# Patient Record
Sex: FEMALE | Race: WHITE | NOT HISPANIC OR LATINO | Employment: OTHER | URBAN - METROPOLITAN AREA
[De-identification: names, ages, dates, MRNs, and addresses within clinical notes are randomized per-mention and may not be internally consistent; named-entity substitution may affect disease eponyms.]

---

## 2017-03-03 ENCOUNTER — TELEPHONE (OUTPATIENT)
Dept: GASTROENTEROLOGY | Facility: CLINIC | Age: 67
End: 2017-03-03

## 2017-03-03 DIAGNOSIS — Z12.11 ENCOUNTER FOR SCREENING COLONOSCOPY: Primary | ICD-10-CM

## 2017-03-10 ENCOUNTER — HOSPITAL ENCOUNTER (OUTPATIENT)
Facility: CLINIC | Age: 67
Discharge: HOME | End: 2017-03-10
Attending: INTERNAL MEDICINE | Admitting: INTERNAL MEDICINE
Payer: COMMERCIAL

## 2017-03-10 ENCOUNTER — SURGERY (OUTPATIENT)
Age: 67
End: 2017-03-10

## 2017-03-10 VITALS
HEART RATE: 72 BPM | SYSTOLIC BLOOD PRESSURE: 135 MMHG | HEIGHT: 65 IN | RESPIRATION RATE: 12 BRPM | OXYGEN SATURATION: 100 % | DIASTOLIC BLOOD PRESSURE: 70 MMHG | BODY MASS INDEX: 35.82 KG/M2 | WEIGHT: 215 LBS

## 2017-03-10 LAB — COLONOSCOPY: NORMAL

## 2017-03-10 PROCEDURE — 40000556 ZZH STATISTIC PERIPHERAL IV START W US GUIDANCE

## 2017-03-10 PROCEDURE — 45385 COLONOSCOPY W/LESION REMOVAL: CPT | Performed by: INTERNAL MEDICINE

## 2017-03-10 PROCEDURE — G0500 MOD SEDAT ENDO SERVICE >5YRS: HCPCS | Performed by: INTERNAL MEDICINE

## 2017-03-10 PROCEDURE — 25000128 H RX IP 250 OP 636: Performed by: INTERNAL MEDICINE

## 2017-03-10 PROCEDURE — 99153 MOD SED SAME PHYS/QHP EA: CPT | Performed by: INTERNAL MEDICINE

## 2017-03-10 PROCEDURE — 25000125 ZZHC RX 250: Performed by: INTERNAL MEDICINE

## 2017-03-10 PROCEDURE — 45380 COLONOSCOPY AND BIOPSY: CPT | Mod: XU,PT | Performed by: INTERNAL MEDICINE

## 2017-03-10 PROCEDURE — 88305 TISSUE EXAM BY PATHOLOGIST: CPT | Performed by: INTERNAL MEDICINE

## 2017-03-10 RX ORDER — FENTANYL CITRATE 50 UG/ML
INJECTION, SOLUTION INTRAMUSCULAR; INTRAVENOUS PRN
Status: DISCONTINUED | OUTPATIENT
Start: 2017-03-10 | End: 2017-03-10 | Stop reason: HOSPADM

## 2017-03-10 RX ADMIN — FENTANYL CITRATE 50 MCG: 50 INJECTION, SOLUTION INTRAMUSCULAR; INTRAVENOUS at 08:57

## 2017-03-10 RX ADMIN — FENTANYL CITRATE 50 MCG: 50 INJECTION, SOLUTION INTRAMUSCULAR; INTRAVENOUS at 09:11

## 2017-03-10 RX ADMIN — MIDAZOLAM HYDROCHLORIDE 1 MG: 1 INJECTION, SOLUTION INTRAMUSCULAR; INTRAVENOUS at 09:06

## 2017-03-10 RX ADMIN — MIDAZOLAM HYDROCHLORIDE 1 MG: 1 INJECTION, SOLUTION INTRAMUSCULAR; INTRAVENOUS at 08:57

## 2017-03-10 RX ADMIN — FENTANYL CITRATE 50 MCG: 50 INJECTION, SOLUTION INTRAMUSCULAR; INTRAVENOUS at 09:04

## 2017-03-10 NOTE — IP AVS SNAPSHOT
MRN:[id number]                      After Visit Summary   3/10/2017    Zenaida Valles    MRN: [id number]           Thank you!     Thank you for choosing [facility] for your care. Our goal is always to provide you with excellent care. Hearing back from our patients is one way we can continue to improve our services. Please take a few minutes to complete the written survey that you may receive in the mail after you visit with us. Thank you!        Patient Information     Date Of Birth          1950        About your hospital stay     You were admitted on:  March 10, 2017 You last received care in the:  [facility], Endoscopy    You were discharged on:  March 10, 2017       Who to Call     For medical emergencies, please call 911.  For non-urgent questions about your medical care, please call your primary care provider or clinic, [phone]  For questions related to your surgery, please call your surgery clinic        Attending Provider     Provider Specialty    Sergo Mcgraw MD Hepatology       Primary Care Provider Office Phone # Fax #    Nadia Randle [phone]       [address] [place] [address]        Further instructions from your care team       Discharge Instructions after Colonoscopy      Today you had a __x__ Colonoscopy     Activity and Diet  You were given medicine for pain. You may be dizzy or sleepy.  For 24 hours:    Do not drive or use heavy equipment.    Do not make important decisions.    Do not drink any alcohol.  You may return to your normal diet and medicines.    Discomfort    Air was placed in your colon during the exam in order to see it. Walking helps to pass the air.    You may take Tylenol (acetaminophen) for pain unless your doctor has told you not to.  Do not take aspirin or ibuprofen (Advil, Motrin, or other anti-inflammatory  drugs) for ___3__ days.    Follow-up  _x___ We took small tissue  samples or polyps to study. Your doctor will call you with the results  within two weeks.    When to call:    Call right away if you have:    Unusual pain in belly or chest pain not relieved with passing air.    More than 1 to 2 Tablespoons of bleeding from your rectum.    Fever above 100.6  F (37.5  C).    If you have severe pain, bleeding, or shortness of breath, go to an emergency room.    If you have questions, call:  Monday to Friday, 7 a.m. to 4:30 p.m.  Endoscopy: [phone] (We may have to call you back)    After hours  Hospital: [phone] (Ask for the GI fellow on call)    Understanding Colon and Rectal Polyps  The colon (also called the large intestine) is a muscular tube that forms the last part of the digestive tract. It absorbs water and stores food waste. The colon is about 4 to 6 feet long. The rectum is the last 6 inches of the colon. The colon and rectum have a smooth lining composed of millions of cells. Changes in these cells can lead to growths in the colon that can become cancerous and should be removed. Multiple tests are available to screen for colon cancer, but the colonoscopy is the most recommended test. During colonoscopy, these polyps can be removed. How often you need this test depends on many things including your condition, your family history, symptoms, and what the findings were at the previous colonoscopy.   When the colon lining changes  Changes that happen in the cells that line the colon or rectum can lead to growths called polyps. Over a period of years, polyps can turn cancerous. Removing polyps early may prevent cancer from ever forming.  Polyps  Polyps are fleshy clumps of tissue that form on the lining of the colon or rectum. Small polyps are usually benign (not cancerous). However, over time, cells in a polyp can change and become cancerous. Certain types of polyps known as adenomatous polyps are premalignant. The risk for invasive cancer increases with the size of the  polyp and certain cell and gene features. This means that they can become cancerous if they're not removed. Hyperplastic polyps are benign. They can grow quite large and not turn cancerous.   Cancer  Almost all colorectal cancers start when polyp cells begin growing abnormally. As a cancerous tumor grows, it may involve more and more of the colon or rectum. In time, cancer can also grow beyond the colon or rectum and spread to nearby organs or to glands called lymph nodes. The cells can also travel to other parts of the body. This is known as metastasis. The earlier a cancerous tumor is removed, the better the chance of preventing its spread.   Understanding Diverticulosis and Diverticulitis     Pouches or diverticula usually occur in the lower part of the colon called the sigmoid.      The colon (large intestine) is the last part of the digestive tract. It absorbs water from stool and changes it from a liquid to a solid. In certain cases, small pouches called diverticula can form in the colon wall. This condition is called diverticulosis. The pouches can become infected. If this happens, it becomes a more serious problem called diverticulitis. These problems can be painful. But they can be managed.   Managing your condition  Diet changes or medicines may be prescribed.   If you have diverticulosis  Recommendations include:  Diet changes are often enough to control symptoms. The main changes are adding fiber (roughage) and drinking more water. Fiber absorbs water as it travels through your colon. This helps your stool stay soft and move smoothly. Water helps this process.  If needed, you may be told to take over-the-counter stool softeners.  To help relieve pain, antispasmodic medicines may be prescribed.  Watch for changes in your bowel movements. Tell the healthcare provider if you notice any changes.  Begin an exercise program. Ask your healthcare provider how to get started.  Get plenty of rest and sleep.   If  "you have diverticulitis  Treatment depends on how bad your symptoms are.  For mild symptoms. You may be put on a liquid diet for a short time. Antibiotics are usually prescribed. If these two steps relieve your symptoms, you may then be prescribed a high-fiber diet. If you still have symptoms, your healthcare provider will discuss more treatment choices with you.  For severe symptoms. You may need to be admitted to the hospital. There, you can be given IV antibiotics and fluids. You will also be put on a low-fiber or liquid diet. Although not common, surgery is needed in some people with severe symptoms.  [place] to colon health     Diverticulitis occurs when the pouches become infected or inflamed.      Help keep your colon healthy with a diet that includes plenty of high-fiber fruits, vegetables, and whole grains. Drink plenty of liquids like water and juice. Maintain a healthy lifestyle including regular exercise, stress management, and adequate rest and sleep.         Pending Results     No orders found from 3/8/2017 to 3/11/2017.            Admission Information     Date & Time Provider Department Dept. Phone    3/10/2017 Sergo Mcgraw MD [facility] [place], Endoscopy [phone]      Your Vitals Were     Blood Pressure Pulse Respirations Height Weight Pulse Oximetry    167/70 72 23 1.651 m (5' 5\") 97.5 kg (215 lb) 100%    BMI (Body Mass Index)                   35.78 kg/m2           MyChart Information     [email or web address] lets you send messages to your doctor, view your test results, renew your prescriptions, schedule appointments and more. To sign up, go to [email or web address] . Click on \"Log in\" on the left side of the screen, which will take you to the Welcome page. Then click on \"Sign up Now\" on the right side of the page.     You will be asked to enter the access code listed below, as well as some personal information. Please follow the directions to create your username and password.     Your access code is: " [email or web address]  Expires: 2017  6:31 AM     Your access code will  in 90 days. If you need help or a new code, please call your [facility] clinic or [phone].        Care EveryWhere ID     This is your Care EveryWhere ID. This could be used by other organizations to access your [facility] medical records  [id number]           Review of your medicines      UNREVIEWED medicines. Ask your doctor about these medicines        Dose / Directions    albuterol 108 (90 BASE) MCG/ACT Inhaler   Commonly known as:  albuterol   Used for:  Mild intermittent asthma without complication        Dose:  2 puff   Inhale 2 puffs into the lungs every 6 hours as needed   Quantity:  3 Inhaler   Refills:  3       aspirin 81 MG tablet        Dose:  81 mg   Take 81 mg by mouth daily   Refills:  0       atenolol 25 MG tablet   Commonly known as:  TENORMIN   Used for:  Benign essential hypertension        Dose:  25 mg   Take 1 tablet (25 mg) by mouth daily   Quantity:  90 tablet   Refills:  3       calcium carbonate 500 MG tablet   Commonly known as:  OS-GAEL 500 mg [place]. Ca   Used for:  Benign essential hypertension, Mixed hyperlipidemia        Dose:  250 mg   Take 250 mg by mouth daily   Refills:  0       fluticasone-salmeterol 100-50 MCG/DOSE diskus inhaler   Commonly known as:  ADVAIR DISKUS   Used for:  Mild intermittent asthma without complication        Dose:  1 puff   Inhale 1 puff into the lungs every 12 hours   Quantity:  3 Inhaler   Refills:  3       hydrochlorothiazide 25 MG tablet   Commonly known as:  HYDRODIURIL   Used for:  Benign essential hypertension        Dose:  25 mg   Take 1 tablet (25 mg) by mouth daily   Quantity:  90 tablet   Refills:  3       irbesartan 300 MG tablet   Commonly known as:  AVAPRO   Used for:  Benign essential hypertension        Dose:  300 mg   Take 1 tablet (300 mg) by mouth At Bedtime   Quantity:  90 tablet   Refills:  3       STATIN NOT PRESCRIBED (INTENTIONAL)   Used for:  Fibromuscular  dysplasia (H)        Dose:  1 each   1 each At Bedtime Statin not prescribed intentionally due to Other Pt declined (This option does not exclude patient from measure)   Quantity:  0 each   Refills:  0                Protect others around you: Learn how to safely use, store and throw away your medicines at www.disposemymeds.org.             Medication List: This is a list of all your medications and when to take them. Check marks below indicate your daily home schedule. Keep this list as a reference.      Medications           Morning Afternoon Evening Bedtime As Needed    albuterol 108 (90 BASE) MCG/ACT Inhaler   Commonly known as:  albuterol   Inhale 2 puffs into the lungs every 6 hours as needed                                aspirin 81 MG tablet   Take 81 mg by mouth daily                                atenolol 25 MG tablet   Commonly known as:  TENORMIN   Take 1 tablet (25 mg) by mouth daily                                calcium carbonate 500 MG tablet   Commonly known as:  OS-GAEL 500 mg [place]. Ca   Take 250 mg by mouth daily                                fluticasone-salmeterol 100-50 MCG/DOSE diskus inhaler   Commonly known as:  ADVAIR DISKUS   Inhale 1 puff into the lungs every 12 hours                                hydrochlorothiazide 25 MG tablet   Commonly known as:  HYDRODIURIL   Take 1 tablet (25 mg) by mouth daily                                irbesartan 300 MG tablet   Commonly known as:  AVAPRO   Take 1 tablet (300 mg) by mouth At Bedtime                                STATIN NOT PRESCRIBED (INTENTIONAL)   1 each At Bedtime Statin not prescribed intentionally due to Other Pt declined (This option does not exclude patient from measure)

## 2017-03-10 NOTE — DISCHARGE INSTRUCTIONS
Discharge Instructions after Colonoscopy      Today you had a __x__ Colonoscopy     Activity and Diet  You were given medicine for pain. You may be dizzy or sleepy.  For 24 hours:    Do not drive or use heavy equipment.    Do not make important decisions.    Do not drink any alcohol.  You may return to your normal diet and medicines.    Discomfort    Air was placed in your colon during the exam in order to see it. Walking helps to pass the air.    You may take Tylenol (acetaminophen) for pain unless your doctor has told you not to.  Do not take aspirin or ibuprofen (Advil, Motrin, or other anti-inflammatory  drugs) for ___3__ days.    Follow-up  _x___ We took small tissue samples or polyps to study. Your doctor will call you with the results  within two weeks.    When to call:    Call right away if you have:    Unusual pain in belly or chest pain not relieved with passing air.    More than 1 to 2 Tablespoons of bleeding from your rectum.    Fever above 100.6  F (37.5  C).    If you have severe pain, bleeding, or shortness of breath, go to an emergency room.    If you have questions, call:  Monday to Friday, 7 a.m. to 4:30 p.m.  Endoscopy: [phone] (We may have to call you back)    After hours  Hospital: [phone] (Ask for the GI fellow on call)    Understanding Colon and Rectal Polyps  The colon (also called the large intestine) is a muscular tube that forms the last part of the digestive tract. It absorbs water and stores food waste. The colon is about 4 to 6 feet long. The rectum is the last 6 inches of the colon. The colon and rectum have a smooth lining composed of millions of cells. Changes in these cells can lead to growths in the colon that can become cancerous and should be removed. Multiple tests are available to screen for colon cancer, but the colonoscopy is the most recommended test. During colonoscopy, these polyps can be removed. How often you need this test depends on many things including your  condition, your family history, symptoms, and what the findings were at the previous colonoscopy.   When the colon lining changes  Changes that happen in the cells that line the colon or rectum can lead to growths called polyps. Over a period of years, polyps can turn cancerous. Removing polyps early may prevent cancer from ever forming.  Polyps  Polyps are fleshy clumps of tissue that form on the lining of the colon or rectum. Small polyps are usually benign (not cancerous). However, over time, cells in a polyp can change and become cancerous. Certain types of polyps known as adenomatous polyps are premalignant. The risk for invasive cancer increases with the size of the polyp and certain cell and gene features. This means that they can become cancerous if they're not removed. Hyperplastic polyps are benign. They can grow quite large and not turn cancerous.   Cancer  Almost all colorectal cancers start when polyp cells begin growing abnormally. As a cancerous tumor grows, it may involve more and more of the colon or rectum. In time, cancer can also grow beyond the colon or rectum and spread to nearby organs or to glands called lymph nodes. The cells can also travel to other parts of the body. This is known as metastasis. The earlier a cancerous tumor is removed, the better the chance of preventing its spread.   Understanding Diverticulosis and Diverticulitis     Pouches or diverticula usually occur in the lower part of the colon called the sigmoid.      The colon (large intestine) is the last part of the digestive tract. It absorbs water from stool and changes it from a liquid to a solid. In certain cases, small pouches called diverticula can form in the colon wall. This condition is called diverticulosis. The pouches can become infected. If this happens, it becomes a more serious problem called diverticulitis. These problems can be painful. But they can be managed.   Managing your condition  Diet changes or  medicines may be prescribed.   If you have diverticulosis  Recommendations include:  Diet changes are often enough to control symptoms. The main changes are adding fiber (roughage) and drinking more water. Fiber absorbs water as it travels through your colon. This helps your stool stay soft and move smoothly. Water helps this process.  If needed, you may be told to take over-the-counter stool softeners.  To help relieve pain, antispasmodic medicines may be prescribed.  Watch for changes in your bowel movements. Tell the healthcare provider if you notice any changes.  Begin an exercise program. Ask your healthcare provider how to get started.  Get plenty of rest and sleep.   If you have diverticulitis  Treatment depends on how bad your symptoms are.  For mild symptoms. You may be put on a liquid diet for a short time. Antibiotics are usually prescribed. If these two steps relieve your symptoms, you may then be prescribed a high-fiber diet. If you still have symptoms, your healthcare provider will discuss more treatment choices with you.  For severe symptoms. You may need to be admitted to the hospital. There, you can be given IV antibiotics and fluids. You will also be put on a low-fiber or liquid diet. Although not common, surgery is needed in some people with severe symptoms.  [place] to colon health     Diverticulitis occurs when the pouches become infected or inflamed.      Help keep your colon healthy with a diet that includes plenty of high-fiber fruits, vegetables, and whole grains. Drink plenty of liquids like water and juice. Maintain a healthy lifestyle including regular exercise, stress management, and adequate rest and sleep.

## 2017-03-10 NOTE — OR NURSING
Pt tolerated colonoscopy well. Total of 4 polyps were removed with large biopsie forcep as well as a hot, small snare. ERBE setting Forced coag, effect 2, Max watt 25

## 2017-03-10 NOTE — IP AVS SNAPSHOT
[facility], [place], Endoscopy    [address] [address]    Phone:  [phone]                                       After Visit Summary   3/10/2017    Zenaida Valles    MRN: [id number]           After Visit Summary Signature Page     I have received my discharge instructions, and my questions have been answered. I have discussed any challenges I see with this plan with the nurse or doctor.    ..........................................................................................................................................  Patient/Patient Representative Signature      ..........................................................................................................................................  Patient Representative Print Name and Relationship to Patient    ..................................................               ................................................  Date                                            Time    ..........................................................................................................................................  Reviewed by Signature/Title    ...................................................              ..............................................  Date                                                            Time

## 2017-03-10 NOTE — LETTER
Patient:  Zenaida Valles  :   1950  MRN:     [id number]        Ms.Nancy Christianne Valles  [address] [facility] [address]        2017    Dear Ms.Werner Valles,    We are writing to inform you of your test results.  The polyps are tubular adenomas which is what we expected. They contain no signs of cancer.   The plan remains the same: repeat colonoscopy in ~ 3 years.     It has been a pleasure participating in your care.  Please feel free to contact our clinic with any further questions.      Sincerely,    Volodymyr Mcgraw MD    [facility], [place], ENDOSCOPY  [address] [facility] [address]  Phone: [phone]     Resulted Orders   Surgical pathology exam   Result Value Ref Range    Copath Report       Patient Name: ZENAIDA INTERIANO  MR#: [id number]  Specimen #: [id number]  Collected: 3/10/2017  Received: 3/10/2017  Reported: 3/15/2017 18:36  Ordering Phy(s): VOLODYMYR MCGRAW    For improved result formatting, select 'View Enhanced Report Format'  under Linked Documents section.    SPECIMEN(S):  A: Cecal polyp  B: Colon biopsy, ascending  C: Colon polyp, transverse    FINAL DIAGNOSIS:  A. COLON, CECAL POLYP, POLYPECTOMY:  - Tubular adenoma  - Negative for high-grade dysplasia and invasive malignancy    B. COLON, ASCENDING POLYPS, POLYPECTOMIES:  - Tubular adenoma(s)  - Negative for high-grade dysplasia and invasive malignancy    C. COLON, TRANSVERSE POLYP, POLYPECTOMY:  - Tubular adenoma  - Negative for high-grade dysplasia and invasive malignancy    I have personally reviewed all specimens and or slides, including the  listed special stains, and used them with my medical judgement to  determine the final diagnosis.    Electronically signed out by:    Maddy Campa M.D., [facility] noemi    CLINICAL HISTORY:  High risk colon cancer surveillance: Personal history of colonic polyps.    GROSS:  A: The specimen is received in formalin with proper  "patient  identification, labeled \"large intestine, cecum polyp\".  The specimen  consists of a 0.2 x 0.1 x 0.1 cm red-brown soft tissue fragment which is  entirely submitted in cassette A1.    B: The specimen is received in formalin with proper patient  identification, labeled \"large intestine, right ascending biopsy\".  The  specimen consists of three red-brown soft tissue fragments, ranging in  size from 0.1-0.4 cm in greatest dimension, which are entirely submitted  in cassette B1.    C: The specimen is received in formalin with proper patient  identification, labeled \"large intestine, transverse polyp\".  The  specimen consists of four red-brown soft tissue fragments, ranging in  size from 0.1-0.4 cm in greatest dimension, which are entirely submitted  in cassette C1.    (Dictated by: Tania Hayes 3/10/2017 02:50 PM)    MARIANO ROSCOPIC:  Microscopic examination is performed.    CPT Codes:  A: 69253-WI6  B: 81110-YS6  C: 39391-QU6    TESTING LAB LOCATION:  [address]   [address]  [phone]    COLLECTION SITE:  Client: [facility]  Location: [facility] (B)         "

## 2017-03-15 LAB — COPATH REPORT: NORMAL

## 2017-05-22 ENCOUNTER — OFFICE VISIT (OUTPATIENT)
Dept: INTERNAL MEDICINE | Facility: CLINIC | Age: 67
End: 2017-05-22

## 2017-05-22 VITALS
RESPIRATION RATE: 16 BRPM | WEIGHT: 223 LBS | SYSTOLIC BLOOD PRESSURE: 128 MMHG | DIASTOLIC BLOOD PRESSURE: 62 MMHG | HEART RATE: 56 BPM | BODY MASS INDEX: 37.11 KG/M2

## 2017-05-22 DIAGNOSIS — M25.561 ACUTE PAIN OF RIGHT KNEE: Primary | ICD-10-CM

## 2017-05-22 DIAGNOSIS — S83.241A ACUTE MEDIAL MENISCUS TEAR OF RIGHT KNEE, INITIAL ENCOUNTER: ICD-10-CM

## 2017-05-22 DIAGNOSIS — Z23 NEED FOR PROPHYLACTIC VACCINATION AGAINST STREPTOCOCCUS PNEUMONIAE (PNEUMOCOCCUS): ICD-10-CM

## 2017-05-22 DIAGNOSIS — S83.281A TEAR OF LATERAL CARTILAGE OR MENISCUS OF KNEE, CURRENT, RIGHT, INITIAL ENCOUNTER: ICD-10-CM

## 2017-05-22 ASSESSMENT — PAIN SCALES - GENERAL: PAINLEVEL: NO PAIN (1)

## 2017-05-22 NOTE — PROGRESS NOTES
ASSESSMENT/PLAN:  Patient with likely ligament tear.  On exam I thought it was ACL given the pain with the Lachman's maneuver but it appears to be a number of other ligaments as seen on the MRI.  I tried to call the patient but there was no answer.  [facility] is not active.  I would like her to see orthopedic clinic as soon as possible, so I will put in a referral and ask the schedulers to try to get ahold of her.    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  The motion was a foot plant with the foot slightly in front of her and then flexion of her knee while shifting her weight forward.  She could not bear weight on it.  She did not move much yesterday.  If she moves in the wrong way it will hurt a lot.  Now the pain is worst on the medial right knee and on the upper calf.      She has hurt her right leg many times.  It first started 40 years ago.  She has will get numbness and tingling of her toes off and on for a long time.    Last fall she repositioned her right leg in the bed and there was a snap in the back.  She can walk with a walker.    Medications and allergies were reviewed by me today.     Patient Active Problem List    Diagnosis Date Noted     Asthma 10/18/2011     Priority: Medium     Problem list name updated by automated process. Provider to review       Atherosclerosis of renal artery (H) 10/18/2011     Priority: Medium     Essential hypertension 10/03/2011     Priority: Medium     Problem list name updated by automated process. Provider to review         PHYSICAL EXAM:  /62  Pulse 56  Resp 16  Wt 101.2 kg (223 lb)  BMI 37.11  kg/m2  Constitutional: no distress, comfortable, pleasant   Musculoskeletal: full range of motion of the knee, no medial or lateral joint laxity, there is pain with Lachman's such that I had to stop although the pain seemed to come from the back of the knee, not with posterior drawer sign, she could not tolerate the McMurrays test; there is swelling of the right vs the left knee but no warmth  Skin: no concerning lesions on the leg including no redness    Imaging via MRI showed a number of tears to the ligaments and meniscus of the right knee.

## 2017-05-22 NOTE — MR AVS SNAPSHOT
After Visit Summary   5/22/2017    Zenaida Valles    MRN: [id number]           Patient Information     Date Of Birth          1950        Visit Information        Provider Department      5/22/2017 10:30 AM Gadiel Ruiz MD [facility]        Today's Diagnoses     Acute pain of right knee    -  1    Need for prophylactic vaccination against Streptococcus pneumoniae (pneumococcus)          Care Instructions    Primary Care Center Medication Refill Request Information:  * Please contact your pharmacy regarding ANY request for medication refills.  ** [facility] Prescription Fax = [phone]  * Please allow 3 business days for routine medication refills.  * Please allow 5 business days for controlled substance medication refills.     Primary Care Center Test Result notification information:  *You will be notified with in 7-10 days of your appointment day regarding the results of your test.  If you are on MyChart you will be notified as soon as the provider has reviewed the results and signed off on them.    Primary Care Center [phone]           Follow-ups after your visit        Your next 10 appointments already scheduled     May 22, 2017 10:30 AM CDT   (Arrive by 10:15 AM)   ACUTE/CHRONIC SINGLE CONDITION with Gadiel Ruiz MD   [facility] ([facility])    [address]  4th Floor  [place] [address] [phone]            May 22, 2017  1:15 PM CDT   (Arrive by 1:00 PM)   XR KNEE RIGHT 3 VIEWS with [id number]   [facility] ([facility] [place])    [address]  1st Floor  [place] [address] [phone]           Please bring a list of your current medicines to your exam. (Include vitamins, minerals and over-thecounter medicines.) Leave your valuables at home.  Tell your doctor if there is a chance you may be pregnant.  You do not need to do anything special for this  exam.            May 22, 2017  1:45 PM CDT   (Arrive by 1:30 PM)   MR KNEE RIGHT W/O & W CONTRAST with [id number]   [facility] [place] [facility] ([facility] [place])    [address] [address] [phone]           Take your medicines as usual, unless your doctor tells you not to. Bring a list of your current medicines to your exam (including vitamins, minerals and over-the-counter drugs).  You will be given intravenous contrast for this exam. To prepare:   The day before your exam, drink extra fluids at least six 8-ounce glasses (unless your doctor tells you to restrict your fluids).   Have a blood test (creatinine test) within 30 days of your exam. Go to your clinic or Diagnostic Imaging Department for this test.  The MRI machine uses a strong magnet. Please wear clothes without metal (snaps, zippers). A sweatsuit works well, or we may give you a hospital gown.  Please remove any body piercings and hair extensions before you arrive. You will also remove watches, jewelry, hairpins, wallets, dentures, partial dental plates and hearing aids. You may wear contact lenses, and you may be able to wear your rings. We have a safe place to keep your personal items, but it is safer to leave them at home.   **IMPORTANT** THE INSTRUCTIONS BELOW ARE ONLY FOR THOSE PATIENTS WHO HAVE BEEN TOLD THEY WILL RECEIVE SEDATION OR GENERAL ANESTHESIA DURING THEIR MRI PROCEDURE:  IF YOU WILL RECEIVE SEDATION (take medicine to help you relax during your exam):   You must get the medicine from your doctor before you arrive. Bring the medicine to the exam. Do not take it at home.   Arrive one hour early. Bring someone who can take you home after the test. Your medicine will make you sleepy. After the exam, you may not drive, take a bus or take a taxi by yourself.   No eating 8 hours before your exam. You may have clear liquids up until 4 hours before your exam. (Clear liquids include water,  clear tea, black coffee and fruit juice without pulp.)  IF YOU WILL RECEIVE ANESTHESIA (be asleep for your exam):   Arrive 1 1/2 hours early. Bring someone who can take you home after the test. You may not drive, take a bus or take a taxi by yourself.   No eating 8 hours before your exam. You may have clear liquids up until 4 hours before your exam. (Clear liquids include water, clear tea, black coffee and fruit juice without pulp.)  Please call the Imaging Department at your exam site with any questions.              Future tests that were ordered for you today     Open Future Orders        Priority Expected Expires Ordered    MRI Knee Right w/o & w contrast Routine  2018    XR Knee Right 3 Views Routine 2017            Who to contact     Please call your clinic at [phone] to:    Ask questions about your health    Make or cancel appointments    Discuss your medicines    Learn about your test results    Speak to your doctor   If you have compliments or concerns about an experience at your clinic, or if you wish to file a complaint, please contact [facility] Patient Relations at [phone] or email us at [email or web address]         Additional Information About Your Visit        [employer] Information     [employer] is an electronic gateway that provides easy, online access to your medical records. With [employer], you can request a clinic appointment, read your test results, renew a prescription or communicate with your care team.     To sign up for [employer] visit the website at [email or web address]   You will be asked to enter the access code listed below, as well as some personal information. Please follow the directions to create your username and password.     Your access code is: [email or web address]  Expires: 2017 10:27 AM     Your access code will  in 90 days. If you need help or a new code, please contact your [facility] or call [phone] for assistance.        Care EveryWhere ID     This is your Care EveryWhere ID. This could be used by other organizations to access your [place] medical records  [id number]        Your Vitals Were     Pulse Respirations BMI (Body Mass Index)             56 16 37.11 kg/m2          Blood Pressure from Last 3 Encounters:   05/22/17 128/62   03/10/17 135/70   11/18/16 164/78    Weight from Last 3 Encounters:   05/22/17 101.2 kg (223 lb)   03/10/17 97.5 kg (215 lb)   11/18/16 98.9 kg (218 lb)              We Performed the Following     Pneumococcal vaccine 13 valent PCV13 IM (Prevnar) [87906]        Primary Care Provider Office Phone # Fax #    Nadia Randle [phone]       [address]  [facility] [address]        Thank you!     Thank you for choosing [facility]  for your care. Our goal is always to provide you with excellent care. Hearing back from our patients is one way we can continue to improve our services. Please take a few minutes to complete the written survey that you may receive in the mail after your visit with us. Thank you!             Your Updated Medication List - Protect others around you: Learn how to safely use, store and throw away your medicines at www.disposemymeds.org.          This list is accurate as of: 5/22/17 10:27 AM.  Always use your most recent med list.                   Brand Name Dispense Instructions for use    albuterol 108 (90 BASE) MCG/ACT Inhaler    albuterol    3 Inhaler    Inhale 2 puffs into the lungs every 6 hours as needed       aspirin 81 MG tablet      Take 81 mg by mouth daily       atenolol 25 MG tablet    TENORMIN    90 tablet    Take 1 tablet (25 mg) by mouth daily       calcium carbonate 500 MG tablet    OS-GAEL 500 mg [place]. Ca     Take 250 mg by mouth daily       fluticasone-salmeterol 100-50 MCG/DOSE diskus inhaler    ADVAIR DISKUS    3 Inhaler     Inhale 1 puff into the lungs every 12 hours       hydrochlorothiazide 25 MG tablet    HYDRODIURIL    90 tablet    Take 1 tablet (25 mg) by mouth daily       irbesartan 300 MG tablet    AVAPRO    90 tablet    Take 1 tablet (300 mg) by mouth At Bedtime       STATIN NOT PRESCRIBED (INTENTIONAL)     0 each    1 each At Bedtime Statin not prescribed intentionally due to Other Pt declined (This option does not exclude patient from measure)

## 2017-05-22 NOTE — NURSING NOTE
Patient received Prevnar 13 vaccine.  See immunization list for administration details.  Patient tolerated injection well.     EDMUNDO FORTE at 10:35 AM on 5/22/2017.

## 2017-05-22 NOTE — PATIENT INSTRUCTIONS
[facility] Medication Refill Request Information:  * Please contact your pharmacy regarding ANY request for medication refills.  ** [facility] Prescription Fax = [phone]  * Please allow 3 business days for routine medication refills.  * Please allow 5 business days for controlled substance medication refills.     [facility] Test Result notification information:  *You will be notified with in 7-10 days of your appointment day regarding the results of your test.  If you are on MyChart you will be notified as soon as the provider has reviewed the results and signed off on them.    [facility] [phone]

## 2017-05-22 NOTE — NURSING NOTE
Chief Complaint   Patient presents with     Musculoskeletal Problem     Patient moved right leg on Saturday and heard a loud crack     EDMUNDO FORTE at 9:56 AM on 5/22/2017.

## 2017-05-23 ENCOUNTER — TELEPHONE (OUTPATIENT)
Dept: INTERNAL MEDICINE | Facility: CLINIC | Age: 67
End: 2017-05-23

## 2017-05-23 NOTE — TELEPHONE ENCOUNTER
----- Message from Vanita Foster sent at 5/23/2017  9:44 AM CDT -----  Regarding: Test reults for MRI  Contact: [phone]  Would like a call back pool with results, she was upset that nobody has returned her call

## 2017-05-25 NOTE — TELEPHONE ENCOUNTER
Patient given results and notified of ortho appointment.  Patient verbalizes understanding.  Alma Sterling LPN

## 2017-05-31 ENCOUNTER — OFFICE VISIT (OUTPATIENT)
Dept: ORTHOPEDICS | Facility: CLINIC | Age: 67
End: 2017-05-31

## 2017-05-31 DIAGNOSIS — M17.11 PRIMARY OSTEOARTHRITIS OF RIGHT KNEE: Primary | ICD-10-CM

## 2017-05-31 NOTE — NURSING NOTE
Reason For Visit:   Chief Complaint   Patient presents with     Consult     Right knee       ?  No  Occupation: Retired.  Currently working? No.  Work status?  Retired.  Date of injury: NA  Type of injury: Multiple Injuries.  Smoker: No  Request smoking cessation information: No    Pain Assessment  Patient Currently in Pain: Yes  0-10 Pain Scale: 1  Primary Pain Location: Knee  Pain Orientation: Right

## 2017-05-31 NOTE — MR AVS SNAPSHOT
After Visit Summary   5/31/2017    Zenaida Valles    MRN: [id number]           Patient Information     Date Of Birth          1950        Visit Information        Provider Department      5/31/2017 9:30 AM Seth Grace MD [facility]        Today's Diagnoses     Primary osteoarthritis of right knee    -  1       Follow-ups after your visit        Who to contact     Please call your clinic at [phone] to:    Ask questions about your health    Make or cancel appointments    Discuss your medicines    Learn about your test results    Speak to your doctor   If you have compliments or concerns about an experience at your clinic, or if you wish to file a complaint, please contact [facility] Patient Relations at [phone] or email us at [email or web address]         Additional Information About Your Visit        MyChart Information     [employer] gives you secure access to your electronic health record. If you see a primary care provider, you can also send messages to your care team and make appointments. If you have questions, please call your primary care clinic.  If you do not have a primary care provider, please call [phone] and they will assist you.      [employer] is an electronic gateway that provides easy, online access to your medical records. With [employer], you can request a clinic appointment, read your test results, renew a prescription or communicate with your care team.     To access your existing account, please contact your [facility] or call [phone] for assistance.        Care EveryWhere ID     This is your Care EveryWhere ID. This could be used by other organizations to access your [place] medical records  [id number]         Blood Pressure from Last 3 Encounters:   05/22/17 128/62   03/10/17 135/70   11/18/16 164/78    Weight from Last 3 Encounters:   05/22/17 101.2 kg  (223 lb)   03/10/17 97.5 kg (215 lb)   11/18/16 98.9 kg (218 lb)              Today, you had the following     No orders found for display       Primary Care Provider Office Phone # Fax #    Nadia Randle [phone]       [address]  [facility] [address]        Thank you!     Thank you for choosing [facility]  for your care. Our goal is always to provide you with excellent care. Hearing back from our patients is one way we can continue to improve our services. Please take a few minutes to complete the written survey that you may receive in the mail after your visit with us. Thank you!             Your Updated Medication List - Protect others around you: Learn how to safely use, store and throw away your medicines at www.disposemymeds.org.          This list is accurate as of: 5/31/17 11:59 PM.  Always use your most recent med list.                   Brand Name Dispense Instructions for use    albuterol 108 (90 BASE) MCG/ACT Inhaler    albuterol    3 Inhaler    Inhale 2 puffs into the lungs every 6 hours as needed       aspirin 81 MG tablet      Take 81 mg by mouth daily       atenolol 25 MG tablet    TENORMIN    90 tablet    Take 1 tablet (25 mg) by mouth daily       calcium carbonate 1250 MG tablet    OS-GAEL 500 mg [place]. Ca     Take 250 mg by mouth daily       fluticasone-salmeterol 100-50 MCG/DOSE diskus inhaler    ADVAIR DISKUS    3 Inhaler    Inhale 1 puff into the lungs every 12 hours       hydrochlorothiazide 25 MG tablet    HYDRODIURIL    90 tablet    Take 1 tablet (25 mg) by mouth daily       irbesartan 300 MG tablet    AVAPRO    90 tablet    Take 1 tablet (300 mg) by mouth At Bedtime       STATIN NOT PRESCRIBED (INTENTIONAL)     0 each    1 each At Bedtime Statin not prescribed intentionally due to Other Pt declined (This option does not exclude patient from measure)

## 2017-05-31 NOTE — LETTER
5/31/2017      RE: Zenaida Valles  [address]  [facility] [address]       Patient seen and examined with the resident.     Assesment: PF OA, ? MM root tear, clinically much improved    Plan: contineu activity as tolerated, wbat, rom as rika, try scheduled po nsads if symptoms persist, injection if not better    I agree with history, physical and imaging as well as the assessment and plan as detailed by Dr. Peterson.       CHIEF COMPLAINT:  Right knee.      HISTORY OF PRESENT ILLNESS:  Ms. Lazarus Valles is a 67-year-old female who had the acute onset of right knee pain while doing a specific pose during her yoga type activity.  The patient was able to obtain an MRI and is here to follow up those results.  She states that she feels 70% better than previous.  She can now go up and down stairs again which she was unable to do previously.  She describes having diffuse pain after the event.  Now she mainly has pain in the back of her knee which she had even prior to the event.  She states she has had multiple events over the years in regards to her right knee, but at baseline, able to do all the activities she would like to do such as a garden, portage a canoe.  The patient denies any hip or back pain.      REVIEW OF SYSTEMS:  A 12-point review of systems otherwise negative.      PAST MEDICAL HISTORY:  Per Epic.      PAST SURGICAL HISTORY:  Stent of radial artery.      MEDICATIONS:  Include Atenolol, hydrochlorothiazide, albuterol,  statin as well as aspirin.      ALLERGIES:  Include Lisinopril and codeine.      FAMILY HISTORY:  No bleeding, clotting disorders or reactions to anesthesia.      SOCIAL HISTORY:  The patient does not have any bad habits.  She is here with her .      PHYSICAL EXAMINATION:  In no acute distress, breathing comfortably.  Specific examination of the right lower extremity demonstrates full hip range of motion without pain.  The patient has full extension of the  right knee 125 degrees of flexion, stable to varus and valgus stress, anterior and posterior drawer, Lachman's, negative Chuy's, nontender over the medial and lateral joint lines, nontender to palpation of the patella.  No pain with resisted quads with resisted straight leg raise.  The patient has a negative straight leg raise bilaterally.  The patient is distally neurovascularly intact.  Specific examination of left lower extremity is equivalent to the right, but with 135 degrees of flexion.  No abnormalities.      IMAGING:  X-rays of the right knee are reviewed.  These demonstrate no evidence of advanced osteoarthritis.  MRI of the right knee is reviewed which demonstrates a likely medial meniscus posterior root tear as well as possible tear of the lateral meniscus posterior root.  Patient has diffuse mild cartilage changes but especially has grade IV cartilage changes of the patella with cystic changes within the lateral patellar facet.  Patient also demonstrates evidence of a posterior cyst that is approximately 3 cm in greatest dimension.      ASSESSMENT:   1.  Patellofemoral osteoarthritis.   2.  Medial meniscus root tear, right knee.   3.  Significant improvement with nonoperative treatment.      PLAN:  We discussed with Ms. Qiu her possible options moving forward including continued observation versus corticosteroid injection versus meniscus root repair.  Patient would like to continue with nonoperative treatment as she feels she has improved so much of late.  We are happy to see the patient back at any time and proceed with a corticosteroid injection or to talk to her about her surgical options.  We also discussed taking a pre-emptive course of anti-inflammatories if she should have another flare in her knee pain.  All the patient's questions were answered.  The patient will follow back up with us on an as-needed basis.           Seth Grace MD

## 2017-05-31 NOTE — PROGRESS NOTES
CHIEF COMPLAINT:  Right knee.      HISTORY OF PRESENT ILLNESS:  Ms. Lazarus Valles is a 67-year-old female who had the acute onset of right knee pain while doing a specific pose during her yoga type activity.  The patient was able to obtain an MRI and is here to follow up those results.  She states that she feels 70% better than previous.  She can now go up and down stairs again which she was unable to do previously.  She describes having diffuse pain after the event.  Now she mainly has pain in the back of her knee which she had even prior to the event.  She states she has had multiple events over the years in regards to her right knee, but at baseline, able to do all the activities she would like to do such as a garden, portage a canoe.  The patient denies any hip or back pain.      REVIEW OF SYSTEMS:  A 12-point review of systems otherwise negative.      PAST MEDICAL HISTORY:  Per [facility].      PAST SURGICAL HISTORY:  Stent of radial artery.      MEDICATIONS:  Include Atenolol, hydrochlorothiazide, albuterol,  statin as well as aspirin.      ALLERGIES:  Include Lisinopril and codeine.      FAMILY HISTORY:  No bleeding, clotting disorders or reactions to anesthesia.      SOCIAL HISTORY:  The patient does not have any bad habits.  She is here with her .      PHYSICAL EXAMINATION:  In no acute distress, breathing comfortably.  Specific examination of the right lower extremity demonstrates full hip range of motion without pain.  The patient has full extension of the right knee 125 degrees of flexion, stable to varus and valgus stress, anterior and posterior drawer, Lachman's, negative Chuy's, nontender over the medial and lateral joint lines, nontender to palpation of the patella.  No pain with resisted quads with resisted straight leg raise.  The patient has a negative straight leg raise bilaterally.  The patient is distally neurovascularly intact.  Specific examination of left lower extremity is equivalent  to the right, but with 135 degrees of flexion.  No abnormalities.      IMAGING:  X-rays of the right knee are reviewed.  These demonstrate no evidence of advanced osteoarthritis.  MRI of the right knee is reviewed which demonstrates a likely medial meniscus posterior root tear as well as possible tear of the lateral meniscus posterior root.  Patient has diffuse mild cartilage changes but especially has grade IV cartilage changes of the patella with cystic changes within the lateral patellar facet.  Patient also demonstrates evidence of a posterior cyst that is approximately 3 cm in greatest dimension.      ASSESSMENT:   1.  Patellofemoral osteoarthritis.   2.  Medial meniscus root tear, right knee.   3.  Significant improvement with nonoperative treatment.      PLAN:  We discussed with Ms. Qiu her possible options moving forward including continued observation versus corticosteroid injection versus meniscus root repair.  Patient would like to continue with nonoperative treatment as she feels she has improved so much of late.  We are happy to see the patient back at any time and proceed with a corticosteroid injection or to talk to her about her surgical options.  We also discussed taking a pre-emptive course of anti-inflammatories if she should have another flare in her knee pain.  All the patient's questions were answered.  The patient will follow back up with us on an as-needed basis.

## 2017-05-31 NOTE — PROGRESS NOTES
Patient seen and examined with the resident.     Assesment: PF OA, ? MM root tear, clinically much improved    Plan: contineu activity as tolerated, wbat, rom as rika, try scheduled po nsads if symptoms persist, injection if not better    I agree with history, physical and imaging as well as the assessment and plan as detailed by Dr. Peterson.

## 2017-07-06 ENCOUNTER — MYC MEDICAL ADVICE (OUTPATIENT)
Dept: INTERNAL MEDICINE | Facility: CLINIC | Age: 67
End: 2017-07-06

## 2017-07-06 DIAGNOSIS — G89.29 CHRONIC PAIN OF RIGHT KNEE: ICD-10-CM

## 2017-07-06 DIAGNOSIS — M25.561 RIGHT KNEE PAIN: Primary | ICD-10-CM

## 2017-07-06 DIAGNOSIS — M25.561 CHRONIC PAIN OF RIGHT KNEE: ICD-10-CM

## 2017-11-08 ASSESSMENT — ENCOUNTER SYMPTOMS
DYSURIA: 0
ARTHRALGIAS: 1
BACK PAIN: 1
MUSCLE WEAKNESS: 0
STIFFNESS: 1
FLANK PAIN: 0
DIFFICULTY URINATING: 0
MYALGIAS: 1
JOINT SWELLING: 0
HEMATURIA: 0
MUSCLE CRAMPS: 0
NECK PAIN: 0

## 2017-11-08 ASSESSMENT — ACTIVITIES OF DAILY LIVING (ADL)
IN_THE_PAST_7_DAYS,_DID_YOU_NEED_HELP_FROM_OTHERS_TO_PERFORM_EVERYDAY_ACTIVITIES_SUCH_AS_EATING,_GETTING_DRESSED,_GROOMING,_BATHING,_WALKING,_OR_USING_THE_TOILET: N
IN_THE_PAST_7_DAYS,_DID_YOU_NEED_HELP_FROM_OTHERS_TO_TAKE_CARE_OF_THINGS_SUCH_AS_LAUNDRY_AND_HOUSEKEEPING,_BANKING,_SHOPPING,_USING_THE_TELEPHONE,_FOOD_PREPARATION,_TRANSPORTATION,_OR_TAKING_YOUR_OWN_MEDICATIONS?: N

## 2017-11-20 ENCOUNTER — OFFICE VISIT (OUTPATIENT)
Dept: INTERNAL MEDICINE | Facility: CLINIC | Age: 67
End: 2017-11-20

## 2017-11-20 VITALS
HEART RATE: 65 BPM | TEMPERATURE: 97.5 F | DIASTOLIC BLOOD PRESSURE: 79 MMHG | OXYGEN SATURATION: 97 % | SYSTOLIC BLOOD PRESSURE: 121 MMHG | RESPIRATION RATE: 16 BRPM | HEIGHT: 65 IN | WEIGHT: 216 LBS | BODY MASS INDEX: 35.99 KG/M2

## 2017-11-20 DIAGNOSIS — E66.3 OVERWEIGHT: Primary | ICD-10-CM

## 2017-11-20 DIAGNOSIS — Z00.00 ROUTINE GENERAL MEDICAL EXAMINATION AT A HEALTH CARE FACILITY: ICD-10-CM

## 2017-11-20 DIAGNOSIS — I10 BENIGN ESSENTIAL HYPERTENSION: ICD-10-CM

## 2017-11-20 DIAGNOSIS — E78.5 HYPERLIPIDEMIA LDL GOAL <100: ICD-10-CM

## 2017-11-20 DIAGNOSIS — E66.3 OVERWEIGHT: ICD-10-CM

## 2017-11-20 DIAGNOSIS — J45.20 MILD INTERMITTENT ASTHMA WITHOUT COMPLICATION: ICD-10-CM

## 2017-11-20 LAB
ALBUMIN SERPL-MCNC: 4 G/DL (ref 3.4–5)
ALP SERPL-CCNC: 112 U/L (ref 40–150)
ALT SERPL W P-5'-P-CCNC: 35 U/L (ref 0–50)
ANION GAP SERPL CALCULATED.3IONS-SCNC: 7 MMOL/L (ref 3–14)
AST SERPL W P-5'-P-CCNC: 18 U/L (ref 0–45)
BILIRUB SERPL-MCNC: 0.3 MG/DL (ref 0.2–1.3)
BUN SERPL-MCNC: 15 MG/DL (ref 7–30)
CALCIUM SERPL-MCNC: 8.8 MG/DL (ref 8.5–10.1)
CHLORIDE SERPL-SCNC: 103 MMOL/L (ref 94–109)
CHOLEST SERPL-MCNC: 232 MG/DL
CO2 SERPL-SCNC: 28 MMOL/L (ref 20–32)
CREAT SERPL-MCNC: 0.7 MG/DL (ref 0.52–1.04)
GFR SERPL CREATININE-BSD FRML MDRD: 84 ML/MIN/1.7M2
GLUCOSE SERPL-MCNC: 131 MG/DL (ref 70–99)
HBA1C MFR BLD: 7.3 % (ref 4.3–6)
HDLC SERPL-MCNC: 39 MG/DL
LDLC SERPL CALC-MCNC: 148 MG/DL
NONHDLC SERPL-MCNC: 193 MG/DL
POTASSIUM SERPL-SCNC: 4 MMOL/L (ref 3.4–5.3)
PROT SERPL-MCNC: 7.3 G/DL (ref 6.8–8.8)
SODIUM SERPL-SCNC: 138 MMOL/L (ref 133–144)
TRIGL SERPL-MCNC: 222 MG/DL

## 2017-11-20 RX ORDER — ATENOLOL 25 MG/1
25 TABLET ORAL DAILY
Qty: 90 TABLET | Refills: 3 | Status: SHIPPED | OUTPATIENT
Start: 2017-11-20 | End: 2018-11-23

## 2017-11-20 RX ORDER — HYDROCHLOROTHIAZIDE 25 MG/1
25 TABLET ORAL DAILY
Qty: 90 TABLET | Refills: 3 | Status: SHIPPED | OUTPATIENT
Start: 2017-11-20 | End: 2018-11-23

## 2017-11-20 RX ORDER — ALBUTEROL SULFATE 90 UG/1
2 AEROSOL, METERED RESPIRATORY (INHALATION) EVERY 6 HOURS PRN
Qty: 3 INHALER | Refills: 3 | Status: SHIPPED | OUTPATIENT
Start: 2017-11-20 | End: 2020-04-29

## 2017-11-20 RX ORDER — IRBESARTAN 300 MG/1
300 TABLET ORAL AT BEDTIME
Qty: 90 TABLET | Refills: 3 | Status: SHIPPED | OUTPATIENT
Start: 2017-11-20 | End: 2018-11-23

## 2017-11-20 RX ORDER — MULTIPLE VITAMINS W/ MINERALS TAB 9MG-400MCG
1 TAB ORAL DAILY
Status: ON HOLD | COMMUNITY
End: 2022-11-25

## 2017-11-20 ASSESSMENT — PAIN SCALES - GENERAL: PAINLEVEL: NO PAIN (0)

## 2017-11-20 NOTE — MR AVS SNAPSHOT
After Visit Summary   11/20/2017    Zenaida Valles    MRN: [id number]           Patient Information     Date Of Birth          1950        Visit Information        Provider Department      11/20/2017 1:10 PM Nadia Randle MD [facility]        Today's Diagnoses     Overweight    -  1    Routine general medical examination at a health care facility        Hyperlipidemia LDL goal <100           Follow-ups after your visit        Follow-up notes from your care team     Return in about 1 year (around 11/20/2018).      Future tests that were ordered for you today     Open Future Orders        Priority Expected Expires Ordered    Lipid panel reflex to direct LDL Fasting Routine 11/20/2017 12/4/2017 11/20/2017    Comprehensive metabolic panel Routine 11/20/2017 12/4/2017 11/20/2017    Hemoglobin A1c Routine 11/20/2017 12/4/2017 11/20/2017            Who to contact     Please call your clinic at [phone] to:    Ask questions about your health    Make or cancel appointments    Discuss your medicines    Learn about your test results    Speak to your doctor   If you have compliments or concerns about an experience at your clinic, or if you wish to file a complaint, please contact [facility] Patient Relations at [phone] or email us at [email or web address]         Additional Information About Your Visit        MyChart Information     Tra[employer]mission gives you secure access to your electronic health record. If you see a primary care provider, you can also send messages to your care team and make appointments. If you have questions, please call your primary care clinic.  If you do not have a primary care provider, please call [phone] and they will assist you.      Traansmission is an electronic gateway that provides easy, online access to your medical records. With [employer], you can request a clinic appointment, read your test results,  "renew a prescription or communicate with your care team.     To access your existing account, please contact your [facility] or call [phone] for assistance.        Care EveryWhere ID     This is your Care EveryWhere ID. This could be used by other organizations to access your [facility] medical records  [id number]        Your Vitals Were     Pulse Temperature Respirations Height Pulse Oximetry Breastfeeding?    65 97.5  F (36.4  C) (Oral) 16 1.657 m (5' 5.25\") 97% No    BMI (Body Mass Index)                   35.67 kg/m2            Blood Pressure from Last 3 Encounters:   11/20/17 121/79   05/22/17 128/62   03/10/17 135/70    Weight from Last 3 Encounters:   11/20/17 98 kg (216 lb)   05/22/17 101.2 kg (223 lb)   03/10/17 97.5 kg (215 lb)                 Today's Medication Changes          These changes are accurate as of: 11/20/17  1:41 PM.  If you have any questions, ask your nurse or doctor.               These medicines have changed or have updated prescriptions.        Dose/Directions    fluticasone-salmeterol 100-50 MCG/DOSE diskus inhaler   Commonly known as:  ADVAIR DISKUS   This may have changed:  when to take this   Used for:  Mild intermittent asthma without complication        Dose:  1 puff   Inhale 1 puff into the lungs every 12 hours   Quantity:  3 Inhaler   Refills:  3                Primary Care Provider Office Phone # Fax #    ParagouldMarine Randle [phone]       [address] [place] [address] [address] [place] [id number]        Equal Access to Services     HANSA ALVAREZ AH: Hadpat Estrada, waaxda luqadaha, qaybta kaalmada lorie cotto. So [facility] [phone].    ATENCIÓN: Si habla español, tiene a schofield disposición servicios gratuitos de asistencia lingüística. Llame al [phone].    We comply with applicable federal civil rights laws and [place] laws. We do not discriminate on the basis of race, color, " national origin, age, disability, sex, sexual orientation, or gender identity.            Thank you!     Thank you for choosing [facility]  for your care. Our goal is always to provide you with excellent care. Hearing back from our patients is one way we can continue to improve our services. Please take a few minutes to complete the written survey that you may receive in the mail after your visit with us. Thank you!             Your Updated Medication List - Protect others around you: Learn how to safely use, store and throw away your medicines at www.disposemymeds.org.          This list is accurate as of: 11/20/17  1:41 PM.  Always use your most recent med list.                   Brand Name Dispense Instructions for use Diagnosis    albuterol 108 (90 BASE) MCG/ACT Inhaler    PROAIR HFA    3 Inhaler    Inhale 2 puffs into the lungs every 6 hours as needed    Mild intermittent asthma without complication       aspirin 81 MG tablet      Take 81 mg by mouth daily        atenolol 25 MG tablet    TENORMIN    90 tablet    Take 1 tablet (25 mg) by mouth daily    Benign essential hypertension       calcium carbonate 1250 MG tablet    OS-GAEL 500 mg [place]. [place]     Take 250 mg by mouth daily    Benign essential hypertension, Mixed hyperlipidemia       diclofenac 1 % Gel topical gel    VOLTAREN    100 g    Apply 2 grams three - four times daily using enclosed dosing card.    Chronic pain of right knee       fluticasone-salmeterol 100-50 MCG/DOSE diskus inhaler    ADVAIR DISKUS    3 Inhaler    Inhale 1 puff into the lungs every 12 hours    Mild intermittent asthma without complication       hydrochlorothiazide 25 MG tablet    HYDRODIURIL    90 tablet    Take 1 tablet (25 mg) by mouth daily    Benign essential hypertension       irbesartan 300 MG tablet    AVAPRO    90 tablet    Take 1 tablet (300 mg) by mouth At Bedtime    Benign essential hypertension       Multi-vitamin Tabs tablet      Take 1 tablet by mouth  daily    Routine general medical examination at a health care facility, Overweight, Hyperlipidemia LDL goal <100       STATIN NOT PRESCRIBED (INTENTIONAL)     0 each    1 each At Bedtime Statin not prescribed intentionally due to Other Pt declined (This option does not exclude patient from measure)    Fibromuscular dysplasia (H)       VITAMIN E COMPLETE Caps       Routine general medical examination at a health care facility, Overweight, Hyperlipidemia LDL goal <100

## 2017-11-20 NOTE — NURSING NOTE
Chief Complaint   Patient presents with     Physical     Patient is here for annual physical exam      Rigo Garcia CMA (AAMA) at 1:15 PM on 11/20/2017

## 2017-11-20 NOTE — PROGRESS NOTES
[facility] [place] [facility]  Primary Care Center   Established Patient Encounter   Nadia Randle MD  2017     Chief Complaint:   Annual Physical Exam    History of Present Illness:   Zenaida Valles is a 67 year old female with a history of hypertension, hyperlipidemia, and asthma who returns to clinic for follow up. In the interim since our last visit in 2016, she has been evaluated in orthopedics for persistent right knee pain. She described persistent aching with increased activity throughout the spring and summer, especially with repetitive movements and viky chi. Work-up in orthopedics showed right patellofemoral osteoarthritis and medial meniscus root tear on MRI. They treated this conservatively and it has been slowly improving.     She has no new health concerns. She denies any chest pain, shortness of breath, abdominal pains, or vision changes. Her most recent eye exam showed the early stages of glaucoma, which runs in her family. We reviewed the health maintenance topics including immunizations, cancer screenings, routine bloodwork, DEXA screening, lifestyle factors and risk behaviors.         Gyn:   Mammo 5/15, 2 cysts, never abnormal  Pap 10/13 NIL, PAP      NIL   10/27/2014, HPV neg  No vaginal itching, bleeding, discharge        Review of Systems:   A full 10-pt Review of Systems was performed, verified and is negative except as documented in the HPI.  All health questionnaires were reviewed, verified and relevant information documented above.     Active Medications:     Current Outpatient Prescriptions:      multivitamin, therapeutic with minerals (MULTI-VITAMIN) TABS tablet, Take 1 tablet by mouth daily, Disp: , Rfl:      Vitamin Mixture (VITAMIN E COMPLETE) CAPS, , Disp: , Rfl:      atenolol (TENORMIN) 25 MG tablet, Take 1 tablet (25 mg) by mouth daily, Disp: 90 tablet, Rfl: 3     hydrochlorothiazide (HYDRODIURIL) 25 MG tablet, Take 1 tablet (25 mg) by mouth daily, Disp: 90 tablet, Rfl:  3     albuterol (ALBUTEROL) 108 (90 BASE) MCG/ACT inhaler, Inhale 2 puffs into the lungs every 6 hours as needed, Disp: 3 Inhaler, Rfl: 3     fluticasone-salmeterol (ADVAIR DISKUS) 100-50 MCG/DOSE diskus inhaler, Inhale 1 puff into the lungs every 12 hours (Patient taking differently: Inhale 1 puff into the lungs daily ), Disp: 3 Inhaler, Rfl: 3     irbesartan (AVAPRO) 300 MG tablet, Take 1 tablet (300 mg) by mouth At Bedtime, Disp: 90 tablet, Rfl: 3     calcium carbonate (OS-GAEL 500 MG [place]. [place]) 500 MG tablet, Take 250 mg by mouth daily , Disp: , Rfl:      aspirin 81 MG tablet, Take 81 mg by mouth daily, Disp: , Rfl:      diclofenac (VOLTAREN) 1 % GEL topical gel, Apply 2 grams three - four times daily using enclosed dosing card. (Patient not taking: Reported on 2017), Disp: 100 g, Rfl: 0     STATIN NOT PRESCRIBED, INTENTIONAL,, 1 each At Bedtime Statin not prescribed intentionally due to Other Pt declined (This option does not exclude patient from measure) (Patient not taking: Reported on 2017), Disp: 0 each, Rfl: 0      Allergies:   Lisinopril and Codeine camsylate      Past Medical History:  Hypertension  Hyperlipidemia   Fibromuscular dysplasia  Asthma  Renal artery stenosis   Obesity    OB/Gyn:   Mammo 5/15, 2 cysts, never abnormal  Pap 10/13 NIL, PAP      NIL   10/27/2014, HPV neg  No vaginal itching, bleeding, discharge      Past Surgical History:  IR Renal/Visceral Stent/Atherect/PTA (Right, )    Family History:   Mother - diabetes, thyroid disease (s/p thyroidectomy), and Zollinger-Ellinson syndrome   Father - prostate cancer, coronary artery disease with aortic aneurysm    Social History:   She retired as a [employer] nurse in 2016. She has been busy, active in [employer], volunteering for a women's shelter, sewing, hiking.  No history of tobacco use. Rare alcohol use.       Physical Exam:   /79 (BP Location: Right arm, Patient Position: Chair, Cuff Size: Adult  "Regular)  Pulse 65  Temp 97.5  F (36.4  C) (Oral)  Resp 16  Ht 1.657 m (5' 5.25\")  Wt 98 kg (216 lb)  SpO2 97%  Breastfeeding? No  BMI 35.67 kg/m2   Constitutional: Alert, oriented, pleasant, no acute distress  Head: Normocephalic, atraumatic  Eyes: Extra-ocular movements intact, pupils equally round and reactive bilaterally, no scleral icterus  ENT: Oropharynx clear, moist mucus membranes, good dentition  Neck: Supple, no lymphadenopathy, no thyromegaly  Cardiovascular: PMI nondisplaced, regular rate and rhythm, no murmurs, rubs or gallops, peripheral pulses full/symmetric  Respiratory: Good air movement bilaterally, lungs clear, no wheezes/rales/rhonchi  GI: Abdomen soft, bowel sounds present, nondistended, nontender, no organomegaly or masses, no rebound/guarding  Musculoskeletal: No edema, normal muscle tone, normal gait  Neurologic: Alert and oriented, cranial nerves 2-12 intact.  Skin: Numerous skin tags on her neck and axilla. Numerous benign appearing nevi and seborrheic keratoses on her anterior chest.   Psychiatric: normal mentation, affect and mood   Breasts: normal without suspicious masses, skin changes or axillary nodes, self exam is taught and encouraged.      Assessment and Plan:  1. Hypertension  Stable. Refills of her hypertensive medications provided today.   -     atenolol (TENORMIN) 25 MG tablet; Take 1 tablet (25 mg) by mouth daily  -     hydrochlorothiazide (HYDRODIURIL) 25 MG tablet; Take 1 tablet (25 mg) by mouth daily  -     irbesartan (AVAPRO) 300 MG tablet; Take 1 tablet (300 mg) by mouth At Bedtime    2. Asthma  Stable. Refills provided today.   -     albuterol (ALBUTEROL) 108 (90 BASE) MCG/ACT inhaler; Inhale 2 puffs into the lungs every 6 hours as needed  -     fluticasone-salmeterol (ADVAIR DISKUS) 100-50 MCG/DOSE diskus inhaler; Inhale 1 puff into the lungs every 12 hours    3. Suspect benign seborrheic keratosis on her right upper chest  Exam is consistent with a " non-irritated seborrheic keratosis. Given the abnormal borders, I did ask that she monitor.     4. History of BASILIO in setting of possible fibromuscular dysplasia  Records not available.  She is s/p stenting for resistent HTN 10 years ago.  No problems, symptoms in interim.  We obtained Carotid US, Renal artery US, and aortic US in 1/2017 that were normal.    5. Routine Health Maintenance  Immunizations (zoster, pneumovax, flu, Tdap, Hep A/B):   Most Recent Immunizations   Administered Date(s) Administered     Influenza (High Dose) 3 valent vaccine 10/06/2017     Influenza (IIV3) 10/01/2013     Influenza Vaccine IM 3yrs+ 4 Valent IIV4 09/29/2016     Pneumococcal (PCV 13) 05/22/2017     Pneumococcal 23 valent 10/24/2013     TD (ADULT, 7+) 07/01/1999     TDAP Vaccine (Boostrix) 10/18/2011     Lipids:   Recent Labs   Lab Test  11/18/16   0923  12/03/15   0948  10/27/14   0909  10/24/13   1443   CHOL  214*  219*  248*  221*   HDL   --   38*  45*  35*   LDL   --   145*  172*  139*   TRIG   --   181*  156*  234*   CHOLHDLRATIO   --    --   5.6*  6.3*     Colonoscopy (50-75 yrs): 2017 3 TA and diverticulosis, rec 3 year f/u  Dexa (>65W or 70M yrs): Completed on 2/17/17. Normal results.  Mammogram (40-75 yrs): 2/17 normal  Pap (21-65 yrs): PAP      NIL   10/27/2014, due 2019 if desired  PAP      NIL   10/18/2011  HIV/HCV if risk factors: neg 2014  Safety/Lifestyle: discussed  Tob/EtOH: n/a  Depression: screen neg  Advanced Directive: she has one, will bring in        Follow-up: Return to clinic in 1 year for her annual physical exam. She may return sooner as needed.          Scribe Disclosure:   I, Rocio Medrano, am serving as a scribe to document services personally performed by Nadia Randle MD at this visit, based upon the provider's statements to me. All documentation has been reviewed by the aforementioned provider prior to being entered into the official medical record.     Portions of this medical record  were completed by a scribe. UPON MY REVIEW AND AUTHENTICATION BY ELECTRONIC SIGNATURE, this confirms (a) I performed the applicable clinical services, and (b) the record is accurate.   Nadia Randle MD  Internal Medicine

## 2017-11-28 ENCOUNTER — TELEPHONE (OUTPATIENT)
Dept: INTERNAL MEDICINE | Facility: CLINIC | Age: 67
End: 2017-11-28

## 2017-11-28 DIAGNOSIS — E11.9 TYPE 2 DIABETES MELLITUS WITHOUT COMPLICATION, WITHOUT LONG-TERM CURRENT USE OF INSULIN (H): Primary | ICD-10-CM

## 2017-11-28 DIAGNOSIS — E78.2 MIXED HYPERLIPIDEMIA: ICD-10-CM

## 2017-11-28 NOTE — TELEPHONE ENCOUNTER
Please call patient:  I tried to reach her about her recent results: elevated cholesterol and elevated A1c, suggestive of diabetes.  I would like her to focus on diet, exercise and weight loss.    I can offer a nutrition or diabetes education referral.  Would like to see her back in 3-4 months with labs prior to reassess.  Will place lab order.  Please assist in scheduling.  Thanks,  Nadia Randle MD  Internal Medicine

## 2017-12-08 ENCOUNTER — MYC MEDICAL ADVICE (OUTPATIENT)
Dept: INTERNAL MEDICINE | Facility: CLINIC | Age: 67
End: 2017-12-08

## 2017-12-08 DIAGNOSIS — J45.20 MILD INTERMITTENT ASTHMA WITHOUT COMPLICATION: ICD-10-CM

## 2017-12-30 ENCOUNTER — HEALTH MAINTENANCE LETTER (OUTPATIENT)
Age: 67
End: 2017-12-30

## 2018-04-22 ENCOUNTER — HEALTH MAINTENANCE LETTER (OUTPATIENT)
Age: 68
End: 2018-04-22

## 2018-05-16 ENCOUNTER — OFFICE VISIT (OUTPATIENT)
Dept: DERMATOLOGY | Facility: CLINIC | Age: 68
End: 2018-05-16
Payer: COMMERCIAL

## 2018-05-16 VITALS — SYSTOLIC BLOOD PRESSURE: 162 MMHG | DIASTOLIC BLOOD PRESSURE: 80 MMHG | HEART RATE: 65 BPM

## 2018-05-16 DIAGNOSIS — D22.5 ATYPICAL NEVUS OF THORACIC REGION: Primary | ICD-10-CM

## 2018-05-16 ASSESSMENT — PAIN SCALES - GENERAL
PAINLEVEL: NO PAIN (0)
PAINLEVEL: MILD PAIN (3)

## 2018-05-16 ASSESSMENT — ACTIVITIES OF DAILY LIVING (ADL)
AMBULATION: 0-->INDEPENDENT
TOILETING: 0 - INDEPENDENT
DRESS: 0-->INDEPENDENT
RETIRED_EATING: 0-->INDEPENDENT
DRESS: 0 - INDEPENDENT
EATING: 0 - INDEPENDENT
CHANGE_IN_FUNCTIONAL_STATUS_SINCE_ONSET_OF_CURRENT_ILLNESS/INJURY: NO
AMBULATION: 0 - INDEPENDENT
RETIRED_COMMUNICATION: 0-->UNDERSTANDS/COMMUNICATES WITHOUT DIFFICULTY
BATHING: 0-->INDEPENDENT
TRANSFERRING: 0 - INDEPENDENT
TRANSFERRING: 0-->INDEPENDENT
FALL_HISTORY_WITHIN_LAST_SIX_MONTHS: NO
TOILETING: 0-->INDEPENDENT
COMMUNICATION: 0 - UNDERSTANDS/COMMUNICATES WITHOUT DIFFICULTY
SWALLOWING: 0-->SWALLOWS FOODS/LIQUIDS WITHOUT DIFFICULTY
COGNITION: 0 - NO COGNITION ISSUES REPORTED
SWALLOWING: 0 - SWALLOWS FOODS/LIQUIDS WITHOUT DIFFICULTY
BATHING: 0 - INDEPENDENT

## 2018-05-16 NOTE — LETTER
5/16/2018       RE: Zenaida Valles  [address]  [facility] [address]     Dear Colleague,    Thank you for referring your patient, Zenaida Valles, to the [facility] DERMATOLOGY at [facility]. Please see a copy of my visit note below.    [facility] Dermatology Note      Dermatology Problem List:    Specialty Problems     None      Patient had never moles removed, but has many of them and was sent some years ago to mole mapping (but not done)    CC:   Derm Problem (Zenaida is here regarding a change in a spot on her chest. She states that she noticed the change in the last month or so and in the last week it has started to hurt. )        Encounter Date: May 16, 2018    History of Present Illness:  Ms. Zenaida Valles is a 68 year old female who presents as a referral from Self.    Past Medical History:   Patient Active Problem List   Diagnosis     Essential hypertension     Asthma     Atherosclerosis of renal artery (H)     Past Medical History:   Diagnosis Date     Asthma      Fibromuscular dysplasia (H)      H/O sebaceous cyst      Hyperlipidemia LDL goal < 130      Hypertension      Obesity      Renal artery stenosis (H) 2005    Right, s/p stenting at [facility], [facility]     Statin medication not prescribed per physician orders     pt declined     Allergy History:  Allergies   Allergen Reactions     Lisinopril Cough     Codeine Camsylate Diarrhea and Rash     Social History:  The patient works as a nurse. Patient likes gardening and outside don. Had sunburns when young, later did good protections     reports that she has never smoked. She has never used smokeless tobacco. She reports that she drinks alcohol. She reports that she does not use illicit drugs.    Family History:  Family History   Problem Relation Age of Onset     Peptic Ulcer Disease Mother      Zollinger-Wheeler Syndrome     DIABETES Mother      Thyroid Disease  Mother      s/p thyroidecomy     CANCER Father      prostate cancer     C.A.D. Father      aortic aneurysm     Childhood Heart Disease Sister      tetrology of fallot     Melanoma No family hx of      Skin Cancer No family hx of      Medications:  Current Outpatient Prescriptions   Medication Sig Dispense Refill     albuterol (PROAIR HFA) 108 (90 BASE) MCG/ACT Inhaler Inhale 2 puffs into the lungs every 6 hours as needed 3 Inhaler 3     aspirin 81 MG tablet Take 81 mg by mouth daily       atenolol (TENORMIN) 25 MG tablet Take 1 tablet (25 mg) by mouth daily 90 tablet 3     calcium carbonate (OS-GAEL 500 MG [place]. [place]) 500 MG tablet Take 250 mg by mouth daily        diclofenac (VOLTAREN) 1 % GEL topical gel Apply 2 grams three - four times daily using enclosed dosing card. 100 g 0     fluticasone-salmeterol (ADVAIR DISKUS) 100-50 MCG/DOSE diskus inhaler Inhale 1 puff into the lungs 2 times daily 2 Inhaler 3     hydrochlorothiazide (HYDRODIURIL) 25 MG tablet Take 1 tablet (25 mg) by mouth daily 90 tablet 3     irbesartan (AVAPRO) 300 MG tablet Take 1 tablet (300 mg) by mouth At Bedtime 90 tablet 3     multivitamin, therapeutic with minerals (MULTI-VITAMIN) TABS tablet Take 1 tablet by mouth daily       Vitamin Mixture (VITAMIN E COMPLETE) CAPS        STATIN NOT PRESCRIBED, INTENTIONAL, 1 each At Bedtime Statin not prescribed intentionally due to Other Pt declined (This option does not exclude patient from measure) (Patient not taking: Reported on 11/20/2017) 0 each 0     Review of Systems:  -As per HPI  -Constitutional: The patient denies fatigue, fevers, chills, unintended weight loss, and night sweats.  -HEENT: Patient denies nonhealing oral sores.  -Skin: As above in HPI. No additional skin concerns.    Physical exam:  Vitals: /80  Pulse 65  GEN: This is a well developed, well-nourished female in no acute distress, in a pleasant mood.    SKIN: Full skin, which includes the head/face, both arms, chest, back,  abdomen,both legs, genitalia and/or groin buttocks, digits and/or nails, was examined.  --> on chest right a 1.2 x 1cm pigmented lesion with a slighly hyperpigmented border zone and hypopigmented central area and cranial a 4mm exophytic part. In dermatoscopy rather the impression of a seborrhoic keratosis, but not sure.   --> shoulder right a slightly exophytic, assmetric Nnc of 3-4 mm diameter.  ==> various Naevi naevocellulares on entire body, but even more seborrhoic keratoses and some cherry angiomas.     -No other lesions of concern on areas examined.     Impression/Plan:  1) seborrhoic keratosis DDx atypical Naevus or Lentigo maligna chest right  --> biopsy from the slight exophytic, cranial part  Punch biopsy:  After discussion of benefits and risks including but not limited to bleeding/bruising, pain/swelling, infection, scar, incomplete removal, nerve damage/numbness, recurrence, and non-diagnostic biopsy, written consent, verbal consent and photographs were obtained. Time-out was performed. The area was cleaned with isopropyl alcohol. 0.5mL of 1% lidocaine with epinephrine was injected to obtain adequate anesthesia of the lesion on the chest right. A 4 mm punch biopsy was performed.  2x4-0 prolene sutures were utilized to approximate the epidermal edges.  White petroleum jelly/VaselineTM and a bandage was applied to the wound.  Explicit verbal and written wound care instructions were provided.  The patient left the Dermatology Clinic in good condition. The patient was counseled to follow up for suture removal in approximately 10-12 days.    2) atypical Naevus or seborrhoic keratosis on shoulder right  --> punch biopsy  Punch biopsy:  After discussion of benefits and risks including but not limited to bleeding/bruising, pain/swelling, infection, scar, incomplete removal, nerve damage/numbness, recurrence, and non-diagnostic biopsy, written consent, verbal consent and photographs were obtained. Time-out was  performed. The area was cleaned with isopropyl alcohol. 0.5mL of 1% lidocaine with epinephrine was injected to obtain adequate anesthesia of the lesion on the right shoulder lateral. A 4 mm punch biopsy was performed.  2x4-0 prolene sutures were utilized to approximate the epidermal edges.  White petroleum jelly/VaselineTM and a bandage was applied to the wound.  Explicit verbal and written wound care instructions were provided.  The patient left the Dermatology Clinic in good condition. The patient was counseled to follow up for suture removal in approximately 10-12 days.    3) many non-suspicious Naevi naevocellulares, seborrhoic keratoses and cherry angiomas    Observe and check entire skin 1xper year    Sun protection    Follow-up and final procedure according to histology. If seborrhoic keratoses, then yearly check up. Otherwise more often.    Again, thank you for allowing me to participate in the care of your patient.      Sincerely,    Gurpreet Young MD

## 2018-05-16 NOTE — PROGRESS NOTES
[facility] Dermatology Note      Dermatology Problem List:    Specialty Problems     None      Patient had never moles removed, but has many of them and was sent some years ago to mole mapping (but not done)    CC:   Derm Problem (Zenaida is here regarding a change in a spot on her chest. She states that she noticed the change in the last month or so and in the last week it has started to hurt. )        Encounter Date: May 16, 2018    History of Present Illness:  Ms. Zenaida Valles is a 68 year old female who presents as a referral from Self.    Past Medical History:   Patient Active Problem List   Diagnosis     Essential hypertension     Asthma     Atherosclerosis of renal artery (H)     Past Medical History:   Diagnosis Date     Asthma      Fibromuscular dysplasia (H)      H/O sebaceous cyst      Hyperlipidemia LDL goal < 130      Hypertension      Obesity      Renal artery stenosis (H) 2005    Right, s/p stenting at [facility], FMD     Statin medication not prescribed per physician orders     pt declined       Allergy History:     Allergies   Allergen Reactions     Lisinopril Cough     Codeine Camsylate Diarrhea and Rash       Social History:  The patient works as a nurse. Patient likes gardening and outside don. Had sunburns when young, later did good protections     reports that she has never smoked. She has never used smokeless tobacco. She reports that she drinks alcohol. She reports that she does not use illicit drugs.      Family History:  Family History   Problem Relation Age of Onset     Peptic Ulcer Disease Mother      Zollinger-Wheeler Syndrome     DIABETES Mother      Thyroid Disease Mother      s/p thyroidecomy     CANCER Father      prostate cancer     C.A.D. Father      aortic aneurysm     Childhood Heart Disease Sister      tetrology of fallot     Melanoma No family hx of      Skin Cancer No family hx of        Medications:  Current Outpatient Prescriptions   Medication  Sig Dispense Refill     albuterol (PROAIR HFA) 108 (90 BASE) MCG/ACT Inhaler Inhale 2 puffs into the lungs every 6 hours as needed 3 Inhaler 3     aspirin 81 MG tablet Take 81 mg by mouth daily       atenolol (TENORMIN) 25 MG tablet Take 1 tablet (25 mg) by mouth daily 90 tablet 3     calcium carbonate (OS-GAEL 500 MG [place]. [place]) 500 MG tablet Take 250 mg by mouth daily        diclofenac (VOLTAREN) 1 % GEL topical gel Apply 2 grams three - four times daily using enclosed dosing card. 100 g 0     fluticasone-salmeterol (ADVAIR DISKUS) 100-50 MCG/DOSE diskus inhaler Inhale 1 puff into the lungs 2 times daily 2 Inhaler 3     hydrochlorothiazide (HYDRODIURIL) 25 MG tablet Take 1 tablet (25 mg) by mouth daily 90 tablet 3     irbesartan (AVAPRO) 300 MG tablet Take 1 tablet (300 mg) by mouth At Bedtime 90 tablet 3     multivitamin, therapeutic with minerals (MULTI-VITAMIN) TABS tablet Take 1 tablet by mouth daily       Vitamin Mixture (VITAMIN E COMPLETE) CAPS        STATIN NOT PRESCRIBED, INTENTIONAL, 1 each At Bedtime Statin not prescribed intentionally due to Other Pt declined (This option does not exclude patient from measure) (Patient not taking: Reported on 11/20/2017) 0 each 0           Review of Systems:  -As per HPI  -Constitutional: The patient denies fatigue, fevers, chills, unintended weight loss, and night sweats.  -HEENT: Patient denies nonhealing oral sores.  -Skin: As above in HPI. No additional skin concerns.    Physical exam:  Vitals: /80  Pulse 65  GEN: This is a well developed, well-nourished female in no acute distress, in a pleasant mood.    SKIN: Full skin, which includes the head/face, both arms, chest, back, abdomen,both legs, genitalia and/or groin buttocks, digits and/or nails, was examined.  --> on chest right a 1.2 x 1cm pigmented lesion with a slighly hyperpigmented border zone and hypopigmented central area and cranial a 4mm exophytic part. In dermatoscopy rather the impression of a  seborrhoic keratosis, but not sure.   --> shoulder right a slightly exophytic, assmetric Nnc of 3-4 mm diameter.  ==> various Naevi naevocellulares on entire body, but even more seborrhoic keratoses and some cherry angiomas.     -No other lesions of concern on areas examined.     Impression/Plan:    1) seborrhoic keratosis DDx atypical Naevus or Lentigo maligna chest right  --> biopsy from the slight exophytic, cranial part  Punch biopsy:  After discussion of benefits and risks including but not limited to bleeding/bruising, pain/swelling, infection, scar, incomplete removal, nerve damage/numbness, recurrence, and non-diagnostic biopsy, written consent, verbal consent and photographs were obtained. Time-out was performed. The area was cleaned with isopropyl alcohol. 0.5mL of 1% lidocaine with epinephrine was injected to obtain adequate anesthesia of the lesion on the chest right. A 4 mm punch biopsy was performed.  2x4-0 prolene sutures were utilized to approximate the epidermal edges.  White petroleum jelly/VaselineTM and a bandage was applied to the wound.  Explicit verbal and written wound care instructions were provided.  The patient left the Dermatology Clinic in good condition. The patient was counseled to follow up for suture removal in approximately 10-12 days.    2) atypical Naevus or seborrhoic keratosis on shoulder right  --> punch biopsy  Punch biopsy:  After discussion of benefits and risks including but not limited to bleeding/bruising, pain/swelling, infection, scar, incomplete removal, nerve damage/numbness, recurrence, and non-diagnostic biopsy, written consent, verbal consent and photographs were obtained. Time-out was performed. The area was cleaned with isopropyl alcohol. 0.5mL of 1% lidocaine with epinephrine was injected to obtain adequate anesthesia of the lesion on the right shoulder lateral. A 4 mm punch biopsy was performed.  2x4-0 prolene sutures were utilized to approximate the epidermal  edges.  White petroleum jelly/VaselineTM and a bandage was applied to the wound.  Explicit verbal and written wound care instructions were provided.  The patient left the Dermatology Clinic in good condition. The patient was counseled to follow up for suture removal in approximately 10-12 days.      3) many non-suspicious Naevi naevocellulares, seborrhoic keratoses and cherry angiomas      Observe and check entire skin 1xper year    Sun protection        Follow-up and final procedure according to histology. If seborrhoic keratoses, then yearly check up. Otherwise more often.

## 2018-05-16 NOTE — MR AVS SNAPSHOT
After Visit Summary   5/16/2018    Zenaida Valles    MRN: [id number]           Patient Information     Date Of Birth          1950        Visit Information        Provider Department      5/16/2018 2:30 PM Gurpreet Young MD [facility] Dermatology        Today's Diagnoses     Atypical nevus of thoracic region    -  1      Care Instructions    Wound Care After a Biopsy    What is a skin biopsy?  A skin biopsy allows the doctor to examine a very small piece of tissue under the microscope to determine the diagnosis and the best treatment for the skin condition. A local anesthetic (numbing medicine)  is injected with a very small needle into the skin area to be tested. A small piece of skin is taken from the area. Sometimes a suture (stitch) is used.     What are the risks of a skin biopsy?  I will experience scar, bleeding, swelling, pain, crusting and redness. I may experience incomplete removal or recurrence. Risks of this procedure are excessive bleeding, bruising, infection, nerve damage, numbness, thick (hypertrophic or keloidal) scar and non-diagnostic biopsy.    How should I care for my wound for the first 24 hours?    Keep the wound dry and covered for 24 hours    If it bleeds, hold direct pressure on the area for 15 minutes. If bleeding does not stop then go to the emergency room    Avoid strenuous exercise the first 1-2 days or as your doctor instructs you    How should I care for the wound after 24 hours?    After 24 hours, remove the bandage    You may bathe or shower as normal    If you had a scalp biopsy, you can shampoo as usual and can use shower water to clean the biopsy site daily    Clean the wound twice a day with gentle soap and water    Do not scrub, be gentle    Apply white petroleum/Vaseline after cleaning the wound with a cotton swab or a clean finger, and keep the site covered with a Bandaid /bandage. Bandages are not necessary with a scalp biopsy    If you are  unable to cover the site with a Bandaid /bandage, re-apply ointment 2-3 times a day to keep the site moist. Moisture will help with healing    Avoid strenuous activity for first 1-2 days    Avoid lakes, rivers, pools, and oceans until the stitches are removed or the site is healed    How do I clean my wound?    Wash hands thoroughly with soap or use hand  before all wound care    Clean the wound with gentle soap and water    Apply white petroleum/Vaseline  to wound after it is clean    Replace the Bandaid /bandage to keep the wound covered for the first few days or as instructed by your doctor    If you had a scalp biopsy, warm shower water to the area on a daily basis should suffice    What should I use to clean my wound?     Cotton-tipped applicators (Qtips )    White petroleum jelly (Vaseline ). Use a clean new container and use Q-tips to apply.    Bandaids   as needed    Gentle soap     How should I care for my wound long term?    Do not get your wound dirty    Keep up with wound care for one week or until the area is healed.    A small scab will form and fall off by itself when the area is completely healed. The area will be red and will become pink in color as it heals. Sun protection is very important for how your scar will turn out. Sunscreen with an SPF 30 or greater is recommended once the area is healed.    If you have stitches, stitches need to be removed in 12 days. You may return to our clinic for this or you may have it done locally at your doctor s office.    You should have some soreness but it should be mild and slowly go away over several days. Talk to your doctor about using tylenol for pain,    When should I call my doctor?  If you have increased:     Pain or swelling    Pus or drainage (clear or slightly yellow drainage is ok)    Temperature over 100F    Spreading redness or warmth around wound    When will I hear about my results?  The biopsy results can take 2-3 weeks to come back.  The clinic will call you with the results, send you a [employer] message, or have you schedule a follow-up clinic or phone time to discuss the results. Contact our clinics if you do not hear from us in 3 weeks.     Who should I call with questions?    [facility]: [phone]     [facility]: [phone]    For urgent needs outside of business hours call the [facility] at [phone] and ask for the dermatology resident on call              Follow-ups after your visit        Who to contact     Please call your clinic at [phone] to:    Ask questions about your health    Make or cancel appointments    Discuss your medicines    Learn about your test results    Speak to your doctor            Additional Information About Your Visit        [employer] Information     [employer] gives you secure access to your electronic health record. If you see a primary care provider, you can also send messages to your care team and make appointments. If you have questions, please call your primary care clinic.  If you do not have a primary care provider, please call [phone] and they will assist you.      [employer] is an electronic gateway that provides easy, online access to your medical records. With [employer], you can request a clinic appointment, read your test results, renew a prescription or communicate with your care team.     To access your existing account, please contact your [facility] or call [phone] for assistance.        Care EveryWhere ID     This is your Care EveryWhere ID. This could be used by other organizations to access your [place] medical records  [id number]        Your Vitals Were     Pulse                   65            Blood Pressure from Last 3 Encounters:   05/16/18 162/80   11/20/17 121/79   05/22/17 128/62    Weight from Last 3 Encounters:   11/20/17 98 kg (216 lb)   05/22/17 101.2 kg (223 lb)    03/10/17 97.5 kg (215 lb)              We Performed the Following     Dermatological path order and indications        Primary Care Provider Office Phone # Fax #    Nadia Randle [phone] [address] [address]        Equal Access to Services     HANSA ALVAREZ : Hadii aad ku hadasho Soomaali, waaxda luqadaha, qaybta kaalmada adeegyada, waxay idiin hayaan adeeg chuyita laalbania liu. So [facility] [phone].    ATENCIÓN: Si habla español, tiene a schofield disposición servicios gratuitos de asistencia lingüística. [facility] [phone].    We comply with applicable federal civil rights laws and [place] laws. We do not discriminate on the basis of race, color, national origin, age, disability, sex, sexual orientation, or gender identity.            Thank you!     Thank you for choosing [facility] DERMATOLOGY  for your care. Our goal is always to provide you with excellent care. Hearing back from our patients is one way we can continue to improve our services. Please take a few minutes to complete the written survey that you may receive in the mail after your visit with us. Thank you!             Your Updated Medication List - Protect others around you: Learn how to safely use, store and throw away your medicines at www.disposemymeds.org.          This list is accurate as of 5/16/18  3:54 PM.  Always use your most recent med list.                   Brand Name Dispense Instructions for use Diagnosis    albuterol 108 (90 Base) MCG/ACT Inhaler    PROAIR HFA    3 Inhaler    Inhale 2 puffs into the lungs every 6 hours as needed    Mild intermittent asthma without complication       aspirin 81 MG tablet      Take 81 mg by mouth daily        atenolol 25 MG tablet    TENORMIN    90 tablet    Take 1 tablet (25 mg) by mouth daily    Benign essential hypertension       calcium carbonate 500 MG tablet    OS-GAEL 500 mg [place]. [place]     Take 250 mg by mouth daily    Benign essential  hypertension, Mixed hyperlipidemia       diclofenac 1 % Gel topical gel    VOLTAREN    100 g    Apply 2 grams three - four times daily using enclosed dosing card.    Chronic pain of right knee       fluticasone-salmeterol 100-50 MCG/DOSE diskus inhaler    ADVAIR DISKUS    2 Inhaler    Inhale 1 puff into the lungs 2 times daily    Mild intermittent asthma without complication       hydrochlorothiazide 25 MG tablet    HYDRODIURIL    90 tablet    Take 1 tablet (25 mg) by mouth daily    Benign essential hypertension       irbesartan 300 MG tablet    AVAPRO    90 tablet    Take 1 tablet (300 mg) by mouth At Bedtime    Benign essential hypertension       Multi-vitamin Tabs tablet      Take 1 tablet by mouth daily    Routine general medical examination at a health care facility, Overweight, Hyperlipidemia LDL goal <100       STATIN NOT PRESCRIBED (INTENTIONAL)     0 each    1 each At Bedtime Statin not prescribed intentionally due to Other Pt declined (This option does not exclude patient from measure)    Fibromuscular dysplasia (H)       VITAMIN E COMPLETE Caps       Routine general medical examination at a health care facility, Overweight, Hyperlipidemia LDL goal <100

## 2018-05-16 NOTE — PATIENT INSTRUCTIONS

## 2018-05-16 NOTE — NURSING NOTE
Lidocaine 1% injection   5 mL once for one use, starting 5/16/2018 ending 5/16/2018,  2mL disp, R-0, injection  Injected by Gisselle Khan LPN

## 2018-05-16 NOTE — NURSING NOTE
Dermatology Rooming Note    Zenaida Valles's goals for this visit include:   Chief Complaint   Patient presents with     Derm Problem     Zenaida is here regarding a change in a spot on her chest. She states that she noticed the change in the last month or so and in the last week it has started to hurt.      Rachel Odell LPN

## 2018-05-23 LAB — COPATH REPORT: NORMAL

## 2018-05-30 ENCOUNTER — ALLIED HEALTH/NURSE VISIT (OUTPATIENT)
Dept: DERMATOLOGY | Facility: CLINIC | Age: 68
End: 2018-05-30
Payer: COMMERCIAL

## 2018-05-30 DIAGNOSIS — Z48.02 VISIT FOR SUTURE REMOVAL: Primary | ICD-10-CM

## 2018-05-30 NOTE — NURSING NOTE
Dermatology Suture Removal Record  S: Zenaida presents to the clinic today for  removal of sutures.  The patient has had the sutures in place for 14 days.    There has been no history of infection or drainage.    O: 2 sutures are seen located on the right upper chest and 1 suture on the right upper arm.  The wound is healing well with no signs of infection.      A: Suture removal.    P:  All sutures were easily removed today.  Routine wound care discussed.  The patient will follow up as needed.    Yola Sims LPN

## 2018-05-30 NOTE — MR AVS SNAPSHOT
After Visit Summary   5/30/2018    Zenaida Valles    MRN: [id number]           Patient Information     Date Of Birth          1950        Visit Information        Provider Department      5/30/2018 2:00 PM Nurse,  Dermatology [facility] Dermatology        Today's Diagnoses     Visit for suture removal    -  1       Follow-ups after your visit        Your next 10 appointments already scheduled     Jul 24, 2018 12:15 PM CDT   (Arrive by 12:00 PM)   Return Visit with Gurpreet Young MD   [facility] Dermatology ([facility] [[email or web address] and [facility])    [address] [place] [address] [phone]              Who to contact     Please call your clinic at [phone] to:    Ask questions about your health    Make or cancel appointments    Discuss your medicines    Learn about your test results    Speak to your doctor            Additional Information About Your Visit        MyChart Information     [employer] gives you secure access to your electronic health record. If you see a primary care provider, you can also send messages to your care team and make appointments. If you have questions, please call your primary care clinic.  If you do not have a primary care provider, please call [phone] and they will assist you.      [employer] is an electronic gateway that provides easy, online access to your medical records. With [employer], you can request a clinic appointment, read your test results, renew a prescription or communicate with your care team.     To access your existing account, please contact your [facility] or call [phone] for assistance.        Care EveryWhere ID     This is your Care EveryWhere ID. This could be used by other organizations to access your [place] medical records  [id number]         Blood Pressure from Last 3 Encounters:   05/16/18 162/80   11/20/17 121/79   05/22/17 128/62    Weight from Last 3  Encounters:   11/20/17 216 lb   05/22/17 223 lb   03/10/17 215 lb              Today, you had the following     No orders found for display       Primary Care Provider Office Phone # Fax #    Nadia Randle [phone] [address] [address]        Equal Access to Services     [facility]: Hadii aad ku hadasho Soomaali, waaxda luqadaha, qaybta kaalmada adeegyada, waxay idiin hayaan adeeg monyfinnmarko laalbania . So [facility] [phone].    ATENCIÓN: Si habla español, tiene a schofield disposición servicios gratuitos de asistencia lingüística. Bryaname al [phone].    We comply with applicable federal civil rights laws and [place] laws. We do not discriminate on the basis of race, color, national origin, age, disability, sex, sexual orientation, or gender identity.            Thank you!     Thank you for choosing [facility] DERMATOLOGY  for your care. Our goal is always to provide you with excellent care. Hearing back from our patients is one way we can continue to improve our services. Please take a few minutes to complete the written survey that you may receive in the mail after your visit with us. Thank you!             Your Updated Medication List - Protect others around you: Learn how to safely use, store and throw away your medicines at www.disposemymeds.org.          This list is accurate as of 5/30/18  2:21 PM.  Always use your most recent med list.                   Brand Name Dispense Instructions for use Diagnosis    albuterol 108 (90 Base) MCG/ACT Inhaler    PROAIR HFA    3 Inhaler    Inhale 2 puffs into the lungs every 6 hours as needed    Mild intermittent asthma without complication       aspirin 81 MG tablet      Take 81 mg by mouth daily        atenolol 25 MG tablet    TENORMIN    90 tablet    Take 1 tablet (25 mg) by mouth daily    Benign essential hypertension       calcium carbonate 500 MG tablet    OS-GAEL 500 mg [place]. [place]     Take 250 mg by mouth daily     Benign essential hypertension, Mixed hyperlipidemia       diclofenac 1 % Gel topical gel    VOLTAREN    100 g    Apply 2 grams three - four times daily using enclosed dosing card.    Chronic pain of right knee       fluticasone-salmeterol 100-50 MCG/DOSE diskus inhaler    ADVAIR DISKUS    2 Inhaler    Inhale 1 puff into the lungs 2 times daily    Mild intermittent asthma without complication       hydrochlorothiazide 25 MG tablet    HYDRODIURIL    90 tablet    Take 1 tablet (25 mg) by mouth daily    Benign essential hypertension       irbesartan 300 MG tablet    AVAPRO    90 tablet    Take 1 tablet (300 mg) by mouth At Bedtime    Benign essential hypertension       Multi-vitamin Tabs tablet      Take 1 tablet by mouth daily    Routine general medical examination at a health care facility, Overweight, Hyperlipidemia LDL goal <100       STATIN NOT PRESCRIBED (INTENTIONAL)     0 each    1 each At Bedtime Statin not prescribed intentionally due to Other Pt declined (This option does not exclude patient from measure)    Fibromuscular dysplasia (H)       VITAMIN E COMPLETE Caps       Routine general medical examination at a health care facility, Overweight, Hyperlipidemia LDL goal <100

## 2018-08-31 ENCOUNTER — OFFICE VISIT (OUTPATIENT)
Dept: DERMATOLOGY | Facility: CLINIC | Age: 68
End: 2018-08-31
Payer: COMMERCIAL

## 2018-08-31 DIAGNOSIS — L82.1 OTHER SEBORRHEIC KERATOSIS: Primary | ICD-10-CM

## 2018-08-31 ASSESSMENT — PAIN SCALES - GENERAL: PAINLEVEL: NO PAIN (0)

## 2018-08-31 NOTE — PROGRESS NOTES
[facility] Dermatology Note      Dermatology Problem List:  1. Atypical lentiginous junctional melanocytic nevus- mild, R deltoid s/p bx 5/2018  - photo obtained 8/31/18  2. SK biopsied right chest    CC:   Derm Problem (Zenaida is here for a follow up from 2 punch biopsies she got done at last office visit. )      Encounter Date: Aug 31, 2018    History of Present Illness:  Ms. Zenaida Valles is a 68 year old female who presents for follow up of biopsies obtained 5/16/18.  She had 2 biopsies performed at her last visit one on the right chest that demonstrated seborrheic keratosis and another on the right deltoid which demonstrated an atypical lentiginous junctional melanocytic nevus with mild atypia.  Clinical follow-up was recommended.  Patient denies any problems at her biopsy sites.  No new areas of concern.  The area on her right shoulder does not itch hurt or bleed and has not changed since biopsy.  She is otherwise feeling well today.    Past Medical History:   Patient Active Problem List   Diagnosis     Essential hypertension     Asthma     Atherosclerosis of renal artery (H)     Past Medical History:   Diagnosis Date     Asthma      Fibromuscular dysplasia (H)      H/O sebaceous cyst      Hyperlipidemia LDL goal < 130      Hypertension      Obesity      Renal artery stenosis (H) 2005    Right, s/p stenting at [facility], [facility]     Statin medication not prescribed per physician orders     pt declined       Allergy History:     Allergies   Allergen Reactions     Lisinopril Cough     Codeine Camsylate Diarrhea and Rash       Social History:  The patient works as a nurse. Patient likes gardening and outside don. Had sunburns when young, later did good protections     reports that she has never smoked. She has never used smokeless tobacco. She reports that she drinks alcohol. She reports that she does not use illicit drugs.      Family History:  Family History   Problem Relation Age of  Onset     Peptic Ulcer Disease Mother      Zollinger-Wheeler Syndrome     Diabetes Mother      Thyroid Disease Mother      s/p thyroidecomy     Cancer Father      prostate cancer     C.A.D. Father      aortic aneurysm     Childhood Heart Disease Sister      tetrology of fallot     Melanoma No family hx of      Skin Cancer No family hx of        Medications:  Current Outpatient Prescriptions   Medication Sig Dispense Refill     albuterol (PROAIR HFA) 108 (90 BASE) MCG/ACT Inhaler Inhale 2 puffs into the lungs every 6 hours as needed 3 Inhaler 3     aspirin 81 MG tablet Take 81 mg by mouth daily       atenolol (TENORMIN) 25 MG tablet Take 1 tablet (25 mg) by mouth daily 90 tablet 3     calcium carbonate (OS-GAEL 500 MG [place]. [place]) 500 MG tablet Take 250 mg by mouth daily        diclofenac (VOLTAREN) 1 % GEL topical gel Apply 2 grams three - four times daily using enclosed dosing card. 100 g 0     fluticasone-salmeterol (ADVAIR DISKUS) 100-50 MCG/DOSE diskus inhaler Inhale 1 puff into the lungs 2 times daily 2 Inhaler 3     hydrochlorothiazide (HYDRODIURIL) 25 MG tablet Take 1 tablet (25 mg) by mouth daily 90 tablet 3     irbesartan (AVAPRO) 300 MG tablet Take 1 tablet (300 mg) by mouth At Bedtime 90 tablet 3     multivitamin, therapeutic with minerals (MULTI-VITAMIN) TABS tablet Take 1 tablet by mouth daily       STATIN NOT PRESCRIBED, INTENTIONAL, 1 each At Bedtime Statin not prescribed intentionally due to Other Pt declined (This option does not exclude patient from measure) 0 each 0     Vitamin Mixture (VITAMIN E COMPLETE) CAPS              Review of Systems:  -As per HPI  -Constitutional: The patient is otherwise feeling well  -Skin: As above in HPI. No additional skin concerns.    Physical exam:  Vitals: There were no vitals taken for this visit.  GEN: This is a well developed, well-nourished female in no acute distress, in a pleasant mood.    SKIN: Focused exam of the right shoulder performed:  - scar at biopsy  site on right deltoid with medium brown pigmented papule remaining at periphery, no concerning features on dermoscopy  -No other lesions of concern on areas examined.     Impression/Plan:  1) Atypical lentiginous junctional melanocytic nevus- mild, R deltoid s/p bx 5/2018. Discussed options including cryotherapy, excision vs observation. Will obtain photo today and monitor for changes at follow up in 1 year. If changing, would plan for removal.   - photo obtained 8/31/18    2) SK s/p biopsy. Discussed benign nature of this diagnosis.    Staff Physician Comments:  I saw and evaluated the patient with the resident and I agree with the assessment and plan as documented in the resident's note.    Gurpreet Young MD  Professor  Head of Dermato-Allergy Division  Department of Dermatology  [facility]  .    Staff involved:  Resident (Sasha Velez), Staff (as above)

## 2018-08-31 NOTE — MR AVS SNAPSHOT
After Visit Summary   8/31/2018    Zenaida Valles    MRN: [id number]           Patient Information     Date Of Birth          1950        Visit Information        Provider Department      8/31/2018 10:15 AM Gurpreet Young MD [facility] Dermatology        Today's Diagnoses     Other seborrheic keratosis    -  1      Care Instructions                        Follow-ups after your visit        Who to contact     Please call your clinic at [phone] to:    Ask questions about your health    Make or cancel appointments    Discuss your medicines    Learn about your test results    Speak to your doctor            Additional Information About Your Visit        MyChart Information     [employer] gives you secure access to your electronic health record. If you see a primary care provider, you can also send messages to your care team and make appointments. If you have questions, please call your primary care clinic.  If you do not have a primary care provider, please call [phone] and they will assist you.      [employer] is an electronic gateway that provides easy, online access to your medical records. With [employer], you can request a clinic appointment, read your test results, renew a prescription or communicate with your care team.     To access your existing account, please contact your [facility] or call [phone] for assistance.        Care EveryWhere ID     This is your Care EveryWhere ID. This could be used by other organizations to access your [place] medical records  [id number]         Blood Pressure from Last 3 Encounters:   05/16/18 162/80   11/20/17 121/79   05/22/17 128/62    Weight from Last 3 Encounters:   11/20/17 98 kg (216 lb)   05/22/17 101.2 kg (223 lb)   03/10/17 97.5 kg (215 lb)              Today, you had the following     No orders found for display       Primary Care Provider Office Phone # Fax #    Nadia Randle MD  [phone]       [address] [address]        Equal Access to Services     HANSA ALVAREZ : Silvia dl younger linda Estrada, wamacarenada brooklynn, kartik kaalexandruda yadiel, lorie riosze alexa. So [facility] [phone].    ATENCIÓN: Si habla español, tiene a schofield disposición servicios gratuitos de asistencia lingüística. Llame al [phone].    We comply with applicable federal civil rights laws and [place] laws. We do not discriminate on the basis of race, color, national origin, age, disability, sex, sexual orientation, or gender identity.            Thank you!     Thank you for choosing [facility] DERMATOLOGY  for your care. Our goal is always to provide you with excellent care. Hearing back from our patients is one way we can continue to improve our services. Please take a few minutes to complete the written survey that you may receive in the mail after your visit with us. Thank you!             Your Updated Medication List - Protect others around you: Learn how to safely use, store and throw away your medicines at www.disposemymeds.org.          This list is accurate as of 8/31/18 10:52 AM.  Always use your most recent med list.                   Brand Name Dispense Instructions for use Diagnosis    albuterol 108 (90 Base) MCG/ACT inhaler    PROAIR HFA    3 Inhaler    Inhale 2 puffs into the lungs every 6 hours as needed    Mild intermittent asthma without complication       aspirin 81 MG tablet      Take 81 mg by mouth daily        atenolol 25 MG tablet    TENORMIN    90 tablet    Take 1 tablet (25 mg) by mouth daily    Benign essential hypertension       calcium carbonate 500 mg {elemental} 500 MG tablet    OS-GAEL     Take 250 mg by mouth daily    Benign essential hypertension, Mixed hyperlipidemia       diclofenac 1 % Gel topical gel    VOLTAREN    100 g    Apply 2 grams three - four times daily using enclosed dosing card.    Chronic pain of right knee        fluticasone-salmeterol 100-50 MCG/DOSE diskus inhaler    ADVAIR DISKUS    2 Inhaler    Inhale 1 puff into the lungs 2 times daily    Mild intermittent asthma without complication       hydrochlorothiazide 25 MG tablet    HYDRODIURIL    90 tablet    Take 1 tablet (25 mg) by mouth daily    Benign essential hypertension       irbesartan 300 MG tablet    AVAPRO    90 tablet    Take 1 tablet (300 mg) by mouth At Bedtime    Benign essential hypertension       Multi-vitamin Tabs tablet      Take 1 tablet by mouth daily    Routine general medical examination at a health care facility, Overweight, Hyperlipidemia LDL goal <100       STATIN NOT PRESCRIBED (INTENTIONAL)     0 each    1 each At Bedtime Statin not prescribed intentionally due to Other Pt declined (This option does not exclude patient from measure)    Fibromuscular dysplasia (H)       VITAMIN E COMPLETE Caps       Routine general medical examination at a health care facility, Overweight, Hyperlipidemia LDL goal <100

## 2018-08-31 NOTE — LETTER
8/31/2018     RE: Zenaida Valles  [address]  [facility] [address]     Dear Colleague,    Thank you for referring your patient, Zenaida Valles, to the [facility] DERMATOLOGY at [facility]. Please see a copy of my visit note below.    [facility] Dermatology Note      Dermatology Problem List:  1. Atypical lentiginous junctional melanocytic nevus- mild, R deltoid s/p bx 5/2018  - photo obtained 8/31/18  2. SK biopsied right chest    CC:   Derm Problem (Zenaida is here for a follow up from 2 punch biopsies she got done at last office visit. )      Encounter Date: Aug 31, 2018    History of Present Illness:  Ms. Zenaida Valles is a 68 year old female who presents for follow up of biopsies obtained 5/16/18.  She had 2 biopsies performed at her last visit one on the right chest that demonstrated seborrheic keratosis and another on the right deltoid which demonstrated an atypical lentiginous junctional melanocytic nevus with mild atypia.  Clinical follow-up was recommended.  Patient denies any problems at her biopsy sites.  No new areas of concern.  The area on her right shoulder does not itch hurt or bleed and has not changed since biopsy.  She is otherwise feeling well today.    Past Medical History:   Patient Active Problem List   Diagnosis     Essential hypertension     Asthma     Atherosclerosis of renal artery (H)     Past Medical History:   Diagnosis Date     Asthma      Fibromuscular dysplasia (H)      H/O sebaceous cyst      Hyperlipidemia LDL goal < 130      Hypertension      Obesity      Renal artery stenosis (H) 2005    Right, s/p stenting at [facility], F[facility]     Statin medication not prescribed per physician orders     pt declined       Allergy History:     Allergies   Allergen Reactions     Lisinopril Cough     Codeine Camsylate Diarrhea and Rash       Social History:  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 Had sunburns when young, later did good protections     reports that she has never smoked. She has never used smokeless tobacco. She reports that she drinks alcohol. She reports that she does not use illicit drugs.      Family History:  Family History   Problem Relation Age of Onset     Peptic Ulcer Disease Mother      Zollinger-Wheeler Syndrome     Diabetes Mother      Thyroid Disease Mother      s/p thyroidecomy     Cancer Father      prostate cancer     C.A.D. Father      aortic aneurysm     Childhood Heart Disease Sister      tetrology of fallot     Melanoma No family hx of      Skin Cancer No family hx of        Medications:  Current Outpatient Prescriptions   Medication Sig Dispense Refill     albuterol (PROAIR HFA) 108 (90 BASE) MCG/ACT Inhaler Inhale 2 puffs into the lungs every 6 hours as needed 3 Inhaler 3     aspirin 81 MG tablet Take 81 mg by mouth daily       atenolol (TENORMIN) 25 MG tablet Take 1 tablet (25 mg) by mouth daily 90 tablet 3     calcium carbonate (OS-GAEL 500 MG [place]. [place]) 500 MG tablet Take 250 mg by mouth daily        diclofenac (VOLTAREN) 1 % GEL topical gel Apply 2 grams three - four times daily using enclosed dosing card. 100 g 0     fluticasone-salmeterol (ADVAIR DISKUS) 100-50 MCG/DOSE diskus inhaler Inhale 1 puff into the lungs 2 times daily 2 Inhaler 3     hydrochlorothiazide (HYDRODIURIL) 25 MG tablet Take 1 tablet (25 mg) by mouth daily 90 tablet 3     irbesartan (AVAPRO) 300 MG tablet Take 1 tablet (300 mg) by mouth At Bedtime 90 tablet 3     multivitamin, therapeutic with minerals (MULTI-VITAMIN) TABS tablet Take 1 tablet by mouth daily       STATIN NOT PRESCRIBED, INTENTIONAL, 1 each At Bedtime Statin not prescribed intentionally due to Other Pt declined (This option does not exclude patient from measure) 0 each 0     Vitamin Mixture (VITAMIN E COMPLETE) CAPS              Review of Systems:  -As per HPI  -Constitutional: The patient is otherwise  feeling well  -Skin: As above in HPI. No additional skin concerns.    Physical exam:  Vitals: There were no vitals taken for this visit.  GEN: This is a well developed, well-nourished female in no acute distress, in a pleasant mood.    SKIN: Focused exam of the right shoulder performed:  - scar at biopsy site on right deltoid with medium brown pigmented papule remaining at periphery, no concerning features on dermoscopy  -No other lesions of concern on areas examined.     Impression/Plan:  1) Atypical lentiginous junctional melanocytic nevus- mild, R deltoid s/p bx 5/2018. Discussed options including cryotherapy, excision vs observation. Will obtain photo today and monitor for changes at follow up in 1 year. If changing, would plan for removal.   - photo obtained 8/31/18    2) SK s/p biopsy. Discussed benign nature of this diagnosis.    Staff Physician Comments:  I saw and evaluated the patient with the resident and I agree with the assessment and plan as documented in the resident's note.    Gurpreet Young MD  Professor  Head of Dermato-Allergy Division  Department of Dermatology  [facility]  .    Staff involved:  Resident (Sasha Velez), Staff (as above)

## 2018-08-31 NOTE — NURSING NOTE
Dermatology Rooming Note    Zenaida Valles's goals for this visit include:   Chief Complaint   Patient presents with     Derm Problem     Zenaida is here for a follow up from 2 punch biopsies she got done at last office visit.        Zenobia Yost LPN

## 2018-11-19 ENCOUNTER — PATIENT OUTREACH (OUTPATIENT)
Dept: CARE COORDINATION | Facility: CLINIC | Age: 68
End: 2018-11-19

## 2018-11-23 ENCOUNTER — OFFICE VISIT (OUTPATIENT)
Dept: INTERNAL MEDICINE | Facility: CLINIC | Age: 68
End: 2018-11-23
Payer: COMMERCIAL

## 2018-11-23 VITALS
WEIGHT: 213.8 LBS | HEART RATE: 65 BPM | BODY MASS INDEX: 35.31 KG/M2 | SYSTOLIC BLOOD PRESSURE: 149 MMHG | DIASTOLIC BLOOD PRESSURE: 78 MMHG | OXYGEN SATURATION: 93 %

## 2018-11-23 DIAGNOSIS — E78.2 MIXED HYPERLIPIDEMIA: ICD-10-CM

## 2018-11-23 DIAGNOSIS — Z13.220 SCREENING CHOLESTEROL LEVEL: ICD-10-CM

## 2018-11-23 DIAGNOSIS — Z12.31 ENCOUNTER FOR SCREENING MAMMOGRAM FOR BREAST CANCER: Primary | ICD-10-CM

## 2018-11-23 DIAGNOSIS — Z12.83 SKIN CANCER SCREENING: ICD-10-CM

## 2018-11-23 DIAGNOSIS — I10 BENIGN ESSENTIAL HYPERTENSION: ICD-10-CM

## 2018-11-23 DIAGNOSIS — J45.20 MILD INTERMITTENT ASTHMA WITHOUT COMPLICATION: ICD-10-CM

## 2018-11-23 DIAGNOSIS — E11.9 TYPE 2 DIABETES MELLITUS WITHOUT COMPLICATION, WITHOUT LONG-TERM CURRENT USE OF INSULIN (H): ICD-10-CM

## 2018-11-23 LAB
ALBUMIN SERPL-MCNC: 3.9 G/DL (ref 3.4–5)
ALP SERPL-CCNC: 116 U/L (ref 40–150)
ALT SERPL W P-5'-P-CCNC: 48 U/L (ref 0–50)
ANION GAP SERPL CALCULATED.3IONS-SCNC: 10 MMOL/L (ref 3–14)
AST SERPL W P-5'-P-CCNC: 24 U/L (ref 0–45)
BASOPHILS # BLD AUTO: 0.1 10E9/L (ref 0–0.2)
BASOPHILS NFR BLD AUTO: 0.9 %
BILIRUB SERPL-MCNC: 0.4 MG/DL (ref 0.2–1.3)
BUN SERPL-MCNC: 15 MG/DL (ref 7–30)
CALCIUM SERPL-MCNC: 8.4 MG/DL (ref 8.5–10.1)
CHLORIDE SERPL-SCNC: 105 MMOL/L (ref 94–109)
CHOLEST SERPL-MCNC: 230 MG/DL
CO2 SERPL-SCNC: 24 MMOL/L (ref 20–32)
CREAT SERPL-MCNC: 0.65 MG/DL (ref 0.52–1.04)
CREAT UR-MCNC: 113 MG/DL
DIFFERENTIAL METHOD BLD: NORMAL
EOSINOPHIL # BLD AUTO: 0.3 10E9/L (ref 0–0.7)
EOSINOPHIL NFR BLD AUTO: 5.6 %
ERYTHROCYTE [DISTWIDTH] IN BLOOD BY AUTOMATED COUNT: 12.9 % (ref 10–15)
GFR SERPL CREATININE-BSD FRML MDRD: >90 ML/MIN/1.7M2
GLUCOSE SERPL-MCNC: 189 MG/DL (ref 70–99)
HBA1C MFR BLD: 7.7 % (ref 0–5.6)
HCT VFR BLD AUTO: 42 % (ref 35–47)
HDLC SERPL-MCNC: 38 MG/DL
HGB BLD-MCNC: 14.2 G/DL (ref 11.7–15.7)
IMM GRANULOCYTES # BLD: 0 10E9/L (ref 0–0.4)
IMM GRANULOCYTES NFR BLD: 0.3 %
LDLC SERPL CALC-MCNC: 144 MG/DL
LYMPHOCYTES # BLD AUTO: 2.6 10E9/L (ref 0.8–5.3)
LYMPHOCYTES NFR BLD AUTO: 44.8 %
MCH RBC QN AUTO: 30.3 PG (ref 26.5–33)
MCHC RBC AUTO-ENTMCNC: 33.8 G/DL (ref 31.5–36.5)
MCV RBC AUTO: 90 FL (ref 78–100)
MICROALBUMIN UR-MCNC: 12 MG/L
MICROALBUMIN/CREAT UR: 10.53 MG/G CR (ref 0–25)
MONOCYTES # BLD AUTO: 0.3 10E9/L (ref 0–1.3)
MONOCYTES NFR BLD AUTO: 5.9 %
NEUTROPHILS # BLD AUTO: 2.5 10E9/L (ref 1.6–8.3)
NEUTROPHILS NFR BLD AUTO: 42.5 %
NONHDLC SERPL-MCNC: 192 MG/DL
NRBC # BLD AUTO: 0 10*3/UL
NRBC BLD AUTO-RTO: 0 /100
PLATELET # BLD AUTO: 246 10E9/L (ref 150–450)
POTASSIUM SERPL-SCNC: 3.9 MMOL/L (ref 3.4–5.3)
PROT SERPL-MCNC: 7.4 G/DL (ref 6.8–8.8)
RBC # BLD AUTO: 4.69 10E12/L (ref 3.8–5.2)
SODIUM SERPL-SCNC: 139 MMOL/L (ref 133–144)
TRIGL SERPL-MCNC: 239 MG/DL
WBC # BLD AUTO: 5.8 10E9/L (ref 4–11)

## 2018-11-23 ASSESSMENT — PAIN SCALES - GENERAL: PAINLEVEL: NO PAIN (0)

## 2018-11-23 NOTE — MR AVS SNAPSHOT
After Visit Summary   11/23/2018    Zenaida Valles    MRN: [id number]           Patient Information     Date Of Birth          1950        Visit Information        Provider Department      11/23/2018 8:05 AM Chito Choi MD [facility]        Today's Diagnoses     Encounter for screening mammogram for breast cancer    -  1    Benign essential hypertension        Screening cholesterol level        Skin cancer screening          Care Instructions    Primary Care Center Medication Refill Request Information:  * Please contact your pharmacy regarding ANY request for medication refills.  ** [facility] Prescription Fax = [phone]  * Please allow 3 business days for routine medication refills.  * Please allow 5 business days for controlled substance medication refills.      Primary Care Center Test Result notification information:  *You will be notified with in 7-10 days of your appointment day regarding the results of your test.  If you are on MyChart you will be notified as soon as the provider has reviewed the results and signed off on them.    Primary Care Center: [phone]     Imaging   [phone]  ([facility])  [phone]  [facility] [place])  [phone]  [facility] ([facility]) and Summer ([facility])    Dermatology  [phone]  OB/Gyn ([facility])  [phone]           Follow-ups after your visit        Additional Services     DERMATOLOGY REFERRAL       Skin screen            OB/GYN REFERRAL       Annual exam                  Future tests that were ordered for you today     Open Future Orders        Priority Expected Expires Ordered    Mammogram, routine screening Routine  11/23/2019 11/23/2018    CBC with platelets differential Routine 11/23/2018 12/7/2018 11/23/2018    Comprehensive metabolic panel Routine 11/23/2018 12/7/2018 11/23/2018    Lipid panel  reflex to direct LDL Fasting Routine  11/23/2019 11/23/2018            Who to contact     Please call your clinic at [phone] to:    Ask questions about your health    Make or cancel appointments    Discuss your medicines    Learn about your test results    Speak to your doctor            Additional Information About Your Visit        MyChart Information     [employer] gives you secure access to your electronic health record. If you see a primary care provider, you can also send messages to your care team and make appointments. If you have questions, please call your primary care clinic.  If you do not have a primary care provider, please call [phone] and they will assist you.      [employer] is an electronic gateway that provides easy, online access to your medical records. With [employer], you can request a clinic appointment, read your test results, renew a prescription or communicate with your care team.     To access your existing account, please contact your [facility] or call [phone] for assistance.        Care EveryWhere ID     This is your Care EveryWhere ID. This could be used by other organizations to access your [place] medical records  [id number]        Your Vitals Were     Pulse Pulse Oximetry BMI (Body Mass Index)             65 93% 35.31 kg/m2          Blood Pressure from Last 3 Encounters:   11/23/18 149/78   05/16/18 162/80   11/20/17 121/79    Weight from Last 3 Encounters:   11/23/18 97 kg (213 lb 12.8 oz)   11/20/17 98 kg (216 lb)   05/22/17 101.2 kg (223 lb)              We Performed the Following     DERMATOLOGY REFERRAL     OB/GYN REFERRAL        Primary Care Provider Office Phone # Fax #    Nadia Randle [phone] [address] [address]        Equal Access to Services     HANSA ALVAREZ : Silvia Estrada, trino overton, lorie smith  ah. So [facility] [phone].    ATENCIÓN: Si balaji de jesus, tiene a schofield disposición servicios gratuitos de asistencia lingüística. Song al [phone].    We comply with applicable federal civil rights laws and [place] laws. We do not discriminate on the basis of race, color, national origin, age, disability, sex, sexual orientation, or gender identity.            Thank you!     Thank you for choosing [facility]  for your care. Our goal is always to provide you with excellent care. Hearing back from our patients is one way we can continue to improve our services. Please take a few minutes to complete the written survey that you may receive in the mail after your visit with us. Thank you!             Your Updated Medication List - Protect others around you: Learn how to safely use, store and throw away your medicines at www.disposemymeds.org.          This list is accurate as of 11/23/18  8:25 AM.  Always use your most recent med list.                   Brand Name Dispense Instructions for use Diagnosis    albuterol 108 (90 Base) MCG/ACT inhaler    PROAIR HFA    3 Inhaler    Inhale 2 puffs into the lungs every 6 hours as needed    Mild intermittent asthma without complication       aspirin 81 MG tablet    ASA     Take 81 mg by mouth daily        atenolol 25 MG tablet    TENORMIN    90 tablet    Take 1 tablet (25 mg) by mouth daily    Benign essential hypertension       calcium carbonate 500 mg (elemental) 500 MG tablet    OS-GAEL     Take 250 mg by mouth daily    Benign essential hypertension, Mixed hyperlipidemia       diclofenac 1 % topical gel    VOLTAREN    100 g    Apply 2 grams three - four times daily using enclosed dosing card.    Chronic pain of right knee       fluticasone-salmeterol 100-50 MCG/DOSE diskus inhaler    ADVAIR DISKUS    2 Inhaler    Inhale 1 puff into the lungs 2 times daily    Mild intermittent asthma without complication       hydrochlorothiazide 25 MG tablet    HYDRODIURIL     90 tablet    Take 1 tablet (25 mg) by mouth daily    Benign essential hypertension       irbesartan 300 MG tablet    AVAPRO    90 tablet    Take 1 tablet (300 mg) by mouth At Bedtime    Benign essential hypertension       Multi-vitamin Tabs tablet      Take 1 tablet by mouth daily    Routine general medical examination at a health care facility, Overweight, Hyperlipidemia LDL goal <100       STATIN NOT PRESCRIBED (INTENTIONAL)     0 each    1 each At Bedtime Statin not prescribed intentionally due to Other Pt declined (This option does not exclude patient from measure)    Fibromuscular dysplasia (H)       VITAMIN E COMPLETE Caps       Routine general medical examination at a health care facility, Overweight, Hyperlipidemia LDL goal <100

## 2018-11-23 NOTE — PROGRESS NOTES
HPI:    Pt. Comes in for physical. She has h/o FMD and renal stent for HTN. She does not smoke nor abuse EtOH. Her mother  this summer and had Zollinger-Wheeler syndrome. Her father had abdominal aortic aneurysm. Overall she is doing well. She would like to see Dermatology for skin check. She o/w denies additional HEENT, cardiopulmonary, abdominal, , GYN, neurological, systemic, psychiatric, musculoskeletal, endocrine, vascular complaints.    PE:    Vitals noted gen nad cooperative, alert, HEENT, ears with B wax, oropharynx clear no exudate, neck supple nl rom, no B carotid bruits, LCTA, B, good air movement, RRR, S1, S2, no MRG, abdomen, nt, nd, normal BS's, could not appreciate and renal bruits, several skin lesions, grossly normal neurological exam    A/P:    1. She got flu shot 10/10/2018; check with insurance regarding new Shingles vaccine  2. HTN/renal FMD. She has carotid, renal, and aortic arterial vascular U/S studies done 2016 and normal. Would recommend repeat again in 2-3 years. She remains on same BP medications and labs checked today  3. Refer to Dermatology for skin check  4. Colonoscopy 3/10/17 repeat in 3-5 years  5. Placed order for mammogram  6. Placed GYN referral for annual exam  7. She continues to use Advair and no new Reactive airway sxs.

## 2018-11-23 NOTE — PATIENT INSTRUCTIONS
Primary Care Center Medication Refill Request Information:  * Please contact your pharmacy regarding ANY request for medication refills.  ** [facility] Prescription Fax = [phone]  * Please allow 3 business days for routine medication refills.  * Please allow 5 business days for controlled substance medication refills.      Primary Care Center Test Result notification information:  *You will be notified with in 7-10 days of your appointment day regarding the results of your test.  If you are on MyChart you will be notified as soon as the provider has reviewed the results and signed off on them.    Primary Care Center: [phone]     Imaging   [phone]  ([facility])  [phone]  [facility] ([place] [place])  [phone]  [facility] and [place] ([facility])    Dermatology  [phone]  OB/Gyn ([facility])  [phone]    No

## 2018-11-23 NOTE — NURSING NOTE
Chief Complaint   Patient presents with     Physical     Pt is here for a physical and med check.      Rajni Roper [facility]  7:35 AM 11/23/2018

## 2018-11-25 ENCOUNTER — TELEPHONE (OUTPATIENT)
Dept: INTERNAL MEDICINE | Facility: CLINIC | Age: 68
End: 2018-11-25

## 2018-11-25 NOTE — TELEPHONE ENCOUNTER
Dear Nadia;    I saw Ms. Qiu last week for physical and as you know elevated A1C/glucose and abnormal lipid panel. Attached is the results letter I sent her    Senthil DAY        Dear Ms. Qiu;    (1) Your glucose is quite high and your A1C is elevated at 7.7%. Dr. Randle may recommend you start on a diabetic medication    (2) Your lipid panel is also abnormal and I would recommend you consider starting on a statin medication to reduce your cardiovascular risk. As you mentioned; however, your inclination is not to do this      SHREYA Choi MD

## 2018-11-27 RX ORDER — IRBESARTAN 300 MG/1
300 TABLET ORAL AT BEDTIME
Qty: 90 TABLET | Refills: 0 | Status: SHIPPED | OUTPATIENT
Start: 2018-11-27 | End: 2019-02-07

## 2018-11-27 RX ORDER — HYDROCHLOROTHIAZIDE 25 MG/1
25 TABLET ORAL DAILY
Qty: 90 TABLET | Refills: 0 | Status: SHIPPED | OUTPATIENT
Start: 2018-11-27 | End: 2019-02-07

## 2018-11-27 RX ORDER — ATENOLOL 25 MG/1
25 TABLET ORAL DAILY
Qty: 90 TABLET | Refills: 0 | Status: SHIPPED | OUTPATIENT
Start: 2018-11-27 | End: 2019-02-07

## 2018-11-27 NOTE — TELEPHONE ENCOUNTER
Last Clinic Visit: 11/23/2018  [facility]  Refill: Avapro, Hydrochlorothiazide, Tenormin  FYI to RN: elevated BP. 90 day RF  Refill: Advair diskus  FYI to CMA: overdue ACT. 90 day RF

## 2018-11-27 NOTE — TELEPHONE ENCOUNTER
Hi Soon Mi,  Can we call Zenaida to recommend appt with me to review recent abnormal lab results: diabetes and hyperlipidemia. I would like to discuss mediation management with her.  Thanks,  Nadia Randle MD  Internal Medicine

## 2018-11-28 ENCOUNTER — MYC MEDICAL ADVICE (OUTPATIENT)
Dept: INTERNAL MEDICINE | Facility: CLINIC | Age: 68
End: 2018-11-28

## 2019-02-06 DIAGNOSIS — E78.5 HYPERLIPIDEMIA: ICD-10-CM

## 2019-02-06 DIAGNOSIS — I10 ESSENTIAL HYPERTENSION: ICD-10-CM

## 2019-02-06 LAB
ANION GAP SERPL CALCULATED.3IONS-SCNC: 6 MMOL/L (ref 3–14)
BUN SERPL-MCNC: 14 MG/DL (ref 7–30)
CALCIUM SERPL-MCNC: 8.7 MG/DL (ref 8.5–10.1)
CHLORIDE SERPL-SCNC: 105 MMOL/L (ref 94–109)
CHOLEST SERPL-MCNC: 250 MG/DL
CO2 SERPL-SCNC: 28 MMOL/L (ref 20–32)
CREAT SERPL-MCNC: 0.74 MG/DL (ref 0.52–1.04)
CREAT UR-MCNC: 106 MG/DL
GFR SERPL CREATININE-BSD FRML MDRD: 84 ML/MIN/{1.73_M2}
GLUCOSE SERPL-MCNC: 146 MG/DL (ref 70–99)
HDLC SERPL-MCNC: 39 MG/DL
LDLC SERPL CALC-MCNC: 173 MG/DL
MICROALBUMIN UR-MCNC: 17 MG/L
MICROALBUMIN/CREAT UR: 15.75 MG/G CR (ref 0–25)
NONHDLC SERPL-MCNC: 211 MG/DL
POTASSIUM SERPL-SCNC: 4.2 MMOL/L (ref 3.4–5.3)
SODIUM SERPL-SCNC: 139 MMOL/L (ref 133–144)
TRIGL SERPL-MCNC: 189 MG/DL

## 2019-02-07 ENCOUNTER — OFFICE VISIT (OUTPATIENT)
Dept: INTERNAL MEDICINE | Facility: CLINIC | Age: 69
End: 2019-02-07
Payer: COMMERCIAL

## 2019-02-07 VITALS
DIASTOLIC BLOOD PRESSURE: 83 MMHG | OXYGEN SATURATION: 96 % | HEART RATE: 64 BPM | SYSTOLIC BLOOD PRESSURE: 133 MMHG | BODY MASS INDEX: 34.51 KG/M2 | WEIGHT: 209 LBS

## 2019-02-07 DIAGNOSIS — J45.20 MILD INTERMITTENT ASTHMA WITHOUT COMPLICATION: ICD-10-CM

## 2019-02-07 DIAGNOSIS — E11.9 TYPE 2 DIABETES MELLITUS WITHOUT COMPLICATION, WITHOUT LONG-TERM CURRENT USE OF INSULIN (H): Primary | ICD-10-CM

## 2019-02-07 DIAGNOSIS — I10 BENIGN ESSENTIAL HYPERTENSION: ICD-10-CM

## 2019-02-07 RX ORDER — CHLORAL HYDRATE 500 MG
2 CAPSULE ORAL DAILY
COMMUNITY
End: 2021-08-28

## 2019-02-07 RX ORDER — HYDROCHLOROTHIAZIDE 25 MG/1
25 TABLET ORAL DAILY
Qty: 90 TABLET | Refills: 3 | Status: SHIPPED | OUTPATIENT
Start: 2019-02-07 | End: 2020-02-06

## 2019-02-07 RX ORDER — ATENOLOL 25 MG/1
25 TABLET ORAL DAILY
Qty: 90 TABLET | Refills: 3 | Status: SHIPPED | OUTPATIENT
Start: 2019-02-07 | End: 2020-02-06

## 2019-02-07 RX ORDER — IRBESARTAN 300 MG/1
300 TABLET ORAL AT BEDTIME
Qty: 90 TABLET | Refills: 3 | Status: SHIPPED | OUTPATIENT
Start: 2019-02-07 | End: 2020-02-06

## 2019-02-07 ASSESSMENT — PAIN SCALES - GENERAL: PAINLEVEL: NO PAIN (0)

## 2019-02-07 NOTE — PROGRESS NOTES
[facility]  Primary Care Center   Nadia Randle MD  02/07/2019      Chief Complaint:   Refill Request     History of Present Illness:   Zenaida Valles is a 69 year old female with a history of hypertension, hyperlipidemia, and asthma  who presents for evaluation of refill request. The patient is frustrated with the clinic system in her inability to have her medications refilled and lack of response to [facility]. The issues were discussed at length.     Zenaida states that she has been working on her diet and exercise in order to loose weight and lower her blood sugars. She has been eating fish, beans, and salads multiple times each week, and is not going out to eat very often anymore. She has also been walking 1-4 miles each day for exercise. However, the patient does note that this year has been very difficult for her as her mother recently passed away. She feels that she lost these healthy habits during this time (between June - October 2018). However, she is now working on restarting her goal to loose more weight, and would like a few more months to work on loosing the weight and lowering her blood sugar herself before starting Metformin. She did note that she had an eye visit in August, and there were no diabetic issues.     Recent Labs:  Component      Latest Ref Rng & Units 2/6/2019   Sodium      133 - 144 mmol/L 139   Potassium      3.4 - 5.3 mmol/L 4.2   Chloride      94 - 109 mmol/L 105   Carbon Dioxide      20 - 32 mmol/L 28   Anion Gap      3 - 14 mmol/L 6   Glucose      70 - 99 mg/dL 146 (H)   Urea Nitrogen      7 - 30 mg/dL 14   Creatinine      0.52 - 1.04 mg/dL 0.74   GFR Estimate      >60 mL/min/1.73:m2 84   GFR Estimate If Black      >60 mL/min/1.73:m2 >90   Calcium      8.5 - 10.1 mg/dL 8.7   Cholesterol      <200 mg/dL 250 (H)   Triglycerides      <150 mg/dL 189 (H)   HDL Cholesterol      >49 mg/dL 39 (L)   LDL Cholesterol Calculated      <100 mg/dL 173 (H)   Non HDL  Cholesterol      <130 mg/dL 211 (H)   Creatinine Urine      mg/dL 106   Albumin Urine mg/L      mg/L 17   Albumin Urine mg/g Cr      0 - 25 mg/g Cr 15.75        Review of Systems:   Pertinent items are noted in HPI, remainder of complete ROS is negative.      Active Medications:      ADVAIR DISKUS 100-50 MCG/DOSE inhaler, INHALE 1 PUFF INTO THE  LUNGS TWO TIMES A DAY, Disp: 2 Inhaler, Rfl: 0     albuterol (PROAIR HFA) 108 (90 BASE) MCG/ACT Inhaler, Inhale 2 puffs into the lungs every 6 hours as needed, Disp: 3 Inhaler, Rfl: 3     aspirin 81 MG tablet, Take 81 mg by mouth daily, Disp: , Rfl:      atenolol (TENORMIN) 25 MG tablet, Take 1 tablet (25 mg) by mouth daily, Disp: 90 tablet, Rfl: 0     calcium carbonate (OS-GAEL 500 MG [place]. [place]) 500 MG tablet, Take 250 mg by mouth daily , Disp: , Rfl:      diclofenac (VOLTAREN) 1 % GEL topical gel, Apply 2 grams three - four times daily using enclosed dosing card., Disp: 100 g, Rfl: 0     hydrochlorothiazide (HYDRODIURIL) 25 MG tablet, Take 1 tablet (25 mg) by mouth daily, Disp: 90 tablet, Rfl: 0     irbesartan (AVAPRO) 300 MG tablet, Take 1 tablet (300 mg) by mouth At Bedtime, Disp: 90 tablet, Rfl: 0     multivitamin, therapeutic with minerals (MULTI-VITAMIN) TABS tablet, Take 1 tablet by mouth daily, Disp: , Rfl:      STATIN NOT PRESCRIBED, INTENTIONAL,, 1 each At Bedtime Statin not prescribed intentionally due to Other Pt declined (This option does not exclude patient from measure), Disp: 0 each, Rfl: 0     Vitamin Mixture (VITAMIN E COMPLETE) CAPS, , Disp: , Rfl:       Allergies:   Lisinopril and Codeine camsylate      Past Medical History:  Asthma  Fibromuscular dysplasia  Sebaceous cyst  Hyperlipidemia  Hypertension  Obesity  Renal artery stenosis  Statin medication not prescribed  Atherosclerosis of renal artery     Past Surgical History:  Colonoscopy  Renal/vsceral stent/atherect/PTA    Family History:   Peptic ulcer disease - mother  Diabetes - mother  Thyroid  disease - mother  Prostate cancer - father  CAD - father  Childhood heart disease - sister      Social History:   This patient presented alone.   Smoking status: never  Smokeless tobacco: never  Alcohol use: yes  Drug use: no     Physical Exam:   /83 (BP Location: Right arm, Patient Position: Sitting)   Pulse 64   Wt 94.8 kg (209 lb)   LMP  (LMP Unknown)   SpO2 96%   Breastfeeding? No   BMI 34.51 kg/m     Constitutional: Alert, oriented, pleasant, no acute distress  Head: Normocephalic, atraumatic  Eyes: Extra-ocular movements intact, no scleral icterus  Musculoskeletal: No edema, normal muscle tone, normal gait  Neurologic: Alert and oriented, cranial nerves 2-12 intact.  Psychiatric: normal mentation, affect and mood      Assessment and Plan:  Type 2 diabetes mellitus without complication, without long-term current use of insulin (H)  Reviewed recent glycemic control. I suggested her goal A1c should be as close to 7 as possible. We discussed strategies to improve lifestyle management including cardiovascular exercise. We briefly discussed benefits of Metformin, however she would like to defer this for now. She would like to come back in 6 months to repeat labs.   - Hemoglobin A1c **(3 MO)  - Hemoglobin A1c **(6 MO)    Benign essential hypertension  Stable. Medications refilled for a year.   - hydrochlorothiazide (HYDRODIURIL) 25 MG tablet  Dispense: 90 tablet; Refill: 3  - irbesartan (AVAPRO) 300 MG tablet  Dispense: 90 tablet; Refill: 3  - atenolol (TENORMIN) 25 MG tablet  Dispense: 90 tablet; Refill: 3    Mild intermittent asthma without complication  - fluticasone-salmeterol (ADVAIR DISKUS) 100-50 MCG/DOSE inhaler  Dispense: 2 Inhaler; Refill: 3    Patient concerns were discussed extensively during the visit.        . Routine Health Maintenance  Immunizations (zoster, pneumovax, flu, Tdap, Hep A/B):        Most Recent Immunizations   Administered Date(s) Administered     Influenza (High Dose) 3  valent vaccine 10/06/2017     Influenza (IIV3) 10/01/2013     Influenza Vaccine IM 3yrs+ 4 Valent IIV4 09/29/2016     Pneumococcal (PCV 13) 05/22/2017     Pneumococcal 23 valent 10/24/2013     TD (ADULT, 7+) 07/01/1999     TDAP Vaccine (Boostrix) 10/18/2011      Lipids:          Recent Labs   Lab Test  11/18/16   0923  12/03/15   0948  10/27/14   0909  10/24/13   1443   CHOL  214*  219*  248*  221*   HDL   --   38*  45*  35*   LDL   --   145*  172*  139*   TRIG   --   181*  156*  234*   CHOLHDLRATIO   --    --   5.6*  6.3*      Colonoscopy (50-75 yrs): 2017 3 TA and diverticulosis, rec 3 year f/u  Dexa (>65W or 70M yrs): Completed on 2/17/17. Normal results.  Mammogram (40-75 yrs): 2/17 normal, ordered 11/18 not yet done  Pap (21-65 yrs): PAP      NIL   10/27/2014, due 2019 if desired  PAP      NIL   10/18/2011  HIV/HCV if risk factors: neg 2014  Safety/Lifestyle: discussed  Tob/EtOH: n/a  Depression: screen neg  Advanced Directive: she has one, will bring in      Follow-up: Return in about 6 months (around 8/7/2019) for Routine Visit, Lab Work.         Scribe Disclosure:   I, Nita Kellogg, am serving as a scribe to document services personally performed by Nadia Randle MD at this visit, based upon the provider's statements to me. All documentation has been reviewed by the aforementioned provider prior to being entered into the official medical record.     Portions of this medical record were completed by a scribe. UPON MY REVIEW AND AUTHENTICATION BY ELECTRONIC SIGNATURE, this confirms (a) I performed the applicable clinical services, and (b) the record is accurate.   Nadia Randle MD  Internal Medicine    40 min spent face to face, of which >50% time spent on counseling/coordinating care exclusive of any procedure time

## 2019-02-07 NOTE — PATIENT INSTRUCTIONS
[facility] Medication Refill Request Information:  * Please contact your pharmacy regarding ANY request for medication refills.  ** [facility] Prescription Fax = [phone]  * Please allow 3 business days for routine medication refills.  * Please allow 5 business days for controlled substance medication refills.     [facility] Test Result notification information:  *You will be notified with in 7-10 days of your appointment day regarding the results of your test.  If you are on MyChart you will be notified as soon as the provider has reviewed the results and signed off on them.    [facility]: [phone]

## 2019-02-07 NOTE — NURSING NOTE
Chief Complaint   Patient presents with     Refill Request     patient states she is here to review labs and refill medications        MANOLO Kilgore at 7:42 AM on 2/7/2019.

## 2019-05-03 ENCOUNTER — HEALTH MAINTENANCE LETTER (OUTPATIENT)
Age: 69
End: 2019-05-03

## 2019-06-13 NOTE — PROGRESS NOTES
"[facility]  Primary Care Center   John Perez MD  06/14/2019      Chief Complaint:   Derm Problem       History of Present Illness:   Zenaida Valles is a 69 year old female with a history of asthma, fibromuscular dysplasia, hypertension, and hyperlipidemia who presents for evaluation of a tick bite. She had a tick bite a week ago, which got very red and sore. It also itches. This morning it is less red and swollen (was 2\" in diameter at largest). She also felt fatigued yesterday, but states that it could have been from something else. She denies fever, chills, sweats, or drainage from the bite. She states that the tick was very large. She also endorses reacting strongly to insect bites in general.    Review of Systems:   Pertinent items are noted in HPI or as in patient entered ROS below, remainder of complete ROS is negative.     Active Medications:      albuterol (PROAIR HFA) 108 (90 BASE) MCG/ACT Inhaler, Inhale 2 puffs into the lungs every 6 hours as needed, Disp: 3 Inhaler, Rfl: 3     aspirin 81 MG tablet, Take 81 mg by mouth daily, Disp: , Rfl:      atenolol (TENORMIN) 25 MG tablet, Take 1 tablet (25 mg) by mouth daily, Disp: 90 tablet, Rfl: 3     calcium carbonate (OS-GAEL 500 MG [place]. [place]) 500 MG tablet, Take 250 mg by mouth daily , Disp: , Rfl:      diclofenac (VOLTAREN) 1 % GEL topical gel, Apply 2 grams three - four times daily using enclosed dosing card., Disp: 100 g, Rfl: 0     fish oil-omega-3 fatty acids 1000 MG capsule, Take 2 g by mouth daily, Disp: , Rfl:      fluticasone-salmeterol (ADVAIR DISKUS) 100-50 MCG/DOSE inhaler, Inhale 1 puff into the lungs 2 times daily, Disp: 2 Inhaler, Rfl: 3     hydrochlorothiazide (HYDRODIURIL) 25 MG tablet, Take 1 tablet (25 mg) by mouth daily, Disp: 90 tablet, Rfl: 3     irbesartan (AVAPRO) 300 MG tablet, Take 1 tablet (300 mg) by mouth At Bedtime, Disp: 90 tablet, Rfl: 3     multivitamin, therapeutic with minerals (MULTI-VITAMIN) TABS tablet, Take " "1 tablet by mouth daily, Disp: , Rfl:      Vitamin Mixture (VITAMIN E COMPLETE) CAPS, , Disp: , Rfl:       Allergies:   Lisinopril  Codeine camsylate      Past Medical History:  Asthma  Fibromuscular dysplasia  Sebaceous cyst  Hyperlipidemia  Hypertension  Obesity  Renal artery stenosis s/p stent     Past Surgical History:  IR renal/visceral stent/atherect/pta, right - 2005    Family History:   Zollinger-Wheeler syndrome - mother  Diabetes - mother  Thyroid disease - mother  Prostate cancer - father  Coronary artery disease (aortic aneurysm) - father  Tetrology of Fallot - sister      Social History:   The patient is , a nonsmoker, and rarely consumes alcohol. She is retired.      Physical Exam:   /80 (BP Location: Right arm, Patient Position: Sitting, Cuff Size: Adult Large)   Pulse 68   Temp 98.5  F (36.9  C) (Oral)   Wt 95.7 kg (211 lb)   LMP  (LMP Unknown)   SpO2 93%   BMI 34.84 kg/m     Constitutional: Alert. In no distress.  Head: Normocephalic.   Skin: Right upper quadrant abdomen 1.5\" round red patch- solid red, not swollen, warm, or tender, no drainage or open area. In the middle is a 2 mm scab. It blanches easily  Psychiatric: Mentation appears normal. Normal affect.      Assessment and Plan:   Rash  Per patient, this is itchy and is not worse. Indeed, it's better since yesterday in terms of size. As the tick sounds like a wood tick and the rash does not look like a Lyme rash or a bacterial infection, we will just monitor.      Follow-up: No follow-ups on file.         Scribe Disclosure:  I, Carlene Hernández, am serving as a scribe to document services personally performed by John Perez MD at this visit, based upon the provider's statements to me. All documentation has been reviewed by the aforementioned provider prior to being entered into the official medical record.    Portions of this medical record were completed by a scribe. UPON MY REVIEW AND AUTHENTICATION BY ELECTRONIC " SIGNATURE, this confirms (a) I performed the applicable clinical services, and (b) the record is accurate.   John Perez MD

## 2019-06-14 ENCOUNTER — OFFICE VISIT (OUTPATIENT)
Dept: FAMILY MEDICINE | Facility: CLINIC | Age: 69
End: 2019-06-14
Payer: COMMERCIAL

## 2019-06-14 VITALS
SYSTOLIC BLOOD PRESSURE: 128 MMHG | OXYGEN SATURATION: 93 % | HEART RATE: 68 BPM | WEIGHT: 211 LBS | DIASTOLIC BLOOD PRESSURE: 80 MMHG | TEMPERATURE: 98.5 F | BODY MASS INDEX: 34.84 KG/M2

## 2019-06-14 DIAGNOSIS — R21 RASH: Primary | ICD-10-CM

## 2019-06-14 ASSESSMENT — PAIN SCALES - GENERAL: PAINLEVEL: NO PAIN (0)

## 2019-06-14 NOTE — PATIENT INSTRUCTIONS
[facility] Medication Refill Request Information:  * Please contact your pharmacy regarding ANY request for medication refills.  ** [facility] Prescription Fax = [phone]  * Please allow 3 business days for routine medication refills.  * Please allow 5 business days for controlled substance medication refills.     [facility] Test Result notification information:  *You will be notified with in 7-10 days of your appointment day regarding the results of your test.  If you are on MyChart you will be notified as soon as the provider has reviewed the results and signed off on them.    [facility]: [phone]

## 2019-06-14 NOTE — NURSING NOTE
Chief Complaint   Patient presents with     Derm Problem     pt states she has a tick bite that is red and swollen       Desi Harvey CMA at 7:32 AM on 6/14/2019.

## 2019-10-04 ENCOUNTER — HEALTH MAINTENANCE LETTER (OUTPATIENT)
Age: 69
End: 2019-10-04

## 2019-12-04 ENCOUNTER — TELEPHONE (OUTPATIENT)
Dept: INTERNAL MEDICINE | Facility: CLINIC | Age: 69
End: 2019-12-04

## 2019-12-04 DIAGNOSIS — J45.20 MILD INTERMITTENT ASTHMA WITHOUT COMPLICATION: ICD-10-CM

## 2019-12-04 NOTE — TELEPHONE ENCOUNTER
Patient requesting new rx for Fluticasone-Salmeterol. She states new dose is 1 puff once a day. Please send new rx to [facility] if appropriate. Thank you

## 2019-12-04 NOTE — TELEPHONE ENCOUNTER
Health Call Center    Phone Message    May a detailed message be left on voicemail: no    Reason for Call: Medication Question or concern regarding medication   Prescription Clarification  Name of Medication: fluticasone-salmeterol (ADVAIR DISKUS) 100-50 MCG/DOSE inhaler  Prescribing Provider: Dr. Randle   Pharm[facility]   What on the order needs clarification? Pt was expecting a script stating 1 puff 1x/day but directions say 2x/day. Please call back to clarify at  # [phone].          Action Taken: Message routed to:  [facility]

## 2019-12-05 ENCOUNTER — MYC MEDICAL ADVICE (OUTPATIENT)
Dept: INTERNAL MEDICINE | Facility: CLINIC | Age: 69
End: 2019-12-05

## 2019-12-05 DIAGNOSIS — J45.20 MILD INTERMITTENT ASTHMA WITHOUT COMPLICATION: ICD-10-CM

## 2019-12-05 NOTE — TELEPHONE ENCOUNTER
We need to verify the dosage. I sent [employer] message to the pt. Please see mychart encounter on 12/15.

## 2019-12-06 NOTE — TELEPHONE ENCOUNTER
Dr. Randle    Please see [employer] messages. Is it OK to change from Advair 1 puff 2 times/day to once a day ?    Soon-Mi

## 2020-02-05 NOTE — PROGRESS NOTES
[facility]  Primary Care Center   Established Patient Encounter   Nadia Randle MD  02/06/2020     Medicare Annual Wellness Visit     History of Present Illness:   Zenaida Valles is a 70 year old female who presents for a routine physical exam.      Wellness: Her mom passed away last year, and there have been many other deaths this year of relatives of her close friends. She has been going to a lot of funerals and she has been experiencing a lot of grief. She has also been stressed because her  is in atrial fibrillation and underwent an unsuccessful cardioversion a few months ago. She is doing well aside from the underlying sadness. She has been going out and being more social with her . 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 She is taking a trip in May and will be going to a wedding out of state in October.     High A1c: Her most recent A1c in 11/2018 was 7.7. If her A1c is still elevated after her labs today, she is interested in seeing a weight management clinic. She has been trying to eat more healthily. She has been trying intermittent fasting, but has not been very consistent with it. She is concerned about taking medications because she eats at inconsistent times and does not notice when she is hypoglycemic.     Asthma: Her asthma worsens when the air is moist and warmer. She takes one Ventolin before she goes for a walk, and does not have any trouble. She continues to take Advair once daily.      Hypertension: Her blood pressure today is 144/79. She does not check her blood pressure at home. She tried taking less Hydrochlorothiazide 25 mg and decreased to 4 days per week. She found that it was not working and increased back to daily frequency. She notes that it makes her urinate a lot.     Health maintenance: She is due for a mammogram and a dexa scan. Her last colonoscopy in 2017 showed 3 tubular adenomas and a 3-5 year follow up was recommended.  She would like to complete this next year. She does self-breast exams and has not found anything worrying.     Derm: She saw Dr. Young in dermatology in 2018 and had 2 punch biopsies done. They recommended a one-year follow up for an atypical lentiginous junctional melanocytic nevus.     Review of Systems:   Constitutional:  Fevers, night sweats or unintentional weight change ?  NO      Eyes:  Vision change, diplopia or red eyes?  NO      Ears, Nose, Mouth, Throat:  Tinnitus or hearing change, epistaxis or nasal discharge, oral lesions, throat pain ?  NO      Neck:  Stiffness?  NO           Cardiovascular:   Chest pain, palpitations, or pain with walking, orthopnea or PND?  NO   Breasts:  Any bumps or unusual discharge?     NO         Respiratory:  Dyspnea, cough, shortness of breath or wheezing?  NO         GI:  Nausea, vomiting, diarrhea or constipation, abdominal pain ?  NO         :  Change in urine,  dysuria or hematuria, sexual dysfunction ?  NO        Musculoskeletal:   Joint or muscle pain or swelling?  NO            Skin:  Concerning lesions or moles?  NO           Nervous System:  Loss of strength or sensation, numbness or tingling, tremor,  dizziness,  headache?  NO   Endocrine/Hormone:  Polyuria or polydipsia, temperature intolerance?  NO            Blood and Lymphnodes:  Concerning bumps, bleeding problems?  NO            Allergy:  Environmental allergies?  NO            Mental Health: Depression or anxiety, sleep problems?  NO              Medical Care      Have you been to an ER or a hospital in the last year? No  What other specialists or organizations are involved in your medical care?  none  Current providers sharing in care for this patient include:  Patient Care Team:  Patient Care Team       Relationship Specialty Notifications Start End    Logeais, Nadia Hawthorne MD PCP - General Internal Medicine  9/20/12     Phone: [phone] Fax: [phone]         [address]  [place] [address]    Gurpreet Young MD MD Dermatology  5/14/18     Phone: [phone] Fax: [phone]         [address] [facility] [address]    John Perez MD MD Family Practice  6/13/19     Phone: [phone] Fax: [phone]         [address] [facility] [address]           Active Medications:     Current Outpatient Medications:      albuterol (PROAIR HFA) 108 (90 BASE) MCG/ACT Inhaler, Inhale 2 puffs into the lungs every 6 hours as needed, Disp: 3 Inhaler, Rfl: 3     aspirin 81 MG tablet, Take 81 mg by mouth daily, Disp: , Rfl:      atenolol (TENORMIN) 25 MG tablet, Take 1 tablet (25 mg) by mouth daily, Disp: 90 tablet, Rfl: 3     calcium carbonate (OS-GAEL 500 MG [place]) 500 MG tablet, Take 300 mg by mouth daily , Disp: , Rfl:      diclofenac (VOLTAREN) 1 % GEL topical gel, Apply 2 grams three - four times daily using enclosed dosing card., Disp: 100 g, Rfl: 0     fish oil-omega-3 fatty acids 1000 MG capsule, Take 2 g by mouth daily, Disp: , Rfl:      fluticasone-salmeterol (ADVAIR DISKUS) 100-50 MCG/DOSE inhaler, Inhale 1 puff into the lungs daily, Disp: 2 Inhaler, Rfl: 3     hydrochlorothiazide (HYDRODIURIL) 25 MG tablet, Take 1 tablet (25 mg) by mouth daily, Disp: 90 tablet, Rfl: 3     irbesartan (AVAPRO) 300 MG tablet, Take 1 tablet (300 mg) by mouth At Bedtime, Disp: 90 tablet, Rfl: 3     multivitamin, therapeutic with minerals (MULTI-VITAMIN) TABS tablet, Take 1 tablet by mouth daily, Disp: , Rfl:      vitamin C (ASCORBIC ACID) 100 MG tablet, Take 100 mg by mouth 3 times daily, Disp: , Rfl:      Vitamin Mixture (VITAMIN E COMPLETE) CAPS, , Disp: , Rfl:       Allergies:   Lisinopril and Codeine camsylate      Past Medical History:  Asthma  Fibromuscular dysplasia  Sebaceous cyst   Hyperlipidemia    Hypertension  Obesity  Renal artery stenosis      Past Surgical History:  Colonoscopy: 3/2017  Renal/visceral stent/arthrect/pta: 2005    Family History:  Peptic ulcer disease:  other  Diabetes: mother  Thyroid disease: mother  Prostate cancer: father  Coronary artery disease: father  Childhood heart disease: sister      Social History:   Smoking status: never smoker  Alcohol use: rare     Marital Status:  Who lives in your household?   Does your home have any of the following safety concerns? Loose rugs in the hallway, no grab bars in the bathroom, no handrails on the stairs or have poorly lit areas?  No  Do you feel threatened or controlled by a partner, ex-partner or anyone in your life? No  Has anyone hurt you physically, for example by pushing, hitting, slapping or kicking you   or forcing you to have sex? No  Do you need help with the phone, transportation, shopping, preparing meals, housework, laundry, medications or managing money? No   Have you noticed any hearing difficulties? No      Risk Behaviors and Healthy Habits      How many servings of fruits and vegetables do you eat a day? 5-6  How often do you exercise and what do you do? 60 minutes 4 a week  Do you frequently ride without a seatbelt? No  Do you use tobacco?  No  Do you use any other drugs? No                                                                                                             Do you use alcohol?Yes, one drink per month       Today's PHQ-2 Score:       Sexual Health      Are you sexually active?  No   If yes, do you more than one current partner?No  If yes, are you using condoms?  No  Have you had any sexually transmitted infections? No   Any sexual concerns? No      FOR WOMEN  What year did you stop having periods? 2002  Any vaginal bleeding in the last year? No  Have you ever had an abnormal Pap smear? No     FUNCTIONAL ABILITY/SAFETY SCREENING      Have you fallen 2 or more times in the past year? No  Any fall with injury in the past year? No     Hearing evaluation if done: No     EVALUATION OF COGNITIVE FUNCTION      Mood/affect:Normal  Appearance:Normal  Mini Cog Scoring   3  points   Clock Draw Test result:  Abnormal: switched long and short hands of time     SCREENING FOR PREVENTION and EARLY DETECTION      Breast CA Screenin-2 years 50-74years:  Recommended and patient accepted testing.  Dexa Scan (>65 yrs) (covered every 2 years):  Recommended and patient declined testing.  Diabetes Screening : FBG covered if at risk (obesity, HTN, dyslipidemia, FH): Recommended and patient accepted testing.     CV Risk based on Pooled Cohort Risk:  The 10-year ASCVD risk score (Thomas HUNG Jr., et al., 2013) is: 27.4%    Values used to calculate the score:      Age: 70 years      Sex: Female      Is Non- : No      Diabetic: Yes      Tobacco smoker: No      Systolic Blood Pressure: 144 mmHg      Is BP treated: Yes      HDL Cholesterol: 39 mg/dL      Total Cholesterol: 250 mg/dL      Advanced Directives: On file.       Immunization status: up to date and documented.  Immunization History   Administered Date(s) Administered     Influenza (High Dose) 3 valent vaccine 10/06/2017, 10/10/2018, 10/09/2019     Influenza (IIV3) PF 2011, 10/01/2011, 2012, 2012, 10/01/2013     Influenza Vaccine IM > 6 months Valent IIV4 2016     Pneumo Conj 13-V (2010&after) 2017     Pneumococcal 23 valent 2006, 10/24/2013     TD (ADULT, 7+) 1999     TDAP Vaccine (Boostrix) 10/18/2011         Reviewed Immunization Record Today  Pneumoccocal Vaccine: up to date  Varicella Vaccine: Will check with insurance.  TDaP: up to date    Physical Exam:   BP (!) 144/79   Pulse 63   Temp 97.7  F (36.5  C) (Oral)   Wt 96.2 kg (212 lb 1.6 oz)   LMP  (LMP Unknown)   SpO2 95%   BMI 35.03 kg/m     Constitutional: Alert, oriented, pleasant, no acute distress  Head: Normocephalic, atraumatic  Eyes: Extra-ocular movements intact, no scleral icterus  ENT: Oropharynx clear, moist mucus membranes, good dentition  Neck: Supple, no lymphadenopathy  Cardiovascular: Regular rate and  rhythm, no murmurs, rubs or gallops, peripheral pulses full/symmetric  Respiratory: Good air movement bilaterally, lungs clear, no wheezes/rales/rhonchi  Musculoskeletal: No edema, normal muscle tone, normal gait  Neurologic: Alert and oriented, cranial nerves 2-12 intact.  Skin: No rashes/lesions. Multiple skin tags, SKs, benign-appearing nevi on the trunk and arms.   Psychiatric: normal mentation, affect and mood  Breast exam: deferred by patient       Assessment and Plan:  Encounter for Medicare annual wellness exam  Routine health maintenance reviewed and updated. She will be due for a colonoscopy before 2022.     Type 2 diabetes mellitus without complication, without long-term current use of insulin (H)  Patient has been reluctant to take medication. We had a discussion about this today. Given she has not been successful with weight loss or exercise, I encouraged her to consider a medication, which might help with weight loss and diabetes.   - Hemoglobin A1c; Future  - TSH with free T4 reflex; Future  - Albumin Random Urine Quantitative with Creat Ratio    Encounter for screening mammogram for breast cancer  - Mammogram, routine screening; Future    Mixed hyperlipidemia  - Lipid panel reflex to direct LDL Fasting; Future  - STATIN NOT PRESCRIBED (INTENTIONAL); Please choose reason not prescribed, below    Mild intermittent asthma without complication  Stable.   - fluticasone-salmeterol (ADVAIR DISKUS) 100-50 MCG/DOSE inhaler; Inhale 1 puff into the lungs daily    Benign essential hypertension  Typically well controlled. Will not make med changes.   - hydrochlorothiazide (HYDRODIURIL) 25 MG tablet; Take 1 tablet (25 mg) by mouth daily  - irbesartan (AVAPRO) 300 MG tablet; Take 1 tablet (300 mg) by mouth At Bedtime  - atenolol (TENORMIN) 25 MG tablet; Take 1 tablet (25 mg) by mouth daily  - Comprehensive metabolic panel; Future    Screening for osteoporosis  Discussed and deferred per patient.    Special  screening for malignant neoplasms, colon  She elects to repeat her colonoscopy in 2021.     Skin lesion  - DERMATOLOGY REFERRAL    Routine Health Maintenance  Immunizations (zoster, pneumovax, flu, Tdap, Hep A/B):           Most Recent Immunizations   Administered Date(s) Administered     Influenza (High Dose) 3 valent vaccine 10/06/2017     Influenza (IIV3) 10/01/2013     Influenza Vaccine IM 3yrs+ 4 Valent IIV4 09/29/2016     Pneumococcal (PCV 13) 05/22/2017     Pneumococcal 23 valent 10/24/2013     TD (ADULT, 7+) 07/01/1999     TDAP Vaccine (Boostrix) 10/18/2011      Lipids:   Recent Labs   Lab Test 02/06/20  1032 02/06/19  1152  10/27/14  0909 10/24/13  1443   CHOL 249* 250*   < > 248* 221*   HDL 42* 39*   < > 45* 35*   * 173*   < > 172* 139*   TRIG 246* 189*   < > 156* 234*   CHOLHDLRATIO  --   --   --  5.6* 6.3*    < > = values in this interval not displayed.        Colonoscopy (50-75 yrs): 2017 3 TA and diverticulosis, rec 3-5 year f/u  Dexa (>65W or 70M yrs): Completed on 2/17/17. Normal results.  Mammogram (40-75 yrs): 2/17 normal, ordered 11/18 not yet done  Pap (21-65 yrs): PAP      NIL   10/27/2014  PAP      NIL   10/18/2011  HIV/HCV if risk factors: neg 2014  Safety/Lifestyle: discussed  Tob/EtOH: n/a  Depression: screen neg  Advanced Directive: she has one on file    Follow-up: Return to clinic in one year.        Scribe Disclosure:  Theresa PONCE, am serving as a scribe to document services personally performed by Nadia Randle MD at this visit, based upon the provider's statements to me. All documentation has been reviewed by the aforementioned provider prior to being entered into the official medical record.    Scribe Preparation Attestation:  Theresa PONCE, a scribe, prepared the chart for today's encounter.      Portions of this medical record were completed by a scribe. UPON MY REVIEW AND AUTHENTICATION BY ELECTRONIC SIGNATURE, this confirms (a) I performed the applicable  clinical services, and (b) the record is accurate.  Nadia Randle MD  Internal Medicine

## 2020-02-06 ENCOUNTER — OFFICE VISIT (OUTPATIENT)
Dept: INTERNAL MEDICINE | Facility: CLINIC | Age: 70
End: 2020-02-06
Payer: COMMERCIAL

## 2020-02-06 VITALS
TEMPERATURE: 97.7 F | BODY MASS INDEX: 35.03 KG/M2 | DIASTOLIC BLOOD PRESSURE: 77 MMHG | OXYGEN SATURATION: 95 % | WEIGHT: 212.1 LBS | SYSTOLIC BLOOD PRESSURE: 137 MMHG | HEART RATE: 63 BPM

## 2020-02-06 DIAGNOSIS — J45.20 MILD INTERMITTENT ASTHMA WITHOUT COMPLICATION: ICD-10-CM

## 2020-02-06 DIAGNOSIS — L98.9 SKIN LESION: ICD-10-CM

## 2020-02-06 DIAGNOSIS — J45.909 UNCOMPLICATED ASTHMA, UNSPECIFIED ASTHMA SEVERITY, UNSPECIFIED WHETHER PERSISTENT: ICD-10-CM

## 2020-02-06 DIAGNOSIS — Z00.00 ENCOUNTER FOR MEDICARE ANNUAL WELLNESS EXAM: Primary | ICD-10-CM

## 2020-02-06 DIAGNOSIS — Z13.820 SCREENING FOR OSTEOPOROSIS: ICD-10-CM

## 2020-02-06 DIAGNOSIS — E78.2 MIXED HYPERLIPIDEMIA: ICD-10-CM

## 2020-02-06 DIAGNOSIS — Z12.31 ENCOUNTER FOR SCREENING MAMMOGRAM FOR BREAST CANCER: ICD-10-CM

## 2020-02-06 DIAGNOSIS — Z12.11 SPECIAL SCREENING FOR MALIGNANT NEOPLASMS, COLON: ICD-10-CM

## 2020-02-06 DIAGNOSIS — I10 ESSENTIAL HYPERTENSION: ICD-10-CM

## 2020-02-06 DIAGNOSIS — E11.9 TYPE 2 DIABETES MELLITUS WITHOUT COMPLICATION, WITHOUT LONG-TERM CURRENT USE OF INSULIN (H): ICD-10-CM

## 2020-02-06 DIAGNOSIS — I10 BENIGN ESSENTIAL HYPERTENSION: ICD-10-CM

## 2020-02-06 LAB
ALBUMIN SERPL-MCNC: 3.7 G/DL (ref 3.4–5)
ALP SERPL-CCNC: 115 U/L (ref 40–150)
ALT SERPL W P-5'-P-CCNC: 38 U/L (ref 0–50)
ANION GAP SERPL CALCULATED.3IONS-SCNC: 5 MMOL/L (ref 3–14)
AST SERPL W P-5'-P-CCNC: 21 U/L (ref 0–45)
BILIRUB SERPL-MCNC: 0.4 MG/DL (ref 0.2–1.3)
BUN SERPL-MCNC: 16 MG/DL (ref 7–30)
CALCIUM SERPL-MCNC: 8.9 MG/DL (ref 8.5–10.1)
CHLORIDE SERPL-SCNC: 106 MMOL/L (ref 94–109)
CHOLEST SERPL-MCNC: 249 MG/DL
CO2 SERPL-SCNC: 26 MMOL/L (ref 20–32)
CREAT SERPL-MCNC: 0.59 MG/DL (ref 0.52–1.04)
CREAT UR-MCNC: 114 MG/DL
GFR SERPL CREATININE-BSD FRML MDRD: >90 ML/MIN/{1.73_M2}
GLUCOSE SERPL-MCNC: 234 MG/DL (ref 70–99)
HBA1C MFR BLD: 9.1 % (ref 0–5.6)
HDLC SERPL-MCNC: 42 MG/DL
LDLC SERPL CALC-MCNC: 157 MG/DL
MICROALBUMIN UR-MCNC: 39 MG/L
MICROALBUMIN/CREAT UR: 34.04 MG/G CR (ref 0–25)
NONHDLC SERPL-MCNC: 206 MG/DL
POTASSIUM SERPL-SCNC: 4.3 MMOL/L (ref 3.4–5.3)
PROT SERPL-MCNC: 7.1 G/DL (ref 6.8–8.8)
SODIUM SERPL-SCNC: 137 MMOL/L (ref 133–144)
TRIGL SERPL-MCNC: 246 MG/DL
TSH SERPL DL<=0.005 MIU/L-ACNC: 2.54 MU/L (ref 0.4–4)

## 2020-02-06 RX ORDER — ATENOLOL 25 MG/1
25 TABLET ORAL DAILY
Qty: 90 TABLET | Refills: 3 | Status: SHIPPED | OUTPATIENT
Start: 2020-02-06 | End: 2021-01-25

## 2020-02-06 RX ORDER — HYDROCHLOROTHIAZIDE 25 MG/1
25 TABLET ORAL DAILY
Qty: 90 TABLET | Refills: 3 | Status: SHIPPED | OUTPATIENT
Start: 2020-02-06 | End: 2021-01-25

## 2020-02-06 RX ORDER — IRBESARTAN 300 MG/1
300 TABLET ORAL AT BEDTIME
Qty: 90 TABLET | Refills: 3 | Status: SHIPPED | OUTPATIENT
Start: 2020-02-06 | End: 2021-01-25

## 2020-02-06 RX ORDER — RIBOFLAVIN (VITAMIN B2) 100 MG
100 TABLET ORAL 3 TIMES DAILY
Status: ON HOLD | COMMUNITY
End: 2022-11-25

## 2020-02-06 ASSESSMENT — PAIN SCALES - GENERAL: PAINLEVEL: NO PAIN (0)

## 2020-02-06 NOTE — PATIENT INSTRUCTIONS
Primary Care Center Phone Number [phone]  Primary Care Center Medication Refill Request Information:  * Please contact your pharmacy regarding ANY request for medication refills.  ** [facility] Prescription Fax = [phone]  * Please allow 3 business days for routine medication refills.  * Please allow 5 business days for controlled substance medication refills.     Primary Care Center Test Result notification information:  *You will be notified with in 7-10 days of your appointment day regarding the results of your test.  If you are on MyChart you will be notified as soon as the provider has reviewed the results and signed off on them.              Patient Education   Personalized Prevention Plan  You are due for the preventive services outlined below.  Your care team is available to assist you in scheduling these services.  If you have already completed any of these items, please share that information with your care team to update in your medical record.  Health Maintenance Due   Topic Date Due     Diabetic Foot Exam  1950     Asthma Action Plan - yearly  1950     Asthma Control Test  1950     Eye Exam  1950     Zoster (Shingles) Vaccine (1 of 2) 01/30/2002     Thyroid Function Lab  08/14/2009     Mammogram  02/16/2018     Pneumococcal Vaccine (2 of 2 - PPSV23) 10/24/2018     A1C Lab  05/23/2019     Annual Wellness Visit  11/23/2019     Basic Metabolic Panel  02/06/2020     Cholesterol Lab  02/06/2020     Kidney Microalbumin Urine Test  02/06/2020     Preventive Health Recommendations    See your health care provider every year to    Review health changes.     Discuss preventive care.      Review your medicines if your doctor has prescribed any.    You no longer need a yearly Pap test unless you've had an abnormal Pap test in the past 10 years. If you have vaginal symptoms, such as bleeding or discharge, be sure to talk with your provider about a Pap test.    Every 1 to 2 years, have a  mammogram.  If you are over 69, talk with your health care provider about whether or not you want to continue having screening mammograms.    Every 10 years, have a colonoscopy. Or, have a yearly FIT test (stool test). These exams will check for colon cancer.     Have a cholesterol test every 5 years, or more often if your doctor advises it.     Have a diabetes test (fasting glucose) every three years. If you are at risk for diabetes, you should have this test more often.     At age 65, have a bone density scan (DEXA) to check for osteoporosis (brittle bone disease).    Shots:    Get a flu shot each year.    Get a tetanus shot every 10 years.    Talk to your doctor about your pneumonia vaccines. There are now two you should receive - Pneumovax (PPSV 23) and Prevnar (PCV 13).    Talk to your pharmacist about the shingles vaccine.    Talk to your doctor about the hepatitis B vaccine.    Nutrition:     Eat at least 5 servings of fruits and vegetables each day.    Eat whole-grain bread, whole-wheat pasta and brown rice instead of white grains and rice.    Get adequate Calcium and Vitamin D.     Lifestyle    Exercise at least 150 minutes a week (30 minutes a day, 5 days a week). This will help you control your weight and prevent disease.    Limit alcohol to one drink per day.    No smoking.     Wear sunscreen to prevent skin cancer.     See your dentist twice a year for an exam and cleaning.    See your eye doctor every 1 to 2 years to screen for conditions such as glaucoma, macular degeneration and cataracts.    Personalized Prevention Plan  You are due for the preventive services outlined below.  Your care team is available to assist you in scheduling these services.  If you have already completed any of these items, please share that information with your care team to update in your medical record.  Health Maintenance Due   Topic Date Due     Diabetic Foot Exam  1950     Asthma Action Plan - yearly  1950      Asthma Control Test  1950     Eye Exam  1950     Zoster (Shingles) Vaccine (1 of 2) 01/30/2002     Thyroid Function Lab  08/14/2009     Mammogram  02/16/2018     Pneumococcal Vaccine (2 of 2 - PPSV23) 10/24/2018     A1C Lab  05/23/2019     Annual Wellness Visit  11/23/2019     Basic Metabolic Panel  02/06/2020     Cholesterol Lab  02/06/2020     Kidney Microalbumin Urine Test  02/06/2020       Your health care Provider has recommended that you receive the new Shingle vaccine called Shingrix to prevent shingles for ages 50 and above. Many private insurance and Medicare Part D plans cover Shingrix. However, not all insurance carriers cover the entire cost of the Shingrix vaccine if the vaccine is administered at your primary care clinic. The clinic cannot determine your insurance benefits.  Please call your insurance carrier prior. The vaccine comes in two doses. Your second dose should be 2-6 months from your first dose.       Prior to receiving the vaccine, we recommend that you call your insurance carrier and ask them the following questions:            1. Is there a cost difference if I receive the vaccine at my doctor's office or a pharmacy?          2. Does my insurance cover the Shingrix Vaccine and administration of the vaccine?          3. What is my co-pay or deductible for the vaccine?        Please call to schedule a Nurse-Visit only at [phone].  Nurse Visit hours are available Monday, Wednesday, and Friday from 9:00 AM-11:00 AM and 1:00 PM-3:00 PM.

## 2020-02-06 NOTE — NURSING NOTE
70 year old  Chief Complaint   Patient presents with     Medicare Visit     Pt is here for a medicare wellness physical.        Blood pressure (!) 144/79, pulse 63, temperature 97.7  F (36.5  C), temperature source Oral, weight 96.2 kg (212 lb 1.6 oz), SpO2 95 %, not currently breastfeeding. Body mass index is 35.03 kg/m .  BP completed using cuff size:      RANJIT Mcneal  February 6, 2020 9:24 AM

## 2020-02-11 ENCOUNTER — ANCILLARY PROCEDURE (OUTPATIENT)
Dept: CT IMAGING | Facility: CLINIC | Age: 70
End: 2020-02-11
Attending: INTERNAL MEDICINE
Payer: COMMERCIAL

## 2020-02-11 ENCOUNTER — OFFICE VISIT (OUTPATIENT)
Dept: INTERNAL MEDICINE | Facility: CLINIC | Age: 70
End: 2020-02-11
Payer: COMMERCIAL

## 2020-02-11 VITALS
HEART RATE: 60 BPM | OXYGEN SATURATION: 97 % | DIASTOLIC BLOOD PRESSURE: 83 MMHG | BODY MASS INDEX: 35.01 KG/M2 | SYSTOLIC BLOOD PRESSURE: 157 MMHG | WEIGHT: 212 LBS

## 2020-02-11 DIAGNOSIS — E11.9 TYPE 2 DIABETES MELLITUS WITHOUT COMPLICATION, WITHOUT LONG-TERM CURRENT USE OF INSULIN (H): ICD-10-CM

## 2020-02-11 DIAGNOSIS — R10.13 ABDOMINAL PAIN, EPIGASTRIC: ICD-10-CM

## 2020-02-11 DIAGNOSIS — R10.13 ABDOMINAL PAIN, EPIGASTRIC: Primary | ICD-10-CM

## 2020-02-11 LAB
ALBUMIN SERPL-MCNC: 3.7 G/DL (ref 3.4–5)
ALP SERPL-CCNC: 107 U/L (ref 40–150)
ALT SERPL W P-5'-P-CCNC: 36 U/L (ref 0–50)
ANION GAP SERPL CALCULATED.3IONS-SCNC: 5 MMOL/L (ref 3–14)
AST SERPL W P-5'-P-CCNC: 20 U/L (ref 0–45)
BILIRUB SERPL-MCNC: 0.4 MG/DL (ref 0.2–1.3)
BUN SERPL-MCNC: 12 MG/DL (ref 7–30)
CALCIUM SERPL-MCNC: 8.9 MG/DL (ref 8.5–10.1)
CHLORIDE SERPL-SCNC: 103 MMOL/L (ref 94–109)
CO2 SERPL-SCNC: 30 MMOL/L (ref 20–32)
CREAT SERPL-MCNC: 0.63 MG/DL (ref 0.52–1.04)
ERYTHROCYTE [DISTWIDTH] IN BLOOD BY AUTOMATED COUNT: 12.6 % (ref 10–15)
GFR SERPL CREATININE-BSD FRML MDRD: >90 ML/MIN/{1.73_M2}
GLUCOSE SERPL-MCNC: 152 MG/DL (ref 70–99)
HBA1C MFR BLD: 9.2 % (ref 0–5.6)
HCT VFR BLD AUTO: 40.7 % (ref 35–47)
HGB BLD-MCNC: 13.6 G/DL (ref 11.7–15.7)
LACTATE BLD-SCNC: 1.5 MMOL/L (ref 0.7–2)
LIPASE SERPL-CCNC: 91 U/L (ref 73–393)
MCH RBC QN AUTO: 29.8 PG (ref 26.5–33)
MCHC RBC AUTO-ENTMCNC: 33.4 G/DL (ref 31.5–36.5)
MCV RBC AUTO: 89 FL (ref 78–100)
PLATELET # BLD AUTO: 233 10E9/L (ref 150–450)
POTASSIUM SERPL-SCNC: 3.6 MMOL/L (ref 3.4–5.3)
PROT SERPL-MCNC: 6.8 G/DL (ref 6.8–8.8)
RBC # BLD AUTO: 4.57 10E12/L (ref 3.8–5.2)
SODIUM SERPL-SCNC: 138 MMOL/L (ref 133–144)
WBC # BLD AUTO: 5.6 10E9/L (ref 4–11)

## 2020-02-11 RX ORDER — IOPAMIDOL 755 MG/ML
130 INJECTION, SOLUTION INTRAVASCULAR ONCE
Status: COMPLETED | OUTPATIENT
Start: 2020-02-11 | End: 2020-02-11

## 2020-02-11 RX ADMIN — IOPAMIDOL 130 ML: 755 INJECTION, SOLUTION INTRAVASCULAR at 16:03

## 2020-02-11 NOTE — PROGRESS NOTES
[facility]         HPI:       Zenaida Valles is a 71 YO F with a h/o asthma, DM2, fibromuscular dysplasia, HLD, and R ABSILIO s/p stent who presents with acute abdominal pain.  She was last seen 02/06/20 in clinic and was noted to have a HgbA1c of 9.1 and was started on Metformin.     She notes that her abdominal pain first started in late January after wet felting, in which she did a repetitive lunging forward motion ~ 750 times. Initially she had bilateral upper abdominal pain that lasted about 24 hours and then resolved. A few days later she noted a return of her abdominal pain (RUQ >epigastric) following a traditional African dish. Again her abdominal pain resolved in ~ 24 hours. On the 6th she started Metformin, and since then she has had intermittent but more frequent abdominal pain. It is now a 8/10 on the pain scale is sharp in nature and exacerbated by deep palpation. Also endorses mild nausea. She denies fevers, chills, emesis, hematemesis, diarrhea, melena, hematochezia, abdominal rash, disorientation/confusion, recent trauma, travel, weight loss, or changes in her diet. She denies a history of gallstones. Patient notes history of Hepatitis A and Hepatitis B several years ago. No history of pancreatitis. Her last BM was today, was of normal appearance and consistency. No NSAID overuse or EtOH abuse. Patient denies taking more than recommended dose of Metformin.    Colonoscopy from 2017 reviewed and was notable for hemorrhoids and diverticulosis of sigmoid colon. Patient had a stent placed in R renal artery d/t BASILIO. Renal US from 2016 reviewed and showed a patent reneal artery stent.    Past Medical History:   Diagnosis Date     Asthma      Diabetes mellitus (H)      Fibromuscular dysplasia (H)      H/O sebaceous cyst      Hyperlipidemia LDL goal < 130      Hypertension      Obesity      Renal artery stenosis (H) 2005    Right, s/p stenting at [facility]     Statin medication not  prescribed per physician orders     pt declined     Past Surgical History:   Procedure Laterality Date     COLONOSCOPY N/A 3/10/2017    Procedure: COMBINED COLONOSCOPY, SINGLE OR MULTIPLE BIOPSY/POLYPECTOMY BY BIOPSY;  Surgeon: Sergo Mcgraw MD;  Location: [facility]     IR RENAL/VISCERAL STENT/ATHERECT/PTA Right 2005     Problem, Medication and Allergy Lists were reviewed and are current.  Patient is an established patient of this clinic.         Review of Systems:     ROS  I have personally reviewed and updated the complete ROS on the day of the visit.           Physical Exam:   BP (!) 157/83 (BP Location: Right arm, Patient Position: Sitting, Cuff Size: Adult Regular)   Pulse 60   Wt 96.2 kg (212 lb)   LMP  (LMP Unknown)   SpO2 97%   BMI 35.01 kg/m    Body mass index is 35.01 kg/m .  Vitals were reviewed       GENERAL APPEARANCE:  woman, standing in exam room with R hand over abdomen     EYES: EOMI, PERRLA     HENT: ear canals and TM's normal and nose and mouth without ulcers or lesions     NECK: no adenopathy, supple     RESP: lungs clear to auscultation - no rales, rhonchi or wheezes, on RA     CV: regular rates and rhythm, normal S1 S2, no S3 or S4 and no murmur, click or rub     ABDOMEN:  soft, tender to deep palpation in epigastric and RUQ area, non-tender throughout LUQ, LLQ, RLQ and suprapubic regions. Nondistended. No guarding. + bowel sounds.      BACK: No CVA tenderness b/l.     MS: extremities normal- no gross deformities noted, no evidence of inflammation in joints, FROM in all extremities.     SKIN: no suspicious lesions or rashes, neg for jaundice     NEURO: Normal strength and tone, sensory exam grossly normal, mentation intact and speech normal     PSYCH: mentation appears normal. and affect normal/bright     LYMPHATICS: No cervical adenopathy      Results:     Last Comprehensive Metabolic Panel:  Sodium   Date Value Ref Range Status   02/11/2020 138 133 - 144 mmol/L Final     Potassium    Date Value Ref Range Status   02/11/2020 3.6 3.4 - 5.3 mmol/L Final     Chloride   Date Value Ref Range Status   02/11/2020 103 94 - 109 mmol/L Final     Carbon Dioxide   Date Value Ref Range Status   02/11/2020 30 20 - 32 mmol/L Final     Anion Gap   Date Value Ref Range Status   02/11/2020 5 3 - 14 mmol/L Final     Glucose   Date Value Ref Range Status   02/11/2020 152 (H) 70 - 99 mg/dL Final     Urea Nitrogen   Date Value Ref Range Status   02/11/2020 12 7 - 30 mg/dL Final     Creatinine   Date Value Ref Range Status   02/11/2020 0.63 0.52 - 1.04 mg/dL Final     GFR Estimate   Date Value Ref Range Status   02/11/2020 >90 >60 mL/min/[1.73_m2] Final     Comment:     Non  GFR Calc  Starting 12/18/2018, serum creatinine based estimated GFR (eGFR) will be   calculated using the Chronic Kidney Disease Epidemiology Collaboration   (CKD-EPI) equation.       Calcium   Date Value Ref Range Status   02/11/2020 8.9 8.5 - 10.1 mg/dL Final     Bilirubin Total   Date Value Ref Range Status   02/11/2020 0.4 0.2 - 1.3 mg/dL Final     Alkaline Phosphatase   Date Value Ref Range Status   02/11/2020 107 40 - 150 U/L Final     ALT   Date Value Ref Range Status   02/11/2020 36 0 - 50 U/L Final     AST   Date Value Ref Range Status   02/11/2020 20 0 - 45 U/L Final     CBC RESULTS:   Recent Labs   Lab Test 02/11/20  1633   WBC 5.6   RBC 4.57   HGB 13.6   HCT 40.7   MCV 89   MCH 29.8   MCHC 33.4   RDW 12.6        Lipase: 91    Lactate: 1.5    CT A&P:  IMPRESSION:   1. No acute findings in the abdomen or pelvis.  2. There is a 2 mm hypodense lesion in the pancreatic tail. This could  represent sidebranch IPMN, and could be further characterized on MRCP.  3. Slightly thickened appearance of the endometrium. Given this  patient's age, further evaluation with pelvic ultrasound and  correlation with any abnormal vaginal bleeding is recommended.  4. Patent right renal artery stent.  5. 3 mm solid nodule right lower  lobe, and 3 mm solid nodule in the  right middle lobe. If this patient is at high risk for lung cancer,  consider optional follow-up with low-dose chest CT in 12 months, per  Fleischner Society criteria.  6. Hepatic steatosis.    Assessment and Plan     Zenaida Valles is a 71 YO F with a h/o asthma, DM2, fibromuscular dysplasia, HLD, and R BASILIO s/p stent who presents with acute abdominal pain.  She was last seen 02/06/20 in clinic and was noted to have a HgbA1c of 9.1 and was started on Metformin, who presents to clinic with acute abdominal pain.    # Acute Epigastric and RUQ Abdominal Pain  Differential at this point remains broad and includes pancreatitis, PUD, gastritis, cholelithiasis, choledocholithiasis, partial obstruction, referred pain from BASILIO, and MSK pain. Will need further imaging and labs to derive a diagnosis.  - CMP  - CBC  - CT A&P  - Discontinue Metoformin. Lactic acidosis is lower on my differential, but it is reasonable to hold this medication in the setting of an unclear etiology.  - Conservative management of pain and nausea    Options for treatment and follow-up care were reviewed with the patient. Zenaida Valles engaged in the decision making process and verbalized understanding of the options discussed and agreed with the final plan.    Poncho Leach Jr., MD  Feb 11, 2020    Pt was seen and plan of care discussed with Dr. Nadia Randle.       Attending Addendum:  Patient seen and examined with resident in clinic today.  Pertinent portions of history and exam were independently verified by myself.  I agree with the exam and plan as outlined above with the following modifications: none.  Nadia Randle MD  Internal Medicine

## 2020-02-11 NOTE — NURSING NOTE
Chief Complaint   Patient presents with     Right Upper Quadrant Pain     Pt comes in for right upper quad pain      MANOLO Hughes at 2:27 PM sign on 2/11/2020

## 2020-02-14 ENCOUNTER — TELEPHONE (OUTPATIENT)
Dept: INTERNAL MEDICINE | Facility: CLINIC | Age: 70
End: 2020-02-14

## 2020-02-14 NOTE — TELEPHONE ENCOUNTER
Please call patient to:  1. Check on abdominal pain symptoms from this week?  2. See if she would like to come in to discuss diabetes management with me in next couple of weeks, if she cannot take metformin? Okay to offer REANNA or ACC.  Thanks,  Nadia Randle MD  Internal Medicine

## 2020-02-19 ENCOUNTER — PATIENT OUTREACH (OUTPATIENT)
Dept: INTERNAL MEDICINE | Facility: CLINIC | Age: 70
End: 2020-02-19

## 2020-02-19 NOTE — PROGRESS NOTES
"Patient was reached by phone, and she let RN know that abdominal pain has been getting slightly better each day, especially since Fri 2/14.    It is not as severe as when she was seen at last office visit on 2/11/20.  She describes it as a fullness or discomfort in the mid to right upper quadrant.  She states, \"I don't feel normal, but as though I am constipated\".  She has continued to take the Metformin and will start taking it twice a day on Sat 2/22.  An appt was made for her to see Dr. Randle for follow up on Fri 3/6 and patient will call to cancel if she continues to improve, but appreciated the offer of the appt in case she still has symptoms.    Katherine Diop RN on 2/19/2020 at 2:35 PM    "

## 2020-03-03 ENCOUNTER — DOCUMENTATION ONLY (OUTPATIENT)
Dept: CARE COORDINATION | Facility: CLINIC | Age: 70
End: 2020-03-03

## 2020-04-28 DIAGNOSIS — J45.20 MILD INTERMITTENT ASTHMA WITHOUT COMPLICATION: ICD-10-CM

## 2020-04-29 RX ORDER — ALBUTEROL SULFATE 90 UG/1
2 AEROSOL, METERED RESPIRATORY (INHALATION) EVERY 6 HOURS PRN
Qty: 1 INHALER | Refills: 2 | Status: SHIPPED | OUTPATIENT
Start: 2020-04-29 | End: 2021-01-25

## 2020-04-29 NOTE — TELEPHONE ENCOUNTER
Last Clinic Visit: 2/11/2020  [facility]  Overdue ACT: FYI to clinic CMA  RF 90 day

## 2020-05-08 ENCOUNTER — VIRTUAL VISIT (OUTPATIENT)
Dept: INTERNAL MEDICINE | Facility: CLINIC | Age: 70
End: 2020-05-08
Payer: COMMERCIAL

## 2020-05-08 DIAGNOSIS — E11.9 TYPE 2 DIABETES MELLITUS WITHOUT COMPLICATION, WITHOUT LONG-TERM CURRENT USE OF INSULIN (H): ICD-10-CM

## 2020-05-08 ASSESSMENT — PAIN SCALES - GENERAL: PAINLEVEL: NO PAIN (0)

## 2020-05-08 NOTE — PROGRESS NOTES
"    Zenaida Valles is a 70 year old female who is being evaluated via a billable telephone visit.      The patient has been notified of following:     \"This telephone visit will be conducted via a call between you and your physician/provider. The patient has consented to a video visit and informed that video and telephone visits are being performed during the COVID-19 pandemic in order to mitigate the risk of an in office visit for appropriate candidates/issues. We have found that certain health care needs can be provided without the need for a physical exam.  This service lets us provide the care you need with a short phone conversation.  If a prescription is necessary we can send it directly to your pharmacy.  If lab work is needed we can place an order for that and you can then stop by our lab to have the test done at a later time.    Telephone visits are billed at different rates depending on your insurance coverage. During this emergency period, for some insurers they may be billed the same as an in-person visit.  Please reach out to your insurance provider with any questions.    If during the course of the call the physician/provider feels a telephone visit is not appropriate, you will not be charged for this service. If the provider feels that they are unable to assess your concerns without an in person visit, you will be advised of this limitation and depending on the nature of the concern, advised to seek in person care if your provider feels you need urgent evaluation.\"    Patient has given verbal consent for Telephone visit?  Yes    How would you like to obtain your AVS? Jameet    Subjective     Zenaida Valles is a 70 year old female who presents to clinic today for the following health issues:  Chief Complaint   Patient presents with     Hypoglycemia     pt would like to discuss low gluose episode last sat       HPI    Patient having allergies this season, which is anxiety " provoking. [place] cabin. She is walking more, has lost a few pounds.    Patient was concerned about low blood sugars. Was doing heavy yard work. Came inside and felt tired. She signaled spouse to get milk. She skipped a couple doses.  She reports no signs or symptoms of stroke. No weakness, speech difficulty, numbness/tingling, chest pain, palpitations.  She has been taking metformin once daily.  This was a one time episode.     Patient Active Problem List   Diagnosis     Essential hypertension     Asthma     Atherosclerosis of renal artery (H)     Past Surgical History:   Procedure Laterality Date     COLONOSCOPY N/A 3/10/2017    Procedure: COMBINED COLONOSCOPY, SINGLE OR MULTIPLE BIOPSY/POLYPECTOMY BY BIOPSY;  Surgeon: Sergo Mcgraw MD;  Location: [facility]     IR RENAL/VISCERAL STENT/ATHERECT/PTA Right 2005       Social History     Tobacco Use     Smoking status: Never Smoker     Smokeless tobacco: Never Used   Substance Use Topics     Alcohol use: Yes     Comment: rare     Family History   Problem Relation Age of Onset     Peptic Ulcer Disease Mother         Zollinger-Wheeler Syndrome     Diabetes Mother      Thyroid Disease Mother         s/p thyroidecomy     Cancer Father         prostate cancer     C.A.D. Father         aortic aneurysm     Childhood Heart Disease Sister         tetrology of fallot     Melanoma No family hx of      Skin Cancer No family hx of          Current Outpatient Medications   Medication Sig Dispense Refill     albuterol (PROAIR HFA) 108 (90 Base) MCG/ACT inhaler Inhale 2 puffs into the lungs every 6 hours as needed 1 Inhaler 2     aspirin 81 MG tablet Take 81 mg by mouth daily       atenolol (TENORMIN) 25 MG tablet Take 1 tablet (25 mg) by mouth daily 90 tablet 3     calcium carbonate (OS-GAEL 500 MG [place]. [place]) 500 MG tablet Take 300 mg by mouth daily        diclofenac (VOLTAREN) 1 % GEL topical gel Apply 2 grams three - four times daily using enclosed dosing card. 100 g 0      "fish oil-omega-3 fatty acids 1000 MG capsule Take 2 g by mouth daily       fluticasone-salmeterol (ADVAIR DISKUS) 100-50 MCG/DOSE inhaler Inhale 1 puff into the lungs daily 2 Inhaler 3     hydrochlorothiazide (HYDRODIURIL) 25 MG tablet Take 1 tablet (25 mg) by mouth daily 90 tablet 3     irbesartan (AVAPRO) 300 MG tablet Take 1 tablet (300 mg) by mouth At Bedtime 90 tablet 3     metFORMIN (GLUCOPHAGE) 500 MG tablet Take 1 tablet (500 mg) by mouth daily (with dinner) Increase to twice daily with Breakfast and Dinner if tolerating after 2 weeks 180 tablet 1     multivitamin, therapeutic with minerals (MULTI-VITAMIN) TABS tablet Take 1 tablet by mouth daily       STATIN NOT PRESCRIBED (INTENTIONAL) Please choose reason not prescribed, below       vitamin C (ASCORBIC ACID) 100 MG tablet Take 100 mg by mouth 3 times daily       Vitamin Mixture (VITAMIN E COMPLETE) CAPS        Allergies   Allergen Reactions     Lisinopril Cough     Codeine Camsylate Diarrhea and Rash       Reviewed and updated as needed this visit by Provider    Review of Systems   Comprehensive review of systems negative unless indicated in the HPI.       Physical Exam   Reported vitals:  There were no vitals taken for this visit.   Wt Readings from Last 2 Encounters:   02/11/20 96.2 kg (212 lb)   02/11/20 96.2 kg (212 lb)    Estimated body mass index is 35.01 kg/m  as calculated from the following:    Height as of 11/20/17: 1.657 m (5' 5.25\").    Weight as of 2/11/20: 96.2 kg (212 lb).   Gen: healthy, alert, no distress and cooperative  Psych: Alert and oriented times 3; coherent speech, normal   rate and volume, able to articulate logical thoughts, able   to abstract reason, no tangential thoughts, no hallucinations   or delusions, affect is normal/bright.  Respiratory: Speaking in full sentences, unlabored, no audible wheezes or cough.  Remainder of exam unable to be completed due to telephone visits    Diagnostic Test Results:  Labs reviewed in " Epic    Hemoglobin A1C   Date Value Ref Range Status   02/11/2020 9.2 (H) 0 - 5.6 % Final     Comment:     Normal <5.7% Prediabetes 5.7-6.4%  Diabetes 6.5% or higher - adopted from ADA   consensus guidelines.           Assessment/Plan:  Zenaida was seen today for hypoglycemia.    Diagnoses and all orders for this visit:    Type 2 diabetes mellitus without complication, without long-term current use of insulin (H)  Discussed at length. Metformin unlikely to cause hypoglycemia, though not impossible. Episode was also preceded by strenuous exercise. Patient not inclined to get glucometer at this time. She will resume bid dosing with close monitoring. Advised her to take it easy with exertion, take breaks, snacks, hydration.  Doesn't sound like ACS or TIA though advised patient to monitor for other symptoms. She will let me know if symptoms recur. We can revisit this summer with labs. Patient verbalized understanding and agreement with plan.    -     metFORMIN (GLUCOPHAGE) 500 MG tablet; Take 1 tablet (500 mg) by mouth 2 times daily (with meals)         -     Future labs, A1c, CMP, lipids          Phone call duration: 26 minutes  8:54 AM 9:20 AM        Nadia Randle MD  Internal Medicine

## 2020-07-07 ENCOUNTER — TELEPHONE (OUTPATIENT)
Dept: DERMATOLOGY | Facility: CLINIC | Age: 70
End: 2020-07-07

## 2020-07-07 NOTE — TELEPHONE ENCOUNTER
"Teledermatology Nurse Call for RETURN patients seen within the last 3 years:    The patient was contacted by phone and we reviewed, \"Due to the coronavirus pandemic, we are calling to review your visit and offer you a teledermatology visit where you send in photos via PageFreezer. These photos will be seen by an MD or CAITLIN. This will be billed to you and your insurance.\"  The patient was also told that \"a teledermatology visit is not as thorough as an in-person visit and that the quality of the photograph sent may not be of the same quality as that taken by the dermatology clinic, but the patient would like to proceed with an teledermatology because of National Emergency Regarding Coronavirus disease (COVID 19) Outbreak.\"  \"If a prescription is necessary we can send it directly to your pharmacy.  If lab work is needed we can place an order for that and you can then stop by our lab to have the test done at a later time.\"    The patient understood that they may receive a call from the clinic to review additional history, may still be instructed to come to clinic even after photo review and be billed for both visits with an MD. Visits are billed at different rates depending on your insurance coverage. Please reach out to your insurance provider with any questions.They were told that a photo assessment does not replace an in person skin exam. The patient understood that teledermatology is not for urgent issues and would require up to 3 business days for review. The patient denied skin pain, fever, mucosal symptoms (lesions, blisters, sores in the mouth, nose, eyes, or genitals)  IF PATIENT ENDORSES ANY OF THESE STOP AND PAGE  ON CALL ATTENDING. IF OTHER POSSIBLY URGENT SYMPTOMS THEN PAGE PHYSICIAN YOU ARE SCHEDULING WITH OR ON CALL IF NO ANSWER.       The patient chose to:                                                                                                                                                           "                                                          The patient declined all follow-up options, the plan is to add them to a call back list and attempt to reschedule in the future.       Photo instructions reviewed with patients as below:  -ALL patient needs to send photos unless they have a phone only visit approved by the clinic:  o To send photos to your doctor, respond to the message in JAZIO as many times as needed to upload your photos. Each message allows for 3 photos.  o For spots or lesions of concern, please take at least 2 photos of each site you are worried about (at different angles if needed, and at least one close up and one farther away so we can tell where it is on the body. Be sure all photographs are in focus)  o For rashes, take photos of the entire body because it is important for us to see which areas are involved and which areas are not involved.  At a minimum, please include photos of the arms, legs, front of trunk, back of trunk, face, and a few close-ups of the rash.  Leave undergarments in place unless the rash involves the skin in these areas  o For acne, please take photos of the face, upper chest and neck, and upper chest and back  o For hair loss, please send views of the top of the head, sides of the head, back of the head and a picture of your face with your hair pulled back. Also, a photos of both hands with nails.    -For ADULT NEW patients video visits are needed, to receive an invitation and connect with your provider, the ExoYou video visit technology must be accessible from your smartphone or personal device. Please click the link below for setup instructions: [email or web address]    Nursing tasks completed  -Pharmacy preference was updated.  -The nurse has dropped in the AVS information *(For adults the phrase is umdermhteleavs and for pediatrics it is their own) for the physician to route in the AVS.                                                                                                                                                                                                                          -The patient was told to contact the clinic if they have not received correspondence within 72 hours.

## 2020-08-26 DIAGNOSIS — J45.20 MILD INTERMITTENT ASTHMA WITHOUT COMPLICATION: ICD-10-CM

## 2020-11-07 ENCOUNTER — HEALTH MAINTENANCE LETTER (OUTPATIENT)
Age: 70
End: 2020-11-07

## 2021-01-13 ENCOUNTER — DOCUMENTATION ONLY (OUTPATIENT)
Dept: CARE COORDINATION | Facility: CLINIC | Age: 71
End: 2021-01-13

## 2021-01-18 ASSESSMENT — ACTIVITIES OF DAILY LIVING (ADL)
IN_THE_PAST_7_DAYS,_DID_YOU_NEED_HELP_FROM_OTHERS_TO_TAKE_CARE_OF_THINGS_SUCH_AS_LAUNDRY_AND_HOUSEKEEPING,_BANKING,_SHOPPING,_USING_THE_TELEPHONE,_FOOD_PREPARATION,_TRANSPORTATION,_OR_TAKING_YOUR_OWN_MEDICATIONS?: N
IN_THE_PAST_7_DAYS,_DID_YOU_NEED_HELP_FROM_OTHERS_TO_PERFORM_EVERYDAY_ACTIVITIES_SUCH_AS_EATING,_GETTING_DRESSED,_GROOMING,_BATHING,_WALKING,_OR_USING_THE_TOILET: N

## 2021-01-19 DIAGNOSIS — E11.9 TYPE 2 DIABETES MELLITUS WITHOUT COMPLICATION, WITHOUT LONG-TERM CURRENT USE OF INSULIN (H): ICD-10-CM

## 2021-01-19 LAB
ALBUMIN SERPL-MCNC: 3.8 G/DL (ref 3.4–5)
ALP SERPL-CCNC: 98 U/L (ref 40–150)
ALT SERPL W P-5'-P-CCNC: 32 U/L (ref 0–50)
ANION GAP SERPL CALCULATED.3IONS-SCNC: 4 MMOL/L (ref 3–14)
AST SERPL W P-5'-P-CCNC: 12 U/L (ref 0–45)
BILIRUB SERPL-MCNC: 0.4 MG/DL (ref 0.2–1.3)
BUN SERPL-MCNC: 15 MG/DL (ref 7–30)
CALCIUM SERPL-MCNC: 8.9 MG/DL (ref 8.5–10.1)
CHLORIDE SERPL-SCNC: 105 MMOL/L (ref 94–109)
CHOLEST SERPL-MCNC: 207 MG/DL
CO2 SERPL-SCNC: 30 MMOL/L (ref 20–32)
CREAT SERPL-MCNC: 0.64 MG/DL (ref 0.52–1.04)
GFR SERPL CREATININE-BSD FRML MDRD: >90 ML/MIN/{1.73_M2}
GLUCOSE SERPL-MCNC: 218 MG/DL (ref 70–99)
HBA1C MFR BLD: 7.6 % (ref 0–5.6)
HDLC SERPL-MCNC: 39 MG/DL
LDLC SERPL CALC-MCNC: 130 MG/DL
NONHDLC SERPL-MCNC: 168 MG/DL
POTASSIUM SERPL-SCNC: 4.1 MMOL/L (ref 3.4–5.3)
PROT SERPL-MCNC: 6.9 G/DL (ref 6.8–8.8)
SODIUM SERPL-SCNC: 139 MMOL/L (ref 133–144)
TRIGL SERPL-MCNC: 190 MG/DL

## 2021-01-19 PROCEDURE — 83036 HEMOGLOBIN GLYCOSYLATED A1C: CPT | Performed by: PATHOLOGY

## 2021-01-19 PROCEDURE — 36415 COLL VENOUS BLD VENIPUNCTURE: CPT | Performed by: PATHOLOGY

## 2021-01-19 PROCEDURE — 80053 COMPREHEN METABOLIC PANEL: CPT | Performed by: PATHOLOGY

## 2021-01-19 PROCEDURE — 80061 LIPID PANEL: CPT | Performed by: PATHOLOGY

## 2021-01-25 ENCOUNTER — OFFICE VISIT (OUTPATIENT)
Dept: INTERNAL MEDICINE | Facility: CLINIC | Age: 71
End: 2021-01-25
Payer: COMMERCIAL

## 2021-01-25 VITALS
WEIGHT: 205 LBS | BODY MASS INDEX: 32.18 KG/M2 | OXYGEN SATURATION: 98 % | SYSTOLIC BLOOD PRESSURE: 132 MMHG | HEART RATE: 67 BPM | HEIGHT: 67 IN | DIASTOLIC BLOOD PRESSURE: 84 MMHG

## 2021-01-25 DIAGNOSIS — J45.909 UNCOMPLICATED ASTHMA, UNSPECIFIED ASTHMA SEVERITY, UNSPECIFIED WHETHER PERSISTENT: ICD-10-CM

## 2021-01-25 DIAGNOSIS — J45.20 MILD INTERMITTENT ASTHMA WITHOUT COMPLICATION: ICD-10-CM

## 2021-01-25 DIAGNOSIS — I10 BENIGN ESSENTIAL HYPERTENSION: ICD-10-CM

## 2021-01-25 DIAGNOSIS — Z12.31 ENCOUNTER FOR SCREENING MAMMOGRAM FOR BREAST CANCER: ICD-10-CM

## 2021-01-25 DIAGNOSIS — E11.69 TYPE 2 DIABETES MELLITUS WITH OTHER SPECIFIED COMPLICATION, WITHOUT LONG-TERM CURRENT USE OF INSULIN (H): ICD-10-CM

## 2021-01-25 DIAGNOSIS — I10 ESSENTIAL HYPERTENSION: ICD-10-CM

## 2021-01-25 DIAGNOSIS — E11.9 TYPE 2 DIABETES MELLITUS WITHOUT COMPLICATION, WITHOUT LONG-TERM CURRENT USE OF INSULIN (H): ICD-10-CM

## 2021-01-25 DIAGNOSIS — Z00.00 ENCOUNTER FOR MEDICARE ANNUAL WELLNESS EXAM: Primary | ICD-10-CM

## 2021-01-25 DIAGNOSIS — H26.9 CATARACT, UNSPECIFIED CATARACT TYPE, UNSPECIFIED LATERALITY: ICD-10-CM

## 2021-01-25 DIAGNOSIS — D22.9 MELANOCYTIC NEVUS, UNSPECIFIED LOCATION: ICD-10-CM

## 2021-01-25 PROCEDURE — G0439 PPPS, SUBSEQ VISIT: HCPCS | Performed by: INTERNAL MEDICINE

## 2021-01-25 RX ORDER — ALBUTEROL SULFATE 90 UG/1
2 AEROSOL, METERED RESPIRATORY (INHALATION) EVERY 6 HOURS PRN
Qty: 1 INHALER | Refills: 3 | Status: SHIPPED | OUTPATIENT
Start: 2021-01-25 | End: 2022-02-10

## 2021-01-25 RX ORDER — ATENOLOL 25 MG/1
25 TABLET ORAL DAILY
Qty: 90 TABLET | Refills: 3 | Status: SHIPPED | OUTPATIENT
Start: 2021-01-25 | End: 2022-01-07

## 2021-01-25 RX ORDER — HYDROCHLOROTHIAZIDE 25 MG/1
25 TABLET ORAL DAILY
Qty: 90 TABLET | Refills: 3 | Status: SHIPPED | OUTPATIENT
Start: 2021-01-25 | End: 2022-01-07

## 2021-01-25 RX ORDER — IRBESARTAN 300 MG/1
300 TABLET ORAL AT BEDTIME
Qty: 90 TABLET | Refills: 3 | Status: SHIPPED | OUTPATIENT
Start: 2021-01-25 | End: 2022-01-07

## 2021-01-25 ASSESSMENT — ANXIETY QUESTIONNAIRES
GAD7 TOTAL SCORE: 1
3. WORRYING TOO MUCH ABOUT DIFFERENT THINGS: NOT AT ALL
2. NOT BEING ABLE TO STOP OR CONTROL WORRYING: NOT AT ALL
IF YOU CHECKED OFF ANY PROBLEMS ON THIS QUESTIONNAIRE, HOW DIFFICULT HAVE THESE PROBLEMS MADE IT FOR YOU TO DO YOUR WORK, TAKE CARE OF THINGS AT HOME, OR GET ALONG WITH OTHER PEOPLE: NOT DIFFICULT AT ALL
6. BECOMING EASILY ANNOYED OR IRRITABLE: NOT AT ALL
7. FEELING AFRAID AS IF SOMETHING AWFUL MIGHT HAPPEN: NOT AT ALL
1. FEELING NERVOUS, ANXIOUS, OR ON EDGE: SEVERAL DAYS
5. BEING SO RESTLESS THAT IT IS HARD TO SIT STILL: NOT AT ALL

## 2021-01-25 ASSESSMENT — MIFFLIN-ST. JEOR: SCORE: 1495.67

## 2021-01-25 ASSESSMENT — PATIENT HEALTH QUESTIONNAIRE - PHQ9
5. POOR APPETITE OR OVEREATING: NOT AT ALL
SUM OF ALL RESPONSES TO PHQ QUESTIONS 1-9: 1

## 2021-01-25 NOTE — PROGRESS NOTES
Medicare Annual Wellness Visit Previsit note    This 70 year old year old female presents for a  Medicare Wellness Exam.           Medical Care     Have you been to an ER or a hospital in the last year? No  What other specialists or organizations are involved in your medical care?  Dermatology  Current providers sharing in care for this patient include:  Patient Care Team       Relationship Specialty Notifications Start End    Nadia Randle MD PCP - General Internal Medicine  9/20/12     Phone: [phone] Fax: [phone]         [address] [place] [id number]    Gurpreet Young MD MD Dermatology  5/14/18     Phone: [phone] Fax: [phone]         [address] [place] [address]    John Perez MD MD Family Practice  6/13/19     Phone: [phone] Fax: [phone]         [address] [place] [address]    Rocio Arechiga [facility] Pharmacist Pharmacist Clinician- Clinical Pharmacy Specialist  2/27/20     Phone: [phone]          [address] [facility] [address]    Leslie Walker PAGumaroC Physician Assistant Physician Assistant - Medical  2/28/20     Phone: [phone] Fax: [phone]         [address] [facility] [address]    Nadia Randle MD Assigned PCP   5/21/20     Phone: [phone] Fax: [phone]         [address] [place] [id number]                 Social History     Marital Status:  Who lives in your household?   Does your home have any of the following safety concerns? Loose rugs in the hallway, no grab bars in the bathroom, no handrails on the stairs or have poorly lit areas?  No  Do you feel threatened or controlled by a partner, ex-partner or anyone in your life? No  Has anyone hurt you physically, for example by pushing, hitting, slapping or kicking you   or forcing you to have sex? No  Do you need help with the phone, transportation, shopping, preparing meals, housework, laundry, medications or managing money?  "No   Have you noticed any hearing difficulties? No      Risk Behaviors and Healthy Habits     How many servings of fruits and vegetables do you eat a day? 5-6  How often do you exercise and what do you do? 300 minutes \"pt didn't report\" a week  Do you frequently ride without a seatbelt? No  Do you use tobacco?  No  Do you use any other drugs? No       Do you use alcohol?Yes  Number of drinks per day 0-1  Number of drinking days a week 0-1      Sexual Health     Are you sexually active? No   If yes, with men, women, or both? n/a  If yes, do you more than one current partner?N/A  If yes, do you use condoms? N/A  Have you had any sexually transmitted infections? N/A  Any sexual concerns? No       FOR WOMEN ONLY  What year did you stop having periods? 2004  Any vaginal bleeding in the last year? No  Have you ever had an abnormal Pap smear? No    "

## 2021-01-25 NOTE — NURSING NOTE
Chief Complaint   Patient presents with     Physical     physical        Luz Bliss MA, at 3:13 PM on 1/25/2021.

## 2021-01-25 NOTE — PROGRESS NOTES
History of Present Illness:  Ms. Lazarus Valles is a 70 year old female who presents for  Chief Complaint   Patient presents with     Physical     physical        Zenaida has been pretty isolated during pandemic, spends time at [place].  Walking and lifting weights, staying socially active, still doing [employer], quilting.    Dealing with knee OA, LBP, takes APAP for this.  DM: Not checking BG. She has changed diet, less carbs. A1c down from last year. Lost ~5 lbs.  BP: controlled on current meds.  HLD: not interested in statin given history of hepatitis.  Asthma: Using advair daily, symptoms controlled.    Due for eye visit given DM and cataracts. Due for mammo, colo. Thinking about deferring until until after she receives her first dose of Covid vaccine.  She is also due for a pneumococcal vaccination booster.    She try to schedule a Derm follow-up given history of atypical junctional nevus but reports that her appointment was canceled twice.        Routine Health Maintenance  Immunizations (zoster, pneumovax, flu, Tdap, Hep A/B):           Most Recent Immunizations   Administered Date(s) Administered     Influenza (High Dose) 3 valent vaccine 10/06/2017     Influenza (IIV3) 10/01/2013     Influenza Vaccine IM 3yrs+ 4 Valent IIV4 09/29/2016     Pneumococcal (PCV 13) 05/22/2017     Pneumococcal 23 valent 10/24/2013     TD (ADULT, 7+) 07/01/1999     TDAP Vaccine (Boostrix) 10/18/2011      Lipids:           Recent Labs   Lab Test 02/06/20  1032 02/06/19  1152   10/27/14  0909 10/24/13  1443   CHOL 249* 250*   < > 248* 221*   HDL 42* 39*   < > 45* 35*   * 173*   < > 172* 139*   TRIG 246* 189*   < > 156* 234*   CHOLHDLRATIO  --   --   --  5.6* 6.3*    < > = values in this interval not displayed.         Colonoscopy (50-75 yrs): 2017 3 TA and diverticulosis, rec 3-5 year f/u  Dexa (>65W or 70M yrs): Completed on 2/17/17. Normal results.  Mammogram (40-75 yrs): 2/17 normal, ordered 11/18 not yet done  Pap (21-65  yrs): PAP      NIL   10/27/2014  PAP      NIL   10/18/2011  HIV/HCV if risk factors: neg 2014  Safety/Lifestyle: discussed  Tob/EtOH: n/a  Depression: screen neg  Advanced Directive: she has one on file    Answers for HPI/ROS submitted by the patient on 1/18/2021   General Symptoms: No  Skin Symptoms: No  HENT Symptoms: No  EYE SYMPTOMS: No  HEART SYMPTOMS: No  LUNG SYMPTOMS: No  INTESTINAL SYMPTOMS: No  URINARY SYMPTOMS: No  GYNECOLOGIC SYMPTOMS: No  BREAST SYMPTOMS: No  SKELETAL SYMPTOMS: No  BLOOD SYMPTOMS: No  NERVOUS SYSTEM SYMPTOMS: No  MENTAL HEALTH SYMPTOMS: No    Medicare Annual Wellness Visit    This 70 year old year old female presents for an subsequent Medicare Wellness Exam.           Medical Care     Have you been to an ER or a hospital in the last year? No  What other specialists or organizations are involved in your medical care?    Current providers sharing in care for this patient include:  Patient Care Team       Relationship Specialty Notifications Start End    Nadia Randle MD PCP - General Internal Medicine  9/20/12     Phone: [phone] Fax: [phone]         [address] [place] [address]    Gurpreet Young MD MD Dermatology  5/14/18     Phone: [phone] Fax: [phone]         [address] [place] [address]    John Perez MD MD Family Practice  6/13/19     Phone: [phone] Fax: [phone]         [address] [place] [address]    Rocio Arechiga [facility] Pharmacist Pharmacist Clinician- Clinical Pharmacy Specialist  2/27/20     Phone: [phone]          [address] [facility] [address]    Leslie Walker PA-C Physician Assistant Physician Assistant - Medical  2/28/20     Phone: [phone] Fax: [phone]         [address] [place] [address]    Nadia Randle MD Assigned PCP   5/21/20     Phone: [phone] Fax: [phone]         [address] [place] [address]                 Social History     Marital  "Status:  Who lives in your household?   Does your home have any of the following safety concerns? Loose rugs in the hallway, no grab bars in the bathroom, no handrails on the stairs or have poorly lit areas?  No  Do you feel threatened or controlled by a partner, ex-partner or anyone in your life? No  Has anyone hurt you physically, for example by pushing, hitting, slapping or kicking you   or forcing you to have sex? No  Do you need help with the phone, transportation, shopping, preparing meals, housework, laundry, medications or managing money? No   Have you noticed any hearing difficulties? No      Risk Behaviors and Healthy Habits     How many servings of fruits and vegetables do you eat a day? 5-6  How often do you exercise and what do you do? 300 minutes \"pt didn't report\" a week  Do you frequently ride without a seatbelt? No  Do you use tobacco?  No  Do you use any other drugs? No       Do you use alcohol?Yes  Number of drinks per day 0-1  Number of drinking days a week 0-1      Sexual Health     Are you sexually active? No   If yes, with men, women, or both? n/a  If yes, do you more than one current partner?N/A  If yes, do you use condoms? N/A  Have you had any sexually transmitted infections? N/A  Any sexual concerns? No       FOR WOMEN ONLY  What year did you stop having periods? 2004  Any vaginal bleeding in the last year? No  Have you ever had an abnormal Pap smear? No        FUNCTIONAL ABILITY/SAFETY SCREENING   **RS to complete Fall Risk, PHQ2 in Assessments prior**    Fall Risk Assessment Today:   Fall Risk Screening:  FALL RISK ASSESSMENT 6/14/2019   Fallen 2 or more times in the past year? No   Any fall with injury in the past year? No         Hearing evaluation if done: no concerns    Visual acuity : referred to ophtho      EVALUATION OF COGNITIVE FUNCTION     Mini Cog Scoring   N/a grossly normal    PHQ-2 Score:    PHQ-2 Score:     PHQ-2 ( 1999 Pfizer) 2/6/2020 6/14/2019   Q1: Little " interest or pleasure in doing things 0 0   Q2: Feeling down, depressed or hopeless 0 0   PHQ-2 Score 0 0           SCREENING FOR PREVENTION and EARLY DETECTION     Advanced Directives: Discussed and patient has on file.                **RS to STOP HERE**            Reviewed Immunization Record Today    Most Recent Immunizations   Administered Date(s) Administered     Influenza (High Dose) 3 valent vaccine 10/09/2019     Influenza (IIV3) PF 10/01/2013     Influenza Vaccine IM > 6 months Valent IIV4 09/29/2016     Influenza, Quad, High Dose, Pf, 65yr + 09/14/2020     Pneumo Conj 13-V (2010&after) 05/22/2017     Pneumococcal 23 valent 10/24/2013     TD (ADULT, 7+) 07/01/1999     TDAP Vaccine (Boostrix) 10/18/2011       Reviewed Health Maintenance Completed and Due    Health Maintenance   Topic Date Due     DIABETIC FOOT EXAM  1950     ASTHMA ACTION PLAN  1950     ASTHMA CONTROL TEST  1950     EYE EXAM  1950     ZOSTER IMMUNIZATION (1 of 2) 01/30/2002     Pneumococcal Vaccine: 65+ Years (2 of 2 - PPSV23) 10/24/2018     FALL RISK ASSESSMENT  06/14/2020     PHQ-2  01/01/2021     MEDICARE ANNUAL WELLNESS VISIT  02/06/2021     MICROALBUMIN  02/06/2021     A1C  07/19/2021     DTAP/TDAP/TD IMMUNIZATION (3 - Td) 10/18/2021     ADVANCE CARE PLANNING  11/18/2021     BMP  01/19/2022     LIPID  01/19/2022     COLORECTAL CANCER SCREENING  03/10/2027     DEXA  02/17/2032     HEPATITIS C SCREENING  Completed     INFLUENZA VACCINE  Completed     Pneumococcal Vaccine: Pediatrics (0 to 5 Years) and At-Risk Patients (6 to 64 Years)  Aged Out     IPV IMMUNIZATION  Aged Out     MENINGITIS IMMUNIZATION  Aged Out       Health Maintenance Due   Topic Date Due     DIABETIC FOOT EXAM  1950     ASTHMA ACTION PLAN  1952     ASTHMA CONTROL TEST  1952     EYE EXAM  1950     ZOSTER IMMUNIZATION (1 of 2) 01/30/2002     Pneumococcal Vaccine: 65+ Years (2 of 2 - PPSV23) 10/24/2018     FALL RISK ASSESSMENT   06/14/2020     PHQ-2  01/01/2021     MEDICARE ANNUAL WELLNESS VISIT  02/06/2021     MICROALBUMIN  02/06/2021         CV Risk based on Pooled Cohort Risk:  The 10-year ASCVD risk score (Thomas HUNG Jr., et al., 2013) is: 22%    Values used to calculate the score:      Age: 70 years      Sex: Female      Is Non- : No      Diabetic: Yes      Tobacco smoker: No      Systolic Blood Pressure: 132 mmHg      Is BP treated: Yes      HDL Cholesterol: 39 mg/dL      Total Cholesterol: 207 mg/dL      Past Medical History:  Past Medical History:   Diagnosis Date     Asthma      Diabetes mellitus (H)      Fibromuscular dysplasia (H)      H/O sebaceous cyst      Hyperlipidemia LDL goal < 130      Hypertension      Obesity      Renal artery stenosis (H) 2005    Right, s/p stenting at [facility]     Statin medication not prescribed per physician orders     pt declined       Past Surgical History:  Past Surgical History:   Procedure Laterality Date     COLONOSCOPY N/A 3/10/2017    Procedure: COMBINED COLONOSCOPY, SINGLE OR MULTIPLE BIOPSY/POLYPECTOMY BY BIOPSY;  Surgeon: Sergo Mcgraw MD;  Location: [facility]     IR RENAL/VISCERAL STENT/ATHERECT/PTA Right 2005       Active Meds:  Current Outpatient Medications   Medication     albuterol (PROAIR HFA) 108 (90 Base) MCG/ACT inhaler     aspirin 81 MG tablet     atenolol (TENORMIN) 25 MG tablet     calcium carbonate (OS-GAEL 500 MG [place]. [place]) 500 MG tablet     cholecalciferol (VITAMIN D3) 125 mcg (5000 units) capsule     fluticasone-salmeterol (ADVAIR DISKUS) 100-50 MCG/DOSE inhaler     hydrochlorothiazide (HYDRODIURIL) 25 MG tablet     irbesartan (AVAPRO) 300 MG tablet     metFORMIN (GLUCOPHAGE) 500 MG tablet     multivitamin, therapeutic with minerals (MULTI-VITAMIN) TABS tablet     STATIN NOT PRESCRIBED (INTENTIONAL)     vitamin C (ASCORBIC ACID) 100 MG tablet     diclofenac (VOLTAREN) 1 % GEL topical gel     fish oil-omega-3 fatty acids 1000 MG capsule     Vitamin  "Mixture (VITAMIN E COMPLETE) CAPS     No current facility-administered medications for this visit.         Allergies:  Advil [ibuprofen], Lisinopril, and Codeine camsylate    Family History:  family history includes C.A.D. in her father; Cancer in her father; Childhood Heart Disease in her sister; Diabetes in her mother; Peptic Ulcer Disease in her mother; Thyroid Disease in her mother.    Social History:  Social History     Tobacco Use     Smoking status: Never Smoker     Smokeless tobacco: Never Used   Substance Use Topics     Alcohol use: Yes     Comment: rare     Drug use: No       Physical Exam:  Vitals: /84 (BP Location: Right arm, Patient Position: Sitting, Cuff Size: Adult Large)   Pulse 67   Ht 1.707 m (5' 7.2\")   Wt 93 kg (205 lb)   LMP  (LMP Unknown)   SpO2 98%   BMI 31.92 kg/m    Constitutional: Alert, oriented, pleasant, no acute distress  Head: Normocephalic, atraumatic  Eyes: Extra-ocular movements intact, pupils equally round and reactive bilaterally, no scleral icterus  ENT: Wearing mask  Neck: Supple, no lymphadenopathy  Cardiovascular: Regular rate and rhythm, no murmurs, rubs or gallops, peripheral pulses full/symmetric  Respiratory: Good air movement bilaterally, lungs clear, no wheezes/rales/rhonchi  Musculoskeletal: No edema, normal muscle tone, normal gait  Neurologic: Alert and oriented, cranial nerves 2-12 intact, grossly non-focal  Skin: No rashes/lesions, multiple melanocytic nevi  Psychiatric: normal mentation, affect and mood      Diagnostics:  Labs reviewed in Epic    Hemoglobin A1C   Date Value Ref Range Status   01/19/2021 7.6 (H) 0 - 5.6 % Final     Comment:     Normal <5.7% Prediabetes 5.7-6.4%  Diabetes 6.5% or higher - adopted from ADA   consensus guidelines.       Last Comprehensive Metabolic Panel:  Sodium   Date Value Ref Range Status   01/19/2021 139 133 - 144 mmol/L Final     Potassium   Date Value Ref Range Status   01/19/2021 4.1 3.4 - 5.3 mmol/L Final "     Chloride   Date Value Ref Range Status   01/19/2021 105 94 - 109 mmol/L Final     Carbon Dioxide   Date Value Ref Range Status   01/19/2021 30 20 - 32 mmol/L Final     Anion Gap   Date Value Ref Range Status   01/19/2021 4 3 - 14 mmol/L Final     Glucose   Date Value Ref Range Status   01/19/2021 218 (H) 70 - 99 mg/dL Final     Urea Nitrogen   Date Value Ref Range Status   01/19/2021 15 7 - 30 mg/dL Final     Creatinine   Date Value Ref Range Status   01/19/2021 0.64 0.52 - 1.04 mg/dL Final     GFR Estimate   Date Value Ref Range Status   01/19/2021 >90 >60 mL/min/[1.73_m2] Final     Comment:     Non  GFR Calc  Starting 12/18/2018, serum creatinine based estimated GFR (eGFR) will be   calculated using the Chronic Kidney Disease Epidemiology Collaboration   (CKD-EPI) equation.       Calcium   Date Value Ref Range Status   01/19/2021 8.9 8.5 - 10.1 mg/dL Final     Recent Labs   Lab Test 01/19/21  0719 02/06/20  1032 10/27/14  0909 10/27/14  0909 10/24/13  1443   CHOL 207* 249*   < > 248* 221*   HDL 39* 42*   < > 45* 35*   * 157*   < > 172* 139*   TRIG 190* 246*   < > 156* 234*   CHOLHDLRATIO  --   --   --  5.6* 6.3*    < > = values in this interval not displayed.           Assessment and Plan:  Zenaida was seen today for physical.    Diagnoses and all orders for this visit:    Encounter for Medicare annual wellness exam  Med refills provided.    Encounter for screening mammogram for breast cancer  -     Mammogram, routine screening; Future    Melanocytic nevus, unspecified location  -     DERMATOLOGY ADULT REFERRAL    Type 2 diabetes mellitus without complication, without long-term current use of insulin (H)  -     EYE ADULT REFERRAL; Future    Cataract, unspecified cataract type, unspecified laterality  -     EYE ADULT REFERRAL; Future    Type 2 diabetes mellitus with other specified complication, without long-term current use of insulin (H)  Improved after dedicated lifestyle changes.  She  continues to take Metformin, as well as aspirin and irbesartan.  She declines a statin after previous discussion of risks benefits and the implications of untreated atherosclerotic vascular disease, including stroke, MI or other vascular sequelae..    Essential hypertension  Blood pressure remains well controlled on her current medications.    Uncomplicated asthma, unspecified asthma severity, unspecified whether persistent  Asthma remains well controlled on low-dose LABA.    We will defer pneumococcal booster today in the chance she gets the Covid vaccine soon.  I asked her to follow-up with me when she is ready to proceed with colonoscopy later this year.      Nadia Randle MD  Internal Medicine

## 2021-01-25 NOTE — PATIENT INSTRUCTIONS
Patient Education   Personalized Prevention Plan  You are due for the preventive services outlined below.  Your care team is available to assist you in scheduling these services.  If you have already completed any of these items, please share that information with your care team to update in your medical record.  Health Maintenance Due   Topic Date Due     Diabetic Foot Exam  1950     Asthma Action Plan - yearly  1950     Asthma Control Test  1950     Eye Exam  1950     Zoster (Shingles) Vaccine (1 of 2) 01/30/2002     Pneumococcal Vaccine (2 of 2 - PPSV23) 10/24/2018     FALL RISK ASSESSMENT  06/14/2020     PHQ-2  01/01/2021     Annual Wellness Visit  02/06/2021     Kidney Microalbumin Urine Test  02/06/2021     Preventive Health Recommendations    See your health care provider every year to    Review health changes.     Discuss preventive care.      Review your medicines if your doctor has prescribed any.    You no longer need a yearly Pap test unless you've had an abnormal Pap test in the past 10 years. If you have vaginal symptoms, such as bleeding or discharge, be sure to talk with your provider about a Pap test.    Every 1 to 2 years, have a mammogram.  If you are over 69, talk with your health care provider about whether or not you want to continue having screening mammograms.    Every 10 years, have a colonoscopy. Or, have a yearly FIT test (stool test). These exams will check for colon cancer.     Have a cholesterol test every 5 years, or more often if your doctor advises it.     Have a diabetes test (fasting glucose) every three years. If you are at risk for diabetes, you should have this test more often.     At age 65, have a bone density scan (DEXA) to check for osteoporosis (brittle bone disease).    Shots:    Get a flu shot each year.    Get a tetanus shot every 10 years.    Talk to your doctor about your pneumonia vaccines. There are now two you should receive - Pneumovax  (PPSV 23) and Prevnar (PCV 13).    Talk to your pharmacist about the shingles vaccine.    Talk to your doctor about the hepatitis B vaccine.    Nutrition:     Eat at least 5 servings of fruits and vegetables each day.    Eat whole-grain bread, whole-wheat pasta and brown rice instead of white grains and rice.    Get adequate Calcium and Vitamin D.     Lifestyle    Exercise at least 150 minutes a week (30 minutes a day, 5 days a week). This will help you control your weight and prevent disease.    Limit alcohol to one drink per day.    No smoking.     Wear sunscreen to prevent skin cancer.     See your dentist twice a year for an exam and cleaning.    See your eye doctor every 1 to 2 years to screen for conditions such as glaucoma, macular degeneration and cataracts.    Personalized Prevention Plan  You are due for the preventive services outlined below.  Your care team is available to assist you in scheduling these services.  If you have already completed any of these items, please share that information with your care team to update in your medical record.  Health Maintenance   Topic Date Due     DIABETIC FOOT EXAM  1950     ASTHMA ACTION PLAN  1950     ASTHMA CONTROL TEST  1950     EYE EXAM  1950     ZOSTER IMMUNIZATION (1 of 2) 01/30/2002     Pneumococcal Vaccine: 65+ Years (2 of 2 - PPSV23) 10/24/2018     FALL RISK ASSESSMENT  06/14/2020     PHQ-2  01/01/2021     MEDICARE ANNUAL WELLNESS VISIT  02/06/2021     MICROALBUMIN  02/06/2021     A1C  07/19/2021     DTAP/TDAP/TD IMMUNIZATION (3 - Td) 10/18/2021     ADVANCE CARE PLANNING  11/18/2021     BMP  01/19/2022     LIPID  01/19/2022     COLORECTAL CANCER SCREENING  03/10/2027     DEXA  02/17/2032     HEPATITIS C SCREENING  Completed     INFLUENZA VACCINE  Completed     Pneumococcal Vaccine: Pediatrics (0 to 5 Years) and At-Risk Patients (6 to 64 Years)  Aged Out     IPV IMMUNIZATION  Aged Out     MENINGITIS IMMUNIZATION  Aged Out        [facility] Medication Refill Request Information:  * Please contact your pharmacy regarding ANY request for medication refills.  ** [facility] Prescription Fax = [phone]  * Please allow 3 business days for routine medication refills.  * Please allow 5 business days for controlled substance medication refills.     [facility] Test Result notification information:  *You will be notified with in 7-10 days of your appointment day regarding the results of your test.  If you are on MyChart you will be notified as soon as the provider has reviewed the results and signed off on them.    Primary Care Center: [phone]     To schedule your mammogram please call:  Breast Center ([facility]) [phone] (2nd Floor [facility])   Breast Center ([facility]) [phone] ([address])     Dermatology [phone] (3rd Floor [facility])     Ophthalmology [phone] (4th Floor [facility])

## 2021-01-26 ASSESSMENT — ANXIETY QUESTIONNAIRES: GAD7 TOTAL SCORE: 1

## 2021-02-02 DIAGNOSIS — E11.9 TYPE 2 DIABETES MELLITUS WITHOUT COMPLICATION, WITHOUT LONG-TERM CURRENT USE OF INSULIN (H): ICD-10-CM

## 2021-03-09 ENCOUNTER — IMMUNIZATION (OUTPATIENT)
Dept: NURSING | Facility: CLINIC | Age: 71
End: 2021-03-09
Payer: COMMERCIAL

## 2021-03-09 PROCEDURE — 0031A PR COVID VAC JANSSEN AD26 0.5ML: CPT

## 2021-03-09 PROCEDURE — 91303 PR COVID VAC JANSSEN AD26 0.5ML: CPT

## 2021-06-29 ENCOUNTER — ANCILLARY PROCEDURE (OUTPATIENT)
Dept: MAMMOGRAPHY | Facility: CLINIC | Age: 71
End: 2021-06-29
Attending: INTERNAL MEDICINE
Payer: COMMERCIAL

## 2021-06-29 DIAGNOSIS — Z12.31 ENCOUNTER FOR SCREENING MAMMOGRAM FOR BREAST CANCER: ICD-10-CM

## 2021-06-29 PROCEDURE — 77067 SCR MAMMO BI INCL CAD: CPT | Performed by: RADIOLOGY

## 2021-08-11 ENCOUNTER — OFFICE VISIT (OUTPATIENT)
Dept: DERMATOLOGY | Facility: CLINIC | Age: 71
End: 2021-08-11
Payer: COMMERCIAL

## 2021-08-11 DIAGNOSIS — D18.01 CHERRY ANGIOMA: ICD-10-CM

## 2021-08-11 DIAGNOSIS — L82.1 SEBORRHEIC KERATOSIS: ICD-10-CM

## 2021-08-11 DIAGNOSIS — L91.8 SKIN TAG: ICD-10-CM

## 2021-08-11 DIAGNOSIS — Z86.018 HISTORY OF DYSPLASTIC NEVUS: Primary | ICD-10-CM

## 2021-08-11 DIAGNOSIS — L81.4 LENTIGINES: ICD-10-CM

## 2021-08-11 PROCEDURE — 99203 OFFICE O/P NEW LOW 30 MIN: CPT | Performed by: DERMATOLOGY

## 2021-08-11 ASSESSMENT — PAIN SCALES - GENERAL: PAINLEVEL: NO PAIN (0)

## 2021-08-11 NOTE — LETTER
8/11/2021       RE: Zenaida Valles  [address]  [facility] [address]     Dear Colleague,    Thank you for referring your patient, Zenaida Valles, to the [facility] DERMATOLOGY CLINIC [place] at [facility]. Please see a copy of my visit note below.    [facility] Dermatology Note  Encounter Date: Aug 11, 2021  Office Visit     Dermatology Problem List:  1. Lentiginous junctional melanocytic nevus with mild atypia, R deltoid, s/p bx 5/2018.  - Per path report - lesion extends to lateral margin; as it arises on chronically sun-damaged skin, clinical follow-up is recommended  - Clinically pigment present 8/2021; discussed options of monitor versus excision; will monitor for now with close recheck   # Benign bx:  - SK, R chest, s/p shave bx 2018    ____________________________________________    Assessment & Plan:  # Lentiginous junctional melanocytic nevus with mild atypia, R deltoid, s/p bx 5/2018.  - Per path report - lesion extends to lateral margin; as it arises on chronically sun-damaged skin, clinical follow-up is recommended  - Clinically pigment present 8/2021, she reports stability since around time of biopsy; discussed options of monitoring versus excision; will monitor for now with close recheck in 6 months, sooner if changes noted    # Benign lesions: Cherry angiomas. Solar lentigines. Skin tags. SKs. EIC.  Discussed the natural history and benign nature of this lesion. Reassurance provided that no additional treatment is necessary.     Procedures Performed:   None    Follow-up: 6 month(s) in-person to recheck right arm, or earlier for new or changing lesions    Staff and Scribe:     Provider Disclosure:   The documentation recorded by the scribe accurately reflects the services I personally performed and the decisions made by me.    Provider Time: Established: (08365) 20-29 minutes in total spent on  day of encounter in chart review, patient visit including counseling, review of tests, documentation and/or discussion with other providers about the issues documented above.      Yoko Ivy MD    Department of Dermatology  [facility]: Phone: [phone], Fax: [phone]  8/28/2021       Scribe Disclosure:  Shira PONCE, am serving as a scribe to document services personally performed by Yoko Ivy MD at this visit, based upon the provider's statements to me. All documentation has been reviewed by the aforementioned provider prior to being entered into the official medical record.     Shira PONCE, a scribe, prepared the chart for today's encounter.   ____________________________________________    CC: Skin Check (breann is coming in today for a skin check, states that she had a pre cancer on her arm and chest)      HPI:  Ms. Breann Valles is a(n) 71 year old female who presents today as a return patient for skin check. The patient was last seen in dermatology on 08/31/2018.    Today, the patient states that there was a spot to her right breast about 6 months ago that fell off and got caught in her shirt. She states that the spot that was biopsied on her right arm in 2018 has remained fairly stable. The patient does wear sunscreen.      She is related to Nu Ohara!    Patient is otherwise feeling well, without additional skin concerns.    Labs Reviewed:  N/A    Physical Exam:  Vitals: LMP  (LMP Unknown)   SKIN: Total skin excluding the undergarment areas was performed. The exam included the head/face, neck, both arms, chest, back, abdomen, both legs, digits and/or nails.   - Prior biopsy site on right deltoid well healed scar with pigment around periphery. On medial aspect, there is a divided area of pigment approximately 3 x 2 mm, laterally 1 x 1 mm, dermoscopy with even reticular  network.  - Left upper back subcutaneous papulonodular with central keratinatious core   - There are dome shaped bright red papules on the trunk and extremities.   - Scattered brown macules on sun exposed areas..   - There is(are) skin colored pedunculated papules with erythema on the trunk and extremities.   There are waxy stuck on tan to brown papules on the trunk and extremities.   - No other lesions of concern on areas examined.     Medications:  Current Outpatient Medications   Medication     albuterol (PROAIR HFA) 108 (90 Base) MCG/ACT inhaler     aspirin 81 MG tablet     atenolol (TENORMIN) 25 MG tablet     calcium carbonate (OS-GAEL 500 MG [place]. [place]) 500 MG tablet     cholecalciferol (VITAMIN D3) 125 mcg (5000 units) capsule     diclofenac (VOLTAREN) 1 % GEL topical gel     fluticasone-salmeterol (ADVAIR DISKUS) 100-50 MCG/DOSE inhaler     hydrochlorothiazide (HYDRODIURIL) 25 MG tablet     irbesartan (AVAPRO) 300 MG tablet     metFORMIN (GLUCOPHAGE) 500 MG tablet     multivitamin, therapeutic with minerals (MULTI-VITAMIN) TABS tablet     STATIN NOT PRESCRIBED (INTENTIONAL)     vitamin C (ASCORBIC ACID) 100 MG tablet     fish oil-omega-3 fatty acids 1000 MG capsule     Vitamin Mixture (VITAMIN E COMPLETE) CAPS     No current facility-administered medications for this visit.        Past Medical History:   Patient Active Problem List   Diagnosis     Essential hypertension     Asthma     Atherosclerosis of renal artery (H)     Diabetes mellitus, type 2 (H)     Past Medical History:   Diagnosis Date     Asthma      Diabetes mellitus (H)      Fibromuscular dysplasia (H)      H/O sebaceous cyst      Hyperlipidemia LDL goal < 130      Hypertension      Obesity      Renal artery stenosis (H) 2005    Right, s/p stenting at [facility]     Statin medication not prescribed per physician orders     pt declined        CC No referring provider defined for this encounter. on close of this encounter.

## 2021-08-11 NOTE — NURSING NOTE
Dermatology Rooming Note    Breann Valles's goals for this visit include:   Chief Complaint   Patient presents with     Skin Check     breann is coming in today for a skin check, states that she had a pre cancer on her arm and chest     Giulia Lubin CMA on 8/11/2021 at 12:56 PM

## 2021-08-11 NOTE — PROGRESS NOTES
[facility] Dermatology Note  Encounter Date: Aug 11, 2021  Office Visit     Dermatology Problem List:  1. Lentiginous junctional melanocytic nevus with mild atypia, R deltoid, s/p bx 5/2018.  - Per path report - lesion extends to lateral margin; as it arises on chronically sun-damaged skin, clinical follow-up is recommended  - Clinically pigment present 8/2021; discussed options of monitor versus excision; will monitor for now with close recheck   # Benign bx:  - SK, R chest, s/p shave bx 2018    ____________________________________________    Assessment & Plan:  # Lentiginous junctional melanocytic nevus with mild atypia, R deltoid, s/p bx 5/2018.  - Per path report - lesion extends to lateral margin; as it arises on chronically sun-damaged skin, clinical follow-up is recommended  - Clinically pigment present 8/2021, she reports stability since around time of biopsy; discussed options of monitoring versus excision; will monitor for now with close recheck in 6 months, sooner if changes noted    # Benign lesions: Cherry angiomas. Solar lentigines. Skin tags. SKs. EIC.  Discussed the natural history and benign nature of this lesion. Reassurance provided that no additional treatment is necessary.     Procedures Performed:   None    Follow-up: 6 month(s) in-person to recheck right arm, or earlier for new or changing lesions    Staff and Scribe:     Provider Disclosure:   The documentation recorded by the scribe accurately reflects the services I personally performed and the decisions made by me.    Provider Time: Established: (05847) 20-29 minutes in total spent on day of encounter in chart review, patient visit including counseling, review of tests, documentation and/or discussion with other providers about the issues documented above.      Yoko Ivy MD    Department of Dermatology  [facility]:  Phone: [phone], Fax: [phone]  8/28/2021       Scribe Disclosure:  Shira PONCE, am serving as a scribe to document services personally performed by Yoko Ivy MD at this visit, based upon the provider's statements to me. All documentation has been reviewed by the aforementioned provider prior to being entered into the official medical record.     Shira PONCE, a scribe, prepared the chart for today's encounter.   ____________________________________________    CC: Skin Check (breann is coming in today for a skin check, states that she had a pre cancer on her arm and chest)      HPI:  Ms. Breann Valles is a(n) 71 year old female who presents today as a return patient for skin check. The patient was last seen in dermatology on 08/31/2018.    Today, the patient states that there was a spot to her right breast about 6 months ago that fell off and got caught in her shirt. She states that the spot that was biopsied on her right arm in 2018 has remained fairly stable. The patient does wear sunscreen.      She is related to Nu Ohara!    Patient is otherwise feeling well, without additional skin concerns.    Labs Reviewed:  N/A    Physical Exam:  Vitals: LMP  (LMP Unknown)   SKIN: Total skin excluding the undergarment areas was performed. The exam included the head/face, neck, both arms, chest, back, abdomen, both legs, digits and/or nails.   - Prior biopsy site on right deltoid well healed scar with pigment around periphery. On medial aspect, there is a divided area of pigment approximately 3 x 2 mm, laterally 1 x 1 mm, dermoscopy with even reticular network.  - Left upper back subcutaneous papulonodular with central keratinatious core   - There are dome shaped bright red papules on the trunk and extremities.   - Scattered brown macules on sun exposed areas..   - There is(are) skin colored pedunculated papules with erythema on the trunk and extremities.   There are waxy stuck on  tan to brown papules on the trunk and extremities.   - No other lesions of concern on areas examined.     Medications:  Current Outpatient Medications   Medication     albuterol (PROAIR HFA) 108 (90 Base) MCG/ACT inhaler     aspirin 81 MG tablet     atenolol (TENORMIN) 25 MG tablet     calcium carbonate (OS-GAEL 500 MG [place]. [place]) 500 MG tablet     cholecalciferol (VITAMIN D3) 125 mcg (5000 units) capsule     diclofenac (VOLTAREN) 1 % GEL topical gel     fluticasone-salmeterol (ADVAIR DISKUS) 100-50 MCG/DOSE inhaler     hydrochlorothiazide (HYDRODIURIL) 25 MG tablet     irbesartan (AVAPRO) 300 MG tablet     metFORMIN (GLUCOPHAGE) 500 MG tablet     multivitamin, therapeutic with minerals (MULTI-VITAMIN) TABS tablet     STATIN NOT PRESCRIBED (INTENTIONAL)     vitamin C (ASCORBIC ACID) 100 MG tablet     fish oil-omega-3 fatty acids 1000 MG capsule     Vitamin Mixture (VITAMIN E COMPLETE) CAPS     No current facility-administered medications for this visit.        Past Medical History:   Patient Active Problem List   Diagnosis     Essential hypertension     Asthma     Atherosclerosis of renal artery (H)     Diabetes mellitus, type 2 (H)     Past Medical History:   Diagnosis Date     Asthma      Diabetes mellitus (H)      Fibromuscular dysplasia (H)      H/O sebaceous cyst      Hyperlipidemia LDL goal < 130      Hypertension      Obesity      Renal artery stenosis (H) 2005    Right, s/p stenting at [facility]     Statin medication not prescribed per physician orders     pt declined        CC No referring provider defined for this encounter. on close of this encounter.

## 2021-09-11 ENCOUNTER — HEALTH MAINTENANCE LETTER (OUTPATIENT)
Age: 71
End: 2021-09-11

## 2021-11-19 DIAGNOSIS — J45.20 MILD INTERMITTENT ASTHMA WITHOUT COMPLICATION: ICD-10-CM

## 2021-11-22 NOTE — TELEPHONE ENCOUNTER
fluticasone-salmeterol (ADVAIR DISKUS) 100-50 MCG/DOSE inhaler    Last Written Prescription Date:  1/25/21  Last Fill Quantity: 1   # refills:3  Last Office Visit : 1/25/21  Future Office visit: none      Routing refill request to provider for review/approval because:act

## 2021-11-24 DIAGNOSIS — J45.20 MILD INTERMITTENT ASTHMA WITHOUT COMPLICATION: ICD-10-CM

## 2021-11-29 DIAGNOSIS — J45.20 MILD INTERMITTENT ASTHMA WITHOUT COMPLICATION: ICD-10-CM

## 2021-11-29 NOTE — TELEPHONE ENCOUNTER
fluticasone-salmeterol (ADVAIR) 100-50 MCG/DOSE inhaler  Resent order  First order not received at pharmacy        Karuna Miller RN  Central Triage Red Flags/Med Refills

## 2021-11-29 NOTE — TELEPHONE ENCOUNTER
Pt would like Rx for #120 (2 inhalers per order)  Thank you!  Corina Claudio [facility]

## 2022-01-06 ENCOUNTER — TELEPHONE (OUTPATIENT)
Dept: INTERNAL MEDICINE | Facility: CLINIC | Age: 72
End: 2022-01-06
Payer: COMMERCIAL

## 2022-01-06 DIAGNOSIS — I10 BENIGN ESSENTIAL HYPERTENSION: ICD-10-CM

## 2022-01-06 DIAGNOSIS — E11.9 TYPE 2 DIABETES MELLITUS WITHOUT COMPLICATION, WITHOUT LONG-TERM CURRENT USE OF INSULIN (H): ICD-10-CM

## 2022-01-06 NOTE — TELEPHONE ENCOUNTER
[facility] Call Center    Phone Message    May a detailed message be left on voicemail: yes     Reason for Call: Other: Patient will be out of her medication before her scheduled appointment and will need refills. Please call and let patient know if this can be done.      Action Taken: Message routed to:  [facility] PCC    Travel Screening: Not Applicable

## 2022-01-06 NOTE — TELEPHONE ENCOUNTER
Patient asking for refills on her medications until she can get in for her appointment, as she will run out of the following medications before her appointment on 2/10/22.      metFORMIN (GLUCOPHAGE) 500 MG tablet  atenolol (TENORMIN) 25 MG tablet  hydrochlorothiazide (HYDRODIURIL) 25 MG tablet  irbesartan (AVAPRO) 300 MG tablet    Pharmacy is: [place] mail order/[facility].     Bonita Lowe LPN 1/6/2022 11:39 AM

## 2022-01-07 RX ORDER — HYDROCHLOROTHIAZIDE 25 MG/1
25 TABLET ORAL DAILY
Qty: 90 TABLET | Refills: 0 | Status: SHIPPED | OUTPATIENT
Start: 2022-01-07 | End: 2022-02-10

## 2022-01-07 RX ORDER — IRBESARTAN 300 MG/1
300 TABLET ORAL AT BEDTIME
Qty: 90 TABLET | Refills: 0 | Status: SHIPPED | OUTPATIENT
Start: 2022-01-07 | End: 2022-02-10

## 2022-01-07 RX ORDER — ATENOLOL 25 MG/1
25 TABLET ORAL DAILY
Qty: 90 TABLET | Refills: 0 | Status: SHIPPED | OUTPATIENT
Start: 2022-01-07 | End: 2022-02-10

## 2022-02-10 ENCOUNTER — LAB (OUTPATIENT)
Dept: LAB | Facility: CLINIC | Age: 72
End: 2022-02-10
Payer: COMMERCIAL

## 2022-02-10 ENCOUNTER — OFFICE VISIT (OUTPATIENT)
Dept: INTERNAL MEDICINE | Facility: CLINIC | Age: 72
End: 2022-02-10
Payer: COMMERCIAL

## 2022-02-10 VITALS
SYSTOLIC BLOOD PRESSURE: 183 MMHG | HEART RATE: 64 BPM | OXYGEN SATURATION: 94 % | RESPIRATION RATE: 16 BRPM | BODY MASS INDEX: 32.66 KG/M2 | DIASTOLIC BLOOD PRESSURE: 81 MMHG | HEIGHT: 66 IN | WEIGHT: 203.2 LBS

## 2022-02-10 DIAGNOSIS — Z12.11 SCREENING FOR COLON CANCER: ICD-10-CM

## 2022-02-10 DIAGNOSIS — E11.9 TYPE 2 DIABETES MELLITUS WITHOUT COMPLICATION, WITHOUT LONG-TERM CURRENT USE OF INSULIN (H): Primary | ICD-10-CM

## 2022-02-10 DIAGNOSIS — I10 BENIGN ESSENTIAL HYPERTENSION: ICD-10-CM

## 2022-02-10 DIAGNOSIS — E78.2 MIXED HYPERLIPIDEMIA: ICD-10-CM

## 2022-02-10 DIAGNOSIS — E11.9 TYPE 2 DIABETES MELLITUS WITHOUT COMPLICATION, WITHOUT LONG-TERM CURRENT USE OF INSULIN (H): ICD-10-CM

## 2022-02-10 DIAGNOSIS — J45.20 MILD INTERMITTENT ASTHMA WITHOUT COMPLICATION: ICD-10-CM

## 2022-02-10 DIAGNOSIS — J45.909 UNCOMPLICATED ASTHMA, UNSPECIFIED ASTHMA SEVERITY, UNSPECIFIED WHETHER PERSISTENT: ICD-10-CM

## 2022-02-10 DIAGNOSIS — Z12.31 ENCOUNTER FOR SCREENING MAMMOGRAM FOR BREAST CANCER: ICD-10-CM

## 2022-02-10 DIAGNOSIS — Z23 NEED FOR VACCINATION: ICD-10-CM

## 2022-02-10 LAB
ALBUMIN SERPL-MCNC: 4.1 G/DL (ref 3.4–5)
ALP SERPL-CCNC: 101 U/L (ref 40–150)
ALT SERPL W P-5'-P-CCNC: 44 U/L (ref 0–50)
ANION GAP SERPL CALCULATED.3IONS-SCNC: 7 MMOL/L (ref 3–14)
AST SERPL W P-5'-P-CCNC: 22 U/L (ref 0–45)
BILIRUB SERPL-MCNC: 0.4 MG/DL (ref 0.2–1.3)
BUN SERPL-MCNC: 15 MG/DL (ref 7–30)
CALCIUM SERPL-MCNC: 9 MG/DL (ref 8.5–10.1)
CHLORIDE BLD-SCNC: 104 MMOL/L (ref 94–109)
CHOLEST SERPL-MCNC: 231 MG/DL
CO2 SERPL-SCNC: 29 MMOL/L (ref 20–32)
CREAT SERPL-MCNC: 0.66 MG/DL (ref 0.52–1.04)
CREAT UR-MCNC: 122 MG/DL
FASTING STATUS PATIENT QL REPORTED: YES
GFR SERPL CREATININE-BSD FRML MDRD: >90 ML/MIN/1.73M2
GLUCOSE BLD-MCNC: 226 MG/DL (ref 70–99)
HBA1C MFR BLD: 8.3 % (ref 0–5.6)
HDLC SERPL-MCNC: 41 MG/DL
LDLC SERPL CALC-MCNC: 144 MG/DL
MICROALBUMIN UR-MCNC: 84 MG/L
MICROALBUMIN/CREAT UR: 68.85 MG/G CR (ref 0–25)
NONHDLC SERPL-MCNC: 190 MG/DL
POTASSIUM BLD-SCNC: 4.4 MMOL/L (ref 3.4–5.3)
PROT SERPL-MCNC: 7.5 G/DL (ref 6.8–8.8)
SODIUM SERPL-SCNC: 140 MMOL/L (ref 133–144)
TRIGL SERPL-MCNC: 232 MG/DL
TSH SERPL DL<=0.005 MIU/L-ACNC: 3.83 MU/L (ref 0.4–4)

## 2022-02-10 PROCEDURE — 80053 COMPREHEN METABOLIC PANEL: CPT | Performed by: PATHOLOGY

## 2022-02-10 PROCEDURE — 82043 UR ALBUMIN QUANTITATIVE: CPT | Performed by: PATHOLOGY

## 2022-02-10 PROCEDURE — 90471 IMMUNIZATION ADMIN: CPT | Performed by: INTERNAL MEDICINE

## 2022-02-10 PROCEDURE — 80061 LIPID PANEL: CPT | Performed by: PATHOLOGY

## 2022-02-10 PROCEDURE — 84443 ASSAY THYROID STIM HORMONE: CPT | Performed by: PATHOLOGY

## 2022-02-10 PROCEDURE — 83036 HEMOGLOBIN GLYCOSYLATED A1C: CPT | Performed by: PATHOLOGY

## 2022-02-10 PROCEDURE — 90714 TD VACC NO PRESV 7 YRS+ IM: CPT | Performed by: INTERNAL MEDICINE

## 2022-02-10 PROCEDURE — 36415 COLL VENOUS BLD VENIPUNCTURE: CPT | Performed by: PATHOLOGY

## 2022-02-10 PROCEDURE — G0439 PPPS, SUBSEQ VISIT: HCPCS | Performed by: INTERNAL MEDICINE

## 2022-02-10 RX ORDER — ATENOLOL 25 MG/1
25 TABLET ORAL DAILY
Qty: 90 TABLET | Refills: 3 | Status: SHIPPED | OUTPATIENT
Start: 2022-02-10 | End: 2023-01-24

## 2022-02-10 RX ORDER — HYDROCHLOROTHIAZIDE 25 MG/1
25 TABLET ORAL DAILY
Qty: 90 TABLET | Refills: 3 | Status: SHIPPED | OUTPATIENT
Start: 2022-02-10 | End: 2023-01-24

## 2022-02-10 RX ORDER — IRBESARTAN 300 MG/1
300 TABLET ORAL AT BEDTIME
Qty: 90 TABLET | Refills: 3 | Status: SHIPPED | OUTPATIENT
Start: 2022-02-10 | End: 2023-01-24

## 2022-02-10 RX ORDER — ALBUTEROL SULFATE 90 UG/1
2 AEROSOL, METERED RESPIRATORY (INHALATION) EVERY 6 HOURS PRN
Qty: 18 G | Refills: 3 | Status: SHIPPED | OUTPATIENT
Start: 2022-02-10 | End: 2024-01-19

## 2022-02-10 ASSESSMENT — MIFFLIN-ST. JEOR: SCORE: 1458.46

## 2022-02-10 NOTE — PATIENT INSTRUCTIONS
Try checking BP at home.  If above 130/80, contact Dr. Randle.      To schedule a Mammogram, please call radiology scheduling at [phone].      Your health care Provider has recommended that you receive the new Shingle vaccine called Shingrix to prevent shingles for ages 50 and above. Many private insurance and Medicare Part D plans cover Shingrix. However, not all insurance carriers cover the entire cost of the Shingrix vaccine if the vaccine is administered at your primary care clinic. The clinic cannot determine your insurance benefits.  Please call your insurance carrier prior. The vaccine comes in two doses. Your second dose should be 2-6 months from your first dose.       Prior to receiving the vaccine, we recommend that you call your insurance carrier and ask them the following questions:            1. Is there a cost difference if I receive the vaccine at my doctor's office or a pharmacy?          2. Does my insurance cover the Shingrix Vaccine and administration of the vaccine?          3. What is my co-pay or deductible for the vaccine?        Please call to schedule a Nurse-Visit only at [phone].  Nurse Visit hours are available Monday, Wednesday, and Friday from 9:00 AM-11:00 AM and 1:00 PM-3:00 PM.

## 2022-02-10 NOTE — PROGRESS NOTES
History of Present Illness:  Ms. Lazarus Valles is a 72 year old female who presents for  Chief Complaint   Patient presents with     Annual Visit     Recheck Medication     Zenaida has been pretty isolated during pandemic, spends time at [place].  Walking and lifting weights, staying socially active, still doing [place] [place], quilting. She is missing traveling.    DM: Not checking BG. She has changed diet, less carbs.  Lost some weight compared to past years, she sees an eye doctor, no neuropathy.    BP: Elevated today, she endorses anxiety    HLD: not interested in statin given history of hepatitis    Asthma: Using advair daily, symptoms controlled.    Due for colo, shingrix, Td        Routine Health Maintenance  Immunizations (zoster, pneumovax, flu, Tdap, Hep A/B):           Most Recent Immunizations   Administered Date(s) Administered     Influenza (High Dose) 3 valent vaccine 10/06/2017     Influenza (IIV3) 10/01/2013     Influenza Vaccine IM 3yrs+ 4 Valent IIV4 09/29/2016     Pneumococcal (PCV 13) 05/22/2017     Pneumococcal 23 valent 10/24/2013     TD (ADULT, 7+) 07/01/1999     TDAP Vaccine (Boostrix) 10/18/2011      Lipids:           Recent Labs   Lab Test 02/06/20  1032 02/06/19  1152   10/27/14  0909 10/24/13  1443   CHOL 249* 250*   < > 248* 221*   HDL 42* 39*   < > 45* 35*   * 173*   < > 172* 139*   TRIG 246* 189*   < > 156* 234*   CHOLHDLRATIO  --   --   --  5.6* 6.3*    < > = values in this interval not displayed.         Colonoscopy (50-75 yrs): 2017 3 TA and diverticulosis, rec 3-5 year f/u  Dexa (>65W or 70M yrs): Completed on 2/17/17. Normal results.  Mammogram (40-75 yrs): 6/21, ordered  Pap (21-65 yrs): PAP      NIL   10/27/2014  PAP      NIL   10/18/2011  HIV/HCV if risk factors: neg 2014  Safety/Lifestyle: discussed  Tob/EtOH: n/a  Depression: screen neg  Advanced Directive: she has one on file        Review of external notes as documented above                   A detailed Review of  Systems was performed, verified and is negative except as documented in the HPI.  All health questionnaires were reviewed, verified and relevant information documented above.    Past Medical History:  Past Medical History:   Diagnosis Date     Asthma      Diabetes mellitus (H)      Fibromuscular dysplasia (H)      H/O sebaceous cyst      Hyperlipidemia LDL goal < 130      Hypertension      Obesity      Renal artery stenosis (H) 2005    Right, s/p stenting at [facility], [facility]     Statin medication not prescribed per physician orders     pt declined       Past Surgical History:  Past Surgical History:   Procedure Laterality Date     COLONOSCOPY N/A 3/10/2017    Procedure: COMBINED COLONOSCOPY, SINGLE OR MULTIPLE BIOPSY/POLYPECTOMY BY BIOPSY;  Surgeon: Sergo Mcgraw MD;  Location: [facility]     IR RENAL/VISCERAL STENT/ATHERECT/PTA Right 2005       Active Meds:  Current Outpatient Medications   Medication     albuterol (PROAIR HFA) 108 (90 Base) MCG/ACT inhaler     aspirin 81 MG tablet     atenolol (TENORMIN) 25 MG tablet     calcium carbonate (OS-GAEL 500 MG [place]. [place]) 500 MG tablet     cholecalciferol (VITAMIN D3) 125 mcg (5000 units) capsule     diclofenac (VOLTAREN) 1 % GEL topical gel     fluticasone-salmeterol (ADVAIR) 100-50 MCG/DOSE inhaler     hydrochlorothiazide (HYDRODIURIL) 25 MG tablet     irbesartan (AVAPRO) 300 MG tablet     metFORMIN (GLUCOPHAGE) 500 MG tablet     multivitamin, therapeutic with minerals (MULTI-VITAMIN) TABS tablet     STATIN NOT PRESCRIBED (INTENTIONAL)     vitamin C (ASCORBIC ACID) 100 MG tablet     No current facility-administered medications for this visit.        Allergies:  Advil [ibuprofen], Lisinopril, and Codeine camsylate    Family History:  family history includes C.A.D. in her father; Cancer in her father; Childhood Heart Disease in her sister; Diabetes in her mother; Peptic Ulcer Disease in her mother; Thyroid Disease in her mother.    Social History:  Social History     Tobacco Use  "    Smoking status: Never Smoker     Smokeless tobacco: Never Used   Substance Use Topics     Alcohol use: Yes     Comment: rare     Drug use: No       Physical Exam:  Vitals: BP (!) 183/81 (BP Location: Right arm, Patient Position: Sitting, Cuff Size: Adult Regular)   Pulse 64   Resp 16   Ht 1.676 m (5' 6\")   Wt 92.2 kg (203 lb 3.2 oz)   LMP  (LMP Unknown)   SpO2 94%   Breastfeeding No   BMI 32.80 kg/m    Constitutional: Alert, oriented, pleasant, no acute distress  Head: Normocephalic, atraumatic  Eyes: Extra-ocular movements intact, pupils equally round and reactive bilaterally, no scleral icterus  ENT: Oropharynx clear, moist mucus membranes, good dentition  Neck: Supple, no lymphadenopathy  Cardiovascular: Regular rate and rhythm, no murmurs, rubs or gallops, peripheral pulses full/symmetric  Respiratory: Good air movement bilaterally, lungs clear, no wheezes/rales/rhonchi  GI: Abdomen soft, bowel sounds present, nondistended, nontender, no organomegaly or masses, no rebound/guarding  Musculoskeletal: No edema, normal muscle tone, normal gait  Neurologic: Alert and oriented, cranial nerves 2-12 intact, grossly non-focal  Skin: No rashes/lesions  Psychiatric: normal mentation, affect and mood      Diagnostics:  Labs reviewed in Epic          Assessment and Plan:  Zenaida was seen today for annual visit and recheck medication.    Diagnoses and all orders for this visit:    Type 2 diabetes mellitus without complication, without long-term current use of insulin (H)  -     Hemoglobin A1c; Future  -     TSH with free T4 reflex; Future  -     Albumin Random Urine Quantitative with Creat Ratio; Future  -     metFORMIN (GLUCOPHAGE) 500 MG tablet; Take 1 tablet (500 mg) by mouth 2 times daily (with meals)    Benign essential hypertension  -     Comprehensive metabolic panel; Future  -     Home Blood Pressure Monitor Order for DME - ONLY FOR DME  -     atenolol (TENORMIN) 25 MG tablet; Take 1 tablet (25 mg) by mouth " daily  -     hydrochlorothiazide (HYDRODIURIL) 25 MG tablet; Take 1 tablet (25 mg) by mouth daily  -     irbesartan (AVAPRO) 300 MG tablet; Take 1 tablet (300 mg) by mouth At Bedtime    Encounter for screening mammogram for breast cancer  -     Mammogram, screening w le (3D); Future    Mixed hyperlipidemia  -     Lipid panel reflex to direct LDL Fasting; Future    Need for vaccination  -     TD PRESERVATIVE FREE, AGE >=7 YR    Screening for colon cancer  -     Adult Gastro Ref - Procedure Only; Future    Mild intermittent asthma without complication  -     albuterol (PROAIR HFA) 108 (90 Base) MCG/ACT inhaler; Inhale 2 puffs into the lungs every 6 hours as needed for shortness of breath / dyspnea or wheezing  -     fluticasone-salmeterol (ADVAIR) 100-50 MCG/DOSE inhaler; Inhale 1 puff into the lungs daily          Nadia Randle MD  Internal Medicine            Medicare Annual Wellness Visit Previsit note    This 72 year old year old female presents for a  Medicare Wellness Exam.        What is your marital status:    Who lives in your household? Myself and my     Does your home have loose rugs in the hallway?   No    Does your home have grab bars in the bathroom?   Yes    Does your home have handrails on the stairs?   Yes     Does your home have poorly lit areas?  No    How many times have you fallen in the past year? 1    How many times have you been in the Emergency Room in the last year? 0     How many times have you been hospitalized in the last year? 0    Do you feel threatened or controlled by a partner, ex-partner or anyone in your life? No    Has anyone hurt you physically, for example by pushing, hitting, slapping or kicking you   or forcing you to have sex? No    Are you sexually active? No     If yes, with men, women, or both? N/A    If yes, do you have more than one current partner? N/A    If yes, are you using condoms? N/A     Have you had any sexually transmitted infections in the last  year? No    Do you have any sexual concerns? No       FOR WOMEN ONLY:  1. What year did you stop having periods (approximate age)? 53    2.   Any vaginal bleeding in the last year? No    3.   Have you ever had an abnormal Pap smear? No        Do you need help with the phone, transportation, shopping, preparing meals, housework, laundry, medications or managing money? No     Have you noticed any hearing difficulties? No    Do you wear hearing aids? No    Have you seen a hearing professional such as an audiologist in the past year? No    Do you have vision difficulties? No    Do you wear glasses or contacts? Yes     Have you seen an eye doctor in the past year? Yes     How many servings of fruits and vegetables do you eat a day (1 serving is 1 cup)? 3    How often do you exercise in a week? 3-4/days    What kind of exercise do you do and how long? Walking 1 - 1.5     Do you wear a seatbelt when driving or riding in a vehicle? Yes    Do you use tobacco?  No    Do you use e-cigarettes?  No    Do you use any other drugs? No         Do you drink alcohol? No    If you drink alcohol, how many drinks per week? 1/2-3 months      PHQ-2: Over the past 2 weeks, how often have you been bothered by any of the following problems?  1) Little interest or pleasure in doing things: 1    2) Feeling down, depressed, or hopeless: 0    Total = 1    Have you completed an Advance Directives document? Yes     If yes, have you given a copy to the clinic? No    Would you like information on Advance Directives today? No    Jd Boudreaux, EMT at 9:32 AM on 2/10/2022

## 2022-02-10 NOTE — NURSING NOTE
"Zenaida Valles is a 72 year old female patient that presents today in clinic for the following:    Chief Complaint   Patient presents with     Annual Visit     Recheck Medication     The patient's allergies and medications were reviewed as noted. A set of vitals were recorded as noted without incident: BP (!) 183/81 (BP Location: Right arm, Patient Position: Sitting, Cuff Size: Adult Regular)   Pulse 64   Resp 16   Ht 1.676 m (5' 6\")   Wt 92.2 kg (203 lb 3.2 oz)   LMP  (LMP Unknown)   SpO2 94%   Breastfeeding No   BMI 32.80 kg/m  . The patient was asked if they had any of the following symptoms in the last forty-eight hours: (1) fever or chills, (2) cough, (3) shortness of breath or difficulty breathing, (4) fatigue, (5) muscle or body aches, (6) headache, (7) new loss of taste or smell, (8) sore throat, (9) congestion or runny nose, (10) nausea or vomiting, and (11) diarrhea. Zenaida Valles denies having any of the following symptoms in the last forty-eight hours: (1) fever or chills, (2) cough, (3) shortness of breath or difficulty breathing, (4) fatigue, (5) muscle or body aches, (6) headache, (7) new loss of taste or smell, (8) sore throat, (9) congestion or runny nose, (10) nausea or vomiting, and (11) diarrhea. The patient does not have any other questions for the provider.    Bret Xiao, EMT at 8:30 AM on 2/10/2022  "

## 2022-02-16 ENCOUNTER — OFFICE VISIT (OUTPATIENT)
Dept: DERMATOLOGY | Facility: CLINIC | Age: 72
End: 2022-02-16
Payer: COMMERCIAL

## 2022-02-16 DIAGNOSIS — D18.01 CHERRY ANGIOMA: ICD-10-CM

## 2022-02-16 DIAGNOSIS — D22.9 MULTIPLE BENIGN NEVI: ICD-10-CM

## 2022-02-16 DIAGNOSIS — L82.1 SEBORRHEIC KERATOSIS: ICD-10-CM

## 2022-02-16 DIAGNOSIS — Z86.018 HISTORY OF DYSPLASTIC NEVUS: Primary | ICD-10-CM

## 2022-02-16 PROCEDURE — 99213 OFFICE O/P EST LOW 20 MIN: CPT | Performed by: DERMATOLOGY

## 2022-02-16 ASSESSMENT — PAIN SCALES - GENERAL: PAINLEVEL: NO PAIN (0)

## 2022-02-16 NOTE — NURSING NOTE
Dermatology Rooming Note    Zenaida Valles's goals for this visit include:   Chief Complaint   Patient presents with     Skin Check     states that she has a new spot of concern on the forehead, just noticed this week     Giulia Lubin CMA on 2/16/2022 at 9:19 AM

## 2022-02-16 NOTE — PROGRESS NOTES
[facility] Dermatology Note  Encounter Date: Feb 16, 2022  Office Visit     Dermatology Problem List:  1. Lentiginous junctional melanocytic nevus with mild atypia, R deltoid, s/p bx 5/2018.  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    ____________________________________________    Assessment & Plan:    # H/o mild DN, R deltoid, s/p bx 5/2018.  - Per path report - lesion extends to lateral margin; as it arises on chronically sun-damaged skin, clinical follow-up is recommended  - No change from 8/2021 to 2/16/2022, continue to monitor    # Multiple benign nevi.   # Lentigenes.  - No concerning lesions today  - Monitor for ABCDEs of melanoma   - Continue sun protection - recommend SPF 30 or higher with frequent application   - Return sooner if noticing changing or symptomatic lesions    # Benign lesions: cherry angiomas, SKs, skin tags, cysts.  - no treatment required    Procedures Performed:   None    Follow-up: 1 year, sooner if concerns.     Staff and Scribe:     Scribe Disclosure:  I, Nita Kellogg, am serving as a scribe to document services personally performed by Yoko Ivy MD based on data collection and the provider's statements to me.     Provider Disclosure:   The documentation recorded by the scribe accurately reflects the services I personally performed and the decisions made by me.    Yoko Ivy MD    Department of Dermatology  [facility]: Phone: [phone], Fax: [phone]  2/16/2022     ____________________________________________    CC: Skin Check (states that  she has a new spot of concern on the forehead, just noticed this week)    HPI:  Ms. Zenaida Valles is a(n) 72 year old female who presents today as a return patient for [facility]. Last seen in dermatology by myself on 8/11/21 at which point a previously biopsied lentiginous junctional melanocytic nevus with mild atypia appeared stable from previously.      Spot on forehead, noticed 1 week ago, no changes in that time.  No other painful, bleeding, non-healing, or otherwise symptomatic lesions.       Patient is otherwise feeling well, without additional skin concerns.    Labs Reviewed:  N/A    Physical Exam:  Vitals: LMP  (LMP Unknown)   SKIN: Total skin excluding the undergarment areas was performed. The exam included the head/face, neck, both arms, chest, back, abdomen, both legs, digits and/or nails. - Prior biopsy site on right deltoid well healed scar with pigment around periphery. On medial aspect, there is a divided area of pigment approximately 3 x 2 mm, laterally 1 x 1 mm, dermoscopy with even reticular network. Unchanged from prior.  - Left upper back subcutaneous papulonodular with central keratinatious core   - There are dome shaped bright red papules on the trunk and extremities.   - Scattered brown macules on sun exposed areas..   - There is(are) skin colored pedunculated papules with erythema on the trunk and extremities.   There are waxy stuck on tan to brown papules on the trunk and extremities.   - No other lesions of concern on areas examined.     Medications:  Current Outpatient Medications   Medication     albuterol (PROAIR HFA) 108 (90 Base) MCG/ACT inhaler     aspirin 81 MG tablet     atenolol (TENORMIN) 25 MG tablet     calcium carbonate (OS-GAEL 500 MG [place]. [place]) 500 MG tablet     cholecalciferol (VITAMIN D3) 125 mcg (5000 units) capsule     diclofenac (VOLTAREN) 1 % GEL topical gel     fluticasone-salmeterol (ADVAIR) 100-50 MCG/DOSE inhaler     hydrochlorothiazide (HYDRODIURIL) 25 MG  tablet     irbesartan (AVAPRO) 300 MG tablet     metFORMIN (GLUCOPHAGE) 500 MG tablet     multivitamin, therapeutic with minerals (MULTI-VITAMIN) TABS tablet     STATIN NOT PRESCRIBED (INTENTIONAL)     vitamin C (ASCORBIC ACID) 100 MG tablet     No current facility-administered medications for this visit.      Past Medical History:   Patient Active Problem List   Diagnosis     Essential hypertension     Asthma     Atherosclerosis of renal artery (H)     Diabetes mellitus, type 2 (H)     Past Medical History:   Diagnosis Date     Asthma      Diabetes mellitus (H)      Fibromuscular dysplasia (H)      H/O sebaceous cyst      Hyperlipidemia LDL goal < 130      Hypertension      Obesity      Renal artery stenosis (H) 2005    Right, s/p stenting at [facility], [facility]     Statin medication not prescribed per physician orders     pt declined        CC Nadia Randle MD  [address] [address] on close of this encounter.

## 2022-02-16 NOTE — PATIENT INSTRUCTIONS
A+    Seborrheic keratosis on forehead :)      Patient Education     Checking for Skin Cancer  You can find cancer early by checking your skin each month. There are 3 kinds of skin cancer. They are melanoma, basal cell carcinoma, and squamous cell carcinoma. Doing monthly skin checks is the best way to find new marks or skin changes. Follow the instructions below for checking your skin.   The ABCDEs of checking moles for melanoma   Check your moles or growths for signs of melanoma using ABCDE:     Asymmetry: the sides of the mole or growth don t match    Border: the edges are ragged, notched, or blurred    Color: the color within the mole or growth varies    Diameter: the mole or growth is larger than 6 mm (size of a pencil eraser)    Evolving: the size, shape, or color of the mole or growth is changing (evolving is not shown in the images below)    Checking for other types of skin cancer  Basal cell carcinoma or squamous cell carcinoma have symptoms such as:       A spot or mole that looks different from all other marks on your skin    Changes in how an area feels, such as itching, tenderness, or pain    Changes in the skin's surface, such as oozing, bleeding, or scaliness    A sore that does not heal    New swelling or redness beyond the border of a mole    Who s at risk?  Anyone can get skin cancer. But you are at greater risk if you have:     Fair skin, light-colored hair, or light-colored eyes    Many moles or abnormal moles on your skin    A history of sunburns from sunlight or tanning beds    A family history of skin cancer    A history of exposure to radiation or chemicals    A weakened immune system  If you have had skin cancer in the past, you are at risk for recurring skin cancer.   How to check your skin  Do your monthly skin checkups in front of a full-length mirror. Check all parts of your body, including your:     Head (ears, face, neck, and scalp)    Torso (front, back, and sides)    Arms (tops,  undersides, upper, and lower armpits)    Hands (palms, backs, and fingers, including under the nails)    Buttocks and genitals    Legs (front, back, and sides)    Feet (tops, soles, toes, including under the nails, and between toes)  If you have a lot of moles, take digital photos of them each month. Make sure to take photos both up close and from a distance. These can help you see if any moles change over time.   Most skin changes are not cancer. But if you see any changes in your skin, call your doctor right away. Only he or she can diagnose a problem. If you have skin cancer, seeing your doctor can be the first step toward getting the treatment that could save your life.   [employer] last reviewed this educational content on 4/1/2019 2000-2020 [employer]. [address] [place] [address]. All rights reserved. This information is not intended as a substitute for professional medical care. Always follow your healthcare professional's instructions.       When should I call my doctor?    If you are worsening or not improving, please, contact us or seek urgent care as noted below.     Who should I call with questions (adults)?    [facility] (adult and pediatric): [phone]    [facility] (adult): [phone]    For urgent needs outside of business hours call the [facility] at [phone] and ask for the dermatology resident on call to be paged    If this is a medical emergency and you are unable to reach an ER, Call [phone]    Who should I call with questions (pediatric)?  [facility]- Pediatric Dermatology  Dr. Brandi Napoles, Dr. Gurdeep Schulte, Dr. Lakia Carson, Sherlyn Montesinos, PA, Dr. Berta Sebastian, Dr. Shweta Pablo & Dr. Chito Whitmore  Non-urgent nurse triage line; [phone]- Ana Maria and Shahnaz SILVA Care Coordinatorholly Jj (/Complex ) [phone]    If you  need a prescription refill, please contact your pharmacy. Refills are approved or denied by our Physicians during normal business hours, Monday through Fridays  Per office policy, refills will not be granted if you have not been seen within the past year (or sooner depending on your child's condition)    Scheduling Information:  Pediatric Appointment Scheduling and Call Center [phone]  Radiology Scheduling- [phone]  Sedation Unit Scheduling- [phone]  [place] Scheduling- [facility] [phone]; Pediatric Dermatology [phone]  Main  Services: [phone]  Qatari: [phone]  Vietnamese: [phone]  Hmong/Gary/Gibraltarian: [phone]  Preadmission Nursing Department Fax Number: [phone] (Fax all pre-operative paperwork to this number)    For urgent matters arising during evenings, weekends, or holidays that cannot wait for normal business hours please call [phone] and ask for the dermatology resident on call to be paged.

## 2022-02-16 NOTE — LETTER
2/16/2022       RE: Zenaida Valles  [address]  [facility] [address]     Dear Colleague,    Thank you for referring your patient, Zenaida Valles, to the [facility] [place] at [facility]. Please see a copy of my visit note below.    [facility] Dermatology Note  Encounter Date: Feb 16, 2022  Office Visit     Dermatology Problem List:  1. Lentiginous junctional melanocytic nevus with mild atypia, R deltoid, s/p bx 5/2018.  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    ____________________________________________    Assessment & Plan:    # H/o mild DN, R deltoid, s/p bx 5/2018.  - Per path report - lesion extends to lateral margin; as it arises on chronically sun-damaged skin, clinical follow-up is recommended  - No change from 8/2021 to 2/16/2022, continue to monitor    # Multiple benign nevi.   # Lentigenes.  - No concerning lesions today  - Monitor for ABCDEs of melanoma   - Continue sun protection - recommend SPF 30 or higher with frequent application   - Return sooner if noticing changing or symptomatic lesions    # Benign lesions: cherry angiomas, SKs, skin tags, cysts.  - no treatment required    Procedures Performed:   None    Follow-up: 1 year, sooner if concerns.     Staff and Scribe:     Scribe Disclosure:  I, Nita Kellogg, am serving as a scribe to document services personally performed by Yoko Ivy MD based on data collection and the provider's statements to me.     Provider Disclosure:   The documentation recorded by the scribe accurately reflects the services I  personally performed and the decisions made by me.    Yoko Ivy MD    Department of Dermatology  [facility]: Phone: [phone], Fax: [phone]  2/16/2022     ____________________________________________    CC: Skin Check (states that she has a new spot of concern on the forehead, just noticed this week)    HPI:  Ms. Zenaida Valles is a(n) 72 year old female who presents today as a return patient for [facility]. Last seen in dermatology by myself on 8/11/21 at which point a previously biopsied lentiginous junctional melanocytic nevus with mild atypia appeared stable from previously.      Spot on forehead, noticed 1 week ago, no changes in that time.  No other painful, bleeding, non-healing, or otherwise symptomatic lesions.       Patient is otherwise feeling well, without additional skin concerns.    Labs Reviewed:  N/A    Physical Exam:  Vitals: LMP  (LMP Unknown)   SKIN: Total skin excluding the undergarment areas was performed. The exam included the head/face, neck, both arms, chest, back, abdomen, both legs, digits and/or nails. - Prior biopsy site on right deltoid well healed scar with pigment around periphery. On medial aspect, there is a divided area of pigment approximately 3 x 2 mm, laterally 1 x 1 mm, dermoscopy with even reticular network. Unchanged from prior.  - Left upper back subcutaneous papulonodular with central keratinatious core   - There are dome shaped bright red papules on the trunk and extremities.   - Scattered brown macules on sun exposed areas..   - There is(are) skin colored pedunculated papules with erythema on the trunk and extremities.   There are waxy stuck on tan to brown papules on the trunk and extremities.   - No other lesions of concern on areas examined.     Medications:  Current Outpatient Medications   Medication     albuterol (PROAIR HFA) 108 (90 Base) MCG/ACT inhaler      aspirin 81 MG tablet     atenolol (TENORMIN) 25 MG tablet     calcium carbonate (OS-GAEL 500 MG [place]. [place]) 500 MG tablet     cholecalciferol (VITAMIN D3) 125 mcg (5000 units) capsule     diclofenac (VOLTAREN) 1 % GEL topical gel     fluticasone-salmeterol (ADVAIR) 100-50 MCG/DOSE inhaler     hydrochlorothiazide (HYDRODIURIL) 25 MG tablet     irbesartan (AVAPRO) 300 MG tablet     metFORMIN (GLUCOPHAGE) 500 MG tablet     multivitamin, therapeutic with minerals (MULTI-VITAMIN) TABS tablet     STATIN NOT PRESCRIBED (INTENTIONAL)     vitamin C (ASCORBIC ACID) 100 MG tablet     No current facility-administered medications for this visit.      Past Medical History:   Patient Active Problem List   Diagnosis     Essential hypertension     Asthma     Atherosclerosis of renal artery (H)     Diabetes mellitus, type 2 (H)     Past Medical History:   Diagnosis Date     Asthma      Diabetes mellitus (H)      Fibromuscular dysplasia (H)      H/O sebaceous cyst      Hyperlipidemia LDL goal < 130      Hypertension      Obesity      Renal artery stenosis (H) 2005    Right, s/p stenting at [facility], FMD     Statin medication not prescribed per physician orders     pt declined      CC Nadia Randle MD  [address] [address] on close of this encounter.

## 2022-02-24 ENCOUNTER — MYC MEDICAL ADVICE (OUTPATIENT)
Dept: INTERNAL MEDICINE | Facility: CLINIC | Age: 72
End: 2022-02-24
Payer: COMMERCIAL

## 2022-02-24 DIAGNOSIS — I10 ESSENTIAL HYPERTENSION: ICD-10-CM

## 2022-02-24 DIAGNOSIS — E78.2 MIXED HYPERLIPIDEMIA: ICD-10-CM

## 2022-02-24 DIAGNOSIS — E11.9 TYPE 2 DIABETES MELLITUS WITHOUT COMPLICATION, WITHOUT LONG-TERM CURRENT USE OF INSULIN (H): Primary | ICD-10-CM

## 2022-02-28 ENCOUNTER — TELEPHONE (OUTPATIENT)
Dept: GASTROENTEROLOGY | Facility: CLINIC | Age: 72
End: 2022-02-28
Payer: COMMERCIAL

## 2022-02-28 DIAGNOSIS — Z11.59 ENCOUNTER FOR SCREENING FOR OTHER VIRAL DISEASES: Primary | ICD-10-CM

## 2022-02-28 NOTE — TELEPHONE ENCOUNTER
Screening Questions  BlueKIND OF PREP RedLOCATION [review exclusion criteria] GreenSEDATION TYPE    1. Have you had a positive covid test in the last 90 days? n     2. Are you active on mychart? y    3. What insurance is in the chart? [employer]     3.  Ordering/Referring Provider: Razia    4. BMI 32.6 [BMI OVER 40-EXTENDED PREP]  If greater than 40 review exclusion criteria [PAC APPT IF @ [facility]]    5.  Respiratory Screening :  [If yes to any of the following HOSPITAL setting only]     Do you use daily home oxygen? n    Do you have mod to severe Obstructive Sleep Apnea? n  [OKAY @ [facility] SH PH RI]   Do you have Pulmonary Hypertension? n     Do you have UNCONTROLLED asthma? n      6. Have you had a heart or lung transplant? n      7. Are you currently on dialysis? n [ If yes, G-PREP & HOSPITAL setting only]     8. Do you have chronic kidney disease? n [ If yes, G-PREP ]    9. Have you had a stroke or Transient ischemic attack (TIA) within 6 months? n (If yes, do not schedule at [facility])    10. In the past 6 months, have you had any heart related issues including cardiomyopathy or heart attack? n        If yes, did it require cardiac stenting or other implantable device? n      11. Do you have any implantable devices in your body (pacemaker, defib, LVAD)? n (If yes, schedule at U)    12. Do you take nitroglycerin? n   If yes, how often? n  (if yes, HOSPITAL setting ONLY)    13. Are you currently taking any blood thinners? n   [IF YES, INFORM PATIENT TO FOLLOW UP W/ ORDERING PROVIDER FOR BRIDGING INSTRUCTIONS]     14. Are you a diabetic?y   [ If yes, G-PREP ]    15. [FEMALES] Are you currently pregnant?     If yes, how many weeks?     16. Are you taking any prescription pain medications on a routine schedule?  n  [ If yes, EXTENDED PREP.]    17. Do you have any chemical dependencies such as alcohol, street drugs, or methadone?  n [If yes, MAC]    18. Do you have any history of post-traumatic stress syndrome, severe  anxiety or history of psychosis?  n  [If yes, MAC]    19. Do you transfer independently?  y    20.  Do you have any issues with constipation?  n  [ If yes, EXTENDED PREP.]    21. Preferred LOCAL Pharmacy for Pre Prescription     [facility] [place] [place] [facility] [address] [facility] [place], [place] - [address]  Scheduling Details      Caller : Zenaida Valles  (Please ask for phone number if not scheduled by patient)    Type of Procedure Scheduled: Colonoscopy  Which Colonoscopy Prep was Sent?: Deng BAUER CF PATIENTS & GROEN'S PATIENTS NEEDS EXTENDED PREP  Surgeon: Leventhal  Date of Procedure: 3/30/22  [place] [facility]      Sedation Type: CS  Conscious Sedation- Needs  for 6 hours after the procedure  MAC/General-Needs  for 24 hours after procedure    Pre-op Required at [facility] [place], [place] and OR for MAC sedation: n  (advise patient they will need a pre-op prior to procedure -)      Informed patient they will need an adult  y  Cannot take any type of public or medical transportation alone    Pre-Procedure Covid test to be completed at [facility] or Externally: y    Confirmed Nurse will call to complete assessment y    Additional comments:

## 2022-03-22 ENCOUNTER — TELEPHONE (OUTPATIENT)
Dept: GASTROENTEROLOGY | Facility: CLINIC | Age: 72
End: 2022-03-22
Payer: COMMERCIAL

## 2022-03-22 DIAGNOSIS — Z12.11 ENCOUNTER FOR SCREENING COLONOSCOPY: Primary | ICD-10-CM

## 2022-03-22 RX ORDER — BISACODYL 5 MG/1
TABLET, DELAYED RELEASE ORAL
Qty: 4 TABLET | Refills: 0 | Status: ON HOLD | OUTPATIENT
Start: 2022-03-22 | End: 2022-11-25

## 2022-03-22 NOTE — TELEPHONE ENCOUNTER
Patient scheduled for colonoscopy  on 3/30/22.     Covid test scheduled: 3/26/22    Arrival time: 1030    Facility location: [facility]     Sedation type: CS     Indication for procedure: screening    Anticoagulations? ASA 81mg     Bowel prep recommendation: Golytely prep d/t DM    Golytely prep sent to [facility] [place], [place] - [address] [facility]. Prep instructions sent via [employer] [facility].     Pre visit planning completed.    Sujata Underwood RN

## 2022-03-23 NOTE — TELEPHONE ENCOUNTER
Pre assessment questions completed for upcoming colonoscopy procedure scheduled on 3.30.2022    COVID test scheduled 3.26.2022    Reviewed procedural arrival time 1030 and facility location [facility].    Designated  policy reviewed. Instructed to have someone stay 6 hours post procedure.     Anticoagulation/blood thinners? ASA 81mg    Electronic implanted devices? no    Reviewed Golytely prep instructions with patient. No fiber/iron supplements or foods that contain nuts/seeds prior to procedure.     Patient verbalized understanding and had no questions or concerns at this time.    Karishma Armstrong RN

## 2022-03-26 ENCOUNTER — LAB (OUTPATIENT)
Dept: LAB | Facility: CLINIC | Age: 72
End: 2022-03-26
Payer: COMMERCIAL

## 2022-03-26 DIAGNOSIS — Z11.59 ENCOUNTER FOR SCREENING FOR OTHER VIRAL DISEASES: ICD-10-CM

## 2022-03-26 LAB — SARS-COV-2 RNA RESP QL NAA+PROBE: NEGATIVE

## 2022-03-26 PROCEDURE — U0005 INFEC AGEN DETEC AMPLI PROBE: HCPCS | Mod: 90 | Performed by: PATHOLOGY

## 2022-03-26 PROCEDURE — U0003 INFECTIOUS AGENT DETECTION BY NUCLEIC ACID (DNA OR RNA); SEVERE ACUTE RESPIRATORY SYNDROME CORONAVIRUS 2 (SARS-COV-2) (CORONAVIRUS DISEASE [COVID-19]), AMPLIFIED PROBE TECHNIQUE, MAKING USE OF HIGH THROUGHPUT TECHNOLOGIES AS DESCRIBED BY CMS-2020-01-R: HCPCS | Mod: 90 | Performed by: PATHOLOGY

## 2022-03-26 PROCEDURE — 99000 SPECIMEN HANDLING OFFICE-LAB: CPT | Performed by: PATHOLOGY

## 2022-03-30 ENCOUNTER — ANESTHESIA (OUTPATIENT)
Dept: SURGERY | Facility: AMBULATORY SURGERY CENTER | Age: 72
End: 2022-03-30
Payer: COMMERCIAL

## 2022-03-30 ENCOUNTER — HOSPITAL ENCOUNTER (OUTPATIENT)
Facility: AMBULATORY SURGERY CENTER | Age: 72
Discharge: HOME | End: 2022-03-30
Attending: INTERNAL MEDICINE
Payer: COMMERCIAL

## 2022-03-30 ENCOUNTER — ANESTHESIA EVENT (OUTPATIENT)
Dept: SURGERY | Facility: AMBULATORY SURGERY CENTER | Age: 72
End: 2022-03-30
Payer: COMMERCIAL

## 2022-03-30 VITALS
SYSTOLIC BLOOD PRESSURE: 132 MMHG | OXYGEN SATURATION: 96 % | HEART RATE: 63 BPM | RESPIRATION RATE: 16 BRPM | DIASTOLIC BLOOD PRESSURE: 66 MMHG | BODY MASS INDEX: 32.95 KG/M2 | TEMPERATURE: 97 F | WEIGHT: 205 LBS | HEIGHT: 66 IN

## 2022-03-30 VITALS — HEART RATE: 64 BPM

## 2022-03-30 DIAGNOSIS — Z12.11 COLON CANCER SCREENING: Primary | ICD-10-CM

## 2022-03-30 LAB
COLONOSCOPY: NORMAL
GLUCOSE BLDC GLUCOMTR-MCNC: 213 MG/DL (ref 70–99)

## 2022-03-30 PROCEDURE — 45380 COLONOSCOPY AND BIOPSY: CPT | Mod: PT

## 2022-03-30 PROCEDURE — 99000 SPECIMEN HANDLING OFFICE-LAB: CPT | Performed by: PATHOLOGY

## 2022-03-30 PROCEDURE — 82962 GLUCOSE BLOOD TEST: CPT | Mod: 90 | Performed by: PATHOLOGY

## 2022-03-30 PROCEDURE — 88305 TISSUE EXAM BY PATHOLOGIST: CPT | Mod: 26 | Performed by: PATHOLOGY

## 2022-03-30 PROCEDURE — 88305 TISSUE EXAM BY PATHOLOGIST: CPT | Mod: TC | Performed by: INTERNAL MEDICINE

## 2022-03-30 RX ORDER — PROPOFOL 10 MG/ML
INJECTION, EMULSION INTRAVENOUS CONTINUOUS PRN
Status: DISCONTINUED | OUTPATIENT
Start: 2022-03-30 | End: 2022-03-30

## 2022-03-30 RX ORDER — NALOXONE HYDROCHLORIDE 0.4 MG/ML
0.2 INJECTION, SOLUTION INTRAMUSCULAR; INTRAVENOUS; SUBCUTANEOUS
Status: DISCONTINUED | OUTPATIENT
Start: 2022-03-30 | End: 2022-03-31 | Stop reason: HOSPADM

## 2022-03-30 RX ORDER — LIDOCAINE HYDROCHLORIDE 20 MG/ML
INJECTION, SOLUTION INFILTRATION; PERINEURAL PRN
Status: DISCONTINUED | OUTPATIENT
Start: 2022-03-30 | End: 2022-03-30

## 2022-03-30 RX ORDER — PROCHLORPERAZINE MALEATE 5 MG
5 TABLET ORAL EVERY 6 HOURS PRN
Status: DISCONTINUED | OUTPATIENT
Start: 2022-03-30 | End: 2022-03-31 | Stop reason: HOSPADM

## 2022-03-30 RX ORDER — LIDOCAINE 40 MG/G
CREAM TOPICAL
Status: DISCONTINUED | OUTPATIENT
Start: 2022-03-30 | End: 2022-03-30 | Stop reason: HOSPADM

## 2022-03-30 RX ORDER — ONDANSETRON 2 MG/ML
4 INJECTION INTRAMUSCULAR; INTRAVENOUS
Status: DISCONTINUED | OUTPATIENT
Start: 2022-03-30 | End: 2022-03-30 | Stop reason: HOSPADM

## 2022-03-30 RX ORDER — SIMETHICONE
LIQUID (ML) MISCELLANEOUS PRN
Status: DISCONTINUED | OUTPATIENT
Start: 2022-03-30 | End: 2022-03-30 | Stop reason: HOSPADM

## 2022-03-30 RX ORDER — PROPOFOL 10 MG/ML
INJECTION, EMULSION INTRAVENOUS PRN
Status: DISCONTINUED | OUTPATIENT
Start: 2022-03-30 | End: 2022-03-30

## 2022-03-30 RX ORDER — ONDANSETRON 2 MG/ML
4 INJECTION INTRAMUSCULAR; INTRAVENOUS EVERY 6 HOURS PRN
Status: DISCONTINUED | OUTPATIENT
Start: 2022-03-30 | End: 2022-03-31 | Stop reason: HOSPADM

## 2022-03-30 RX ORDER — SODIUM CHLORIDE, SODIUM LACTATE, POTASSIUM CHLORIDE, CALCIUM CHLORIDE 600; 310; 30; 20 MG/100ML; MG/100ML; MG/100ML; MG/100ML
INJECTION, SOLUTION INTRAVENOUS CONTINUOUS
Status: DISCONTINUED | OUTPATIENT
Start: 2022-03-30 | End: 2022-03-30 | Stop reason: HOSPADM

## 2022-03-30 RX ORDER — NALOXONE HYDROCHLORIDE 0.4 MG/ML
0.4 INJECTION, SOLUTION INTRAMUSCULAR; INTRAVENOUS; SUBCUTANEOUS
Status: DISCONTINUED | OUTPATIENT
Start: 2022-03-30 | End: 2022-03-31 | Stop reason: HOSPADM

## 2022-03-30 RX ORDER — ONDANSETRON 4 MG/1
4 TABLET, ORALLY DISINTEGRATING ORAL EVERY 6 HOURS PRN
Status: DISCONTINUED | OUTPATIENT
Start: 2022-03-30 | End: 2022-03-31 | Stop reason: HOSPADM

## 2022-03-30 RX ORDER — FLUMAZENIL 0.1 MG/ML
0.2 INJECTION, SOLUTION INTRAVENOUS
Status: ACTIVE | OUTPATIENT
Start: 2022-03-30 | End: 2022-03-30

## 2022-03-30 RX ADMIN — PROPOFOL 200 MCG/KG/MIN: 10 INJECTION, EMULSION INTRAVENOUS at 11:31

## 2022-03-30 RX ADMIN — SODIUM CHLORIDE, SODIUM LACTATE, POTASSIUM CHLORIDE, CALCIUM CHLORIDE: 600; 310; 30; 20 INJECTION, SOLUTION INTRAVENOUS at 11:29

## 2022-03-30 RX ADMIN — PROPOFOL 50 MG: 10 INJECTION, EMULSION INTRAVENOUS at 11:51

## 2022-03-30 RX ADMIN — PROPOFOL 100 MG: 10 INJECTION, EMULSION INTRAVENOUS at 11:31

## 2022-03-30 RX ADMIN — LIDOCAINE HYDROCHLORIDE 60 MG: 20 INJECTION, SOLUTION INFILTRATION; PERINEURAL at 11:31

## 2022-03-30 NOTE — ANESTHESIA PREPROCEDURE EVALUATION
Anesthesia Pre-Procedure Evaluation    Patient: Zenaida Valles   MRN: [id number] : 1950        Procedure : Procedure(s):  COLONOSCOPY          Past Medical History:   Diagnosis Date     Asthma      Diabetes mellitus (H)      Fibromuscular dysplasia (H)      H/O sebaceous cyst      Hyperlipidemia LDL goal < 130      Hypertension      Obesity      Renal artery stenosis (H)     Right, s/p stenting at [facility], FMD     Statin medication not prescribed per physician orders     pt declined      Past Surgical History:   Procedure Laterality Date     COLONOSCOPY N/A 3/10/2017    Procedure: COMBINED COLONOSCOPY, SINGLE OR MULTIPLE BIOPSY/POLYPECTOMY BY BIOPSY;  Surgeon: Sergo Mcgraw MD;  Location: [facility]     IR RENAL/VISCERAL STENT/ATHERECT/PTA Right       Allergies   Allergen Reactions     Advil [Ibuprofen] GI Disturbance     Lisinopril Cough     Codeine Camsylate Diarrhea and Rash      Social History     Tobacco Use     Smoking status: Never Smoker     Smokeless tobacco: Never Used   Substance Use Topics     Alcohol use: Yes     Comment: rare      Wt Readings from Last 1 Encounters:   22 93 kg (205 lb)        Anesthesia Evaluation   Pt has had prior anesthetic.         ROS/MED HX  ENT/Pulmonary: Comment: Takes inhaler prior to cold walks only    (+) Intermittent, asthma Treatment: Inhaler prn,      Neurologic:       Cardiovascular:     (+) hypertension-----    METS/Exercise Tolerance: >4 METS    Hematologic:       Musculoskeletal:       GI/Hepatic:       Renal/Genitourinary:       Endo:     (+) type II DM, Obesity,     Psychiatric/Substance Use:       Infectious Disease:       Malignancy:       Other:            Physical Exam    Airway        Mallampati: I   TM distance: > 3 FB   Neck ROM: full   Mouth opening: > 3 cm    Respiratory Devices and Support         Dental  no notable dental history         Cardiovascular   cardiovascular exam normal          Pulmonary   pulmonary exam normal                 OUTSIDE LABS:  CBC:   Lab Results   Component Value Date    WBC 5.6 02/11/2020    WBC 5.8 11/23/2018    HGB 13.6 02/11/2020    HGB 14.2 11/23/2018    HCT 40.7 02/11/2020    HCT 42.0 11/23/2018     02/11/2020     11/23/2018     BMP:   Lab Results   Component Value Date     02/10/2022     01/19/2021    POTASSIUM 4.4 02/10/2022    POTASSIUM 4.1 01/19/2021    CHLORIDE 104 02/10/2022    CHLORIDE 105 01/19/2021    CO2 29 02/10/2022    CO2 30 01/19/2021    BUN 15 02/10/2022    BUN 15 01/19/2021    CR 0.66 02/10/2022    CR 0.64 01/19/2021     (H) 02/10/2022     (H) 01/19/2021     COAGS:   Lab Results   Component Value Date    PTT 24 08/14/2007    INR 1.09 08/14/2007     POC: No results found for: BGM, HCG, HCGS  HEPATIC:   Lab Results   Component Value Date    ALBUMIN 4.1 02/10/2022    PROTTOTAL 7.5 02/10/2022    ALT 44 02/10/2022    AST 22 02/10/2022    ALKPHOS 101 02/10/2022    BILITOTAL 0.4 02/10/2022     OTHER:   Lab Results   Component Value Date    LACT 1.5 02/11/2020    A1C 8.3 (H) 02/10/2022    GAEL 9.0 02/10/2022    PHOS 3.8 08/14/2007    MAG 1.9 08/14/2007    LIPASE 91 02/11/2020    TSH 3.83 02/10/2022       Anesthesia Plan    ASA Status:  3   NPO Status:  NPO Appropriate    Anesthesia Type: MAC.     - Reason for MAC: straight local not clinically adequate, immobility needed   Induction: Intravenous.   Maintenance: TIVA.        Consents    Anesthesia Plan(s) and associated risks, benefits, and realistic alternatives discussed. Questions answered and patient/representative(s) expressed understanding.    - Discussed:     - Discussed with:  Patient         Postoperative Care    Pain management: Multi-modal analgesia.   PONV prophylaxis: Background Propofol Infusion, Dexamethasone or Solumedrol, Ondansetron (or other 5HT-3)     Comments:                GLYNN OLSON MD

## 2022-03-30 NOTE — ANESTHESIA CARE TRANSFER NOTE
Patient: Zenaida Valles    Procedure: Procedure(s):  COLONOSCOPY, WITH POLYPECTOMY       Diagnosis: Screening for colon cancer [Z12.11]  Diagnosis Additional Information: No value filed.    Anesthesia Type:   MAC     Note:    Oropharynx: oropharynx clear of all foreign objects and spontaneously breathing  Level of Consciousness: drowsy  Oxygen Supplementation: room air    Independent Airway: airway patency satisfactory and stable  Dentition: dentition unchanged  Vital Signs Stable: post-procedure vital signs reviewed and stable  Report to RN Given: handoff report given  Patient transferred to: Phase II    Handoff Report: Identifed the Patient, Identified the Reponsible Provider, Reviewed the pertinent medical history, Discussed the surgical course, Reviewed Intra-OP anesthesia mangement and issues during anesthesia, Set expectations for post-procedure period and Allowed opportunity for questions and acknowledgement of understanding      Vitals:  Vitals Value Taken Time   BP     Temp     Pulse     Resp     SpO2         Electronically Signed By: WOOD Ellsworth CRNA  March 30, 2022  12:01 PM

## 2022-03-30 NOTE — H&P
Zenaida Valles  [id number]  female  72 year old      Reason for procedure/surgery: colonoscopy for surveillance    Patient Active Problem List   Diagnosis     Essential hypertension     Asthma     Atherosclerosis of renal artery (H)     Diabetes mellitus, type 2 (H)       Past Surgical History:    Past Surgical History:   Procedure Laterality Date     COLONOSCOPY N/A 3/10/2017    Procedure: COMBINED COLONOSCOPY, SINGLE OR MULTIPLE BIOPSY/POLYPECTOMY BY BIOPSY;  Surgeon: Sergo Mcgraw MD;  Location: [facility]     IR RENAL/VISCERAL STENT/ATHERECT/PTA Right 2005       Past Medical History:   Past Medical History:   Diagnosis Date     Asthma      Diabetes mellitus (H)      Fibromuscular dysplasia (H)      H/O sebaceous cyst      Hyperlipidemia LDL goal < 130      Hypertension      Obesity      Renal artery stenosis (H) 2005    Right, s/p stenting at [facility], FMD     Statin medication not prescribed per physician orders     pt declined       Social History:   Social History     Tobacco Use     Smoking status: Never Smoker     Smokeless tobacco: Never Used   Substance Use Topics     Alcohol use: Yes     Comment: rare       Family History:   Family History   Problem Relation Age of Onset     Peptic Ulcer Disease Mother         Zollinger-Wheeler Syndrome     Diabetes Mother      Thyroid Disease Mother         s/p thyroidecomy     Cancer Father         prostate cancer     C.A.D. Father         aortic aneurysm     Childhood Heart Disease Sister         tetrology of fallot     Melanoma No family hx of      Skin Cancer No family hx of        Allergies:   Allergies   Allergen Reactions     Advil [Ibuprofen] GI Disturbance     Lisinopril Cough     Codeine Camsylate Diarrhea and Rash       Active Medications:   Current Outpatient Medications   Medication Sig Dispense Refill     albuterol (PROAIR HFA) 108 (90 Base) MCG/ACT inhaler Inhale 2 puffs into the lungs every 6 hours as needed for shortness of breath / dyspnea or  wheezing 18 g 3     aspirin 81 MG tablet Take 81 mg by mouth daily       atenolol (TENORMIN) 25 MG tablet Take 1 tablet (25 mg) by mouth daily 90 tablet 3     calcium carbonate (OS-GAEL 500 MG [place]. [place]) 500 MG tablet Take 300 mg by mouth daily        cholecalciferol (VITAMIN D3) 125 mcg (5000 units) capsule Take 125 mcg by mouth daily       diclofenac (VOLTAREN) 1 % GEL topical gel Apply 2 grams three - four times daily using enclosed dosing card. 100 g 0     fluticasone-salmeterol (ADVAIR) 100-50 MCG/DOSE inhaler Inhale 1 puff into the lungs daily 2 each 2     hydrochlorothiazide (HYDRODIURIL) 25 MG tablet Take 1 tablet (25 mg) by mouth daily 90 tablet 3     irbesartan (AVAPRO) 300 MG tablet Take 1 tablet (300 mg) by mouth At Bedtime 90 tablet 3     metFORMIN (GLUCOPHAGE) 500 MG tablet Take 1 tablet (500 mg) by mouth 2 times daily (with meals) 180 tablet 3     multivitamin, therapeutic with minerals (MULTI-VITAMIN) TABS tablet Take 1 tablet by mouth daily       vitamin C (ASCORBIC ACID) 100 MG tablet Take 100 mg by mouth 3 times daily       bisacodyl (DULCOLAX) 5 MG EC tablet Take as directed. One day before exam take 2 tablets at 3 PM. Take 2 tablets at 11 PM. 4 tablet 0     polyethylene glycol (GOLYTELY) 236 g suspension Take as directed. One day before exam fill the jug with water. Cover and shake until well mixed. At 6 PM start drinking an 8oz glass of mixture every 15 minutes until jug is 1/2 empty. Store remainder in the refrigerator.  At 11 PM Start drinking the other half of the Gol[place]ely jug. Drink one 8-ounce glass every 15 minutes until the jug is empty. 4000 mL 0     STATIN NOT PRESCRIBED (INTENTIONAL) Please choose reason not prescribed, below         Systemic Review:   CONSTITUTIONAL: NEGATIVE for fever, chills, change in weight  ENT/MOUTH: NEGATIVE for ear, mouth and throat problems  RESP: NEGATIVE for significant cough or SOB  CV: NEGATIVE for chest pain, palpitations or peripheral edema    Physical  "Examination:   Vital Signs: BP (!) 170/90   Pulse 60   Temp 97.5  F (36.4  C) (Skin)   Resp 17   Ht 1.676 m (5' 6\")   Wt 93 kg (205 lb)   LMP  (LMP Unknown)   SpO2 97%   BMI 33.09 kg/m    GENERAL: healthy, alert and no distress  RESP: Normal respiratory excursion   CV: regular rates and rhythm and no peripheral edema  ABDOMEN: soft, nontender and bowel sounds normal  MS: no gross musculoskeletal defects noted, no edema    Plan: Appropriate to proceed as scheduled.      Thomas M. Leventhal, MD  3/30/2022    PCP:  Nadia Randle    "

## 2022-03-30 NOTE — ANESTHESIA POSTPROCEDURE EVALUATION
Patient: Zenaida Valles    Procedure: Procedure(s):  COLONOSCOPY, WITH POLYPECTOMY       Anesthesia Type:  MAC    Note:  Disposition: Outpatient   Postop Pain Control: Uneventful            Sign Out: Well controlled pain   PONV: No   Neuro/Psych: Uneventful            Sign Out: Acceptable/Baseline neuro status   Airway/Respiratory: Uneventful            Sign Out: Acceptable/Baseline resp. status   CV/Hemodynamics: Uneventful            Sign Out: Acceptable CV status; No obvious hypovolemia; No obvious fluid overload   Other NRE: NONE   DID A NON-ROUTINE EVENT OCCUR? No           Last vitals:  Vitals Value Taken Time   /66 03/30/22 1158   Temp 36.1  C (97  F) 03/30/22 1158   Pulse 63 03/30/22 1158   Resp 16 03/30/22 1158   SpO2 96 % 03/30/22 1158       Electronically Signed By: GLYNN OLSON MD  March 30, 2022  3:25 PM

## 2022-03-31 LAB
PATH REPORT.COMMENTS IMP SPEC: NORMAL
PATH REPORT.COMMENTS IMP SPEC: NORMAL
PATH REPORT.FINAL DX SPEC: NORMAL
PATH REPORT.GROSS SPEC: NORMAL
PATH REPORT.MICROSCOPIC SPEC OTHER STN: NORMAL
PATH REPORT.RELEVANT HX SPEC: NORMAL
PHOTO IMAGE: NORMAL

## 2022-06-15 ENCOUNTER — ANCILLARY PROCEDURE (OUTPATIENT)
Dept: MAMMOGRAPHY | Facility: CLINIC | Age: 72
End: 2022-06-15
Attending: INTERNAL MEDICINE
Payer: COMMERCIAL

## 2022-06-15 DIAGNOSIS — Z12.31 ENCOUNTER FOR SCREENING MAMMOGRAM FOR BREAST CANCER: ICD-10-CM

## 2022-06-15 PROCEDURE — 77067 SCR MAMMO BI INCL CAD: CPT | Mod: GC

## 2022-08-13 ENCOUNTER — HEALTH MAINTENANCE LETTER (OUTPATIENT)
Age: 72
End: 2022-08-13

## 2022-09-07 ENCOUNTER — MYC MEDICAL ADVICE (OUTPATIENT)
Dept: INTERNAL MEDICINE | Facility: CLINIC | Age: 72
End: 2022-09-07

## 2022-10-29 ENCOUNTER — HEALTH MAINTENANCE LETTER (OUTPATIENT)
Age: 72
End: 2022-10-29

## 2022-10-31 ENCOUNTER — LAB (OUTPATIENT)
Dept: LAB | Facility: CLINIC | Age: 72
End: 2022-10-31
Payer: COMMERCIAL

## 2022-10-31 ENCOUNTER — OFFICE VISIT (OUTPATIENT)
Dept: INTERNAL MEDICINE | Facility: CLINIC | Age: 72
End: 2022-10-31
Payer: COMMERCIAL

## 2022-10-31 VITALS
DIASTOLIC BLOOD PRESSURE: 88 MMHG | OXYGEN SATURATION: 99 % | HEART RATE: 68 BPM | SYSTOLIC BLOOD PRESSURE: 148 MMHG | BODY MASS INDEX: 31.01 KG/M2 | WEIGHT: 192.1 LBS

## 2022-10-31 DIAGNOSIS — E11.42 DIABETIC POLYNEUROPATHY ASSOCIATED WITH TYPE 2 DIABETES MELLITUS (H): ICD-10-CM

## 2022-10-31 DIAGNOSIS — E11.42 DIABETIC POLYNEUROPATHY ASSOCIATED WITH TYPE 2 DIABETES MELLITUS (H): Primary | ICD-10-CM

## 2022-10-31 DIAGNOSIS — R93.89 ABNORMAL CT SCAN: ICD-10-CM

## 2022-10-31 DIAGNOSIS — E11.9 TYPE 2 DIABETES MELLITUS WITHOUT COMPLICATION, WITHOUT LONG-TERM CURRENT USE OF INSULIN (H): ICD-10-CM

## 2022-10-31 LAB — HBA1C MFR BLD: 9 %

## 2022-10-31 PROCEDURE — 36415 COLL VENOUS BLD VENIPUNCTURE: CPT | Performed by: PATHOLOGY

## 2022-10-31 PROCEDURE — 99000 SPECIMEN HANDLING OFFICE-LAB: CPT | Performed by: PATHOLOGY

## 2022-10-31 PROCEDURE — 99215 OFFICE O/P EST HI 40 MIN: CPT | Performed by: INTERNAL MEDICINE

## 2022-10-31 PROCEDURE — 83036 HEMOGLOBIN GLYCOSYLATED A1C: CPT | Mod: 90 | Performed by: PATHOLOGY

## 2022-10-31 ASSESSMENT — ASTHMA QUESTIONNAIRES: ACT_TOTALSCORE: 24

## 2022-10-31 NOTE — PROGRESS NOTES
History of Present Illness:  Ms. Laazrus Valles is a 72 year old female who presents for  Chief Complaint   Patient presents with     Follow Up     Hyperglycemia     Pt here with concerns regarding elevated glucose levels       DM: Last about 15 lbs in last year, highest BG was 220, now 190 lbs. Started checking BG. She has changed diet, less carbs. On metformin bid 500 mg.  Her BG in AM are mostly >200, some 170-190s. Notes tingling in toes.    Lab Results   Component Value Date    A1C 8.3 02/10/2022    A1C 7.6 01/19/2021    A1C 9.2 02/11/2020    A1C 9.1 02/06/2020    A1C 7.7 11/23/2018    A1C 7.3 11/20/2017       BP: Elevated today, she endorses anxiety    HLD: not interested in statin given history of hepatitis    Asthma: Using advair daily, symptoms controlled.     Routine Health Maintenance  Colonoscopy (50-75 yrs): 2017 3 TA and diverticulosis, rec 3-5 year f/u    3/22 - Hemorrhoids found on perianal exam.                          - Two 2 to 3 mm polyps in the descending colon and in                          the ascending colon, removed with a jumbo cold                          forceps. Resected and retrieved.                          - Diverticulosis in the recto-sigmoid colon and in the                          sigmoid colon.                            - Non-bleeding external hemorrhoids.   Dexa (>65W or 70M yrs): Completed on 2/17/17.       Mammogram (40-75 yrs): 6/21, 6/22  Pap (21-65 yrs): PAP      NIL   10/27/2014  PAP      NIL   10/18/2011  HIV/HCV if risk factors: neg 2014  Safety/Lifestyle: discussed  Tob/EtOH: n/a  Depression: screen neg  Advanced Directive: she has one on file        Review of external notes as documented above                             A detailed Review of Systems was performed, verified and is negative except as documented in the HPI.  All health questionnaires were reviewed, verified and relevant information documented above.      Past Medical History:  Past Medical History:    Diagnosis Date     Asthma      Diabetes mellitus (H)      Fibromuscular dysplasia (H)      H/O sebaceous cyst      Hyperlipidemia LDL goal < 130      Hypertension      Obesity      Renal artery stenosis (H) 2005    Right, s/p stenting at [facility], FMD     Statin medication not prescribed per physician orders     pt declined       Active Meds:  Current Outpatient Medications   Medication     albuterol (PROAIR HFA) 108 (90 Base) MCG/ACT inhaler     aspirin 81 MG tablet     atenolol (TENORMIN) 25 MG tablet     blood glucose (NO BRAND SPECIFIED) lancets standard     blood glucose (NO BRAND SPECIFIED) test strip     blood glucose monitoring (NO BRAND SPECIFIED) meter device kit     diclofenac (VOLTAREN) 1 % GEL topical gel     fluticasone-salmeterol (ADVAIR) 100-50 MCG/DOSE inhaler     hydrochlorothiazide (HYDRODIURIL) 25 MG tablet     irbesartan (AVAPRO) 300 MG tablet     metFORMIN (GLUCOPHAGE) 500 MG tablet     bisacodyl (DULCOLAX) 5 MG EC tablet     calcium carbonate (OS-GAEL 500 MG [place]. [place]) 500 MG tablet     cholecalciferol (VITAMIN D3) 125 mcg (5000 units) capsule     multivitamin w/minerals (THERA-VIT-M) tablet     polyethylene glycol (GOLYTELY) 236 g suspension     STATIN NOT PRESCRIBED (INTENTIONAL)     vitamin C (ASCORBIC ACID) 100 MG tablet     No current facility-administered medications for this visit.        Allergies:  Reviewed, refer to EMR    Relevant Social History:  Social History     Tobacco Use     Smoking status: Never     Smokeless tobacco: Never   Substance Use Topics     Alcohol use: Yes     Comment: rare     Drug use: No        Physical Exam:  Vitals: BP (!) 148/88 (BP Location: Right arm, Patient Position: Sitting, Cuff Size: Adult Regular)   Pulse 68   Wt 87.1 kg (192 lb 1.6 oz)   LMP  (LMP Unknown)   SpO2 99%   BMI 31.01 kg/m    Constitutional: Alert, oriented, pleasant, no acute distress  Head: Normocephalic, atraumatic  Eyes: Extra-ocular movements intact, pupils equally round  bilaterally, no scleral icterus  ENT: Oropharynx clear, moist mucus membranes, good dentition  Musculoskeletal: No edema, normal muscle tone, normal gait  Neurologic: Alert and oriented, cranial nerves 2-12 intact, grossly non-focal  Skin: No rashes/lesions  Foot: pulses intact, warm, reduced sensation to light touch on R hallux, no ulcers  Psychiatric: normal mentation, affect and mood      Diagnostics:  Labs reviewed in Epic          Assessment and Plan:  Zenaida was seen today for follow up and hyperglycemia.  Diabetes for several years. Patient has been reluctant to adopt pharmacotherapy, additionally has declined DM education/nutrition.  Today comes in because insurance has notified her that she is not compliant with testing/goals.  We discussed goal of A1c less than 8 and that more frequent monitoring/meeting would be beneficial if she is open to additional treatment.  We discussed need to intensity therapy, as recommended in Feb 2022.  She will increase metformin to max dose and may need to consider additional therapy.  F/u in 3 months.    Diagnoses and all orders for this visit:    Diabetic polyneuropathy associated with type 2 diabetes mellitus (H)  -     metFORMIN (GLUCOPHAGE) 500 MG tablet; Take 2 tablets (1,000 mg) by mouth 2 times daily (with meals)  -     Hemoglobin A1c; Future  -     Hemoglobin A1c; Future    Type 2 diabetes mellitus without complication, without long-term current use of insulin (H)  -     metFORMIN (GLUCOPHAGE) 500 MG tablet; Take 2 tablets (1,000 mg) by mouth 2 times daily (with meals)  -     blood glucose (NO BRAND SPECIFIED) lancets standard; Use to test blood sugar 1 times daily or as directed.  -     blood glucose (NO BRAND SPECIFIED) test strip; Use to test blood sugar 1 time daily or as directed.    Diabetes mellitus, type 2 (H)  -     blood glucose (NO BRAND SPECIFIED) lancets standard; Use to test blood sugar 1 times daily or as directed.  -     blood glucose (NO BRAND  "SPECIFIED) test strip; Use to test blood sugar 1 time daily or as directed.    Abnormal CT scan--possible endometrial thickening, patient denies symptoms  -     US Pelvic w/ Transvaginal; Future      Nadia Randle MD  Internal Medicine    >40 minutes spent today performing chart review, history and exam, counseling, care coordination, documentation and further activities as noted above exclusive of any procedures or EKG interpretation          Addendum: Patient completed EMBx, which was benign. Per Dr. Nation, biopsy is \"good for one year\" as long as patient is not having new symptoms.  Nadia Randle MD  Internal Medicine          "

## 2022-10-31 NOTE — NURSING NOTE
Zenaida Valles is a 72 year old female that presents in clinic today for the following:     Chief Complaint   Patient presents with     Follow Up     Hyperglycemia     Pt here with concerns regarding elevated glucose levels       The patient's allergies and medications were reviewed. The patient's vitals were obtained without incident. The patient does not have any other questions for the provider.     Claudia Latham, EMT at 9:46 AM on 10/31/2022.  Primary Care Clinic: [phone]

## 2022-11-07 ENCOUNTER — MYC MEDICAL ADVICE (OUTPATIENT)
Dept: INTERNAL MEDICINE | Facility: CLINIC | Age: 72
End: 2022-11-07

## 2022-11-16 ENCOUNTER — ANCILLARY PROCEDURE (OUTPATIENT)
Dept: ULTRASOUND IMAGING | Facility: CLINIC | Age: 72
End: 2022-11-16
Attending: INTERNAL MEDICINE
Payer: COMMERCIAL

## 2022-11-16 ENCOUNTER — MYC MEDICAL ADVICE (OUTPATIENT)
Dept: INTERNAL MEDICINE | Facility: CLINIC | Age: 72
End: 2022-11-16

## 2022-11-16 DIAGNOSIS — R93.89 ABNORMAL CT SCAN: ICD-10-CM

## 2022-11-16 DIAGNOSIS — R93.5 ABNORMAL ULTRASOUND OF UTERUS: Primary | ICD-10-CM

## 2022-11-16 PROCEDURE — 76856 US EXAM PELVIC COMPLETE: CPT | Performed by: RADIOLOGY

## 2022-11-16 PROCEDURE — 76830 TRANSVAGINAL US NON-OB: CPT | Performed by: RADIOLOGY

## 2022-11-18 NOTE — PROGRESS NOTES
"11-22-22 called pt - she is open to doing a biopsy on Friday in the OR - I spoke with Dr Nation about this and she will be in touch with the patient and scheduling  Camelia Park MD, PhD        Assessment & Plan     Endometrial thickening on ultrasound  19mm thickening - concern for endometrial ca    - ENDOMETRIAL BIOPSY W/O CERVICAL DILATION  - Surgical Pathology Exam      Return if symptoms worsen or fail to improve.    Camelia Park MD PhD  [facility] [place]    Time note ((n3, 30'): The total time (on the date of service) for this service was 30 minutes, including discussion/face-to-face, chart review, interpretation not otherwise reported, documentation, and updating of the computerized record.  Discussed case with Dr Nation.     Rachel Estevez is a 72 year old, presenting for the following health issues: abnormal TVUS      HPI  73 yo female - [id number]  -  She is PM.  No hx of vaginal bleeding.  LMP was 20 yrs ago.  Pt had abdominal pain 2 yrs ago - after starting increase dose of metformin.  Then had CT of abdomen - noticed slightlyy thickened appreance of the endometrium  - and recommended TVUS - which was done last week -  Endometrium was 19mm thick.  No HRT - She does not take phyto estrogens.    TVUS  IMPRESSION:   1.  Markedly abnormal and thickened endometrium concerning for  endometrial hyperplasia versus malignancy in a patient this age.  Recommend correlation with tissue sampling.    Pt has had uterine cramping since the TVUS.      Review of Systems   Endorses weight loss, problems with gums HTN, asthma, constipation, tingling in her feet Diabetes, the rest of the complete ROS is negative other than as mentioned above.        Objective    LMP  (LMP Unknown)  /65 (BP Location: Left arm, Patient Position: Sitting, Cuff Size: Adult Large)   Pulse 69   Ht 1.676 m (5' 6\")   Wt 85.3 kg (188 lb)   LMP  (LMP Unknown)   BMI 30.34 kg/m      Body mass index is " 30.34 kg/m .  Physical Exam   Pelvic Exam:  Vulva: No external lesions, scant  hair distribution, no adenopathy  Vagina: Moist, pale - thinned vaginal mucosa and no  lesions  Cervix: pale- atrophic, no lesions   Uterus: small anteverted, non-tender, mobile  Ovaries: No mass, non-tender      Camelia Park MD, PhD                PROCEDURE   PROCEDURE NOTE -- Endometrial biopsy    Verification of Procedure  Just before the procedure begins, through verbal and active participation of team members, verify:     Initials   Patient Name SSA   Patient date of birth SSA   Procedure to be performed SSA     Pregnancy test: PM    Tylenol - took at home     Counseling:  Patient counseled on potential side effects of EMX procedure.     Consent:  Risks, benefits of treatment, and no treatment were discussed.  Patient's questions were elicited and answered. Verbalized understanding and agreement with plan. Written consent signed and scanned into medical record.      Under sterile technique, cervix was visualized with a medium Graves speculum and prepped with betadine solution. The tenaculum was placed at 2 and 10 on the anterior aspect of her cervix .  Explora currette was guided, with resistance, through the cervix to the fundus. A dilatator was used.   The uterus sounded to 3 cm. Suction was applied and a scant  sample obtained. It was unlikely that the currette was in the uterine fundus.  The tenaculum was removed and pressure applied for effective stasis. The specimen was placed immediately into a labeled formalin jar.     EBL:  Minimal     Complications: None      She tolerated the procedure with pain reported 10 of 10 during procedure and 8 of 10 at visit end.      Sample sent to Path.  Will call with pathology report and recommended follow up. To report heavy bleeding, severe cramping, or abnormal vaginal discharge.  May take Ibuprofen 400-800 mg PO TID PRN or Naproxen 500 mg PO BID for cramping.     I spoke with Dr Nation  and she can add her to her surgery schedule on Friday afternoon to get a better sample.  I will talk with patient about this     Camelia Park MD, PhD

## 2022-11-21 ENCOUNTER — TELEPHONE (OUTPATIENT)
Dept: OBGYN | Facility: CLINIC | Age: 72
End: 2022-11-21

## 2022-11-21 ENCOUNTER — OFFICE VISIT (OUTPATIENT)
Dept: FAMILY MEDICINE | Facility: CLINIC | Age: 72
End: 2022-11-21
Attending: INTERNAL MEDICINE
Payer: COMMERCIAL

## 2022-11-21 VITALS
BODY MASS INDEX: 30.22 KG/M2 | HEART RATE: 69 BPM | WEIGHT: 188 LBS | HEIGHT: 66 IN | DIASTOLIC BLOOD PRESSURE: 65 MMHG | SYSTOLIC BLOOD PRESSURE: 139 MMHG

## 2022-11-21 DIAGNOSIS — R93.89 ENDOMETRIAL THICKENING ON ULTRASOUND: Primary | ICD-10-CM

## 2022-11-21 PROCEDURE — G0463 HOSPITAL OUTPT CLINIC VISIT: HCPCS

## 2022-11-21 PROCEDURE — 88305 TISSUE EXAM BY PATHOLOGIST: CPT | Mod: TC | Performed by: FAMILY MEDICINE

## 2022-11-21 PROCEDURE — 88305 TISSUE EXAM BY PATHOLOGIST: CPT | Mod: 26 | Performed by: PATHOLOGY

## 2022-11-21 PROCEDURE — 99203 OFFICE O/P NEW LOW 30 MIN: CPT | Mod: 25 | Performed by: FAMILY MEDICINE

## 2022-11-21 PROCEDURE — 58100 BIOPSY OF UTERUS LINING: CPT | Performed by: FAMILY MEDICINE

## 2022-11-21 NOTE — PATIENT INSTRUCTIONS
After your ENDOMETRIAL BIOPSY    * Expect some mild cramping and vaginal bleeding/spotting for the next few days.    * Take over the counter pain reliever like:       1-- Ibuprofen 600 mg every 6 hours                             OR       2 -- Aleve 500 mg every 12 hours       3 -- acetaminophen 500 mg every 6 hours if advised to NOT take ibuprofen and Aleve.    * You  May wear a tampon or pad for bleeding.    * Do NOT douche or have intercourse for the next 2-3 days.    * Continue your normal diet and activities.  If you were advised to take an iron supplement, please take it with fruit, NOT dairy, for maximum iron absorption.    CALL [phone] to report:      * Bleeding heavier than normal period or bleeding more than 2 days after the procedure.    * An abnormal, bad smell to the drainage from your vagina    * Fever greater than 100 degrees and/or chills    * Severe lower abdominal or pelvic pain    Expect a call and/or My Chart Message with your test results within a week. IF you have not heard from me, PLEASE CALL to check on your test result.  Thanks.

## 2022-11-21 NOTE — TELEPHONE ENCOUNTER
Patient called regarding her appointment at 1130 with Dr. Park and was wondering if she could still come in for that due to we had no power. I let Zenaida know that as soon as we have power I will call her.    Power came back on and patient was notified to come in as scheduled.

## 2022-11-22 ENCOUNTER — PREP FOR PROCEDURE (OUTPATIENT)
Dept: OBGYN | Facility: CLINIC | Age: 72
End: 2022-11-22

## 2022-11-22 ENCOUNTER — TELEPHONE (OUTPATIENT)
Dept: OBGYN | Facility: CLINIC | Age: 72
End: 2022-11-22

## 2022-11-22 DIAGNOSIS — R93.89 THICKENED ENDOMETRIUM: Primary | ICD-10-CM

## 2022-11-22 RX ORDER — ACETAMINOPHEN 325 MG/1
975 TABLET ORAL ONCE
Status: CANCELLED | OUTPATIENT
Start: 2022-11-22 | End: 2022-11-22

## 2022-11-22 RX ORDER — MISOPROSTOL 100 UG/1
TABLET ORAL
Qty: 2 TABLET | Refills: 0 | Status: SHIPPED | OUTPATIENT
Start: 2022-11-22 | End: 2023-01-24

## 2022-11-22 NOTE — TELEPHONE ENCOUNTER
Spoke with patient, scheduled surgery. Patient is aware of date, time, location, prep instructions, need for H&P within 30 days and covid test within 96 hours of surgery.     Type of surgery: EXAM UNDER ANESTHESIA, PELVIS, [[id number]]  endometrial biospy using portable ultrasound guidance  Location of surgery: [facility] OR  Date and time of surgery: 11/25/22 at 1:20pm  Surgeon: Jessica Nation MD  Pre-Op Appt Date: DOS  Post-Op Appt Date: NA   Packet sent out: Yes  Pre-cert/Authorization completed:  Not Applicable  Date: 11/22/22    Kerry Salmeron  Clinical Services Assistant       show

## 2022-11-22 NOTE — LETTER
November 22, 2022    Zenaida Valles   [address] [facility] [address]       Dear Zenaida Valles,    Thank you for choosing [facility] for your surgical procedure.  You will enter the hospital as an outpatient, or same-day surgery patient, which means that you will enter the hospital the day of your surgery and leave that same day.    Your procedure is scheduled on:    Date: Friday November 25th, 2022    Time: 1:20pm    Please arrive at: 11:20am    Procedure: EXAM UNDER ANESTHESIA, PELVIS, [[id number]]  endometrial biospy using portable ultrasound guidance    MD: Jessica Nation MD    Go to:  The main hospital entrance ([facility] entrance) is located on [address] ([address] [facility] [facility] [address])  There is signage in the lobby directing you to check in on 3rd floor of the [facility].       parking is available (no charge to park your car, regular parking rates do apply). You may also self park your car in the Green garage - the entrance is located on [address].     Please keep your ticket with you as your ticket will be validated to give you a reduced rate for the parking garage.     NOTE: The times noted above may change.  A nurse from the hospital pre-admission department will call to confirm your procedure.  He or she will answer any questions you have about the procedure and will provide you with instructions for taking medications the day of the procedure.  If you have not received a call, or if you have more questions please call us one working day before your procedure.  Call pre-admission department at [phone].  If you need to cancel or reschedule your surgery please call [phone].    FOLLOW-UP/POST OPERATIVE VISIT    Please call now and schedule a follow-up postoperative appointment with your surgeon.     Your follow-up/postoperative visit is due approximately 2-4 weeks after your  procedure.    GETTING READY FOR SURGERY  You must have a preoperative COVID-19 test within 4 days (96 hours) of your procedure.   If you are going home the same day as your procedure: You may take an at-home rapid antigen test 1-2 days before your procedure.   If your test is negative, please take a photo of your test, you will need to show this to a nurse when you come in for your procedure.  If your test is positive, please notify our office right away our your results. We may need to postpone your procedure.  If you are staying overnight in the hospital: You will need a PCR test from a lab 4 days before your procedure.  You may schedule this test by calling [phone].    *If you get a fever, cold, or rash please call the [facility] Triage Nurses at [phone] - we may need to postpone your procedure.    TEN DAYS BEFORE SURGERY    [place] with the hospital 10 days before your procedure date.     Have your insurance card ready.     To register online, go to [email or web address].     You may also call the hospital Pre Registration Department at [phone].    THE DAY BEFORE SURGERY    For a same-day procedure, you must arrange for an adult to take you home from the hospital. You cannot drive, take a cab, or ride a bus home by yourself.  An adult must stay with you for the first 24 hours after your procedure.    If you smoke - quit or at least cut down.    Stop drinking alcohol (liquor, beer, and wine) at least 24 hours before your procedure.    Shower or bathe the night before and the morning of your procedure.  Use an antiseptic surgical soap such as Hibiclens, Scrub Care or Exidine. You can find it at your local pharmacy.  If your doctor does not give you a special soap, buy Hibiclens or Gely-Star at the drug store.  You can also ask your pharmacist to recommend an antiseptic alternative soap.    Do not put on lotion, powder, perfume, deodorant, or make-up after bathing.    Remove  all nail polish.    THE DAY OF SURGERY  Have nothing to eat or drink starting eight hours before your procedure.  You may drink small sips of water up until two hours before your procedure.  Nothing by mouth within two hours of the procedure, including gum, candy and mints.     Take prescription medicines as instructed by the hospital preadmissions nursing staff.    Alternatively you should follow any directives you receive from the MD doing your procedure.     Do not take insulin or oral diabetes medicine on the day of the surgery.     Take off jewelry, including rings and body piercings.    Leave valuables at home.    Bring these items to the hospital:  1. Insurance cards.  2. Forms your doctor asked you to bring.  3. Health care directive or living will, if you have one.    If you are under 18, a parent or legal guardian must come with you to the hospital.      Sincerely,    [facility]      If you are hard of hearing, please let us know. We provide many free services including sign language and oral interpreters, TTYs, telephone amplifiers, note takers, and written materials.

## 2022-11-22 NOTE — PROGRESS NOTES
Performing surgeon: Self    Surgeon Procedure time (incision-close in minutes): 30    Surgery location: [facility]     Urgency of Surgery?: Other: 11/25/2022    H&P to be completed by?:Surgeon- in pre-op    Postop appointment?:2 weeks    Time off work: Time Off Work: None    Other comments:  Please let PRE op know we will do h and P in Pre op area on Friday.     Current BMI:   BMI Readings from Last 1 Encounters:   11/21/22 30.34 kg/m

## 2022-11-24 ENCOUNTER — ANESTHESIA EVENT (OUTPATIENT)
Dept: SURGERY | Facility: CLINIC | Age: 72
End: 2022-11-24
Payer: COMMERCIAL

## 2022-11-24 NOTE — ANESTHESIA PREPROCEDURE EVALUATION
Anesthesia Pre-Procedure Evaluation    Patient: Zenaida Valles   MRN: [id number] : 1950        Procedure : Procedure(s):  EXAM UNDER ANESTHESIA, PELVIS,  endometrial biospy using portable ultrasound guidance          Past Medical History:   Diagnosis Date     Asthma      Diabetes mellitus (H)      Fibromuscular dysplasia (H)      H/O sebaceous cyst      Hyperlipidemia LDL goal < 130      Hypertension      Obesity      Renal artery stenosis (H)     Right, s/p stenting at [facility], FMD     Statin medication not prescribed per physician orders     pt declined      Past Surgical History:   Procedure Laterality Date     COLONOSCOPY N/A 3/10/2017    Procedure: COMBINED COLONOSCOPY, SINGLE OR MULTIPLE BIOPSY/POLYPECTOMY BY BIOPSY;  Surgeon: Sergo Mcgraw MD;  Location: [facility]     COLONOSCOPY N/A 3/30/2022    Procedure: COLONOSCOPY, WITH POLYPECTOMY;  Surgeon: Leventhal, Thomas Michael, MD;  Location: [facility] OR     IR RENAL/VISCERAL STENT/ATHERECT/PTA Right       Allergies   Allergen Reactions     Codeine Diarrhea and Rash     Codeine Camsylate Diarrhea and Rash     Ibuprofen GI Disturbance and Nausea     Lisinopril Cough      Social History     Tobacco Use     Smoking status: Never     Smokeless tobacco: Never   Substance Use Topics     Alcohol use: Yes     Comment: rare      Wt Readings from Last 1 Encounters:   22 85.3 kg (188 lb)        Anesthesia Evaluation   Pt has had prior anesthetic. Type: MAC.    No history of anesthetic complications       ROS/MED HX  ENT/Pulmonary: Comment: Takes inhaler prior to cold walks only    (+) Intermittent, asthma Treatment: Inhaler prn,      Neurologic:  - neg neurologic ROS     Cardiovascular:  - neg cardiovascular ROS   (+) hypertension-----    METS/Exercise Tolerance: >4 METS    Hematologic:  - neg hematologic  ROS     Musculoskeletal:  - neg musculoskeletal ROS     GI/Hepatic:  - neg GI/hepatic ROS     Renal/Genitourinary:  - neg Renal ROS     Endo:      (+) type II DM, Last HgA1c: 9, date: 10/31/22, Not using insulin, - not using insulin pump. Normal glucose range: 100-200, not previously admitted for DM/DKA. Obesity,     Psychiatric/Substance Use:  - neg psychiatric ROS     Infectious Disease:  - neg infectious disease ROS     Malignancy:  - neg malignancy ROS     Other:            Physical Exam    Airway  airway exam normal           Respiratory Devices and Support         Dental  no notable dental history         Cardiovascular   cardiovascular exam normal          Pulmonary   pulmonary exam normal                OUTSIDE LABS:  CBC:   Lab Results   Component Value Date    WBC 5.6 02/11/2020    WBC 5.8 11/23/2018    HGB 13.6 02/11/2020    HGB 14.2 11/23/2018    HCT 40.7 02/11/2020    HCT 42.0 11/23/2018     02/11/2020     11/23/2018     BMP:   Lab Results   Component Value Date     02/10/2022     01/19/2021    POTASSIUM 4.4 02/10/2022    POTASSIUM 4.1 01/19/2021    CHLORIDE 104 02/10/2022    CHLORIDE 105 01/19/2021    CO2 29 02/10/2022    CO2 30 01/19/2021    BUN 15 02/10/2022    BUN 15 01/19/2021    CR 0.66 02/10/2022    CR 0.64 01/19/2021     (H) 03/30/2022     (H) 02/10/2022     COAGS:   Lab Results   Component Value Date    PTT 24 08/14/2007    INR 1.09 08/14/2007     POC: No results found for: BGM, HCG, HCGS  HEPATIC:   Lab Results   Component Value Date    ALBUMIN 4.1 02/10/2022    PROTTOTAL 7.5 02/10/2022    ALT 44 02/10/2022    AST 22 02/10/2022    ALKPHOS 101 02/10/2022    BILITOTAL 0.4 02/10/2022     OTHER:   Lab Results   Component Value Date    LACT 1.5 02/11/2020    A1C 9.0 (H) 10/31/2022    GAEL 9.0 02/10/2022    PHOS 3.8 08/14/2007    MAG 1.9 08/14/2007    LIPASE 91 02/11/2020    TSH 3.83 02/10/2022       Anesthesia Plan    ASA Status:  2      Anesthesia Type: MAC.     - Reason for MAC: straight local not clinically adequate, immobility needed   Induction: Propofol.   Maintenance: TIVA.   Techniques and  Equipment:     - Lines/Monitors: BIS     Consents            Postoperative Care    Pain management: IV analgesics, Oral pain medications.   PONV prophylaxis: Ondansetron (or other 5HT-3), Dexamethasone or Solumedrol, Background Propofol Infusion     Comments:                Luiz Mclaughlin MD

## 2022-11-25 ENCOUNTER — ANESTHESIA (OUTPATIENT)
Dept: SURGERY | Facility: CLINIC | Age: 72
End: 2022-11-25
Payer: COMMERCIAL

## 2022-11-25 ENCOUNTER — HOSPITAL ENCOUNTER (OUTPATIENT)
Facility: CLINIC | Age: 72
Discharge: HOME | End: 2022-11-25
Attending: OBSTETRICS & GYNECOLOGY | Admitting: OBSTETRICS & GYNECOLOGY
Payer: COMMERCIAL

## 2022-11-25 VITALS
DIASTOLIC BLOOD PRESSURE: 82 MMHG | OXYGEN SATURATION: 97 % | HEIGHT: 66 IN | WEIGHT: 190.7 LBS | TEMPERATURE: 98.1 F | RESPIRATION RATE: 14 BRPM | SYSTOLIC BLOOD PRESSURE: 134 MMHG | HEART RATE: 69 BPM | BODY MASS INDEX: 30.65 KG/M2

## 2022-11-25 DIAGNOSIS — R93.89 THICKENED ENDOMETRIUM: ICD-10-CM

## 2022-11-25 LAB
GLUCOSE BLDC GLUCOMTR-MCNC: 164 MG/DL (ref 70–99)
GLUCOSE BLDC GLUCOMTR-MCNC: 192 MG/DL (ref 70–99)

## 2022-11-25 PROCEDURE — 250N000013 HC RX MED GY IP 250 OP 250 PS 637: Performed by: STUDENT IN AN ORGANIZED HEALTH CARE EDUCATION/TRAINING PROGRAM

## 2022-11-25 PROCEDURE — 258N000003 HC RX IP 258 OP 636: Performed by: STUDENT IN AN ORGANIZED HEALTH CARE EDUCATION/TRAINING PROGRAM

## 2022-11-25 PROCEDURE — 250N000011 HC RX IP 250 OP 636: Performed by: STUDENT IN AN ORGANIZED HEALTH CARE EDUCATION/TRAINING PROGRAM

## 2022-11-25 PROCEDURE — 82962 GLUCOSE BLOOD TEST: CPT | Mod: 91

## 2022-11-25 PROCEDURE — 710N000012 HC RECOVERY PHASE 2, PER MINUTE: Performed by: OBSTETRICS & GYNECOLOGY

## 2022-11-25 PROCEDURE — 272N000001 HC OR GENERAL SUPPLY STERILE: Performed by: OBSTETRICS & GYNECOLOGY

## 2022-11-25 PROCEDURE — 250N000013 HC RX MED GY IP 250 OP 250 PS 637: Performed by: OBSTETRICS & GYNECOLOGY

## 2022-11-25 PROCEDURE — 250N000009 HC RX 250: Performed by: OBSTETRICS & GYNECOLOGY

## 2022-11-25 PROCEDURE — 250N000009 HC RX 250: Performed by: STUDENT IN AN ORGANIZED HEALTH CARE EDUCATION/TRAINING PROGRAM

## 2022-11-25 PROCEDURE — 360N000074 HC SURGERY LEVEL 1, PER MIN: Performed by: OBSTETRICS & GYNECOLOGY

## 2022-11-25 PROCEDURE — 88305 TISSUE EXAM BY PATHOLOGIST: CPT | Mod: TC | Performed by: OBSTETRICS & GYNECOLOGY

## 2022-11-25 PROCEDURE — 58120 DILATION AND CURETTAGE: CPT | Mod: GC | Performed by: OBSTETRICS & GYNECOLOGY

## 2022-11-25 PROCEDURE — 76998 US GUIDE INTRAOP: CPT | Mod: 26 | Performed by: OBSTETRICS & GYNECOLOGY

## 2022-11-25 PROCEDURE — 999N000141 HC STATISTIC PRE-PROCEDURE NURSING ASSESSMENT: Performed by: OBSTETRICS & GYNECOLOGY

## 2022-11-25 PROCEDURE — 370N000017 HC ANESTHESIA TECHNICAL FEE, PER MIN: Performed by: OBSTETRICS & GYNECOLOGY

## 2022-11-25 PROCEDURE — 88305 TISSUE EXAM BY PATHOLOGIST: CPT | Mod: 26 | Performed by: PATHOLOGY

## 2022-11-25 PROCEDURE — 94640 AIRWAY INHALATION TREATMENT: CPT

## 2022-11-25 RX ORDER — GABAPENTIN 100 MG/1
100 CAPSULE ORAL
Status: COMPLETED | OUTPATIENT
Start: 2022-11-25 | End: 2022-11-25

## 2022-11-25 RX ORDER — ACETAMINOPHEN 500 MG
500-1000 TABLET ORAL EVERY 6 HOURS PRN
COMMUNITY

## 2022-11-25 RX ORDER — IBUPROFEN 600 MG/1
600 TABLET, FILM COATED ORAL ONCE
Status: DISCONTINUED | OUTPATIENT
Start: 2022-11-25 | End: 2022-11-25 | Stop reason: HOSPADM

## 2022-11-25 RX ORDER — LIDOCAINE 40 MG/G
CREAM TOPICAL
Status: DISCONTINUED | OUTPATIENT
Start: 2022-11-25 | End: 2022-11-25 | Stop reason: HOSPADM

## 2022-11-25 RX ORDER — HYDRALAZINE HYDROCHLORIDE 20 MG/ML
2.5-5 INJECTION INTRAMUSCULAR; INTRAVENOUS EVERY 10 MIN PRN
Status: DISCONTINUED | OUTPATIENT
Start: 2022-11-25 | End: 2022-11-25 | Stop reason: HOSPADM

## 2022-11-25 RX ORDER — SODIUM CHLORIDE, SODIUM LACTATE, POTASSIUM CHLORIDE, CALCIUM CHLORIDE 600; 310; 30; 20 MG/100ML; MG/100ML; MG/100ML; MG/100ML
INJECTION, SOLUTION INTRAVENOUS CONTINUOUS
Status: DISCONTINUED | OUTPATIENT
Start: 2022-11-25 | End: 2022-11-25 | Stop reason: HOSPADM

## 2022-11-25 RX ORDER — MEPERIDINE HYDROCHLORIDE 25 MG/ML
12.5 INJECTION INTRAMUSCULAR; INTRAVENOUS; SUBCUTANEOUS
Status: DISCONTINUED | OUTPATIENT
Start: 2022-11-25 | End: 2022-11-25 | Stop reason: HOSPADM

## 2022-11-25 RX ORDER — KETOROLAC TROMETHAMINE 30 MG/ML
INJECTION, SOLUTION INTRAMUSCULAR; INTRAVENOUS PRN
Status: DISCONTINUED | OUTPATIENT
Start: 2022-11-25 | End: 2022-11-25

## 2022-11-25 RX ORDER — PROPOFOL 10 MG/ML
INJECTION, EMULSION INTRAVENOUS CONTINUOUS PRN
Status: DISCONTINUED | OUTPATIENT
Start: 2022-11-25 | End: 2022-11-25

## 2022-11-25 RX ORDER — ACETAMINOPHEN 325 MG/1
975 TABLET ORAL ONCE
Status: COMPLETED | OUTPATIENT
Start: 2022-11-25 | End: 2022-11-25

## 2022-11-25 RX ORDER — LIDOCAINE HYDROCHLORIDE 10 MG/ML
INJECTION, SOLUTION INFILTRATION; PERINEURAL PRN
Status: DISCONTINUED | OUTPATIENT
Start: 2022-11-25 | End: 2022-11-25 | Stop reason: HOSPADM

## 2022-11-25 RX ORDER — FENTANYL CITRATE 50 UG/ML
25 INJECTION, SOLUTION INTRAMUSCULAR; INTRAVENOUS EVERY 5 MIN PRN
Status: DISCONTINUED | OUTPATIENT
Start: 2022-11-25 | End: 2022-11-25 | Stop reason: HOSPADM

## 2022-11-25 RX ORDER — PROPOFOL 10 MG/ML
INJECTION, EMULSION INTRAVENOUS PRN
Status: DISCONTINUED | OUTPATIENT
Start: 2022-11-25 | End: 2022-11-25

## 2022-11-25 RX ORDER — ALBUTEROL SULFATE 0.83 MG/ML
2.5 SOLUTION RESPIRATORY (INHALATION) ONCE
Status: COMPLETED | OUTPATIENT
Start: 2022-11-25 | End: 2022-11-25

## 2022-11-25 RX ORDER — ONDANSETRON 2 MG/ML
4 INJECTION INTRAMUSCULAR; INTRAVENOUS EVERY 30 MIN PRN
Status: DISCONTINUED | OUTPATIENT
Start: 2022-11-25 | End: 2022-11-25 | Stop reason: HOSPADM

## 2022-11-25 RX ORDER — FENTANYL CITRATE 50 UG/ML
25 INJECTION, SOLUTION INTRAMUSCULAR; INTRAVENOUS
Status: DISCONTINUED | OUTPATIENT
Start: 2022-11-25 | End: 2022-11-25 | Stop reason: HOSPADM

## 2022-11-25 RX ORDER — ACETAMINOPHEN 325 MG/1
975 TABLET ORAL ONCE
Status: DISCONTINUED | OUTPATIENT
Start: 2022-11-25 | End: 2022-11-25 | Stop reason: HOSPADM

## 2022-11-25 RX ORDER — HYDROMORPHONE HYDROCHLORIDE 1 MG/ML
0.4 INJECTION, SOLUTION INTRAMUSCULAR; INTRAVENOUS; SUBCUTANEOUS EVERY 5 MIN PRN
Status: DISCONTINUED | OUTPATIENT
Start: 2022-11-25 | End: 2022-11-25 | Stop reason: HOSPADM

## 2022-11-25 RX ORDER — FENTANYL CITRATE 50 UG/ML
50 INJECTION, SOLUTION INTRAMUSCULAR; INTRAVENOUS EVERY 5 MIN PRN
Status: DISCONTINUED | OUTPATIENT
Start: 2022-11-25 | End: 2022-11-25 | Stop reason: HOSPADM

## 2022-11-25 RX ORDER — SODIUM CHLORIDE, SODIUM LACTATE, POTASSIUM CHLORIDE, CALCIUM CHLORIDE 600; 310; 30; 20 MG/100ML; MG/100ML; MG/100ML; MG/100ML
INJECTION, SOLUTION INTRAVENOUS CONTINUOUS PRN
Status: DISCONTINUED | OUTPATIENT
Start: 2022-11-25 | End: 2022-11-25

## 2022-11-25 RX ORDER — ONDANSETRON 2 MG/ML
INJECTION INTRAMUSCULAR; INTRAVENOUS PRN
Status: DISCONTINUED | OUTPATIENT
Start: 2022-11-25 | End: 2022-11-25

## 2022-11-25 RX ORDER — HYDROMORPHONE HYDROCHLORIDE 1 MG/ML
0.2 INJECTION, SOLUTION INTRAMUSCULAR; INTRAVENOUS; SUBCUTANEOUS EVERY 5 MIN PRN
Status: DISCONTINUED | OUTPATIENT
Start: 2022-11-25 | End: 2022-11-25 | Stop reason: HOSPADM

## 2022-11-25 RX ORDER — ONDANSETRON 4 MG/1
4 TABLET, ORALLY DISINTEGRATING ORAL EVERY 30 MIN PRN
Status: DISCONTINUED | OUTPATIENT
Start: 2022-11-25 | End: 2022-11-25 | Stop reason: HOSPADM

## 2022-11-25 RX ADMIN — ACETAMINOPHEN 975 MG: 325 TABLET, FILM COATED ORAL at 11:46

## 2022-11-25 RX ADMIN — SODIUM CHLORIDE, POTASSIUM CHLORIDE, SODIUM LACTATE AND CALCIUM CHLORIDE: 600; 310; 30; 20 INJECTION, SOLUTION INTRAVENOUS at 13:15

## 2022-11-25 RX ADMIN — PROPOFOL 50 MG: 10 INJECTION, EMULSION INTRAVENOUS at 13:14

## 2022-11-25 RX ADMIN — ALBUTEROL SULFATE 2.5 MG: 2.5 SOLUTION RESPIRATORY (INHALATION) at 11:51

## 2022-11-25 RX ADMIN — ONDANSETRON 4 MG: 2 INJECTION INTRAMUSCULAR; INTRAVENOUS at 13:34

## 2022-11-25 RX ADMIN — PROPOFOL 100 MCG/KG/MIN: 10 INJECTION, EMULSION INTRAVENOUS at 13:14

## 2022-11-25 RX ADMIN — KETOROLAC TROMETHAMINE 30 MG: 30 INJECTION, SOLUTION INTRAMUSCULAR at 13:37

## 2022-11-25 RX ADMIN — GABAPENTIN 100 MG: 100 CAPSULE ORAL at 11:46

## 2022-11-25 RX ADMIN — MIDAZOLAM 1 MG: 1 INJECTION INTRAMUSCULAR; INTRAVENOUS at 13:15

## 2022-11-25 ASSESSMENT — ACTIVITIES OF DAILY LIVING (ADL)
ADLS_ACUITY_SCORE: 37
ADLS_ACUITY_SCORE: 37

## 2022-11-25 NOTE — ANESTHESIA CARE TRANSFER NOTE
Patient: Zenaida Valles    Procedure: Procedure(s):  EXAM UNDER ANESTHESIA, PELVIS,  endometrial biospy using portable ultrasound guidance       Diagnosis: Thickened endometrium [R93.89]  Diagnosis Additional Information: No value filed.    Anesthesia Type:   MAC     Note:    Oropharynx: oropharynx clear of all foreign objects  Level of Consciousness: awake  Oxygen Supplementation: face mask  Level of Supplemental Oxygen (L/min / FiO2): 8  Independent Airway: airway patency satisfactory and stable  Dentition: dentition unchanged  Vital Signs Stable: post-procedure vital signs reviewed and stable  Report to RN Given: handoff report given  Patient transferred to: PACU    Handoff Report: Identifed the Patient, Identified the Reponsible Provider, Reviewed the pertinent medical history, Discussed the surgical course, Reviewed Intra-OP anesthesia mangement and issues during anesthesia, Set expectations for post-procedure period and Allowed opportunity for questions and acknowledgement of understanding      Vitals:  Vitals Value Taken Time   /68 11/25/22 1400   Temp 36.5  C (97.7  F) 11/25/22 1349   Pulse 77 11/25/22 1400   Resp 14 11/25/22 1349   SpO2 100 % 11/25/22 1407   Vitals shown include unvalidated device data.    Electronically Signed By: Luiz Mclaughlin MD  November 25, 2022  2:08 PM

## 2022-11-25 NOTE — OP NOTE
[facility]  Operative Note  Name: Zenaida Valles  MRN: [id number]  : 1950     Date of Surgery: 2022    Surgeon: Jessica Nation MD    Assistant: Yola Solis MD, PGY-1    Preoperative diagnosis:    1. Thickened endometrium    Postoperative diagnosis:    Same as above, s/p below stated procedure    Procedure:  EUA, ultrasound-guided dilation and curettage    Anesthesia:  MAC, paracervical block    EBL:  5 mL  IVF:  500 mL, crystalloid  UOP:  None    Complications: None apparent    Specimens: Endometrial curettings    Indications:  Zenaida Valles is a 72 year old  who was incidentally found to have thickened endometrium on CT scan. Follow up TVUS () revealed 19 mm endometrium with cystic foci and solid vascularity. Biopsy in the office was attempted, but endometrial tissue was not obtained. Risks, benefits, and alternatives to the procedure were discussed with the patient who elected to proceed.  All questions were answered and an informed consent was obtained.    Findings:  Exam under anesthesia revealed normal cervix, small mobile anteverted uterus, no adnexal masses. Cervix noted to be stenotic, so dilation and curettage performed under ultrasound guidance. Minimal return of tissue.    Procedure:  The patient was taken to the operating room where she underwent moderate sedation under monitored anesthesia care without difficulty.  She was placed in a dorsal lithotomy position using [place] stirrups.  The patient was examined for the above noted findings.  A medium graves speculum was inserted into the vagina.  The cervix was prepped with betadine. 1 mL 1% plain lidocaine was injected into the cervix at 12 o'clock position. A tenaculum was placed horizontally on the anterior cervical lip.  A paracervical block was administered with a total of 9 mL 1% plain lidocaine at the 5 and 7 o'clock positions. The endocervical canal was serially dilated  to 6 mm using Hegar dilators under ultrasound guidance. Two passes were made with a pipelle with minimal return of tissue.  Sharp curettage was performed, followed by one additional pass with the pipelle with minimal tissue.  The tissue was sent to pathology.  The tenaculum was removed from the cervix and the puncture sites were hemostatic.  The patient was repositioned to the supine position.  The patient tolerated the procedure well and was taken to the recovery room in stable condition.  Dr. Nation was present for the entire procedure.    Patricia Solis MD  OB/GYN PGY-1    Staff:  I was scrubbed and present for entire case and agree w/ above note.    Jessica Nation MD

## 2022-11-25 NOTE — DISCHARGE INSTRUCTIONS

## 2022-11-25 NOTE — ANESTHESIA POSTPROCEDURE EVALUATION
Patient: Zenaida Valles    Procedure: Procedure(s):  EXAM UNDER ANESTHESIA, PELVIS,  endometrial biospy using portable ultrasound guidance       Anesthesia Type:  MAC    Note:  Disposition: Outpatient   Postop Pain Control: Uneventful            Sign Out: Well controlled pain   PONV: No   Neuro/Psych: Uneventful            Sign Out: Acceptable/Baseline neuro status   Airway/Respiratory: Uneventful            Sign Out: Acceptable/Baseline resp. status   CV/Hemodynamics: Uneventful            Sign Out: Acceptable CV status; No obvious hypovolemia; No obvious fluid overload   Other NRE: NONE   DID A NON-ROUTINE EVENT OCCUR? No           Last vitals:  Vitals Value Taken Time   /84 11/25/22 1415   Temp 36.5  C (97.7  F) 11/25/22 1349   Pulse 67 11/25/22 1415   Resp 14 11/25/22 1349   SpO2 100 % 11/25/22 1416   Vitals shown include unvalidated device data.    Electronically Signed By: Annie Renee MD  November 25, 2022  2:17 PM

## 2022-11-30 ENCOUNTER — TELEPHONE (OUTPATIENT)
Dept: OBGYN | Facility: CLINIC | Age: 72
End: 2022-11-30

## 2022-11-30 NOTE — TELEPHONE ENCOUNTER
Zenaida called and was inquiring if she needs to do any follow up after EMB due to it came back normal.     I let the patient know I will send a message to Dr. Nation to inquire and then send the patient a Wikia message per her request.    Message routed to Dr. Nation

## 2022-12-06 ENCOUNTER — MYC MEDICAL ADVICE (OUTPATIENT)
Dept: INTERNAL MEDICINE | Facility: CLINIC | Age: 72
End: 2022-12-06

## 2022-12-13 ENCOUNTER — TELEPHONE (OUTPATIENT)
Dept: INTERNAL MEDICINE | Facility: CLINIC | Age: 72
End: 2022-12-13

## 2022-12-13 DIAGNOSIS — D49.0 IPMN (INTRADUCTAL PAPILLARY MUCINOUS NEOPLASM): Primary | ICD-10-CM

## 2022-12-13 RX ORDER — LORAZEPAM 0.5 MG/1
0.5 TABLET ORAL
Qty: 1 TABLET | Refills: 0 | Status: SHIPPED | OUTPATIENT
Start: 2022-12-13 | End: 2023-11-15

## 2022-12-13 NOTE — TELEPHONE ENCOUNTER
I discussed with patient getting pancreatic MRI to follow up incidental cyst.    Will order MRI. Please assist with scheduling nonurgently.  Pre med sent for claustrophobia.    Thanks,  Nadia Randle MD  Internal Medicine

## 2023-01-18 ENCOUNTER — LAB (OUTPATIENT)
Dept: LAB | Facility: CLINIC | Age: 73
End: 2023-01-18
Payer: COMMERCIAL

## 2023-01-18 ENCOUNTER — ANCILLARY PROCEDURE (OUTPATIENT)
Dept: MRI IMAGING | Facility: CLINIC | Age: 73
End: 2023-01-18
Attending: INTERNAL MEDICINE
Payer: COMMERCIAL

## 2023-01-18 DIAGNOSIS — E11.42 DIABETIC POLYNEUROPATHY ASSOCIATED WITH TYPE 2 DIABETES MELLITUS (H): ICD-10-CM

## 2023-01-18 DIAGNOSIS — D49.0 IPMN (INTRADUCTAL PAPILLARY MUCINOUS NEOPLASM): ICD-10-CM

## 2023-01-18 LAB — HBA1C MFR BLD: 7.4 %

## 2023-01-18 PROCEDURE — 99000 SPECIMEN HANDLING OFFICE-LAB: CPT | Performed by: PATHOLOGY

## 2023-01-18 PROCEDURE — 36415 COLL VENOUS BLD VENIPUNCTURE: CPT | Performed by: PATHOLOGY

## 2023-01-18 PROCEDURE — A9585 GADOBUTROL INJECTION: HCPCS | Performed by: RADIOLOGY

## 2023-01-18 PROCEDURE — 74183 MRI ABD W/O CNTR FLWD CNTR: CPT | Performed by: RADIOLOGY

## 2023-01-18 PROCEDURE — 83036 HEMOGLOBIN GLYCOSYLATED A1C: CPT | Mod: 90 | Performed by: PATHOLOGY

## 2023-01-18 RX ORDER — GADOBUTROL 604.72 MG/ML
10 INJECTION INTRAVENOUS ONCE
Status: COMPLETED | OUTPATIENT
Start: 2023-01-18 | End: 2023-01-18

## 2023-01-18 RX ADMIN — GADOBUTROL 10 ML: 604.72 INJECTION INTRAVENOUS at 15:49

## 2023-01-18 NOTE — DISCHARGE INSTRUCTIONS
MRI Contrast Discharge Instructions    The IV contrast you received today will pass out of your body in your  urine. This will happen in the next 24 hours. You will not feel this process.  Your urine will not change color.    Drink at least 4 extra glasses of water or juice today (unless your doctor  has restricted your fluids). This reduces the stress on your kidneys.  You may take your regular medicines.    If you are on dialysis: It is best to have dialysis today.    If you have a reaction: Most reactions happen right away. If you have  any new symptoms after leaving the hospital (such as hives or swelling),  call your hospital at the correct number below. Or call your family doctor.  If you have breathing distress or wheezing, call 911.    Special instructions: ***    I have read and understand the above information.    Signature:______________________________________ Date:___________    Staff:__________________________________________ Date:___________     Time:__________    [place] [facility] Departments:    ___[place]: [phone]  ___[facility]: [phone]  ___[place]: [phone] ___[facility]: [phone]  ___[facility]: [phone]  ___[facility]: [phone]  ___[phone]  ___[facility]: [phone]  ___[phone]

## 2023-01-24 ENCOUNTER — VIRTUAL VISIT (OUTPATIENT)
Dept: INTERNAL MEDICINE | Facility: CLINIC | Age: 73
End: 2023-01-24
Payer: COMMERCIAL

## 2023-01-24 DIAGNOSIS — E11.42 DIABETIC POLYNEUROPATHY ASSOCIATED WITH TYPE 2 DIABETES MELLITUS (H): ICD-10-CM

## 2023-01-24 DIAGNOSIS — D49.0 IPMN (INTRADUCTAL PAPILLARY MUCINOUS NEOPLASM): Primary | ICD-10-CM

## 2023-01-24 DIAGNOSIS — I10 BENIGN ESSENTIAL HYPERTENSION: ICD-10-CM

## 2023-01-24 DIAGNOSIS — E11.9 TYPE 2 DIABETES MELLITUS WITHOUT COMPLICATION, WITHOUT LONG-TERM CURRENT USE OF INSULIN (H): ICD-10-CM

## 2023-01-24 PROCEDURE — 99214 OFFICE O/P EST MOD 30 MIN: CPT | Mod: 95 | Performed by: INTERNAL MEDICINE

## 2023-01-24 RX ORDER — HYDROCHLOROTHIAZIDE 25 MG/1
25 TABLET ORAL DAILY
Qty: 90 TABLET | Refills: 3 | Status: SHIPPED | OUTPATIENT
Start: 2023-01-24 | End: 2024-04-23

## 2023-01-24 RX ORDER — ATENOLOL 25 MG/1
25 TABLET ORAL DAILY
Qty: 90 TABLET | Refills: 3 | Status: SHIPPED | OUTPATIENT
Start: 2023-01-24 | End: 2024-04-23

## 2023-01-24 RX ORDER — IRBESARTAN 300 MG/1
300 TABLET ORAL DAILY
Qty: 90 TABLET | Refills: 3 | Status: SHIPPED | OUTPATIENT
Start: 2023-01-24 | End: 2024-04-23

## 2023-01-24 NOTE — PATIENT INSTRUCTIONS
Thank you for visiting the Primary Care Center today at the [facility]! The following is some information about our clinic:     Primary Care Center Frequently-Asked Questions    (1) How do I schedule appointments at the [facility]?     Primary Care--to schedule or make changes to an existing appointment, please call our primary care line at [phone].    Labs--to schedule a lab appointment at the [facility] you can use [employer] or call [phone]. If you have a [place] location that is closer to home, you can reach out to that location for scheduling options.     Imaging--if you need to schedule a CT, X-ray, MRI, ultrasound, or other imaging study you can call [phone] to schedule at the [facility] or any other [facility] imaging location.     Referrals--if a referral to another specialty was ordered you can expect a phone call from their scheduling team. If you have not heard from them in a week, please call us or send us a Agensys message to check the status or get a scheduling number. Please note that this only applies to internal [facility] referrals. If the referral is external you would need to contact their office for scheduling.     (2) I have a question about my visit, who do I contact?     You can call us at the primary care line at [phone] to ask questions about your visit. You can also send a secure message through [employer]ys, which is reviewed by clinic staff. Please note that [employer] messages have a twenty-four to forty-eight business hour turnaround time and should not be used for urgent concerns.    (3) How will I get the results of my tests?    If you are signed up for [employer] all tests will be released to you within twenty-four hours of resulting. Please allow three to five days for your doctor to review your results and place a note interpreting the results. If you do not have [employer] you will receive your  results through mail seven to ten business days following the return of the tests. Please note that if there should be any urgent or concerning results that your doctor or their registered nurse will reach out to you the same day as the tests come back. If you have follow up questions about your results or would like to discuss the results in detail please schedule a follow up with your provider either in person or virtually.     (4) How do I get refills of my prescriptions?     You should always first contact your pharmacy for refills of your medications. If submitting a refill request on Rezzie, please be sure to submit the request only once--repeat requests can cause delays in refill. If you are requesting a NEW medication or a medication related to new symptoms you will need to schedule an appointment with a provider prior to approval. Please note: Routine medication refills have up to one to three business day turnaround whereas controlled substances refills have up to five to seven business day turnaround.    (5) I have new symptoms, what do I do?     If you are having an immediate medical emergency, you should dial 911 for assistance.   For anything urgent that needs to be seen within a few hours to one day you should visit a local urgent care for assistance.  For non-urgent symptoms that need to be seen within a few days to a week you can schedule with an available provider in primary care by going to [employer] or calling [phone].   If you are not sure how serious your symptoms are or you would like to receive medical advice you can always call [phone] to speak with a triage nurse.

## 2023-01-24 NOTE — PROGRESS NOTES
Zenaida is a 72 year old who is being evaluated via a billable video visit.      How would you like to obtain your AVS? MyChart  If the video visit is dropped, the invitation should be resent by: Send to e-mail at: [email or web address]  Will anyone else be joining your video visit? No         Katey CHASE      Zenaida Valles is a 72 year old female who is being evaluated via a billable video visit.      The patient has consented to a video visit and informed that video and telephone visits are being performed during the COVID-19 pandemic in order to mitigate the risk of an in office visit for appropriate candidates/issues. If during the course of the call the physician/provider feels a video visit is not appropriate, you will not be charged for this service. If the provider feels that they are unable to assess your concerns without an in person visit, you will be advised of this limitation and depending on the nature of the concern, advised to seek in person care if your provider feels you need urgent evaluation.      Subjective     Zenaida Valles is a 72 year old female who presents to clinic today for the following health issues:    No chief complaint on file.      HPI    States bad backache since MRI  Had MRI for pancreatic cyst surveillance, mom had zollinger almaraz  NO unintentional weight loss, no pain, no diarrhea      DM ranges 130-150, rarely 170. Last A1c 7.4, down from 9.  NO major diet changes. Trying to limit carbs.    HLD: not interested in statin given history of hepatitis    Routine Health Maintenance  Colonoscopy (50-75 yrs): 2017 3 TA and diverticulosis, rec 3-5 year f/u    3/22 - Hemorrhoids found on perianal exam.                          - Two 2 to 3 mm polyps in the descending colon and in                          the ascending colon, removed with a jumbo cold                          forceps. Resected and retrieved.                          - Diverticulosis  in the recto-sigmoid colon and in the                          sigmoid colon.                            - Non-bleeding external hemorrhoids.   Dexa (>65W or 70M yrs): Completed on 2/17/17.       Mammogram (40-75 yrs): 6/21, 6/22  Pap (21-65 yrs): PAP      NIL   10/27/2014  PAP      NIL   10/18/2011  HIV/HCV if risk factors: neg 2014  Safety/Lifestyle: discussed  Tob/EtOH: n/a  Depression: screen neg  Advanced Directive: she has one on file      Review of external notes as documented above                   Patient Active Problem List   Diagnosis     Essential hypertension     Asthma     Atherosclerosis of renal artery (H)     Diabetes mellitus, type 2 (H)     Past Surgical History:   Procedure Laterality Date     COLONOSCOPY N/A 3/10/2017    Procedure: COMBINED COLONOSCOPY, SINGLE OR MULTIPLE BIOPSY/POLYPECTOMY BY BIOPSY;  Surgeon: Sergo Mcgraw MD;  Location: [facility]     COLONOSCOPY N/A 3/30/2022    Procedure: COLONOSCOPY, WITH POLYPECTOMY;  Surgeon: Leventhal, Thomas Michael, MD;  Location: [facility] OR     ENDOMETRIAL SAMPLING (BIOPSY) N/A 11/25/2022    Procedure: endometrial biospy using portable ultrasound guidance;  Surgeon: Jessica Nation MD;  [facility]     EXAM UNDER ANESTHESIA PELVIC N/A 11/25/2022    Procedure: EXAM UNDER ANESTHESIA, PELVIS,;  Surgeon: Jesscia Nation MD;  [facility]     IR RENAL/VISCERAL STENT/ATHERECT/PTA Right 2005       Social History     Tobacco Use     Smoking status: Never     Smokeless tobacco: Never   Substance Use Topics     Alcohol use: Yes     Comment: rare     Family History   Problem Relation Age of Onset     Peptic Ulcer Disease Mother         Zollinger-Wheeler Syndrome     Diabetes Mother      Thyroid Disease Mother         s/p thyroidecomy     Cancer Father         prostate cancer     C.A.D. Father         aortic aneurysm     Childhood Heart Disease Sister         tetrology of fallot     Melanoma No family hx of      Skin Cancer No family hx of           Current Outpatient Medications   Medication Sig Dispense Refill     atenolol (TENORMIN) 25 MG tablet Take 1 tablet (25 mg) by mouth daily 90 tablet 3     blood glucose (NO BRAND SPECIFIED) lancets standard Use to test blood sugar 1 times daily or as directed. 300 each 3     blood glucose (NO BRAND SPECIFIED) test strip Use to test blood sugar 1 time daily or as directed. 300 strip 3     hydrochlorothiazide (HYDRODIURIL) 25 MG tablet Take 1 tablet (25 mg) by mouth daily 90 tablet 3     irbesartan (AVAPRO) 300 MG tablet Take 1 tablet (300 mg) by mouth daily 90 tablet 3     metFORMIN (GLUCOPHAGE) 500 MG tablet Take 2 tablets (1,000 mg) by mouth 2 times daily (with meals) 180 tablet 3     acetaminophen (TYLENOL) 500 MG tablet Take 500-1,000 mg by mouth every 6 hours as needed for mild pain       albuterol (PROAIR HFA) 108 (90 Base) MCG/ACT inhaler Inhale 2 puffs into the lungs every 6 hours as needed for shortness of breath / dyspnea or wheezing 18 g 3     aspirin 81 MG tablet Take 81 mg by mouth daily       blood glucose monitoring (NO BRAND SPECIFIED) meter device kit Use to test blood sugar 3 times daily or as directed. 1 kit 0     diclofenac (VOLTAREN) 1 % GEL topical gel Apply 2 grams three - four times daily using enclosed dosing card. 100 g 0     fluticasone-salmeterol (ADVAIR) 100-50 MCG/DOSE inhaler Inhale 1 puff into the lungs daily 2 each 2     LORazepam (ATIVAN) 0.5 MG tablet Take 1 tablet (0.5 mg) by mouth once as needed for anxiety (take approx 15 min before MRI, after instructed to do so by staff) 1 tablet 0     STATIN NOT PRESCRIBED (INTENTIONAL) Please choose reason not prescribed, below       Allergies   Allergen Reactions     Codeine Diarrhea and Rash     Codeine Camsylate Diarrhea and Rash     Ibuprofen GI Disturbance and Nausea     Lisinopril Cough         Review of Systems   A detailed ROS was performed and was negative unless indicated in the HPI above.        Physical Exam   There were no  "vitals taken for this visit.  Wt Readings from Last 2 Encounters:   11/25/22 86.5 kg (190 lb 11.2 oz)   11/21/22 85.3 kg (188 lb)      Ht Readings from Last 2 Encounters:   11/25/22 1.676 m (5' 5.98\")   11/21/22 1.676 m (5' 6\")     GENERAL: healthy, alert and no distress  HEAD: Normocephalic, atraumatic  EYES: Eyes grossly normal to inspection, EOMI and conjunctivae and sclerae normal  RESP: Speaking in full sentences, unlabored, no audible wheezes or cough  SKIN: no suspicious lesions or rashes, no jaundice  NEURO: oriented, and speech normal  PSYCH: mentation appears normal, affect normal/bright          Diagnostic Test Results:  Labs reviewed in Epic            Assessment and Plan:  Diagnoses and all orders for this visit:    IPMN (intraductal papillary mucinous neoplasm)  Discussed likely diagnosis but need for further f/u given imaging characteristics. Will discuss with GI and see if endoscopic evaluation is warranted.    Type 2 diabetes mellitus without complication, without long-term current use of insulin (H)  Recent A1c improved, meeting goals for now, continue current management  -     Hemoglobin A1c FUTURE 3mo; Future  -     blood glucose (NO BRAND SPECIFIED) lancets standard; Use to test blood sugar 1 times daily or as directed.  -     blood glucose (NO BRAND SPECIFIED) test strip; Use to test blood sugar 1 time daily or as directed.  -     metFORMIN (GLUCOPHAGE) 500 MG tablet; Take 2 tablets (1,000 mg) by mouth 2 times daily (with meals)    Benign essential hypertension  -     hydrochlorothiazide (HYDRODIURIL) 25 MG tablet; Take 1 tablet (25 mg) by mouth daily  -     irbesartan (AVAPRO) 300 MG tablet; Take 1 tablet (300 mg) by mouth daily  -     atenolol (TENORMIN) 25 MG tablet; Take 1 tablet (25 mg) by mouth daily    Diabetic polyneuropathy associated with type 2 diabetes mellitus (H)  -     metFORMIN (GLUCOPHAGE) 500 MG tablet; Take 2 tablets (1,000 mg) by mouth 2 times daily (with " meals)          Video-Visit Details    Type of service:  Video Visit    Video Start/End Time: 9:28 AM 9:57 AM    Originating Location (pt. Location): Home    Distant Location (provider location):  [facility]     Mode of Communication:  Video Conference via  [employer] or  Zahraa Randle MD  Internal Medicine      >30 minutes spent today performing chart review, history and exam, documentation and further activities as noted above.

## 2023-01-27 ENCOUNTER — PATIENT OUTREACH (OUTPATIENT)
Dept: GASTROENTEROLOGY | Facility: CLINIC | Age: 73
End: 2023-01-27
Payer: COMMERCIAL

## 2023-01-27 NOTE — PROGRESS NOTES
Called pt to discuss referral and Dr Vickers's recommendations. Left VM    I do suspect that the cyst has grown, which is an indication for EUS.     I am happy to follow this pt. I'd favor an initial clinic visit (which may be a few months) and then EUS, unless she is highly worried about this and I can go straight to EUS.     Holding clinic date of 2/13. Will wait for call back to schedule.    Karuna Taylor, RN, BSN,   [facility]  Care coordinator

## 2023-01-30 NOTE — PROGRESS NOTES
Pt returned call, does not feel comfortable proceeding with EUS until she has spoken with Dr Vickers. Clinic arranged for 2/13/23 at 8:40am, in person. Pt would like to delay schedule procedure until she has had clinic. Message routed to clinic coordinators to schedule.

## 2023-02-07 ENCOUNTER — DOCUMENTATION ONLY (OUTPATIENT)
Dept: GASTROENTEROLOGY | Facility: CLINIC | Age: 73
End: 2023-02-07
Payer: COMMERCIAL

## 2023-02-07 NOTE — PROGRESS NOTES
Patient called and confirmed their upcoming appointment with our GI clinic, on 02/13/23 at 8:40 AM with Dr. Frederic Vickers. This appointment is scheduled as an in-person appt. Please arrive 15 minutes early to check in for your appointment. , if your appointment is virtual (video or telephone) you need to be in [place] for the visit. To reschedule or cancel patient to call [phone].        SK

## 2023-02-07 NOTE — TELEPHONE ENCOUNTER
RECORDS STATUS - ALL OTHER DIAGNOSIS      RECORDS RECEIVED FROM: [facility]   DATE RECEIVED: 02/07/23   NOTES STATUS DETAILS   OFFICE NOTE from referring provider Epic 01/24/23: Dr. Nadia Randle   MEDICATION LIST [facility]    CLINICAL TRIAL TREATMENTS TO DATE     LABS     PATHOLOGY REPORTS     ANYTHING RELATED TO DIAGNOSIS Epic Most recent 01/18/23   IMAGING (NEED IMAGES & REPORT)     CT SCANS PACS 02/11/20: CT Abd Pel   MRI PACS 01/18/23: MR Pancreas   ULTRASOUND PACS 11/16/22: US Pelvic  01/15/16: US Renal

## 2023-02-07 NOTE — PROGRESS NOTES
Called PT and left VM.     Called to remind patient of their upcoming appointment with our GI clinic, on 02/13/23 at 8:40 AM with Dr. Frederic Vickers. This appointment is scheduled as an in-person appt. Please arrive 15 minutes early to check in for your appointment. , if your appointment is virtual (video or telephone) you need to be in [place] for the visit. To reschedule or cancel patient to call [phone].      SK

## 2023-02-13 ENCOUNTER — OFFICE VISIT (OUTPATIENT)
Dept: GASTROENTEROLOGY | Facility: CLINIC | Age: 73
End: 2023-02-13
Attending: INTERNAL MEDICINE
Payer: COMMERCIAL

## 2023-02-13 ENCOUNTER — TELEPHONE (OUTPATIENT)
Dept: GASTROENTEROLOGY | Facility: CLINIC | Age: 73
End: 2023-02-13

## 2023-02-13 ENCOUNTER — PRE VISIT (OUTPATIENT)
Dept: GASTROENTEROLOGY | Facility: CLINIC | Age: 73
End: 2023-02-13
Payer: COMMERCIAL

## 2023-02-13 VITALS
OXYGEN SATURATION: 98 % | SYSTOLIC BLOOD PRESSURE: 154 MMHG | DIASTOLIC BLOOD PRESSURE: 93 MMHG | HEART RATE: 83 BPM | HEIGHT: 65 IN | RESPIRATION RATE: 16 BRPM | WEIGHT: 185 LBS | BODY MASS INDEX: 30.82 KG/M2 | TEMPERATURE: 98.2 F

## 2023-02-13 DIAGNOSIS — D49.0 IPMN (INTRADUCTAL PAPILLARY MUCINOUS NEOPLASM): Primary | ICD-10-CM

## 2023-02-13 DIAGNOSIS — E16.4 ZOLLINGER-ELLISON SYNDROME: Primary | ICD-10-CM

## 2023-02-13 DIAGNOSIS — D49.0 IPMN (INTRADUCTAL PAPILLARY MUCINOUS NEOPLASM): ICD-10-CM

## 2023-02-13 PROCEDURE — G0463 HOSPITAL OUTPT CLINIC VISIT: HCPCS | Performed by: INTERNAL MEDICINE

## 2023-02-13 PROCEDURE — 99204 OFFICE O/P NEW MOD 45 MIN: CPT | Performed by: INTERNAL MEDICINE

## 2023-02-13 ASSESSMENT — PAIN SCALES - GENERAL: PAINLEVEL: NO PAIN (0)

## 2023-02-13 NOTE — LETTER
2/13/2023         RE: Zenaida Valles  [address]  [facility] [address]        Dear Colleague,    Thank you for referring your patient, Zenaida Valles, to the [facility] [place] [facility]. Please see a copy of my visit note below.    GI CLINIC VISIT - NEW PATIENT    CC/REFERRING MD:  Nadia Randle  REASON FOR CONSULTATION: chief complaint    ASSESSMENT/PLAN:  1) Incidentally-identified multiple pancreatic cysts. No enhancing components or main duct dilation. Reported serial growth.    We discussed the likely diagnosis of IPMN and reviewed the clinical significance and potential risk of malignant transformation. We reviewed consensus guidelines which recommend EUS given the growth. I reviewed that the growth may be over-estimated given comparison of MRI to only a CT, however I would recommend EUS.    2) Family history of 2 relatives with ZE syndrome. Also mother with some form of thyroid surgery (theoretically could have been parathyroid). All raise question re possible MEN-1.     PLAN:  - EUS to be scheduled with MAC.  - Referral to genetic counselor re strong FH of ZE syndrome.    RTC at the time of EUS. Further surveillance based on EUS findings.    Thank you for this consultation.  It was a pleasure to participate in the care of this patient; please contact us with any further questions.  A total of  45 minutes was spent on the day of the visit, >50% of which was counseling regarding the above delineated issues. The remainder was review of records and imaging as well as documentation and coordination of care.      JULIO CESAR Vickers MD  Professor of Medicine  Division of Gastroenterology, Hepatology and Nutrition  [facility]  -------------------------------------------------------------------------------------------------------------------  HPI:  The pt is a/n 73 year old female who I was asked to see in consultation at the request of Dr. Nadia Randle for  evaluation of pancreatic cysts.     This was initially an incidental finding on CT A/P with contrast 2/11/2020 which was obtained during an episode of abdominal pain. This reported:  IMPRESSION:   1. No acute findings in the abdomen or pelvis.  2. There is a 2 mm hypodense lesion in the pancreatic tail. This could  represent sidebranch IPMN, and could be further characterized on MRCP.  3. Slightly thickened appearance of the endometrium. Given this  patient's age, further evaluation with pelvic ultrasound and  correlation with any abnormal vaginal bleeding is recommended.  4. Patent right renal artery stent.  5. 3 mm solid nodule right lower lobe, and 3 mm solid nodule in the  right middle lobe. If this patient is at high risk for lung cancer,  consider optional follow-up with low-dose chest CT in 12 months, per  Fleischner Society criteria.  6. Hepatic steatosis.    NOTE: The text of the report describes the pancreas cyst as 10 mm, not 2 mm.    She was unaware of the pancreatic findings. This was brought up during a recent primary care visit and MRI was performed.    MRI 1/18/23 showed:  IMPRESSION:   1.  Dominant pancreatic cyst measuring 1.5 cm with mild complexity,  increased in size from CT 2020. Recommend follow-up with  gastroenterology and consideration of US as detailed in guidelines  below.  2.  Multiple additional pancreatic cysts measuring up to 0.7 cm.  Recommend follow-up as detailed below.    She was referred to consider EUS.    She is asymptomatic and has no history of pancreatitis.    Her mother and maternal uncle both had ZE syndrome, with her mother having metastatic disease but living into her 80s. Her mother also had thyroid surgery however details are unknown.   There is no FH of pancreatic adenocarcinoma.    Her MGF had some form of cancer which eventually involved the heart. Primary unknown. Her Maternal aunt had an unknown form of cancer. Her father had prostate cancer.    She has an  intentional weight loss of 50 lb.  She was diagnosed with type 2 diabetes following COVID in 2020.    She does not smoke and drinks alcohol rarely with no prior history of heavy use.    ROS:  See history of present illness.    PERTINENT PAST MEDICAL HISTORY:  Past Medical History:   Diagnosis Date     Asthma      Diabetes mellitus (H)      Fibromuscular dysplasia (H)      H/O sebaceous cyst      Hyperlipidemia LDL goal < 130      Hypertension      Obesity      Renal artery stenosis (H) 2005    Right, s/p stenting at [facility], [facility]     Statin medication not prescribed per physician orders     pt declined   COVID 1/2020    PREVIOUS SURGERIES:  Past Surgical History:   Procedure Laterality Date     COLONOSCOPY N/A 3/10/2017    Procedure: COMBINED COLONOSCOPY, SINGLE OR MULTIPLE BIOPSY/POLYPECTOMY BY BIOPSY;  Surgeon: Sergo Mcgraw MD;  Location: [facility]     COLONOSCOPY N/A 3/30/2022    Procedure: COLONOSCOPY, WITH POLYPECTOMY;  Surgeon: Leventhal, Thomas Michael, MD;  Location: [facility]     ENDOMETRIAL SAMPLING (BIOPSY) N/A 11/25/2022    Procedure: endometrial biospy using portable ultrasound guidance;  Surgeon: Jessica Nation MD;  [facility]     EXAM UNDER ANESTHESIA PELVIC N/A 11/25/2022    Procedure: EXAM UNDER ANESTHESIA, PELVIS,;  Surgeon: Jessica Nation MD;  [facility]     IR RENAL/VISCERAL STENT/ATHERECT/PTA Right 2005     Colon cancer screening up to date.    ALLERGIES:     Allergies   Allergen Reactions     Codeine Diarrhea and Rash     Codeine Camsylate Diarrhea and Rash     Ibuprofen GI Disturbance and Nausea     Lisinopril Cough       PERTINENT MEDICATIONS:    Current Outpatient Medications:      acetaminophen (TYLENOL) 500 MG tablet, Take 500-1,000 mg by mouth every 6 hours as needed for mild pain, Disp: , Rfl:      aspirin 81 MG tablet, Take 81 mg by mouth daily, Disp: , Rfl:      atenolol (TENORMIN) 25 MG tablet, Take 1 tablet (25 mg) by mouth daily, Disp: 90 tablet, Rfl: 3      blood glucose (NO BRAND SPECIFIED) lancets standard, Use to test blood sugar 1 times daily or as directed., Disp: 300 each, Rfl: 3     blood glucose (NO BRAND SPECIFIED) test strip, Use to test blood sugar 1 time daily or as directed., Disp: 300 strip, Rfl: 3     blood glucose monitoring (NO BRAND SPECIFIED) meter device kit, Use to test blood sugar 3 times daily or as directed., Disp: 1 kit, Rfl: 0     fluticasone-salmeterol (ADVAIR) 100-50 MCG/DOSE inhaler, Inhale 1 puff into the lungs daily, Disp: 2 each, Rfl: 2     hydrochlorothiazide (HYDRODIURIL) 25 MG tablet, Take 1 tablet (25 mg) by mouth daily, Disp: 90 tablet, Rfl: 3     irbesartan (AVAPRO) 300 MG tablet, Take 1 tablet (300 mg) by mouth daily, Disp: 90 tablet, Rfl: 3     metFORMIN (GLUCOPHAGE) 500 MG tablet, Take 2 tablets (1,000 mg) by mouth 2 times daily (with meals), Disp: 180 tablet, Rfl: 3     albuterol (PROAIR HFA) 108 (90 Base) MCG/ACT inhaler, Inhale 2 puffs into the lungs every 6 hours as needed for shortness of breath / dyspnea or wheezing (Patient not taking: Reported on 2/13/2023), Disp: 18 g, Rfl: 3     diclofenac (VOLTAREN) 1 % GEL topical gel, Apply 2 grams three - four times daily using enclosed dosing card. (Patient not taking: Reported on 2/13/2023), Disp: 100 g, Rfl: 0     LORazepam (ATIVAN) 0.5 MG tablet, Take 1 tablet (0.5 mg) by mouth once as needed for anxiety (take approx 15 min before MRI, after instructed to do so by staff) (Patient not taking: Reported on 2/13/2023), Disp: 1 tablet, Rfl: 0     STATIN NOT PRESCRIBED (INTENTIONAL), Please choose reason not prescribed, below (Patient not taking: Reported on 2/13/2023), Disp: , Rfl:     SOCIAL HISTORY:  Social History     Socioeconomic History     Marital status:      Spouse name: Not on file     Number of children: Not on file     Years of education: Not on file     Highest education level: Not on file   Occupational History     Occupation: nurse     Employer: [employer]  [place]   Tobacco Use     Smoking status: Never     Smokeless tobacco: Never   Substance and Sexual Activity     Alcohol use: Yes     Comment: rare     Drug use: No     Sexual activity: Yes     Partners: Male   Other Topics Concern     Parent/sibling w/ CABG, MI or angioplasty before 65F 55M? Not Asked   Social History Narrative    Pt states she did [employer] in     Retired [employer] nurse    Teaching [employer]    Helping at [employer]            Gyn:     Mammo never abnormal    Pap 10/13 NIL, PAP      NIL   10/27/2014, HPV neg    No vaginal itching, bleeding, discharge           Social Determinants of Health     Financial Resource Strain: Not on file   Food Insecurity: Not on file   Transportation Needs: Not on file   Physical Activity: Not on file   Stress: Not on file   Social Connections: Not on file   Intimate Partner Violence: Not on file   Housing Stability: Not on file       FAMILY HISTORY:  Family History   Problem Relation Age of Onset     Peptic Ulcer Disease Mother         Zollinger-Wheeler Syndrome     Diabetes Mother      Thyroid Disease Mother         s/p thyroidecomy     Cancer Father         prostate cancer     C.A.D. Father         aortic aneurysm     Childhood Heart Disease Sister         tetrology of fallot     Melanoma No family hx of      Skin Cancer No family hx of          PHYSICAL EXAMINATION:  Vitals reviewed, AFVSS   Wt   Wt Readings from Last 2 Encounters:   23 83.9 kg (185 lb)   22 86.5 kg (190 lb 11.2 oz)      Gen: aaox3, cooperative, pleasant, not dyspneic/diaphoretic, nad  HEENT: ncat,  normal op w/o ulcer/exudate, anicteric, mmm, Mallampati 1.  Resp: CTAP  CV: RRR no MGR  Ext: trace edema (no pitting, marks from socks)  Skin: warm, perfused, no jaundice      PERTINENT STUDIES:    Lab on 2023   Component Date Value Ref Range Status     Hemoglobin A1C 2023 7.4 (H)  <5.7 % Final             Sincerely,        Miguel A Vickers MD

## 2023-02-13 NOTE — PATIENT INSTRUCTIONS
You will find a brief summary of your discussion and care plan from today's visit below.     Next steps:     EUS (endoscopic ultrasound) with MAC sedation in the Endoscopy unit at the [facility]. You will receive a call from Endo scheduling to arrange this. If you have not heard from scheduling please call [phone]    Genetic counseling consult. You will receive a call from clinic scheduling to make this appointment. If you have not heard from someone in the next few days please call the clinic number below.     Please call with any questions or concerns regarding your clinic visit today.     It is a pleasure being involved in your health care.     Contacts post-consultation depending on your need:     Schedule Clinic Appointments                        [phone], option #5   M-F 7:30 - 5 pm    Yoselyn Taylor RN Care Coordinator           [phone]     OR Procedure Scheduling                              [phone]     For urgent/emergent questions after business hours, you may reach the on-call GI Fellow by contacting the [facility]  at [phone].     How do I schedule labs, imaging studies, or procedures that were ordered in clinic today?      Labs: To schedule lab appointment at the [facility] and [facility], use my chart or call [phone]. If you have a [facility] lab closer to home where you are regularly seen you can give them a call.      Procedures: If a colonoscopy, upper endoscopy, breath test, esophageal manometry, or pH impedence was ordered today, our endoscopy team will call you to schedule this. If you have not heard from our endoscopy team within a week, please call [phone] to schedule.      Imaging Studies: If you were scheduled for a CT scan, X-ray, MRI, ultrasound, HIDA scan or other imaging study, please call [phone] to have this scheduled.      Referral: If a referral to another specialty was ordered, expect a phone call or follow  instructions above. If you have not heard from anyone regarding your referral in a week, please call our clinic to check the status.      How to I schedule a follow-up visit?  If you did not schedule a follow-up visit today, please call [phone] option #5 to schedule a follow-up office visit.      I recommend signing up for [facility] access if you have not already done so and are comfortable with using a computer.  This allows for online access to your lab results and also helps you communicate efficiently with the clinic should any questions arise in your care.

## 2023-02-13 NOTE — TELEPHONE ENCOUNTER
"Screening Questions  BLUE  KIND OF PREP RED  LOCATION [review exclusion criteria] GREEN  SEDATION TYPE        Y Are you active on mychart?       JAIRO Ordering/Referring Provider?        [employer] MEDICARE What type of coverage do you have?      N Have you had a positive covid test in the last 14 days?     30.79 1. BMI  [BMI 40+ - review exclusion criteria]    Y  2. Are you able to give consent for your medical care? [IF NO,RN REVIEW]          N  3. Are you taking any prescription pain medications on a routine schedule   (ex narcotics: tramadol, oxycodone, roxicodone, oxycontin,  and percocet)?          3a. EXTENDED PREP What kind of prescription?     N 4. Do you have any chemical dependencies such as alcohol, street drugs, or methadone?        **If yes 3- 5 , please schedule with MAC sedation.**          IF YES TO ANY 6 - 10 - HOSPITAL SETTING ONLY.     N 6.   Do you need assistance transferring?     N 7.   Have you had a heart or lung transplant?    N 8.   Are you currently on dialysis?   N 9.   Do you use daily home oxygen?   N 10. Do you take nitroglycerin?   10a.  If yes, how often?     11. [FEMALES]   Are you currently pregnant?    11a.  If yes, how many weeks? [ Greater than 12 weeks, OR NEEDED]    N 12. Do you have Pulmonary Hypertension? *NEED PAC APPT AT [facility]*     N 13. [review exclusion criteria]  Do you have any implantable devices in your body (pacemaker, defib, LVAD)?    N 14. In the past 6 months, have you had any heart related issues including cardiomyopathy or heart attack?     14a.  If yes, did it require cardiac stenting if so when?     N 15. Have you had a stroke or Transient ischemic attack (TIA - aka  mini stroke ) within 6 months?      N 16. Do you have mod to severe Obstructive Sleep Apnea?  [Hospital only]    N 17. Do you have SEVERE AND UNCONTROLLED asthma? *NEED PAC APPT AT [facility]*     BABY ASPIRIN 18. Are you currently taking any blood thinners?     18a. If yes, inform patient to \"follow up " "w/ ordering provider for bridging instructions.\"    N 19. Do you take the medication Phentermine?    19a. If yes, \"Hold for 7 days before procedure.  Please consult your prescribing provider if you have questions about holding this medication.\"     N  20. Do you have chronic kidney disease?      T- T2 21. Do you have a diagnosis of diabetes?     NA  22. On a regular basis do you go 3-5 days between bowel movements?      23. Preferred LOCAL Pharmacy for Pre Prescription    [ LIST ONLY ONE PHARMACY]        [facility] - [place], [place] - [address] [facility] - [place], [place] - [address]    - CLOSING REMINDERS -    Informed patient they will need an adult    Cannot take any type of public or medical transportation alone    Conscious Sedation- Needs  for 6 hours after the procedure       MAC/General-Needs  for 24 hours after procedure    Pre-Procedure Covid test to be completed [[facility] PCR Testing Required]    Confirmed Nurse will call to complete assessment       - SCHEDULING DETAILS -  NO Hospital Setting Required? If yes, what is the exclusion?:    JAIRO  Surgeon    3/14  Date of Procedure  ENDOSCOPIC ULTRASOUND [EUS]  Type of Procedure Scheduled  [facility]   MAC per order Sedation Type     N PAC / Pre-op Required                 "

## 2023-02-13 NOTE — PROGRESS NOTES
GI CLINIC VISIT - NEW PATIENT    CC/REFERRING MD:  Nadia Randle  REASON FOR CONSULTATION: chief complaint    ASSESSMENT/PLAN:  1) Incidentally-identified multiple pancreatic cysts. No enhancing components or main duct dilation. Reported serial growth.    We discussed the likely diagnosis of IPMN and reviewed the clinical significance and potential risk of malignant transformation. We reviewed consensus guidelines which recommend EUS given the growth. I reviewed that the growth may be over-estimated given comparison of MRI to only a CT, however I would recommend EUS.    2) Family history of 2 relatives with ZE syndrome. Also mother with some form of thyroid surgery (theoretically could have been parathyroid). All raise question re possible MEN-1.     PLAN:  - EUS to be scheduled with MAC.  - Referral to genetic counselor re strong FH of ZE syndrome.    RTC at the time of EUS. Further surveillance based on EUS findings.    Thank you for this consultation.  It was a pleasure to participate in the care of this patient; please contact us with any further questions.  A total of  45 minutes was spent on the day of the visit, >50% of which was counseling regarding the above delineated issues. The remainder was review of records and imaging as well as documentation and coordination of care.      JULIO CESAR Vickers MD  Professor of Medicine  Division of Gastroenterology, Hepatology and Nutrition  [facility]  -------------------------------------------------------------------------------------------------------------------  HPI:  The pt is a/n 73 year old female who I was asked to see in consultation at the request of Dr. Nadia Randle for evaluation of pancreatic cysts.     This was initially an incidental finding on CT A/P with contrast 2/11/2020 which was obtained during an episode of abdominal pain. This reported:  IMPRESSION:   1. No acute findings in the abdomen or pelvis.  2. There is a 2 mm hypodense lesion  in the pancreatic tail. This could  represent sidebranch IPMN, and could be further characterized on MRCP.  3. Slightly thickened appearance of the endometrium. Given this  patient's age, further evaluation with pelvic ultrasound and  correlation with any abnormal vaginal bleeding is recommended.  4. Patent right renal artery stent.  5. 3 mm solid nodule right lower lobe, and 3 mm solid nodule in the  right middle lobe. If this patient is at high risk for lung cancer,  consider optional follow-up with low-dose chest CT in 12 months, per  Fleischner Society criteria.  6. Hepatic steatosis.    NOTE: The text of the report describes the pancreas cyst as 10 mm, not 2 mm.    She was unaware of the pancreatic findings. This was brought up during a recent primary care visit and MRI was performed.    MRI 1/18/23 showed:  IMPRESSION:   1.  Dominant pancreatic cyst measuring 1.5 cm with mild complexity,  increased in size from CT 2020. Recommend follow-up with  gastroenterology and consideration of US as detailed in guidelines  below.  2.  Multiple additional pancreatic cysts measuring up to 0.7 cm.  Recommend follow-up as detailed below.    She was referred to consider EUS.    She is asymptomatic and has no history of pancreatitis.    Her mother and maternal uncle both had ZE syndrome, with her mother having metastatic disease but living into her 80s. Her mother also had thyroid surgery however details are unknown.   There is no FH of pancreatic adenocarcinoma.    Her MGF had some form of cancer which eventually involved the heart. Primary unknown. Her Maternal aunt had an unknown form of cancer. Her father had prostate cancer.    She has an intentional weight loss of 50 lb.  She was diagnosed with type 2 diabetes following COVID in 2020.    She does not smoke and drinks alcohol rarely with no prior history of heavy use.    ROS:  See history of present illness.    PERTINENT PAST MEDICAL HISTORY:  Past Medical History:    Diagnosis Date     Asthma      Diabetes mellitus (H)      Fibromuscular dysplasia (H)      H/O sebaceous cyst      Hyperlipidemia LDL goal < 130      Hypertension      Obesity      Renal artery stenosis (H) 2005    Right, s/p stenting at [facility]     Statin medication not prescribed per physician orders     pt declined   COVID 1/2020    PREVIOUS SURGERIES:  Past Surgical History:   Procedure Laterality Date     COLONOSCOPY N/A 3/10/2017    Procedure: COMBINED COLONOSCOPY, SINGLE OR MULTIPLE BIOPSY/POLYPECTOMY BY BIOPSY;  Surgeon: Sergo Mcgraw MD;  Location: [facility]     COLONOSCOPY N/A 3/30/2022    Procedure: COLONOSCOPY, WITH POLYPECTOMY;  Surgeon: Leventhal, Thomas Michael, MD;  Location: [facility] OR     ENDOMETRIAL SAMPLING (BIOPSY) N/A 11/25/2022    Procedure: endometrial biospy using portable ultrasound guidance;  Surgeon: Jessica Nation MD;  [facility]     EXAM UNDER ANESTHESIA PELVIC N/A 11/25/2022    Procedure: EXAM UNDER ANESTHESIA, PELVIS,;  Surgeon: Jessica Nation MD;  Location: [facility] OR     IR RENAL/VISCERAL STENT/ATHERECT/PTA Right 2005     Colon cancer screening up to date.    ALLERGIES:     Allergies   Allergen Reactions     Codeine Diarrhea and Rash     Codeine Camsylate Diarrhea and Rash     Ibuprofen GI Disturbance and Nausea     Lisinopril Cough       PERTINENT MEDICATIONS:    Current Outpatient Medications:      acetaminophen (TYLENOL) 500 MG tablet, Take 500-1,000 mg by mouth every 6 hours as needed for mild pain, Disp: , Rfl:      aspirin 81 MG tablet, Take 81 mg by mouth daily, Disp: , Rfl:      atenolol (TENORMIN) 25 MG tablet, Take 1 tablet (25 mg) by mouth daily, Disp: 90 tablet, Rfl: 3     blood glucose (NO BRAND SPECIFIED) lancets standard, Use to test blood sugar 1 times daily or as directed., Disp: 300 each, Rfl: 3     blood glucose (NO BRAND SPECIFIED) test strip, Use to test blood sugar 1 time daily or as directed., Disp: 300 strip, Rfl: 3     blood glucose  monitoring (NO BRAND SPECIFIED) meter device kit, Use to test blood sugar 3 times daily or as directed., Disp: 1 kit, Rfl: 0     fluticasone-salmeterol (ADVAIR) 100-50 MCG/DOSE inhaler, Inhale 1 puff into the lungs daily, Disp: 2 each, Rfl: 2     hydrochlorothiazide (HYDRODIURIL) 25 MG tablet, Take 1 tablet (25 mg) by mouth daily, Disp: 90 tablet, Rfl: 3     irbesartan (AVAPRO) 300 MG tablet, Take 1 tablet (300 mg) by mouth daily, Disp: 90 tablet, Rfl: 3     metFORMIN (GLUCOPHAGE) 500 MG tablet, Take 2 tablets (1,000 mg) by mouth 2 times daily (with meals), Disp: 180 tablet, Rfl: 3     albuterol (PROAIR HFA) 108 (90 Base) MCG/ACT inhaler, Inhale 2 puffs into the lungs every 6 hours as needed for shortness of breath / dyspnea or wheezing (Patient not taking: Reported on 2/13/2023), Disp: 18 g, Rfl: 3     diclofenac (VOLTAREN) 1 % GEL topical gel, Apply 2 grams three - four times daily using enclosed dosing card. (Patient not taking: Reported on 2/13/2023), Disp: 100 g, Rfl: 0     LORazepam (ATIVAN) 0.5 MG tablet, Take 1 tablet (0.5 mg) by mouth once as needed for anxiety (take approx 15 min before MRI, after instructed to do so by staff) (Patient not taking: Reported on 2/13/2023), Disp: 1 tablet, Rfl: 0     STATIN NOT PRESCRIBED (INTENTIONAL), Please choose reason not prescribed, below (Patient not taking: Reported on 2/13/2023), Disp: , Rfl:     SOCIAL HISTORY:  Social History     Socioeconomic History     Marital status:      Spouse name: Not on file     Number of children: Not on file     Years of education: Not on file     Highest education level: Not on file   Occupational History     Occupation: nurse     Employer: [employer]   Tobacco Use     Smoking status: Never     Smokeless tobacco: Never   Substance and Sexual Activity     Alcohol use: Yes     Comment: rare     Drug use: No     Sexual activity: Yes     Partners: Male   Other Topics Concern     Parent/sibling w/ CABG, MI or angioplasty  before 65F 55M? Not Asked   Social History Narrative    Pt states she did [employer], [place], in     Retired [employer] nurse    Teaching [employer]    Helping at [employer]            Gyn:     Mammo never abnormal    Pap 10/13 NIL, PAP      NIL   10/27/2014, HPV neg    No vaginal itching, bleeding, discharge           Social Determinants of Health     Financial Resource Strain: Not on file   Food Insecurity: Not on file   Transportation Needs: Not on file   Physical Activity: Not on file   Stress: Not on file   Social Connections: Not on file   Intimate Partner Violence: Not on file   Housing Stability: Not on file       FAMILY HISTORY:  Family History   Problem Relation Age of Onset     Peptic Ulcer Disease Mother         Zollinger-Wheeler Syndrome     Diabetes Mother      Thyroid Disease Mother         s/p thyroidecomy     Cancer Father         prostate cancer     C.A.D. Father         aortic aneurysm     Childhood Heart Disease Sister         tetrology of fallot     Melanoma No family hx of      Skin Cancer No family hx of          PHYSICAL EXAMINATION:  Vitals reviewed, AFVSS   Wt   Wt Readings from Last 2 Encounters:   23 83.9 kg (185 lb)   22 86.5 kg (190 lb 11.2 oz)      Gen: aaox3, cooperative, pleasant, not dyspneic/diaphoretic, nad  HEENT: ncat,  normal op w/o ulcer/exudate, anicteric, mmm, Mallampati 1.  Resp: CTAP  CV: RRR no MGR  Ext: trace edema (no pitting, marks from socks)  Skin: warm, perfused, no jaundice      PERTINENT STUDIES:    Lab on 2023   Component Date Value Ref Range Status     Hemoglobin A1C 2023 7.4 (H)  <5.7 % Final

## 2023-03-01 ENCOUNTER — TELEPHONE (OUTPATIENT)
Dept: GASTROENTEROLOGY | Facility: CLINIC | Age: 73
End: 2023-03-01

## 2023-03-07 NOTE — TELEPHONE ENCOUNTER
Second attempt for pre-assessment prior to upcoming upper endoscopic ultrasound (EUS)     No answer.  Left message to return call [phone] #[phone]    Sujata Corbett, RN  Endoscopy Procedure Pre Assessment RN

## 2023-03-10 NOTE — TELEPHONE ENCOUNTER
Pre assessment questions completed for upcoming Endoscopic ultrasound (EUS) procedure scheduled on 3.14.23    COVID policy reviewed.     Reviewed procedural arrival time 0730 and facility location [facility]; [address], [place] [address]    Designated  policy reviewed. Instructed to have someone stay 24 hours post procedure.     Anticoagulation/blood thinners? Yes ASA Patient states Ok per Dr Vickers to continue taking.    Electronic implanted devices? No    Diabetic? Yes - Patient to hold oral diabetic medications day of procedure    Reviewed EUS procedure prep instructions.     Patient verbalized understanding and had no questions or concerns at this time.    Shobha Pinto, RN  Endoscopy Procedure Pre Assessment RN

## 2023-03-13 ENCOUNTER — ANESTHESIA EVENT (OUTPATIENT)
Dept: GASTROENTEROLOGY | Facility: CLINIC | Age: 73
End: 2023-03-13
Payer: COMMERCIAL

## 2023-03-14 ENCOUNTER — ANESTHESIA (OUTPATIENT)
Dept: GASTROENTEROLOGY | Facility: CLINIC | Age: 73
End: 2023-03-14
Payer: COMMERCIAL

## 2023-03-14 ENCOUNTER — HOSPITAL ENCOUNTER (OUTPATIENT)
Facility: CLINIC | Age: 73
Discharge: HOME | End: 2023-03-14
Attending: INTERNAL MEDICINE | Admitting: INTERNAL MEDICINE
Payer: COMMERCIAL

## 2023-03-14 ENCOUNTER — TELEPHONE (OUTPATIENT)
Dept: GASTROENTEROLOGY | Facility: CLINIC | Age: 73
End: 2023-03-14

## 2023-03-14 VITALS
OXYGEN SATURATION: 98 % | DIASTOLIC BLOOD PRESSURE: 70 MMHG | HEIGHT: 65 IN | SYSTOLIC BLOOD PRESSURE: 137 MMHG | WEIGHT: 185 LBS | BODY MASS INDEX: 30.82 KG/M2 | HEART RATE: 56 BPM | TEMPERATURE: 97.8 F | RESPIRATION RATE: 16 BRPM

## 2023-03-14 LAB
GLUCOSE BLDC GLUCOMTR-MCNC: 200 MG/DL (ref 70–99)
UPPER EUS: NORMAL

## 2023-03-14 PROCEDURE — 43259 EGD US EXAM DUODENUM/JEJUNUM: CPT | Performed by: INTERNAL MEDICINE

## 2023-03-14 PROCEDURE — 370N000017 HC ANESTHESIA TECHNICAL FEE, PER MIN: Performed by: INTERNAL MEDICINE

## 2023-03-14 PROCEDURE — 250N000011 HC RX IP 250 OP 636: Performed by: ANESTHESIOLOGY

## 2023-03-14 PROCEDURE — 82962 GLUCOSE BLOOD TEST: CPT

## 2023-03-14 PROCEDURE — 258N000003 HC RX IP 258 OP 636: Performed by: ANESTHESIOLOGY

## 2023-03-14 PROCEDURE — 43237 ENDOSCOPIC US EXAM ESOPH: CPT | Performed by: INTERNAL MEDICINE

## 2023-03-14 PROCEDURE — 250N000009 HC RX 250: Performed by: ANESTHESIOLOGY

## 2023-03-14 RX ORDER — PROPOFOL 10 MG/ML
INJECTION, EMULSION INTRAVENOUS PRN
Status: DISCONTINUED | OUTPATIENT
Start: 2023-03-14 | End: 2023-03-14

## 2023-03-14 RX ORDER — ONDANSETRON 4 MG/1
4 TABLET, ORALLY DISINTEGRATING ORAL EVERY 6 HOURS PRN
Status: DISCONTINUED | OUTPATIENT
Start: 2023-03-14 | End: 2023-03-14 | Stop reason: HOSPADM

## 2023-03-14 RX ORDER — NALOXONE HYDROCHLORIDE 0.4 MG/ML
0.2 INJECTION, SOLUTION INTRAMUSCULAR; INTRAVENOUS; SUBCUTANEOUS
Status: DISCONTINUED | OUTPATIENT
Start: 2023-03-14 | End: 2023-03-14 | Stop reason: HOSPADM

## 2023-03-14 RX ORDER — PROPOFOL 10 MG/ML
INJECTION, EMULSION INTRAVENOUS CONTINUOUS PRN
Status: DISCONTINUED | OUTPATIENT
Start: 2023-03-14 | End: 2023-03-14

## 2023-03-14 RX ORDER — OXYCODONE HYDROCHLORIDE 5 MG/1
5 TABLET ORAL
Status: DISCONTINUED | OUTPATIENT
Start: 2023-03-14 | End: 2023-03-14 | Stop reason: HOSPADM

## 2023-03-14 RX ORDER — LIDOCAINE 40 MG/G
CREAM TOPICAL
Status: DISCONTINUED | OUTPATIENT
Start: 2023-03-14 | End: 2023-03-14 | Stop reason: HOSPADM

## 2023-03-14 RX ORDER — NALOXONE HYDROCHLORIDE 0.4 MG/ML
0.4 INJECTION, SOLUTION INTRAMUSCULAR; INTRAVENOUS; SUBCUTANEOUS
Status: DISCONTINUED | OUTPATIENT
Start: 2023-03-14 | End: 2023-03-14 | Stop reason: HOSPADM

## 2023-03-14 RX ORDER — LIDOCAINE HYDROCHLORIDE 20 MG/ML
INJECTION, SOLUTION INFILTRATION; PERINEURAL PRN
Status: DISCONTINUED | OUTPATIENT
Start: 2023-03-14 | End: 2023-03-14

## 2023-03-14 RX ORDER — FLUMAZENIL 0.1 MG/ML
0.2 INJECTION, SOLUTION INTRAVENOUS
Status: DISCONTINUED | OUTPATIENT
Start: 2023-03-14 | End: 2023-03-14 | Stop reason: HOSPADM

## 2023-03-14 RX ORDER — ACETAMINOPHEN 325 MG/1
975 TABLET ORAL ONCE
Status: DISCONTINUED | OUTPATIENT
Start: 2023-03-14 | End: 2023-03-14 | Stop reason: HOSPADM

## 2023-03-14 RX ORDER — OXYCODONE HYDROCHLORIDE 10 MG/1
10 TABLET ORAL
Status: DISCONTINUED | OUTPATIENT
Start: 2023-03-14 | End: 2023-03-14 | Stop reason: HOSPADM

## 2023-03-14 RX ORDER — SODIUM CHLORIDE, SODIUM LACTATE, POTASSIUM CHLORIDE, CALCIUM CHLORIDE 600; 310; 30; 20 MG/100ML; MG/100ML; MG/100ML; MG/100ML
INJECTION, SOLUTION INTRAVENOUS CONTINUOUS PRN
Status: DISCONTINUED | OUTPATIENT
Start: 2023-03-14 | End: 2023-03-14

## 2023-03-14 RX ORDER — ONDANSETRON 2 MG/ML
4 INJECTION INTRAMUSCULAR; INTRAVENOUS EVERY 6 HOURS PRN
Status: DISCONTINUED | OUTPATIENT
Start: 2023-03-14 | End: 2023-03-14 | Stop reason: HOSPADM

## 2023-03-14 RX ADMIN — TOPICAL ANESTHETIC 1 SPRAY: 200 SPRAY DENTAL; PERIODONTAL at 08:34

## 2023-03-14 RX ADMIN — LIDOCAINE HYDROCHLORIDE 100 MG: 20 INJECTION, SOLUTION INFILTRATION; PERINEURAL at 08:39

## 2023-03-14 RX ADMIN — SODIUM CHLORIDE, POTASSIUM CHLORIDE, SODIUM LACTATE AND CALCIUM CHLORIDE: 600; 310; 30; 20 INJECTION, SOLUTION INTRAVENOUS at 08:34

## 2023-03-14 RX ADMIN — PROPOFOL 150 MCG/KG/MIN: 10 INJECTION, EMULSION INTRAVENOUS at 08:39

## 2023-03-14 RX ADMIN — PROPOFOL 20 MG: 10 INJECTION, EMULSION INTRAVENOUS at 08:50

## 2023-03-14 ASSESSMENT — ACTIVITIES OF DAILY LIVING (ADL): ADLS_ACUITY_SCORE: 37

## 2023-03-14 NOTE — ANESTHESIA POSTPROCEDURE EVALUATION
Patient: Zenaida Valles    Procedure: Procedure(s):  ENDOSCOPIC ULTRASOUND, ESOPHAGOSCOPY / UPPER GASTROINTESTINAL TRACT (GI)       Anesthesia Type:  MAC    Note:  Disposition: Outpatient   Postop Pain Control: Uneventful            Sign Out: Well controlled pain   PONV: No   Neuro/Psych: Uneventful            Sign Out: Acceptable/Baseline neuro status   Airway/Respiratory: Uneventful            Sign Out: Acceptable/Baseline resp. status   CV/Hemodynamics: Uneventful            Sign Out: Acceptable CV status; No obvious hypovolemia; No obvious fluid overload   Other NRE: NONE   DID A NON-ROUTINE EVENT OCCUR? No           Last vitals:  Vitals Value Taken Time   /70 03/14/23 0930   Temp     Pulse 56 03/14/23 0930   Resp     SpO2 99 % 03/14/23 0936   Vitals shown include unvalidated device data.    Electronically Signed By: Gadiel Park MD  March 14, 2023  9:38 AM

## 2023-03-14 NOTE — H&P
Gastroenterology Pre-op History and Physical    Zenaida Valles MRN# [id number]   Age: 73 year old YOB: 1950      Date of Surgery: 03/14/23  [facility]      Date of Exam 3/14/2023 Facility Same Day       Primary care provider: Nadia Randle         Chief Complaint and/or Reason for Procedure:   72 yo female here for EUS of enlarging pancreatic tail cyst.     CT 2/11/20 for acute abdominal pain with incidental 1 cm tail cyst. Lipase normal.     Surveillance MRI 1/18/23 showed enlargement of the cyst without other concerning findings. EUS recommended due to growth.    Pt is asymptomatic. No anticoagulation.    FH of gastrinoma - referred to genetic clinic for assessment.         Past Medical and Surgical History:     Past Medical History:   Diagnosis Date     Asthma      Diabetes mellitus (H)      Fibromuscular dysplasia (H)      H/O sebaceous cyst      Hyperlipidemia LDL goal < 130      Hypertension      Obesity      Renal artery stenosis (H) 2005    Right, s/p stenting at [facility]     Statin medication not prescribed per physician orders     pt declined     Past Surgical History:   Procedure Laterality Date     COLONOSCOPY N/A 3/10/2017    Procedure: COMBINED COLONOSCOPY, SINGLE OR MULTIPLE BIOPSY/POLYPECTOMY BY BIOPSY;  Surgeon: Sergo Mcgraw MD;  [facility] GI     COLONOSCOPY N/A 3/30/2022    Procedure: COLONOSCOPY, WITH POLYPECTOMY;  Surgeon: Leventhal, Thomas Michael, MD;  Location: [facility] OR     ENDOMETRIAL SAMPLING (BIOPSY) N/A 11/25/2022    Procedure: endometrial biospy using portable ultrasound guidance;  Surgeon: Jessica Nation MD;  [facility]     EXAM UNDER ANESTHESIA PELVIC N/A 11/25/2022    Procedure: EXAM UNDER ANESTHESIA, PELVIS,;  Surgeon: Jessica Nation MD;  [facility]     IR RENAL/VISCERAL STENT/ATHERECT/PTA Right 2005            Medications (include herbals and vitamins):        Medications Prior to  Admission   Medication Sig Dispense Refill Last Dose     acetaminophen (TYLENOL) 500 MG tablet Take 500-1,000 mg by mouth every 6 hours as needed for mild pain   Unknown     albuterol (PROAIR HFA) 108 (90 Base) MCG/ACT inhaler Inhale 2 puffs into the lungs every 6 hours as needed for shortness of breath / dyspnea or wheezing 18 g 3 Unknown     aspirin 81 MG tablet Take 81 mg by mouth daily   3/14/2023     atenolol (TENORMIN) 25 MG tablet Take 1 tablet (25 mg) by mouth daily 90 tablet 3 3/14/2023     diclofenac (VOLTAREN) 1 % GEL topical gel Apply 2 grams three - four times daily using enclosed dosing card. 100 g 0 Unknown     fluticasone-salmeterol (ADVAIR) 100-50 MCG/DOSE inhaler Inhale 1 puff into the lungs daily 2 each 2 Unknown     hydrochlorothiazide (HYDRODIURIL) 25 MG tablet Take 1 tablet (25 mg) by mouth daily 90 tablet 3 3/13/2023     irbesartan (AVAPRO) 300 MG tablet Take 1 tablet (300 mg) by mouth daily 90 tablet 3 3/14/2023     LORazepam (ATIVAN) 0.5 MG tablet Take 1 tablet (0.5 mg) by mouth once as needed for anxiety (take approx 15 min before MRI, after instructed to do so by staff) 1 tablet 0 Unknown     metFORMIN (GLUCOPHAGE) 500 MG tablet Take 2 tablets (1,000 mg) by mouth 2 times daily (with meals) 180 tablet 3 3/13/2023     STATIN NOT PRESCRIBED (INTENTIONAL) Please choose reason not prescribed, below   Unknown     blood glucose (NO BRAND SPECIFIED) lancets standard Use to test blood sugar 1 times daily or as directed. 300 each 3      blood glucose (NO BRAND SPECIFIED) test strip Use to test blood sugar 1 time daily or as directed. 300 strip 3      blood glucose monitoring (NO BRAND SPECIFIED) meter device kit Use to test blood sugar 3 times daily or as directed. 1 kit 0              Allergies:      Allergies   Allergen Reactions     Codeine Diarrhea and Rash     Codeine Camsylate Diarrhea and Rash     Ibuprofen GI Disturbance and Nausea     Lisinopril Cough               Physical Exam:   All  "vitals have been reviewed  Patient Vitals for the past 8 hrs:   BP Temp Temp src Pulse Resp SpO2 Height Weight   03/14/23 0748 (!) 162/77 97.8  F (36.6  C) Oral 64 16 97 % 1.651 m (5' 5\") 83.9 kg (185 lb)     No intake/output data recorded.  Airway assessment:   Patient is able to open mouth wide  Patient is able to stick out tongue  Mallampatti classification: Class I (visualization of the soft palate, fauces, uvula, anterior and posterior pillars)}      Lungs:   No increased work of breathing, good air exchange, clear to auscultation bilaterally, no crackles or wheezing     Cardiovascular:   regular rate and rhythm and normal S1 and S2                 Anesthetic risk and/or ASA classification:     ASA 2    Miguel A Vickers MD        "

## 2023-03-14 NOTE — ANESTHESIA CARE TRANSFER NOTE
Patient: Zenaida Valles    Procedure: Procedure(s):  ENDOSCOPIC ULTRASOUND, ESOPHAGOSCOPY / UPPER GASTROINTESTINAL TRACT (GI)       Diagnosis: IPMN (intraductal papillary mucinous neoplasm) [D49.0]  Diagnosis Additional Information: No value filed.    Anesthesia Type:   MAC     Note:    Oropharynx: spontaneously breathing  Level of Consciousness: awake  Oxygen Supplementation: nasal cannula  Level of Supplemental Oxygen (L/min / FiO2): 2  Independent Airway: airway patency satisfactory and stable  Dentition: dentition unchanged  Vital Signs Stable: post-procedure vital signs reviewed and stable  Report to RN Given: handoff report given  Patient transferred to: PACU    Handoff Report: Identifed the Patient, Identified the Reponsible Provider, Reviewed the pertinent medical history, Discussed the surgical course, Reviewed Intra-OP anesthesia mangement and issues during anesthesia, Set expectations for post-procedure period and Allowed opportunity for questions and acknowledgement of understanding      Vitals:  Vitals Value Taken Time   BP     Temp     Pulse     Resp     SpO2         Electronically Signed By: WOOD Gaxiola CRNA  March 14, 2023  9:13 AM

## 2023-03-14 NOTE — OR NURSING
Procedure: EUS no interventions  Sedation: monitored anesthesia care  Specimens: none.   O2: per CRNA  Tolerated procedure: well  Pt to recovery area in stable condition accompanied by RN.   Other:  none    Karen Coulter RN

## 2023-03-14 NOTE — TELEPHONE ENCOUNTER
Yoselyn -     See EUS note from today.    Please arrange for MRI abdomen with contrast in 1 year (follow-up pancreatic cysts).    Then clinic visit with me to review. Can be same day in person or 1 week later virtually per pt preference.    JULIO CESAR Vickers MD  Professor of Medicine  Division of Gastroenterology, Hepatology and Nutrition  [facility]

## 2023-03-14 NOTE — ANESTHESIA PREPROCEDURE EVALUATION
Anesthesia Pre-Procedure Evaluation    Patient: Zenaida Valles   MRN: [id number] : 1950        Procedure : Procedure(s):  ENDOSCOPIC ULTRASOUND, ESOPHAGOSCOPY / UPPER GASTROINTESTINAL TRACT (GI)          Past Medical History:   Diagnosis Date     Asthma      Diabetes mellitus (H)      Fibromuscular dysplasia (H)      H/O sebaceous cyst      Hyperlipidemia LDL goal < 130      Hypertension      Obesity      Renal artery stenosis (H)     Right, s/p stenting at [facility], [facility]     Statin medication not prescribed per physician orders     pt declined      Past Surgical History:   Procedure Laterality Date     COLONOSCOPY N/A 3/10/2017    Procedure: COMBINED COLONOSCOPY, SINGLE OR MULTIPLE BIOPSY/POLYPECTOMY BY BIOPSY;  Surgeon: Sergo Mcgraw MD;  Location: [facility]     COLONOSCOPY N/A 3/30/2022    Procedure: COLONOSCOPY, WITH POLYPECTOMY;  Surgeon: Leventhal, Thomas Michael, MD;  Location: [facility] OR     ENDOMETRIAL SAMPLING (BIOPSY) N/A 2022    Procedure: endometrial biospy using portable ultrasound guidance;  Surgeon: Jessica Nation MD;  Location: [facility]     EXAM UNDER ANESTHESIA PELVIC N/A 2022    Procedure: EXAM UNDER ANESTHESIA, PELVIS,;  Surgeon: Jessica Nation MD;  Location: [facility]     IR RENAL/VISCERAL STENT/ATHERECT/PTA Right       Allergies   Allergen Reactions     Codeine Diarrhea and Rash     Codeine Camsylate Diarrhea and Rash     Ibuprofen GI Disturbance and Nausea     Lisinopril Cough      Social History     Tobacco Use     Smoking status: Never     Smokeless tobacco: Never   Substance Use Topics     Alcohol use: Yes     Comment: rare      Wt Readings from Last 1 Encounters:   23 83.9 kg (185 lb)        Anesthesia Evaluation   Pt has had prior anesthetic. Type: General and MAC.        ROS/MED HX  ENT/Pulmonary:     (+) Intermittent, asthma Treatment: Inhaler prn,      Neurologic:  - neg neurologic ROS     Cardiovascular: Comment: Impression:   1. No  aortic aneurysm.  2. No hemodynamically significant stenosis.    (+) Dyslipidemia hypertension-----    METS/Exercise Tolerance:     Hematologic: Comments: Lab Test        02/11/20 11/23/18                       1633          0843          WBC          5.6          5.8           HGB          13.6         14.2          MCV          89           90            PLT          233          246            Lab Test        11/25/22     11/25/22     03/30/22     02/10/22     01/19/21     02/11/20                       1408          1050          1117          0938          0719          1633          NA            --           --           --          140          139          138           POTASSIUM     --           --           --          4.4          4.1          3.6           CHLORIDE      --           --           --          104          105          103           CO2           --           --           --          29           30           30            BUN           --           --           --          15           15           12            CR            --           --           --          0.66         0.64         0.63          ANIONGAP      --           --           --          7            4            5             GAEL           --           --           --          9.0          8.9          8.9           GLC          164*         192*         213*         226*         218*         152*                Musculoskeletal:  - neg musculoskeletal ROS     GI/Hepatic: Comment: intraductal papillary mucinous neoplasm      Renal/Genitourinary: Comment: Renal artery stenosis (H) 2005     Right, s/p stenting at [facility]          Endo:     (+) type II DM, Obesity,     Psychiatric/Substance Use:  - neg psychiatric ROS     Infectious Disease:  - neg infectious disease ROS     Malignancy:  - neg malignancy ROS     Other:            Physical Exam    Airway        Mallampati: II   TM distance: > 3 FB   Neck ROM: full   Mouth  opening: > 3 cm    Respiratory Devices and Support         Dental       (+) Minor Abnormalities - some fillings, tiny chips      Cardiovascular   cardiovascular exam normal          Pulmonary   pulmonary exam normal                OUTSIDE LABS:  CBC:   Lab Results   Component Value Date    WBC 5.6 02/11/2020    WBC 5.8 11/23/2018    HGB 13.6 02/11/2020    HGB 14.2 11/23/2018    HCT 40.7 02/11/2020    HCT 42.0 11/23/2018     02/11/2020     11/23/2018     BMP:   Lab Results   Component Value Date     02/10/2022     01/19/2021    POTASSIUM 4.4 02/10/2022    POTASSIUM 4.1 01/19/2021    CHLORIDE 104 02/10/2022    CHLORIDE 105 01/19/2021    CO2 29 02/10/2022    CO2 30 01/19/2021    BUN 15 02/10/2022    BUN 15 01/19/2021    CR 0.66 02/10/2022    CR 0.64 01/19/2021     (H) 11/25/2022     (H) 11/25/2022     COAGS:   Lab Results   Component Value Date    PTT 24 08/14/2007    INR 1.09 08/14/2007     POC: No results found for: BGM, HCG, HCGS  HEPATIC:   Lab Results   Component Value Date    ALBUMIN 4.1 02/10/2022    PROTTOTAL 7.5 02/10/2022    ALT 44 02/10/2022    AST 22 02/10/2022    ALKPHOS 101 02/10/2022    BILITOTAL 0.4 02/10/2022     OTHER:   Lab Results   Component Value Date    LACT 1.5 02/11/2020    A1C 7.4 (H) 01/18/2023    GAEL 9.0 02/10/2022    PHOS 3.8 08/14/2007    MAG 1.9 08/14/2007    LIPASE 91 02/11/2020    TSH 3.83 02/10/2022       Anesthesia Plan    ASA Status:  3      Anesthesia Type: MAC.     - Reason for MAC: immobility needed   Induction: Propofol.   Maintenance: Balanced.        Consents    Anesthesia Plan(s) and associated risks, benefits, and realistic alternatives discussed. Questions answered and patient/representative(s) expressed understanding.    - Discussed:     - Discussed with:  Patient      - Extended Intubation/Ventilatory Support Discussed: No.      - Patient is DNR/DNI Status: No    Use of blood products discussed: Yes.     - Discussed with: Patient.      - Consented: consented to blood products            Reason for refusal: other.     Postoperative Care    Pain management: IV analgesics.   PONV prophylaxis: Ondansetron (or other 5HT-3), Dexamethasone or Solumedrol     Comments:                Gadiel Park MD

## 2023-03-14 NOTE — DISCHARGE INSTRUCTIONS
Discharge Instructions after Endoscopic Ultrasound    Activity  You were given medicine for pain. You may be dizzy or sleepy.    For 24 hours:  Do not drive or use heavy equipment.  Do not make important decisions.  Do not drink any alcohol.    Diet  Wait one hour before eating or drinking. Start with sips of water. When your gag reflex has returned you  may go back to your usual diet, medicines and light exercise.    Discomfort  Some bloating is normal. You may have large burps or pass air.  You may have a sore throat for 2 to 3 days. It may help to:  Use sore throat lozenges.  Gargle as needed with salt water up to 4 times a day. Mix 1 cup of warm water with 1 teaspoon of salt. Do not swallow.  You may take Tylenol (acetaminophen) for pain unless your doctor has told you not to.    When to call    Call right away if you have:  Severe throat pain or trouble swallowing  Black stools (tar-like looking bowel movement)  Fever above 100.6 F (37.5 C)  Unusual pain in belly or chest not relieved by belching or passing air.    If you vomit blood or have severe pain, go to an emergency room.    If you have questions, call    Monday to Friday, 8 a.m. to 4:30 p.m.:   Central Scheduling Department: [phone]  After hours: Hospital: [phone] (Ask for the GI fellow on call)

## 2023-04-08 ENCOUNTER — HEALTH MAINTENANCE LETTER (OUTPATIENT)
Age: 73
End: 2023-04-08

## 2023-04-25 ENCOUNTER — OFFICE VISIT (OUTPATIENT)
Dept: INTERNAL MEDICINE | Facility: CLINIC | Age: 73
End: 2023-04-25
Payer: COMMERCIAL

## 2023-04-25 VITALS
OXYGEN SATURATION: 96 % | HEIGHT: 65 IN | DIASTOLIC BLOOD PRESSURE: 63 MMHG | WEIGHT: 186 LBS | BODY MASS INDEX: 30.99 KG/M2 | SYSTOLIC BLOOD PRESSURE: 133 MMHG | HEART RATE: 78 BPM

## 2023-04-25 DIAGNOSIS — E11.9 TYPE 2 DIABETES MELLITUS WITHOUT COMPLICATION, WITHOUT LONG-TERM CURRENT USE OF INSULIN (H): Primary | ICD-10-CM

## 2023-04-25 LAB — HBA1C MFR BLD: 7.1 % (ref 4.3–?)

## 2023-04-25 PROCEDURE — 83036 HEMOGLOBIN GLYCOSYLATED A1C: CPT | Performed by: NURSE PRACTITIONER

## 2023-04-25 PROCEDURE — 99214 OFFICE O/P EST MOD 30 MIN: CPT | Mod: 25 | Performed by: NURSE PRACTITIONER

## 2023-04-25 ASSESSMENT — ASTHMA QUESTIONNAIRES: ACT_TOTALSCORE: 25

## 2023-04-25 NOTE — NURSING NOTE
"Zenaida Valles is a 73 year old female patient that presents today in clinic for the following:    Chief Complaint   Patient presents with     Pre-Op Exam     Pt here for pre-op for surgery 5/11/23 and 5/18/23 for cataracts with [facility].     The patient's allergies and medications were reviewed as noted. A set of vitals were recorded as noted without incident: /63 (BP Location: Right arm, Patient Position: Sitting, Cuff Size: Adult Large)   Pulse 78   Ht 1.651 m (5' 5\")   Wt 84.4 kg (186 lb)   LMP  (LMP Unknown)   SpO2 96%   BMI 30.95 kg/m  . The patient does not have any other questions for the provider.    Paola Reyes, EMT at 2:17 PM on 4/25/2023  "

## 2023-04-25 NOTE — PROGRESS NOTES
Zenaida Valles is 73 year old female here at the request of Dr. Chito Johnson at [facility] for cardiovascular, pulmonary, and perioperative risk assessment prior to surgery.The intended surgical procedure is cataract extraction of the right eye 5/11/23 and left eye 5/18/23.A copy of this note will be sent to the surgeon.    PROBLEM LIST:   Patient Active Problem List   Diagnosis     Essential hypertension     Asthma     Atherosclerosis of renal artery (H)     Diabetes mellitus, type 2 (H)       PAST SURGICAL HX:   Past Surgical History:   Procedure Laterality Date     COLONOSCOPY N/A 3/10/2017    Procedure: COMBINED COLONOSCOPY, SINGLE OR MULTIPLE BIOPSY/POLYPECTOMY BY BIOPSY;  Surgeon: Sergo Mcgraw MD;  Location: [facility]     COLONOSCOPY N/A 3/30/2022    Procedure: COLONOSCOPY, WITH POLYPECTOMY;  Surgeon: Leventhal, Thomas Michael, MD;  Location: [facility] OR     ENDOMETRIAL SAMPLING (BIOPSY) N/A 11/25/2022    Procedure: endometrial biospy using portable ultrasound guidance;  Surgeon: Jessica Nation MD;  [facility]     ENDOSCOPIC ULTRASOUND UPPER GASTROINTESTINAL TRACT (GI) N/A 3/14/2023    Procedure: ENDOSCOPIC ULTRASOUND, ESOPHAGOSCOPY / UPPER GASTROINTESTINAL TRACT (GI);  Surgeon: Miguel A Vickers MD;  Location: [facility]     EXAM UNDER ANESTHESIA PELVIC N/A 11/25/2022    Procedure: EXAM UNDER ANESTHESIA, PELVIS,;  Surgeon: Jessica Nation MD;  [facility]     IR RENAL/VISCERAL STENT/ATHERECT/PTA Right 2005       FAMILY MEDICAL HX:   Family History   Problem Relation Age of Onset     Peptic Ulcer Disease Mother         Zollinger-Wheeler Syndrome     Diabetes Mother      Thyroid Disease Mother         s/p thyroidecomy     Cancer Father         prostate cancer     C.A.D. Father         aortic aneurysm     Childhood Heart Disease Sister         tetrology of fallot     Melanoma No family hx of      Skin Cancer No family hx of        IMMUNIZATION HX:   Immunization  History   Administered Date(s) Administered     COVID-19 Vaccine (Sari) 03/09/2021     COVID-19 Vaccine 18+ (Moderna) 11/03/2021     Influenza (High Dose) 3 valent vaccine 10/06/2017, 10/10/2018, 10/09/2019     Influenza (IIV3) PF 09/29/2011, 10/01/2011, 09/25/2012, 09/30/2012, 10/01/2013     Influenza Vaccine 65+ (Fluzone HD) 09/14/2020, 09/30/2021     Influenza Vaccine >6 months (Alfuria,Fluzone) 09/29/2016     Pneumo Conj 13-V (2010&after) 05/22/2017     Pneumococcal 23 valent 07/01/2006, 10/24/2013, 06/02/2021     TD,PF 7+ (Tenivac) 07/01/1999, 02/10/2022     TDAP Vaccine (Boostrix) 10/18/2011       MEDICATIONS:   Current Outpatient Medications   Medication Sig Dispense Refill     acetaminophen (TYLENOL) 500 MG tablet Take 500-1,000 mg by mouth every 6 hours as needed for mild pain       albuterol (PROAIR HFA) 108 (90 Base) MCG/ACT inhaler Inhale 2 puffs into the lungs every 6 hours as needed for shortness of breath / dyspnea or wheezing 18 g 3     aspirin 81 MG tablet Take 81 mg by mouth daily       atenolol (TENORMIN) 25 MG tablet Take 1 tablet (25 mg) by mouth daily 90 tablet 3     blood glucose (NO BRAND SPECIFIED) lancets standard Use to test blood sugar 1 times daily or as directed. 300 each 3     blood glucose (NO BRAND SPECIFIED) test strip Use to test blood sugar 1 time daily or as directed. 300 strip 3     blood glucose monitoring (NO BRAND SPECIFIED) meter device kit Use to test blood sugar 3 times daily or as directed. 1 kit 0     diclofenac (VOLTAREN) 1 % GEL topical gel Apply 2 grams three - four times daily using enclosed dosing card. 100 g 0     fluticasone-salmeterol (ADVAIR) 100-50 MCG/DOSE inhaler Inhale 1 puff into the lungs daily 2 each 2     hydrochlorothiazide (HYDRODIURIL) 25 MG tablet Take 1 tablet (25 mg) by mouth daily 90 tablet 3     irbesartan (AVAPRO) 300 MG tablet Take 1 tablet (300 mg) by mouth daily 90 tablet 3     LORazepam (ATIVAN) 0.5 MG tablet Take 1 tablet (0.5 mg) by  mouth once as needed for anxiety (take approx 15 min before MRI, after instructed to do so by staff) 1 tablet 0     metFORMIN (GLUCOPHAGE) 500 MG tablet Take 2 tablets (1,000 mg) by mouth 2 times daily (with meals) 180 tablet 3     STATIN NOT PRESCRIBED (INTENTIONAL) Please choose reason not prescribed, below         SOCIAL HX:   Social History     Socioeconomic History     Marital status:    Occupational History     Occupation: nurse     Employer: [employer]   Tobacco Use     Smoking status: Never     Smokeless tobacco: Never   Vaping Use     Vaping status: Never Used   Substance and Sexual Activity     Alcohol use: Yes     Comment: rare     Drug use: No     Sexual activity: Yes     Partners: Male   Social History Narrative    Pt states she did [employer], in     Retired [employer] nurse    Teaching [employer]    Helping at [employer]            Gyn:     Mammo never abnormal    Pap 10/13 NIL, PAP      NIL   10/27/2014, HPV neg    No vaginal itching, bleeding, discharge             This is a LOW risk surgery.      HPI:   Reason for surgery: Bilateral cataracts affecting visual acuity.    Cardiovascular Risk:  This patient ambulates without assist.  without   chest pain. She IS able to climb a flight of stairs without chest pain.    The patient does not  have chest pain Exercise.    She does not  have a history of hypertension, high cholesterol and renal artery stenosis disease.   The patient does not  have a history of stroke, and does not  have a history of valvular disease.    Pulmonary Risk:  In terms of risk factors for pulmonary complications, the patient does  have a history of Asthma, which has been well controlled.    Perioperative Complications:  The patient does not  have a history of bleeding or clotting problems in the past. The patient has not  had complications from past surgeries.  The patient does not  have a family history of any anesthesia or surgical  complications.      ROS:  Constitutional: no fevers, night sweats or unintentional weight change   Eyes: no vision change, diplopia or red eyes   Answers for HPI/ROS submitted by the patient on 4/25/2023  General Symptoms: No  Skin Symptoms: No  HENT Symptoms: No  EYE SYMPTOMS: No  HEART SYMPTOMS: No  LUNG SYMPTOMS: No  INTESTINAL SYMPTOMS: No  URINARY SYMPTOMS: No  GYNECOLOGIC SYMPTOMS: No  BREAST SYMPTOMS: No  SKELETAL SYMPTOMS: No  BLOOD SYMPTOMS: No  NERVOUS SYSTEM SYMPTOMS: No  MENTAL HEALTH SYMPTOMS: No      PHYSICAL EXAM:  LMP  (LMP Unknown)     Wt Readings from Last 1 Encounters:   03/14/23 83.9 kg (185 lb)       Constitutional: no distress, comfortable, pleasant   Eyes: anicteric, normal extra-ocular movements   Ears, Nose and Throat: tympanic membranes clear, nose clear and free of lesions, throat clear, neck supple with full range of motion, no thyromegaly.   Cardiovascular: regular rate and rhythm, normal S1 and S2, no murmurs, rubs or gallops, peripheral pulses full and symmetric   Respiratory: clear to auscultation, no wheezes or crackles, normal breath sounds   Gastrointestinal: positive bowel sounds, nontender, no hepatosplenomegaly, no masses   Musculoskeletal: full range of motion, no edema   Skin: no concerning lesions, no jaundice   Neurological: cranial nerves intact, normal strength and sensation, reflexes at patella and biceps normal, normal gait, no tremor   Psychological: appropriate mood   Lymphatic: no cervical, supra or infra clavicular, axillary or inguinal lymphadenopathy    A/P:    The patient with   Patient Active Problem List   Diagnosis     Essential hypertension     Asthma     Atherosclerosis of renal artery (H)     Diabetes mellitus, type 2 (H)    presents prior to surgery for assessment of perioperative risk. The patient is at LOW   risk for cardiovascular complications and at LOW  risk for pulmonary complications of this LOW   risk surgery.      ASA class 1 - Healthy patient, no  medical problems  No evidence of functionally compromising cardiac or pulmonary status  Clear for anesthesia and surgery  The patient is recommended to hold aspirin or NSAIDS for 10 days prior to surgery.  The patient is instructed as to which medications to take with sips of water the morning of surgery    Pre-Op Plan:   Proceed with surgery as planned.  No food for 8 hours before surgery.  No liquid for 2 hours before surgery.   Call surgeon  If you develop a fever, respiratory illness or other symptoms.  Hold the following medications the morning of  Surgery: Metformin and HCTZ.  SHe can take her meds after surgery.    Letter sent to requesting surgeon  listed above  Written pre-operative instructions given.    Laboratory studies:  Hgb A1C 7.1    Cardiovascular: EKG was not indicated based on risk assessment.      Total time spent 25 minutes.  More than 50% of the time spent with Ms. Lazarus Valles on counseling / coordinating her care      Please contact our office if there are any further questions or information required about this patient.      Darcie MUIR, CNP

## 2023-05-23 NOTE — PROGRESS NOTES
"Virtual Visit Details    Type of service:  Video Visit   Video Start Time: 8:58am  Video End Time:9:55am    Originating Location (pt. Location): Home  Distant Location (provider location):  Off-site  Platform used for Video Visit: [employer]    5/24/2023    Referring Provider: Miguel A Vickers MD    Presenting Information:   I spoke with Zenaida Valles over video today for genetic counseling to discuss her family history of cancer. This appointment was conducted virtually due to COVID-19 precautions. We talked today to review this history, cancer screening recommendations, and available genetic testing options.    Personal History:   Zenaida is a 73 year old female. She does not have any personal history of cancer. She was incidentally noted to have a pancreatic lesion on a CT of the abdomen and pelvis done on 2/11/20 due to abdominal pain. She underwent MRI of the pancreas on 1/18/23 which identified multiple pancreatic cysts. She then met with Dr. Vickers and underwent upper EUS on 3/14/23 with findings \"highly suggestive of branch-duct IPMN.\" A repeat MRI in one year for follow up was recommended.    She had her first menstrual period at age 13 and is postmenopausal (around age 52). She does not have any children. Zenaida has her ovaries, fallopian tubes and uterus in place. An endometrial curettage on 11/25/22 showed: atrophic endometrium, negative for hyperplasia or atypia. Fragments of benign endocervical polyp. She reports that she has not used hormone replacement therapy. She has used oral contraceptives. She has clinical breast exams and mammograms; her most recent mammogram on 6/15/22 was negative. Her most recent colonoscopy on 3/30/22 detected one tubular adenoma and one inflammatory polyp and follow-up was recommended in 5 years. Previous colonoscopies include: 3/10/17 (four tubular adenomas), 1/6/12 (one tubular adenoma). She believes that she has had at least one prior colonoscopy, records not " available for review today. Zenaida reported no history of tobacco use and minimal/rare alcohol use. 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 She was also in the [employer] and has had Dengue fever, Malaria, hepatitis. 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    Family History: (Please see scanned pedigree for detailed family history information)  Siblings:    She has one brother who is 70 years old with no known history of cancer or tumors.   Maternal:    Her mother was diagnosed with a gastrinoma (Zollinger-Wheeler syndrome) at age 48-50. She lived with this for many years. It was eventually metastatic to her liver and lungs and she passed away at age 89. She also had some problems with her thyroid, but Zenaida has no further details about this.     Her uncle also had Zollinger-Wheeler syndrome due to a gastrinoma diagnosed around age 50. He passed away around age 60.    She also had an aunt who passed away around age 65 and it was suspected that she may have also had Zollinger-Wheeler syndrome, although this is not confirmed.     Another uncle passed away in his mid-late 60s due to an unknown reason, and one other aunt passed away around age 70 due to heart issues.    She is not aware of any cancers/tumors in her cousins, although she has limited information about them.     Her grandfather had a tumor which she believes was a lung cancer that involved his heart. He passed away due to cardiac rupture in his early-mid 60s. He had a history of smoking.   Paternal:    Her father was diagnosed with prostate cancer in his mid 60s. He had surgery, but did not complete any additional treatments. This was eventually metastatic to his bones and he passed away at age 77. He had a history of smoking.     She is not aware of any other cancers/tumors in her paternal relatives.     Her maternal ethnicity is [place]. Her paternal ethnicity is  [place]. She reports that there is possibly some Ashkenazi Methodist ancestry on her father's side of the family, although this is unconfirmed.     Discussion:    Zenaida's family history of cancer is suggestive of a hereditary cancer syndrome.    We reviewed the features of sporadic, familial, and hereditary cancers. In looking at Zenaida's family history, it is possible that a cancer susceptibility gene is present due to her maternal family history of multiple relatives with Zollinger-Wheeler syndrome, as well as her paternal family history of metastatic prostate cancer in her father.  We discussed the natural history and genetics of hereditary cancer. Based on the diagnosis of Zollinger-Wheeler syndrome in her mother and maternal relatives, we discussed that their gastrinomas may have been due to an underlying condition called multiple endocrine neoplasia. This condition affects the endocrine system, which produces hormones. Individuals with multiple endocrine neoplasia can develop multiple tumors of the endocrine glands, which can be malignant or benign. There are several forms of multiple endocrine neoplasia:   Individuals with multiple endocrine neoplasia type 1 (caused by mutations in the MEN1 gene) typically develop tumors of the parathyroid gland, pituitary gland, and gastro-entero-pancreatic (GEP) tract (such as gastrinoma). Individuals can also have hyperparathyroidism and are at risk to develop carcinoid tumors.   Multiple endocrine neoplasia type 4 is very similar to type 1, but is caused by mutations in the CDKN1B gene. Hyperparathyroidism is a common feature of type 4.    Based on her father's diagnosis of metastatic prostate cancer, we discussed genes in which mutations may be associated with an increased risk for prostate cancer, such as the BRCA1 and BRCA2 genes. Mutations in these genes cause a condition known as Hereditary Breast and Ovarian Cancer syndrome (HBOC). Women with a mutation in either of  these genes are at increased risk for breast and ovarian cancer. There is also an increased risk for a second primary breast cancer. Men with a mutation in either of these genes are at increased risk for breast and prostate cancer. Both women and men may also be at increased risk for pancreatic cancer and melanoma.     A detailed handout regarding genetic testing will be provided to Zenaida via [employer] and can be found in the after visit summary. Topics included: inheritance pattern, cancer risks, cancer screening recommendations, and also risks, benefits and limitations of testing.     Based on her personal and family history, Zenaida meets current National Comprehensive Cancer Network (NCCN) criteria for genetic testing of prostate cancer susceptibility genes, as well as NCCN criteria for testing for Hereditary Endocrine Neoplasia Syndromes.      We discussed that there are additional genes that could cause increased risk for prostate and other cancers/tumors. As many of these genes present with overlapping features in a family and accurate cancer risk cannot always be established based upon the pedigree analysis alone, it would be reasonable for Zenaida to consider panel genetic testing to analyze multiple genes at once.    We reviewed genetic testing options for Zenaida based on her personal and family history: a panel of genes associated with an increased risk for certain cancers, or larger panel options to include genes associated with increased risk for multiple different cancer types. She expressed an interest in more broad testing and opted for a Custom Panel (a combination of the Common Hereditary Cancers Panel + additional endocrine neoplasia syndrome genes).  Genetic testing is available for 47 genes associated with cancers of the breast, ovary, uterus, prostate and gastrointestinal system: [facility] Common Hereditary Cancers panel (APC, SUMIT, AXIN2, BARD1, BMPR1A, BRCA1, BRCA2, BRIP1, CDH1, CDK4, CDKN2A, CHEK2,  CTNNA1, DICER1, EPCAM, GREM1, HOXB13, KIT, MEN1, MLH1, MSH2, MSH3, MSH6, MUTYH, NBN, NF1, NTHL1, PALB2, PDGFRA, PMS2, POLD1, POLE, PTEN,RAD50, RAD51C, RAD51D, SDHA, SDHB, SDHC, SDHD, SMAD4, SMARCA4, STK11, TP53,TSC1, TSC2, VHL).    We discussed that many of these genes are associated with specific hereditary cancer syndromes and published management guidelines: Hereditary Breast and Ovarian Cancer syndrome (BRCA1, BRCA2), Baron syndrome (MLH1, MSH2, MSH6, PMS2, EPCAM), Familial Adenomatous Polyposis (APC), Hereditary Diffuse Gastric Cancer (CDH1), Familial Atypical Multiple Mole Melanoma syndrome (CDK4, CDKN2A), Multiple Endocrine Neoplasia type 1 (MEN1), Juvenile Polyposis syndrome (BMPR1A, SMAD4), Cowden syndrome (PTEN), Li Fraumeni syndrome (TP53), Hereditary Paraganglioma and Pheochromocytoma syndrome (SDHA, SDHB, SDHC, SDHD), Peutz-Jeghers syndrome (STK11), MUTYH Associated Polyposis (MUTYH), Tuberous sclerosis complex (TSC1, TSC2), Von Hippel-Lindau disease (VHL), and Neurofibromatosis type 1 (NF1).   The SUMIT, AXIN2, BRIP1, CHEK2, GREM1, MSH3, NBN, NTHL1, PALB2, POLD1, POLE, RAD51C, and RAD51D genes are associated with increased cancer risk and have published management guidelines for certain cancers.   The remaining genes (BARD1, CTNNA1, DICER1, HOXB13, KIT, PDGFRA, RAD50, and SMARCA4) are associated with increased cancer risk and may allow us to make medical recommendations when mutations are identified.     Due to COVID-19 precautions consent was obtained over the phone/video today. Genetic testing via a Custom Panel (a combination of the Common Hereditary Cancers Panel + additional endocrine neoplasia syndrome genes) will be sent to [facility]. Zenaida opted to schedule a blood draw for testing. Turnaround time from date when sample is received at the lab: approximately 3-4 weeks.    Medical Management: For Zenaida, we reviewed that the information from genetic testing may  determine:    additional cancer screening for which Zenaida may qualify (i.e. mammogram and breast MRI, more frequent colonoscopies, surveillance for endocrine tumors, more frequent dermatologic exams, etc.),    options for risk reducing surgeries Zenaida could consider (i.e. bilateral mastectomy, surgery to remove her ovaries and/or uterus, etc.),      and targeted chemotherapies if she were to develop certain cancers in the future (i.e. immunotherapy for individuals with Baron syndrome, PARP inhibitors, etc.).     These recommendations will be discussed in detail once genetic testing is completed.     Plan:  1) Today Zenaida elected to proceed with genetic testing via a Custom Panel (a combination of the Common Hereditary Cancers Panel + additional endocrine neoplasia syndrome genes) offered by [employer].  2) This information should be available in 4-5 weeks.  3) Zenaida will be scheduled for a virtual visit (phone or video) to discuss the results.    Celine Galvez MS, [facility] [place]  Licensed, Certified Genetic Counselor  Office: [phone]  Email: [email or web address]

## 2023-05-24 ENCOUNTER — VIRTUAL VISIT (OUTPATIENT)
Dept: ONCOLOGY | Facility: CLINIC | Age: 73
End: 2023-05-24
Attending: INTERNAL MEDICINE
Payer: COMMERCIAL

## 2023-05-24 DIAGNOSIS — E16.4 ZOLLINGER-ELLISON SYNDROME: ICD-10-CM

## 2023-05-24 DIAGNOSIS — Z84.89: ICD-10-CM

## 2023-05-24 DIAGNOSIS — Z80.42 FAMILY HISTORY OF PROSTATE CANCER: Primary | ICD-10-CM

## 2023-05-24 PROCEDURE — 96040 HC GENETIC COUNSELING, EACH 30 MINUTES: CPT | Mod: GT | Performed by: GENETIC COUNSELOR, MS

## 2023-05-24 NOTE — LETTER
"    5/24/2023         RE: Zenaida Valles  [address]  [place] [address]        Dear Colleague,    Thank you for referring your patient, Zenaida Valles, to the [facility]. Please see a copy of my visit note below.    Virtual Visit Details    Type of service:  Video Visit   Video Start Time: 8:58am  Video End Time:9:55am    Originating Location (pt. Location): Home  Distant Location (provider location):  Off-site  Platform used for Video Visit: [facility]    5/24/2023    Referring Provider: Miguel A Vickers MD    Presenting Information:   I spoke with Zenaida Valles over video today for genetic counseling to discuss her family history of cancer. This appointment was conducted virtually due to COVID-19 precautions. We talked today to review this history, cancer screening recommendations, and available genetic testing options.    Personal History:   Zenaida is a 73 year old female. She does not have any personal history of cancer. She was incidentally noted to have a pancreatic lesion on a CT of the abdomen and pelvis done on 2/11/20 due to abdominal pain. She underwent MRI of the pancreas on 1/18/23 which identified multiple pancreatic cysts. She then met with Dr. Vickers and underwent upper EUS on 3/14/23 with findings \"highly suggestive of branch-duct IPMN.\" A repeat MRI in one year for follow up was recommended.    She had her first menstrual period at age 13 and is postmenopausal (around age 52). She does not have any children. Zenaida has her ovaries, fallopian tubes and uterus in place. An endometrial curettage on 11/25/22 showed: atrophic endometrium, negative for hyperplasia or atypia. Fragments of benign endocervical polyp. She reports that she has not used hormone replacement therapy. She has used oral contraceptives. She has clinical breast exams and mammograms; her most recent mammogram on 6/15/22 was negative. Her most recent colonoscopy " on 3/30/22 detected one tubular adenoma and one inflammatory polyp and follow-up was recommended in 5 years. Previous colonoscopies include: 3/10/17 (four tubular adenomas), 1/6/12 (one tubular adenoma). She believes that she has had at least one prior colonoscopy, records not available for review today. Zenaida reported no history of tobacco use and minimal/rare alcohol use. 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 She was also in the [employer] and has had Dengue fever, Malaria, hepatitis. 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    Family History: (Please see scanned pedigree for detailed family history information)  Siblings:  She has one brother who is 70 years old with no known history of cancer or tumors.   Maternal:  Her mother was diagnosed with a gastrinoma (Zollinger-Wheeler syndrome) at age 48-50. She lived with this for many years. It was eventually metastatic to her liver and lungs and she passed away at age 89. She also had some problems with her thyroid, but Zenaida has no further details about this.   Her uncle also had Zollinger-Wheeler syndrome due to a gastrinoma diagnosed around age 50. He passed away around age 60.  She also had an aunt who passed away around age 65 and it was suspected that she may have also had Zollinger-Wheeler syndrome, although this is not confirmed.   Another uncle passed away in his mid-late 60s due to an unknown reason, and one other aunt passed away around age 70 due to heart issues.  She is not aware of any cancers/tumors in her cousins, although she has limited information about them.   Her grandfather had a tumor which she believes was a lung cancer that involved his heart. He passed away due to cardiac rupture in his early-mid 60s. He had a history of smoking.   Paternal:  Her father was diagnosed with prostate cancer in his mid 60s. He had surgery, but did not complete any  additional treatments. This was eventually metastatic to his bones and he passed away at age 77. He had a history of smoking.   She is not aware of any other cancers/tumors in her paternal relatives.     Her maternal ethnicity is [place]. Her paternal ethnicity is [place]. She reports that there is possibly some Ashkenazi Pentecostalism ancestry on her father's side of the family, although this is unconfirmed.     Discussion:  Zenaida's family history of cancer is suggestive of a hereditary cancer syndrome.  We reviewed the features of sporadic, familial, and hereditary cancers. In looking at Zenaida's family history, it is possible that a cancer susceptibility gene is present due to her maternal family history of multiple relatives with Zollinger-Wheeler syndrome, as well as her paternal family history of metastatic prostate cancer in her father.  We discussed the natural history and genetics of hereditary cancer. Based on the diagnosis of Zollinger-Wheeler syndrome in her mother and maternal relatives, we discussed that their gastrinomas may have been due to an underlying condition called multiple endocrine neoplasia. This condition affects the endocrine system, which produces hormones. Individuals with multiple endocrine neoplasia can develop multiple tumors of the endocrine glands, which can be malignant or benign. There are several forms of multiple endocrine neoplasia:   Individuals with multiple endocrine neoplasia type 1 (caused by mutations in the MEN1 gene) typically develop tumors of the parathyroid gland, pituitary gland, and gastro-entero-pancreatic (GEP) tract (such as gastrinoma). Individuals can also have hyperparathyroidism and are at risk to develop carcinoid tumors.   Multiple endocrine neoplasia type 4 is very similar to type 1, but is caused by mutations in the CDKN1B gene. Hyperparathyroidism is a common feature of type 4.  Based on her father's diagnosis of metastatic prostate cancer, we discussed genes in  which mutations may be associated with an increased risk for prostate cancer, such as the BRCA1 and BRCA2 genes. Mutations in these genes cause a condition known as Hereditary Breast and Ovarian Cancer syndrome (HBOC). Women with a mutation in either of these genes are at increased risk for breast and ovarian cancer. There is also an increased risk for a second primary breast cancer. Men with a mutation in either of these genes are at increased risk for breast and prostate cancer. Both women and men may also be at increased risk for pancreatic cancer and melanoma.   A detailed handout regarding genetic testing will be provided to Zenaida via [employer] and can be found in the after visit summary. Topics included: inheritance pattern, cancer risks, cancer screening recommendations, and also risks, benefits and limitations of testing.   Based on her personal and family history, Zenaida meets current National Comprehensive Cancer Network (NCCN) criteria for genetic testing of prostate cancer susceptibility genes, as well as NCCN criteria for testing for Hereditary Endocrine Neoplasia Syndromes.    We discussed that there are additional genes that could cause increased risk for prostate and other cancers/tumors. As many of these genes present with overlapping features in a family and accurate cancer risk cannot always be established based upon the pedigree analysis alone, it would be reasonable for Zenaida to consider panel genetic testing to analyze multiple genes at once.  We reviewed genetic testing options for Zenaida based on her personal and family history: a panel of genes associated with an increased risk for certain cancers, or larger panel options to include genes associated with increased risk for multiple different cancer types. She expressed an interest in more broad testing and opted for a Custom Panel (a combination of the Common Hereditary Cancers Panel + additional endocrine neoplasia syndrome genes).  Genetic  testing is available for 47 genes associated with cancers of the breast, ovary, uterus, prostate and gastrointestinal system: [facility] Common Hereditary Cancers panel (APC, SUMIT, AXIN2, BARD1, BMPR1A, BRCA1, BRCA2, BRIP1, CDH1, CDK4, CDKN2A, CHEK2, CTNNA1, DICER1, EPCAM, GREM1, HOXB13, KIT, MEN1, MLH1, MSH2, MSH3, MSH6, MUTYH, NBN, NF1, NTHL1, PALB2, PDGFRA, PMS2, POLD1, POLE, PTEN,RAD50, RAD51C, RAD51D, SDHA, SDHB, SDHC, SDHD, SMAD4, SMARCA4, STK11, TP53,TSC1, TSC2, VHL).    We discussed that many of these genes are associated with specific hereditary cancer syndromes and published management guidelines: Hereditary Breast and Ovarian Cancer syndrome (BRCA1, BRCA2), Baron syndrome (MLH1, MSH2, MSH6, PMS2, EPCAM), Familial Adenomatous Polyposis (APC), Hereditary Diffuse Gastric Cancer (CDH1), Familial Atypical Multiple Mole Melanoma syndrome (CDK4, CDKN2A), Multiple Endocrine Neoplasia type 1 (MEN1), Juvenile Polyposis syndrome (BMPR1A, SMAD4), Cowden syndrome (PTEN), Li Fraumeni syndrome (TP53), Hereditary Paraganglioma and Pheochromocytoma syndrome (SDHA, SDHB, SDHC, SDHD), Peutz-Jeghers syndrome (STK11), MUTYH Associated Polyposis (MUTYH), Tuberous sclerosis complex (TSC1, TSC2), Von Hippel-Lindau disease (VHL), and Neurofibromatosis type 1 (NF1).   The SUMIT, AXIN2, BRIP1, CHEK2, GREM1, MSH3, NBN, NTHL1, PALB2, POLD1, POLE, RAD51C, and RAD51D genes are associated with increased cancer risk and have published management guidelines for certain cancers.   The remaining genes (BARD1, CTNNA1, DICER1, HOXB13, KIT, PDGFRA, RAD50, and SMARCA4) are associated with increased cancer risk and may allow us to make medical recommendations when mutations are identified.   Due to COVID-19 precautions consent was obtained over the phone/video today. Genetic testing via a Custom Panel (a combination of the Common Hereditary Cancers Panel + additional endocrine neoplasia syndrome genes) will be sent to [facility].  Zenaida opted to schedule a blood draw for testing. Turnaround time from date when sample is received at the lab: approximately 3-4 weeks.  Medical Management: For Zenaida, we reviewed that the information from genetic testing may determine:  additional cancer screening for which Zenaida may qualify (i.e. mammogram and breast MRI, more frequent colonoscopies, surveillance for endocrine tumors, more frequent dermatologic exams, etc.),  options for risk reducing surgeries Zenaida could consider (i.e. bilateral mastectomy, surgery to remove her ovaries and/or uterus, etc.),    and targeted chemotherapies if she were to develop certain cancers in the future (i.e. immunotherapy for individuals with Baron syndrome, PARP inhibitors, etc.).   These recommendations will be discussed in detail once genetic testing is completed.     Plan:  1) Today Zenaida elected to proceed with genetic testing via a Custom Panel (a combination of the Common Hereditary Cancers Panel + additional endocrine neoplasia syndrome genes) offered by [facility].  2) This information should be available in 4-5 weeks.  3) Zenaida will be scheduled for a virtual visit (phone or video) to discuss the results.    Celine Galvez MS, [facility]  Licensed, Certified Genetic Counselor  Office: [phone]  Email: [email or web address]

## 2023-05-28 NOTE — PATIENT INSTRUCTIONS
Assessing Cancer Risk  Only about 5-10% of cancers are thought to be due to an inherited cancer susceptibility gene.    These families often have:  Several people with the same or related types of cancer  Cancers diagnosed at a young age (before age 50)  Individuals with more than one primary cancer  Multiple generations of the family affected with cancer    Genetic Testing  Genetic testing involves a simple blood test and will look at the genetic information in select genes for any harmful mutations that are associated with increased cancer risk.  If possible, it is recommended that the person(s) who has had cancer be tested before other family members.  That person will give us the most useful information about whether or not a specific gene is associated with the cancer in the family.     Results  There are three possible results of genetic testing:  Positive--a harmful mutation was identified  Negative--no mutation was identified  Variant of unknown significance--a variation in one of the genes was identified, but it is unclear how this impacts cancer risk in the family    Advantages and Disadvantages  There are advantages and disadvantages to genetic testing of these genes.    Advantages  May clarify your cancer risk  Can help you make medical decisions  May explain the cancers in your family  May give useful information to your family members (if you share your results)    Disadvantages  Possible negative emotional impact of learning about inherited cancer risk  Uncertainty in interpreting a negative test result in some situations  Possible genetic discrimination concerns (see below)    Inheritance   Mutations in most cancer risk genes are inherited in an autosomal dominant pattern.  This means that if a parent has a mutation, each of his or her children will have a 50% chance of inheriting that same mutation.  Therefore, each child--male or female--would have a 50% chance of being at increased risk for  developing cancer.                                              Image obtained from Genetics Home Reference, 2013     Genetic Information Nondiscrimination Act (SHAYAN)  SHAYAN is a federal law that protects individuals from health insurance or employment discrimination based on a genetic test result alone.  Although rare, there are currently no legal protections in terms of life insurance, long term care, or disability insurances.  Visit the National Human Genome Research [place] at Genome.gov/60275521 to learn more.    Reducing Cancer Risk  If a harmful mutation is found in a cancer risk gene, there may be certain screens or preventative surgeries that can be offered. This information will be discussed after genetic testing is completed. If no mutations are found on genetic testing, screening is then recommended based on personal and/or family history of cancer.     Questions to Think About Regarding Genetic Testing  What effect will the test result have on me and my relationship with my family members if I have an inherited gene mutation?  If I don t have a gene mutation?  Should I share my test results, and how will my family react to this news, which may also affect them?  Are my children ready to learn new information that may one day affect their own health?    Resources  American Cancer Society (ACS) cancer.org   National Cancer [place] (NCI) cancer.gov     Please call us if you have any questions or concerns.   Cancer Risk Management Program [phone] ([phone])  Manuel Patrick, MS [facility] [place]  [phone]  Macy Reyes, MS, [facility] [place] [facility] [phone]  Teresa Sam, MS, [facility] [place]  [phone]  Dorothy Piedra, MS, [facility] [place]  [phone]  Celine Galvez, MS, [facility] [place]  [phone]  Dolores Thacker, MS, [facility] [place] [phone]  Janell Kauffman, MS, [facility] [place] [phone]

## 2023-06-09 ENCOUNTER — LAB (OUTPATIENT)
Dept: LAB | Facility: CLINIC | Age: 73
End: 2023-06-09
Payer: COMMERCIAL

## 2023-06-09 DIAGNOSIS — E16.4 ZOLLINGER-ELLISON SYNDROME: ICD-10-CM

## 2023-06-09 DIAGNOSIS — Z80.42 FAMILY HISTORY OF PROSTATE CANCER: ICD-10-CM

## 2023-06-09 DIAGNOSIS — Z84.89: ICD-10-CM

## 2023-06-09 PROCEDURE — 99000 SPECIMEN HANDLING OFFICE-LAB: CPT | Performed by: PATHOLOGY

## 2023-06-09 PROCEDURE — 36415 COLL VENOUS BLD VENIPUNCTURE: CPT | Performed by: PATHOLOGY

## 2023-06-21 LAB — SCANNED LAB RESULT: ABNORMAL

## 2023-07-18 ENCOUNTER — MYC MEDICAL ADVICE (OUTPATIENT)
Dept: INTERNAL MEDICINE | Facility: CLINIC | Age: 73
End: 2023-07-18
Payer: COMMERCIAL

## 2023-07-18 DIAGNOSIS — E11.69 TYPE 2 DIABETES MELLITUS WITH OTHER SPECIFIED COMPLICATION, WITHOUT LONG-TERM CURRENT USE OF INSULIN (H): Primary | ICD-10-CM

## 2023-07-19 DIAGNOSIS — J45.20 MILD INTERMITTENT ASTHMA WITHOUT COMPLICATION: ICD-10-CM

## 2023-07-21 RX ORDER — FLUTICASONE PROPIONATE AND SALMETEROL 100; 50 UG/1; UG/1
POWDER RESPIRATORY (INHALATION)
Qty: 2 EACH | Refills: 2 | Status: SHIPPED | OUTPATIENT
Start: 2023-07-21 | End: 2024-02-02

## 2023-07-21 NOTE — TELEPHONE ENCOUNTER
FLUTICASONE-SALMETEROL 100-50 AEPB    Office Visit    4/25/2023  [facility] [place]   Darcie Dennis, APRN CNP  Internal Medicine

## 2023-08-02 ENCOUNTER — LAB (OUTPATIENT)
Dept: LAB | Facility: CLINIC | Age: 73
End: 2023-08-02
Payer: COMMERCIAL

## 2023-08-02 DIAGNOSIS — E11.69 TYPE 2 DIABETES MELLITUS WITH OTHER SPECIFIED COMPLICATION, WITHOUT LONG-TERM CURRENT USE OF INSULIN (H): ICD-10-CM

## 2023-08-02 LAB
ALBUMIN SERPL BCG-MCNC: 4.8 G/DL (ref 3.5–5.2)
ALP SERPL-CCNC: 96 U/L (ref 35–104)
ALT SERPL W P-5'-P-CCNC: 25 U/L (ref 0–50)
ANION GAP SERPL CALCULATED.3IONS-SCNC: 10 MMOL/L (ref 7–15)
AST SERPL W P-5'-P-CCNC: 17 U/L (ref 0–45)
BILIRUB SERPL-MCNC: 0.3 MG/DL
BUN SERPL-MCNC: 18.5 MG/DL (ref 8–23)
CALCIUM SERPL-MCNC: 9.7 MG/DL (ref 8.8–10.2)
CHLORIDE SERPL-SCNC: 102 MMOL/L (ref 98–107)
CHOLEST SERPL-MCNC: 242 MG/DL
CREAT SERPL-MCNC: 0.8 MG/DL (ref 0.51–0.95)
CREAT UR-MCNC: 234 MG/DL
DEPRECATED HCO3 PLAS-SCNC: 29 MMOL/L (ref 22–29)
GFR SERPL CREATININE-BSD FRML MDRD: 78 ML/MIN/1.73M2
GLUCOSE SERPL-MCNC: 205 MG/DL (ref 70–99)
HBA1C MFR BLD: 7.6 %
HDLC SERPL-MCNC: 47 MG/DL
LDLC SERPL CALC-MCNC: 164 MG/DL
MICROALBUMIN UR-MCNC: 16.1 MG/L
MICROALBUMIN/CREAT UR: 6.88 MG/G CR (ref 0–25)
NONHDLC SERPL-MCNC: 195 MG/DL
POTASSIUM SERPL-SCNC: 4.6 MMOL/L (ref 3.4–5.3)
PROT SERPL-MCNC: 7.2 G/DL (ref 6.4–8.3)
SODIUM SERPL-SCNC: 141 MMOL/L (ref 136–145)
TRIGL SERPL-MCNC: 156 MG/DL

## 2023-08-02 PROCEDURE — 99000 SPECIMEN HANDLING OFFICE-LAB: CPT | Performed by: PATHOLOGY

## 2023-08-02 PROCEDURE — 36415 COLL VENOUS BLD VENIPUNCTURE: CPT | Performed by: PATHOLOGY

## 2023-08-02 PROCEDURE — 80053 COMPREHEN METABOLIC PANEL: CPT | Performed by: PATHOLOGY

## 2023-08-02 PROCEDURE — 82570 ASSAY OF URINE CREATININE: CPT | Performed by: INTERNAL MEDICINE

## 2023-08-02 PROCEDURE — 83036 HEMOGLOBIN GLYCOSYLATED A1C: CPT | Performed by: INTERNAL MEDICINE

## 2023-08-02 PROCEDURE — 80061 LIPID PANEL: CPT | Performed by: PATHOLOGY

## 2023-08-03 ENCOUNTER — VIRTUAL VISIT (OUTPATIENT)
Dept: ONCOLOGY | Facility: CLINIC | Age: 73
End: 2023-08-03
Attending: GENETIC COUNSELOR, MS
Payer: COMMERCIAL

## 2023-08-03 DIAGNOSIS — Z15.89 MONOALLELIC MUTATION OF MUTYH GENE: Primary | ICD-10-CM

## 2023-08-03 PROCEDURE — 999N000069 HC STATISTIC GENETIC COUNSELING, < 16 MIN: Mod: TEL | Performed by: GENETIC COUNSELOR, MS

## 2023-08-03 NOTE — PROGRESS NOTES
"Virtual Visit Details    Type of service:  Telephone Visit   Phone call duration: 15 minutes     8/3/23    Referring Provider: Miguel A Vickers MD    Presenting Information:   I spoke with Zenaida over the phone today to discuss her genetic testing results. Her blood was drawn on 6/9/23. A Custom Panel (a combination of the Common Hereditary Cancers Panel + additional endocrine neoplasia syndrome genes) was ordered from [facility]. This testing was done because of her family history of cancer.     Genetic Testing Results: POSITIVE - CARRIER  Zenaida is POSITIVE for one MUTYH mutation, called c.536A>G (also known as p.[id number]). 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 No additional mutations were found in the MUTYH gene.    Of note, Zenaida tested negative for mutations in the following genes by sequencing and deletion/duplication analysis: APC, SUMIT, AXIN2, BARD1, BMPR1A, BRCA1, BRCA2, BRIP1, CDH1, CDK4, CDKN1B, CDKN2A, CHEK2, CTNNA1, DICER1, EPCAM, GREM1, HOXB13, KIT, MAX, MEN1, MLH1, MSH2, MSH3, MSH6, NBN, NF1, NTHL1, PALB2, PDGFRA, PMS2, POLD1, POLE, QGBYY9Y, PTEN, RAD50, RAD51C, RAD51D, RET, SDHA, SDHAF2, SDHB, SDHC, SDHD, SMAD4, SMARCA4, STK11, IZAQ973, TP53, TSC1, TSC2, VHL. We reviewed the autosomal dominant inheritance of these genes. Mutations in these genes do not skip generations.      A copy of the test report can be found in the Laboratory tab, dated 6/9/23, and named \"LABORATORY MISCELLANEOUS ORDER\". The report is scanned in as a linked document.    Cancer Risks:   We reviewed that having only one MUTYH mutation means that Zenaida is a carrier for MAP. Approximately 1 in 100 (1%) individuals in the general population are carriers of MAP (carry a single mutation in the MUTYH gene).       Data previously suggested that individuals who carry one mutation in the MUTYH gene had a slightly increased risk for colorectal cancer, however current National " Comprehensive Cancer Network (NCCN) guidelines note that there is no increased risk for colon cancer in these individuals. Risks may be influenced by family history (such as having a first degree relative diagnosed with colon cancer). Other cancer risks may be present (such as neuroendocrine tumors, gastric, hepatobiliary, endometrial, breast cancer, and colon polyps), however these risks are not currently well defined.       We discussed that in comparison, individuals with MAP (who have two MUTYH mutations) may have up to 100 to even thousands of colon polyps. Individuals with MAP have a 70-90% risk of developing colon cancer if not treated. MAP is also associated with an increased risk of duodenal cancer and polyposis.         Cancer Screening and Prevention:  The following screening is recommended for individuals who have one mutation in the MUTYH gene, per current National Comprehensive Cancer Network (NCCN) guidelines:    General population screening for colon cancer is appropriate for these individuals    For individuals with a family history of colorectal cancer or polyps:     Individuals with a first degree relative (parent, child, sibling) with colorectal cancer diagnosed at any age should begin colonoscopy at age 40 (or 10 years before their relative's age of diagnosis, whichever is first). Colonoscopy should be repeated every 5 years, or based on colonoscopy findings.     Individuals with a second- and/or third-degree relative with colorectal cancer diagnosed at any age should start colonoscopy at age 45, and should be repeated every 10 years, or per colonoscopy findings. Per the American Cancer Society, colon screening in the average population should begin at age 45.     Individuals with a first-degree relative with confirmed advanced adenoma should begin colonoscopy at age 40, or at age of onset of the adenoma in their relative, whichever comes first. Colonoscopy should be repeated every 5-10 years,  or per colonoscopy findings.      We discussed the importance of keeping in contact with me regularly, to determine if guidelines or family history have changed, as this may change how we approach colon screening for Zenaida in the future.     There are currently no other specific screening recommendations for other cancers potentially associated with one MUTYH mutation.    Other screening based on Zenaida's personal and family history:    Zenaida should continue with her colonoscopies as recommended by her physicians based on her personal history of colon polyps.     She should continue with follow up of her pancreatic cysts as recommended by Dr. Vickers.    Other population cancer screening options, such as those recommended by the American Cancer Society and the National Comprehensive Cancer Network (NCCN), are also appropriate for Zenaida and her family. These screening recommendations may change if there are changes to Zenaida's personal and/or family history of cancer. Final screening recommendations should be made by each individual's primary care provider.     Of note, the above information is based on our current understanding of Zenaida's genetic findings. Zenaida is encouraged to reach out to me regularly regarding any pertinent updates to her personal and/or family history of cancer, as our understanding of the genetic findings in her family may change over time.     Implications for Family Members:  We reviewed the autosomal recessive inheritance of MAP. Individuals with MAP have a mutation within both copies of the MUTYH gene (two mutations, one that was inherited from each parent). When both parents are carriers for MAP, they have a 25% chance of having a child who inherited two mutations (affected with MAP), a 50% chance of having a child who inherited one mutation (carrier of MAP), and a 25% chance of having a child who inherited neither mutation (unaffected; not a carrier). These chances apply to each pregnancy.      Genetic counseling is recommended for Zenaida s brother, as he has a 50% chance to carry the same MUTYH mutation identified in Zenaida. Extended relatives may also carry this mutation, and she is encouraged to share this information with her family members on both sides of the family. It will likely be recommended that her relatives consider comprehensive testing of the MUTYH gene, though, as it is possible that they could have MAP or carry a mutation in the MUTYH gene that is different than the one identified in Zenaida. I am happy to help her relatives connect with a genetic counselor in their area if they would like to discuss testing.    Even though Zenaida's genetic testing result was positive, other relatives may carry a different gene mutation associated with an increased risk for cancer. Genetic counseling is recommended for her brother and extended maternal and paternal relatives (both to rule out MAP and because of the family history of cancers) to discuss genetic testing options. If any of these relatives do pursue genetic testing, Zenaida is encouraged to contact me so that we may review the impact of their test results on her.    Plan:  1.  A copy of her results was released to her today via the online [facility] portal.   2.  She plans to follow up with her physicians.    If Zenaida has additional questions, I encouraged her to contact me directly at [phone].     Celine Galvez MS, [facility]  Licensed, Certified Genetic Counselor  Office: [phone]  Email: [email or web address]

## 2023-08-03 NOTE — LETTER
"    8/3/2023         RE: Zenaida Valles  [address] [facility] [address]        Dear Colleague,    Thank you for referring your patient, Zenaida Valles, to the [facility]. Please see a copy of my visit note below.    Virtual Visit Details    Type of service:  Telephone Visit   Phone call duration: 15 minutes     8/3/23    Referring Provider: Miguel A Vickers MD    Presenting Information:   I spoke with Zenaida over the phone today to discuss her genetic testing results. Her blood was drawn on 6/9/23. A Custom Panel (a combination of the Common Hereditary Cancers Panel + additional endocrine neoplasia syndrome genes) was ordered from [employer]. This testing was done because of her family history of cancer.     Genetic Testing Results: POSITIVE - CARRIER  Zenaida is POSITIVE for one MUTYH mutation, called c.536A>G (also known as p.[id number]). 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 No additional mutations were found in the MUTYH gene.    Of note, Zenaida tested negative for mutations in the following genes by sequencing and deletion/duplication analysis: APC, SUMIT, AXIN2, BARD1, BMPR1A, BRCA1, BRCA2, BRIP1, CDH1, CDK4, CDKN1B, CDKN2A, CHEK2, CTNNA1, DICER1, EPCAM, GREM1, HOXB13, KIT, MAX, MEN1, MLH1, MSH2, MSH3, MSH6, NBN, NF1, NTHL1, PALB2, PDGFRA, PMS2, POLD1, POLE, RWLCL5R, PTEN, RAD50, RAD51C, RAD51D, RET, SDHA, SDHAF2, SDHB, SDHC, SDHD, SMAD4, SMARCA4, STK11, XCYJ100, TP53, TSC1, TSC2, VHL. We reviewed the autosomal dominant inheritance of these genes. Mutations in these genes do not skip generations.      A copy of the test report can be found in the Laboratory tab, dated 6/9/23, and named \"LABORATORY MISCELLANEOUS ORDER\". The report is scanned in as a linked document.    Cancer Risks:   We reviewed that having only one MUTYH mutation means that Zenaida is a carrier for MAP. Approximately 1 in 100 (1%) " individuals in the general population are carriers of MAP (carry a single mutation in the MUTYH gene).       Data previously suggested that individuals who carry one mutation in the MUTYH gene had a slightly increased risk for colorectal cancer, however current National Comprehensive Cancer Network (NCCN) guidelines note that there is no increased risk for colon cancer in these individuals. Risks may be influenced by family history (such as having a first degree relative diagnosed with colon cancer). Other cancer risks may be present (such as neuroendocrine tumors, gastric, hepatobiliary, endometrial, breast cancer, and colon polyps), however these risks are not currently well defined.       We discussed that in comparison, individuals with MAP (who have two MUTYH mutations) may have up to 100 to even thousands of colon polyps. Individuals with MAP have a 70-90% risk of developing colon cancer if not treated. MAP is also associated with an increased risk of duodenal cancer and polyposis.         Cancer Screening and Prevention:  The following screening is recommended for individuals who have one mutation in the MUTYH gene, per current National Comprehensive Cancer Network (NCCN) guidelines:  General population screening for colon cancer is appropriate for these individuals  For individuals with a family history of colorectal cancer or polyps:   Individuals with a first degree relative (parent, child, sibling) with colorectal cancer diagnosed at any age should begin colonoscopy at age 40 (or 10 years before their relative's age of diagnosis, whichever is first). Colonoscopy should be repeated every 5 years, or based on colonoscopy findings.   Individuals with a second- and/or third-degree relative with colorectal cancer diagnosed at any age should start colonoscopy at age 45, and should be repeated every 10 years, or per colonoscopy findings. Per the American Cancer Society, colon screening in the average population  should begin at age 45.   Individuals with a first-degree relative with confirmed advanced adenoma should begin colonoscopy at age 40, or at age of onset of the adenoma in their relative, whichever comes first. Colonoscopy should be repeated every 5-10 years, or per colonoscopy findings.    We discussed the importance of keeping in contact with me regularly, to determine if guidelines or family history have changed, as this may change how we approach colon screening for Zenaida in the future.   There are currently no other specific screening recommendations for other cancers potentially associated with one MUTYH mutation.    Other screening based on Zenaida's personal and family history:  Zenaida should continue with her colonoscopies as recommended by her physicians based on her personal history of colon polyps.   She should continue with follow up of her pancreatic cysts as recommended by Dr. Vickers.  Other population cancer screening options, such as those recommended by the American Cancer Society and the National Comprehensive Cancer Network (NCCN), are also appropriate for Zenaida and her family. These screening recommendations may change if there are changes to Zenaida's personal and/or family history of cancer. Final screening recommendations should be made by each individual's primary care provider.     Of note, the above information is based on our current understanding of Zenaida's genetic findings. Zenaida is encouraged to reach out to me regularly regarding any pertinent updates to her personal and/or family history of cancer, as our understanding of the genetic findings in her family may change over time.     Implications for Family Members:  We reviewed the autosomal recessive inheritance of MAP. Individuals with MAP have a mutation within both copies of the MUTYH gene (two mutations, one that was inherited from each parent). When both parents are carriers for MAP, they have a 25% chance of having a child who  inherited two mutations (affected with MAP), a 50% chance of having a child who inherited one mutation (carrier of MAP), and a 25% chance of having a child who inherited neither mutation (unaffected; not a carrier). These chances apply to each pregnancy.     Genetic counseling is recommended for Zenaida s brother, as he has a 50% chance to carry the same MUTYH mutation identified in Zenaida. Extended relatives may also carry this mutation, and she is encouraged to share this information with her family members on both sides of the family. It will likely be recommended that her relatives consider comprehensive testing of the MUTYH gene, though, as it is possible that they could have MAP or carry a mutation in the MUTYH gene that is different than the one identified in Zenaida. I am happy to help her relatives connect with a genetic counselor in their area if they would like to discuss testing.    Even though Zenaida's genetic testing result was positive, other relatives may carry a different gene mutation associated with an increased risk for cancer. Genetic counseling is recommended for her brother and extended maternal and paternal relatives (both to rule out MAP and because of the family history of cancers) to discuss genetic testing options. If any of these relatives do pursue genetic testing, Zenaida is encouraged to contact me so that we may review the impact of their test results on her.    Plan:  1.  A copy of her results was released to her today via the online [employer] portal.   2.  She plans to follow up with her physicians.    If Zenaida has additional questions, I encouraged her to contact me directly at [phone].     Celine Galvez MS, [facility] [place]  Licensed, Certified Genetic Counselor  Office: [phone]  Email: [email or web address]       Again, thank you for allowing me to participate in the care of your patient.        Sincerely,        Celine Galvez GC

## 2023-08-03 NOTE — NURSING NOTE
Is the patient currently in the state of [place]? YES    Visit mode:TELEPHONE    If the visit is dropped, the patient can be reconnected by: TELEPHONE VISIT: Phone number: [phone]    Will anyone else be joining the visit? NO      How would you like to obtain your AVS? MyChart    Are changes needed to the allergy or medication list? NO    Reason for visit: RECHECK    Dora CHASE

## 2023-08-15 ENCOUNTER — ANCILLARY PROCEDURE (OUTPATIENT)
Dept: MAMMOGRAPHY | Facility: CLINIC | Age: 73
End: 2023-08-15
Attending: INTERNAL MEDICINE
Payer: COMMERCIAL

## 2023-08-15 DIAGNOSIS — Z12.31 VISIT FOR SCREENING MAMMOGRAM: ICD-10-CM

## 2023-08-15 PROCEDURE — 77063 BREAST TOMOSYNTHESIS BI: CPT

## 2023-08-15 PROCEDURE — 77067 SCR MAMMO BI INCL CAD: CPT

## 2023-09-20 PROBLEM — Z15.89 MONOALLELIC MUTATION OF MUTYH GENE: Status: ACTIVE | Noted: 2023-08-03

## 2023-10-24 DIAGNOSIS — E11.9 TYPE 2 DIABETES MELLITUS WITHOUT COMPLICATION, WITHOUT LONG-TERM CURRENT USE OF INSULIN (H): ICD-10-CM

## 2023-10-24 DIAGNOSIS — E11.42 DIABETIC POLYNEUROPATHY ASSOCIATED WITH TYPE 2 DIABETES MELLITUS (H): ICD-10-CM

## 2023-10-25 DIAGNOSIS — E11.42 DIABETIC POLYNEUROPATHY ASSOCIATED WITH TYPE 2 DIABETES MELLITUS (H): ICD-10-CM

## 2023-10-25 DIAGNOSIS — E11.9 TYPE 2 DIABETES MELLITUS WITHOUT COMPLICATION, WITHOUT LONG-TERM CURRENT USE OF INSULIN (H): ICD-10-CM

## 2023-10-25 NOTE — TELEPHONE ENCOUNTER
[facility] Call Center    Phone Message    May a detailed message be left on voicemail: yes     Reason for Call: Medication Question or concern regarding medication   Prescription Clarification  Name of Medication:   metFORMIN (GLUCOPHAGE) 500 MG tablet       Prescribing Provider: Nadia Randle MD       Pharmacy:   [facility] - [place], [place] - [address]            What on the order needs clarification?   The patient is upset because her refill hasn't been sent to her pharmacy yet, I explained the turnaround timeframe from when we received the request from the pharmacy however, she said because she's leaving out of town she needed this to be completed by today please follow up with patient with more details thank you.      Action Taken: Message routed to:  [facility] pcc    Travel Screening: Not Applicable

## 2023-10-25 NOTE — TELEPHONE ENCOUNTER
metFORMIN (GLUCOPHAGE) 500 MG tablet       Take 2 tablets (1,000 mg) by mouth 2 times daily (with meals)  Last Written Prescription Date:  1-24-23  Last Fill Quantity: 180,   # refills: 3  Last Office Visit : 4-25-23  Future Office visit:  none  RF 90 day  Scheduling has been notified to contact the pt for appointment.

## 2023-11-02 ENCOUNTER — MYC MEDICAL ADVICE (OUTPATIENT)
Dept: INTERNAL MEDICINE | Facility: CLINIC | Age: 73
End: 2023-11-02
Payer: COMMERCIAL

## 2023-11-02 DIAGNOSIS — E11.9 TYPE 2 DIABETES MELLITUS WITHOUT COMPLICATION, WITHOUT LONG-TERM CURRENT USE OF INSULIN (H): Primary | ICD-10-CM

## 2023-11-03 ENCOUNTER — TELEPHONE (OUTPATIENT)
Dept: GASTROENTEROLOGY | Facility: CLINIC | Age: 73
End: 2023-11-03
Payer: COMMERCIAL

## 2023-11-03 NOTE — TELEPHONE ENCOUNTER
Contacted pt due to IB received, pt stated that she will schedule through myc since there isnt a urgent need.

## 2023-11-08 DIAGNOSIS — D49.0 IPMN (INTRADUCTAL PAPILLARY MUCINOUS NEOPLASM): Primary | ICD-10-CM

## 2023-11-08 DIAGNOSIS — K86.2 PANCREAS CYST: ICD-10-CM

## 2023-11-15 ENCOUNTER — LAB (OUTPATIENT)
Dept: LAB | Facility: CLINIC | Age: 73
End: 2023-11-15
Payer: COMMERCIAL

## 2023-11-15 DIAGNOSIS — E11.9 TYPE 2 DIABETES MELLITUS WITHOUT COMPLICATION, WITHOUT LONG-TERM CURRENT USE OF INSULIN (H): ICD-10-CM

## 2023-11-15 DIAGNOSIS — E11.42 DIABETIC POLYNEUROPATHY ASSOCIATED WITH TYPE 2 DIABETES MELLITUS (H): ICD-10-CM

## 2023-11-15 LAB — HBA1C MFR BLD: 7.4 %

## 2023-11-15 PROCEDURE — 99000 SPECIMEN HANDLING OFFICE-LAB: CPT | Performed by: PATHOLOGY

## 2023-11-15 PROCEDURE — 36415 COLL VENOUS BLD VENIPUNCTURE: CPT | Performed by: PATHOLOGY

## 2023-11-15 PROCEDURE — 83036 HEMOGLOBIN GLYCOSYLATED A1C: CPT | Performed by: INTERNAL MEDICINE

## 2024-01-19 DIAGNOSIS — J45.20 MILD INTERMITTENT ASTHMA WITHOUT COMPLICATION: ICD-10-CM

## 2024-01-22 RX ORDER — ALBUTEROL SULFATE 90 UG/1
2 AEROSOL, METERED RESPIRATORY (INHALATION) EVERY 6 HOURS PRN
Qty: 18 G | Refills: 3 | Status: SHIPPED | OUTPATIENT
Start: 2024-01-22 | End: 2024-02-02

## 2024-02-02 ENCOUNTER — OFFICE VISIT (OUTPATIENT)
Dept: INTERNAL MEDICINE | Facility: CLINIC | Age: 74
End: 2024-02-02
Payer: COMMERCIAL

## 2024-02-02 VITALS
SYSTOLIC BLOOD PRESSURE: 124 MMHG | OXYGEN SATURATION: 94 % | HEART RATE: 62 BPM | BODY MASS INDEX: 30.92 KG/M2 | DIASTOLIC BLOOD PRESSURE: 78 MMHG | WEIGHT: 185.8 LBS

## 2024-02-02 DIAGNOSIS — Z78.0 ASYMPTOMATIC POSTMENOPAUSAL STATE: ICD-10-CM

## 2024-02-02 DIAGNOSIS — E11.9 TYPE 2 DIABETES MELLITUS WITHOUT COMPLICATION, WITHOUT LONG-TERM CURRENT USE OF INSULIN (H): ICD-10-CM

## 2024-02-02 DIAGNOSIS — J45.20 MILD INTERMITTENT ASTHMA WITHOUT COMPLICATION: ICD-10-CM

## 2024-02-02 DIAGNOSIS — D49.0 IPMN (INTRADUCTAL PAPILLARY MUCINOUS NEOPLASM): ICD-10-CM

## 2024-02-02 DIAGNOSIS — Z00.00 ENCOUNTER FOR MEDICARE ANNUAL WELLNESS EXAM: Primary | ICD-10-CM

## 2024-02-02 DIAGNOSIS — E11.65 TYPE 2 DIABETES MELLITUS WITH HYPERGLYCEMIA, WITHOUT LONG-TERM CURRENT USE OF INSULIN (H): ICD-10-CM

## 2024-02-02 DIAGNOSIS — E11.42 DIABETIC POLYNEUROPATHY ASSOCIATED WITH TYPE 2 DIABETES MELLITUS (H): ICD-10-CM

## 2024-02-02 PROCEDURE — G0439 PPPS, SUBSEQ VISIT: HCPCS | Performed by: INTERNAL MEDICINE

## 2024-02-02 RX ORDER — FLUTICASONE PROPIONATE AND SALMETEROL 100; 50 UG/1; UG/1
POWDER RESPIRATORY (INHALATION)
Qty: 2 EACH | Refills: 2 | Status: SHIPPED | OUTPATIENT
Start: 2024-02-02

## 2024-02-02 RX ORDER — ALBUTEROL SULFATE 90 UG/1
2 AEROSOL, METERED RESPIRATORY (INHALATION) EVERY 6 HOURS PRN
Qty: 18 G | Refills: 3 | Status: SHIPPED | OUTPATIENT
Start: 2024-02-02

## 2024-02-02 ASSESSMENT — ASTHMA QUESTIONNAIRES
ACT_TOTALSCORE: 25
QUESTION_5 LAST FOUR WEEKS HOW WOULD YOU RATE YOUR ASTHMA CONTROL: COMPLETELY CONTROLLED
ACT_TOTALSCORE: 25
QUESTION_1 LAST FOUR WEEKS HOW MUCH OF THE TIME DID YOUR ASTHMA KEEP YOU FROM GETTING AS MUCH DONE AT WORK, SCHOOL OR AT HOME: NONE OF THE TIME
QUESTION_3 LAST FOUR WEEKS HOW OFTEN DID YOUR ASTHMA SYMPTOMS (WHEEZING, COUGHING, SHORTNESS OF BREATH, CHEST TIGHTNESS OR PAIN) WAKE YOU UP AT NIGHT OR EARLIER THAN USUAL IN THE MORNING: NOT AT ALL
QUESTION_2 LAST FOUR WEEKS HOW OFTEN HAVE YOU HAD SHORTNESS OF BREATH: NOT AT ALL
QUESTION_4 LAST FOUR WEEKS HOW OFTEN HAVE YOU USED YOUR RESCUE INHALER OR NEBULIZER MEDICATION (SUCH AS ALBUTEROL): NOT AT ALL

## 2024-02-02 NOTE — PROGRESS NOTES
Medicare Annual Wellness Visit Note    Zenaida Valles is a 74 year old year old female patient that presents for a Medicare [facility]    The following are provider that share care for Zenaida Valles:  Patient Care Team         Relationship Specialty Notifications Start End    Nadia Randle MD PCP - General Internal Medicine  11/18/22     Phone: [phone] Fax: [phone]         [address] [place] [address]    Gurpreet Young MD MD Dermatology  5/14/18     Phone: [phone] Fax: [phone]         [address] [place] [address]    John Perez MD MD Family Practice  6/13/19     Phone: [phone] Fax: [phone]         [address] [place] [address]    Rocio Arechiga [facility] Pharmacist Pharmacist Clinician- Clinical Pharmacy Specialist  2/27/20     Phone: [phone]          [address] [place] [address]    Leslie Walker, PA-C Physician Assistant Physician Assistant - Medical  2/28/20     Phone: [phone] Fax: [phone]         [address] [place] [address]    Nadia Randle MD Assigned PCP   2/14/21     Phone: [phone] Fax: [phone]         [address] [place] [address]    Yoko Ivy MD MD Dermatology  6/15/21     Phone: [phone] Pager: [phone] Fax: [phone]         DERMATOLOGY [address] [place], [place] [id number] [address]    Miguel A Vickers MD Assigned Gastroenterology Provider   2/18/23     Phone: [phone] Fax: [phone]         [address] [facility] [address] [facility] [address]    Celine Galvez GC Genetic Counselor Genetic Counselor, MS  2/24/23     Phone: [phone]          [facility] [facility] [address]          What is your marital status:  Who lives in your household? Myself and   Does your home have loose rugs in the hallway? Yes   Does your home have grab bars in the bathroom? Yes   Does your  home have handrails on the stairs? Yes   Does your home have poorly lit areas? No  How many times have you fallen in the past year? N/a  How many times have you been in the Emergency Room in the last year? N/A   How many times have you been hospitalized in the last year? N/A  Do you feel threatened or controlled by a partner, ex-partner or anyone in your life? No  Has anyone hurt you physically, for example by pushing, hitting, slapping or kicking you or forcing you to have sex? No  Are you sexually active? No   If yes, with men, women, or both? N/A  If yes, do you have more than one current partner? N/A  If yes, are you using condoms? N/A   Have you had any sexually transmitted infections in the last year? No  Do you have any sexual concerns? No   For Women Only:  What year did you stop having periods (approximate age)? At age 52  Any vaginal bleeding in the last year? No  Have you ever had an abnormal Pap smear? No  Do you need help with the phone, transportation, shopping, preparing meals, housework, laundry, medications or managing money? No  Have you noticed any hearing difficulties? No  Do you wear hearing aids? No  Have you seen a hearing professional such as an audiologist in the past year? No  Do you have vision difficulties? No  Do you wear glasses or contacts? Yes   Have you seen an eye doctor in the past year? Yes   How many servings of fruits and vegetables do you eat a day (1 serving is 1 cup)? 2-3  How often do you exercise in a week? 5  What kind of exercise do you do and how long? Walking, weights  Do you wear a seatbelt when driving or riding in a vehicle? Yes  Do you use tobacco?  No  Do you use e-cigarettes?  No  Do you use any other drugs? No     Do you drink alcohol? Yes  If you drink alcohol, how many drinks per week? 1/month  Over the past 2 weeks, how often have you been bothered by any of the following problems?  Little interest or pleasure in doing things: not at all   Feeling down,  depressed, or hopeless: not at all   PHQ-2 Total: 0   Have you completed an Advance Directives document? Yes   If yes, have you given a copy to the clinic? Yes   Would you like information on Advance Directives today? No    Sunita Adams, EMT at 9:11 AM on 2/2/2024

## 2024-02-02 NOTE — PROGRESS NOTES
Zenaida is a 74 year old that presents in clinic today for the following:     Chief Complaint   Patient presents with    Physical           2/2/2024     7:43 AM   Additional Questions   Roomed by MR     Screenings from encounters over the past 10 days    No data recorded       Sunita Adams, EMT at 7:47 AM on 2/2/2024    History of Present Illness:  Ms. Lazarus Valles is a 74 year old female who presents for  Chief Complaint   Patient presents with    Physical       In interim, got cataracts done  Had EUS for pancreatic lesion, thought to be IPMN, no FNA performed given no suspicious features, rec MRI annually  Met with genetic counselor, no MEN mutations, one MUTHY mutation but no increased cancer surveillance recommended  Endometrial biopsy 11/22 neg, no further bleeding    In September going to [place]    DM: She has changed diet, less carbs. On metformin bid 1000 mg.  Notes tingling in toes.  AM -190. She is reluctant to try new meds.  Lab Results   Component Value Date    A1C 7.4 11/15/2023    A1C 7.6 08/02/2023    A1C 7.4 01/18/2023    A1C 9.0 10/31/2022    A1C 8.3 02/10/2022    A1C 7.6 01/19/2021    A1C 9.2 02/11/2020    A1C 9.1 02/06/2020    A1C 7.7 11/23/2018    A1C 7.3 11/20/2017       BP: Stable today    HLD: not interested in statin given history of hepatitis    Asthma: Using advair daily, symptoms controlled.     Routine Health Maintenance  Colonoscopy (50-75 yrs): 2017 3 TA and diverticulosis, rec 3-5 year f/u    3/22 - Hemorrhoids found on perianal exam.                          - Two 2 to 3 mm polyps in the descending colon and in                          the ascending colon, removed with a jumbo cold                          forceps. Resected and retrieved.                          - Diverticulosis in the recto-sigmoid colon and in the                          sigmoid colon.                            - Non-bleeding external hemorrhoids.    Due 3/27  Dexa (>65W or 70M yrs):  Completed on 2/17/17. Ordered.  Mammogram (40-75 yrs): 6/21, 6/22, 8/23  Pap (21-65 yrs): PAP      NIL   10/27/2014  PAP      NIL   10/18/2011  EmBx 11/22 normal  HIV/HCV if risk factors: neg 2014  Safety/Lifestyle: discussed  Tob/EtOH: n/a  Depression: screen neg  Advanced Directive: she has one on file                          A detailed Review of Systems was performed, verified and is negative except as documented in the HPI.  All health questionnaires were reviewed, verified and relevant information documented above.    Past Medical History:  Past Medical History:   Diagnosis Date    Asthma     Diabetes mellitus (H)     Fibromuscular dysplasia (H24)     H/O sebaceous cyst     Hyperlipidemia LDL goal < 130     Hypertension     Obesity     Renal artery stenosis (H24) 2005    Right, s/p stenting at [facility], FMD    Statin medication not prescribed per physician orders     pt declined       Past Surgical History:  Past Surgical History:   Procedure Laterality Date    COLONOSCOPY N/A 3/10/2017    Procedure: COMBINED COLONOSCOPY, SINGLE OR MULTIPLE BIOPSY/POLYPECTOMY BY BIOPSY;  Surgeon: Sergo Mcgraw MD;  Location: [facility]    COLONOSCOPY N/A 3/30/2022    Procedure: COLONOSCOPY, WITH POLYPECTOMY;  Surgeon: Leventhal, Thomas Michael, MD;  Location: [facility] OR    ENDOMETRIAL SAMPLING (BIOPSY) N/A 11/25/2022    Procedure: endometrial biospy using portable ultrasound guidance;  Surgeon: Jessica Nation MD;  Location: [facility]    ENDOSCOPIC ULTRASOUND UPPER GASTROINTESTINAL TRACT (GI) N/A 3/14/2023    Procedure: ENDOSCOPIC ULTRASOUND, ESOPHAGOSCOPY / UPPER GASTROINTESTINAL TRACT (GI);  Surgeon: Miguel A Vickers MD;  Location: [facility]    EXAM UNDER ANESTHESIA PELVIC N/A 11/25/2022    Procedure: EXAM UNDER ANESTHESIA, PELVIS,;  Surgeon: Jessica Nation MD;  Location: [facility] OR    IR RENAL/VISCERAL STENT/ATHERECT/PTA Right 2005       Active Meds:  Current Outpatient Medications   Medication    acetaminophen (TYLENOL)  500 MG tablet    albuterol (PROAIR HFA) 108 (90 Base) MCG/ACT inhaler    aspirin 81 MG tablet    atenolol (TENORMIN) 25 MG tablet    blood glucose (NO BRAND SPECIFIED) lancets standard    blood glucose (NO BRAND SPECIFIED) test strip    blood glucose monitoring (NO BRAND SPECIFIED) meter device kit    diclofenac (VOLTAREN) 1 % GEL topical gel    fluticasone-salmeterol (ADVAIR) 100-50 MCG/ACT inhaler    hydrochlorothiazide (HYDRODIURIL) 25 MG tablet    irbesartan (AVAPRO) 300 MG tablet    [START ON 3/20/2024] LORazepam (ATIVAN) 0.5 MG tablet    metFORMIN (GLUCOPHAGE) 500 MG tablet    STATIN NOT PRESCRIBED (INTENTIONAL)     No current facility-administered medications for this visit.        Allergies:  Codeine, Codeine camsylate, Ibuprofen, and Lisinopril    Family History:  family history includes C.A.D. in her father; Cancer in her father; Childhood Heart Disease in her sister; Diabetes in her mother; Peptic Ulcer Disease in her mother; Thyroid Disease in her mother.    Social History:  Social History     Tobacco Use    Smoking status: Never    Smokeless tobacco: Never   Vaping Use    Vaping Use: Never used   Substance Use Topics    Alcohol use: Yes     Comment: rare    Drug use: No       Physical Exam:  Vitals: /78 (BP Location: Right arm, Patient Position: Sitting, Cuff Size: Adult Regular)   Pulse 62   Wt 84.3 kg (185 lb 12.8 oz)   LMP  (LMP Unknown)   SpO2 94%   BMI 30.92 kg/m    Constitutional: Alert, oriented, pleasant, no acute distress  Head: Normocephalic, atraumatic  Eyes: Extra-ocular movements intact, pupils equally round and reactive bilaterally, no scleral icterus  ENT: Oropharynx clear, moist mucus membranes, good dentition  Neck: Supple, no lymphadenopathy  Cardiovascular: Regular rate and rhythm, no murmurs, rubs or gallops, peripheral pulses full/symmetric  Respiratory: Good air movement bilaterally, lungs clear, no wheezes/rales/rhonchi  GI: Abdomen soft, bowel sounds present,  nondistended, nontender, no organomegaly or masses, no rebound/guarding  Musculoskeletal: No edema, normal muscle tone, normal gait  Neurologic: Alert and oriented, cranial nerves 2-12 intact, grossly non-focal  Skin: No rashes/lesions  Psychiatric: normal mentation, affect and mood      Diagnostics:  Labs reviewed in Epic          Assessment and Plan:  Zenaida was seen today for physical.    Diagnoses and all orders for this visit:    Encounter for Medicare annual wellness exam  Routine health care reviewed and updated as appropriate.    Type 2 diabetes mellitus with hyperglycemia, without long-term current use of insulin (H)  Discussed escalation of medications if A1c doesn't improve.   -     **Hemoglobin A1c FUTURE 3mo; Future  -     **Basic metabolic panel FUTURE 2mo; Future  -     Albumin Random Urine Quantitative with Creat Ratio; Future    Asymptomatic postmenopausal state  -     DX Hip/Pelvis/Spine; Future    Type 2 diabetes mellitus without complication, without long-term current use of insulin (H)  -     blood glucose (NO BRAND SPECIFIED) lancets standard; Use to test blood sugar 2 times daily or as directed.  -     blood glucose (NO BRAND SPECIFIED) test strip; Use to test blood sugar 2 times daily or as directed.  -     metFORMIN (GLUCOPHAGE) 500 MG tablet; Take 2 tablets (1,000 mg) by mouth 2 times daily (with meals)    Mild intermittent asthma without complication  -     fluticasone-salmeterol (ADVAIR) 100-50 MCG/ACT inhaler; INHALE ONE PUFF INTO THE LUNGS DAILY  -     albuterol (PROAIR HFA) 108 (90 Base) MCG/ACT inhaler; Inhale 2 puffs into the lungs every 6 hours as needed for shortness of breath or wheezing    Diabetic polyneuropathy associated with type 2 diabetes mellitus (H)  -     metFORMIN (GLUCOPHAGE) 500 MG tablet; Take 2 tablets (1,000 mg) by mouth 2 times daily (with meals)    IPMN (intraductal papillary mucinous neoplasm)  Reviewed care with GI and Genetics, due for annual surveillance this  spring.     Other orders  -     REVIEW OF HEALTH MAINTENANCE PROTOCOL ORDERS        Nadia Randle MD  Internal Medicine

## 2024-02-02 NOTE — PATIENT INSTRUCTIONS
Get labs done in 2-3 months.    Dexa scan for bone density. To schedule your dexa scan, please call [phone]

## 2024-04-16 ENCOUNTER — LAB (OUTPATIENT)
Dept: LAB | Facility: CLINIC | Age: 74
End: 2024-04-16
Payer: COMMERCIAL

## 2024-04-16 DIAGNOSIS — E11.65 TYPE 2 DIABETES MELLITUS WITH HYPERGLYCEMIA, WITHOUT LONG-TERM CURRENT USE OF INSULIN (H): ICD-10-CM

## 2024-04-16 LAB
ANION GAP SERPL CALCULATED.3IONS-SCNC: 14 MMOL/L (ref 7–15)
BUN SERPL-MCNC: 15.2 MG/DL (ref 8–23)
CALCIUM SERPL-MCNC: 9.4 MG/DL (ref 8.8–10.2)
CHLORIDE SERPL-SCNC: 102 MMOL/L (ref 98–107)
CREAT SERPL-MCNC: 0.73 MG/DL (ref 0.51–0.95)
DEPRECATED HCO3 PLAS-SCNC: 25 MMOL/L (ref 22–29)
EGFRCR SERPLBLD CKD-EPI 2021: 87 ML/MIN/1.73M2
GLUCOSE SERPL-MCNC: 153 MG/DL (ref 70–99)
POTASSIUM SERPL-SCNC: 4.2 MMOL/L (ref 3.4–5.3)
SODIUM SERPL-SCNC: 141 MMOL/L (ref 135–145)

## 2024-04-16 PROCEDURE — 80048 BASIC METABOLIC PNL TOTAL CA: CPT | Performed by: PATHOLOGY

## 2024-04-16 PROCEDURE — 82570 ASSAY OF URINE CREATININE: CPT | Performed by: INTERNAL MEDICINE

## 2024-04-16 PROCEDURE — 36415 COLL VENOUS BLD VENIPUNCTURE: CPT | Performed by: PATHOLOGY

## 2024-04-16 PROCEDURE — 99000 SPECIMEN HANDLING OFFICE-LAB: CPT | Performed by: PATHOLOGY

## 2024-04-17 LAB
CREAT UR-MCNC: 133 MG/DL
MICROALBUMIN UR-MCNC: <12 MG/L
MICROALBUMIN/CREAT UR: NORMAL MG/G{CREAT}

## 2024-04-19 DIAGNOSIS — I10 BENIGN ESSENTIAL HYPERTENSION: ICD-10-CM

## 2024-04-23 DIAGNOSIS — I10 BENIGN ESSENTIAL HYPERTENSION: ICD-10-CM

## 2024-04-23 RX ORDER — ATENOLOL 25 MG/1
25 TABLET ORAL DAILY
Qty: 90 TABLET | Refills: 3 | Status: SHIPPED | OUTPATIENT
Start: 2024-04-23

## 2024-04-23 RX ORDER — IRBESARTAN 300 MG/1
300 TABLET ORAL DAILY
Qty: 90 TABLET | Refills: 3 | Status: SHIPPED | OUTPATIENT
Start: 2024-04-23

## 2024-04-23 RX ORDER — HYDROCHLOROTHIAZIDE 25 MG/1
25 TABLET ORAL DAILY
Qty: 90 TABLET | Refills: 3 | Status: SHIPPED | OUTPATIENT
Start: 2024-04-23

## 2024-04-23 NOTE — TELEPHONE ENCOUNTER
Health Call Center    Phone Message    May a detailed message be left on voicemail: yes     Reason for Call: Medication Refill Request    Has the patient contacted the pharmacy for the refill? Yes   Name of medication being requested:    irbesartan (AVAPRO) 300 MG tablet       atenolol (TENORMIN) 25 MG tablet       hydrochlorothiazide (HYDRODIURIL) 25 MG tablet     Provider who prescribed the medication: Nadia Randle MD   Pharmacy: [address]   Date medication is needed: ASAP please, patient is frustrated, she asked Dr. Randle to okay refill patients medication at annual check up. She is wanting these sent over to pharmacy please, please call her once these are sent over.    Action Taken: Message routed to:  [facility]    Travel Screening: Not Applicable

## 2024-04-23 NOTE — TELEPHONE ENCOUNTER
irbesartan (AVAPRO) 300 MG tablet     Last Written Prescription Date:  1/24/23  Last Fill Quantity: 90,   # refills: 3    atenolol (TENORMIN) 25 MG tablet   Last Written Prescription Date:  1/24/23  Last Fill Quantity: 90,   # refills: 3  hydrochlorothiazide (HYDRODIURIL) 25 MG tablet  Last Written Prescription Date:  1/24/23  Last Fill Quantity: 90,   # refills: 3   Last Office Visit : 2/2/24  Future Office visit:  None    MyChart notification sent.

## 2024-04-24 ENCOUNTER — ANCILLARY PROCEDURE (OUTPATIENT)
Dept: MRI IMAGING | Facility: CLINIC | Age: 74
End: 2024-04-24
Attending: INTERNAL MEDICINE
Payer: COMMERCIAL

## 2024-04-24 DIAGNOSIS — D49.0 IPMN (INTRADUCTAL PAPILLARY MUCINOUS NEOPLASM): ICD-10-CM

## 2024-04-24 DIAGNOSIS — K86.2 PANCREAS CYST: ICD-10-CM

## 2024-04-24 PROCEDURE — 74183 MRI ABD W/O CNTR FLWD CNTR: CPT | Performed by: RADIOLOGY

## 2024-04-24 PROCEDURE — A9585 GADOBUTROL INJECTION: HCPCS | Performed by: RADIOLOGY

## 2024-04-24 RX ORDER — GADOBUTROL 604.72 MG/ML
10 INJECTION INTRAVENOUS ONCE
Status: COMPLETED | OUTPATIENT
Start: 2024-04-24 | End: 2024-04-24

## 2024-04-24 RX ADMIN — GADOBUTROL 8.5 ML: 604.72 INJECTION INTRAVENOUS at 09:41

## 2024-04-25 DIAGNOSIS — E11.9 TYPE 2 DIABETES MELLITUS WITHOUT COMPLICATION, WITHOUT LONG-TERM CURRENT USE OF INSULIN (H): ICD-10-CM

## 2024-04-25 DIAGNOSIS — E11.42 DIABETIC POLYNEUROPATHY ASSOCIATED WITH TYPE 2 DIABETES MELLITUS (H): ICD-10-CM

## 2024-04-26 RX ORDER — HYDROCHLOROTHIAZIDE 25 MG/1
25 TABLET ORAL DAILY
Qty: 90 TABLET | Refills: 2 | OUTPATIENT
Start: 2024-04-26

## 2024-04-26 RX ORDER — IRBESARTAN 300 MG/1
300 TABLET ORAL DAILY
Qty: 90 TABLET | Refills: 2 | OUTPATIENT
Start: 2024-04-26

## 2024-04-26 RX ORDER — ATENOLOL 25 MG/1
25 TABLET ORAL DAILY
Qty: 90 TABLET | Refills: 2 | OUTPATIENT
Start: 2024-04-26

## 2024-04-29 ENCOUNTER — OFFICE VISIT (OUTPATIENT)
Dept: GASTROENTEROLOGY | Facility: CLINIC | Age: 74
End: 2024-04-29
Attending: INTERNAL MEDICINE
Payer: COMMERCIAL

## 2024-04-29 ENCOUNTER — LAB (OUTPATIENT)
Dept: LAB | Facility: CLINIC | Age: 74
End: 2024-04-29
Payer: COMMERCIAL

## 2024-04-29 ENCOUNTER — TELEPHONE (OUTPATIENT)
Dept: INTERNAL MEDICINE | Facility: CLINIC | Age: 74
End: 2024-04-29

## 2024-04-29 VITALS
RESPIRATION RATE: 16 BRPM | BODY MASS INDEX: 29.57 KG/M2 | TEMPERATURE: 98.6 F | SYSTOLIC BLOOD PRESSURE: 144 MMHG | HEART RATE: 70 BPM | OXYGEN SATURATION: 98 % | DIASTOLIC BLOOD PRESSURE: 80 MMHG | WEIGHT: 177.7 LBS

## 2024-04-29 DIAGNOSIS — K86.2 PANCREAS CYST: Primary | ICD-10-CM

## 2024-04-29 DIAGNOSIS — D49.0 IPMN (INTRADUCTAL PAPILLARY MUCINOUS NEOPLASM): ICD-10-CM

## 2024-04-29 DIAGNOSIS — Z15.89 MONOALLELIC MUTATION OF MUTYH GENE: ICD-10-CM

## 2024-04-29 DIAGNOSIS — E11.65 TYPE 2 DIABETES MELLITUS WITH HYPERGLYCEMIA, WITHOUT LONG-TERM CURRENT USE OF INSULIN (H): ICD-10-CM

## 2024-04-29 LAB — HBA1C MFR BLD: 7.2 %

## 2024-04-29 PROCEDURE — 99213 OFFICE O/P EST LOW 20 MIN: CPT | Performed by: INTERNAL MEDICINE

## 2024-04-29 PROCEDURE — G0463 HOSPITAL OUTPT CLINIC VISIT: HCPCS | Performed by: INTERNAL MEDICINE

## 2024-04-29 PROCEDURE — 36415 COLL VENOUS BLD VENIPUNCTURE: CPT | Performed by: PATHOLOGY

## 2024-04-29 PROCEDURE — 83036 HEMOGLOBIN GLYCOSYLATED A1C: CPT | Performed by: INTERNAL MEDICINE

## 2024-04-29 PROCEDURE — 99000 SPECIMEN HANDLING OFFICE-LAB: CPT | Performed by: PATHOLOGY

## 2024-04-29 ASSESSMENT — PAIN SCALES - GENERAL: PAINLEVEL: NO PAIN (0)

## 2024-04-29 NOTE — LETTER
4/29/2024         RE: Zenaida Valles  [address]  [facility] [address]        Dear Colleague,    Thank you for referring your patient, Zenaida Valles, to the [facility]. Please see a copy of my visit note below.    [facility]  GASTROENTEROLOGY PROGRESS NOTE  Zenaida Valles [id number]     SUBJECTIVE:  75 yo female being seen for follow-up of pancreatic cysts. This was incidentally seen on CT 2/11/20. She was last seen 2/13/23, at which time EUS was recommended due to the interval growth on imaging.     EUS 3/14/23 showed 2 cysts in the pancreatic tail, one measuring 15 x 10 mm and one 3 x 2 mm. There were no solid components and the main PD was not dilated.  Surveillance MRI in 1 yr was then recommended.    She was also referred to genetics clinic due to a FH of two relatives with Z-E syndrome as well as her mother having unclear thyroid surgery. No significant issues were identified.    She now is seen for follow regarding MRI 4/24/24. This was read as:  IMPRESSION:  Multiple unchanged T2 hyperintense cystic foci throughout the pancreas  with the largest measuring up to 15 mm in the pancreatic tail. No new  suspicious pancreatic lesions. Recommend continued follow-up as per  the guidelines below.    She is feeling well and denies weight loss, abdominal pain, jaundice or chronic diarrhea.    OBJECTIVE:  VS: BP (!) 144/80 (BP Location: Right arm, Patient Position: Sitting, Cuff Size: Adult Regular)   Pulse 70   Temp 98.6  F (37  C) (Oral)   Resp 16   Wt 80.6 kg (177 lb 11.2 oz)   LMP  (LMP Unknown)   SpO2 98%   BMI 29.57 kg/m     GEN: A&Ox3, NAD, comfortable  Anicteric, no jaundice.      IMPRESSION:  Zenaida Valles is a 74 year old female with multiple pancreatic cysts in the absence of history of acute pancreatitis or imaging suggestive of chronic pancreatitis.   This is consistent with branch-duct IPMN, without  high-risk or worrisome features.    We discussed the recent changes in the International Consensus Guidelines ([place] guidelines) which now recommend repeat imaging every 18 months for all cysts < 20 mm in diameter.    RECOMMENDATIONS:  Repeat MRI in 18 months. Sooner if symptomatic.    Due for colonoscopy for colon screening in 2027.    It was a pleasure to participate in the care of this patient; please contact us with any further questions.  A total of 25 minutes was spent on the day of the visit, >50% of which was counseling regarding the above delineated issues. The remainder was review of records and imaging as well as documentation and coordination of care.        Again, thank you for allowing me to participate in the care of your patient.      Sincerely,    Miguel A Vickers MD

## 2024-04-29 NOTE — NURSING NOTE
"Oncology Rooming Note    April 29, 2024 10:28 AM   Zenaida Valles is a 74 year old female who presents for:    Chief Complaint   Patient presents with    Oncology Clinic Visit     Monoallelic mutation of MUTYH gene     Initial Vitals: BP (!) 144/80 (BP Location: Right arm, Patient Position: Sitting, Cuff Size: Adult Regular)   Pulse 70   Temp 98.6  F (37  C) (Oral)   Resp 16   Wt 80.6 kg (177 lb 11.2 oz)   LMP  (LMP Unknown)   SpO2 98%   BMI 29.57 kg/m   Estimated body mass index is 29.57 kg/m  as calculated from the following:    Height as of 4/25/23: 1.651 m (5' 5\").    Weight as of this encounter: 80.6 kg (177 lb 11.2 oz). Body surface area is 1.92 meters squared.  No Pain (0) Comment: Data Unavailable   No LMP recorded (lmp unknown). Patient is postmenopausal.  Allergies reviewed: Yes  Medications reviewed: Yes    Medications: Medication refills not needed today.  Pharmacy name entered into [facility]:    [facility] - [place], [place] - [address] [facility] - [place] - [address] [facility] [place] - [place], [place] - [address]    Frailty Screening:   Is the patient here for a new oncology consult visit in cancer care? 2. No      Clinical concerns: none      Lucero Roger, EMT  4/29/2024            "

## 2024-04-29 NOTE — TELEPHONE ENCOUNTER
Spoke with patient re: some concerns around the refill process. Pt conversational and appreciative of phone call.

## 2024-05-14 ENCOUNTER — TELEPHONE (OUTPATIENT)
Dept: GASTROENTEROLOGY | Facility: CLINIC | Age: 74
End: 2024-05-14
Payer: COMMERCIAL

## 2024-05-14 NOTE — TELEPHONE ENCOUNTER
Yoselyn:    Please arrange for repeat MRI abdomen with contrast in 18 months.    Ind - pancreatic cyst surveillance.    JULIO CESAR Vickers MD  Professor of Medicine  Division of Gastroenterology, Hepatology and Nutrition  [facility]

## 2024-05-14 NOTE — PROGRESS NOTES
[facility]  GASTROENTEROLOGY PROGRESS NOTE  Zenaida Valles [id number]     SUBJECTIVE:  73 yo female being seen for follow-up of pancreatic cysts. This was incidentally seen on CT 2/11/20. She was last seen 2/13/23, at which time EUS was recommended due to the interval growth on imaging.     EUS 3/14/23 showed 2 cysts in the pancreatic tail, one measuring 15 x 10 mm and one 3 x 2 mm. There were no solid components and the main PD was not dilated.  Surveillance MRI in 1 yr was then recommended.    She was also referred to genetics clinic due to a FH of two relatives with Z-E syndrome as well as her mother having unclear thyroid surgery. No significant issues were identified.    She now is seen for follow regarding MRI 4/24/24. This was read as:  IMPRESSION:  Multiple unchanged T2 hyperintense cystic foci throughout the pancreas  with the largest measuring up to 15 mm in the pancreatic tail. No new  suspicious pancreatic lesions. Recommend continued follow-up as per  the guidelines below.    She is feeling well and denies weight loss, abdominal pain, jaundice or chronic diarrhea.    OBJECTIVE:  VS: BP (!) 144/80 (BP Location: Right arm, Patient Position: Sitting, Cuff Size: Adult Regular)   Pulse 70   Temp 98.6  F (37  C) (Oral)   Resp 16   Wt 80.6 kg (177 lb 11.2 oz)   LMP  (LMP Unknown)   SpO2 98%   BMI 29.57 kg/m     GEN: A&Ox3, NAD, comfortable  Anicteric, no jaundice.      IMPRESSION:  Zenaida Valles is a 74 year old female with multiple pancreatic cysts in the absence of history of acute pancreatitis or imaging suggestive of chronic pancreatitis.   This is consistent with branch-duct IPMN, without high-risk or worrisome features.    We discussed the recent changes in the International Consensus Guidelines ([place] guidelines) which now recommend repeat imaging every 18 months for all cysts < 20 mm in diameter.    RECOMMENDATIONS:  Repeat MRI in 18 months. Sooner if  symptomatic.    Due for colonoscopy for colon screening in 2027.    It was a pleasure to participate in the care of this patient; please contact us with any further questions.  A total of 25 minutes was spent on the day of the visit, >50% of which was counseling regarding the above delineated issues. The remainder was review of records and imaging as well as documentation and coordination of care.    JULIO CESAR Vickers MD  Professor of Medicine  Division of Gastroenterology, Hepatology and Nutrition  [facility]  5/14/2024

## 2024-05-16 ENCOUNTER — ANCILLARY PROCEDURE (OUTPATIENT)
Dept: BONE DENSITY | Facility: CLINIC | Age: 74
End: 2024-05-16
Attending: INTERNAL MEDICINE
Payer: COMMERCIAL

## 2024-05-16 DIAGNOSIS — Z78.0 ASYMPTOMATIC POSTMENOPAUSAL STATE: ICD-10-CM

## 2024-05-16 PROCEDURE — 77080 DXA BONE DENSITY AXIAL: CPT | Performed by: INTERNAL MEDICINE

## 2024-06-25 ENCOUNTER — TRANSFERRED RECORDS (OUTPATIENT)
Dept: HEALTH INFORMATION MANAGEMENT | Facility: CLINIC | Age: 74
End: 2024-06-25
Payer: COMMERCIAL

## 2024-10-02 ENCOUNTER — ANCILLARY PROCEDURE (OUTPATIENT)
Dept: MAMMOGRAPHY | Facility: CLINIC | Age: 74
End: 2024-10-02
Attending: INTERNAL MEDICINE
Payer: COMMERCIAL

## 2024-10-02 DIAGNOSIS — Z12.31 VISIT FOR SCREENING MAMMOGRAM: ICD-10-CM

## 2024-10-02 PROCEDURE — 77063 BREAST TOMOSYNTHESIS BI: CPT | Mod: GC | Performed by: RADIOLOGY

## 2024-10-02 PROCEDURE — 77067 SCR MAMMO BI INCL CAD: CPT | Mod: GC | Performed by: RADIOLOGY

## 2024-10-11 ENCOUNTER — HOSPITAL ENCOUNTER (EMERGENCY)
Facility: CLINIC | Age: 74
Discharge: HOME | End: 2024-10-11
Attending: EMERGENCY MEDICINE | Admitting: EMERGENCY MEDICINE
Payer: COMMERCIAL

## 2024-10-11 ENCOUNTER — APPOINTMENT (OUTPATIENT)
Dept: GENERAL RADIOLOGY | Facility: CLINIC | Age: 74
End: 2024-10-11
Payer: COMMERCIAL

## 2024-10-11 VITALS
OXYGEN SATURATION: 99 % | TEMPERATURE: 97.8 F | RESPIRATION RATE: 18 BRPM | SYSTOLIC BLOOD PRESSURE: 155 MMHG | DIASTOLIC BLOOD PRESSURE: 81 MMHG | HEART RATE: 78 BPM

## 2024-10-11 DIAGNOSIS — S83.92XA LEFT KNEE SPRAIN: ICD-10-CM

## 2024-10-11 LAB
ALBUMIN UR-MCNC: NEGATIVE MG/DL
APPEARANCE UR: CLEAR
BILIRUB UR QL STRIP: NEGATIVE
COLOR UR AUTO: ABNORMAL
GLUCOSE BLDC GLUCOMTR-MCNC: 115 MG/DL (ref 70–99)
GLUCOSE UR STRIP-MCNC: NEGATIVE MG/DL
HGB UR QL STRIP: NEGATIVE
HOLD SPECIMEN: NORMAL
KETONES UR STRIP-MCNC: NEGATIVE MG/DL
LEUKOCYTE ESTERASE UR QL STRIP: ABNORMAL
MUCOUS THREADS #/AREA URNS LPF: PRESENT /LPF
NITRATE UR QL: NEGATIVE
PH UR STRIP: 6 [PH] (ref 5–7)
RBC URINE: 3 /HPF
SP GR UR STRIP: 1.02 (ref 1–1.03)
SQUAMOUS EPITHELIAL: 1 /HPF
UROBILINOGEN UR STRIP-MCNC: NORMAL MG/DL
WBC URINE: 4 /HPF

## 2024-10-11 PROCEDURE — 99284 EMERGENCY DEPT VISIT MOD MDM: CPT | Performed by: EMERGENCY MEDICINE

## 2024-10-11 PROCEDURE — 73560 X-RAY EXAM OF KNEE 1 OR 2: CPT | Mod: LT

## 2024-10-11 PROCEDURE — 82962 GLUCOSE BLOOD TEST: CPT

## 2024-10-11 PROCEDURE — 29505 APPLICATION LONG LEG SPLINT: CPT | Mod: LT | Performed by: EMERGENCY MEDICINE

## 2024-10-11 PROCEDURE — 99284 EMERGENCY DEPT VISIT MOD MDM: CPT | Mod: GC | Performed by: EMERGENCY MEDICINE

## 2024-10-11 PROCEDURE — 73560 X-RAY EXAM OF KNEE 1 OR 2: CPT | Mod: 26 | Performed by: RADIOLOGY

## 2024-10-11 PROCEDURE — 81001 URINALYSIS AUTO W/SCOPE: CPT

## 2024-10-11 RX ORDER — OXYCODONE HYDROCHLORIDE 5 MG/1
5 TABLET ORAL EVERY 6 HOURS PRN
Qty: 12 TABLET | Refills: 0 | Status: SHIPPED | OUTPATIENT
Start: 2024-10-11 | End: 2024-10-14

## 2024-10-11 ASSESSMENT — ACTIVITIES OF DAILY LIVING (ADL)
ADLS_ACUITY_SCORE: 37

## 2024-10-11 ASSESSMENT — COLUMBIA-SUICIDE SEVERITY RATING SCALE - C-SSRS
6. HAVE YOU EVER DONE ANYTHING, STARTED TO DO ANYTHING, OR PREPARED TO DO ANYTHING TO END YOUR LIFE?: NO
2. HAVE YOU ACTUALLY HAD ANY THOUGHTS OF KILLING YOURSELF IN THE PAST MONTH?: NO
1. IN THE PAST MONTH, HAVE YOU WISHED YOU WERE DEAD OR WISHED YOU COULD GO TO SLEEP AND NOT WAKE UP?: NO

## 2024-10-11 NOTE — ED TRIAGE NOTES
"L knee has been hurting for past 2 days  Went out to garden and heard \"snap\" 2 hrs ago while walking, suddenly unable to bear weight on left leg        "

## 2024-10-12 NOTE — DISCHARGE INSTRUCTIONS
You were seen in the ED for left knee pain. XR was negative for fracture. You likely have a sprain or ligamentous injury. Please utilize the knee immobilizer for comfort and the crutches to assist with getting around. If you are unable to use the crutches, please consider obtaining a wheelchair. We sent a referral for Orthopedics, please reach out to them and schedule a follow up appointment as you are able. We also provided you with a prescription for Oxycodone 5mg every 6 hours as needed for pain. Please refrain from operating motor vehicles and drinking alcohol while taking this medication. This medication can cause constipation as a side effect; please consider using over the counter laxatives while taking this medication. Please return to the ED if you pain worsens significantly, you loose feeling in your left leg, or you re-injure your leg.

## 2024-10-14 NOTE — TELEPHONE ENCOUNTER
DIAGNOSIS: Left Knee Sprain   APPOINTMENT DATE: 10/15/2024   NOTES STATUS DETAILS   OFFICE NOTE from referring provider N/A    OFFICE NOTE from other specialist N/A    DISCHARGE SUMMARY from hospital N/A    DISCHARGE REPORT from the ER [facility]:  10/11/24 - ED OV with Dr. Adler   OPERATIVE REPORT N/A    MEDICATION LIST Internal    DEXA (osteoporosis/bone health) Internal [facility]:  5/16/24 - DEXA   XRAYS (IMAGES & REPORTS) Internal [facility]:  10/11/24 - XR L Knee

## 2024-10-15 ENCOUNTER — OFFICE VISIT (OUTPATIENT)
Dept: ORTHOPEDICS | Facility: CLINIC | Age: 74
End: 2024-10-15
Payer: COMMERCIAL

## 2024-10-15 ENCOUNTER — ANCILLARY PROCEDURE (OUTPATIENT)
Dept: MAMMOGRAPHY | Facility: CLINIC | Age: 74
End: 2024-10-15
Attending: INTERNAL MEDICINE
Payer: COMMERCIAL

## 2024-10-15 ENCOUNTER — PRE VISIT (OUTPATIENT)
Dept: ORTHOPEDICS | Facility: CLINIC | Age: 74
End: 2024-10-15

## 2024-10-15 DIAGNOSIS — R92.8 ABNORMAL MAMMOGRAM OF LEFT BREAST: ICD-10-CM

## 2024-10-15 DIAGNOSIS — S76.312A STRAIN OF LEFT HAMSTRING, INITIAL ENCOUNTER: Primary | ICD-10-CM

## 2024-10-15 DIAGNOSIS — M25.562 ACUTE PAIN OF LEFT KNEE: ICD-10-CM

## 2024-10-15 PROCEDURE — 99203 OFFICE O/P NEW LOW 30 MIN: CPT | Performed by: FAMILY MEDICINE

## 2024-10-15 PROCEDURE — G0279 TOMOSYNTHESIS, MAMMO: HCPCS | Performed by: RADIOLOGY

## 2024-10-15 PROCEDURE — 76642 ULTRASOUND BREAST LIMITED: CPT | Mod: LT | Performed by: RADIOLOGY

## 2024-10-15 PROCEDURE — 77065 DX MAMMO INCL CAD UNI: CPT | Mod: LT | Performed by: RADIOLOGY

## 2024-10-15 NOTE — PROGRESS NOTES
CHIEF COMPLAINT:  Consult (Left knee)       HISTORY OF PRESENT ILLNESS  Ms. Lazarus Valles is a pleasant 74 year old year old female who presents to clinic today with left knee pain.  Zenaida explains that she came home from a trip and was walking and felt a snap in her knee with sharp pain. Pain localizes to the popliteal region of the knee and into the medial thigh, distal one third.    Zenaida was placed in a immobilizer brace and given crutches after she was seen and evaluated in the ER.  X-rays were negative.  She has been in the immobilizer since 11 October.    Zenaida notes over the past few days she has had increased ability to walk with the immobilizer and feels less pain.    Onset: sudden  Location: left knee  Quality:  sharp  Duration: 10/11/24  Severity: 10/10 at worst  Timing:intermittent episodes with movement  Modifying factors:  resting and non-use makes it better, movement and use makes it worse  Associated signs & symptoms: pain  Previous similar pain: Yes  Treatments to date: NSAIDs, crutches, knee immobilizer, ice, oxycodone given by ER at night     Additional history: as documented    Review of Systems:  Have you recently had a a fever, chills, weight loss? No  Do you have any vision problems? No  Do you have any chest pain or edema? No  Do you have any shortness of breath or wheezing?  No  Do you have stomach problems? No  Do you have any numbness or focal weakness? Yes, bilateral feet numbness  Do you have diabetes? Yes, type 2  Do you have problems with bleeding or clotting? No  Do you have an rashes or other skin lesions? No    MEDICAL HISTORY  Patient Active Problem List   Diagnosis    Essential hypertension    Asthma    Atherosclerosis of renal artery (H)    Diabetes mellitus, type 2 (H)    Monoallelic mutation of MUTYH gene       Current Outpatient Medications   Medication Sig Dispense Refill    acetaminophen (TYLENOL) 500 MG tablet Take 500-1,000 mg by mouth every 6 hours as needed for mild  pain      albuterol (PROAIR HFA) 108 (90 Base) MCG/ACT inhaler Inhale 2 puffs into the lungs every 6 hours as needed for shortness of breath or wheezing 18 g 3    aspirin 81 MG tablet Take 81 mg by mouth daily      atenolol (TENORMIN) 25 MG tablet Take 1 tablet (25 mg) by mouth daily 90 tablet 3    blood glucose (NO BRAND SPECIFIED) lancets standard Use to test blood sugar 2 times daily or as directed. 200 each 3    blood glucose (NO BRAND SPECIFIED) test strip Use to test blood sugar 2 times daily or as directed. 200 strip 3    blood glucose monitoring (NO BRAND SPECIFIED) meter device kit Use to test blood sugar 3 times daily or as directed. 1 kit 0    diclofenac (VOLTAREN) 1 % GEL topical gel Apply 2 grams three - four times daily using enclosed dosing card. 100 g 0    fluticasone-salmeterol (ADVAIR) 100-50 MCG/ACT inhaler INHALE ONE PUFF INTO THE LUNGS DAILY 2 each 2    hydrochlorothiazide (HYDRODIURIL) 25 MG tablet Take 1 tablet (25 mg) by mouth daily 90 tablet 3    irbesartan (AVAPRO) 300 MG tablet Take 1 tablet (300 mg) by mouth daily 90 tablet 3    LORazepam (ATIVAN) 0.5 MG tablet Take 1 tablet (0.5 mg) by mouth once as needed for anxiety (take approx 15 min before MRI, after instructed to do so by staff) 1 tablet 0    metFORMIN (GLUCOPHAGE) 500 MG tablet Take 2 tablets (1,000 mg) by mouth 2 times daily (with meals) 360 tablet 3    STATIN NOT PRESCRIBED (INTENTIONAL) Please choose reason not prescribed, below         Allergies   Allergen Reactions    Codeine Diarrhea and Rash    Codeine Camsylate Diarrhea and Rash    Ibuprofen GI Disturbance and Nausea    Lisinopril Cough       Family History   Problem Relation Age of Onset    Peptic Ulcer Disease Mother         Zollinger-Wheeler Syndrome    Diabetes Mother     Thyroid Disease Mother         s/p thyroidecomy    Cancer Father         prostate cancer    C.A.D. Father         aortic aneurysm    Childhood Heart Disease Sister         tetrology of fallot     "Melanoma No family hx of     Skin Cancer No family hx of        Additional medical/Social/Surgical histories reviewed in [facility] and updated as appropriate.       PHYSICAL EXAM  LMP  (LMP Unknown)     General  - normal appearance, in no obvious distress  Musculoskeletal -  Left knee  - stance: normal gait without limp  - inspection: no swelling or effusion, normal bone and joint alignment, no obvious deformity, no ecchymosis posteriorly  - palpation: no joint line tenderness, patella and patellar tendon non-tender. Tenderness distal semitendinosis /semimembranosus tendons posteriorly.  Nontender pes anserine.  Nontender MCL/LCL   - ROM: 90 degrees flexion, -5 degrees extension, painful knee flexion.    - strength: 4-/5 knee flexion , 4/5 in extension  - special tests:  (-) Lachman  (-) Chuy  (-) varus at 0 and 30 degrees flexion  (-) valgus at 0 and 30 degrees flexion  Neuro  - no sensory or motor deficit, grossly normal coordination, normal muscle tone  Skin  - no ecchymosis, erythema, warmth, or induration, no obvious rash      IMAGING : XR knee left 3 views. Final results and radiologist's interpretation, available in the [facility] health record. Images were reviewed with the patient/family members in the office today. My personal interpretation of the performed imaging is no acute osseous abnormality or significant degenerative changes of joint.No effusion.       ASSESSMENT & PLAN  Ms. Lazarus Valles is a 74 year old year old female who presents to clinic today with acute posterior left knee pain and posterior thigh pain after walking without inciting event, feeling \"pop\" on 10/11/24.     Tenderness myotendinous and muscular region of semitendinosus, semimembranosus.  I anticipate difficulty flexing knee relates to stiffness in knee immobilizer over the past 4 days. No indication for intraarticular pathology with no effusion, no joint line tenderness and no ligamentous laxity.    Diagnosis:   Acute hamstring strain " of left knee  Acute pain of left knee    -Continue knee immobilizer as needed over the next 3 days  -Discontinue knee immobilizer by Friday, transition to hinged knee brace provided today  - Gentle knee range of motion  -Start formal physical therapy   -Continue ice, tylenol as mainstay of analgesics (cannot tolerate NSAIDs) and sparing use of oxycodone, no refills today  -Follow up 3 weeks - 4 weeks.    It was a pleasure seeing Zenaida today.    Chito Lundy DO, CAQSM  Primary Care Sports Medicine

## 2024-10-15 NOTE — LETTER
10/15/2024      RE: Zenaida Valles  [address]  [facility] [address]     Dear Colleague,    Thank you for referring your patient, Zenaida Valles, to the [facility] [place]. Please see a copy of my visit note below.    CHIEF COMPLAINT:  Consult (Left knee)       HISTORY OF PRESENT ILLNESS  Ms. Lazarus Valles is a pleasant 74 year old year old female who presents to clinic today with left knee pain.  Zenaida explains that she came home from a trip and was walking and felt a snap in her knee with sharp pain. Pain localizes to the popliteal region of the knee and into the medial thigh, distal one third.    Zenaida was placed in a immobilizer brace and given crutches after she was seen and evaluated in the ER.  X-rays were negative.  She has been in the immobilizer since 11 October.    Zenaida notes over the past few days she has had increased ability to walk with the immobilizer and feels less pain.    Onset: sudden  Location: left knee  Quality:  sharp  Duration: 10/11/24  Severity: 10/10 at worst  Timing:intermittent episodes with movement  Modifying factors:  resting and non-use makes it better, movement and use makes it worse  Associated signs & symptoms: pain  Previous similar pain: Yes  Treatments to date: NSAIDs, crutches, knee immobilizer, ice, oxycodone given by ER at night     Additional history: as documented    Review of Systems:  Have you recently had a a fever, chills, weight loss? No  Do you have any vision problems? No  Do you have any chest pain or edema? No  Do you have any shortness of breath or wheezing?  No  Do you have stomach problems? No  Do you have any numbness or focal weakness? Yes, bilateral feet numbness  Do you have diabetes? Yes, type 2  Do you have problems with bleeding or clotting? No  Do you have an rashes or other skin lesions? No    MEDICAL HISTORY  Patient Active Problem List   Diagnosis     Essential hypertension      Asthma     Atherosclerosis of renal artery (H)     Diabetes mellitus, type 2 (H)     Monoallelic mutation of MUTYH gene       Current Outpatient Medications   Medication Sig Dispense Refill     acetaminophen (TYLENOL) 500 MG tablet Take 500-1,000 mg by mouth every 6 hours as needed for mild pain       albuterol (PROAIR HFA) 108 (90 Base) MCG/ACT inhaler Inhale 2 puffs into the lungs every 6 hours as needed for shortness of breath or wheezing 18 g 3     aspirin 81 MG tablet Take 81 mg by mouth daily       atenolol (TENORMIN) 25 MG tablet Take 1 tablet (25 mg) by mouth daily 90 tablet 3     blood glucose (NO BRAND SPECIFIED) lancets standard Use to test blood sugar 2 times daily or as directed. 200 each 3     blood glucose (NO BRAND SPECIFIED) test strip Use to test blood sugar 2 times daily or as directed. 200 strip 3     blood glucose monitoring (NO BRAND SPECIFIED) meter device kit Use to test blood sugar 3 times daily or as directed. 1 kit 0     diclofenac (VOLTAREN) 1 % GEL topical gel Apply 2 grams three - four times daily using enclosed dosing card. 100 g 0     fluticasone-salmeterol (ADVAIR) 100-50 MCG/ACT inhaler INHALE ONE PUFF INTO THE LUNGS DAILY 2 each 2     hydrochlorothiazide (HYDRODIURIL) 25 MG tablet Take 1 tablet (25 mg) by mouth daily 90 tablet 3     irbesartan (AVAPRO) 300 MG tablet Take 1 tablet (300 mg) by mouth daily 90 tablet 3     LORazepam (ATIVAN) 0.5 MG tablet Take 1 tablet (0.5 mg) by mouth once as needed for anxiety (take approx 15 min before MRI, after instructed to do so by staff) 1 tablet 0     metFORMIN (GLUCOPHAGE) 500 MG tablet Take 2 tablets (1,000 mg) by mouth 2 times daily (with meals) 360 tablet 3     STATIN NOT PRESCRIBED (INTENTIONAL) Please choose reason not prescribed, below         Allergies   Allergen Reactions     Codeine Diarrhea and Rash     Codeine Camsylate Diarrhea and Rash     Ibuprofen GI Disturbance and Nausea     Lisinopril Cough       Family History   Problem  "Relation Age of Onset     Peptic Ulcer Disease Mother         Zollinger-Wheeler Syndrome     Diabetes Mother      Thyroid Disease Mother         s/p thyroidecomy     Cancer Father         prostate cancer     C.A.D. Father         aortic aneurysm     Childhood Heart Disease Sister         tetrology of fallot     Melanoma No family hx of      Skin Cancer No family hx of        Additional medical/Social/Surgical histories reviewed in [facility] and updated as appropriate.       PHYSICAL EXAM  LMP  (LMP Unknown)     General  - normal appearance, in no obvious distress  Musculoskeletal -  Left knee  - stance: normal gait without limp  - inspection: no swelling or effusion, normal bone and joint alignment, no obvious deformity, no ecchymosis posteriorly  - palpation: no joint line tenderness, patella and patellar tendon non-tender. Tenderness distal semitendinosis /semimembranosus tendons posteriorly.  Nontender pes anserine.  Nontender MCL/LCL   - ROM: 90 degrees flexion, -5 degrees extension, painful knee flexion.    - strength: 4-/5 knee flexion , 4/5 in extension  - special tests:  (-) Lachman  (-) Chuy  (-) varus at 0 and 30 degrees flexion  (-) valgus at 0 and 30 degrees flexion  Neuro  - no sensory or motor deficit, grossly normal coordination, normal muscle tone  Skin  - no ecchymosis, erythema, warmth, or induration, no obvious rash      IMAGING : XR knee left 3 views. Final results and radiologist's interpretation, available in the [facility] health record. Images were reviewed with the patient/family members in the office today. My personal interpretation of the performed imaging is no acute osseous abnormality or significant degenerative changes of joint.No effusion.       ASSESSMENT & PLAN  Ms. Lazarus Valles is a 74 year old year old female who presents to clinic today with acute posterior left knee pain and posterior thigh pain after walking without inciting event, feeling \"pop\" on 10/11/24.     Tenderness " myotendinous and muscular region of semitendinosus, semimembranosus.  I anticipate difficulty flexing knee relates to stiffness in knee immobilizer over the past 4 days. No indication for intraarticular pathology with no effusion, no joint line tenderness and no ligamentous laxity.    Diagnosis:   Acute hamstring strain of left knee  Acute pain of left knee    -Continue knee immobilizer as needed over the next 3 days  -Discontinue knee immobilizer by Friday, transition to hinged knee brace provided today  - Gentle knee range of motion  -Start formal physical therapy   -Continue ice, tylenol as mainstay of analgesics (cannot tolerate NSAIDs) and sparing use of oxycodone, no refills today  -Follow up 3 weeks - 4 weeks.    It was a pleasure seeing Zenaida today.    Chito Lundy DO, [facility]  Primary Care Sports Medicine      Again, thank you for allowing me to participate in the care of your patient.      Sincerely,    Chito Lundy DO

## 2024-10-22 ENCOUNTER — THERAPY VISIT (OUTPATIENT)
Dept: PHYSICAL THERAPY | Facility: CLINIC | Age: 74
End: 2024-10-22
Attending: FAMILY MEDICINE
Payer: COMMERCIAL

## 2024-10-22 DIAGNOSIS — S76.312A STRAIN OF LEFT HAMSTRING, INITIAL ENCOUNTER: ICD-10-CM

## 2024-10-22 PROBLEM — S76.319A HAMSTRING STRAIN: Status: ACTIVE | Noted: 2024-10-22

## 2024-10-22 PROCEDURE — 97110 THERAPEUTIC EXERCISES: CPT | Mod: GP | Performed by: PHYSICAL THERAPIST

## 2024-10-22 PROCEDURE — 97161 PT EVAL LOW COMPLEX 20 MIN: CPT | Mod: GP | Performed by: PHYSICAL THERAPIST

## 2024-10-22 NOTE — PROGRESS NOTES
PHYSICAL THERAPY EVALUATION  Type of Visit: Evaluation        Fall Risk Screen:  Fall screen completed by: PT  Have you fallen 2 or more times in the past year?: No  Have you fallen and had an injury in the past year?: No  Is patient a fall risk?: No    Subjective Had  a long flight from [place] end of Sept, back of knee was tight. Just walking from garden into house 10/11/24, and felt pop in back of knee, no bruising. Could not put weight on it few days. Had to use crutches. ER gave her immobilizer, then MD gave her hinged knee brace to start wearing.       Presenting condition or subjective complaint:    Date of onset: 10/11/24    Relevant medical history:     Dates & types of surgery:      Prior diagnostic imaging/testing results:       Prior therapy history for the same diagnosis, illness or injury:        Prior Level of Function  Transfers: Independent  Ambulation: Independent  ADL: Independent  IADL:         Employment:    retired nurse  Hobbies/Interests:  walking, [employer], gardens    Patient goals for therapy:  walk without pain    Pain assessment:      Objective   KNEE EVALUATION  PAIN: with walking  INTEGUMENTARY (edema, incisions): very slight posterior knee swelling??  POSTURE:   GAIT:antalgic wearing immobilizer  Weightbearing Status:   Assistive Device(s):   Gait Deviations:   BALANCE/PROPRIOCEPTION:   WEIGHTBEARING ALIGNMENT:   NON-WEIGHTBEARING ALIGNMENT:   ROM: 0-115*    STRENGTH: hamstring 4+/5 no pain tested resistance supine and prone  FLEXIBILITY: hamstring to 80* L  no pain,90* R  SPECIAL TESTS:   FUNCTIONAL TESTS:   PALPATION: no specific tenderness  in knee or hamstring  JOINT MOBILITY:     Assessment & Plan  eval limited, seen in 30 min time slot  CLINICAL IMPRESSIONS  Medical Diagnosis: L hamstring strain    Treatment Diagnosis: knee pain, possible Baker's cyst rupture vs hamstring strain   Impression/Assessment: Patient is a 74 year old female with knee/thigh complaints.  The following  significant findings have been identified: Pain, Decreased ROM/flexibility, and Impaired gait. These impairments interfere with their ability to perform self care tasks, recreational activities, household chores, driving , household mobility, and community mobility as compared to previous level of function.     Clinical Decision Making (Complexity):  Clinical Presentation: Stable/Uncomplicated  Clinical Presentation Rationale: based on medical and personal factors listed in PT evaluation  Clinical Decision Making (Complexity): Low complexity    PLAN OF CARE  Treatment Interventions:  Interventions: Gait Training, Manual Therapy, Neuromuscular Re-education, Therapeutic Exercise, Self-Care/Home Management    Long Term Goals     PT Goal 1  Goal Identifier: 1  Goal Description: pt will be able to do household tasks w/o pain in 6wk  Target Date: 12/03/24  PT Goal 2  Goal Identifier: 2  Goal Description: will be able to walk/do stairs w/o pain in 6wk  Target Date: 12/03/24      Frequency of Treatment: 1x every other week  Duration of Treatment: 6wk    Recommended Referrals to Other Professionals:   Education Assessment:   Learner/Method: Patient;Demonstration;No Barriers to Learning    Risks and benefits of evaluation/treatment have been explained.   Patient/Family/caregiver agrees with Plan of Care.     Evaluation Time:     PT Eval, Low Complexity Minutes (23440): 15       Signing Clinician: Kris Hoenk, PT        [facility]                                                                                   OUTPATIENT PHYSICAL THERAPY      PLAN OF TREATMENT FOR OUTPATIENT REHABILITATION   Patient's Last Name, First Name, LIN VallesZenaida  Christianne YOB: 1950   Provider's Name   [facility]   Medical Record No.  [id number]     Onset Date: 10/11/24  Start of Care Date: 10/22/24     Medical Diagnosis:  L hamstring strain      PT Treatment  Diagnosis:  knee pain, possible Baker's cyst rupture vs hamstring strain Plan of Treatment  Frequency/Duration: 1x every other week/ 6wk    Certification date from 10/22/24 to 12/03/24         See note for plan of treatment details and functional goals     Kris Hoenk, PT                         I CERTIFY THE NEED FOR THESE SERVICES FURNISHED UNDER        THIS PLAN OF TREATMENT AND WHILE UNDER MY CARE     (Physician attestation of this document indicates review and certification of the therapy plan).              Referring Provider:  Chito Lundy    Initial Assessment  See Epic Evaluation- Start of Care Date: 10/22/24

## 2024-10-23 ENCOUNTER — ANCILLARY PROCEDURE (OUTPATIENT)
Dept: MAMMOGRAPHY | Facility: CLINIC | Age: 74
End: 2024-10-23
Attending: INTERNAL MEDICINE
Payer: COMMERCIAL

## 2024-10-23 ENCOUNTER — LAB (OUTPATIENT)
Dept: LAB | Facility: CLINIC | Age: 74
End: 2024-10-23
Attending: INTERNAL MEDICINE
Payer: COMMERCIAL

## 2024-10-23 DIAGNOSIS — R92.8 ABNORMAL MAMMOGRAM OF LEFT BREAST: ICD-10-CM

## 2024-10-23 DIAGNOSIS — E11.9 DIABETES MELLITUS, TYPE 2 (H): Primary | ICD-10-CM

## 2024-10-23 LAB
CHOLEST SERPL-MCNC: 211 MG/DL
CREAT SERPL-MCNC: 0.76 MG/DL (ref 0.51–0.95)
EGFRCR SERPLBLD CKD-EPI 2021: 83 ML/MIN/1.73M2
EST. AVERAGE GLUCOSE BLD GHB EST-MCNC: 146 MG/DL
FASTING STATUS PATIENT QL REPORTED: NO
HBA1C MFR BLD: 6.7 %
HDLC SERPL-MCNC: 42 MG/DL
LDLC SERPL CALC-MCNC: 132 MG/DL
NONHDLC SERPL-MCNC: 169 MG/DL
TRIGL SERPL-MCNC: 185 MG/DL

## 2024-10-23 PROCEDURE — 19083 BX BREAST 1ST LESION US IMAG: CPT | Mod: LT | Performed by: RADIOLOGY

## 2024-10-23 PROCEDURE — 99000 SPECIMEN HANDLING OFFICE-LAB: CPT | Performed by: PATHOLOGY

## 2024-10-23 PROCEDURE — 19084 BX BREAST ADD LESION US IMAG: CPT | Mod: LT | Performed by: RADIOLOGY

## 2024-10-23 PROCEDURE — G0279 TOMOSYNTHESIS, MAMMO: HCPCS | Performed by: RADIOLOGY

## 2024-10-23 PROCEDURE — 82565 ASSAY OF CREATININE: CPT | Performed by: PATHOLOGY

## 2024-10-23 PROCEDURE — 88305 TISSUE EXAM BY PATHOLOGIST: CPT | Mod: TC | Performed by: INTERNAL MEDICINE

## 2024-10-23 PROCEDURE — 80061 LIPID PANEL: CPT | Performed by: PATHOLOGY

## 2024-10-23 PROCEDURE — 77066 DX MAMMO INCL CAD BI: CPT | Performed by: RADIOLOGY

## 2024-10-23 PROCEDURE — 88360 TUMOR IMMUNOHISTOCHEM/MANUAL: CPT | Mod: 26 | Performed by: STUDENT IN AN ORGANIZED HEALTH CARE EDUCATION/TRAINING PROGRAM

## 2024-10-23 PROCEDURE — 88305 TISSUE EXAM BY PATHOLOGIST: CPT | Mod: 26 | Performed by: STUDENT IN AN ORGANIZED HEALTH CARE EDUCATION/TRAINING PROGRAM

## 2024-10-23 PROCEDURE — 83036 HEMOGLOBIN GLYCOSYLATED A1C: CPT | Performed by: INTERNAL MEDICINE

## 2024-10-23 PROCEDURE — 36415 COLL VENOUS BLD VENIPUNCTURE: CPT | Performed by: PATHOLOGY

## 2024-10-23 PROCEDURE — 88377 M/PHMTRC ALYS ISHQUANT/SEMIQ: CPT | Performed by: INTERNAL MEDICINE

## 2024-10-23 PROCEDURE — 88342 IMHCHEM/IMCYTCHM 1ST ANTB: CPT | Mod: 26 | Performed by: STUDENT IN AN ORGANIZED HEALTH CARE EDUCATION/TRAINING PROGRAM

## 2024-10-23 PROCEDURE — 88377 M/PHMTRC ALYS ISHQUANT/SEMIQ: CPT | Mod: 26 | Performed by: MEDICAL GENETICS

## 2024-10-23 RX ORDER — IOPAMIDOL 755 MG/ML
100 INJECTION, SOLUTION INTRAVASCULAR ONCE
Status: COMPLETED | OUTPATIENT
Start: 2024-10-23 | End: 2024-10-23

## 2024-10-23 RX ADMIN — IOPAMIDOL 90 ML: 755 INJECTION, SOLUTION INTRAVASCULAR at 11:01

## 2024-10-25 ENCOUNTER — TELEPHONE (OUTPATIENT)
Dept: MAMMOGRAPHY | Facility: CLINIC | Age: 74
End: 2024-10-25
Payer: COMMERCIAL

## 2024-10-25 DIAGNOSIS — C50.912 INVASIVE DUCTAL CARCINOMA OF BREAST, FEMALE, LEFT (H): Primary | ICD-10-CM

## 2024-10-25 LAB
PATH REPORT.COMMENTS IMP SPEC: ABNORMAL
PATH REPORT.COMMENTS IMP SPEC: YES
PATH REPORT.FINAL DX SPEC: ABNORMAL
PATH REPORT.GROSS SPEC: ABNORMAL
PATH REPORT.MICROSCOPIC SPEC OTHER STN: ABNORMAL
PATH REPORT.RELEVANT HX SPEC: ABNORMAL
PATHOLOGY SYNOPTIC REPORT: ABNORMAL
PHOTO IMAGE: ABNORMAL

## 2024-10-25 NOTE — TELEPHONE ENCOUNTER
Spoke to Zenaida about the benign finding of one area in her left breast and the finding of Invasive Ductal Carcinoma in the other area.  We discussed the Radiologist's recommendation of surgical and oncology consultation.  Referrals have been placed and someone will be reaching out to help coordinate the appointments.

## 2024-10-28 ENCOUNTER — PATIENT OUTREACH (OUTPATIENT)
Dept: ONCOLOGY | Facility: CLINIC | Age: 74
End: 2024-10-28
Payer: COMMERCIAL

## 2024-10-28 ENCOUNTER — DOCUMENTATION ONLY (OUTPATIENT)
Dept: ORTHOPEDICS | Facility: CLINIC | Age: 74
End: 2024-10-28
Payer: COMMERCIAL

## 2024-10-28 DIAGNOSIS — S76.312A STRAIN OF HAMSTRING MUSCLE, LEFT, INITIAL ENCOUNTER: Primary | ICD-10-CM

## 2024-10-28 NOTE — PROGRESS NOTES
New Patient Oncology Nurse Navigator Note     Referring provider: Nadia Randle MD      Referring Clinic/Organization: [facility] [place]     Referred to (specialty:) Medical Oncology and Cancer Surgery     Requested provider (if applicable): [facility]     Date Referral Received: October 28, 2024     Evaluation for:  C50.912 (ICD-10-CM) - Invasive ductal carcinoma of breast, female, left (H)     Clinical History (per Nurse review of records provided):      Zenaida Valles is a 74 year old female who presented for screening mammogram on 10/2/24 revealing a possible asymmetry in the left breast in the upper outer quadrant, middle to anterior depth. Diagnostic imaging followed on 10/15 with the following findings:     Mammogram:  Persistent asymmetries in the lateral left breast on additional views.  Ultrasound the left lateral breast was performed. At 3:00 5 cm from the nipple there is an irregular hypoechoic mass measuring 1.0 x 0.7 x 0.9 cm. Closer to the nipple at 3:00 1 cm from the nipple there is a indistinct subtle hypoechoic mass measuring 0.5 x 0.4 cm     10/23/24 - [id number]  A. LEFT breast, 3:00, 1 cm from nipple, ultrasound-guided core biopsy:  - Benign breast tissue with usual ductal hyperplasia (UDH) and fibrocystic changes including microcysts, apocrine metaplasia, and columnar cell change  - Rare calcifications associated with benign breast ducts and acini  - Negative for atypia or malignancy  B. LEFT breast, 3:00, 5 cm from nipple, ultrasound-guided core biopsy:  - INVASIVE BREAST CARCINOMA OF NO SPECIAL TYPE (INVASIVE DUCTAL CARCINOMA), Mynor grade 2  - Ductal carcinoma in situ (DCIS), nuclear grade 2, solid type  - Invasive carcinoma is estrogen receptor positive, progesterone receptor positive, and HER2 equivocal (score 2+) by immunohistochemistry (see 'Breast Biomarker Reporting Template' below)  - HER2 FISH is is process; results  will be reportedly separately by [facility]etis  - See comment.  Comment  Invasive carcinoma involves multiple tissue cores, and is 9 mm in greatest dimension in a single core. The Mynor grade is 2 (tubule formation 2 + nuclear pleomorphism 2 + mitotic activity 2 = Mynor score 6).  HER2 by FISH PENDING  HER2 subsequently reported - Please see source document   INTERPRETATION:  Two admixed populations of cells were found in this sample:  Majority (~60-70%) of tumor in area of strongest IHC staining:  Per the American Society of Clinical Oncology/College of American Pathologists Clinical Practice Guideline Focused Update  (Margarita BIANCA et al, 2018, Arch Pathol Lab Med doi:10.5858/arpa.8810-4180-JS), the HER2/MARION 17 ratio of 1.7 and average number  of HER2 signals/cell of 2.9 places this population of cells in Group 5 (ADRIANA Negative).  Subpopulation (~30-40%) of tumor in area of strongest IHC staining:  Admixed within this sample were cells with increased HER2 signals, which, when selectively scored, had an average of 4.9 HER2  signals/nucleus and a HER2/MARION 17 ratio of 2.2; per the American Society of Clinical Oncology/College of American  Pathologists Clinical Practice Guideline Focused Update (Margarita VALENZUELA et al, 2018, Arch Pathol Lab Med doi:10.5858/arpa.2018-  0902-SA), this population of cells falls into Group 1 (ADRIANA Positive).    If calling her cabin phone number, there is no voicemail on that landline. It may take lots of rings for them to answer.     Zenaida had genetic counseling with [facility] in 2023 due to a family history of with Z-E syndrome as well as her mother having unclear thyroid surgery. She did proceed with testing.   Genetic Testing Results: POSITIVE - CARRIER  Zenaida is POSITIVE for one MUTYH mutation, called c.536A>G (also known as [id number]). 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 No additional mutations were found in the MUTYH gene.    10/28  1106 -Telephone Zenaida at [phone], the listed number for her cabin. Explained my role and purpose for the call. We reviewed next available appointments for cancer surgery and medical oncology. She declined consult with medical oncology for next week due to a prior commitment. Writer received referral, reviewed for appropriate plan, and call warm transferred to New Patient Scheduling for completion.    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    Records Location: See Bookmarked material     Records Needed: Breast imaging for past 5 years

## 2024-10-29 NOTE — TELEPHONE ENCOUNTER
RECORDS STATUS - BREAST    RECORDS REQUESTED FROM: [facility]   NOTES DETAILS STATUS   OFFICE NOTE from referring provider Epic 2/2/24: Dr. Nadia Randle   OPERATIVE REPORT Epic 10/23/24: US Breast Bx   MEDICATION LIST [facility]    LABS     PATHOLOGY REPORTS  (Tissue diagnosis, Stage, ER/OK percentage positive and intensity of staining, HER2 IHC, FISH, and all biopsies from breast and any distant metastasis)                 Report in Epic 10/23/24: [id number]    GENONOMIC TESTING     TYPE:   (Next Generation Sequencing, including Foundation One testing, and Oncotype score) External: [facility] 6/9/23: [id number]   IMAGING (NEED IMAGES & REPORT)     MAMMO PACS 10/23/24-4/23/15   ULTRASOUND PACS 10/23/24: US Breast Bx  10/15/24, 5/7/15: US Breast

## 2024-10-31 ENCOUNTER — PRE VISIT (OUTPATIENT)
Dept: ONCOLOGY | Facility: CLINIC | Age: 74
End: 2024-10-31
Payer: COMMERCIAL

## 2024-10-31 ENCOUNTER — TELEPHONE (OUTPATIENT)
Dept: ONCOLOGY | Facility: CLINIC | Age: 74
End: 2024-10-31

## 2024-10-31 ENCOUNTER — VIRTUAL VISIT (OUTPATIENT)
Dept: ONCOLOGY | Facility: CLINIC | Age: 74
End: 2024-10-31
Attending: INTERNAL MEDICINE
Payer: COMMERCIAL

## 2024-10-31 ENCOUNTER — PATIENT OUTREACH (OUTPATIENT)
Dept: ONCOLOGY | Facility: CLINIC | Age: 74
End: 2024-10-31

## 2024-10-31 VITALS — HEIGHT: 66 IN | BODY MASS INDEX: 27.48 KG/M2 | WEIGHT: 171 LBS

## 2024-10-31 DIAGNOSIS — C50.912 INVASIVE DUCTAL CARCINOMA OF BREAST, FEMALE, LEFT (H): ICD-10-CM

## 2024-10-31 PROCEDURE — 99203 OFFICE O/P NEW LOW 30 MIN: CPT | Mod: 95 | Performed by: SURGERY

## 2024-10-31 ASSESSMENT — PAIN SCALES - GENERAL: PAINLEVEL_OUTOF10: NO PAIN (0)

## 2024-10-31 NOTE — PROGRESS NOTES
[facility]: Surgical Oncology Plan of Care Education Note                                     Discussion with Patient:                                                         Spoke with Zenaida following her visit with Dr. Wood on 11/31/2024. Introduced self and explained role of RNCC.       Plan:                                                       Reviewed plan for left tag-localized lumpectomy with a sentinel lymph node biopsy at the [facility]. Discussed need for H&P within 30 days of surgery. Zenaida prefers to schedule with her PCP.     The process for scheduling the tag placement was reviewed with Zenaida. She is aware the [facility] will be calling to schedule the tag placement. The tag placement will be scheduled for at least two days prior to her surgery.     Informed patient they should get a call within the next three business days from our OR  with surgery date, H&P plan and date of post-op visit with their surgeon. Writer answered all questions and concerns to the best of her ability and provided her contact information. Reviewed use of [facility] as alternative way to contact team.  Encouraged patient to contact with questions.         Emelyn Cheung, RNCC  [facility]  Surgical Onolcogy      Approximately 10 minutes was spent in discussion with patient.

## 2024-10-31 NOTE — LETTER
10/31/2024      Zenaida Valles  [address] [facility] [address]      Dear Colleague,    Thank you for referring your patient, Zenaida Valles, to the [facility]. Please see a copy of my visit note below.    Zenaida is a 74-year-old woman with a new diagnosis of a left breast cancer.  On October 7 she underwent a screening mammogram which demonstrated an asymmetry in her left breast.  On October 15 she underwent a diagnostic mammogram which confirmed a left breast asymmetry.  She had an ultrasound which demonstrated 2 masses in her left breast.  At the 3 o'clock position 1 cm from the nipple there was a mass and at the 3 o'clock position 5 cm from the nipple there was a second mass.  Her lymph nodes were normal by ultrasound.  On October 23 she underwent a contrast-enhanced mammography which only demonstrated 1 mass measuring 1 cm.  The right breast was normal.  She underwent ultrasound-guided biopsies of both masses.  The mass at the 3 o'clock position 1 cm from the nipple was benign.  The mass at the 3 o'clock position 5 cm from the nipple demonstrating a grade 2 invasive ductal cancer that was ER positive and HER2 equivocal.  There is also DCIS present.  The patient on her HER2 is still pending.  She is now here to talk about treatment options.  She has had no prior history of any breast problems.  Her past medical history is significant for diabetes mellitus, hypertension obesity IPMN and a renal artery stent.  The family history is negative for breast cancer.    Impression: Stage I left breast cancer    Plan I talked her about the treatment options including mastectomy with or without reconstruction versus a lumpectomy with or without radiation therapy.  I did recommend a sentinel lymph node biopsy.  She is interested in a lumpectomy with a sentinel lymph node.  I have informed her that some women do not require radiation therapy after a lumpectomy.   I did recommend endocrine therapy.  We will follow-up on her HER2 FISH results.  With family scheduling her surgery.    Video started at 8:48 AM 9:08 AM and the total time of the visit was 30 minutes which included reviewing her imaging      Again, thank you for allowing me to participate in the care of your patient.        Sincerely,        Javier Wood MD

## 2024-10-31 NOTE — TELEPHONE ENCOUNTER
Left voicemail for patient regarding scheduling surgery with Dr. Wood.    Left call back [phone]    Ar Ohara on 10/31/2024 at 4:03 PM

## 2024-10-31 NOTE — PROGRESS NOTES
Zenaida is a 74-year-old woman with a new diagnosis of a left breast cancer.  On October 7 she underwent a screening mammogram which demonstrated an asymmetry in her left breast.  On October 15 she underwent a diagnostic mammogram which confirmed a left breast asymmetry.  She had an ultrasound which demonstrated 2 masses in her left breast.  At the 3 o'clock position 1 cm from the nipple there was a mass and at the 3 o'clock position 5 cm from the nipple there was a second mass.  Her lymph nodes were normal by ultrasound.  On October 23 she underwent a contrast-enhanced mammography which only demonstrated 1 mass measuring 1 cm.  The right breast was normal.  She underwent ultrasound-guided biopsies of both masses.  The mass at the 3 o'clock position 1 cm from the nipple was benign.  The mass at the 3 o'clock position 5 cm from the nipple demonstrating a grade 2 invasive ductal cancer that was ER positive and HER2 equivocal.  There is also DCIS present.  The patient on her HER2 is still pending.  She is now here to talk about treatment options.  She has had no prior history of any breast problems.  Her past medical history is significant for diabetes mellitus, hypertension obesity IPMN and a renal artery stent.  The family history is negative for breast cancer.    Impression: Stage I left breast cancer    Plan I talked her about the treatment options including mastectomy with or without reconstruction versus a lumpectomy with or without radiation therapy.  I did recommend a sentinel lymph node biopsy.  She is interested in a lumpectomy with a sentinel lymph node.  I have informed her that some women do not require radiation therapy after a lumpectomy.  I did recommend endocrine therapy.  We will follow-up on her HER2 FISH results.  With family scheduling her surgery.    Video started at 8:48 AM 9:08 AM and the total time of the visit was 30 minutes which included reviewing her imaging

## 2024-10-31 NOTE — NURSING NOTE
Current [facility] location[facility] [address] [address]    Is the patient currently in the state of [place]? YES    Visit mode:VIDEO    If the visit is dropped, the patient can be reconnected by: VIDEO VISIT: Send to e-mail at: [email or web address]    Will anyone else be joining the visit? NO  (If patient encounters technical issues they should call [phone]    Are changes needed to the allergy or medication list? No    Are refills needed on medications prescribed by this physician? NO    Rooming Documentation:  Questionnaire(s) not done per department protocol    Reason for visit: Consult    Barbra SALCIDO

## 2024-11-04 PROBLEM — C50.912 INVASIVE DUCTAL CARCINOMA OF BREAST, FEMALE, LEFT (H): Status: ACTIVE | Noted: 2024-10-31

## 2024-11-04 NOTE — TELEPHONE ENCOUNTER
[PATIENT DECLINED SOONER DATE]    Called patient to schedule surgery with Dr. Wood    Spoke with:  patient    Date of Surgery: 11/25/2024 [patient declined sooner date]    Arrival time Discussed with Patient:  No    Location of surgery: [facility]     Pre-Op H&P: PCP, [facility]    Post-Op Appts: 12/9/24, 3:50 PM, [facility]     Imaging:  No     Discussed with patient pre-op RN will call 2-3 days prior to surgery with arrival time and instructions:  Yes     Packet sent out: Yes  via Mail - Standard [DATE] 11/5/24    Informed patient that they will need an adult  to bring patient home from surgery: Yes  : patient confirmed , but did not state name/relationship       Additional Comments:  Nucmed to be delivered to ASC OR at 9:30 AM DOS; surgeon to inject - scheduled.    [facility] to schedule tag-localizer prior to surgery.    All patients questions were answered and patient was instructed to review surgical packet and call back with any questions or concerns.       Ar Ohara on 11/4/2024 at 3:54 PM

## 2024-11-05 LAB — INTERPRETATION: NORMAL

## 2024-11-08 NOTE — PROGRESS NOTES
MEDICAL ONCOLOGY NEW PATIENT NOTE    Javier Wood MD  Professor   [address] [address]    Re. Zenaida Valles   Female, 74 year old, 1950  MRN: [id number]    November 12, 2024    Dear Dr. Wood,    Thank you for referring Zenaida Valles who is a 74-year-old woman with a new diagnosis of a screening identified stage I invasive ductal cancer of the upper outer quadrant of the left breast ER positive, CA positive, HER2 amplified stage [id number].      HISTORY OF PRESENT ILLNESS:    Zenaida has been in generally good health except for a right renal artery stenosis requiring a stent.  She also has a left leg injury recently she was in her usual state of good health and had a screening mammogram which showed a suspicious lesion in the upper outer quadrant of the left breast at the 3 o'clock position 5 cm from the nipple there is an irregular hypoechoic mass measuring 1.0 x 0.7 x 0.9 cm in size 1 cm from the nipple there is also a 0.5 x 0.4 cm area of suspicion    She underwent an ultrasound guided biopsy which showed usual ductal hyperplasia and fibrocystic changes microcysts in April Kren metaplasia.  Columnar cell changes.  It invasive ductal carcinoma was also found grade 2 and DCIS grade 2 solid type.  Her breast cancer was estrogen receptor positive progesterone receptor positive at HER2 2+ by IHC.  She then underwent ADRIANA which showed a subpopulation which was 60 to 70% ADRIANA 5 negative and 30 to 40% I-S age 1 positive with a 4.9 HER2 signals per nucleus and the ratio of 2.2 indicating HER2 positivity.    She then came to see Dr. Wood at [facility] for surgical recommendations.  She had a contrast mammogram which showed a 0.9 cm enhancing lesion in the upper outer quadrant of the left breast.     She has had no weight loss or loss of energy.  She does not sleep during the day.  She can perform all of her household chores.  ECOG  0 PS.     DIAGNOSTIC AND TREATMENT SUMMARY:  On October 7 she underwent a screening mammogram which demonstrated an asymmetry in her left breast.      On October 15 she underwent a diagnostic mammogram which confirmed a left breast asymmetry.  She had an ultrasound which demonstrated 2 masses in her left breast.  At the 3 o'clock position 1 cm from the nipple there was a mass and at the 3 o'clock position 5 cm from the nipple there was a second mass.  Her lymph nodes were normal by ultrasound.      On October 23 she underwent a contrast-enhanced mammography which only demonstrated 1 mass measuring 1 cm.  The right breast was normal.  She underwent ultrasound-guided biopsies of both masses.  The mass at the 3 o'clock position 1 cm from the nipple was benign.  The mass at the 3 o'clock position 5 cm from the nipple demonstrating a grade 2 invasive ductal cancer that was ER positive and HER2 equivocal.  There is also DCIS present.        A. LEFT breast, 3:00, 1 cm from nipple, ultrasound-guided core biopsy:  - Benign breast tissue with usual ductal hyperplasia (UDH) and fibrocystic changes including microcysts, apocrine metaplasia, and columnar cell change  - Rare calcifications associated with benign breast ducts and acini  - Negative for atypia or malignancy      B. LEFT breast, 3:00, 5 cm from nipple, ultrasound-guided core biopsy:   - INVASIVE BREAST CARCINOMA OF NO SPECIAL TYPE (INVASIVE DUCTAL CARCINOMA), [place] grade 2  - Ductal carcinoma in situ (DCIS), nuclear grade 2, solid type  - Invasive carcinoma is estrogen receptor positive, progesterone receptor positive, and HER2 equivocal (score 2+) by immunohistochemistry (see 'Breast Biomarker Reporting Template' below)  - HER2 FISH is is process; results will be reportedly separately by cytogenetis  - See comment    This FISH analysis is performed in follow up to the reported equivocal (2+) HER2 findings by immunohistochemistry ([id number]).        RESULTS:      Ratio of HER2/MARION-17 signals  Zenaida Lazarus Valles:  See Interpretation below     Majority (~60-70%) of tumor in area of strongest IHC staining:  Group 5 (ADRIANA Negative)  Avg. number HER2 signals/nucleus:  2.9  Avg. number MARION-17 signals/nucleus:  1.7  HER2/MARION 17 ratio:  1.7     Subpopulation (~30-40%) of tumor in area of strongest IHC staining:  Group 1 (ADRIANA Positive)                             Avg. number HER2 signals/nucleus:  4.9  Avg. number MARION-17 signals/nucleus:  2.3  HER2/MARION 17 ratio:  2.2     **Interpretive guidelines per the American Society of Clinical Oncology/College of American Pathologists Clinical Practice Guideline Update (Margarita VALENZUELA et al, 2023, Arch Pathol Lab Med 147:993):     -- Group 1: HER2/MARION-17 ratio 2.0 or more -AND- avg. number HER2 signals/nucleus 4.0 or more (ADRIANA Positive)  -- Group 2: HER2/MARION-17 ratio 2.0 or more -AND- avg. number HER2 signals/nucleus <4.0 (Additional work required)  -- Group 3: HER2/MARION-17 ratio <2.0 -AND- avg. number HER2 signals/nucleus 6.0 or more (Additional work required)  -- Group 4: HER2/MARION-17 ratio <2.0 -AND- avg. number HER2 signals/nucleus 4.0 or more and <6.0 (Additional work required)  -- Group 5: HER2/MARION-17 ratio <2.0 -AND- avg. number HER2 signals/nucleus <4.0 (ADRIANA Negative)     INTERPRETATION:  Two admixed populations of cells were found in this sample:     Majority (~60-70%) of tumor in area of strongest IHC staining:  Per the American Society of Clinical Oncology/College of American Pathologists Clinical Practice Guideline Focused Update (Margarita VALENZUELA et al, 2018, Arch Pathol Lab Med  doi:10.5858/arpa.8062-3514-KE), the HER2/MARION 17 ratio of 1.7 and average number of HER2 signals/cell of 2.9 places this population of cells in Group 5 (ADRIANA Negative).      Subpopulation (~30-40%) of tumor in area of strongest IHC staining:  Admixed within this sample were cells with increased HER2 signals, which, when selectively scored, had an average of 4.9 HER2  signals/nucleus and a HER2/MARION 17 ratio of 2.2; per the American Society of Clinical Oncology/College of American Pathologists Clinical Practice Guideline Focused Update (Margarita VALENZUELA et al, 2018, Arch Pathol Lab Med  doi:10.5858/arpa.1620-6726-VS), this population of cells falls into Group 1 (ADRIANA Positive).    PAST MEDICAL HISTORY: No history of breast surgery.  No history of breast cancer in the past.  No history of radiation therapy in the past or radiation exposure.  No history of tumor of any kind.  No history of heart problems, heart attack, breathing problems, blood clots, seizures, arthritis, peptic ulcer disease, osteoporosis or bone fractures.  She is not currently participating in a clinical trial.  She does have a history of asthma and uses an albuterol inhaler in cold weather.    Her past medical history is significant for diabetes mellitus and is on metformin, hypertension obesity, she has IPMN and a right renal artery stent.  The family history is negative for breast cancer.  She has a history of right renal artery stenosis and has a stent in place.    She has a recent history of a left hamstring injury.  She is seeing orthopedics for this problem.  She also has a history of back pain.  Her gait is affected but she can perform activities of daily living.  She has a history of aches in her shoulders.  She had a 65 pound weight loss over the last 2 years which was intentional related to improve diet and exercise.  She says she has a history of fibromuscular dysplasia.    She worked in the [employer] in [place] [employer] and was exposed to dengue, malaria, hepatitis A, hepatitis B, encephalitis.  She has also had COVID and the flu.    FAMILY HISTORY:   No history of ovarian, uterine, colon cancer, or melanoma.  No history of glioma, gastric cancer or pancreatic cancer.  Her mother did have Zollinger-Wheeler syndrome but the patient's genetics are negative for this syndrome.      PAST MENSTRUAL HISTORY:  Age of  "first menstrual period was 13.   She has been pregnant 1 times with 0 live births and 0 miscarriages and 1 .  Age at in place pregnancy age 28.   Uterus and ovaries are in place.  Last menstrual period was about age 52 and the menopause occurred naturally. No history of hormone replacement therapy.    She does have a history of thickened endometrial lining but by her report no abnormalities of the lining when she had a DIC     HABITS:  She is a never smoker but she has a history of exposure to secondhand smoke from her father as a child.  She consumes alcoholic beverages socially 1 drink every couple of months.    GERMLINE GENETICS: pending    ALLERGIES: She has drug allergies to codeine, codeine derivatives, ibuprofen, lisinopril..  No allergy to seafood, iodine, or contrast dye.  She does take daily aspirin 81 mg because of her renal stent.     TREATMENT HISTORY:  A.  Lumpectomy has been scheduled.     INTERVAL HISTORY:  She was seen in clinic today with her  She has no pain, fatigue, depression, but anxiety about the breast cancer diagnosis.  She has an ECOG 0 performance status.    REVIEW OF SYSTEMS:   She has had no fevers, headaches, cough, chest pain, shortness of breath, hemoptysis, loss of appetite, nausea, vomiting, abdominal pain, constipation, diarrhea, bone pain, back pain, muscle or joint complaints, numbness or tingling in hands and feet, hearing loss or depression.  The remainder of a 14-point review of systems is negative.       PHYSICAL EXAMINATION:     VITALS:  /81 (BP Location: Right arm, Patient Position: Sitting, Cuff Size: Adult Regular)   Pulse 66   Temp 98.2  F (36.8  C) (Oral)   Resp 16   Ht 1.68 m (5' 6.14\")   Wt 77.3 kg (170 lb 8 oz)   LMP  (LMP Unknown)   SpO2 100%   BMI 27.40 kg/m    HEENT:  No alopecia, no lesions in the oropharynx.  Dentition.  LYMPH:  There is no palpable cervical, supraclavicular, subclavicular, or axillary lymphadenopathy.  BREASTS:  " Examination of the right breast revealed   LUNGS:  Clear to percussion and auscultation.  HEART:  Regular rate and rhythm.  S1, S2.  ABDOMEN:  Soft, nontender, without hepatosplenomegaly.  EXTREMITIES:  Without edema.  PSYCH:  Mood and affect were normal.  NEUROLOGIC:  Mood and affect  normal.          LABORATORY DATA:  None today.     ASSESSMENT AND PLAN:    Zenaida Qiu is our ski has a stage I T1b NX MX invasive ductal carcinoma of the left breast upper outer quadrant which was estrogen receptor positive progesterone receptor positive and HER2 2+ by IHC and HER2 positive by ADRIANA.  She now comes to our clinic for recommendations.  I discussed with Zenaida that it would be very reasonable to go forward with a lumpectomy and sentinel node procedure.  We would then see her in follow-up in our clinic to discuss adjuvant chemotherapy for her HER2 positive breast cancer.   Germline genetics recommended.  I discussed the breast imaging with Dr. Lainez her radiologist and a breast MRI is not recommended at this time and that the contrast mammogram should suffice.  Discussion of the recommendation for adjuvant HER2 directed therapy with trastuzumab and paclitaxel.  She was understandably very disappointed about the recommendation for adjuvant treatment that would include chemotherapy.  I discussed with her the [facility] data indicating that even small T1 node-negative HER2 positive breast cancers are recommended for adjuvant trastuzumab based chemotherapy.  I did discuss with her that Dr. Zelaya at the [facility] had developed a highly effective regimen which de-escalates therapy to trastuzumab and paclitaxel for 12 weeks on weekly basis followed by trastuzumab given every 3 weeks to complete 1 year of therapy.  I discussed that this regimen is very well-tolerated.  I discussed that the distant disease-free survival is approximately 92% at 10 years.  She was impressed with these data but was understandably  very disappointed about the recommendation for chemotherapy.  We also discussed that we would begin hormonal therapy with an aromatase inhibitor after completion of chemotherapy and that the aromatase inhibitor treatment would be recommended for 10 years given the lack of data on shorter adjuvant therapy for hormone receptor positive HER2 positive breast cancer.  I discussed with Zenaida and her  that we would recommend the APT regimen which consists of weekly paclitaxel and weekly trastuzumab for 12 weeks followed by every 3-week trastuzumab to complete 1 year of therapy.  I discussed that the goal of the adjuvant ADT regimen is to prevent breast cancer from coming back.  She would also undergo radiation after lumpectomy and we would also include hormonal therapy which would be for a total of 10 years.  We would also add adjuvant zoledronic acid.  We discussed each of these interventions.    Discussion of the a PT regimen.  I discussed that Zenaida could perform all of her activities of daily living well on the a PT regimen.  I discussed that she could do cold capping to prevent hair loss.  I discussed that her energy level should remain good through the 12 weeks of combination paclitaxel and trastuzumab.  I discussed that she would need an echocardiogram and we would monitor her cardiac function.  I discussed that toxicities of regimen include decreased cardiac function and could include reactions to paclitaxel including allergic reactions.  I discussed that if she does have an allergic reaction we could substitute Abraxane for paclitaxel.  We also discussed at that time toxicity of having to go back and forth to the oncologist office and that we would make every effort to reduce these time toxicity issues through efficient scheduling and minimizing the time spent in the oncologist office.  We discussed toxicities of paclitaxel which could include alopecia, neuropathy which could last for 6 months to a year  after infusion.  I discussed that neutropenia is fairly uncommon.  I discussed that allergic reactions certainly can happen and we may need to substitute Abraxane.    Discussion of toxicities of trastuzumab which could include cardiac toxicity.  Discussion of hormonal therapy.  I discussed that after completion of paclitaxel we would begin hormonal therapy with an aromatase inhibitor and that this aromatase inhibitor treatment would continue for a total of 10 years.  We do not have data on  7 years of adjuvant hormonal therapy in patients with HER2 positive breast cancer.  We discussed side effects of aromatase inhibitor therapy which include joint stiffness which occurs in about 30% of patients hot flashes and vaginal dryness.  I discussed that exercise can help mitigate some of the side effects of aromatase inhibitor therapy.  Discussion of adjuvant zoledronic acid.  I discussed that zoledronic acid would help reduce risk of distant recurrence and also reduce bone loss due to aromatase inhibitor therapy.  I discussed that postlumpectomy radiation will be recommended.  Will arrange for radiation oncology consult.  Discussion of adjuvant zoledronic acid.  Adjuvant zoledronic acid can be associated with flulike symptoms that can last for several days after treatment.  I also discussed that there can be dental complications.  She keeps her teeth and good shape with frequent dental visits.  She does have excellent dentition and the risk of osteonecrosis of the jaw is quite low.  Discussion of exercise.  Discussion of the Lace and Pathway.  We recommend 150 minutes of exercise per week with mixed cardio and strength training.  Stretch band, hand weights, yoga.  Diet.  I recommended a diet low in saturated fat, but not low in fat with more fruits and vegetables, and less in the way of red meat.  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  Follow up plan.  She will have surgery with Dr. Wood and will then come to  our clinic in 6 weeks to discuss adjuvant APT regimen and hormonal therapy to follow. CBC CMP.  Echocardiogram.      Thank you for allowing us to participate in this patient's care.       Sincerely,      Chito Cheung MD  Professor  [facility]  [phone]    I spent 60 minutes with the patient more than 50% of which was in counseling and coordination of care. The remainder of a 10 point review of systems was negative.

## 2024-11-12 ENCOUNTER — PATIENT OUTREACH (OUTPATIENT)
Dept: ONCOLOGY | Facility: CLINIC | Age: 74
End: 2024-11-12

## 2024-11-12 ENCOUNTER — ONCOLOGY VISIT (OUTPATIENT)
Dept: ONCOLOGY | Facility: CLINIC | Age: 74
End: 2024-11-12
Attending: INTERNAL MEDICINE
Payer: COMMERCIAL

## 2024-11-12 ENCOUNTER — OFFICE VISIT (OUTPATIENT)
Dept: ORTHOPEDICS | Facility: CLINIC | Age: 74
End: 2024-11-12
Payer: COMMERCIAL

## 2024-11-12 VITALS
HEART RATE: 66 BPM | OXYGEN SATURATION: 100 % | WEIGHT: 170.5 LBS | BODY MASS INDEX: 27.4 KG/M2 | TEMPERATURE: 98.2 F | HEIGHT: 66 IN | SYSTOLIC BLOOD PRESSURE: 129 MMHG | DIASTOLIC BLOOD PRESSURE: 81 MMHG | RESPIRATION RATE: 16 BRPM

## 2024-11-12 DIAGNOSIS — S76.312D HAMSTRING MUSCLE STRAIN, LEFT, SUBSEQUENT ENCOUNTER: Primary | ICD-10-CM

## 2024-11-12 DIAGNOSIS — Z51.11 ENCOUNTER FOR ANTINEOPLASTIC CHEMOTHERAPY: ICD-10-CM

## 2024-11-12 DIAGNOSIS — C50.912 INVASIVE DUCTAL CARCINOMA OF BREAST, FEMALE, LEFT (H): Primary | ICD-10-CM

## 2024-11-12 PROCEDURE — 99213 OFFICE O/P EST LOW 20 MIN: CPT | Performed by: FAMILY MEDICINE

## 2024-11-12 PROCEDURE — G0463 HOSPITAL OUTPT CLINIC VISIT: HCPCS | Performed by: INTERNAL MEDICINE

## 2024-11-12 PROCEDURE — 99205 OFFICE O/P NEW HI 60 MIN: CPT | Performed by: INTERNAL MEDICINE

## 2024-11-12 ASSESSMENT — PAIN SCALES - GENERAL: PAINLEVEL_OUTOF10: MILD PAIN (2)

## 2024-11-12 NOTE — NURSING NOTE
"Oncology Rooming Note    November 12, 2024 3:29 PM   Zenaida Valles is a 74 year old female who presents for:    Chief Complaint   Patient presents with    Oncology Clinic Visit     Invasive ductal carcinoma of breast, female, left      Initial Vitals: /81 (BP Location: Right arm, Patient Position: Sitting, Cuff Size: Adult Regular)   Pulse 66   Temp 98.2  F (36.8  C) (Oral)   Resp 16   Ht 1.68 m (5' 6.14\")   Wt 77.3 kg (170 lb 8 oz)   LMP  (LMP Unknown)   SpO2 100%   BMI 27.40 kg/m   Estimated body mass index is 27.4 kg/m  as calculated from the following:    Height as of this encounter: 1.68 m (5' 6.14\").    Weight as of this encounter: 77.3 kg (170 lb 8 oz). Body surface area is 1.9 meters squared.  Mild Pain (2) Comment: Data Unavailable   No LMP recorded (lmp unknown). Patient is postmenopausal.  Allergies reviewed: Yes  Medications reviewed: Yes    Medications: Medication refills not needed today.  Pharmacy name entered into [employer]:    [facility] [place], [place] - [facility] [place], [place] - [address]    Frailty Screening:   Is the patient here for a new oncology consult visit in cancer care? 1. Yes. Over the past month, have you experienced difficulty or required a caregiver to assist with:   1. Balance, walking or general mobility (including any falls)? YES  2. Completion of self-care tasks such as bathing, dressing, toileting, grooming/hygiene?  NO  3. Concentration or memory that affects your daily life?  NO       Clinical concerns: none      Fany Doherty            "

## 2024-11-12 NOTE — PROGRESS NOTES
[facility]: Cancer Care                                                                                          Met with Zenaida and her  Trae after appt with Dr. Cheung.  Provided them with treatment information and articles from Dr. Cheung.   Zenaida signed authorization for us to talk to  Trae.  Plan is to do surgery first and follow with treatment.  Contact information provided.   Follow up with Dr. Cheung 2 weeks after surgery.    Isamar Esparza RN

## 2024-11-12 NOTE — LETTER
11/12/2024      RE: Zenaida Valles  [address]  [place] [address]     Dear Colleague,    Thank you for referring your patient, Zenaida Valles, to the [facility] SP[facility]TS [facility]ICINE [facility] [place]. Please see a copy of my visit note below.    ESTABLISHED PATIENT FOLLOW-UP:  Follow Up of the Left Knee     HISTORY OF PRESENT ILLNESS  Ms. Lazarus Valles is a pleasant 74 year old year old female who presents to clinic today for follow-up of left hamstring strain.     Date of injury: 10/11/24  Date last seen: 10/15/24  Following Therapeutic Plan: PT and hinged knee brace  Pain: 2/10   Function: Unable to do Grid20/20 Uma, pain still with stairs   Interval History: overall pain has improved. Completed one physical therapy.     Additional medical/Social/Surgical histories reviewed in [facility] and updated as appropriate.    REVIEW OF SYSTEMS (11/12/2024)  CONSTITUTIONAL: Denies fever and weight loss  GASTROINTESTINAL: Denies abdominal pain, nausea, vomiting  MUSCULOSKELETAL: See HPI  SKIN: Denies any recent rash or lesion  NEUROLOGICAL: Denies numbness or focal weakness     PHYSICAL EXAM  LMP  (LMP Unknown)     General  - normal appearance, in no obvious distress  Musculoskeletal -  Left knee  - stance: normal gait without limp  - inspection: no swelling or effusion, normal bone and joint alignment, no obvious deformity, no ecchymosis posteriorly  - palpation: no joint line tenderness, patella and patellar tendon non-tender. Tenderness distal semitendinosis /semimembranosus tendons posteriorly.  Nontender pes anserine.  Nontender MCL/LCL   - ROM: 90 degrees flexion, -5 degrees extension, painful knee flexion.    - strength: 4-/5 knee flexion , 4/5 in extension  - special tests:  (-) Lachman  (-) Chuy  (-) varus at 0 and 30 degrees flexion  (-) valgus at 0 and 30 degrees flexion  Neuro  - no sensory or motor deficit, grossly normal coordination, normal muscle tone  Skin  - no  ecchymosis, erythema, warmth, or induration, no obvious rash    IMAGING :   EXAM: XR KNEE LEFT 1/2 VIEWS  LOCATION: [facility]  DATE: 10/11/2024     INDICATION: Hear snapping noise while walking w  subsequent significant pain, unable to bear weight  COMPARISON: None.                                                                  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 No evidence for fracture or significant overall compartmental narrowing. No significant effusion.     ASSESSMENT & PLAN  Ms. Lazarus Valles is a 74 year old year old female who presents to clinic today with Follow Up of the Left Knee    Overall improved; range of motion has returned to baseline.  Persistent tenderness of mid hamstrings and new finding of quad tenderness and weakness proximally.  This may have been related to injury versus change in her gait pattern.    Diagnosis:  Strain of left hamstring, sequelae    Overall reassurance was provided given significant improvements from about 3.5 weeks ago.  I have recommended ongoing increasing her stamina with gradually increasing her walking duration.  Continue on with home exercise program and may return to [place] in the upcoming month to use this as a rehab modality.  Discussed focusing on some hip strength as well.  Follow-up as needed in the next 4 weeks anticipate she will continue to have some mild deficits for at least 4 to 6 weeks.    It was a pleasure seeing Zenaidalokesh Lundy DO, [facility]  Primary Care Sports Medicine         Again, thank you for allowing me to participate in the care of your patient.      Sincerely,    Chito Lundy DO

## 2024-11-12 NOTE — LETTER
11/12/2024      Zenaida Valles  [address]  [place] [place] [address]      Dear Colleague,    Thank you for referring your patient, Zenaida Valles, to the [facility] CANCER [facility]. Please see a copy of my visit note below.      MEDICAL ONCOLOGY NEW PATIENT NOTE    Javier Wood MD  Professor   [address] [address]    Re. Zenaida Valles   Female, 74 year old, 1950  MRN: [id number]    November 12, 2024    Dear Dr. Wood,    Thank you for referring Zenaida Valles who is a 74-year-old woman with a new diagnosis of a screening identified stage I invasive ductal cancer of the upper outer quadrant of the left breast ER positive, NM positive, HER2 amplified stage [id number].      HISTORY OF PRESENT ILLNESS:    Zenaida has been in generally good health except for a right renal artery stenosis requiring a stent.  She also has a left leg injury recently she was in her usual state of good health and had a screening mammogram which showed a suspicious lesion in the upper outer quadrant of the left breast at the 3 o'clock position 5 cm from the nipple there is an irregular hypoechoic mass measuring 1.0 x 0.7 x 0.9 cm in size 1 cm from the nipple there is also a 0.5 x 0.4 cm area of suspicion    She underwent an ultrasound guided biopsy which showed usual ductal hyperplasia and fibrocystic changes microcysts in April Kren metaplasia.  Columnar cell changes.  It invasive ductal carcinoma was also found grade 2 and DCIS grade 2 solid type.  Her breast cancer was estrogen receptor positive progesterone receptor positive at HER2 2+ by IHC.  She then underwent ADRIANA which showed a subpopulation which was 60 to 70% ADRIANA 5 negative and 30 to 40% I-S age 1 positive with a 4.9 HER2 signals per nucleus and the ratio of 2.2 indicating HER2 positivity.    She then came to see Dr. Wood at [facility] for surgical  recommendations.  She had a contrast mammogram which showed a 0.9 cm enhancing lesion in the upper outer quadrant of the left breast.     She has had no weight loss or loss of energy.  She does not sleep during the day.  She can perform all of her household chores.  ECOG 0 PS.     DIAGNOSTIC AND TREATMENT SUMMARY:  On October 7 she underwent a screening mammogram which demonstrated an asymmetry in her left breast.      On October 15 she underwent a diagnostic mammogram which confirmed a left breast asymmetry.  She had an ultrasound which demonstrated 2 masses in her left breast.  At the 3 o'clock position 1 cm from the nipple there was a mass and at the 3 o'clock position 5 cm from the nipple there was a second mass.  Her lymph nodes were normal by ultrasound.      On October 23 she underwent a contrast-enhanced mammography which only demonstrated 1 mass measuring 1 cm.  The right breast was normal.  She underwent ultrasound-guided biopsies of both masses.  The mass at the 3 o'clock position 1 cm from the nipple was benign.  The mass at the 3 o'clock position 5 cm from the nipple demonstrating a grade 2 invasive ductal cancer that was ER positive and HER2 equivocal.  There is also DCIS present.        A. LEFT breast, 3:00, 1 cm from nipple, ultrasound-guided core biopsy:  - Benign breast tissue with usual ductal hyperplasia (UDH) and fibrocystic changes including microcysts, apocrine metaplasia, and columnar cell change  - Rare calcifications associated with benign breast ducts and acini  - Negative for atypia or malignancy      B. LEFT breast, 3:00, 5 cm from nipple, ultrasound-guided core biopsy:   - INVASIVE BREAST CARCINOMA OF NO SPECIAL TYPE (INVASIVE DUCTAL CARCINOMA), [place] grade 2  - Ductal carcinoma in situ (DCIS), nuclear grade 2, solid type  - Invasive carcinoma is estrogen receptor positive, progesterone receptor positive, and HER2 equivocal (score 2+) by immunohistochemistry (see 'Breast Biomarker  Reporting Template' below)  - HER2 FISH is is process; results will be reportedly separately by cytogenetis  - See comment    This FISH analysis is performed in follow up to the reported equivocal (2+) HER2 findings by immunohistochemistry ([id number]).        RESULTS:     Ratio of HER2/MARION-17 signals  Zenaida Valles:  See Interpretation below     Majority (~60-70%) of tumor in area of strongest IHC staining:  Group 5 (ADRIANA Negative)  Avg. number HER2 signals/nucleus:  2.9  Avg. number MARION-17 signals/nucleus:  1.7  HER2/MARION 17 ratio:  1.7     Subpopulation (~30-40%) of tumor in area of strongest IHC staining:  Group 1 (ADRIANA Positive)                             Avg. number HER2 signals/nucleus:  4.9  Avg. number MARION-17 signals/nucleus:  2.3  HER2/MARION 17 ratio:  2.2     **Interpretive guidelines per the American Society of Clinical Oncology/College of American Pathologists Clinical Practice Guideline Update (Margarita VALENZUELA et al, 2023, Arch Pathol Lab Med 147:993):     -- Group 1: HER2/MARION-17 ratio 2.0 or more -AND- avg. number HER2 signals/nucleus 4.0 or more (ADRIANA Positive)  -- Group 2: HER2/MARION-17 ratio 2.0 or more -AND- avg. number HER2 signals/nucleus <4.0 (Additional work required)  -- Group 3: HER2/MARION-17 ratio <2.0 -AND- avg. number HER2 signals/nucleus 6.0 or more (Additional work required)  -- Group 4: HER2/MARION-17 ratio <2.0 -AND- avg. number HER2 signals/nucleus 4.0 or more and <6.0 (Additional work required)  -- Group 5: HER2/MARION-17 ratio <2.0 -AND- avg. number HER2 signals/nucleus <4.0 (ADRIANA Negative)     INTERPRETATION:  Two admixed populations of cells were found in this sample:     Majority (~60-70%) of tumor in area of strongest IHC staining:  Per the American Society of Clinical Oncology/College of American Pathologists Clinical Practice Guideline Focused Update (Margarita VALENZUELA et al, 2018, Arch Pathol Lab Med  doi:10.5858/arpa.3002-9601-VB), the HER2/MRAION 17 ratio of 1.7 and average number of HER2  signals/cell of 2.9 places this population of cells in Group 5 (ADRIANA Negative).      Subpopulation (~30-40%) of tumor in area of strongest IHC staining:  Admixed within this sample were cells with increased HER2 signals, which, when selectively scored, had an average of 4.9 HER2 signals/nucleus and a HER2/MARION 17 ratio of 2.2; per the American Society of Clinical Oncology/College of American Pathologists Clinical Practice Guideline Focused Update (Margarita VALENZUELA et al, 2018, Arch Pathol Lab Med  doi:10.5858/arpa.4521-3337-EM), this population of cells falls into Group 1 (ADRIANA Positive).    PAST MEDICAL HISTORY: No history of breast surgery.  No history of breast cancer in the past.  No history of radiation therapy in the past or radiation exposure.  No history of tumor of any kind.  No history of heart problems, heart attack, breathing problems, blood clots, seizures, arthritis, peptic ulcer disease, osteoporosis or bone fractures.  She is not currently participating in a clinical trial.  She does have a history of asthma and uses an albuterol inhaler in cold weather.    Her past medical history is significant for diabetes mellitus and is on metformin, hypertension obesity, she has IPMN and a right renal artery stent.  The family history is negative for breast cancer.  She has a history of right renal artery stenosis and has a stent in place.    She has a recent history of a left hamstring injury.  She is seeing orthopedics for this problem.  She also has a history of back pain.  Her gait is affected but she can perform activities of daily living.  She has a history of aches in her shoulders.  She had a 65 pound weight loss over the last 2 years which was intentional related to improve diet and exercise.  She says she has a history of fibromuscular dysplasia.    She worked in the [employer] in [place] [employer] and was exposed to dengue, malaria, hepatitis A, hepatitis B, encephalitis.  She has also had COVID and the  flu.    FAMILY HISTORY:   No history of ovarian, uterine, colon cancer, or melanoma.  No history of glioma, gastric cancer or pancreatic cancer.  Her mother did have Zollinger-Wheeler syndrome but the patient's genetics are negative for this syndrome.      PAST MENSTRUAL HISTORY:  Age of first menstrual period was 13.   She has been pregnant 1 times with 0 live births and 0 miscarriages and 1 .  Age at in place pregnancy age 28.   Uterus and ovaries are in place.  Last menstrual period was about age 52 and the menopause occurred naturally. No history of hormone replacement therapy.    She does have a history of thickened endometrial lining but by her report no abnormalities of the lining when she had a DIC     HABITS:  She is a never smoker but she has a history of exposure to secondhand smoke from her father as a child.  She consumes alcoholic beverages socially 1 drink every couple of months.    GERMLINE GENETICS: pending    ALLERGIES: She has drug allergies to codeine, codeine derivatives, ibuprofen, lisinopril..  No allergy to seafood, iodine, or contrast dye.  She does take daily aspirin 81 mg because of her renal stent.     TREATMENT HISTORY:  A.  Lumpectomy has been scheduled.     INTERVAL HISTORY:  She was seen in clinic today with her  She has no pain, fatigue, depression, but anxiety about the breast cancer diagnosis.  She has an ECOG 0 performance status.    REVIEW OF SYSTEMS:   She has had no fevers, headaches, cough, chest pain, shortness of breath, hemoptysis, loss of appetite, nausea, vomiting, abdominal pain, constipation, diarrhea, bone pain, back pain, muscle or joint complaints, numbness or tingling in hands and feet, hearing loss or depression.  The remainder of a 14-point review of systems is negative.       PHYSICAL EXAMINATION:     VITALS:  /81 (BP Location: Right arm, Patient Position: Sitting, Cuff Size: Adult Regular)   Pulse 66   Temp 98.2  F (36.8  C) (Oral)   Resp  "16   Ht 1.68 m (5' 6.14\")   Wt 77.3 kg (170 lb 8 oz)   LMP  (LMP Unknown)   SpO2 100%   BMI 27.40 kg/m    HEENT:  No alopecia, no lesions in the oropharynx.  Dentition.  LYMPH:  There is no palpable cervical, supraclavicular, subclavicular, or axillary lymphadenopathy.  BREASTS:  Examination of the right breast revealed   LUNGS:  Clear to percussion and auscultation.  HEART:  Regular rate and rhythm.  S1, S2.  ABDOMEN:  Soft, nontender, without hepatosplenomegaly.  EXTREMITIES:  Without edema.  PSYCH:  Mood and affect were normal.  NEUROLOGIC:  Mood and affect  normal.          LABORATORY DATA:  None today.     ASSESSMENT AND PLAN:    Zenaida Qiu is our ski has a stage I T1b NX MX invasive ductal carcinoma of the left breast upper outer quadrant which was estrogen receptor positive progesterone receptor positive and HER2 2+ by IHC and HER2 positive by ADRIANA.  She now comes to our clinic for recommendations.  I discussed with Zenaida that it would be very reasonable to go forward with a lumpectomy and sentinel node procedure.  We would then see her in follow-up in our clinic to discuss adjuvant chemotherapy for her HER2 positive breast cancer.   Germline genetics recommended.  I discussed the breast imaging with Dr. Lainez her radiologist and a breast MRI is not recommended at this time and that the contrast mammogram should suffice.  Discussion of the recommendation for adjuvant HER2 directed therapy with trastuzumab and paclitaxel.  She was understandably very disappointed about the recommendation for adjuvant treatment that would include chemotherapy.  I discussed with her the [facility] data indicating that even small T1 node-negative HER2 positive breast cancers are recommended for adjuvant trastuzumab based chemotherapy.  I did discuss with her that Dr. Zelaya at the [facility] had developed a highly effective regimen which de-escalates therapy to trastuzumab and paclitaxel for 12 weeks on " weekly basis followed by trastuzumab given every 3 weeks to complete 1 year of therapy.  I discussed that this regimen is very well-tolerated.  I discussed that the distant disease-free survival is approximately 92% at 10 years.  She was impressed with these data but was understandably very disappointed about the recommendation for chemotherapy.  We also discussed that we would begin hormonal therapy with an aromatase inhibitor after completion of chemotherapy and that the aromatase inhibitor treatment would be recommended for 10 years given the lack of data on shorter adjuvant therapy for hormone receptor positive HER2 positive breast cancer.  I discussed with Zenaida and her  that we would recommend the APT regimen which consists of weekly paclitaxel and weekly trastuzumab for 12 weeks followed by every 3-week trastuzumab to complete 1 year of therapy.  I discussed that the goal of the adjuvant ADT regimen is to prevent breast cancer from coming back.  She would also undergo radiation after lumpectomy and we would also include hormonal therapy which would be for a total of 10 years.  We would also add adjuvant zoledronic acid.  We discussed each of these interventions.    Discussion of the a PT regimen.  I discussed that Zenaida could perform all of her activities of daily living well on the a PT regimen.  I discussed that she could do cold capping to prevent hair loss.  I discussed that her energy level should remain good through the 12 weeks of combination paclitaxel and trastuzumab.  I discussed that she would need an echocardiogram and we would monitor her cardiac function.  I discussed that toxicities of regimen include decreased cardiac function and could include reactions to paclitaxel including allergic reactions.  I discussed that if she does have an allergic reaction we could substitute Abraxane for paclitaxel.  We also discussed at that time toxicity of having to go back and forth to the oncologist  office and that we would make every effort to reduce these time toxicity issues through efficient scheduling and minimizing the time spent in the oncologist office.  We discussed toxicities of paclitaxel which could include alopecia, neuropathy which could last for 6 months to a year after infusion.  I discussed that neutropenia is fairly uncommon.  I discussed that allergic reactions certainly can happen and we may need to substitute Abraxane.    Discussion of toxicities of trastuzumab which could include cardiac toxicity.  Discussion of hormonal therapy.  I discussed that after completion of paclitaxel we would begin hormonal therapy with an aromatase inhibitor and that this aromatase inhibitor treatment would continue for a total of 10 years.  We do not have data on  7 years of adjuvant hormonal therapy in patients with HER2 positive breast cancer.  We discussed side effects of aromatase inhibitor therapy which include joint stiffness which occurs in about 30% of patients hot flashes and vaginal dryness.  I discussed that exercise can help mitigate some of the side effects of aromatase inhibitor therapy.  Discussion of adjuvant zoledronic acid.  I discussed that zoledronic acid would help reduce risk of distant recurrence and also reduce bone loss due to aromatase inhibitor therapy.  I discussed that postlumpectomy radiation will be recommended.  Will arrange for radiation oncology consult.  Discussion of adjuvant zoledronic acid.  Adjuvant zoledronic acid can be associated with flulike symptoms that can last for several days after treatment.  I also discussed that there can be dental complications.  She keeps her teeth and good shape with frequent dental visits.  She does have excellent dentition and the risk of osteonecrosis of the jaw is quite low.  Discussion of exercise.  Discussion of the [employer].  We recommend 150 minutes of exercise per week with mixed cardio and strength training.  Stretch band,  hand weights, yoga.  Diet.  I recommended a diet low in saturated fat, but not low in fat with more fruits and vegetables, and less in the way of red meat.  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  Follow up plan.  She will have surgery with Dr. Wood and will then come to our clinic in 6 weeks to discuss adjuvant APT regimen and hormonal therapy to follow. CBC CMP.  Echocardiogram.      Thank you for allowing us to participate in this patient's care.       Sincerely,      Chito Cheung MD  Professor  [facility]  [phone]    I spent 60 minutes with the patient more than 50% of which was in counseling and coordination of care. The remainder of a 10 point review of systems was negative.            Again, thank you for allowing me to participate in the care of your patient.        Sincerely,        Chito Cheung MD

## 2024-11-12 NOTE — PROGRESS NOTES
ESTABLISHED PATIENT FOLLOW-UP:  Follow Up of the Left Knee     HISTORY OF PRESENT ILLNESS  Ms. Lazarus Valles is a pleasant 74 year old year old female who presents to clinic today for follow-up of left hamstring strain.     Date of injury: 10/11/24  Date last seen: 10/15/24  Following Therapeutic Plan: PT and hinged knee brace  Pain: 2/10   Function: Unable to do [facility], pain still with stairs   Interval History: overall pain has improved. Completed one physical therapy.     Additional medical/Social/Surgical histories reviewed in [facility] and updated as appropriate.    REVIEW OF SYSTEMS (11/12/2024)  CONSTITUTIONAL: Denies fever and weight loss  GASTROINTESTINAL: Denies abdominal pain, nausea, vomiting  MUSCULOSKELETAL: See HPI  SKIN: Denies any recent rash or lesion  NEUROLOGICAL: Denies numbness or focal weakness     PHYSICAL EXAM  LMP  (LMP Unknown)     General  - normal appearance, in no obvious distress  Musculoskeletal -  Left knee  - stance: normal gait without limp  - inspection: no swelling or effusion, normal bone and joint alignment, no obvious deformity, no ecchymosis posteriorly  - palpation: no joint line tenderness, patella and patellar tendon non-tender. Tenderness distal semitendinosis /semimembranosus tendons posteriorly.  Nontender pes anserine.  Nontender MCL/LCL   - ROM: 90 degrees flexion, -5 degrees extension, painful knee flexion.    - strength: 4-/5 knee flexion , 4/5 in extension  - special tests:  (-) Lachman  (-) Chuy  (-) varus at 0 and 30 degrees flexion  (-) valgus at 0 and 30 degrees flexion  Neuro  - no sensory or motor deficit, grossly normal coordination, normal muscle tone  Skin  - no ecchymosis, erythema, warmth, or induration, no obvious rash    IMAGING :   EXAM: XR KNEE LEFT 1/2 VIEWS  LOCATION: [facility]  DATE: 10/11/2024     INDICATION: Hear snapping noise while walking w  subsequent significant pain, unable to bear  weight  COMPARISON: None.                                                                  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 No evidence for fracture or significant overall compartmental narrowing. No significant effusion.     ASSESSMENT & PLAN  Ms. Lazarus Valles is a 74 year old year old female who presents to clinic today with Follow Up of the Left Knee    Overall improved; range of motion has returned to baseline.  Persistent tenderness of mid hamstrings and new finding of quad tenderness and weakness proximally.  This may have been related to injury versus change in her gait pattern.    Diagnosis:  Strain of left hamstring, sequelae    Overall reassurance was provided given significant improvements from about 3.5 weeks ago.  I have recommended ongoing increasing her stamina with gradually increasing her walking duration.  Continue on with home exercise program and may return to [facility] in the upcoming month to use this as a rehab modality.  Discussed focusing on some hip strength as well.  Follow-up as needed in the next 4 weeks anticipate she will continue to have some mild deficits for at least 4 to 6 weeks.    It was a pleasure seeing Zenaida.    Chito Lundy, DO, CAQSM  Primary Care Sports Medicine

## 2024-11-13 ENCOUNTER — OFFICE VISIT (OUTPATIENT)
Dept: INTERNAL MEDICINE | Facility: CLINIC | Age: 74
End: 2024-11-13
Payer: COMMERCIAL

## 2024-11-13 ENCOUNTER — HOSPITAL ENCOUNTER (OUTPATIENT)
Dept: CARDIOLOGY | Facility: CLINIC | Age: 74
Discharge: HOME | End: 2024-11-13
Attending: INTERNAL MEDICINE | Admitting: INTERNAL MEDICINE
Payer: COMMERCIAL

## 2024-11-13 ENCOUNTER — LAB (OUTPATIENT)
Dept: LAB | Facility: CLINIC | Age: 74
End: 2024-11-13
Payer: COMMERCIAL

## 2024-11-13 VITALS
BODY MASS INDEX: 27.32 KG/M2 | HEART RATE: 73 BPM | SYSTOLIC BLOOD PRESSURE: 127 MMHG | WEIGHT: 170 LBS | HEIGHT: 66 IN | DIASTOLIC BLOOD PRESSURE: 80 MMHG | OXYGEN SATURATION: 98 % | TEMPERATURE: 98.1 F | RESPIRATION RATE: 16 BRPM

## 2024-11-13 DIAGNOSIS — Z01.818 PREOP GENERAL PHYSICAL EXAM: Primary | ICD-10-CM

## 2024-11-13 DIAGNOSIS — Z01.818 PREOP GENERAL PHYSICAL EXAM: ICD-10-CM

## 2024-11-13 DIAGNOSIS — C50.912 INVASIVE DUCTAL CARCINOMA OF BREAST, FEMALE, LEFT (H): ICD-10-CM

## 2024-11-13 DIAGNOSIS — Z51.11 ENCOUNTER FOR ANTINEOPLASTIC CHEMOTHERAPY: ICD-10-CM

## 2024-11-13 LAB
ANION GAP SERPL CALCULATED.3IONS-SCNC: 12 MMOL/L (ref 7–15)
BUN SERPL-MCNC: 17.5 MG/DL (ref 8–23)
CALCIUM SERPL-MCNC: 9.7 MG/DL (ref 8.8–10.4)
CHLORIDE SERPL-SCNC: 102 MMOL/L (ref 98–107)
CREAT SERPL-MCNC: 0.58 MG/DL (ref 0.51–0.95)
EGFRCR SERPLBLD CKD-EPI 2021: >90 ML/MIN/1.73M2
GLUCOSE SERPL-MCNC: 174 MG/DL (ref 70–99)
HCO3 SERPL-SCNC: 26 MMOL/L (ref 22–29)
HGB BLD-MCNC: 13.3 G/DL (ref 11.7–15.7)
LVEF ECHO: NORMAL
POTASSIUM SERPL-SCNC: 3.9 MMOL/L (ref 3.4–5.3)
SODIUM SERPL-SCNC: 140 MMOL/L (ref 135–145)

## 2024-11-13 PROCEDURE — 99214 OFFICE O/P EST MOD 30 MIN: CPT

## 2024-11-13 PROCEDURE — 36415 COLL VENOUS BLD VENIPUNCTURE: CPT | Performed by: PATHOLOGY

## 2024-11-13 PROCEDURE — 80048 BASIC METABOLIC PNL TOTAL CA: CPT | Performed by: PATHOLOGY

## 2024-11-13 PROCEDURE — 255N000002 HC RX 255 OP 636: Performed by: INTERNAL MEDICINE

## 2024-11-13 PROCEDURE — 85018 HEMOGLOBIN: CPT | Performed by: PATHOLOGY

## 2024-11-13 PROCEDURE — 999N000208 ECHOCARDIOGRAM COMPLETE

## 2024-11-13 RX ADMIN — HUMAN ALBUMIN MICROSPHERES AND PERFLUTREN 6 ML: 10; .22 INJECTION, SOLUTION INTRAVENOUS at 14:30

## 2024-11-13 ASSESSMENT — ASTHMA QUESTIONNAIRES
ACT_TOTALSCORE: 25
QUESTION_3 LAST FOUR WEEKS HOW OFTEN DID YOUR ASTHMA SYMPTOMS (WHEEZING, COUGHING, SHORTNESS OF BREATH, CHEST TIGHTNESS OR PAIN) WAKE YOU UP AT NIGHT OR EARLIER THAN USUAL IN THE MORNING: NOT AT ALL
QUESTION_5 LAST FOUR WEEKS HOW WOULD YOU RATE YOUR ASTHMA CONTROL: COMPLETELY CONTROLLED
QUESTION_2 LAST FOUR WEEKS HOW OFTEN HAVE YOU HAD SHORTNESS OF BREATH: NOT AT ALL
ACT_TOTALSCORE: 25
QUESTION_1 LAST FOUR WEEKS HOW MUCH OF THE TIME DID YOUR ASTHMA KEEP YOU FROM GETTING AS MUCH DONE AT WORK, SCHOOL OR AT HOME: NONE OF THE TIME
QUESTION_4 LAST FOUR WEEKS HOW OFTEN HAVE YOU USED YOUR RESCUE INHALER OR NEBULIZER MEDICATION (SUCH AS ALBUTEROL): NOT AT ALL

## 2024-11-13 NOTE — PROGRESS NOTES
Preoperative Evaluation  [facility] INTERNAL MEDICINE [address] [address]  Phone: [phone]  Fax: [phone]  Primary Provider: Nadia Randle MD  Pre-op Performing Provider: Shayy Grady NP  Nov 13, 2024   {Provider  Link to PREOP SmartSet  REQUIRED to apply standard patient instructions and medication directions to the AVS :241529}        11/13/2024   Surgical Information   What procedure is being done?  LEFT BREAST LUMPECTOMY LOCALIZED USING RADIOFREQUENCY IDENTIFICATION and LEFT SENTINEL LYMPH NODE BIOPSY   Facility or Hospital where procedure/surgery will be performed: [facility]   Who is doing the procedure / surgery? dr barboza    Date of surgery / procedure: november 25    Time of surgery / procedure: 10:35AM   Where do you plan to recover after surgery? at home with family        Patient-reported     Fax number for surgical facility: Note does not need to be faxed, will be available electronically in Epic.    {Provider Charting Preference for Preop :090039}    Rachel Estevez is a 74 year old, presenting for the following:  Pre-Op Exam (LEFT BREAST LUMPECTOMY LOCALIZED USING RADIOFREQUENCY IDENTIFICATION and LEFT SENTINEL LYMPH NODE BIOPSY On 11/25/2024  With Javier Barboza MD at 10:35AM at [facility])          11/13/2024     7:17 AM   Additional Questions   Roomed by Paola WHITTINGTON   Accompanied by N/A     HPI related to upcoming procedure: ***        11/13/2024   Pre-Op Questionnaire   Have you ever had a heart attack or stroke? No    Have you ever had surgery on your heart or blood vessels, such as a stent placement, a coronary artery bypass, or surgery on an artery in your head, neck, heart, or legs? (!) YES ***    Do you have chest pain with activity? No    Do you have a history of heart failure? No    Do you currently have a cold, bronchitis or symptoms of other infection? No    Do you have a cough, shortness of breath, or wheezing? No     Do you or anyone in your family have previous history of blood clots? No    Do you or does anyone in your family have a serious bleeding problem such as prolonged bleeding following surgeries or cuts? No    Have you ever had problems with anemia or been told to take iron pills? No    Have you had any abnormal blood loss such as black, tarry or bloody stools, or abnormal vaginal bleeding? No    Have you ever had a blood transfusion? No    Are you willing to have a blood transfusion if it is medically needed before, during, or after your surgery? Yes    Have you or any of your relatives ever had problems with anesthesia? No    Do you have sleep apnea, excessive snoring or daytime drowsiness? No    Do you have any artifical heart valves or other implanted medical devices like a pacemaker, defibrillator, or continuous glucose monitor? No    Do you have artificial joints? No    Are you allergic to latex? No        Patient-reported     Health Care Directive  Patient does not have a Health Care Directive: Advance Directive received and scanned. Click on Code in the patient header to view.    Preoperative Review of    reviewed - controlled substances reflected in medication list. - -lorazepam and oxycodone (12 tablets on 10/12/24)    {Chronic problem details (Optional) :862443}    Patient Active Problem List    Diagnosis Date Noted    Invasive ductal carcinoma of breast, female, left (H) 10/31/2024     Priority: Medium    Hamstring strain 10/22/2024     Priority: Medium    Monoallelic mutation of MUTYH gene 08/03/2023     Priority: Medium    Diabetes mellitus, type 2 (H) 01/25/2021     Priority: Medium    Asthma 10/18/2011     Priority: Medium     Problem list name updated by automated process. Provider to review      Atherosclerosis of renal artery (H) 10/18/2011     Priority: Medium    Essential hypertension 10/03/2011     Priority: Medium     Problem list name updated by automated process. Provider to review         Past Medical History:   Diagnosis Date    Asthma     Diabetes mellitus (H)     Fibromuscular dysplasia (H)     H/O sebaceous cyst     Hyperlipidemia LDL goal < 130     Hypertension     IPMN (intraductal papillary mucinous neoplasm)     Obesity     Renal artery stenosis (H) 2005    Right, s/p stenting at [facility]    Statin medication not prescribed per physician orders     pt declined     Past Surgical History:   Procedure Laterality Date    COLONOSCOPY N/A 3/10/2017    Procedure: COMBINED COLONOSCOPY, SINGLE OR MULTIPLE BIOPSY/POLYPECTOMY BY BIOPSY;  Surgeon: Sergo Mcgraw MD;  Location: [facility]    COLONOSCOPY N/A 3/30/2022    Procedure: COLONOSCOPY, WITH POLYPECTOMY;  Surgeon: Leventhal, Thomas Michael, MD;  Location: [facility] OR    ENDOMETRIAL SAMPLING (BIOPSY) N/A 11/25/2022    Procedure: endometrial biospy using portable ultrasound guidance;  Surgeon: Jessica Nation MD;  [facility]    ENDOSCOPIC ULTRASOUND UPPER GASTROINTESTINAL TRACT (GI) N/A 3/14/2023    Procedure: ENDOSCOPIC ULTRASOUND, ESOPHAGOSCOPY / UPPER GASTROINTESTINAL TRACT (GI);  Surgeon: Miguel A Vickers MD;  Location: [facility]    EXAM UNDER ANESTHESIA PELVIC N/A 11/25/2022    Procedure: EXAM UNDER ANESTHESIA, PELVIS,;  Surgeon: Jessica Nation MD;  [facility]    IR RENAL/VISCERAL STENT/ATHERECT/PTA Right 2005     Current Outpatient Medications   Medication Sig Dispense Refill    acetaminophen (TYLENOL) 500 MG tablet Take 500-1,000 mg by mouth every 6 hours as needed for mild pain      albuterol (PROAIR HFA) 108 (90 Base) MCG/ACT inhaler Inhale 2 puffs into the lungs every 6 hours as needed for shortness of breath or wheezing 18 g 3    aspirin 81 MG tablet Take 81 mg by mouth daily      atenolol (TENORMIN) 25 MG tablet Take 1 tablet (25 mg) by mouth daily 90 tablet 3    fluticasone-salmeterol (ADVAIR) 100-50 MCG/ACT inhaler INHALE ONE PUFF INTO THE LUNGS DAILY 2 each 2    hydrochlorothiazide (HYDRODIURIL) 25 MG tablet  "Take 1 tablet (25 mg) by mouth daily 90 tablet 3    irbesartan (AVAPRO) 300 MG tablet Take 1 tablet (300 mg) by mouth daily 90 tablet 3    metFORMIN (GLUCOPHAGE) 500 MG tablet Take 2 tablets (1,000 mg) by mouth 2 times daily (with meals) 360 tablet 3    blood glucose (NO BRAND SPECIFIED) lancets standard Use to test blood sugar 2 times daily or as directed. 200 each 3    blood glucose (NO BRAND SPECIFIED) test strip Use to test blood sugar 2 times daily or as directed. 200 strip 3    blood glucose monitoring (NO BRAND SPECIFIED) meter device kit Use to test blood sugar 3 times daily or as directed. 1 kit 0    diclofenac (VOLTAREN) 1 % GEL topical gel Apply 2 grams three - four times daily using enclosed dosing card. (Patient not taking: Reported on 11/13/2024) 100 g 0    LORazepam (ATIVAN) 0.5 MG tablet Take 1 tablet (0.5 mg) by mouth once as needed for anxiety (take approx 15 min before MRI, after instructed to do so by staff) (Patient not taking: Reported on 11/13/2024) 1 tablet 0    STATIN NOT PRESCRIBED (INTENTIONAL) Please choose reason not prescribed, below         Allergies   Allergen Reactions    Codeine Diarrhea and Rash    Codeine Camsylate Diarrhea and Rash    Ibuprofen GI Disturbance and Nausea    Lisinopril Cough        Social History     Tobacco Use    Smoking status: Never    Smokeless tobacco: Never   Substance Use Topics    Alcohol use: Yes     Comment: rare     {FAMILY HISTORY (Optional):114137046}  History   Drug Use No           {ROS Picklists (Optional):217545}    Objective    /80 (BP Location: Right arm, Patient Position: Sitting, Cuff Size: Adult Regular)   Pulse 73   Temp 98.1  F (36.7  C)   Resp 16   Ht 1.68 m (5' 6.14\")   Wt 77.1 kg (170 lb)   LMP  (LMP Unknown)   SpO2 98%   BMI 27.32 kg/m     Estimated body mass index is 27.32 kg/m  as calculated from the following:    Height as of this encounter: 1.68 m (5' 6.14\").    Weight as of this encounter: 77.1 kg (170 lb).  "

## 2024-11-13 NOTE — PROGRESS NOTES
Preoperative Evaluation  [facility] INTERNAL MEDICINE [address]  [address] [facility] [address]  Phone: [phone]  Fax: [phone]  Primary Provider: Nadia Randle MD  Pre-op Performing Provider: Shayy Grady NP  Nov 13, 2024   {Provider  Link to PREOP SmartSet  REQUIRED to apply standard patient instructions and medication directions to the AVS :495089}  {ROOMER review and update patient entered surgical information if needed :743045}        11/13/2024   Surgical Information   What procedure is being done? history and physical    Facility or Hospital where procedure/surgery will be performed: [facility]    Who is doing the procedure / surgery? dr barboza    Date of surgery / procedure: november 25    Time of surgery / procedure: unknown    Where do you plan to recover after surgery? at home with family        Patient-reported     Fax number for surgical facility: [phone]}    {Provider Charting Preference for Preop :311431}    Rachel Estevez is a 74 year old, presenting for the following:  Pre-Op Exam (LEFT BREAST LUMPECTOMY LOCALIZED USING RADIOFREQUENCY IDENTIFICATION and LEFT SENTINEL LYMPH NODE BIOPSY On 11/25/2024  With Javier Barboza MD at 10:35AM at [facility])          11/13/2024     7:17 AM   Additional Questions   Roomed by Paola WHITTINGTON   Accompanied by N/A     HPI related to upcoming procedure: ***    Zenaida Valles presents to the clinic today for a preoperative visit. She has a history of hypertension, atherosclerosis of renal artery (s/p renal artery stent 2005), asthma, type 2 DM, IPMN, and invasive ductal carcinoma of the left breast.    Per chart review, recent diagnosis of breast cancer after routine mammogram in October. She met with Dr. Barboza on 10/31/24 to discuss management, plan for lumpectomy with lymph node biopsy scheduled for 11/25/24.     Metformin, hydrochlorothiazide, irbesartan, aspirin ***    A1c 6.7 on 10/23/24         11/13/2024   Pre-Op Questionnaire   Have you ever had a heart attack or stroke? No    Have you ever had surgery on your heart or blood vessels, such as a stent placement, a coronary artery bypass, or surgery on an artery in your head, neck, heart, or legs? (!) YES ***    Do you have chest pain with activity? No    Do you have a history of heart failure? No    Do you currently have a cold, bronchitis or symptoms of other infection? No    Do you have a cough, shortness of breath, or wheezing? No    Do you or anyone in your family have previous history of blood clots? No    Do you or does anyone in your family have a serious bleeding problem such as prolonged bleeding following surgeries or cuts? No    Have you ever had problems with anemia or been told to take iron pills? No    Have you had any abnormal blood loss such as black, tarry or bloody stools, or abnormal vaginal bleeding? No    Have you ever had a blood transfusion? No    Are you willing to have a blood transfusion if it is medically needed before, during, or after your surgery? Yes    Have you or any of your relatives ever had problems with anesthesia? No    Do you have sleep apnea, excessive snoring or daytime drowsiness? No    Do you have any artifical heart valves or other implanted medical devices like a pacemaker, defibrillator, or continuous glucose monitor? No    Do you have artificial joints? No    Are you allergic to latex? No        Patient-reported     Health Care Directive  Patient has a Health Care Directive on file      Preoperative Review of   {Mnpmpreport:143263}  {Review MNPMP for all patients per [facility] Profile:606088}    {Chronic problem details (Optional) :566224}    Patient Active Problem List    Diagnosis Date Noted    Invasive ductal carcinoma of breast, female, left (H) 10/31/2024     Priority: Medium    Hamstring strain 10/22/2024     Priority: Medium    Monoallelic mutation of MUTYH gene 08/03/2023     Priority: Medium     Diabetes mellitus, type 2 (H) 01/25/2021     Priority: Medium    Asthma 10/18/2011     Priority: Medium     Problem list name updated by automated process. Provider to review      Atherosclerosis of renal artery (H) 10/18/2011     Priority: Medium    Essential hypertension 10/03/2011     Priority: Medium     Problem list name updated by automated process. Provider to review        Past Medical History:   Diagnosis Date    Asthma     Diabetes mellitus (H)     Fibromuscular dysplasia (H)     H/O sebaceous cyst     Hyperlipidemia LDL goal < 130     Hypertension     IPMN (intraductal papillary mucinous neoplasm)     Obesity     Renal artery stenosis (H) 2005    Right, s/p stenting at [facility]    Statin medication not prescribed per physician orders     pt declined     Past Surgical History:   Procedure Laterality Date    COLONOSCOPY N/A 3/10/2017    Procedure: COMBINED COLONOSCOPY, SINGLE OR MULTIPLE BIOPSY/POLYPECTOMY BY BIOPSY;  Surgeon: Sergo Mcgraw MD;  Location: [facility]    COLONOSCOPY N/A 3/30/2022    Procedure: COLONOSCOPY, WITH POLYPECTOMY;  Surgeon: Leventhal, Thomas Michael, MD;  Location: [facility] OR    ENDOMETRIAL SAMPLING (BIOPSY) N/A 11/25/2022    Procedure: endometrial biospy using portable ultrasound guidance;  Surgeon: Jessica Nation MD;  [facility]    ENDOSCOPIC ULTRASOUND UPPER GASTROINTESTINAL TRACT (GI) N/A 3/14/2023    Procedure: ENDOSCOPIC ULTRASOUND, ESOPHAGOSCOPY / UPPER GASTROINTESTINAL TRACT (GI);  Surgeon: Miguel A Vickers MD;  Location: [facility]    EXAM UNDER ANESTHESIA PELVIC N/A 11/25/2022    Procedure: EXAM UNDER ANESTHESIA, PELVIS,;  Surgeon: Jessica Nation MD;  [facility]    IR RENAL/VISCERAL STENT/ATHERECT/PTA Right 2005     Current Outpatient Medications   Medication Sig Dispense Refill    acetaminophen (TYLENOL) 500 MG tablet Take 500-1,000 mg by mouth every 6 hours as needed for mild pain      albuterol (PROAIR HFA) 108 (90 Base) MCG/ACT inhaler Inhale 2  puffs into the lungs every 6 hours as needed for shortness of breath or wheezing 18 g 3    aspirin 81 MG tablet Take 81 mg by mouth daily      atenolol (TENORMIN) 25 MG tablet Take 1 tablet (25 mg) by mouth daily 90 tablet 3    fluticasone-salmeterol (ADVAIR) 100-50 MCG/ACT inhaler INHALE ONE PUFF INTO THE LUNGS DAILY 2 each 2    hydrochlorothiazide (HYDRODIURIL) 25 MG tablet Take 1 tablet (25 mg) by mouth daily 90 tablet 3    irbesartan (AVAPRO) 300 MG tablet Take 1 tablet (300 mg) by mouth daily 90 tablet 3    metFORMIN (GLUCOPHAGE) 500 MG tablet Take 2 tablets (1,000 mg) by mouth 2 times daily (with meals) 360 tablet 3    blood glucose (NO BRAND SPECIFIED) lancets standard Use to test blood sugar 2 times daily or as directed. 200 each 3    blood glucose (NO BRAND SPECIFIED) test strip Use to test blood sugar 2 times daily or as directed. 200 strip 3    blood glucose monitoring (NO BRAND SPECIFIED) meter device kit Use to test blood sugar 3 times daily or as directed. 1 kit 0    diclofenac (VOLTAREN) 1 % GEL topical gel Apply 2 grams three - four times daily using enclosed dosing card. (Patient not taking: Reported on 11/13/2024) 100 g 0    LORazepam (ATIVAN) 0.5 MG tablet Take 1 tablet (0.5 mg) by mouth once as needed for anxiety (take approx 15 min before MRI, after instructed to do so by staff) (Patient not taking: Reported on 11/13/2024) 1 tablet 0    STATIN NOT PRESCRIBED (INTENTIONAL) Please choose reason not prescribed, below         Allergies   Allergen Reactions    Codeine Diarrhea and Rash    Codeine Camsylate Diarrhea and Rash    Ibuprofen GI Disturbance and Nausea    Lisinopril Cough        Social History     Tobacco Use    Smoking status: Never    Smokeless tobacco: Never   Substance Use Topics    Alcohol use: Yes     Comment: rare     {FAMILY HISTORY (Optional):514555609}  History   Drug Use No             Review of Systems  Constitutional, HEENT, cardiovascular, pulmonary, gi and gu systems are  "negative, except as otherwise noted.    Objective    /80 (BP Location: Right arm, Patient Position: Sitting, Cuff Size: Adult Regular)   Pulse 73   Temp 98.1  F (36.7  C)   Resp 16   Ht 1.68 m (5' 6.14\")   Wt 77.1 kg (170 lb)   LMP  (LMP Unknown)   SpO2 98%   BMI 27.32 kg/m     Estimated body mass index is 27.32 kg/m  as calculated from the following:    Height as of this encounter: 1.68 m (5' 6.14\").    Weight as of this encounter: 77.1 kg (170 lb).  Physical Exam  GENERAL: alert and no distress  EYES: Eyes grossly normal to inspection, PERRL and conjunctivae and sclerae normal  HENT: ear canals and TM's normal, nose and mouth without ulcers or lesions  NECK: no adenopathy, no asymmetry, masses, or scars  RESP: lungs clear to auscultation - no rales, rhonchi or wheezes  CV: regular rate and rhythm, normal S1 S2, no S3 or S4, no murmur, click or rub, no peripheral edema  ABDOMEN: soft, nontender, no hepatosplenomegaly, no masses and bowel sounds normal  MS: no gross musculoskeletal defects noted, no edema  NEURO: Normal strength and tone, mentation intact and speech normal  PSYCH: mentation appears normal, affect normal/bright    Recent Labs   Lab Test 10/23/24  0925 04/29/24  0935 04/16/24  1442   NA  --   --  141   POTASSIUM  --   --  4.2   CR 0.76  --  0.73   A1C 6.7* 7.2*  --         Diagnostics  {LABS:361270}   No EKG required, no history of coronary heart disease, significant arrhythmia, peripheral arterial disease or other structural heart disease.    Revised Cardiac Risk Index (RCRI)  The patient has the following serious cardiovascular risks for perioperative complications:   - No serious cardiac risks = 0 points     RCRI Interpretation: 0 points: Class I (very low risk - 0.4% complication rate)         Signed Electronically by: Shayy Grady NP  A copy of this evaluation report is provided to the requesting physician.    {Provider Resources  Preop [facility] Preop " Guidelines  Revised Cardiac Risk Index :087331}   {Email feedback regarding this note to [email or web address]   :599202}

## 2024-11-13 NOTE — PATIENT INSTRUCTIONS
How to Take Your Medication Before Surgery  Preoperative Medication Instructions   Antiplatelet or Anticoagulation Medication Instructions   - aspirin: patient did not tolerate stopping aspirin in the past    Additional Medication Instructions  Take all scheduled medications on the day of surgery EXCEPT for modifications listed below:   - ACE/ARB: DO NOT TAKE on day of surgery (minimum 11 hours for general anesthesia).   - Diuretics: DO NOT TAKE on the day of surgery.   - metformin: DO NOT TAKE day of surgery.   - Herbal medications and vitamins: DO NOT TAKE 14 days prior to surgery.   - Topicals: DO NOT TAKE day of surgery.       Patient Education   Preparing for Your Surgery  For Adults  Getting started  In most cases, a nurse will call to review your health history and instructions. They will give you an arrival time based on your scheduled surgery time. Please be ready to share:  Your doctor's clinic name and phone number  Your medical, surgical, and anesthesia history  A list of allergies and sensitivities  A list of medicines, including herbal treatments and over-the-counter drugs  Whether the patient has a legal guardian (ask how to send us the papers in advance)  Note: You may not receive a call if you were seen at our PAC (Preoperative Assessment Center).  Please tell us if you're pregnant--or if there's any chance you might be pregnant. Some surgeries may injure a fetus (unborn baby), so they require a pregnancy test. Surgeries that are safe for a fetus don't always need a test, and you can choose whether to have one.   Preparing for surgery  Within 10 to 30 days of surgery: Have a pre-op exam (sometimes called an H&P, or History and Physical). This can be done at a clinic or pre-operative center.  If you're having a , you may not need this exam. Talk to your care team.  At your pre-op exam, talk to your care team about all medicines you take. (This includes CBD oil and any drugs, such as THC,  marijuana, and other forms of cannabis.) If you need to stop any medicine before surgery, ask when to start taking it again.  This is for your safety. Many medicines and drugs can make you bleed too much during surgery. Some change how well surgery (anesthesia) drugs work.  Call your insurance company to let them know you're having surgery. (If you don't have insurance, call [phone].)  Call your clinic if there's any change in your health. This includes a scrape or scratch near the surgery site, or any signs of a cold (sore throat, runny nose, cough, rash, fever).  Eating and drinking guidelines  For your safety: Unless your surgeon tells you otherwise, follow the guidelines below.  Eat and drink as normal until 8 hours before you arrive for surgery. After that, no food or milk. You can spit out gum when you arrive.  Drink clear liquids until 2 hours before you arrive. These are liquids you can see through, like water, Gatorade, and Propel Water. They also include plain black coffee and tea (no cream or milk).  No alcohol for 24 hours before you arrive. The night before surgery, stop any drinks that contain THC.  If your care team tells you to take medicine on the morning of surgery, it's okay to take it with a sip of water. No other medicines or drugs are allowed (including CBD oil)--follow your care team's instructions.  If you have questions the day of surgery, call your hospital or surgery center.   Preventing infection  Shower or bathe the night before and the morning of surgery. Follow the instructions your clinic gave you. (If no instructions, use regular soap.)  Don't shave or clip hair near your surgery site. We'll remove the hair if needed.  Don't smoke or vape the morning of surgery. No chewing tobacco for 6 hours before you arrive. A nicotine patch is okay. You may spit out nicotine gum when you arrive.  For some surgeries, the surgeon will tell you to fully quit smoking and nicotine.  We will make  every effort to keep you safe from infection. We will:  Clean our hands often with soap and water (or an alcohol-based hand rub).  Clean the skin at your surgery site with a special soap that kills germs.  Give you a special gown to keep you warm. (Cold raises the risk of infection.)  Wear hair covers, masks, gowns, and gloves during surgery.  Give antibiotic medicine, if prescribed. Not all surgeries need this medicine.  What to bring on the day of surgery  Photo ID and insurance card  Copy of your health care directive, if you have one  Glasses and hearing aids (bring cases)  You can't wear contacts during surgery  Inhaler and eye drops, if you use them (tell us about these when you arrive)  CPAP machine or breathing device, if you use them  A few personal items, if spending the night  If you have . . .  A pacemaker, ICD (cardiac defibrillator), or other implant: Bring the ID card.  An implanted stimulator: Bring the remote control.  A legal guardian: Bring a copy of the certified (court-stamped) guardianship papers.  Please remove any jewelry, including body piercings. Leave jewelry and other valuables at home.  If you're going home the day of surgery  You must have a responsible adult drive you home. They should stay with you overnight as well.  If you don't have someone to stay with you, and you aren't safe to go home alone, we may keep you overnight. Insurance often won't pay for this.  After surgery  If it's hard to control your pain or you need more pain medicine, please call your surgeon's office.  Questions?   If you have any questions for your care team, list them here:   ____________________________________________________________________________________________________________________________________________________________________________________________________________________________________________________________  For informational purposes only. Not to replace the advice of your health care  provider. Copyright   2003, 2019 [facility]. All rights reserved. Clinically reviewed by Jamil Coffman MD. [employer] [id number] - REV 08/24.

## 2024-11-18 ENCOUNTER — PATIENT OUTREACH (OUTPATIENT)
Dept: ONCOLOGY | Facility: CLINIC | Age: 74
End: 2024-11-18
Payer: COMMERCIAL

## 2024-11-18 NOTE — PROGRESS NOTES
[facility]: Surgical Oncology Cancer Care Short Note                                     Discussion with Patient:                                                       INBOUND CALL:     Received call from Zenaida regarding holding her aspirin which she takes due to her right renal artery stent. She stated she does not have internet up [place] and is therefore unable to check [employer] regarding any messages from her PCP clinic about how long to hold the aspirin.     Reviewed [facility] message from Jovana Grady NP which instructs Zenaida to hold aspirin 5 days prior to her surgery per Dr. Wood's request. Zenaida verbalized understanding of the plan. She will hold starting 11/20 and increase her fluid intake.     Encouraged Zenaida to call with any further questions or concerns. Direct contact number provided.     Emelyn Cheung RNCC  [facility]   Surgical Oncology     Approximately 5 minutes was spent in conversation with the patient/caregiver.

## 2024-11-20 ENCOUNTER — ANCILLARY PROCEDURE (OUTPATIENT)
Dept: MAMMOGRAPHY | Facility: CLINIC | Age: 74
End: 2024-11-20
Attending: SURGERY
Payer: COMMERCIAL

## 2024-11-20 DIAGNOSIS — Z85.3 HISTORY OF BREAST CANCER: ICD-10-CM

## 2024-11-22 ENCOUNTER — ANESTHESIA EVENT (OUTPATIENT)
Dept: SURGERY | Facility: AMBULATORY SURGERY CENTER | Age: 74
End: 2024-11-22
Payer: COMMERCIAL

## 2024-11-22 RX ORDER — OXYCODONE HYDROCHLORIDE 5 MG/1
10 TABLET ORAL
Status: CANCELLED | OUTPATIENT
Start: 2024-11-22

## 2024-11-22 RX ORDER — NALOXONE HYDROCHLORIDE 0.4 MG/ML
0.1 INJECTION, SOLUTION INTRAMUSCULAR; INTRAVENOUS; SUBCUTANEOUS
Status: CANCELLED | OUTPATIENT
Start: 2024-11-22

## 2024-11-22 RX ORDER — OXYCODONE HYDROCHLORIDE 5 MG/1
5 TABLET ORAL
Status: CANCELLED | OUTPATIENT
Start: 2024-11-22

## 2024-11-22 RX ORDER — ONDANSETRON 2 MG/ML
4 INJECTION INTRAMUSCULAR; INTRAVENOUS EVERY 30 MIN PRN
Status: CANCELLED | OUTPATIENT
Start: 2024-11-22

## 2024-11-22 RX ORDER — DEXAMETHASONE SODIUM PHOSPHATE 10 MG/ML
4 INJECTION, SOLUTION INTRAMUSCULAR; INTRAVENOUS
Status: CANCELLED | OUTPATIENT
Start: 2024-11-22

## 2024-11-22 RX ORDER — ONDANSETRON 4 MG/1
4 TABLET, ORALLY DISINTEGRATING ORAL EVERY 30 MIN PRN
Status: CANCELLED | OUTPATIENT
Start: 2024-11-22

## 2024-11-25 ENCOUNTER — HOSPITAL ENCOUNTER (OUTPATIENT)
Facility: AMBULATORY SURGERY CENTER | Age: 74
Discharge: HOME | End: 2024-11-25
Attending: SURGERY
Payer: COMMERCIAL

## 2024-11-25 ENCOUNTER — ANESTHESIA (OUTPATIENT)
Dept: SURGERY | Facility: AMBULATORY SURGERY CENTER | Age: 74
End: 2024-11-25
Payer: COMMERCIAL

## 2024-11-25 ENCOUNTER — ANCILLARY PROCEDURE (OUTPATIENT)
Dept: MAMMOGRAPHY | Facility: CLINIC | Age: 74
End: 2024-11-25
Attending: SURGERY
Payer: COMMERCIAL

## 2024-11-25 ENCOUNTER — HOSPITAL ENCOUNTER (OUTPATIENT)
Dept: NUCLEAR MEDICINE | Facility: CLINIC | Age: 74
Setting detail: NUCLEAR MEDICINE
Discharge: HOME | End: 2024-11-25
Attending: SURGERY | Admitting: SURGERY
Payer: COMMERCIAL

## 2024-11-25 VITALS
HEIGHT: 66 IN | OXYGEN SATURATION: 98 % | HEART RATE: 65 BPM | RESPIRATION RATE: 16 BRPM | TEMPERATURE: 98 F | SYSTOLIC BLOOD PRESSURE: 136 MMHG | WEIGHT: 170 LBS | DIASTOLIC BLOOD PRESSURE: 65 MMHG | BODY MASS INDEX: 27.32 KG/M2

## 2024-11-25 DIAGNOSIS — Z85.3 HISTORY OF BREAST CANCER: ICD-10-CM

## 2024-11-25 DIAGNOSIS — C50.912 INVASIVE DUCTAL CARCINOMA OF BREAST, FEMALE, LEFT (H): ICD-10-CM

## 2024-11-25 LAB — GLUCOSE BLDC GLUCOMTR-MCNC: 152 MG/DL (ref 70–99)

## 2024-11-25 PROCEDURE — 88307 TISSUE EXAM BY PATHOLOGIST: CPT | Mod: 26 | Performed by: PATHOLOGY

## 2024-11-25 PROCEDURE — 38792 RA TRACER ID OF SENTINL NODE: CPT

## 2024-11-25 PROCEDURE — 82962 GLUCOSE BLOOD TEST: CPT | Performed by: PATHOLOGY

## 2024-11-25 PROCEDURE — 99207 NM LYMPHOSCINTIGRAPHY INJECTION ONLY: CPT | Performed by: RADIOLOGY

## 2024-11-25 PROCEDURE — 76098 X-RAY EXAM SURGICAL SPECIMEN: CPT

## 2024-11-25 PROCEDURE — 88307 TISSUE EXAM BY PATHOLOGIST: CPT | Mod: TC | Performed by: SURGERY

## 2024-11-25 PROCEDURE — 94640 AIRWAY INHALATION TREATMENT: CPT | Mod: ZZRT

## 2024-11-25 RX ORDER — ALBUTEROL SULFATE 0.83 MG/ML
2.5 SOLUTION RESPIRATORY (INHALATION) EVERY 6 HOURS PRN
Status: DISCONTINUED | OUTPATIENT
Start: 2024-11-25 | End: 2024-11-26 | Stop reason: HOSPADM

## 2024-11-25 RX ORDER — HYDROMORPHONE HYDROCHLORIDE 1 MG/ML
0.4 INJECTION, SOLUTION INTRAMUSCULAR; INTRAVENOUS; SUBCUTANEOUS EVERY 5 MIN PRN
Status: DISCONTINUED | OUTPATIENT
Start: 2024-11-25 | End: 2024-11-26 | Stop reason: HOSPADM

## 2024-11-25 RX ORDER — FENTANYL CITRATE 50 UG/ML
50 INJECTION, SOLUTION INTRAMUSCULAR; INTRAVENOUS EVERY 5 MIN PRN
Status: DISCONTINUED | OUTPATIENT
Start: 2024-11-25 | End: 2024-11-26 | Stop reason: HOSPADM

## 2024-11-25 RX ORDER — ONDANSETRON 2 MG/ML
4 INJECTION INTRAMUSCULAR; INTRAVENOUS EVERY 30 MIN PRN
Status: DISCONTINUED | OUTPATIENT
Start: 2024-11-25 | End: 2024-11-26 | Stop reason: HOSPADM

## 2024-11-25 RX ORDER — SODIUM CHLORIDE, SODIUM LACTATE, POTASSIUM CHLORIDE, CALCIUM CHLORIDE 600; 310; 30; 20 MG/100ML; MG/100ML; MG/100ML; MG/100ML
INJECTION, SOLUTION INTRAVENOUS CONTINUOUS
Status: DISCONTINUED | OUTPATIENT
Start: 2024-11-25 | End: 2024-11-26 | Stop reason: HOSPADM

## 2024-11-25 RX ORDER — CEFAZOLIN SODIUM 2 G/50ML
2 SOLUTION INTRAVENOUS SEE ADMIN INSTRUCTIONS
Status: DISCONTINUED | OUTPATIENT
Start: 2024-11-25 | End: 2024-11-26 | Stop reason: HOSPADM

## 2024-11-25 RX ORDER — LIDOCAINE 40 MG/G
CREAM TOPICAL
Status: DISCONTINUED | OUTPATIENT
Start: 2024-11-25 | End: 2024-11-26 | Stop reason: HOSPADM

## 2024-11-25 RX ORDER — ONDANSETRON 4 MG/1
4 TABLET, ORALLY DISINTEGRATING ORAL EVERY 30 MIN PRN
Status: DISCONTINUED | OUTPATIENT
Start: 2024-11-25 | End: 2024-11-26 | Stop reason: HOSPADM

## 2024-11-25 RX ORDER — PROPOFOL 10 MG/ML
INJECTION, EMULSION INTRAVENOUS CONTINUOUS PRN
Status: DISCONTINUED | OUTPATIENT
Start: 2024-11-25 | End: 2024-11-25

## 2024-11-25 RX ORDER — FENTANYL CITRATE 50 UG/ML
25 INJECTION, SOLUTION INTRAMUSCULAR; INTRAVENOUS EVERY 5 MIN PRN
Status: DISCONTINUED | OUTPATIENT
Start: 2024-11-25 | End: 2024-11-26 | Stop reason: HOSPADM

## 2024-11-25 RX ORDER — HYDROMORPHONE HYDROCHLORIDE 1 MG/ML
0.2 INJECTION, SOLUTION INTRAMUSCULAR; INTRAVENOUS; SUBCUTANEOUS EVERY 5 MIN PRN
Status: DISCONTINUED | OUTPATIENT
Start: 2024-11-25 | End: 2024-11-26 | Stop reason: HOSPADM

## 2024-11-25 RX ORDER — CEFAZOLIN SODIUM 2 G/50ML
2 SOLUTION INTRAVENOUS
Status: DISCONTINUED | OUTPATIENT
Start: 2024-11-25 | End: 2024-11-26 | Stop reason: HOSPADM

## 2024-11-25 RX ORDER — NALOXONE HYDROCHLORIDE 0.4 MG/ML
0.1 INJECTION, SOLUTION INTRAMUSCULAR; INTRAVENOUS; SUBCUTANEOUS
Status: DISCONTINUED | OUTPATIENT
Start: 2024-11-25 | End: 2024-11-26 | Stop reason: HOSPADM

## 2024-11-25 RX ORDER — FENTANYL CITRATE 50 UG/ML
INJECTION, SOLUTION INTRAMUSCULAR; INTRAVENOUS PRN
Status: DISCONTINUED | OUTPATIENT
Start: 2024-11-25 | End: 2024-11-25

## 2024-11-25 RX ORDER — ACETAMINOPHEN 325 MG/1
975 TABLET ORAL ONCE
Status: DISCONTINUED | OUTPATIENT
Start: 2024-11-25 | End: 2024-11-26 | Stop reason: HOSPADM

## 2024-11-25 RX ORDER — DEXAMETHASONE SODIUM PHOSPHATE 4 MG/ML
INJECTION, SOLUTION INTRA-ARTICULAR; INTRALESIONAL; INTRAMUSCULAR; INTRAVENOUS; SOFT TISSUE PRN
Status: DISCONTINUED | OUTPATIENT
Start: 2024-11-25 | End: 2024-11-25

## 2024-11-25 RX ORDER — ONDANSETRON 2 MG/ML
INJECTION INTRAMUSCULAR; INTRAVENOUS PRN
Status: DISCONTINUED | OUTPATIENT
Start: 2024-11-25 | End: 2024-11-25

## 2024-11-25 RX ORDER — ACETAMINOPHEN 325 MG/1
975 TABLET ORAL ONCE
Status: COMPLETED | OUTPATIENT
Start: 2024-11-25 | End: 2024-11-25

## 2024-11-25 RX ORDER — LIDOCAINE HYDROCHLORIDE 20 MG/ML
INJECTION, SOLUTION INFILTRATION; PERINEURAL PRN
Status: DISCONTINUED | OUTPATIENT
Start: 2024-11-25 | End: 2024-11-25

## 2024-11-25 RX ORDER — PROPOFOL 10 MG/ML
INJECTION, EMULSION INTRAVENOUS PRN
Status: DISCONTINUED | OUTPATIENT
Start: 2024-11-25 | End: 2024-11-25

## 2024-11-25 RX ORDER — DEXAMETHASONE SODIUM PHOSPHATE 10 MG/ML
4 INJECTION, SOLUTION INTRAMUSCULAR; INTRAVENOUS
Status: DISCONTINUED | OUTPATIENT
Start: 2024-11-25 | End: 2024-11-26 | Stop reason: HOSPADM

## 2024-11-25 RX ADMIN — ACETAMINOPHEN 975 MG: 325 TABLET ORAL at 07:45

## 2024-11-25 RX ADMIN — FENTANYL CITRATE 25 MCG: 50 INJECTION, SOLUTION INTRAMUSCULAR; INTRAVENOUS at 09:26

## 2024-11-25 RX ADMIN — ALBUTEROL SULFATE 2.5 MG: 0.83 SOLUTION RESPIRATORY (INHALATION) at 11:24

## 2024-11-25 RX ADMIN — CEFAZOLIN SODIUM 2 G: 2 SOLUTION INTRAVENOUS at 09:08

## 2024-11-25 RX ADMIN — PROPOFOL 150 MCG/KG/MIN: 10 INJECTION, EMULSION INTRAVENOUS at 09:15

## 2024-11-25 RX ADMIN — PROPOFOL 200 MG: 10 INJECTION, EMULSION INTRAVENOUS at 09:13

## 2024-11-25 RX ADMIN — PROPOFOL 150 MCG/KG/MIN: 10 INJECTION, EMULSION INTRAVENOUS at 10:03

## 2024-11-25 RX ADMIN — FENTANYL CITRATE 25 MCG: 50 INJECTION, SOLUTION INTRAMUSCULAR; INTRAVENOUS at 09:11

## 2024-11-25 RX ADMIN — ONDANSETRON 4 MG: 2 INJECTION INTRAMUSCULAR; INTRAVENOUS at 09:22

## 2024-11-25 RX ADMIN — DEXAMETHASONE SODIUM PHOSPHATE 4 MG: 4 INJECTION, SOLUTION INTRA-ARTICULAR; INTRALESIONAL; INTRAMUSCULAR; INTRAVENOUS; SOFT TISSUE at 09:22

## 2024-11-25 RX ADMIN — Medication 0.25 MG: at 10:00

## 2024-11-25 RX ADMIN — LIDOCAINE HYDROCHLORIDE 100 MG: 20 INJECTION, SOLUTION INFILTRATION; PERINEURAL at 09:13

## 2024-11-25 RX ADMIN — SODIUM CHLORIDE, SODIUM LACTATE, POTASSIUM CHLORIDE, CALCIUM CHLORIDE: 600; 310; 30; 20 INJECTION, SOLUTION INTRAVENOUS at 07:46

## 2024-11-25 RX ADMIN — Medication 0.25 MG: at 10:23

## 2024-11-25 NOTE — ANESTHESIA CARE TRANSFER NOTE
Patient: Zenaida Valles    Procedure: Procedure(s):  LEFT BREAST LUMPECTOMY LOCALIZED USING RADIOFREQUENCY IDENTIFICATION  LEFT SENTINEL LYMPH NODE BIOPSY       Diagnosis: Invasive ductal carcinoma of breast, female, left (H) [C50.912]  Diagnosis Additional Information: No value filed.    Anesthesia Type:   General     Note:    Oropharynx: oropharynx clear of all foreign objects and spontaneously breathing  Level of Consciousness: drowsy  Oxygen Supplementation: face mask  Level of Supplemental Oxygen (L/min / FiO2): 6  Independent Airway: airway patency satisfactory and stable  Dentition: dentition unchanged    Report to RN Given: handoff report given  Patient transferred to: PACU  Comments: Report to PACU RN. Resps easy and regular.   Handoff Report: Identifed the Patient, Identified the Reponsible Provider, Reviewed the pertinent medical history, Discussed the surgical course, Reviewed Intra-OP anesthesia mangement and issues during anesthesia, Set expectations for post-procedure period and Allowed opportunity for questions and acknowledgement of understanding      Vitals:  Vitals Value Taken Time   BP     Temp     Pulse 62 11/25/24 1025   Resp 12 11/25/24 1025   SpO2 100 % 11/25/24 1024   Vitals shown include unfiled device data.    Electronically Signed By: WOOD SEE CRNA  November 25, 2024  10:26 AM

## 2024-11-25 NOTE — ANESTHESIA PREPROCEDURE EVALUATION
Anesthesia Pre-Procedure Evaluation    Patient: Zenaida Valles   MRN: [id number] : 1950        Procedure : Procedure(s):  LEFT BREAST LUMPECTOMY LOCALIZED USING RADIOFREQUENCY IDENTIFICATION  LEFT SENTINEL LYMPH NODE BIOPSY          Past Medical History:   Diagnosis Date    Asthma     Diabetes mellitus (H)     Fibromuscular dysplasia (H)     H/O sebaceous cyst     Hyperlipidemia LDL goal < 130     Hypertension     IPMN (intraductal papillary mucinous neoplasm)     Obesity     Renal artery stenosis (H)     Right, s/p stenting at [facility], F[facility]    Statin medication not prescribed per physician orders     pt declined      Past Surgical History:   Procedure Laterality Date    COLONOSCOPY N/A 3/10/2017    Procedure: COMBINED COLONOSCOPY, SINGLE OR MULTIPLE BIOPSY/POLYPECTOMY BY BIOPSY;  Surgeon: Sergo Mcgraw MD;  Location: [facility] GI    COLONOSCOPY N/A 3/30/2022    Procedure: COLONOSCOPY, WITH POLYPECTOMY;  Surgeon: Leventhal, Thomas Michael, MD;  Location: [facility] OR    ENDOMETRIAL SAMPLING (BIOPSY) N/A 2022    Procedure: endometrial biospy using portable ultrasound guidance;  Surgeon: Jessica Nation MD;  Location: [facility] OR    ENDOSCOPIC ULTRASOUND UPPER GASTROINTESTINAL TRACT (GI) N/A 3/14/2023    Procedure: ENDOSCOPIC ULTRASOUND, ESOPHAGOSCOPY / UPPER GASTROINTESTINAL TRACT (GI);  Surgeon: Miguel A Vickers MD;  Location: [facility] GI    EXAM UNDER ANESTHESIA PELVIC N/A 2022    Procedure: EXAM UNDER ANESTHESIA, PELVIS,;  Surgeon: Jessica Nation MD;  Location: [facility] OR    IR RENAL/VISCERAL STENT/ATHERECT/PTA Right       Allergies   Allergen Reactions    Codeine Diarrhea and Rash    Codeine Camsylate Diarrhea and Rash    Ibuprofen GI Disturbance and Nausea    Lisinopril Cough      Social History     Tobacco Use    Smoking status: Never    Smokeless tobacco: Never   Substance Use Topics    Alcohol use: Yes     Comment: rare      Wt Readings from Last 1 Encounters:   24 77.1 kg  (170 lb)        Anesthesia Evaluation   Pt has had prior anesthetic. Type: General and MAC.        ROS/MED HX  ENT/Pulmonary:     (+)                     Intermittent, asthma  Treatment: Inhaler prn,                 Neurologic:  - neg neurologic ROS     Cardiovascular: Comment: Impression:   1. No aortic aneurysm.  2. No hemodynamically significant stenosis.    (+) Dyslipidemia hypertension- -   -  - -                                      METS/Exercise Tolerance:     Hematologic: Comments: Lab Test        02/11/20 11/23/18                       1633          0843          WBC          5.6          5.8           HGB          13.6         14.2          MCV          89           90            PLT          233          246            Lab Test        11/25/22     11/25/22     03/30/22     02/10/22     01/19/21     02/11/20                       1408          1050          1117          0938          0719          1633          NA            --           --           --          140          139          138           POTASSIUM     --           --           --          4.4          4.1          3.6           CHLORIDE      --           --           --          104          105          103           CO2           --           --           --          29           30           30            BUN           --           --           --          15           15           12            CR            --           --           --          0.66         0.64         0.63          ANIONGAP      --           --           --          7            4            5             GAEL           --           --           --          9.0          8.9          8.9           GLC          164*         192*         213*         226*         218*         152*                Musculoskeletal:  - neg musculoskeletal ROS     GI/Hepatic: Comment: intraductal papillary mucinous neoplasm      Renal/Genitourinary: Comment: Renal artery stenosis  "(H) 2005     Right, s/p stenting at [facility]          Endo:     (+)  type II DM,             Obesity,       Psychiatric/Substance Use:  - neg psychiatric ROS     Infectious Disease:  - neg infectious disease ROS     Malignancy:  - neg malignancy ROS     Other:            Physical Exam    Airway        Mallampati: II       Respiratory Devices and Support         Dental       (+) Minor Abnormalities - some fillings, tiny chips      Cardiovascular          Rhythm and rate: regular     Pulmonary                   OUTSIDE LABS:  CBC:   Lab Results   Component Value Date    WBC 5.6 02/11/2020    WBC 5.8 11/23/2018    HGB 13.3 11/13/2024    HGB 13.6 02/11/2020    HCT 40.7 02/11/2020    HCT 42.0 11/23/2018     02/11/2020     11/23/2018     BMP:   Lab Results   Component Value Date     11/13/2024     04/16/2024    POTASSIUM 3.9 11/13/2024    POTASSIUM 4.2 04/16/2024    CHLORIDE 102 11/13/2024    CHLORIDE 102 04/16/2024    CO2 26 11/13/2024    CO2 25 04/16/2024    BUN 17.5 11/13/2024    BUN 15.2 04/16/2024    CR 0.58 11/13/2024    CR 0.76 10/23/2024     (H) 11/13/2024     (H) 10/11/2024     COAGS:   Lab Results   Component Value Date    PTT 24 08/14/2007    INR 1.09 08/14/2007     POC: No results found for: \"BGM\", \"HCG\", \"HCGS\"  HEPATIC:   Lab Results   Component Value Date    ALBUMIN 4.8 08/02/2023    PROTTOTAL 7.2 08/02/2023    ALT 25 08/02/2023    AST 17 08/02/2023    ALKPHOS 96 08/02/2023    BILITOTAL 0.3 08/02/2023     OTHER:   Lab Results   Component Value Date    LACT 1.5 02/11/2020    A1C 6.7 (H) 10/23/2024    GAEL 9.7 11/13/2024    PHOS 3.8 08/14/2007    MAG 1.9 08/14/2007    LIPASE 91 02/11/2020    TSH 3.83 02/10/2022       Anesthesia Plan    ASA Status:  2    NPO Status:  NPO Appropriate    Anesthesia Type: General.     - Airway: LMA   Induction: Intravenous.   Maintenance: TIVA.        Consents    Anesthesia Plan(s) and associated risks, benefits, and realistic alternatives " "discussed. Questions answered and patient/representative(s) expressed understanding.     - Discussed:     - Discussed with:  Patient            Postoperative Care            Comments:               Miguel A Morales MD    I have reviewed the pertinent notes and labs in the chart from the past 30 days and (re)examined the patient.  Any updates or changes from those notes are reflected in this note.             # Drug Induced Platelet Defect: home medication list includes an antiplatelet medication   # Hypertension: Noted on problem list          # DMII: A1C = 6.7 % (Ref range: <5.7 %) within past 6 months    # Overweight: Estimated body mass index is 27.44 kg/m  as calculated from the following:    Height as of this encounter: 1.676 m (5' 6\").    Weight as of this encounter: 77.1 kg (170 lb).       # Asthma: noted on problem list       "

## 2024-11-25 NOTE — OP NOTE
Preoperative Diagnosis: Left breast cancer    Postoperative Diagnosis: Same    Procedure: Left seed localized lumpectomy, lymphatic mapping, left axillary sentinel lymph node biopsy x 2    Surgeons: Dr. Javier Wood and Dolores Gloria PA-C assisted because there is no surgical residents available    Anesthesia: General    Indications for Surgery: The patient is a 74-year-old woman who has a nonpalpable stage I breast cancer.    Procedure in Detail: After informed consent the patient was brought the operative room given a general anesthetic.  I injected technetium sulfur colloid and ICG in her left breast.  She was prepped and draped in the usual fashion.  Using the localizer I identified the radiofrequency seed at the 3 o'clock position of her left breast.  The Bovie cautery was used to incise the subcutaneous tissues after the skin incision.  The Bovie cautery was used to dissect circumferentially around the radiofrequency seed to ensure adequate surgical margins.  The specimen was removed oriented and sent to the breast center which demonstrated complete excision of the targeted lesion.  Strict hemostasis was obtained with the Bovie cautery.  Next we used the neoprobe to identify a transcutaneous hotspot in the left axilla.  Local anesthetic was administered.  A transverse x-ray incision was made with a scalpel.  The Bovie cautery was used to incise obtains tissues.  I identified a radioactive fluorescent lymph node.  This lymph node was removed and had ex vivo counts of 50 counts per second.  We identified another lymph node that was mildly radioactive.  However we took it out there was no radioactivity within it.  There was no fluorescence as well the second lymph node was labeled as sentinel lymph node #2.  After removal of the second sentinel lymph node there were no additional radioactive fluorescent or palpable lymph nodes.  Both incisions were closed with interrupted 3-0 Vicryl suture for the dermal layer  and a running 4-0 PDS subcuticular stitch for the skin.  Surgical clips were placed in the lumpectomy cavity.  Dermabond was placed and the patient was taken to the recovery room in stable condition.      F[place]ardo Node Biopsy for Breast Cancer - Left  Operation performed with curative intent Yes   Tracer(s) used to identify sentinel nodes in the upfront surgery (non-neoadjuvant) setting Dye   Tracer(s) used to identify sentinel nodes in the neoadjuvant setting Radioactive tracer   All nodes (colored or non-colored) present at the end of a dye-filled lymphatic channel were removed Yes   All significantly radioactive nodes were removed Yes   All palpably suspicious nodes were removed N/A   Biopsy-proven positive nodes marked with clips prior to chemotherapy were identified and removed N/A     This procedure was not performed to treat breast cancer through axillary lymph node dissection      Javier Wood MD, MS  Surgical Oncology

## 2024-11-25 NOTE — ANESTHESIA PROCEDURE NOTES
Airway       Patient location during procedure: OR  Staff -        CRNA: Hilary Talbot APRN CRNA       Performed By: SRNA  Consent for Airway        Urgency: elective  Indications and Patient Condition       Indications for airway management: maynor-procedural       Induction type:intravenous       Mask difficulty assessment: 0 - not attempted    Final Airway Details       Final airway type: supraglottic airway    Supraglottic Airway Details        Type: LMA       Brand: LMA Unique       LMA size: 4    Post intubation assessment        Placement verified by: capnometry and equal breath sounds        Number of attempts at approach: 1       Secured with: tape       Ease of procedure: easy       Dentition: Intact and Unchanged

## 2024-11-25 NOTE — BRIEF OP NOTE
[facility]    Brief Operative Note    Pre-operative diagnosis: Invasive ductal carcinoma of breast, female, left (H) [C50.912]  Post-operative diagnosis Same as pre-operative diagnosis    Procedure: LEFT BREAST LUMPECTOMY LOCALIZED USING RADIOFREQUENCY IDENTIFICATION, Left - Breast  LEFT SENTINEL LYMPH NODE BIOPSY, Left - Shoulder    Surgeon: Surgeons and Role:     * Javier Wood MD - Primary  Anesthesia: General   Estimated Blood Loss: Minimal    Drains: None  Specimens:   ID Type Source Tests Collected by Time Destination   1 : Left breast lumpectomy Tissue Breast, Left SURGICAL PATHOLOGY EXAM Javier Wood MD 11/25/2024  9:40 AM    2 : Left axillary sentinel lymph node #1 Tissue Lymph Node(s), [place] SURGICAL PATHOLOGY EXAM Javier Wood MD 11/25/2024  9:49 AM    3 : Left axillary sentinel lymph node # 2 Tissue Lymph Node(s), [place] SURGICAL PATHOLOGY EXAM Javier Wood MD 11/25/2024  9:58 AM      Findings:   None.  Complications: None.  Implants: * No implants in log *      Krystal Murillo PA-C

## 2024-11-25 NOTE — DISCHARGE INSTRUCTIONS
"[facility]  Home Care Following Anesthesia  For 24 hours after surgery:  Get plenty of rest.  A responsible adult must stay with you for at least 24 hours after you leave the surgery center.  Do not drive or use heavy equipment.  If you have weakness or tingling, don't drive or use heavy equipment until this feeling goes away.   Do not drink alcohol.   Avoid strenuous or risky activities.  Ask for help when climbing stairs.  You may feel lightheaded.  IF so, sit for a few minutes before standing.  Have someone help you get up.   If you have nausea (feel sick to your stomach): Drink only clear liquids such as apple juice, ginger ale, broth or 7-Up.  Rest may also help.  Be sure to drink enough fluids.  Move to a regular diet as you feel able.   You may have a slight fever.  Call the doctor if your fever is over 100 F (37.7 C) (taken under the tongue) or lasts longer than 24 hours.  You may have a dry mouth, a sore throat, muscle aches or trouble sleeping. These should go away after 24 hours.  Do not make important or legal decisions.   It is recommended to avoid smoking.        Today you received a Marcaine or bupivacaine block to numb the nerves near your surgery site.  This is a block using local anesthetic or \"numbing\" medication injected around the nerves to anesthetize or \"numb\" the area supplied by those nerves.  This block is injected into the muscle layer near your surgical site.  The medication may numb the location where you had surgery for 6-18 hours, but may last up to 24 hours.  If your surgical site is an arm or leg you should be careful with your affected limb, since it is possible to injure your limb without being aware of it due to the numbing.  Until full feeling returns, you should guard against bumping or hitting your limb, and avoid extreme hot or cold temperatures on the skin.  As the block wears off, the feeling will return as a tingling or prickly " sensation near your surgical site.  You will experience more discomfort from your incision as the feeling returns.  You may want to take a pain pill (a narcotic or Tylenol if this was prescribed by your surgeon) when you start to experience mild pain before the pain beccomes more severe.  If your pain medications do not control your pain you should notifiy your surgeon.    Tips for taking pain medications  To get the best pain relief possible, remember these points:  Take pain medications as directed, before pain becomes severe.  Pain medication can upset your stomach: taking it with food may help.  Constipation is a common side effect of pain medication. Drink plenty of  fluids.  Eat foods high in fiber. Take a stool softener if recommended by your doctor or pharmacist.  Do not drink alcohol, drive or operate machinery while taking pain medications.  Ask about other ways to control pain, such as with heat, ice or relaxation.    Tylenol/Acetaminophen Consumption    If you feel your pain relief is insufficient, you may take Tylenol/Acetaminophen in addition to your narcotic pain medication.   Be careful not to exceed 4,000 mg of Tylenol/Acetaminophen in a 24 hour period from all sources.  If you are taking extra strength Tylenol/acetaminophen (500 mg), the maximum dose is 8 tablets in 24 hours.  If you are taking regular strength acetaminophen (325 mg), the maximum dose is 12 tablets in 24 hours.  Tylenol 975 mg given at 7:45 am.   Ok to take more after 1:45 pm.      Call a doctor for any of the following:  Signs of infection (fever, growing tenderness at the surgery site, a large amount of drainage or bleeding, severe pain, foul-smelling drainage, redness, swelling).  It has been over 8 to 10 hours since surgery and you are still not able to urinate (pass water).  Headache for over 24 hours.  Numbness, tingling or weakness the day after surgery (if you had spinal anesthesia).  Signs of Covid-19 infection  (temperature over 100 degrees, shortness of breath, cough, loss of taste/smell, generalized body aches, persistent headache, chills, sore throat, nausea/vomiting/diarrhea)  Your doctor is:       Dr. Javier Wood, [facility]: [phone]               Or dial [phone] and ask for the resident on call for:  [facility]  For emergency care, call the:  [facility]:  [phone] (TTY for hearing impaired: [phone])

## 2024-11-25 NOTE — ANESTHESIA POSTPROCEDURE EVALUATION
Patient: Zenaida Valles    Procedure: Procedure(s):  LEFT BREAST LUMPECTOMY LOCALIZED USING RADIOFREQUENCY IDENTIFICATION  LEFT SENTINEL LYMPH NODE BIOPSY       Anesthesia Type:  General    Note:  Disposition: Outpatient   Postop Pain Control: Uneventful            Sign Out: Well controlled pain   PONV: No   Neuro/Psych: Uneventful            Sign Out: Acceptable/Baseline neuro status   Airway/Respiratory: Uneventful            Sign Out: Acceptable/Baseline resp. status   CV/Hemodynamics: Uneventful            Sign Out: Acceptable CV status; No obvious hypovolemia; No obvious fluid overload   Other NRE: NONE   DID A NON-ROUTINE EVENT OCCUR?            Last vitals:  Vitals Value Taken Time   /76 11/25/24 1045   Temp 36.6  C (97.8  F) 11/25/24 1045   Pulse 58 11/25/24 1045   Resp 15 11/25/24 1045   SpO2 100 % 11/25/24 1045       Electronically Signed By: Miguel A Morales MD  November 25, 2024  12:19 PM

## 2024-12-03 ENCOUNTER — MYC MEDICAL ADVICE (OUTPATIENT)
Dept: INTERNAL MEDICINE | Facility: CLINIC | Age: 74
End: 2024-12-03
Payer: COMMERCIAL

## 2024-12-09 ENCOUNTER — ONCOLOGY VISIT (OUTPATIENT)
Dept: ONCOLOGY | Facility: CLINIC | Age: 74
End: 2024-12-09
Attending: SURGERY
Payer: COMMERCIAL

## 2024-12-09 VITALS
SYSTOLIC BLOOD PRESSURE: 164 MMHG | WEIGHT: 172.8 LBS | RESPIRATION RATE: 16 BRPM | TEMPERATURE: 97.6 F | BODY MASS INDEX: 27.89 KG/M2 | OXYGEN SATURATION: 98 % | DIASTOLIC BLOOD PRESSURE: 96 MMHG | HEART RATE: 74 BPM

## 2024-12-09 DIAGNOSIS — C50.912 INVASIVE DUCTAL CARCINOMA OF BREAST, FEMALE, LEFT (H): Primary | ICD-10-CM

## 2024-12-09 LAB
PATH REPORT.COMMENTS IMP SPEC: ABNORMAL
PATH REPORT.COMMENTS IMP SPEC: ABNORMAL
PATH REPORT.COMMENTS IMP SPEC: YES
PATH REPORT.FINAL DX SPEC: ABNORMAL
PATH REPORT.GROSS SPEC: ABNORMAL
PATH REPORT.MICROSCOPIC SPEC OTHER STN: ABNORMAL
PATH REPORT.RELEVANT HX SPEC: ABNORMAL
PATHOLOGY SYNOPTIC REPORT: ABNORMAL
PHOTO IMAGE: ABNORMAL

## 2024-12-09 ASSESSMENT — PAIN SCALES - GENERAL: PAINLEVEL_OUTOF10: NO PAIN (0)

## 2024-12-09 NOTE — PROGRESS NOTES
Zenaida is here for postoperative visit after undergoing a left lumpectomy and a sentinel lymph node biopsy on 1125 for stage I breast cancer.  She has done well since her surgery except for some postoperative discomfort.  Her pathology demonstrated a grade 1 invasive ductal cancer 11 mm in size with negative surgical margins.  2 sentinel nodes were negative.  On physical examination her incisions healing well.  She is going to follow-up with oncology next week and with radiation oncology I will see her in the future if any problems arise.

## 2024-12-09 NOTE — LETTER
12/9/2024      Zenaida Valles  [address] [facility] [address]      Dear Colleague,    Thank you for referring your patient, Zenaida Valles, to the [facility]. Please see a copy of my visit note below.    Zenaida is here for postoperative visit after undergoing a left lumpectomy and a sentinel lymph node biopsy on 1125 for stage I breast cancer.  She has done well since her surgery except for some postoperative discomfort.  Her pathology demonstrated a grade 1 invasive ductal cancer 11 mm in size with negative surgical margins.  2 sentinel nodes were negative.  On physical examination her incisions healing well.  She is going to follow-up with oncology next week and with radiation oncology I will see her in the future if any problems arise.      Again, thank you for allowing me to participate in the care of your patient.        Sincerely,        Javier Wood MD

## 2024-12-09 NOTE — NURSING NOTE
"Oncology Rooming Note    December 9, 2024 3:58 PM   Zenaida Valles is a 74 year old female who presents for:    Chief Complaint   Patient presents with    Oncology Clinic Visit     2-week post-op visit     Initial Vitals: BP (!) 164/96 (BP Location: Right arm, Patient Position: Sitting, Cuff Size: Adult Regular)   Pulse 74   Temp 97.6  F (36.4  C) (Oral)   Resp 16   Wt 78.4 kg (172 lb 12.8 oz)   LMP  (LMP Unknown)   SpO2 98%   BMI 27.89 kg/m   Estimated body mass index is 27.89 kg/m  as calculated from the following:    Height as of 11/25/24: 1.676 m (5' 6\").    Weight as of this encounter: 78.4 kg (172 lb 12.8 oz). Body surface area is 1.91 meters squared.  No Pain (0) Comment: Data Unavailable   No LMP recorded (lmp unknown). Patient is postmenopausal.  Allergies reviewed: Yes  Medications reviewed: Yes    Medications: Medication refills not needed today.  Pharmacy name entered into [facility]:    [facility] [place], [place] - [address] [facility] [place] [facility] [place], [place] - [address]    Frailty Screening:   Is the patient here for a new oncology consult visit in cancer care? 2. No      Clinical concerns:  none      Fany Doherty              "

## 2024-12-15 NOTE — PROGRESS NOTES
MEDICAL ONCOLOGY NOTE     Javier Wood MD  Professor   [address] [address]     Re. Zenaida Valles   Female, 74 year old, 1950  MRN: [id number]        Dear Dr. Wood,     Thank you for referring Zenaida Valles who is a 74-year-old woman with a new diagnosis of a screening identified stage I invasive ductal cancer of the upper outer quadrant of the left breast ER positive, AR positive, HER2 amplified stage [id number].       HISTORY OF PRESENT ILLNESS:    Zenaida has been in generally good health except for a right renal artery stenosis requiring a stent.  She also has a left leg injury recently she was in her usual state of good health and had a screening mammogram which showed a suspicious lesion in the upper outer quadrant of the left breast at the 3 o'clock position 5 cm from the nipple there is an irregular hypoechoic mass measuring 1.0 x 0.7 x 0.9 cm in size 1 cm from the nipple there is also a 0.5 x 0.4 cm area of suspicion     She underwent an ultrasound guided biopsy which showed usual ductal hyperplasia and fibrocystic changes microcysts in April Kren metaplasia.  Columnar cell changes.  It invasive ductal carcinoma was also found grade 2 and DCIS grade 2 solid type.  Her breast cancer was estrogen receptor positive progesterone receptor positive at HER2 2+ by IHC.  She then underwent ADRIANA which showed a subpopulation which was 60 to 70% ADRIANA 5 negative and 30 to 40% I-S age 1 positive with a 4.9 HER2 signals per nucleus and the ratio of 2.2 indicating HER2 positivity.     She then came to see Dr. Wood at [facility] for surgical recommendations.  She had a contrast mammogram which showed a 0.9 cm enhancing lesion in the upper outer quadrant of the left breast.     She has had no weight loss or loss of energy.  She does not sleep during the day.  She can perform all of her household chores.  ECOG 0 PS.      DIAGNOSTIC  AND TREATMENT SUMMARY:  On October 7 she underwent a screening mammogram which demonstrated an asymmetry in her left breast.       On October 15 she underwent a diagnostic mammogram which confirmed a left breast asymmetry.  She had an ultrasound which demonstrated 2 masses in her left breast.  At the 3 o'clock position 1 cm from the nipple there was a mass and at the 3 o'clock position 5 cm from the nipple there was a second mass.  Her lymph nodes were normal by ultrasound.       On October 23 she underwent a contrast-enhanced mammography which only demonstrated 1 mass measuring 1 cm.  The right breast was normal.  She underwent ultrasound-guided biopsies of both masses.  The mass at the 3 o'clock position 1 cm from the nipple was benign.  The mass at the 3 o'clock position 5 cm from the nipple demonstrating a grade 2 invasive ductal cancer that was ER positive and HER2 equivocal.  There is also DCIS present.          A. LEFT breast, 3:00, 1 cm from nipple, ultrasound-guided core biopsy:  - Benign breast tissue with usual ductal hyperplasia (UDH) and fibrocystic changes including microcysts, apocrine metaplasia, and columnar cell change  - Rare calcifications associated with benign breast ducts and acini  - Negative for atypia or malignancy      B. LEFT breast, 3:00, 5 cm from nipple, ultrasound-guided core biopsy:   - INVASIVE BREAST CARCINOMA OF NO SPECIAL TYPE (INVASIVE DUCTAL CARCINOMA), [place] grade 2  - Ductal carcinoma in situ (DCIS), nuclear grade 2, solid type  - Invasive carcinoma is estrogen receptor positive, progesterone receptor positive, and HER2 equivocal (score 2+) by immunohistochemistry (see 'Breast Biomarker Reporting Template' below)  - HER2 FISH is is process; results will be reportedly separately by cytogenetis  - See comment     This FISH analysis is performed in follow up to the reported equivocal (2+) HER2 findings by immunohistochemistry ([id number]).        RESULTS:     Ratio of  HER2/MARION-17 signals  Zenaida Lazarus Valles:  See Interpretation below     Majority (~60-70%) of tumor in area of strongest IHC staining:  Group 5 (ADRIANA Negative)  Avg. number HER2 signals/nucleus:  2.9  Avg. number MARION-17 signals/nucleus:  1.7  HER2/MARION 17 ratio:  1.7     Subpopulation (~30-40%) of tumor in area of strongest IHC staining:  Group 1 (ADRIANA Positive)                             Avg. number HER2 signals/nucleus:  4.9  Avg. number MARION-17 signals/nucleus:  2.3  HER2/MARION 17 ratio:  2.2     **Interpretive guidelines per the American Society of Clinical Oncology/College of American Pathologists Clinical Practice Guideline Update (Margarita VALENZUELA et al, 2023, Arch Pathol Lab Med 147:993):     -- Group 1: HER2/MARION-17 ratio 2.0 or more -AND- avg. number HER2 signals/nucleus 4.0 or more (ADIRANA Positive)  -- Group 2: HER2/MARION-17 ratio 2.0 or more -AND- avg. number HER2 signals/nucleus <4.0 (Additional work required)  -- Group 3: HER2/MARION-17 ratio <2.0 -AND- avg. number HER2 signals/nucleus 6.0 or more (Additional work required)  -- Group 4: HER2/MARION-17 ratio <2.0 -AND- avg. number HER2 signals/nucleus 4.0 or more and <6.0 (Additional work required)  -- Group 5: HER2/MARION-17 ratio <2.0 -AND- avg. number HER2 signals/nucleus <4.0 (ADRIANA Negative)     INTERPRETATION:  Two admixed populations of cells were found in this sample:     Majority (~60-70%) of tumor in area of strongest IHC staining:  Per the American Society of Clinical Oncology/College of American Pathologists Clinical Practice Guideline Focused Update (Margarita VALENZUELA et al, 2018, Arch Pathol Lab Med  doi:10.5858/arpa.2507-9760-DJ), the HER2/MARION 17 ratio of 1.7 and average number of HER2 signals/cell of 2.9 places this population of cells in Group 5 (ADRIANA Negative).      Subpopulation (~30-40%) of tumor in area of strongest IHC staining:  Admixed within this sample were cells with increased HER2 signals, which, when selectively scored, had an average of 4.9 HER2 signals/nucleus  and a HER2/MARION 17 ratio of 2.2; per the American Society of Clinical Oncology/College of American Pathologists Clinical Practice Guideline Focused Update (Margarita VALENZUELA et al, 2018, Arch Pathol Lab Med  doi:10.5858/arpa.5830-5638-OI), this population of cells falls into Group 1 (ADRIANA Positive).     PAST MEDICAL HISTORY: No history of breast surgery.  No history of breast cancer in the past.  No history of radiation therapy in the past or radiation exposure.  No history of tumor of any kind.  No history of heart problems, heart attack, breathing problems, blood clots, seizures, arthritis, peptic ulcer disease, osteoporosis or bone fractures.  She is not currently participating in a clinical trial.  She does have a history of asthma and uses an albuterol inhaler in cold weather.     Her past medical history is significant for diabetes mellitus and is on metformin, hypertension obesity, she has IPMN and a right renal artery stent.  The family history is negative for breast cancer.  She has a history of right renal artery stenosis and has a stent in place.     She has a recent history of a left hamstring injury.  She is seeing orthopedics for this problem.  She also has a history of back pain.  Her gait is affected but she can perform activities of daily living.  She has a history of aches in her shoulders.  She had a 65 pound weight loss over the last 2 years which was intentional related to improve diet and exercise.  She says she has a history of fibromuscular dysplasia.     She worked in the [employer] in [employer] and was exposed to dengue, malaria, hepatitis A, hepatitis B, encephalitis.  She has also had COVID and the flu.     FAMILY HISTORY:   No history of ovarian, uterine, colon cancer, or melanoma.  No history of glioma, gastric cancer or pancreatic cancer.  Her mother did have Zollinger-Wheeler syndrome but the patient's genetics are negative for this syndrome.      PAST MENSTRUAL HISTORY:  Age of first  menstrual period was 13.   She has been pregnant 1 times with 0 live births and 0 miscarriages and 1 .  Age at in place pregnancy age 28.   Uterus and ovaries are in place.  Last menstrual period was about age 52 and the menopause occurred naturally. No history of hormone replacement therapy.     She does have a history of thickened endometrial lining but by her report no abnormalities of the lining when she had a DIC     HABITS:  She is a never smoker but she has a history of exposure to secondhand smoke from her father as a child.  She consumes alcoholic beverages socially 1 drink every couple of months.     GERMLINE GENETICS: pending     ALLERGIES: She has drug allergies to codeine, codeine derivatives, ibuprofen, lisinopril..  No allergy to seafood, iodine, or contrast dye.  She does take daily aspirin 81 mg because of her renal stent.    LUMPECTOMY:  Surgical Pathology Report                         Case: [id number]                                   Authorizing Provider:  Javier Wood MD         Collected:           2024 09:40 AM           Ordering Location:     [facility] OR  Received:            2024 09:54 AM                                  [place]                                                                   Pathologist:           Kristal Lanza MD                                                   Specimens:   A) - Breast, Left, Left breast lumpectomy                                                            B) - Lymph Node(s), [place], Left axillary sentinel lymph node #1                                  C) - Lymph Node(s), [place], Left axillary sentinel lymph node # 2                        Final Diagnosis   A. LEFT breast, RFID tag-localized lumpectomy:  -INVASIVE BREAST CARCINOMA OF NO SPECIAL TYPE (INVASIVE DUCTAL CARCINOMA), FAREED GRADE 2, size 11 mm  -Margins are uninvolved by invasive carcinoma  -Invasive carcinoma is 1 mm from the nearest  (posterior) margin, 5 mm from the anterior margin, and > 5 mm from the superior, inferior, medial and lateral margins  -No lymphovascular invasion identified  -Other findings: fibrocystic change (including microcysts with apocrine metaplasia), sclerosing adenosis, and usual ductal hyperplasia  -Calcifications associated with benign acini  -Prior core biopsy site changes  -See tumor synoptic below     B. Lymph node, LEFT axillary, sentinel #1, excision:  -One benign lymph node (0/1)     C. Lymph node, LEFT axillary, sentinel #2, excision:  -One benign lymph node (0/1)       Synoptic Checklist   INVASIVE CARCINOMA OF THE BREAST: Resection   8th Edition - Protocol posted: 12/13/2023INVASIVE CARCINOMA OF THE BREAST: RESECTION - All Specimens  SPECIMEN   Procedure  Excision (less than total mastectomy)   Specimen Laterality  Left   TUMOR   Tumor Site  Clock position     3 o'clock   Histologic Type  Invasive carcinoma of no special type (ductal)   Histologic Grade (Mynor Histologic Score)     Glandular (Acinar) / Tubular Differentiation  Score 2   Nuclear Pleomorphism  Score 3   Mitotic Rate  Score 2   Overall Grade  Grade 2 (scores of 6 or 7)   Tumor Size  Greatest dimension of largest invasive focus (Millimeters): 11 mm   Additional Dimension (Millimeters)  9 mm     9 mm   Tumor Focality  Single focus of invasive carcinoma   Ductal Carcinoma In Situ (DCIS)  Not identified   Lobular Carcinoma In Situ (LCIS)  Not identified   Lymphatic and / or Vascular Invasion  Not identified   Dermal Lymphatic and / or Vascular Invasion  No skin present   Microcalcifications  Present in non-neoplastic tissue   Treatment Effect in the Breast  No known presurgical therapy   MARGINS   Margin Status for Invasive Carcinoma  All margins negative for invasive carcinoma   Distance from Invasive Carcinoma to Closest Margin  1 mm   Closest Margin(s) to Invasive Carcinoma  Posterior   Distance from Invasive Carcinoma to Anterior Margin  5 mm    Distance from Invasive Carcinoma to Superior Margin  Greater than: 5 mm   Distance from Invasive Carcinoma to Inferior Margin  Greater than: 5 mm   Distance from Invasive Carcinoma to Medial Margin  Greater than: 5 mm   Distance from Invasive Carcinoma to Lateral Margin  Greater than: 5 mm   REGIONAL LYMPH NODES   Regional Lymph Node Status  All regional lymph nodes negative for tumor   Total Number of Lymph Nodes Examined (sentinel and non-sentinel)  2   Number of Ransom Nodes Examined  2   pTNM CLASSIFICATION (AJCC 8th Edition)   Reporting of pT, pN, and (when applicable) pM categories is based on information available to the pathologist at the time the report is issued. As per the AJCC (Chapter 1, 8th Ed.) it is the managing physician s responsibility to establish the final pathologic stage based upon all pertinent information, including but potentially not limited to this pathology report.   pT Category  pT1c   pN Category  pN0   N Suffix  (sn)   SPECIAL STUDIES        Estrogen Receptor (ER) Status  Positive (greater than 10% of cells demonstrate nuclear positivity)   Percentage of Cells with Nuclear Positivity  %        Progesterone Receptor (PgR) Status  Positive   Percentage of Cells with Nuclear Positivity  81-90%        HER2 (by immunohistochemistry)  Equivocal (Score 2+)   Percentage of Cells with Uniform Intense Complete Membrane Staining  0 %        HER2 (by in situ hybridization)  Positive (amplified)   Testing Performed on Case Number  HER2 FISH amplified in 30-40% of tumor cells. Performed on prior core biopsy [id number] specimen B   Comment(s)  Best block: A13   .      Clinical Information  ALEE   The patient is a 74 year old woman with LEFT breast carcinoma. Procedure: LEFT breast RFID tag localized lumpectomy, LEFT axillary sentinel lymph node biopsy.    Gross Description  ALEE ROBIN(1). Breast, Left, Left breast lumpectomy:  The specimen is received fresh with proper patient  "identification, labeled \"left breast lumpectomy\". It consists of 27.85 g, 6.7 cm from medial to lateral x 4.7 cm from superior to inferior x 2.4 cm from anterior to posterior left breast lumpectomy specimen. The specimen was received previously inked by the surgeon as follows:  Superior - red, anterior - green, inferior - blue, posterior - black, medial - yellow and lateral - orange.     The specimen is sectioned from medial (slice 1) to lateral (slice 15) into 15 slices.        [id number]   This FISH analysis is performed in follow up to the reported equivocal (2+) HER2 findings by immunohistochemistry ([id number]).        RESULTS:     Ratio of HER2/MARION-17 signals  Zenaida Valles:  See Interpretation below     Majority (~60-70%) of tumor in area of strongest IHC staining:  Group 5 (ADRIANA Negative)  Avg. number HER2 signals/nucleus:  2.9  Avg. number MARION-17 signals/nucleus:  1.7  HER2/MARION 17 ratio:  1.7     Subpopulation (~30-40%) of tumor in area of strongest IHC staining:  Group 1 (ADRIANA Positive)                             Avg. number HER2 signals/nucleus:  4.9  Avg. number MARION-17 signals/nucleus:  2.3  HER2/MARION 17 ratio:  2.2      TREATMENT HISTORY:  A.  Lumpectomy has been scheduled.       INTERVAL HISTORY:  She was seen in clinic today with her  Trae. She had her lumpectomy with Dr. Wood on November 25. She reports some continued pain/tingling around the surgical site but she feels it is getting better. She also tore her left hamstring in early November. She has a brace that she was wearing but states it is feeling much better now. She also feels like she would benefit from a port-a-cath. We also discussed several questions pertaining to her planned APT treatment.       She has an ECOG 0 performance status.     REVIEW OF SYSTEMS:   She has had no fevers, headaches, cough, chest pain, shortness of breath, hemoptysis, loss of appetite, nausea, vomiting, abdominal pain, constipation, diarrhea, bone " pain, back pain, muscle or joint complaints, numbness or tingling in hands and feet, hearing loss or depression.  The remainder of a 14-point review of systems is negative.        PHYSICAL EXAMINATION:     VITALS:  BP (!) 144/82 (BP Location: Right arm, Patient Position: Sitting, Cuff Size: Adult Regular)   Pulse 74   Temp 97.7  F (36.5  C) (Oral)   Resp 18   Wt 78.6 kg (173 lb 3.2 oz)   LMP  (LMP Unknown)   SpO2 96%   BMI 27.96 kg/m      HEENT:  No alopecia, no lesions in the oropharynx.  Dentition.  LYMPH:  There is no palpable cervical, supraclavicular, subclavicular, or axillary lymphadenopathy.  BREASTS:  Examination of the right breast revealed   LUNGS:  Clear to percussion and auscultation.  HEART:  Regular rate and rhythm.  S1, S2.  ABDOMEN:  Soft, nontender, without hepatosplenomegaly.  EXTREMITIES:  Without edema.  PSYCH:  Mood and affect were normal.  NEUROLOGIC:  Mood and affect  normal.           LABORATORY DATA:  None today.     ASSESSMENT AND PLAN:    Zenaida Valles has a stage I T1b NX MX invasive ductal carcinoma of the left breast upper outer quadrant which was estrogen receptor positive progesterone receptor positive and HER2 2+ by IHC and HER2 positive by ADRIANA.   Germline genetics recommended.  I discussed the breast imaging with Dr. Lainez her radiologist and a breast MRI is not recommended at this time and that the contrast mammogram should suffice.  Discussion of the recommendation for adjuvant HER2 directed therapy with trastuzumab and paclitaxel.  She was understandably very disappointed about the recommendation for adjuvant treatment that would include chemotherapy.  I discussed with her the [facility] data indicating that even small T1 node-negative HER2 positive breast cancers are recommended for adjuvant trastuzumab based chemotherapy.  I did discuss with her that Dr. Zelaya at the [facility] had developed a highly effective regimen which de-escalates  therapy to trastuzumab and paclitaxel for 12 weeks on weekly basis followed by trastuzumab given every 3 weeks to complete 1 year of therapy.  I discussed that this regimen is very well-tolerated.  I discussed that the distant disease-free survival is approximately 92% at 10 years.  She was impressed with these data but was understandably very disappointed about the recommendation for chemotherapy.  We also discussed that we would begin hormonal therapy with an aromatase inhibitor after completion of chemotherapy and that the aromatase inhibitor treatment would be recommended for 10 years given the lack of data on shorter adjuvant therapy for hormone receptor positive HER2 positive breast cancer.  I discussed with Zenaida and her  that we would recommend the APT regimen which consists of weekly paclitaxel and weekly trastuzumab for 12 weeks followed by every 3-week trastuzumab to complete 1 year of therapy.  I discussed that the goal of the adjuvant ADT regimen is to prevent breast cancer from coming back.  She would also undergo radiation after lumpectomy and we would also include hormonal therapy which would be for a total of 10 years.  We would also add adjuvant zoledronic acid.  We discussed each of these interventions.    Discussion of the a PT regimen.  I discussed that Zenaida could perform all of her activities of daily living well on the a PT regimen.  I discussed that she could do cold capping to prevent hair loss.  I discussed that her energy level should remain good through the 12 weeks of combination paclitaxel and trastuzumab.  I discussed that she would need an echocardiogram and we would monitor her cardiac function.  I discussed that toxicities of regimen include decreased cardiac function and could include reactions to paclitaxel including allergic reactions.  I discussed that if she does have an allergic reaction we could substitute Abraxane for paclitaxel.  We also discussed at that time  toxicity of having to go back and forth to the oncologist office and that we would make every effort to reduce these time toxicity issues through efficient scheduling and minimizing the time spent in the oncologist office.  We discussed toxicities of paclitaxel which could include alopecia, neuropathy which could last for 6 months to a year after infusion.  I discussed that neutropenia is fairly uncommon.  I discussed that allergic reactions certainly can happen and we may need to substitute Abraxane.    Discussion of toxicities of trastuzumab which could include cardiac toxicity.  Discussion of hormonal therapy.  I discussed that after completion of paclitaxel we would begin hormonal therapy with an aromatase inhibitor and that this aromatase inhibitor treatment would continue for a total of 10 years.  We do not have data on  7 years of adjuvant hormonal therapy in patients with HER2 positive breast cancer.  We discussed side effects of aromatase inhibitor therapy which include joint stiffness which occurs in about 30% of patients hot flashes and vaginal dryness.  I discussed that exercise can help mitigate some of the side effects of aromatase inhibitor therapy.  Discussion of adjuvant zoledronic acid.  I discussed that zoledronic acid would help reduce risk of distant recurrence and also reduce bone loss due to aromatase inhibitor therapy.  I discussed that postlumpectomy radiation will be recommended.  Will arrange for radiation oncology consult.  Discussion of adjuvant zoledronic acid.  Adjuvant zoledronic acid can be associated with flulike symptoms that can last for several days after treatment.  I also discussed that there can be dental complications.  She keeps her teeth and good shape with frequent dental visits.  She does have excellent dentition and the risk of osteonecrosis of the jaw is quite low.  Discussion of exercise.  Discussion of the Lace and Pathway.  We recommend 150 minutes of exercise per  week with mixed cardio and strength training.  Stretch band, hand weights, yoga.  Diet.  I recommended a diet low in saturated fat, but not low in fat with more fruits and vegetables, and less in the way of red meat.  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  Follow up plan.  Right single lumen Powerport 12-26.  Start APT regimen with weekly paclitaxel and trastuzuamb beginning 12-27 with Leilani CBC, CMP and visit with ALEC visit January 3 CBC, CMP with me C4 1-16 and then every 3 weeks with me.  Follow up every 3rd  cycle with me for total of 12 cycles of paclitaxel. Echo in February.       Thank you for allowing us to participate in this patient's care.       Sincerely,      Chito Cheung MD  Professor  [facility]  [phone]     I spent 40 minutes with the patient more than 50% of which was in counseling and coordination of care. The remainder of a 10 point review of systems was negative.

## 2024-12-19 ENCOUNTER — APPOINTMENT (OUTPATIENT)
Dept: LAB | Facility: CLINIC | Age: 74
End: 2024-12-19
Attending: INTERNAL MEDICINE
Payer: COMMERCIAL

## 2024-12-19 ENCOUNTER — MYC MEDICAL ADVICE (OUTPATIENT)
Dept: INTERVENTIONAL RADIOLOGY/VASCULAR | Facility: CLINIC | Age: 74
End: 2024-12-19

## 2024-12-19 ENCOUNTER — ONCOLOGY VISIT (OUTPATIENT)
Dept: ONCOLOGY | Facility: CLINIC | Age: 74
End: 2024-12-19
Attending: INTERNAL MEDICINE
Payer: COMMERCIAL

## 2024-12-19 VITALS
WEIGHT: 173.2 LBS | DIASTOLIC BLOOD PRESSURE: 82 MMHG | TEMPERATURE: 97.7 F | BODY MASS INDEX: 27.96 KG/M2 | HEART RATE: 74 BPM | SYSTOLIC BLOOD PRESSURE: 144 MMHG | OXYGEN SATURATION: 96 % | RESPIRATION RATE: 18 BRPM

## 2024-12-19 DIAGNOSIS — Z51.11 ENCOUNTER FOR ANTINEOPLASTIC CHEMOTHERAPY: ICD-10-CM

## 2024-12-19 DIAGNOSIS — C50.912 INVASIVE DUCTAL CARCINOMA OF BREAST, FEMALE, LEFT (H): Primary | ICD-10-CM

## 2024-12-19 LAB
ALBUMIN SERPL BCG-MCNC: 4.1 G/DL (ref 3.5–5.2)
ALP SERPL-CCNC: 70 U/L (ref 40–150)
ALT SERPL W P-5'-P-CCNC: 15 U/L (ref 0–50)
ANION GAP SERPL CALCULATED.3IONS-SCNC: 13 MMOL/L (ref 7–15)
AST SERPL W P-5'-P-CCNC: 16 U/L (ref 0–45)
BASOPHILS # BLD AUTO: 0 10E3/UL (ref 0–0.2)
BASOPHILS NFR BLD AUTO: 1 %
BILIRUB SERPL-MCNC: 0.3 MG/DL
BUN SERPL-MCNC: 18.3 MG/DL (ref 8–23)
CALCIUM SERPL-MCNC: 9.3 MG/DL (ref 8.8–10.4)
CHLORIDE SERPL-SCNC: 101 MMOL/L (ref 98–107)
CREAT SERPL-MCNC: 0.67 MG/DL (ref 0.51–0.95)
EGFRCR SERPLBLD CKD-EPI 2021: >90 ML/MIN/1.73M2
EOSINOPHIL # BLD AUTO: 0.2 10E3/UL (ref 0–0.7)
EOSINOPHIL NFR BLD AUTO: 4 %
ERYTHROCYTE [DISTWIDTH] IN BLOOD BY AUTOMATED COUNT: 13.2 % (ref 10–15)
GLUCOSE SERPL-MCNC: 154 MG/DL (ref 70–99)
HCO3 SERPL-SCNC: 26 MMOL/L (ref 22–29)
HCT VFR BLD AUTO: 36.7 % (ref 35–47)
HGB BLD-MCNC: 12.4 G/DL (ref 11.7–15.7)
IMM GRANULOCYTES # BLD: 0 10E3/UL
IMM GRANULOCYTES NFR BLD: 0 %
LYMPHOCYTES # BLD AUTO: 2.2 10E3/UL (ref 0.8–5.3)
LYMPHOCYTES NFR BLD AUTO: 36 %
MCH RBC QN AUTO: 29.3 PG (ref 26.5–33)
MCHC RBC AUTO-ENTMCNC: 33.8 G/DL (ref 31.5–36.5)
MCV RBC AUTO: 87 FL (ref 78–100)
MONOCYTES # BLD AUTO: 0.4 10E3/UL (ref 0–1.3)
MONOCYTES NFR BLD AUTO: 6 %
NEUTROPHILS # BLD AUTO: 3.2 10E3/UL (ref 1.6–8.3)
NEUTROPHILS NFR BLD AUTO: 53 %
NRBC # BLD AUTO: 0 10E3/UL
NRBC BLD AUTO-RTO: 0 /100
PLATELET # BLD AUTO: 274 10E3/UL (ref 150–450)
POTASSIUM SERPL-SCNC: 3.9 MMOL/L (ref 3.4–5.3)
PROT SERPL-MCNC: 6.6 G/DL (ref 6.4–8.3)
RBC # BLD AUTO: 4.23 10E6/UL (ref 3.8–5.2)
SODIUM SERPL-SCNC: 140 MMOL/L (ref 135–145)
WBC # BLD AUTO: 6 10E3/UL (ref 4–11)

## 2024-12-19 PROCEDURE — 84155 ASSAY OF PROTEIN SERUM: CPT | Performed by: INTERNAL MEDICINE

## 2024-12-19 PROCEDURE — G0463 HOSPITAL OUTPT CLINIC VISIT: HCPCS | Performed by: INTERNAL MEDICINE

## 2024-12-19 PROCEDURE — 36415 COLL VENOUS BLD VENIPUNCTURE: CPT | Performed by: INTERNAL MEDICINE

## 2024-12-19 PROCEDURE — 85004 AUTOMATED DIFF WBC COUNT: CPT | Performed by: INTERNAL MEDICINE

## 2024-12-19 PROCEDURE — 85048 AUTOMATED LEUKOCYTE COUNT: CPT | Performed by: INTERNAL MEDICINE

## 2024-12-19 PROCEDURE — 99215 OFFICE O/P EST HI 40 MIN: CPT | Performed by: INTERNAL MEDICINE

## 2024-12-19 PROCEDURE — 82247 BILIRUBIN TOTAL: CPT | Performed by: INTERNAL MEDICINE

## 2024-12-19 PROCEDURE — 85014 HEMATOCRIT: CPT | Performed by: INTERNAL MEDICINE

## 2024-12-19 RX ORDER — HEPARIN SODIUM (PORCINE) LOCK FLUSH IV SOLN 100 UNIT/ML 100 UNIT/ML
5 SOLUTION INTRAVENOUS
OUTPATIENT
Start: 2024-12-27

## 2024-12-19 RX ORDER — DIPHENHYDRAMINE HYDROCHLORIDE 50 MG/ML
25 INJECTION INTRAMUSCULAR; INTRAVENOUS
Start: 2024-12-27

## 2024-12-19 RX ORDER — EPINEPHRINE 1 MG/ML
0.3 INJECTION, SOLUTION INTRAMUSCULAR; SUBCUTANEOUS EVERY 5 MIN PRN
OUTPATIENT
Start: 2024-12-27

## 2024-12-19 RX ORDER — DIPHENHYDRAMINE HCL 25 MG
50 CAPSULE ORAL ONCE
Start: 2024-12-27

## 2024-12-19 RX ORDER — ALBUTEROL SULFATE 90 UG/1
1-2 INHALANT RESPIRATORY (INHALATION)
Start: 2024-12-27

## 2024-12-19 RX ORDER — MEPERIDINE HYDROCHLORIDE 25 MG/ML
25 INJECTION INTRAMUSCULAR; INTRAVENOUS; SUBCUTANEOUS
OUTPATIENT
Start: 2024-12-27

## 2024-12-19 RX ORDER — HEPARIN SODIUM,PORCINE 10 UNIT/ML
5-20 VIAL (ML) INTRAVENOUS DAILY PRN
OUTPATIENT
Start: 2024-12-27

## 2024-12-19 RX ORDER — DIPHENHYDRAMINE HYDROCHLORIDE 50 MG/ML
50 INJECTION INTRAMUSCULAR; INTRAVENOUS
Start: 2024-12-27

## 2024-12-19 RX ORDER — METHYLPREDNISOLONE SODIUM SUCCINATE 40 MG/ML
40 INJECTION INTRAMUSCULAR; INTRAVENOUS
Start: 2024-12-27

## 2024-12-19 RX ORDER — LORAZEPAM 2 MG/ML
0.5 INJECTION INTRAMUSCULAR EVERY 4 HOURS PRN
OUTPATIENT
Start: 2024-12-27

## 2024-12-19 RX ORDER — ALBUTEROL SULFATE 0.83 MG/ML
2.5 SOLUTION RESPIRATORY (INHALATION)
OUTPATIENT
Start: 2024-12-27

## 2024-12-19 RX ORDER — ACETAMINOPHEN 325 MG/1
650 TABLET ORAL ONCE
OUTPATIENT
Start: 2024-12-27

## 2024-12-19 ASSESSMENT — PAIN SCALES - GENERAL: PAINLEVEL_OUTOF10: NO PAIN (0)

## 2024-12-19 NOTE — Clinical Note
12/19/2024      Zenaida Valles  [address]  [facility] [address]      Dear Colleague,    Thank you for referring your patient, Zenaida Valles, to the [facility]. Please see a copy of my visit note below.    MEDICAL ONCOLOGY NOTE     Javier Wood MD  Professor   [facility]  [address] [place] [address]     Re. Zenaida Valles   Female, 74 year old, 1950  MRN: [id number]     November 12, 2024     Dear Dr. Wood,     Thank you for referring Zenaida Valles who is a 74-year-old woman with a new diagnosis of a screening identified stage I invasive ductal cancer of the upper outer quadrant of the left breast ER positive, RI positive, HER2 amplified stage [id number].       HISTORY OF PRESENT ILLNESS:    Zenaida has been in generally good health except for a right renal artery stenosis requiring a stent.  She also has a left leg injury recently she was in her usual state of good health and had a screening mammogram which showed a suspicious lesion in the upper outer quadrant of the left breast at the 3 o'clock position 5 cm from the nipple there is an irregular hypoechoic mass measuring 1.0 x 0.7 x 0.9 cm in size 1 cm from the nipple there is also a 0.5 x 0.4 cm area of suspicion     She underwent an ultrasound guided biopsy which showed usual ductal hyperplasia and fibrocystic changes microcysts in April Kren metaplasia.  Columnar cell changes.  It invasive ductal carcinoma was also found grade 2 and DCIS grade 2 solid type.  Her breast cancer was estrogen receptor positive progesterone receptor positive at HER2 2+ by IHC.  She then underwent ADRIANA which showed a subpopulation which was 60 to 70% ADRIANA 5 negative and 30 to 40% I-S age 1 positive with a 4.9 HER2 signals per nucleus and the ratio of 2.2 indicating HER2 positivity.     She then came to see Dr. Wood at [facility] for surgical  recommendations.  She had a contrast mammogram which showed a 0.9 cm enhancing lesion in the upper outer quadrant of the left breast.     She has had no weight loss or loss of energy.  She does not sleep during the day.  She can perform all of her household chores.  ECOG 0 PS.      DIAGNOSTIC AND TREATMENT SUMMARY:  On October 7 she underwent a screening mammogram which demonstrated an asymmetry in her left breast.       On October 15 she underwent a diagnostic mammogram which confirmed a left breast asymmetry.  She had an ultrasound which demonstrated 2 masses in her left breast.  At the 3 o'clock position 1 cm from the nipple there was a mass and at the 3 o'clock position 5 cm from the nipple there was a second mass.  Her lymph nodes were normal by ultrasound.       On October 23 she underwent a contrast-enhanced mammography which only demonstrated 1 mass measuring 1 cm.  The right breast was normal.  She underwent ultrasound-guided biopsies of both masses.  The mass at the 3 o'clock position 1 cm from the nipple was benign.  The mass at the 3 o'clock position 5 cm from the nipple demonstrating a grade 2 invasive ductal cancer that was ER positive and HER2 equivocal.  There is also DCIS present.          A. LEFT breast, 3:00, 1 cm from nipple, ultrasound-guided core biopsy:  - Benign breast tissue with usual ductal hyperplasia (UDH) and fibrocystic changes including microcysts, apocrine metaplasia, and columnar cell change  - Rare calcifications associated with benign breast ducts and acini  - Negative for atypia or malignancy      B. LEFT breast, 3:00, 5 cm from nipple, ultrasound-guided core biopsy:   - INVASIVE BREAST CARCINOMA OF NO SPECIAL TYPE (INVASIVE DUCTAL CARCINOMA), [place] grade 2  - Ductal carcinoma in situ (DCIS), nuclear grade 2, solid type  - Invasive carcinoma is estrogen receptor positive, progesterone receptor positive, and HER2 equivocal (score 2+) by immunohistochemistry (see 'Breast  Biomarker Reporting Template' below)  - HER2 FISH is is process; results will be reportedly separately by cytogenetis  - See comment     This FISH analysis is performed in follow up to the reported equivocal (2+) HER2 findings by immunohistochemistry ([id number]).        RESULTS:     Ratio of HER2/MARION-17 signals  Zenaida Valles:  See Interpretation below     Majority (~60-70%) of tumor in area of strongest IHC staining:  Group 5 (ADRIANA Negative)  Avg. number HER2 signals/nucleus:  2.9  Avg. number MARION-17 signals/nucleus:  1.7  HER2/MARION 17 ratio:  1.7     Subpopulation (~30-40%) of tumor in area of strongest IHC staining:  Group 1 (ADRIANA Positive)                             Avg. number HER2 signals/nucleus:  4.9  Avg. number MARION-17 signals/nucleus:  2.3  HER2/MARION 17 ratio:  2.2     **Interpretive guidelines per the American Society of Clinical Oncology/College of American Pathologists Clinical Practice Guideline Update (Margarita VALENZUELA et al, 2023, Arch Pathol Lab Med 147:993):     -- Group 1: HER2/MARION-17 ratio 2.0 or more -AND- avg. number HER2 signals/nucleus 4.0 or more (ADRIANA Positive)  -- Group 2: HER2/MARION-17 ratio 2.0 or more -AND- avg. number HER2 signals/nucleus <4.0 (Additional work required)  -- Group 3: HER2/MARION-17 ratio <2.0 -AND- avg. number HER2 signals/nucleus 6.0 or more (Additional work required)  -- Group 4: HER2/MARION-17 ratio <2.0 -AND- avg. number HER2 signals/nucleus 4.0 or more and <6.0 (Additional work required)  -- Group 5: HER2/MARION-17 ratio <2.0 -AND- avg. number HER2 signals/nucleus <4.0 (ADRIANA Negative)     INTERPRETATION:  Two admixed populations of cells were found in this sample:     Majority (~60-70%) of tumor in area of strongest IHC staining:  Per the American Society of Clinical Oncology/College of American Pathologists Clinical Practice Guideline Focused Update (Margarita VALENZUELA et al, 2018, Arch Pathol Lab Med  doi:10.5858/arpa.9945-5072-LL), the HER2/MARION 17 ratio of 1.7 and average number of HER2  signals/cell of 2.9 places this population of cells in Group 5 (ADRIANA Negative).      Subpopulation (~30-40%) of tumor in area of strongest IHC staining:  Admixed within this sample were cells with increased HER2 signals, which, when selectively scored, had an average of 4.9 HER2 signals/nucleus and a HER2/MARION 17 ratio of 2.2; per the American Society of Clinical Oncology/College of American Pathologists Clinical Practice Guideline Focused Update (Margarita VALENZUELA et al, 2018, Arch Pathol Lab Med  doi:10.5858/arpa.1417-8543-HI), this population of cells falls into Group 1 (ADRIANA Positive).     PAST MEDICAL HISTORY: No history of breast surgery.  No history of breast cancer in the past.  No history of radiation therapy in the past or radiation exposure.  No history of tumor of any kind.  No history of heart problems, heart attack, breathing problems, blood clots, seizures, arthritis, peptic ulcer disease, osteoporosis or bone fractures.  She is not currently participating in a clinical trial.  She does have a history of asthma and uses an albuterol inhaler in cold weather.     Her past medical history is significant for diabetes mellitus and is on metformin, hypertension obesity, she has IPMN and a right renal artery stent.  The family history is negative for breast cancer.  She has a history of right renal artery stenosis and has a stent in place.     She has a recent history of a left hamstring injury.  She is seeing orthopedics for this problem.  She also has a history of back pain.  Her gait is affected but she can perform activities of daily living.  She has a history of aches in her shoulders.  She had a 65 pound weight loss over the last 2 years which was intentional related to improve diet and exercise.  She says she has a history of fibromuscular dysplasia.     She worked in the [employer] in [place] [employer] and was exposed to dengue, malaria, hepatitis A, hepatitis B, encephalitis.  She has also had COVID and the flu.      FAMILY HISTORY:   No history of ovarian, uterine, colon cancer, or melanoma.  No history of glioma, gastric cancer or pancreatic cancer.  Her mother did have Zollinger-Wheeler syndrome but the patient's genetics are negative for this syndrome.      PAST MENSTRUAL HISTORY:  Age of first menstrual period was 13.   She has been pregnant 1 times with 0 live births and 0 miscarriages and 1 .  Age at in place pregnancy age 28.   Uterus and ovaries are in place.  Last menstrual period was about age 52 and the menopause occurred naturally. No history of hormone replacement therapy.     She does have a history of thickened endometrial lining but by her report no abnormalities of the lining when she had a DIC     HABITS:  She is a never smoker but she has a history of exposure to secondhand smoke from her father as a child.  She consumes alcoholic beverages socially 1 drink every couple of months.     GERMLINE GENETICS: pending     ALLERGIES: She has drug allergies to codeine, codeine derivatives, ibuprofen, lisinopril..  No allergy to seafood, iodine, or contrast dye.  She does take daily aspirin 81 mg because of her renal stent.    LUMPECTOMY:  Surgical Pathology Report                         Case: [id number]                                   Authorizing Provider:  Javier Wood MD         Collected:           2024 09:40 AM           Ordering Location:     [facility] OR  Received:            2024 09:54 AM                                  [place]                                                                   Pathologist:           Kristal Lanza MD                                                   Specimens:   A) - Breast, Left, Left breast lumpectomy                                                            B) - Lymph Node(s), [place], Left axillary sentinel lymph node #1                                  C) - Lymph Node(s), [place], Left axillary sentinel lymph node #  2                        Final Diagnosis   A. LEFT breast, RFID tag-localized lumpectomy:  -INVASIVE BREAST CARCINOMA OF NO SPECIAL TYPE (INVASIVE DUCTAL CARCINOMA), FAREED GRADE 2, size 11 mm  -Margins are uninvolved by invasive carcinoma  -Invasive carcinoma is 1 mm from the nearest (posterior) margin, 5 mm from the anterior margin, and > 5 mm from the superior, inferior, medial and lateral margins  -No lymphovascular invasion identified  -Other findings: fibrocystic change (including microcysts with apocrine metaplasia), sclerosing adenosis, and usual ductal hyperplasia  -Calcifications associated with benign acini  -Prior core biopsy site changes  -See tumor synoptic below     B. Lymph node, LEFT axillary, sentinel #1, excision:  -One benign lymph node (0/1)     C. Lymph node, LEFT axillary, sentinel #2, excision:  -One benign lymph node (0/1)       Synoptic Checklist   INVASIVE CARCINOMA OF THE BREAST: Resection   8th Edition - Protocol posted: 12/13/2023INVASIVE CARCINOMA OF THE BREAST: RESECTION - All Specimens  SPECIMEN   Procedure  Excision (less than total mastectomy)   Specimen Laterality  Left   TUMOR   Tumor Site  Clock position     3 o'clock   Histologic Type  Invasive carcinoma of no special type (ductal)   Histologic Grade (Clements Histologic Score)     Glandular (Acinar) / Tubular Differentiation  Score 2   Nuclear Pleomorphism  Score 3   Mitotic Rate  Score 2   Overall Grade  Grade 2 (scores of 6 or 7)   Tumor Size  Greatest dimension of largest invasive focus (Millimeters): 11 mm   Additional Dimension (Millimeters)  9 mm     9 mm   Tumor Focality  Single focus of invasive carcinoma   Ductal Carcinoma In Situ (DCIS)  Not identified   Lobular Carcinoma In Situ (LCIS)  Not identified   Lymphatic and / or Vascular Invasion  Not identified   Dermal Lymphatic and / or Vascular Invasion  No skin present   Microcalcifications  Present in non-neoplastic tissue   Treatment Effect in the Breast  No  known presurgical therapy   MARGINS   Margin Status for Invasive Carcinoma  All margins negative for invasive carcinoma   Distance from Invasive Carcinoma to Closest Margin  1 mm   Closest Margin(s) to Invasive Carcinoma  Posterior   Distance from Invasive Carcinoma to Anterior Margin  5 mm   Distance from Invasive Carcinoma to Superior Margin  Greater than: 5 mm   Distance from Invasive Carcinoma to Inferior Margin  Greater than: 5 mm   Distance from Invasive Carcinoma to Medial Margin  Greater than: 5 mm   Distance from Invasive Carcinoma to Lateral Margin  Greater than: 5 mm   REGIONAL LYMPH NODES   Regional Lymph Node Status  All regional lymph nodes negative for tumor   Total Number of Lymph Nodes Examined (sentinel and non-sentinel)  2   Number of Datil Nodes Examined  2   pTNM CLASSIFICATION (AJCC 8th Edition)   Reporting of pT, pN, and (when applicable) pM categories is based on information available to the pathologist at the time the report is issued. As per the AJCC (Chapter 1, 8th Ed.) it is the managing physician s responsibility to establish the final pathologic stage based upon all pertinent information, including but potentially not limited to this pathology report.   pT Category  pT1c   pN Category  pN0   N Suffix  (sn)   SPECIAL STUDIES        Estrogen Receptor (ER) Status  Positive (greater than 10% of cells demonstrate nuclear positivity)   Percentage of Cells with Nuclear Positivity  %        Progesterone Receptor (PgR) Status  Positive   Percentage of Cells with Nuclear Positivity  81-90%        HER2 (by immunohistochemistry)  Equivocal (Score 2+)   Percentage of Cells with Uniform Intense Complete Membrane Staining  0 %        HER2 (by in situ hybridization)  Positive (amplified)   Testing Performed on Case Number  HER2 FISH amplified in 30-40% of tumor cells. Performed on prior core biopsy [id number] specimen B   Comment(s)  Best block: A13   .      Clinical Information  UUMAYO   The  "patient is a 74 year old woman with LEFT breast carcinoma. Procedure: LEFT breast RFID tag localized lumpectomy, LEFT axillary sentinel lymph node biopsy.    Gross Description  ALEE ROBIN(1). Breast, Left, Left breast lumpectomy:  The specimen is received fresh with proper patient identification, labeled \"left breast lumpectomy\". It consists of 27.85 g, 6.7 cm from medial to lateral x 4.7 cm from superior to inferior x 2.4 cm from anterior to posterior left breast lumpectomy specimen. The specimen was received previously inked by the surgeon as follows:  Superior - red, anterior - green, inferior - blue, posterior - black, medial - yellow and lateral - orange.     The specimen is sectioned from medial (slice 1) to lateral (slice 15) into 15 slices.        [id number]   This FISH analysis is performed in follow up to the reported equivocal (2+) HER2 findings by immunohistochemistry ([id number]).        RESULTS:     Ratio of HER2/MARION-17 signals  Zenaida Valles:  See Interpretation below     Majority (~60-70%) of tumor in area of strongest IHC staining:  Group 5 (ADRIANA Negative)  Avg. number HER2 signals/nucleus:  2.9  Avg. number MARION-17 signals/nucleus:  1.7  HER2/MARION 17 ratio:  1.7     Subpopulation (~30-40%) of tumor in area of strongest IHC staining:  Group 1 (ADRIANA Positive)                             Avg. number HER2 signals/nucleus:  4.9  Avg. number MARION-17 signals/nucleus:  2.3  HER2/MARION 17 ratio:  2.2      TREATMENT HISTORY:  A.  Lumpectomy has been scheduled.       INTERVAL HISTORY:  She was seen in clinic today with her  Trae. She had her lumpectomy with Dr. Wood on November 25. She reports some continued pain/tingling around the surgical site but she feels it is getting better. She also tore her left hamstring in early November. She has a brace that she was wearing but states it is feeling much better now. She also feels like she would benefit from a port-a-cath. We also discussed several " questions pertaining to her planned APT treatment.       She has an ECOG 0 performance status.     REVIEW OF SYSTEMS:   She has had no fevers, headaches, cough, chest pain, shortness of breath, hemoptysis, loss of appetite, nausea, vomiting, abdominal pain, constipation, diarrhea, bone pain, back pain, muscle or joint complaints, numbness or tingling in hands and feet, hearing loss or depression.  The remainder of a 14-point review of systems is negative.        PHYSICAL EXAMINATION:     VITALS:  BP (!) 144/82 (BP Location: Right arm, Patient Position: Sitting, Cuff Size: Adult Regular)   Pulse 74   Temp 97.7  F (36.5  C) (Oral)   Resp 18   Wt 78.6 kg (173 lb 3.2 oz)   LMP  (LMP Unknown)   SpO2 96%   BMI 27.96 kg/m      HEENT:  No alopecia, no lesions in the oropharynx.  Dentition.  LYMPH:  There is no palpable cervical, supraclavicular, subclavicular, or axillary lymphadenopathy.  BREASTS:  Examination of the right breast revealed   LUNGS:  Clear to percussion and auscultation.  HEART:  Regular rate and rhythm.  S1, S2.  ABDOMEN:  Soft, nontender, without hepatosplenomegaly.  EXTREMITIES:  Without edema.  PSYCH:  Mood and affect were normal.  NEUROLOGIC:  Mood and affect  normal.           LABORATORY DATA:  None today.     ASSESSMENT AND PLAN:    Zenaida Valles has a stage I T1b NX MX invasive ductal carcinoma of the left breast upper outer quadrant which was estrogen receptor positive progesterone receptor positive and HER2 2+ by IHC and HER2 positive by ADRIANA.   Germline genetics recommended.  I discussed the breast imaging with Dr. Lainez her radiologist and a breast MRI is not recommended at this time and that the contrast mammogram should suffice.  Discussion of the recommendation for adjuvant HER2 directed therapy with trastuzumab and paclitaxel.  She was understandably very disappointed about the recommendation for adjuvant treatment that would include chemotherapy.  I discussed with her the MD  [place] data indicating that even small T1 node-negative HER2 positive breast cancers are recommended for adjuvant trastuzumab based chemotherapy.  I did discuss with her that Dr. Zelaya at the [facility] had developed a highly effective regimen which de-escalates therapy to trastuzumab and paclitaxel for 12 weeks on weekly basis followed by trastuzumab given every 3 weeks to complete 1 year of therapy.  I discussed that this regimen is very well-tolerated.  I discussed that the distant disease-free survival is approximately 92% at 10 years.  She was impressed with these data but was understandably very disappointed about the recommendation for chemotherapy.  We also discussed that we would begin hormonal therapy with an aromatase inhibitor after completion of chemotherapy and that the aromatase inhibitor treatment would be recommended for 10 years given the lack of data on shorter adjuvant therapy for hormone receptor positive HER2 positive breast cancer.  I discussed with Zenaida and her  that we would recommend the APT regimen which consists of weekly paclitaxel and weekly trastuzumab for 12 weeks followed by every 3-week trastuzumab to complete 1 year of therapy.  I discussed that the goal of the adjuvant ADT regimen is to prevent breast cancer from coming back.  She would also undergo radiation after lumpectomy and we would also include hormonal therapy which would be for a total of 10 years.  We would also add adjuvant zoledronic acid.  We discussed each of these interventions.    Discussion of the a PT regimen.  I discussed that Zenaida could perform all of her activities of daily living well on the a PT regimen.  I discussed that she could do cold capping to prevent hair loss.  I discussed that her energy level should remain good through the 12 weeks of combination paclitaxel and trastuzumab.  I discussed that she would need an echocardiogram and we would monitor her cardiac function.  I  discussed that toxicities of regimen include decreased cardiac function and could include reactions to paclitaxel including allergic reactions.  I discussed that if she does have an allergic reaction we could substitute Abraxane for paclitaxel.  We also discussed at that time toxicity of having to go back and forth to the oncologist office and that we would make every effort to reduce these time toxicity issues through efficient scheduling and minimizing the time spent in the oncologist office.  We discussed toxicities of paclitaxel which could include alopecia, neuropathy which could last for 6 months to a year after infusion.  I discussed that neutropenia is fairly uncommon.  I discussed that allergic reactions certainly can happen and we may need to substitute Abraxane.    Discussion of toxicities of trastuzumab which could include cardiac toxicity.  Discussion of hormonal therapy.  I discussed that after completion of paclitaxel we would begin hormonal therapy with an aromatase inhibitor and that this aromatase inhibitor treatment would continue for a total of 10 years.  We do not have data on  7 years of adjuvant hormonal therapy in patients with HER2 positive breast cancer.  We discussed side effects of aromatase inhibitor therapy which include joint stiffness which occurs in about 30% of patients hot flashes and vaginal dryness.  I discussed that exercise can help mitigate some of the side effects of aromatase inhibitor therapy.  Discussion of adjuvant zoledronic acid.  I discussed that zoledronic acid would help reduce risk of distant recurrence and also reduce bone loss due to aromatase inhibitor therapy.  I discussed that postlumpectomy radiation will be recommended.  Will arrange for radiation oncology consult.  Discussion of adjuvant zoledronic acid.  Adjuvant zoledronic acid can be associated with flulike symptoms that can last for several days after treatment.  I also discussed that there can be dental  complications.  She keeps her teeth and good shape with frequent dental visits.  She does have excellent dentition and the risk of osteonecrosis of the jaw is quite low.  Discussion of exercise.  Discussion of the L[employer].  We recommend 150 minutes of exercise per week with mixed cardio and strength training.  Stretch band, hand weights, yoga.  Diet.  I recommended a diet low in saturated fat, but not low in fat with more fruits and vegetables, and less in the way of red meat.  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  Follow up plan.  Right single lumen powerport.  Start APT regimen with weekly paclitaxel and trastuzuamb beginning next week.       Thank you for allowing us to participate in this patient's care.       Sincerely,      Chito Cheung MD  Professor  [facility]  [phone]     I spent 40 minutes with the patient more than 50% of which was in counseling and coordination of care. The remainder of a 10 point review of systems was negative.          MEDICAL ONCOLOGY NOTE     Javier [place] [address]     Re. Zenaida Valles   Female, 74 year old, 1950  MRN: [id number]        Dear Dr. Wood,     Thank you for referring Zenaida Valles who is a 74-year-old woman with a new diagnosis of a screening identified stage I invasive ductal cancer of the upper outer quadrant of the left breast ER positive, DE positive, HER2 amplified stage [id number].       HISTORY OF PRESENT ILLNESS:    Zenaida has been in generally good health except for a right renal artery stenosis requiring a stent.  She also has a left leg injury recently she was in her usual state of good health and had a screening mammogram which showed a suspicious lesion in the upper outer quadrant of the left breast at the 3 o'clock position 5 cm from the nipple there is an irregular hypoechoic mass measuring 1.0 x 0.7 x 0.9 cm in size 1  cm from the nipple there is also a 0.5 x 0.4 cm area of suspicion     She underwent an ultrasound guided biopsy which showed usual ductal hyperplasia and fibrocystic changes microcysts in April Kren metaplasia.  Columnar cell changes.  It invasive ductal carcinoma was also found grade 2 and DCIS grade 2 solid type.  Her breast cancer was estrogen receptor positive progesterone receptor positive at HER2 2+ by IHC.  She then underwent ADRIANA which showed a subpopulation which was 60 to 70% ADRIANA 5 negative and 30 to 40% I-S age 1 positive with a 4.9 HER2 signals per nucleus and the ratio of 2.2 indicating HER2 positivity.     She then came to see Dr. Wood at [facility] for surgical recommendations.  She had a contrast mammogram which showed a 0.9 cm enhancing lesion in the upper outer quadrant of the left breast.     She has had no weight loss or loss of energy.  She does not sleep during the day.  She can perform all of her household chores.  ECOG 0 PS.      DIAGNOSTIC AND TREATMENT SUMMARY:  On October 7 she underwent a screening mammogram which demonstrated an asymmetry in her left breast.       On October 15 she underwent a diagnostic mammogram which confirmed a left breast asymmetry.  She had an ultrasound which demonstrated 2 masses in her left breast.  At the 3 o'clock position 1 cm from the nipple there was a mass and at the 3 o'clock position 5 cm from the nipple there was a second mass.  Her lymph nodes were normal by ultrasound.       On October 23 she underwent a contrast-enhanced mammography which only demonstrated 1 mass measuring 1 cm.  The right breast was normal.  She underwent ultrasound-guided biopsies of both masses.  The mass at the 3 o'clock position 1 cm from the nipple was benign.  The mass at the 3 o'clock position 5 cm from the nipple demonstrating a grade 2 invasive ductal cancer that was ER positive and HER2 equivocal.  There is also DCIS present.          A. LEFT  breast, 3:00, 1 cm from nipple, ultrasound-guided core biopsy:  - Benign breast tissue with usual ductal hyperplasia (UDH) and fibrocystic changes including microcysts, apocrine metaplasia, and columnar cell change  - Rare calcifications associated with benign breast ducts and acini  - Negative for atypia or malignancy      B. LEFT breast, 3:00, 5 cm from nipple, ultrasound-guided core biopsy:   - INVASIVE BREAST CARCINOMA OF NO SPECIAL TYPE (INVASIVE DUCTAL CARCINOMA), [place] grade 2  - Ductal carcinoma in situ (DCIS), nuclear grade 2, solid type  - Invasive carcinoma is estrogen receptor positive, progesterone receptor positive, and HER2 equivocal (score 2+) by immunohistochemistry (see 'Breast Biomarker Reporting Template' below)  - HER2 FISH is is process; results will be reportedly separately by cytogenetis  - See comment     This FISH analysis is performed in follow up to the reported equivocal (2+) HER2 findings by immunohistochemistry ([id number]).        RESULTS:     Ratio of HER2/MARION-17 signals  Zenaida Valles:  See Interpretation below     Majority (~60-70%) of tumor in area of strongest IHC staining:  Group 5 (ADIRANA Negative)  Avg. number HER2 signals/nucleus:  2.9  Avg. number MARION-17 signals/nucleus:  1.7  HER2/MARION 17 ratio:  1.7     Subpopulation (~30-40%) of tumor in area of strongest IHC staining:  Group 1 (ADRIANA Positive)                             Avg. number HER2 signals/nucleus:  4.9  Avg. number MARION-17 signals/nucleus:  2.3  HER2/MARION 17 ratio:  2.2     **Interpretive guidelines per the American Society of Clinical Oncology/College of American Pathologists Clinical Practice Guideline Update (Margarita VALENZUELA et al, 2023, Arch Pathol Lab Med 147:993):     -- Group 1: HER2/MARION-17 ratio 2.0 or more -AND- avg. number HER2 signals/nucleus 4.0 or more (ADRIANA Positive)  -- Group 2: HER2/MARION-17 ratio 2.0 or more -AND- avg. number HER2 signals/nucleus <4.0 (Additional work required)  -- Group 3: HER2/MARION-17  ratio <2.0 -AND- avg. number HER2 signals/nucleus 6.0 or more (Additional work required)  -- Group 4: HER2/MARION-17 ratio <2.0 -AND- avg. number HER2 signals/nucleus 4.0 or more and <6.0 (Additional work required)  -- Group 5: HER2/MARION-17 ratio <2.0 -AND- avg. number HER2 signals/nucleus <4.0 (ADRIANA Negative)     INTERPRETATION:  Two admixed populations of cells were found in this sample:     Majority (~60-70%) of tumor in area of strongest IHC staining:  Per the American Society of Clinical Oncology/College of American Pathologists Clinical Practice Guideline Focused Update (Margarita VALENZUELA et al, 2018, Arch Pathol Lab Med  doi:10.5858/arpa.8035-8644-FI), the HER2/MARION 17 ratio of 1.7 and average number of HER2 signals/cell of 2.9 places this population of cells in Group 5 (ADRIANA Negative).      Subpopulation (~30-40%) of tumor in area of strongest IHC staining:  Admixed within this sample were cells with increased HER2 signals, which, when selectively scored, had an average of 4.9 HER2 signals/nucleus and a HER2/MARION 17 ratio of 2.2; per the American Society of Clinical Oncology/College of American Pathologists Clinical Practice Guideline Focused Update (Margarita VALENZUELA et al, 2018, Arch Pathol Lab Med  doi:10.5858/arpa.1220-5570-BC), this population of cells falls into Group 1 (ADRIANA Positive).     PAST MEDICAL HISTORY: No history of breast surgery.  No history of breast cancer in the past.  No history of radiation therapy in the past or radiation exposure.  No history of tumor of any kind.  No history of heart problems, heart attack, breathing problems, blood clots, seizures, arthritis, peptic ulcer disease, osteoporosis or bone fractures.  She is not currently participating in a clinical trial.  She does have a history of asthma and uses an albuterol inhaler in cold weather.     Her past medical history is significant for diabetes mellitus and is on metformin, hypertension obesity, she has IPMN and a right renal artery stent.  The  family history is negative for breast cancer.  She has a history of right renal artery stenosis and has a stent in place.     She has a recent history of a left hamstring injury.  She is seeing orthopedics for this problem.  She also has a history of back pain.  Her gait is affected but she can perform activities of daily living.  She has a history of aches in her shoulders.  She had a 65 pound weight loss over the last 2 years which was intentional related to improve diet and exercise.  She says she has a history of fibromuscular dysplasia.     She worked in the [employer] in [place] and was exposed to dengue, malaria, hepatitis A, hepatitis B, encephalitis.  She has also had COVID and the flu.     FAMILY HISTORY:   No history of ovarian, uterine, colon cancer, or melanoma.  No history of glioma, gastric cancer or pancreatic cancer.  Her mother did have Zollinger-Wheeler syndrome but the patient's genetics are negative for this syndrome.      PAST MENSTRUAL HISTORY:  Age of first menstrual period was 13.   She has been pregnant 1 times with 0 live births and 0 miscarriages and 1 .  Age at in place pregnancy age 28.   Uterus and ovaries are in place.  Last menstrual period was about age 52 and the menopause occurred naturally. No history of hormone replacement therapy.     She does have a history of thickened endometrial lining but by her report no abnormalities of the lining when she had a DIC     HABITS:  She is a never smoker but she has a history of exposure to secondhand smoke from her father as a child.  She consumes alcoholic beverages socially 1 drink every couple of months.     GERMLINE GENETICS: pending     ALLERGIES: She has drug allergies to codeine, codeine derivatives, ibuprofen, lisinopril..  No allergy to seafood, iodine, or contrast dye.  She does take daily aspirin 81 mg because of her renal stent.    LUMPECTOMY:  Surgical Pathology Report                         Case: [id number]                                    Authorizing Provider:  Javier Wood MD         Collected:           11/25/2024 09:40 AM           Ordering Location:     [facility] OR  Received:            11/25/2024 09:54 AM                                  [place]                                                                   Pathologist:           Kristal Lanza MD                                                   Specimens:   A) - Breast, Left, Left breast lumpectomy                                                            B) - Lymph Node(s), [place], Left axillary sentinel lymph node #1                                  C) - Lymph Node(s), [place], Left axillary sentinel lymph node # 2                        Final Diagnosis   A. LEFT breast, RFID tag-localized lumpectomy:  -INVASIVE BREAST CARCINOMA OF NO SPECIAL TYPE (INVASIVE DUCTAL CARCINOMA), FAREED GRADE 2, size 11 mm  -Margins are uninvolved by invasive carcinoma  -Invasive carcinoma is 1 mm from the nearest (posterior) margin, 5 mm from the anterior margin, and > 5 mm from the superior, inferior, medial and lateral margins  -No lymphovascular invasion identified  -Other findings: fibrocystic change (including microcysts with apocrine metaplasia), sclerosing adenosis, and usual ductal hyperplasia  -Calcifications associated with benign acini  -Prior core biopsy site changes  -See tumor synoptic below     B. Lymph node, LEFT axillary, sentinel #1, excision:  -One benign lymph node (0/1)     C. Lymph node, LEFT axillary, sentinel #2, excision:  -One benign lymph node (0/1)       Synoptic Checklist   INVASIVE CARCINOMA OF THE BREAST: Resection   8th Edition - Protocol posted: 12/13/2023INVASIVE CARCINOMA OF THE BREAST: RESECTION - All Specimens  SPECIMEN   Procedure  Excision (less than total mastectomy)   Specimen Laterality  Left   TUMOR   Tumor Site  Clock position     3 o'clock   Histologic Type  Invasive carcinoma of no special type  (ductal)   Histologic Grade ([place] Histologic Score)     Glandular (Acinar) / Tubular Differentiation  Score 2   Nuclear Pleomorphism  Score 3   Mitotic Rate  Score 2   Overall Grade  Grade 2 (scores of 6 or 7)   Tumor Size  Greatest dimension of largest invasive focus (Millimeters): 11 mm   Additional Dimension (Millimeters)  9 mm     9 mm   Tumor Focality  Single focus of invasive carcinoma   Ductal Carcinoma In Situ (DCIS)  Not identified   Lobular Carcinoma In Situ (LCIS)  Not identified   Lymphatic and / or Vascular Invasion  Not identified   Dermal Lymphatic and / or Vascular Invasion  No skin present   Microcalcifications  Present in non-neoplastic tissue   Treatment Effect in the Breast  No known presurgical therapy   MARGINS   Margin Status for Invasive Carcinoma  All margins negative for invasive carcinoma   Distance from Invasive Carcinoma to Closest Margin  1 mm   Closest Margin(s) to Invasive Carcinoma  Posterior   Distance from Invasive Carcinoma to Anterior Margin  5 mm   Distance from Invasive Carcinoma to Superior Margin  Greater than: 5 mm   Distance from Invasive Carcinoma to Inferior Margin  Greater than: 5 mm   Distance from Invasive Carcinoma to Medial Margin  Greater than: 5 mm   Distance from Invasive Carcinoma to Lateral Margin  Greater than: 5 mm   REGIONAL LYMPH NODES   Regional Lymph Node Status  All regional lymph nodes negative for tumor   Total Number of Lymph Nodes Examined (sentinel and non-sentinel)  2   Number of [place] Nodes Examined  2   pTNM CLASSIFICATION (AJCC 8th Edition)   Reporting of pT, pN, and (when applicable) pM categories is based on information available to the pathologist at the time the report is issued. As per the AJCC (Chapter 1, 8th Ed.) it is the managing physician s responsibility to establish the final pathologic stage based upon all pertinent information, including but potentially not limited to this pathology report.   pT Category  pT1c   pN Category  " pN0   N Suffix  (sn)   SPECIAL STUDIES        Estrogen Receptor (ER) Status  Positive (greater than 10% of cells demonstrate nuclear positivity)   Percentage of Cells with Nuclear Positivity  %        Progesterone Receptor (PgR) Status  Positive   Percentage of Cells with Nuclear Positivity  81-90%        HER2 (by immunohistochemistry)  Equivocal (Score 2+)   Percentage of Cells with Uniform Intense Complete Membrane Staining  0 %        HER2 (by in situ hybridization)  Positive (amplified)   Testing Performed on Case Number  HER2 FISH amplified in 30-40% of tumor cells. Performed on prior core biopsy [id number] specimen B   Comment(s)  Best block: A13   .      Clinical Information  UCARLOS   The patient is a 74 year old woman with LEFT breast carcinoma. Procedure: LEFT breast RFID tag localized lumpectomy, LEFT axillary sentinel lymph node biopsy.    Gross Description  ALEE ROBIN(1). Breast, Left, Left breast lumpectomy:  The specimen is received fresh with proper patient identification, labeled \"left breast lumpectomy\". It consists of 27.85 g, 6.7 cm from medial to lateral x 4.7 cm from superior to inferior x 2.4 cm from anterior to posterior left breast lumpectomy specimen. The specimen was received previously inked by the surgeon as follows:  Superior - red, anterior - green, inferior - blue, posterior - black, medial - yellow and lateral - orange.     The specimen is sectioned from medial (slice 1) to lateral (slice 15) into 15 slices.        [id number]   This FISH analysis is performed in follow up to the reported equivocal (2+) HER2 findings by immunohistochemistry ([id number]).        RESULTS:     Ratio of HER2/MARION-17 signals  Zenaida Valles:  See Interpretation below     Majority (~60-70%) of tumor in area of strongest IHC staining:  Group 5 (ADRIANA Negative)  Avg. number HER2 signals/nucleus:  2.9  Avg. number MARION-17 signals/nucleus:  1.7  HER2/MARION 17 ratio:  1.7     Subpopulation (~30-40%) of " tumor in area of strongest IHC staining:  Group 1 (ADRIANA Positive)                             Avg. number HER2 signals/nucleus:  4.9  Avg. number MARION-17 signals/nucleus:  2.3  HER2/MARION 17 ratio:  2.2      TREATMENT HISTORY:  A.  Lumpectomy has been scheduled.       INTERVAL HISTORY:  She was seen in clinic today with her  Trae. She had her lumpectomy with Dr. Wood on November 25. She reports some continued pain/tingling around the surgical site but she feels it is getting better. She also tore her left hamstring in early November. She has a brace that she was wearing but states it is feeling much better now. She also feels like she would benefit from a port-a-cath. We also discussed several questions pertaining to her planned APT treatment.       She has an ECOG 0 performance status.     REVIEW OF SYSTEMS:   She has had no fevers, headaches, cough, chest pain, shortness of breath, hemoptysis, loss of appetite, nausea, vomiting, abdominal pain, constipation, diarrhea, bone pain, back pain, muscle or joint complaints, numbness or tingling in hands and feet, hearing loss or depression.  The remainder of a 14-point review of systems is negative.        PHYSICAL EXAMINATION:     VITALS:  BP (!) 144/82 (BP Location: Right arm, Patient Position: Sitting, Cuff Size: Adult Regular)   Pulse 74   Temp 97.7  F (36.5  C) (Oral)   Resp 18   Wt 78.6 kg (173 lb 3.2 oz)   LMP  (LMP Unknown)   SpO2 96%   BMI 27.96 kg/m      HEENT:  No alopecia, no lesions in the oropharynx.  Dentition.  LYMPH:  There is no palpable cervical, supraclavicular, subclavicular, or axillary lymphadenopathy.  BREASTS:  Examination of the right breast revealed   LUNGS:  Clear to percussion and auscultation.  HEART:  Regular rate and rhythm.  S1, S2.  ABDOMEN:  Soft, nontender, without hepatosplenomegaly.  EXTREMITIES:  Without edema.  PSYCH:  Mood and affect were normal.  NEUROLOGIC:  Mood and affect  normal.           LABORATORY DATA:  None  today.     ASSESSMENT AND PLAN:    Zenaida Valles has a stage I T1b NX MX invasive ductal carcinoma of the left breast upper outer quadrant which was estrogen receptor positive progesterone receptor positive and HER2 2+ by IHC and HER2 positive by ADRIANA.   Germline genetics recommended.  I discussed the breast imaging with Dr. Lainez her radiologist and a breast MRI is not recommended at this time and that the contrast mammogram should suffice.  Discussion of the recommendation for adjuvant HER2 directed therapy with trastuzumab and paclitaxel.  She was understandably very disappointed about the recommendation for adjuvant treatment that would include chemotherapy.  I discussed with her the [facility] data indicating that even small T1 node-negative HER2 positive breast cancers are recommended for adjuvant trastuzumab based chemotherapy.  I did discuss with her that Dr. Zelaya at the [facility] had developed a highly effective regimen which de-escalates therapy to trastuzumab and paclitaxel for 12 weeks on weekly basis followed by trastuzumab given every 3 weeks to complete 1 year of therapy.  I discussed that this regimen is very well-tolerated.  I discussed that the distant disease-free survival is approximately 92% at 10 years.  She was impressed with these data but was understandably very disappointed about the recommendation for chemotherapy.  We also discussed that we would begin hormonal therapy with an aromatase inhibitor after completion of chemotherapy and that the aromatase inhibitor treatment would be recommended for 10 years given the lack of data on shorter adjuvant therapy for hormone receptor positive HER2 positive breast cancer.  I discussed with Zenaida and her  that we would recommend the APT regimen which consists of weekly paclitaxel and weekly trastuzumab for 12 weeks followed by every 3-week trastuzumab to complete 1 year of therapy.  I discussed that the goal of the  adjuvant ADT regimen is to prevent breast cancer from coming back.  She would also undergo radiation after lumpectomy and we would also include hormonal therapy which would be for a total of 10 years.  We would also add adjuvant zoledronic acid.  We discussed each of these interventions.    Discussion of the a PT regimen.  I discussed that Zenaida could perform all of her activities of daily living well on the a PT regimen.  I discussed that she could do cold capping to prevent hair loss.  I discussed that her energy level should remain good through the 12 weeks of combination paclitaxel and trastuzumab.  I discussed that she would need an echocardiogram and we would monitor her cardiac function.  I discussed that toxicities of regimen include decreased cardiac function and could include reactions to paclitaxel including allergic reactions.  I discussed that if she does have an allergic reaction we could substitute Abraxane for paclitaxel.  We also discussed at that time toxicity of having to go back and forth to the oncologist office and that we would make every effort to reduce these time toxicity issues through efficient scheduling and minimizing the time spent in the oncologist office.  We discussed toxicities of paclitaxel which could include alopecia, neuropathy which could last for 6 months to a year after infusion.  I discussed that neutropenia is fairly uncommon.  I discussed that allergic reactions certainly can happen and we may need to substitute Abraxane.    Discussion of toxicities of trastuzumab which could include cardiac toxicity.  Discussion of hormonal therapy.  I discussed that after completion of paclitaxel we would begin hormonal therapy with an aromatase inhibitor and that this aromatase inhibitor treatment would continue for a total of 10 years.  We do not have data on  7 years of adjuvant hormonal therapy in patients with HER2 positive breast cancer.  We discussed side effects of aromatase  inhibitor therapy which include joint stiffness which occurs in about 30% of patients hot flashes and vaginal dryness.  I discussed that exercise can help mitigate some of the side effects of aromatase inhibitor therapy.  Discussion of adjuvant zoledronic acid.  I discussed that zoledronic acid would help reduce risk of distant recurrence and also reduce bone loss due to aromatase inhibitor therapy.  I discussed that postlumpectomy radiation will be recommended.  Will arrange for radiation oncology consult.  Discussion of adjuvant zoledronic acid.  Adjuvant zoledronic acid can be associated with flulike symptoms that can last for several days after treatment.  I also discussed that there can be dental complications.  She keeps her teeth and good shape with frequent dental visits.  She does have excellent dentition and the risk of osteonecrosis of the jaw is quite low.  Discussion of exercise.  Discussion of the [employer].  We recommend 150 minutes of exercise per week with mixed cardio and strength training.  Stretch band, hand weights, yoga.  Diet.  I recommended a diet low in saturated fat, but not low in fat with more fruits and vegetables, and less in the way of red meat.  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  Follow up plan.  Right single lumen powerport 12-26.  Start APT regimen with weekly paclitaxel and trastuzuamb beginning 12-27 with ALEC CBC, CMP and visit with ALEC visit with me C4 1-16 and then every 3 weeks with me.   Echo in February.       Thank you for allowing us to participate in this patient's care.       Sincerely,      Chito Cheung MD  Professor  [facility]  [phone]     I spent 40 minutes with the patient more than 50% of which was in counseling and coordination of care. The remainder of a 10 point review of systems was negative.            Again, thank you for allowing me to participate in the care of your patient.        Sincerely,        Chito Smith  MD [place]

## 2024-12-19 NOTE — NURSING NOTE
Chief Complaint   Patient presents with    Oncology Clinic Visit     Breast CA    Blood Draw     Labs drawn via  by RN in lab, vitals taken.      Labs collected from venipuncture by RN. Vitals taken. Checked in for appointment(s).    Samia Jin RN

## 2024-12-19 NOTE — NURSING NOTE
"Oncology Rooming Note    December 19, 2024 8:56 AM   Zenaida Valles is a 74 year old female who presents for:    Chief Complaint   Patient presents with    Oncology Clinic Visit     Breast CA    Blood Draw     Labs drawn via  by RN in lab, vitals taken.      Initial Vitals: BP (!) 144/82 (BP Location: Right arm, Patient Position: Sitting, Cuff Size: Adult Regular)   Pulse 74   Temp 97.7  F (36.5  C) (Oral)   Resp 18   Wt 78.6 kg (173 lb 3.2 oz)   LMP  (LMP Unknown)   SpO2 96%   BMI 27.96 kg/m   Estimated body mass index is 27.96 kg/m  as calculated from the following:    Height as of 11/25/24: 1.676 m (5' 6\").    Weight as of this encounter: 78.6 kg (173 lb 3.2 oz). Body surface area is 1.91 meters squared.  No Pain (0) Comment: Data Unavailable   No LMP recorded (lmp unknown). Patient is postmenopausal.  Allergies reviewed: Yes  Medications reviewed: Yes    Medications: Medication refills not needed today.  Pharmacy name entered into Intoloop:    [facility] [place], [place] - [address] [facility] - [place], [place] - [address]    Frailty Screening:   Is the patient here for a new oncology consult visit in cancer care? 2. No      Clinical concerns:        Cristina Vaughn              "

## 2024-12-23 ENCOUNTER — PATIENT OUTREACH (OUTPATIENT)
Dept: ONCOLOGY | Facility: CLINIC | Age: 74
End: 2024-12-23
Payer: COMMERCIAL

## 2024-12-23 NOTE — PROGRESS NOTES
Writer received Cancer Risk Management Program referral, referred for:    Zenaida Valles has a stage I T1b NX MX invasive ductal carcinoma of the left breast upper outer quadrant which was estrogen receptor positive (%) progesterone receptor positive (81-90%) and HER2 2+ by IHC and HER2 positive by ADRIANA. Margins negative for invasive carcinoma.    Germline genetics recommended.     Reviewed for appropriate plan, and sent to New Patient Scheduling for completion.

## 2024-12-26 ENCOUNTER — HOSPITAL ENCOUNTER (OUTPATIENT)
Facility: CLINIC | Age: 74
Discharge: HOME | End: 2024-12-26
Admitting: RADIOLOGY
Payer: COMMERCIAL

## 2024-12-26 ENCOUNTER — APPOINTMENT (OUTPATIENT)
Dept: INTERVENTIONAL RADIOLOGY/VASCULAR | Facility: CLINIC | Age: 74
End: 2024-12-26
Attending: INTERNAL MEDICINE
Payer: COMMERCIAL

## 2024-12-26 VITALS
HEART RATE: 62 BPM | TEMPERATURE: 97.4 F | OXYGEN SATURATION: 97 % | DIASTOLIC BLOOD PRESSURE: 51 MMHG | WEIGHT: 172.5 LBS | HEIGHT: 66 IN | RESPIRATION RATE: 16 BRPM | BODY MASS INDEX: 27.72 KG/M2 | SYSTOLIC BLOOD PRESSURE: 123 MMHG

## 2024-12-26 DIAGNOSIS — C50.912 INVASIVE DUCTAL CARCINOMA OF BREAST, FEMALE, LEFT (H): ICD-10-CM

## 2024-12-26 PROCEDURE — C1769 GUIDE WIRE: HCPCS

## 2024-12-26 PROCEDURE — 250N000009 HC RX 250: Performed by: PHYSICIAN ASSISTANT

## 2024-12-26 PROCEDURE — C1788 PORT, INDWELLING, IMP: HCPCS

## 2024-12-26 PROCEDURE — 250N000011 HC RX IP 250 OP 636: Performed by: RADIOLOGY

## 2024-12-26 PROCEDURE — 99152 MOD SED SAME PHYS/QHP 5/>YRS: CPT

## 2024-12-26 PROCEDURE — 999N000163 HC STATISTIC SIMPLE TUBE INSERTION/CHARGE, PORT, CATH, FISTULOGRAM

## 2024-12-26 PROCEDURE — 272N000196 HC ACCESSORY CR5

## 2024-12-26 PROCEDURE — 250N000011 HC RX IP 250 OP 636: Performed by: PHYSICIAN ASSISTANT

## 2024-12-26 RX ORDER — HEPARIN SODIUM,PORCINE 10 UNIT/ML
5-10 VIAL (ML) INTRAVENOUS
Status: DISCONTINUED | OUTPATIENT
Start: 2024-12-26 | End: 2024-12-26 | Stop reason: HOSPADM

## 2024-12-26 RX ORDER — FLUMAZENIL 0.1 MG/ML
0.2 INJECTION, SOLUTION INTRAVENOUS
Status: DISCONTINUED | OUTPATIENT
Start: 2024-12-26 | End: 2024-12-26 | Stop reason: HOSPADM

## 2024-12-26 RX ORDER — LIDOCAINE 40 MG/G
CREAM TOPICAL
Status: DISCONTINUED | OUTPATIENT
Start: 2024-12-26 | End: 2024-12-26 | Stop reason: HOSPADM

## 2024-12-26 RX ORDER — NALOXONE HYDROCHLORIDE 0.4 MG/ML
0.2 INJECTION, SOLUTION INTRAMUSCULAR; INTRAVENOUS; SUBCUTANEOUS
Status: DISCONTINUED | OUTPATIENT
Start: 2024-12-26 | End: 2024-12-26 | Stop reason: HOSPADM

## 2024-12-26 RX ORDER — HEPARIN SODIUM (PORCINE) LOCK FLUSH IV SOLN 100 UNIT/ML 100 UNIT/ML
500 SOLUTION INTRAVENOUS
Status: COMPLETED | OUTPATIENT
Start: 2024-12-26 | End: 2024-12-26

## 2024-12-26 RX ORDER — CEFAZOLIN SODIUM 2 G/100ML
2 INJECTION, SOLUTION INTRAVENOUS
Status: COMPLETED | OUTPATIENT
Start: 2024-12-26 | End: 2024-12-26

## 2024-12-26 RX ORDER — NALOXONE HYDROCHLORIDE 0.4 MG/ML
0.4 INJECTION, SOLUTION INTRAMUSCULAR; INTRAVENOUS; SUBCUTANEOUS
Status: DISCONTINUED | OUTPATIENT
Start: 2024-12-26 | End: 2024-12-26 | Stop reason: HOSPADM

## 2024-12-26 RX ORDER — HEPARIN SODIUM 200 [USP'U]/100ML
1 INJECTION, SOLUTION INTRAVENOUS EVERY 5 MIN PRN
Status: DISCONTINUED | OUTPATIENT
Start: 2024-12-26 | End: 2024-12-26 | Stop reason: HOSPADM

## 2024-12-26 RX ORDER — ACETAMINOPHEN 325 MG/1
325 TABLET ORAL
Status: DISCONTINUED | OUTPATIENT
Start: 2024-12-26 | End: 2024-12-26 | Stop reason: HOSPADM

## 2024-12-26 RX ORDER — HEPARIN SODIUM (PORCINE) LOCK FLUSH IV SOLN 100 UNIT/ML 100 UNIT/ML
5-10 SOLUTION INTRAVENOUS
Status: DISCONTINUED | OUTPATIENT
Start: 2024-12-26 | End: 2024-12-26 | Stop reason: HOSPADM

## 2024-12-26 RX ORDER — HEPARIN SODIUM,PORCINE 10 UNIT/ML
5-10 VIAL (ML) INTRAVENOUS EVERY 24 HOURS
Status: DISCONTINUED | OUTPATIENT
Start: 2024-12-26 | End: 2024-12-26 | Stop reason: HOSPADM

## 2024-12-26 RX ORDER — FENTANYL CITRATE 50 UG/ML
25-50 INJECTION, SOLUTION INTRAMUSCULAR; INTRAVENOUS EVERY 5 MIN PRN
Status: DISCONTINUED | OUTPATIENT
Start: 2024-12-26 | End: 2024-12-26 | Stop reason: HOSPADM

## 2024-12-26 RX ADMIN — FENTANYL CITRATE 25 MCG: 50 INJECTION, SOLUTION INTRAMUSCULAR; INTRAVENOUS at 13:19

## 2024-12-26 RX ADMIN — MIDAZOLAM 0.5 MG: 1 INJECTION INTRAMUSCULAR; INTRAVENOUS at 13:23

## 2024-12-26 RX ADMIN — HEPARIN SODIUM (PORCINE) LOCK FLUSH IV SOLN 100 UNIT/ML 500 UNITS: 100 SOLUTION at 13:28

## 2024-12-26 RX ADMIN — FENTANYL CITRATE 25 MCG: 50 INJECTION, SOLUTION INTRAMUSCULAR; INTRAVENOUS at 13:25

## 2024-12-26 RX ADMIN — MIDAZOLAM 1 MG: 1 INJECTION INTRAMUSCULAR; INTRAVENOUS at 13:10

## 2024-12-26 RX ADMIN — LIDOCAINE HYDROCHLORIDE 7 ML: 10 INJECTION, SOLUTION INFILTRATION; PERINEURAL at 13:30

## 2024-12-26 RX ADMIN — CEFAZOLIN SODIUM 2 G: 2 INJECTION, SOLUTION INTRAVENOUS at 12:50

## 2024-12-26 RX ADMIN — FENTANYL CITRATE 50 MCG: 50 INJECTION, SOLUTION INTRAMUSCULAR; INTRAVENOUS at 13:10

## 2024-12-26 RX ADMIN — MIDAZOLAM 0.5 MG: 1 INJECTION INTRAMUSCULAR; INTRAVENOUS at 13:29

## 2024-12-26 RX ADMIN — LIDOCAINE HYDROCHLORIDE 10 ML: 10; .005 INJECTION, SOLUTION EPIDURAL; INFILTRATION; INTRACAUDAL; PERINEURAL at 13:29

## 2024-12-26 ASSESSMENT — ACTIVITIES OF DAILY LIVING (ADL)
ADLS_ACUITY_SCORE: 41

## 2024-12-26 NOTE — PRE-PROCEDURE
GENERAL PRE-PROCEDURE:   Procedure:  Right chest port placement with image guidance  Date/Time:  12/26/2024 11:30 AM    Written consent obtained?: Yes    Risks and benefits: Risks, benefits and alternatives were discussed    Consent given by:  Patient  Patient states understanding of procedure being performed: Yes    Patient's understanding of procedure matches consent: Yes    Procedure consent matches procedure scheduled: Yes    Expected level of sedation:  Moderate  Appropriately NPO:  Yes  ASA Class:  3  Mallampati  :  Grade 2- soft palate, base of uvula, tonsillar pillars, and portion of posterior pharyngeal wall visible  Lungs:  Lungs clear with good breath sounds bilaterally  Heart:  Normal heart sounds and rate  History & Physical reviewed:  History and physical reviewed and no updates needed  Statement of review:  I have reviewed the lab findings, diagnostic data, medications, and the plan for sedation    Saji Tang PA-C  Interventional Radiology  [phone] (IR)  *[phone] ([facility] Office)

## 2024-12-26 NOTE — PROGRESS NOTES
Care Suites Admission Nursing Note    Patient Information  Name: Zenaida Valles  Age: 74 year old  Reason for admission: port placement   Care Suites arrival time: 1030    Visitor Information  Name: Trae     Patient Admission/Assessment   Pre-procedure assessment complete: Yes  If abnormal assessment/labs, provider notified: N/A  NPO: Yes  Medications held per instructions/orders: Yes  Consent: deferred  If applicable, pregnancy test status: deferred  Patient oriented to room: Yes  Education/questions answered: Yes  Plan/other:   Proceed with plan     Discharge Planning  Discharge name/phone number: Trae [phone]  Overnight post sedation caregiver: Trae  Discharge location: home    Denzel Matthew RN

## 2024-12-26 NOTE — PROGRESS NOTES
Care Suites Discharge Nursing Note    Patient Information  Name: Zenaida Valles  Age: 74 year old    Discharge Education:  Discharge instructions reviewed: Yes  Additional education/resources provided: port information booklet   Patient/patient representative verbalizes understanding: Yes  Patient discharging on new medications: N/A  Medication education completed: Yes    Discharge Plans:   Discharge location: home  Discharge ride contacted: Yes  Approximate discharge time: 1500    Discharge Criteria:  Discharge criteria met and vital signs stable: Yes  Right chest CDI with surgical glue  No bleeding no hematoma noted   Denies any pain   Patient able to ambulate to bathroom, eating, drinking without difficulty     Patient Belongs:  Patient belongings returned to patient: Yes    Denzel Matthew RN

## 2024-12-26 NOTE — PROCEDURES
RADIOLOGY POST PROCEDURE NOTE WITH SEDATION  Patient name: Zenaida Valles  MRN: [id number]  : 1950    Pre-procedure diagnosis: breast cancer  Post-procedure diagnosis: Same    Procedure Date/Time: 2024  1:41 PM    Procedure: Port-a-cath placement  Estimated blood loss: Minimal  Sedation: Moderate sedation was employed. The patient was monitored by a nurse at all times during the procedure under my direct supervision.    Specimen(s) collected with description: None    I determined this patient to be an appropriate candidate for the planned sedation and procedure and reassessed the patient IMMEDIATELY PRIOR to sedation and procedure.     The patient tolerated the procedure well with no immediate complications.    Significant findings: Successful placement of port-a-cath. Ready for immediate use.    See imaging dictation for procedural details.    Provider name: Sanjeev Wong M.D.  Assistant(s):None

## 2024-12-26 NOTE — IR NOTE
Interventional Radiology Intra-procedural Nursing Note    Patient Name: Zenaida Valles  Medical Record Number: [id number]  Today's Date: December 26, 2024    Procedure: port placement with moderate sedation  Start time: 1313  End time: 1336  Report provided to: Patrice SILVA  Patient depart time and location: 1340 to CS-22    Note: Patient entered Interventional Radiology Suite number 2 via cart. Patient awake, alert and oriented. Assisted onto procedural table in supine position. Prepped and draped.  Dr. Wong in room. Time out and procedure started. Vital signs stable. Telemetry reading sinus rhythm.    Procedure well tolerated by patient without complications. Procedure end with debrief by Dr. Wong.  Skin glue applied to right chest interventional procedure access site.    Administered medication totals:  Lidocaine 1% 7 mL Intradermal  Lidocaine with Epinephrine 10 mL Intradermal  Heparin 500 Units port  Versed 2 mg IVP  Fentanyl 100 mcg IVP    Last dose of sedation administered at 1329.

## 2024-12-26 NOTE — PROGRESS NOTES
Care Suites Post Procedure Note    Patient Information  Name: Zenaida Valles  Age: 74 year old    Post Procedure  Time patient returned to Care Suites: 1345  Concerns/abnormal assessment: none  If abnormal assessment, provider notified: N/A  Plan/Other:   Proceed to discharge patient home when meets criteria   Right chest CDI with surgical glue open to air no bleeding no hematoma noted   Denies pain     Denzel Matthew RN

## 2024-12-26 NOTE — DISCHARGE INSTRUCTIONS
Port Insertion Discharge Instructions     After you go home:    Have an adult stay with you for the first 6 hours  You may resume your normal diet       For 24 hours - due to the sedation you received:  Relax and take it easy  Do NOT make any important or legal decisions  Do NOT drive or operate machines at home or at work  Do NOT drink alcohol    Care of Incision sites:    You have a liquid adhesive covering on your incisions. Do not remove the adhesive residue. It will come off on it's own.  You may take a shower 24-48 hours after your procedure. Do not scrub site.  No submerging site for 2 weeks or until the incision is completely healed.  You may cover the wound with a bandaid if needed for comfort.      Activity     Avoid heavy lifting (greater than 10 pounds) or the overuse of your shoulder for 48-72 hrs.    Bleeding:    If you start bleeding from the incision sites in your chest or neck - or have swelling in your neck, sit down and press on the site for 5-10 minutes.   If bleeding has not stopped after 10 minutes, call your provider.        Call 911 right away if you have heavy bleeding or bleeding that does not stop.      Medicines:    You may resume all medications  Resume your Warfarin/Coumadin at your regular dose today. Follow up with your provider to have your INR rechecked  Resume your Platelet Inhibitors and Aspirin tomorrow at your regular dose  For minor pain, you may take Acetaminophen (Tylenol) or Ibuprofen (Advil)    Call the provider who ordered this procedure if:    You have swelling in your neck or over your port site  The incision area is red, swollen, hot or tender  You have chills or a fever greater than 101 F (38 C)  Any questions or concerns    Call  911 or go to the Emergency Room if you have:    Severe chest pain or trouble breathing  Bleeding that you cannot control    Additional Information:    Your port may be accessed right away.   You will need to have your port flushed every 30  days or after each use.      If you have questions call:          [facility] Radiology Dept @ [phone]    Or you can contact your provider via My Chart

## 2024-12-27 ENCOUNTER — ONCOLOGY VISIT (OUTPATIENT)
Dept: ONCOLOGY | Facility: CLINIC | Age: 74
End: 2024-12-27
Attending: INTERNAL MEDICINE
Payer: COMMERCIAL

## 2024-12-27 ENCOUNTER — APPOINTMENT (OUTPATIENT)
Dept: LAB | Facility: CLINIC | Age: 74
End: 2024-12-27
Attending: NURSE PRACTITIONER
Payer: COMMERCIAL

## 2024-12-27 VITALS
BODY MASS INDEX: 28.1 KG/M2 | TEMPERATURE: 97.8 F | RESPIRATION RATE: 16 BRPM | SYSTOLIC BLOOD PRESSURE: 148 MMHG | HEART RATE: 69 BPM | DIASTOLIC BLOOD PRESSURE: 79 MMHG | OXYGEN SATURATION: 98 % | WEIGHT: 174.1 LBS

## 2024-12-27 DIAGNOSIS — C50.912 INVASIVE DUCTAL CARCINOMA OF BREAST, FEMALE, LEFT (H): Primary | ICD-10-CM

## 2024-12-27 PROCEDURE — 250N000011 HC RX IP 250 OP 636: Performed by: NURSE PRACTITIONER

## 2024-12-27 PROCEDURE — 250N000011 HC RX IP 250 OP 636: Performed by: INTERNAL MEDICINE

## 2024-12-27 PROCEDURE — G0463 HOSPITAL OUTPT CLINIC VISIT: HCPCS | Performed by: NURSE PRACTITIONER

## 2024-12-27 PROCEDURE — 99215 OFFICE O/P EST HI 40 MIN: CPT | Mod: 24 | Performed by: NURSE PRACTITIONER

## 2024-12-27 PROCEDURE — G2211 COMPLEX E/M VISIT ADD ON: HCPCS | Performed by: NURSE PRACTITIONER

## 2024-12-27 PROCEDURE — 258N000003 HC RX IP 258 OP 636: Performed by: INTERNAL MEDICINE

## 2024-12-27 RX ORDER — HEPARIN SODIUM (PORCINE) LOCK FLUSH IV SOLN 100 UNIT/ML 100 UNIT/ML
5 SOLUTION INTRAVENOUS ONCE
Status: COMPLETED | OUTPATIENT
Start: 2024-12-27 | End: 2024-12-27

## 2024-12-27 RX ORDER — LIDOCAINE/PRILOCAINE 2.5 %-2.5%
CREAM (GRAM) TOPICAL DAILY PRN
Status: CANCELLED | OUTPATIENT
Start: 2024-12-27

## 2024-12-27 RX ADMIN — HEPARIN 5 ML: 100 SYRINGE at 08:02

## 2024-12-27 RX ADMIN — SODIUM CHLORIDE 310 MG: 9 INJECTION, SOLUTION INTRAVENOUS at 10:39

## 2024-12-27 ASSESSMENT — PAIN SCALES - GENERAL: PAINLEVEL_OUTOF10: SEVERE PAIN (6)

## 2024-12-27 NOTE — PROGRESS NOTES
MEDICAL ONCOLOGY NOTE     Re. Zenaida Valles   Female, 74 year old, 1950  MRN: [id number]     Diagnosis: Screening identified stage I invasive ductal cancer of the upper outer quadrant of the left breast ER positive, [place] positive, HER2 amplified stage [id number].       HISTORY OF PRESENT ILLNESS:    Zenaida has been in generally good health except for a right renal artery stenosis requiring a stent.  She also has a left leg injury recently she was in her usual state of good health and had a screening mammogram which showed a suspicious lesion in the upper outer quadrant of the left breast at the 3 o'clock position 5 cm from the nipple there is an irregular hypoechoic mass measuring 1.0 x 0.7 x 0.9 cm in size 1 cm from the nipple there is also a 0.5 x 0.4 cm area of suspicion     She underwent an ultrasound guided biopsy which showed usual ductal hyperplasia and fibrocystic changes microcysts in April Kren metaplasia.  Columnar cell changes.  It invasive ductal carcinoma was also found grade 2 and DCIS grade 2 solid type.  Her breast cancer was estrogen receptor positive progesterone receptor positive at HER2 2+ by IHC.  She then underwent ADRIANA which showed a subpopulation which was 60 to 70% ADRIANA 5 negative and 30 to 40% I-S age 1 positive with a 4.9 HER2 signals per nucleus and the ratio of 2.2 indicating HER2 positivity.     She then came to see Dr. Wood at [facility] for surgical recommendations.  She had a contrast mammogram which showed a 0.9 cm enhancing lesion in the upper outer quadrant of the left breast.     She has had no weight loss or loss of energy.  She does not sleep during the day.  She can perform all of her household chores.  ECOG 0 PS.      DIAGNOSTIC AND TREATMENT SUMMARY:  On October 7 she underwent a screening mammogram which demonstrated an asymmetry in her left breast.       On October 15 she underwent a diagnostic mammogram which confirmed a left breast  asymmetry.  She had an ultrasound which demonstrated 2 masses in her left breast.  At the 3 o'clock position 1 cm from the nipple there was a mass and at the 3 o'clock position 5 cm from the nipple there was a second mass.  Her lymph nodes were normal by ultrasound.       On October 23 she underwent a contrast-enhanced mammography which only demonstrated 1 mass measuring 1 cm.  The right breast was normal.  She underwent ultrasound-guided biopsies of both masses.  The mass at the 3 o'clock position 1 cm from the nipple was benign.  The mass at the 3 o'clock position 5 cm from the nipple demonstrating a grade 2 invasive ductal cancer that was ER positive and HER2 equivocal.  There is also DCIS present.          A. LEFT breast, 3:00, 1 cm from nipple, ultrasound-guided core biopsy:  - Benign breast tissue with usual ductal hyperplasia (UDH) and fibrocystic changes including microcysts, apocrine metaplasia, and columnar cell change  - Rare calcifications associated with benign breast ducts and acini  - Negative for atypia or malignancy      B. LEFT breast, 3:00, 5 cm from nipple, ultrasound-guided core biopsy:   - INVASIVE BREAST CARCINOMA OF NO SPECIAL TYPE (INVASIVE DUCTAL CARCINOMA), Vergas grade 2  - Ductal carcinoma in situ (DCIS), nuclear grade 2, solid type  - Invasive carcinoma is estrogen receptor positive, progesterone receptor positive, and HER2 equivocal (score 2+) by immunohistochemistry (see 'Breast Biomarker Reporting Template' below)  - HER2 FISH is is process; results will be reportedly separately by cytogenetis  - See comment     This FISH analysis is performed in follow up to the reported equivocal (2+) HER2 findings by immunohistochemistry ([id number]).        RESULTS:     Ratio of HER2/MARION-17 signals  Zenaida Valles:  See Interpretation below     Majority (~60-70%) of tumor in area of strongest IHC staining:  Group 5 (ADRIANA Negative)  Avg. number HER2 signals/nucleus:  2.9  Avg. number  MARION-17 signals/nucleus:  1.7  HER2/MARION 17 ratio:  1.7     Subpopulation (~30-40%) of tumor in area of strongest IHC staining:  Group 1 (ADRIANA Positive)                             Avg. number HER2 signals/nucleus:  4.9  Avg. number MARION-17 signals/nucleus:  2.3  HER2/MARION 17 ratio:  2.2     **Interpretive guidelines per the American Society of Clinical Oncology/College of American Pathologists Clinical Practice Guideline Update (Margarita VALENZUELA et al, 2023, Arch Pathol Lab Med 147:993):     -- Group 1: HER2/MARION-17 ratio 2.0 or more -AND- avg. number HER2 signals/nucleus 4.0 or more (ADRIANA Positive)  -- Group 2: HER2/MARION-17 ratio 2.0 or more -AND- avg. number HER2 signals/nucleus <4.0 (Additional work required)  -- Group 3: HER2/MARION-17 ratio <2.0 -AND- avg. number HER2 signals/nucleus 6.0 or more (Additional work required)  -- Group 4: HER2/MARION-17 ratio <2.0 -AND- avg. number HER2 signals/nucleus 4.0 or more and <6.0 (Additional work required)  -- Group 5: HER2/MARION-17 ratio <2.0 -AND- avg. number HER2 signals/nucleus <4.0 (ADRIANA Negative)     INTERPRETATION:  Two admixed populations of cells were found in this sample:     Majority (~60-70%) of tumor in area of strongest IHC staining:  Per the American Society of Clinical Oncology/College of American Pathologists Clinical Practice Guideline Focused Update (Margarita VALENZUELA et al, 2018, Arch Pathol Lab Med  doi:10.5858/arpa.8774-9018-PM), the HER2/MARION 17 ratio of 1.7 and average number of HER2 signals/cell of 2.9 places this population of cells in Group 5 (ADRIANA Negative).      Subpopulation (~30-40%) of tumor in area of strongest IHC staining:  Admixed within this sample were cells with increased HER2 signals, which, when selectively scored, had an average of 4.9 HER2 signals/nucleus and a HER2/MARION 17 ratio of 2.2; per the American Society of Clinical Oncology/College of American Pathologists Clinical Practice Guideline Focused Update (Margarita VALENZUELA et al, 2018, Arch Pathol Lab Med   doi:10.5858/arpa.8825-3232-JE), this population of cells falls into Group 1 (ADRIANA Positive).     PAST MEDICAL HISTORY: No history of breast surgery.  No history of breast cancer in the past.  No history of radiation therapy in the past or radiation exposure.  No history of tumor of any kind.  No history of heart problems, heart attack, breathing problems, blood clots, seizures, arthritis, peptic ulcer disease, osteoporosis or bone fractures.  She is not currently participating in a clinical trial.  She does have a history of asthma and uses an albuterol inhaler in cold weather.     Her past medical history is significant for diabetes mellitus and is on metformin, hypertension obesity, she has IPMN and a right renal artery stent.  The family history is negative for breast cancer.  She has a history of right renal artery stenosis and has a stent in place.     She has a recent history of a left hamstring injury.  She is seeing orthopedics for this problem.  She also has a history of back pain.  Her gait is affected but she can perform activities of daily living.  She has a history of aches in her shoulders.  She had a 65 pound weight loss over the last 2 years which was intentional related to improve diet and exercise.  She says she has a history of fibromuscular dysplasia.     She worked in the [employer] in [place] [employer] and was exposed to dengue, malaria, hepatitis A, hepatitis B, encephalitis.  She has also had COVID and the flu.     FAMILY HISTORY:   No history of ovarian, uterine, colon cancer, or melanoma.  No history of glioma, gastric cancer or pancreatic cancer.  Her mother did have Zollinger-Wheeler syndrome but the patient's genetics are negative for this syndrome.      PAST MENSTRUAL HISTORY:  Age of first menstrual period was 13.   She has been pregnant 1 times with 0 live births and 0 miscarriages and 1 .  Age at in place pregnancy age 28.   Uterus and ovaries are in place.  Last menstrual period  was about age 52 and the menopause occurred naturally. No history of hormone replacement therapy.     She does have a history of thickened endometrial lining but by her report no abnormalities of the lining when she had a DIC     HABITS:  She is a never smoker but she has a history of exposure to secondhand smoke from her father as a child.  She consumes alcoholic beverages socially 1 drink every couple of months.     GERMLINE GENETICS: pending     ALLERGIES: She has drug allergies to codeine, codeine derivatives, ibuprofen, lisinopril..  No allergy to seafood, iodine, or contrast dye.  She does take daily aspirin 81 mg because of her renal stent.    TREATMENT HISTORY:  A.  Lumpectomy showed a stage I T1b NX MX invasive ductal carcinoma of the left breast upper outer quadrant which was estrogen receptor positive (%) progesterone receptor positive (81-90%) and HER2 2+ by IHC and HER2 positive by ADRIANA.  Margins negative for invasive carcinoma.    B.  Plan is to give the APT regimen followed by addition of hormonal therapy with letrozole for 7 years.      INTERVAL HISTORY:  She was seen in clinic today with her  Trae.   -Port placed yesterday and she is scheduled to start treatment today.   -She feels that she continues to heal well from her lumpectomy on 11/25/24.  -Reports that at baseline her toes can be numb, no other neuropathy noted.  -Today, she denies fever, chills, daily persistent headaches, dizziness or double vision.  -She denies new cough, chest pain, or shortness of breath at rest.  She has no mouth sores today.  -She denies nausea, vomiting or any new or unusual issues with constipation or diarrhea.  -She denies any new rashes or skin changes.  Her port is a bit sore with access today as it was just placed yesterday.    She has an ECOG 0 performance status.     REVIEW OF SYSTEMS:   She has had no fevers, headaches, cough, chest pain, shortness of breath, hemoptysis, loss of appetite, nausea,  vomiting, abdominal pain, constipation, diarrhea, bone pain, back pain, muscle or joint complaints, numbness or tingling in hands and feet, hearing loss or depression.  The remainder of a 14-point review of systems is negative.     PHYSICAL EXAMINATION:     VITALS:  BP (!) 148/79   Pulse 69   Temp 97.8  F (36.6  C) (Oral)   Resp 16   Wt 79 kg (174 lb 1.6 oz)   LMP  (LMP Unknown)   SpO2 98%   BMI 28.10 kg/m      HEENT:  No alopecia, no lesions in the oropharynx.  Dentition.  LYMPH:  There is no palpable cervical, supraclavicular, subclavicular, or axillary lymphadenopathy.  LUNGS:  Clear to percussion and auscultation.  HEART:  Regular rate and rhythm.  S1, S2.  ABDOMEN:  Soft, nontender, without hepatosplenomegaly.  EXTREMITIES:  Without edema.  PSYCH:  Mood and affect were normal.  NEUROLOGIC:  Mood and affect  normal.      LABORATORY DATA:    Most Recent 3 CBC's:  Recent Labs   Lab Test 12/27/24  0800 12/19/24  0845 11/13/24  0828 02/11/20  1633   WBC 5.8 6.0  --  5.6   HGB 12.0 12.4 13.3 13.6   MCV 88 87  --  89    274  --  233   ANEUTAUTO 2.9 3.2  --   --      Most Recent 3 BMP's:  Recent Labs   Lab Test 12/27/24  0800 12/19/24  0845 11/25/24  0743 11/13/24  0828 04/16/24  1442 08/02/23  0825    140  --  140   < > 141   POTASSIUM 3.9 3.9  --  3.9   < > 4.6   CHLORIDE 104 101  --  102   < > 102   CO2 25 26  --  26   < > 29   BUN 15.2 18.3  --  17.5   < > 18.5   CR 0.61 0.67  --  0.58   < > 0.80   ANIONGAP 10 13  --  12   < > 10   GAEL 8.9 9.3  --  9.7   < > 9.7   * 154* 152* 174*   < > 205*   PROTTOTAL 6.3* 6.6  --   --   --  7.2   ALBUMIN 4.0 4.1  --   --   --  4.8    < > = values in this interval not displayed.    Most Recent 3 LFT's:  Recent Labs   Lab Test 12/27/24  0800 12/19/24  0845 08/02/23  0825   AST 15 16 17   ALT 11 15 25   ALKPHOS 71 70 96   BILITOTAL <0.2 0.3 0.3    Most Recent 2 TSH and T4:  Recent Labs   Lab Test 02/10/22  0938 02/06/20  1032   TSH 3.83 2.54     I  reviewed the above labs today.    IMAGING:  Echo 11/13/2024:  Interpretation Summary  Global and regional left ventricular function is normal with an EF of 60-65%.  Right ventricular function, chamber size, wall motion, and thickness are  normal.  No significant valvular abnormalities were noted.  No pericardial effusion is present.  Previous study not available for compariso       ASSESSMENT AND PLAN:    Stage I T1b NX MX invasive ductal carcinoma of the left breast upper outer quadrant which was estrogen receptor positive (%) progesterone receptor positive (81-90%) and HER2 2+ by IHC and HER2 positive by ADRIANA. Margins negative for invasive carcinoma.    -Post lumpectomy on 11/25/24.  -Port placed yesterday.  -Echo 11/13/24 with 60-65% LVEF.  -She is clinically stable and labs adequate to start adjuvant HER2 directed therapy with trastuzumab and paclitaxel.    -She will begin hormonal therapy with an aromatase inhibitor after completion of chemotherapy and that the aromatase inhibitor treatment would be recommended for 10 years given the lack of data on shorter adjuvant therapy for hormone receptor positive HER2 positive breast cancer.  -She will complete every 3-week trastuzumab for1 year of therapy.   -Today, she is given written information about paclitaxel and traztuzumab and we review side effects and their questions are answered.   -Postlumpectomy radiation will be recommended.     Bone Health:  -She and Dr. Cheung discussed use of adjuvant zoledronic acid.    -She will work on dental clearance.   -Weight bearing exersise recommended.    Follow-up reviewed and she will call sooner with concerns.     47 minutes spent on the date of the encounter doing chart review, review of test results, interpretation of tests, patient visit, and documentation. The longitudinal plan of care for the diagnosis(es)/condition(s) as documented were addressed during this visit. Due to the added complexity in care, I will  continue to support Zenaida in the subsequent management and with ongoing continuity of care.    WOOD Rangel, CNP  [facility] [place] [address] [place], [place] [id number] [phone]

## 2024-12-27 NOTE — NURSING NOTE
"Oncology Rooming Note    December 27, 2024 8:07 AM   Zenaida Valles is a 74 year old female who presents for:    Chief Complaint   Patient presents with    Blood Draw     Port blood draw by lab RN    Oncology Clinic Visit     Invasive ductal carcinoma of breast, female, left     Initial Vitals: BP (!) 148/79   Pulse 69   Temp 97.8  F (36.6  C) (Oral)   Resp 16   Wt 79 kg (174 lb 1.6 oz)   LMP  (LMP Unknown)   SpO2 98%   BMI 28.10 kg/m   Estimated body mass index is 28.1 kg/m  as calculated from the following:    Height as of 12/26/24: 1.676 m (5' 6\").    Weight as of this encounter: 79 kg (174 lb 1.6 oz). Body surface area is 1.92 meters squared.  Severe Pain (6) Comment: port site and Left breast   No LMP recorded (lmp unknown). Patient is postmenopausal.  Allergies reviewed: Yes  Medications reviewed: Yes    Medications: Medication refills not needed today.  Pharmacy name entered into [facility]:    [facility] [place], [place] - [facility] [place] [facility] [place], [place] - [address]  [facility] [place], [place] - [address] [place] [address]-    Frailty Screening:   Is the patient here for a new oncology consult visit in cancer care? 2. No      Clinical concerns: Patient would like some numbing cream for her port.        Franchesca Hutchison, EMT            "

## 2024-12-27 NOTE — LETTER
12/27/2024      Zenaida Valles  [address] [facility] [address]      Dear Colleague,    Thank you for referring your patient, Zenaida Valles, to the [facility]. Please see a copy of my visit note below.    MEDICAL ONCOLOGY NOTE     Re. Zenaida Valles   Female, 74 year old, 1950  MRN: [id number]     Diagnosis: Screening identified stage I invasive ductal cancer of the upper outer quadrant of the left breast ER positive, NH positive, HER2 amplified stage [id number].       HISTORY OF PRESENT ILLNESS:    Zenaida has been in generally good health except for a right renal artery stenosis requiring a stent.  She also has a left leg injury recently she was in her usual state of good health and had a screening mammogram which showed a suspicious lesion in the upper outer quadrant of the left breast at the 3 o'clock position 5 cm from the nipple there is an irregular hypoechoic mass measuring 1.0 x 0.7 x 0.9 cm in size 1 cm from the nipple there is also a 0.5 x 0.4 cm area of suspicion     She underwent an ultrasound guided biopsy which showed usual ductal hyperplasia and fibrocystic changes microcysts in April Kren metaplasia.  Columnar cell changes.  It invasive ductal carcinoma was also found grade 2 and DCIS grade 2 solid type.  Her breast cancer was estrogen receptor positive progesterone receptor positive at HER2 2+ by IHC.  She then underwent ADRIANA which showed a subpopulation which was 60 to 70% ADRIANA 5 negative and 30 to 40% I-S age 1 positive with a 4.9 HER2 signals per nucleus and the ratio of 2.2 indicating HER2 positivity.     She then came to see Dr. Wood at [facility] for surgical recommendations.  She had a contrast mammogram which showed a 0.9 cm enhancing lesion in the upper outer quadrant of the left breast.     She has had no weight loss or loss of energy.  She does not sleep during the day.  She can perform all  of her household chores.  ECOG 0 PS.      DIAGNOSTIC AND TREATMENT SUMMARY:  On October 7 she underwent a screening mammogram which demonstrated an asymmetry in her left breast.       On October 15 she underwent a diagnostic mammogram which confirmed a left breast asymmetry.  She had an ultrasound which demonstrated 2 masses in her left breast.  At the 3 o'clock position 1 cm from the nipple there was a mass and at the 3 o'clock position 5 cm from the nipple there was a second mass.  Her lymph nodes were normal by ultrasound.       On October 23 she underwent a contrast-enhanced mammography which only demonstrated 1 mass measuring 1 cm.  The right breast was normal.  She underwent ultrasound-guided biopsies of both masses.  The mass at the 3 o'clock position 1 cm from the nipple was benign.  The mass at the 3 o'clock position 5 cm from the nipple demonstrating a grade 2 invasive ductal cancer that was ER positive and HER2 equivocal.  There is also DCIS present.          A. LEFT breast, 3:00, 1 cm from nipple, ultrasound-guided core biopsy:  - Benign breast tissue with usual ductal hyperplasia (UDH) and fibrocystic changes including microcysts, apocrine metaplasia, and columnar cell change  - Rare calcifications associated with benign breast ducts and acini  - Negative for atypia or malignancy      B. LEFT breast, 3:00, 5 cm from nipple, ultrasound-guided core biopsy:   - INVASIVE BREAST CARCINOMA OF NO SPECIAL TYPE (INVASIVE DUCTAL CARCINOMA), Mynor grade 2  - Ductal carcinoma in situ (DCIS), nuclear grade 2, solid type  - Invasive carcinoma is estrogen receptor positive, progesterone receptor positive, and HER2 equivocal (score 2+) by immunohistochemistry (see 'Breast Biomarker Reporting Template' below)  - HER2 FISH is is process; results will be reportedly separately by cytogenetis  - See comment     This FISH analysis is performed in follow up to the reported equivocal (2+) HER2 findings by  immunohistochemistry ([id number]).        RESULTS:     Ratio of HER2/MARION-17 signals  Zenaida Qiu Reena:  See Interpretation below     Majority (~60-70%) of tumor in area of strongest IHC staining:  Group 5 (ADRIANA Negative)  Avg. number HER2 signals/nucleus:  2.9  Avg. number MARION-17 signals/nucleus:  1.7  HER2/MARION 17 ratio:  1.7     Subpopulation (~30-40%) of tumor in area of strongest IHC staining:  Group 1 (ADRIANA Positive)                             Avg. number HER2 signals/nucleus:  4.9  Avg. number MARION-17 signals/nucleus:  2.3  HER2/MARION 17 ratio:  2.2     **Interpretive guidelines per the American Society of Clinical Oncology/College of American Pathologists Clinical Practice Guideline Update (Margarita VALENZUELA et al, 2023, Arch Pathol Lab Med 147:993):     -- Group 1: HER2/MARION-17 ratio 2.0 or more -AND- avg. number HER2 signals/nucleus 4.0 or more (ADRIANA Positive)  -- Group 2: HER2/MARION-17 ratio 2.0 or more -AND- avg. number HER2 signals/nucleus <4.0 (Additional work required)  -- Group 3: HER2/MARION-17 ratio <2.0 -AND- avg. number HER2 signals/nucleus 6.0 or more (Additional work required)  -- Group 4: HER2/MARION-17 ratio <2.0 -AND- avg. number HER2 signals/nucleus 4.0 or more and <6.0 (Additional work required)  -- Group 5: HER2/MARION-17 ratio <2.0 -AND- avg. number HER2 signals/nucleus <4.0 (ADRIANA Negative)     INTERPRETATION:  Two admixed populations of cells were found in this sample:     Majority (~60-70%) of tumor in area of strongest IHC staining:  Per the American Society of Clinical Oncology/College of American Pathologists Clinical Practice Guideline Focused Update (Margarita VALENZUELA et al, 2018, Arch Pathol Lab Med  doi:10.5858/arpa.4486-3548-ED), the HER2/MARION 17 ratio of 1.7 and average number of HER2 signals/cell of 2.9 places this population of cells in Group 5 (ADRIANA Negative).      Subpopulation (~30-40%) of tumor in area of strongest IHC staining:  Admixed within this sample were cells with increased HER2 signals, which,  when selectively scored, had an average of 4.9 HER2 signals/nucleus and a HER2/MARION 17 ratio of 2.2; per the American Society of Clinical Oncology/College of American Pathologists Clinical Practice Guideline Focused Update (Margarita VALENZUELA et al, 2018, Arch Pathol Lab Med  doi:10.5858/arpa.9050-4783-EN), this population of cells falls into Group 1 (ADRIANA Positive).     PAST MEDICAL HISTORY: No history of breast surgery.  No history of breast cancer in the past.  No history of radiation therapy in the past or radiation exposure.  No history of tumor of any kind.  No history of heart problems, heart attack, breathing problems, blood clots, seizures, arthritis, peptic ulcer disease, osteoporosis or bone fractures.  She is not currently participating in a clinical trial.  She does have a history of asthma and uses an albuterol inhaler in cold weather.     Her past medical history is significant for diabetes mellitus and is on metformin, hypertension obesity, she has IPMN and a right renal artery stent.  The family history is negative for breast cancer.  She has a history of right renal artery stenosis and has a stent in place.     She has a recent history of a left hamstring injury.  She is seeing orthopedics for this problem.  She also has a history of back pain.  Her gait is affected but she can perform activities of daily living.  She has a history of aches in her shoulders.  She had a 65 pound weight loss over the last 2 years which was intentional related to improve diet and exercise.  She says she has a history of fibromuscular dysplasia.     She worked in the [employer] in [place] and was exposed to dengue, malaria, hepatitis A, hepatitis B, encephalitis.  She has also had COVID and the flu.     FAMILY HISTORY:   No history of ovarian, uterine, colon cancer, or melanoma.  No history of glioma, gastric cancer or pancreatic cancer.  Her mother did have Zollinger-Wheeler syndrome but the patient's genetics are negative  for this syndrome.      PAST MENSTRUAL HISTORY:  Age of first menstrual period was 13.   She has been pregnant 1 times with 0 live births and 0 miscarriages and 1 .  Age at in place pregnancy age 28.   Uterus and ovaries are in place.  Last menstrual period was about age 52 and the menopause occurred naturally. No history of hormone replacement therapy.     She does have a history of thickened endometrial lining but by her report no abnormalities of the lining when she had a DIC     HABITS:  She is a never smoker but she has a history of exposure to secondhand smoke from her father as a child.  She consumes alcoholic beverages socially 1 drink every couple of months.     GERMLINE GENETICS: pending     ALLERGIES: She has drug allergies to codeine, codeine derivatives, ibuprofen, lisinopril..  No allergy to seafood, iodine, or contrast dye.  She does take daily aspirin 81 mg because of her renal stent.    TREATMENT HISTORY:  A.  Lumpectomy showed a stage I T1b NX MX invasive ductal carcinoma of the left breast upper outer quadrant which was estrogen receptor positive (%) progesterone receptor positive (81-90%) and HER2 2+ by IHC and HER2 positive by ADRIANA.  Margins negative for invasive carcinoma.    B.  Plan is to give the APT regimen followed by addition of hormonal therapy with letrozole for 7 years.      INTERVAL HISTORY:  She was seen in clinic today with her  Trae.   -Port placed yesterday and she is scheduled to start treatment today.   -She feels that she continues to heal well from her lumpectomy on 24.  -Reports that at baseline her toes can be numb, no other neuropathy noted.  -Today, she denies fever, chills, daily persistent headaches, dizziness or double vision.  -She denies new cough, chest pain, or shortness of breath at rest.  She has no mouth sores today.  -She denies nausea, vomiting or any new or unusual issues with constipation or diarrhea.  -She denies any new rashes or  skin changes.  Her port is a bit sore with access today as it was just placed yesterday.    She has an ECOG 0 performance status.     REVIEW OF SYSTEMS:   She has had no fevers, headaches, cough, chest pain, shortness of breath, hemoptysis, loss of appetite, nausea, vomiting, abdominal pain, constipation, diarrhea, bone pain, back pain, muscle or joint complaints, numbness or tingling in hands and feet, hearing loss or depression.  The remainder of a 14-point review of systems is negative.     PHYSICAL EXAMINATION:     VITALS:  BP (!) 148/79   Pulse 69   Temp 97.8  F (36.6  C) (Oral)   Resp 16   Wt 79 kg (174 lb 1.6 oz)   LMP  (LMP Unknown)   SpO2 98%   BMI 28.10 kg/m      HEENT:  No alopecia, no lesions in the oropharynx.  Dentition.  LYMPH:  There is no palpable cervical, supraclavicular, subclavicular, or axillary lymphadenopathy.  LUNGS:  Clear to percussion and auscultation.  HEART:  Regular rate and rhythm.  S1, S2.  ABDOMEN:  Soft, nontender, without hepatosplenomegaly.  EXTREMITIES:  Without edema.  PSYCH:  Mood and affect were normal.  NEUROLOGIC:  Mood and affect  normal.      LABORATORY DATA:    Most Recent 3 CBC's:  Recent Labs   Lab Test 12/27/24  0800 12/19/24  0845 11/13/24  0828 02/11/20  1633   WBC 5.8 6.0  --  5.6   HGB 12.0 12.4 13.3 13.6   MCV 88 87  --  89    274  --  233   ANEUTAUTO 2.9 3.2  --   --      Most Recent 3 BMP's:  Recent Labs   Lab Test 12/27/24  0800 12/19/24  0845 11/25/24  0743 11/13/24  0828 04/16/24  1442 08/02/23  0825    140  --  140   < > 141   POTASSIUM 3.9 3.9  --  3.9   < > 4.6   CHLORIDE 104 101  --  102   < > 102   CO2 25 26  --  26   < > 29   BUN 15.2 18.3  --  17.5   < > 18.5   CR 0.61 0.67  --  0.58   < > 0.80   ANIONGAP 10 13  --  12   < > 10   GAEL 8.9 9.3  --  9.7   < > 9.7   * 154* 152* 174*   < > 205*   PROTTOTAL 6.3* 6.6  --   --   --  7.2   ALBUMIN 4.0 4.1  --   --   --  4.8    < > = values in this interval not displayed.    Most  Recent 3 LFT's:  Recent Labs   Lab Test 12/27/24  0800 12/19/24  0845 08/02/23  0825   AST 15 16 17   ALT 11 15 25   ALKPHOS 71 70 96   BILITOTAL <0.2 0.3 0.3    Most Recent 2 TSH and T4:  Recent Labs   Lab Test 02/10/22  0938 02/06/20  1032   TSH 3.83 2.54     I reviewed the above labs today.    IMAGING:  Echo 11/13/2024:  Interpretation Summary  Global and regional left ventricular function is normal with an EF of 60-65%.  Right ventricular function, chamber size, wall motion, and thickness are  normal.  No significant valvular abnormalities were noted.  No pericardial effusion is present.  Previous study not available for compariso       ASSESSMENT AND PLAN:    Stage I T1b NX MX invasive ductal carcinoma of the left breast upper outer quadrant which was estrogen receptor positive (%) progesterone receptor positive (81-90%) and HER2 2+ by IHC and HER2 positive by ADRIANA. Margins negative for invasive carcinoma.    -Post lumpectomy on 11/25/24.  -Port placed yesterday.  -Echo 11/13/24 with 60-65% LVEF.  -She is clinically stable and labs adequate to start adjuvant HER2 directed therapy with trastuzumab and paclitaxel.    -She will begin hormonal therapy with an aromatase inhibitor after completion of chemotherapy and that the aromatase inhibitor treatment would be recommended for 10 years given the lack of data on shorter adjuvant therapy for hormone receptor positive HER2 positive breast cancer.  -She will complete every 3-week trastuzumab for1 year of therapy.   -Today, she is given written information about paclitaxel and traztuzumab and we review side effects and their questions are answered.   -Postlumpectomy radiation will be recommended.     Bone Health:  -She and Dr. Cheung discussed use of adjuvant zoledronic acid.    -She will work on dental clearance.   -Weight bearing exersise recommended.    Follow-up reviewed and she will call sooner with concerns.     47 minutes spent on the date of the encounter  doing chart review, review of test results, interpretation of tests, patient visit, and documentation. The longitudinal plan of care for the diagnosis(es)/condition(s) as documented were addressed during this visit. Due to the added complexity in care, I will continue to support Zenaida in the subsequent management and with ongoing continuity of care.    WOOD Rangel, CNP  [facility] [address] [address]  [phone]                   Again, thank you for allowing me to participate in the care of your patient.        Sincerely,        WOOD Reyes CNP    Electronically signed

## 2024-12-27 NOTE — NURSING NOTE
Chief Complaint   Patient presents with    Blood Draw     Port blood draw by lab RN       Port accessed with blood draw and heparin flush by lab RN. Vitals taken and next appointment arrived.    Sasha Kc RN

## 2025-01-02 ENCOUNTER — VIRTUAL VISIT (OUTPATIENT)
Dept: ONCOLOGY | Facility: CLINIC | Age: 75
End: 2025-01-02
Attending: NURSE PRACTITIONER
Payer: COMMERCIAL

## 2025-01-02 VITALS
HEIGHT: 65 IN | BODY MASS INDEX: 28.99 KG/M2 | DIASTOLIC BLOOD PRESSURE: 64 MMHG | SYSTOLIC BLOOD PRESSURE: 126 MMHG | WEIGHT: 174 LBS

## 2025-01-02 DIAGNOSIS — C50.912 INVASIVE DUCTAL CARCINOMA OF BREAST, FEMALE, LEFT (H): Primary | ICD-10-CM

## 2025-01-02 RX ORDER — HEPARIN SODIUM (PORCINE) LOCK FLUSH IV SOLN 100 UNIT/ML 100 UNIT/ML
5 SOLUTION INTRAVENOUS
Status: CANCELLED | OUTPATIENT
Start: 2025-01-03

## 2025-01-02 RX ORDER — METHYLPREDNISOLONE SODIUM SUCCINATE 40 MG/ML
40 INJECTION INTRAMUSCULAR; INTRAVENOUS
Status: CANCELLED
Start: 2025-01-03

## 2025-01-02 RX ORDER — EPINEPHRINE 1 MG/ML
0.3 INJECTION, SOLUTION INTRAMUSCULAR; SUBCUTANEOUS EVERY 5 MIN PRN
Status: CANCELLED | OUTPATIENT
Start: 2025-01-03

## 2025-01-02 RX ORDER — HEPARIN SODIUM,PORCINE 10 UNIT/ML
5-20 VIAL (ML) INTRAVENOUS DAILY PRN
Status: CANCELLED | OUTPATIENT
Start: 2025-01-03

## 2025-01-02 RX ORDER — ALBUTEROL SULFATE 0.83 MG/ML
2.5 SOLUTION RESPIRATORY (INHALATION)
Status: CANCELLED | OUTPATIENT
Start: 2025-01-03

## 2025-01-02 RX ORDER — ACETAMINOPHEN 325 MG/1
650 TABLET ORAL
Status: CANCELLED | OUTPATIENT
Start: 2025-01-03

## 2025-01-02 RX ORDER — DIPHENHYDRAMINE HYDROCHLORIDE 50 MG/ML
25 INJECTION INTRAMUSCULAR; INTRAVENOUS
Status: CANCELLED
Start: 2025-01-03

## 2025-01-02 RX ORDER — MEPERIDINE HYDROCHLORIDE 25 MG/ML
25 INJECTION INTRAMUSCULAR; INTRAVENOUS; SUBCUTANEOUS
Status: CANCELLED | OUTPATIENT
Start: 2025-01-03

## 2025-01-02 RX ORDER — LORAZEPAM 2 MG/ML
0.5 INJECTION INTRAMUSCULAR EVERY 4 HOURS PRN
Status: CANCELLED | OUTPATIENT
Start: 2025-01-03

## 2025-01-02 RX ORDER — ALBUTEROL SULFATE 90 UG/1
1-2 INHALANT RESPIRATORY (INHALATION)
Status: CANCELLED
Start: 2025-01-03

## 2025-01-02 RX ORDER — DIPHENHYDRAMINE HCL 25 MG
50 CAPSULE ORAL ONCE
Status: CANCELLED
Start: 2025-01-03

## 2025-01-02 RX ORDER — DIPHENHYDRAMINE HYDROCHLORIDE 50 MG/ML
50 INJECTION INTRAMUSCULAR; INTRAVENOUS
Status: CANCELLED
Start: 2025-01-03

## 2025-01-02 ASSESSMENT — PAIN SCALES - GENERAL: PAINLEVEL_OUTOF10: NO PAIN (0)

## 2025-01-02 NOTE — PROGRESS NOTES
MEDICAL ONCOLOGY NOTE     Re. Zenaida Valles   Female, 74 year old, 1950  MRN: [id number]     Diagnosis: Screening identified stage I invasive ductal cancer of the upper outer quadrant of the left breast ER positive, GA positive, HER2 amplified stage [id number].       HISTORY OF PRESENT ILLNESS:    Zenaida has been in generally good health except for a right renal artery stenosis requiring a stent.  She also has a left leg injury recently she was in her usual state of good health and had a screening mammogram which showed a suspicious lesion in the upper outer quadrant of the left breast at the 3 o'clock position 5 cm from the nipple there is an irregular hypoechoic mass measuring 1.0 x 0.7 x 0.9 cm in size 1 cm from the nipple there is also a 0.5 x 0.4 cm area of suspicion     She underwent an ultrasound guided biopsy which showed usual ductal hyperplasia and fibrocystic changes microcysts in April Kren metaplasia.  Columnar cell changes.  It invasive ductal carcinoma was also found grade 2 and DCIS grade 2 solid type.  Her breast cancer was estrogen receptor positive progesterone receptor positive at HER2 2+ by IHC.  She then underwent ADRIANA which showed a subpopulation which was 60 to 70% ADRIANA 5 negative and 30 to 40% I-S age 1 positive with a 4.9 HER2 signals per nucleus and the ratio of 2.2 indicating HER2 positivity.     She then came to see Dr. Wood at [facility] for surgical recommendations.  She had a contrast mammogram which showed a 0.9 cm enhancing lesion in the upper outer quadrant of the left breast.     She has had no weight loss or loss of energy.  She does not sleep during the day.  She can perform all of her household chores.  ECOG 0 PS.      DIAGNOSTIC AND TREATMENT SUMMARY:  On October 7 she underwent a screening mammogram which demonstrated an asymmetry in her left breast.       On October 15 she underwent a diagnostic mammogram which confirmed a left breast  asymmetry.  She had an ultrasound which demonstrated 2 masses in her left breast.  At the 3 o'clock position 1 cm from the nipple there was a mass and at the 3 o'clock position 5 cm from the nipple there was a second mass.  Her lymph nodes were normal by ultrasound.       On October 23 she underwent a contrast-enhanced mammography which only demonstrated 1 mass measuring 1 cm.  The right breast was normal.  She underwent ultrasound-guided biopsies of both masses.  The mass at the 3 o'clock position 1 cm from the nipple was benign.  The mass at the 3 o'clock position 5 cm from the nipple demonstrating a grade 2 invasive ductal cancer that was ER positive and HER2 equivocal.  There is also DCIS present.          A. LEFT breast, 3:00, 1 cm from nipple, ultrasound-guided core biopsy:  - Benign breast tissue with usual ductal hyperplasia (UDH) and fibrocystic changes including microcysts, apocrine metaplasia, and columnar cell change  - Rare calcifications associated with benign breast ducts and acini  - Negative for atypia or malignancy      B. LEFT breast, 3:00, 5 cm from nipple, ultrasound-guided core biopsy:   - INVASIVE BREAST CARCINOMA OF NO SPECIAL TYPE (INVASIVE DUCTAL CARCINOMA), [place] grade 2  - Ductal carcinoma in situ (DCIS), nuclear grade 2, solid type  - Invasive carcinoma is estrogen receptor positive, progesterone receptor positive, and HER2 equivocal (score 2+) by immunohistochemistry (see 'Breast Biomarker Reporting Template' below)  - HER2 FISH is is process; results will be reportedly separately by cytogenetis  - See comment     This FISH analysis is performed in follow up to the reported equivocal (2+) HER2 findings by immunohistochemistry ([id number]).     RESULTS:     Ratio of HER2/MARION-17 signals  Zenaida Valles:  See Interpretation below     Majority (~60-70%) of tumor in area of strongest IHC staining:  Group 5 (ADRIANA Negative)  Avg. number HER2 signals/nucleus:  2.9  Avg. number MARION-17  signals/nucleus:  1.7  HER2/MARION 17 ratio:  1.7     Subpopulation (~30-40%) of tumor in area of strongest IHC staining:  Group 1 (ADRIANA Positive)                             Avg. number HER2 signals/nucleus:  4.9  Avg. number MARION-17 signals/nucleus:  2.3  HER2/MARION 17 ratio:  2.2     **Interpretive guidelines per the American Society of Clinical Oncology/College of American Pathologists Clinical Practice Guideline Update (Margarita VALENZUELA et al, 2023, Arch Pathol Lab Med 147:993):     -- Group 1: HER2/MARION-17 ratio 2.0 or more -AND- avg. number HER2 signals/nucleus 4.0 or more (ADRIANA Positive)  -- Group 2: HER2/MARION-17 ratio 2.0 or more -AND- avg. number HER2 signals/nucleus <4.0 (Additional work required)  -- Group 3: HER2/MARION-17 ratio <2.0 -AND- avg. number HER2 signals/nucleus 6.0 or more (Additional work required)  -- Group 4: HER2/MARION-17 ratio <2.0 -AND- avg. number HER2 signals/nucleus 4.0 or more and <6.0 (Additional work required)  -- Group 5: HER2/MARION-17 ratio <2.0 -AND- avg. number HER2 signals/nucleus <4.0 (ADRIANA Negative)     INTERPRETATION:  Two admixed populations of cells were found in this sample:     Majority (~60-70%) of tumor in area of strongest IHC staining:  Per the American Society of Clinical Oncology/College of American Pathologists Clinical Practice Guideline Focused Update (Margarita VALENZUELA et al, 2018, Arch Pathol Lab Med  doi:10.5858/arpa.3786-5842-ZM), the HER2/MARION 17 ratio of 1.7 and average number of HER2 signals/cell of 2.9 places this population of cells in Group 5 (ADRIANA Negative).      Subpopulation (~30-40%) of tumor in area of strongest IHC staining:  Admixed within this sample were cells with increased HER2 signals, which, when selectively scored, had an average of 4.9 HER2 signals/nucleus and a HER2/MARION 17 ratio of 2.2; per the American Society of Clinical Oncology/College of American Pathologists Clinical Practice Guideline Focused Update (Margarita VALENZUELA et al, 2018, Arch Pathol Lab Med   doi:10.5858/arpa.2993-4927-AA), this population of cells falls into Group 1 (ADRIANA Positive).     PAST MEDICAL HISTORY: No history of breast surgery.  No history of breast cancer in the past.  No history of radiation therapy in the past or radiation exposure.  No history of tumor of any kind.  No history of heart problems, heart attack, breathing problems, blood clots, seizures, arthritis, peptic ulcer disease, osteoporosis or bone fractures.  She is not currently participating in a clinical trial.  She does have a history of asthma and uses an albuterol inhaler in cold weather.     Her past medical history is significant for diabetes mellitus and is on metformin, hypertension obesity, she has IPMN and a right renal artery stent.  The family history is negative for breast cancer.  She has a history of right renal artery stenosis and has a stent in place.     She has a recent history of a left hamstring injury.  She is seeing orthopedics for this problem.  She also has a history of back pain.  Her gait is affected but she can perform activities of daily living.  She has a history of aches in her shoulders.  She had a 65 pound weight loss over the last 2 years which was intentional related to improve diet and exercise.  She says she has a history of fibromuscular dysplasia.     She worked in the [employer] in [place] [employer] and was exposed to dengue, malaria, hepatitis A, hepatitis B, encephalitis.  She has also had COVID and the flu.     FAMILY HISTORY:   No history of ovarian, uterine, colon cancer, or melanoma.  No history of glioma, gastric cancer or pancreatic cancer.  Her mother did have Zollinger-Wheeler syndrome but the patient's genetics are negative for this syndrome.      PAST MENSTRUAL HISTORY:  Age of first menstrual period was 13.   She has been pregnant 1 times with 0 live births and 0 miscarriages and 1 .  Age at in place pregnancy age 28.   Uterus and ovaries are in place.  Last menstrual period  was about age 52 and the menopause occurred naturally. No history of hormone replacement therapy.     She does have a history of thickened endometrial lining but by her report no abnormalities of the lining when she had a DIC     HABITS:  She is a never smoker but she has a history of exposure to secondhand smoke from her father as a child.  She consumes alcoholic beverages socially 1 drink every couple of months.     GERMLINE GENETICS: pending     ALLERGIES: She has drug allergies to codeine, codeine derivatives, ibuprofen, lisinopril..  No allergy to seafood, iodine, or contrast dye.  She does take daily aspirin 81 mg because of her renal stent.    TREATMENT HISTORY:  A.  Lumpectomy showed a stage I T1b NX MX invasive ductal carcinoma of the left breast upper outer quadrant which was estrogen receptor positive (%) progesterone receptor positive (81-90%) and HER2 2+ by IHC and HER2 positive by ADRIANA.  Margins negative for invasive carcinoma.    B.  Plan is to give the APT regimen followed by addition of hormonal therapy with letrozole for 7 years.      INTERVAL HISTORY:  She is seen virtually today for evaluation and toxicity check prior to week 2 of weekly paclitaxel plus trastuzumab.  -She reports that her glucose the day after her treatment was high at 242. We discuss this is realated to dex which will stop at C3.  -Sat fine, but Sunday developed lower abdominal pain and cramping.  Sunday night she felt that her legs we painful with spasms and cramps.  Tried heat the next day which helped.    -Sun Mon fatigued and with poor appetite.   -Added Vitamin D per Dr. Cheung's suggestion.  -Tuesday felt better and Wed was able to walk for an hour and was able to eat more and felt better.   -Intentionality lost 65 lbs in the last two years.   -She is constipated.  Last BM was Tuesday.    -No rashes or skin changes.  -Toes got a bit more painful than her baseline but no neuropathy in her hands.    -She feels that she  continues to heal well from her lumpectomy on 11/25/24.  -Reports that at baseline her toes can be numb, no other neuropathy noted.  -Today, she denies fever, chills, daily persistent headaches, dizziness or double vision.  -She denies new cough, chest pain, or shortness of breath at rest.  She has no mouth sores today.  -She denies nausea, vomiting or any new or unusual issues with constipation or diarrhea.  -She denies any new rashes or skin changes.  Her port is a bit sore with access today as it was just placed yesterday.    She has an ECOG 0 performance status.     REVIEW OF SYSTEMS:   She has had no fevers, headaches, cough, chest pain, shortness of breath, hemoptysis, loss of appetite, nausea, vomiting, abdominal pain, constipation, diarrhea, bone pain, back pain, muscle or joint complaints, numbness or tingling in hands and feet, hearing loss or depression.  The remainder of a 14-point review of systems is negative.     PHYSICAL EXAMINATION:     Video physical exam  General: Patient appears well in no acute distress.   Skin: No visualized rash or lesions on visualized skin  Eyes: EOMI, no erythema, sclera icterus or discharge noted  Resp: Appears to be breathing comfortably without accessory muscle usage, speaking in full sentences, no cough  MSK: Appears to have normal range of motion based on visualized movements  Neurologic: No apparent tremors, facial movements symmetric  Psych: affect bright, alert and oriented       LABORATORY DATA:    Labs scheduled for tomorrow.     IMAGING:  Echo 11/13/2024:  Interpretation Summary  Global and regional left ventricular function is normal with an EF of 60-65%.  Right ventricular function, chamber size, wall motion, and thickness are  normal.  No significant valvular abnormalities were noted.  No pericardial effusion is present.  Previous study not available for comparison.       ASSESSMENT AND PLAN:    Stage I T1b NX MX invasive ductal carcinoma of the left breast  upper outer quadrant which was estrogen receptor positive (%) progesterone receptor positive (81-90%) and HER2 2+ by IHC and HER2 positive by ADRIANA. Margins negative for invasive carcinoma.    -Post lumpectomy on 11/25/24.  -Port placed last week and is healing generally well.  Feels it is less tender this week.  Still bothering her a bit by the top incision.    -Echo 11/13/24 with 60-65% LVEF.  -She is clinically stable to continue adjuvant HER2 directed therapy with trastuzumab and paclitaxel as long as her labs are adequate tomorrow.  -She will begin hormonal therapy with an aromatase inhibitor after completion of chemotherapy and that the aromatase inhibitor treatment would be recommended for 10 years given the lack of data on shorter adjuvant therapy for hormone receptor positive HER2 positive breast cancer.  -She will complete every 3-week trastuzumab for 1 year of therapy.   -Postlumpectomy radiation will be recommended.     Constipation:     Bone Health:  -She and Dr. Cheung discussed use of adjuvant zoledronic acid.    -She will work on dental clearance.   -Weight bearing exersise recommended.    Follow-up reviewed and she will call sooner with concerns.     *** minutes spent on the date of the encounter doing chart review, review of test results, interpretation of tests, patient visit, and documentation. The longitudinal plan of care for the diagnosis(es)/condition(s) as documented were addressed during this visit. Due to the added complexity in care, I will continue to support Zenaida in the subsequent management and with ongoing continuity of care.    WOOD Rangel, CNP  [facility] [address] [address]  [phone]    Virtual Visit Details    Type of service:  Video Visit   Video Start Time:  4:00 PM  Video End Time:4:26 PM    Originating Location (pt. Location): Home  Distant Location (provider location):  On-site  Platform used for Video Visit:  AmWell

## 2025-01-02 NOTE — NURSING NOTE
Current patient location: [address] [facility] [address]    Is the patient currently in the state of [place]? YES    Visit mode:VIDEO    If the visit is dropped, the patient can be reconnected by:VIDEO VISIT: Send to e-mail at: [email or web address]    Will anyone else be joining the visit? Yes  (If patient encounters technical issues they should call [phone]    Are changes needed to the allergy or medication list? Pt completed echeck-in an confirms medications and allergies are correct.      Are refills needed on medications prescribed by this physician? NO    Rooming Documentation:  Questionnaire(s) completed    Reason for visit: RECHMIRIAM SALCIDO

## 2025-01-02 NOTE — LETTER
1/2/2025      Zenaida Valles  [address] [facility] [address]      Dear Colleague,    Thank you for referring your patient, Zenaida Valles, to the [facility]. Please see a copy of my visit note below.    MEDICAL ONCOLOGY NOTE     Re. Zenaida Valles   Female, 74 year old, 1950  MRN: [id number]     Diagnosis: Screening identified stage I invasive ductal cancer of the upper outer quadrant of the left breast ER positive, AL positive, HER2 amplified stage [id number].       HISTORY OF PRESENT ILLNESS:    Zenaida has been in generally good health except for a right renal artery stenosis requiring a stent.  She also has a left leg injury recently she was in her usual state of good health and had a screening mammogram which showed a suspicious lesion in the upper outer quadrant of the left breast at the 3 o'clock position 5 cm from the nipple there is an irregular hypoechoic mass measuring 1.0 x 0.7 x 0.9 cm in size 1 cm from the nipple there is also a 0.5 x 0.4 cm area of suspicion     She underwent an ultrasound guided biopsy which showed usual ductal hyperplasia and fibrocystic changes microcysts in April Kren metaplasia.  Columnar cell changes.  It invasive ductal carcinoma was also found grade 2 and DCIS grade 2 solid type.  Her breast cancer was estrogen receptor positive progesterone receptor positive at HER2 2+ by IHC.  She then underwent ADRIANA which showed a subpopulation which was 60 to 70% ADRIANA 5 negative and 30 to 40% I-S age 1 positive with a 4.9 HER2 signals per nucleus and the ratio of 2.2 indicating HER2 positivity.     She then came to see Dr. Wood at [facility] for surgical recommendations.  She had a contrast mammogram which showed a 0.9 cm enhancing lesion in the upper outer quadrant of the left breast.     She has had no weight loss or loss of energy.  She does not sleep during the day.  She can perform all  of her household chores.  ECOG 0 PS.      DIAGNOSTIC AND TREATMENT SUMMARY:  On October 7 she underwent a screening mammogram which demonstrated an asymmetry in her left breast.       On October 15 she underwent a diagnostic mammogram which confirmed a left breast asymmetry.  She had an ultrasound which demonstrated 2 masses in her left breast.  At the 3 o'clock position 1 cm from the nipple there was a mass and at the 3 o'clock position 5 cm from the nipple there was a second mass.  Her lymph nodes were normal by ultrasound.       On October 23 she underwent a contrast-enhanced mammography which only demonstrated 1 mass measuring 1 cm.  The right breast was normal.  She underwent ultrasound-guided biopsies of both masses.  The mass at the 3 o'clock position 1 cm from the nipple was benign.  The mass at the 3 o'clock position 5 cm from the nipple demonstrating a grade 2 invasive ductal cancer that was ER positive and HER2 equivocal.  There is also DCIS present.          A. LEFT breast, 3:00, 1 cm from nipple, ultrasound-guided core biopsy:  - Benign breast tissue with usual ductal hyperplasia (UDH) and fibrocystic changes including microcysts, apocrine metaplasia, and columnar cell change  - Rare calcifications associated with benign breast ducts and acini  - Negative for atypia or malignancy      B. LEFT breast, 3:00, 5 cm from nipple, ultrasound-guided core biopsy:   - INVASIVE BREAST CARCINOMA OF NO SPECIAL TYPE (INVASIVE DUCTAL CARCINOMA), Mynor grade 2  - Ductal carcinoma in situ (DCIS), nuclear grade 2, solid type  - Invasive carcinoma is estrogen receptor positive, progesterone receptor positive, and HER2 equivocal (score 2+) by immunohistochemistry (see 'Breast Biomarker Reporting Template' below)  - HER2 FISH is is process; results will be reportedly separately by cytogenetis  - See comment     This FISH analysis is performed in follow up to the reported equivocal (2+) HER2 findings by  immunohistochemistry ([id number]).     RESULTS:     Ratio of HER2/MARION-17 signals  Zenaida Qiu Reena:  See Interpretation below     Majority (~60-70%) of tumor in area of strongest IHC staining:  Group 5 (ADRIANA Negative)  Avg. number HER2 signals/nucleus:  2.9  Avg. number MARION-17 signals/nucleus:  1.7  HER2/MARION 17 ratio:  1.7     Subpopulation (~30-40%) of tumor in area of strongest IHC staining:  Group 1 (ADRIANA Positive)                             Avg. number HER2 signals/nucleus:  4.9  Avg. number MARION-17 signals/nucleus:  2.3  HER2/MARION 17 ratio:  2.2     **Interpretive guidelines per the American Society of Clinical Oncology/College of American Pathologists Clinical Practice Guideline Update (Margarita VALENZUELA et al, 2023, Arch Pathol Lab Med 147:993):     -- Group 1: HER2/MARION-17 ratio 2.0 or more -AND- avg. number HER2 signals/nucleus 4.0 or more (ADRIANA Positive)  -- Group 2: HER2/MARION-17 ratio 2.0 or more -AND- avg. number HER2 signals/nucleus <4.0 (Additional work required)  -- Group 3: HER2/MARION-17 ratio <2.0 -AND- avg. number HER2 signals/nucleus 6.0 or more (Additional work required)  -- Group 4: HER2/MARION-17 ratio <2.0 -AND- avg. number HER2 signals/nucleus 4.0 or more and <6.0 (Additional work required)  -- Group 5: HER2/MARION-17 ratio <2.0 -AND- avg. number HER2 signals/nucleus <4.0 (ADRIANA Negative)     INTERPRETATION:  Two admixed populations of cells were found in this sample:     Majority (~60-70%) of tumor in area of strongest IHC staining:  Per the American Society of Clinical Oncology/College of American Pathologists Clinical Practice Guideline Focused Update (Margarita VALENZUELA et al, 2018, Arch Pathol Lab Med  doi:10.5858/arpa.5156-7957-GH), the HER2/MARION 17 ratio of 1.7 and average number of HER2 signals/cell of 2.9 places this population of cells in Group 5 (ADRIANA Negative).      Subpopulation (~30-40%) of tumor in area of strongest IHC staining:  Admixed within this sample were cells with increased HER2 signals, which, when  selectively scored, had an average of 4.9 HER2 signals/nucleus and a HER2/MARION 17 ratio of 2.2; per the American Society of Clinical Oncology/College of American Pathologists Clinical Practice Guideline Focused Update (Margarita VALENZUELA et al, 2018, Arch Pathol Lab Med  doi:10.5858/arpa.8810-6077-YW), this population of cells falls into Group 1 (ADRIANA Positive).     PAST MEDICAL HISTORY: No history of breast surgery.  No history of breast cancer in the past.  No history of radiation therapy in the past or radiation exposure.  No history of tumor of any kind.  No history of heart problems, heart attack, breathing problems, blood clots, seizures, arthritis, peptic ulcer disease, osteoporosis or bone fractures.  She is not currently participating in a clinical trial.  She does have a history of asthma and uses an albuterol inhaler in cold weather.     Her past medical history is significant for diabetes mellitus and is on metformin, hypertension obesity, she has IPMN and a right renal artery stent.  The family history is negative for breast cancer.  She has a history of right renal artery stenosis and has a stent in place.     She has a recent history of a left hamstring injury.  She is seeing orthopedics for this problem.  She also has a history of back pain.  Her gait is affected but she can perform activities of daily living.  She has a history of aches in her shoulders.  She had a 65 pound weight loss over the last 2 years which was intentional related to improve diet and exercise.  She says she has a history of fibromuscular dysplasia.     She worked in the [employer] in [place] and was exposed to dengue, malaria, hepatitis A, hepatitis B, encephalitis.  She has also had COVID and the flu.     FAMILY HISTORY:   No history of ovarian, uterine, colon cancer, or melanoma.  No history of glioma, gastric cancer or pancreatic cancer.  Her mother did have Zollinger-Wheeler syndrome but the patient's genetics are negative for  this syndrome.      PAST MENSTRUAL HISTORY:  Age of first menstrual period was 13.   She has been pregnant 1 times with 0 live births and 0 miscarriages and 1 .  Age at in place pregnancy age 28.   Uterus and ovaries are in place.  Last menstrual period was about age 52 and the menopause occurred naturally. No history of hormone replacement therapy.     She does have a history of thickened endometrial lining but by her report no abnormalities of the lining when she had a DIC     HABITS:  She is a never smoker but she has a history of exposure to secondhand smoke from her father as a child.  She consumes alcoholic beverages socially 1 drink every couple of months.     GERMLINE GENETICS: pending     ALLERGIES: She has drug allergies to codeine, codeine derivatives, ibuprofen, lisinopril..  No allergy to seafood, iodine, or contrast dye.  She does take daily aspirin 81 mg because of her renal stent.    TREATMENT HISTORY:  A.  Lumpectomy showed a stage I T1b NX MX invasive ductal carcinoma of the left breast upper outer quadrant which was estrogen receptor positive (%) progesterone receptor positive (81-90%) and HER2 2+ by IHC and HER2 positive by ADRIANA.  Margins negative for invasive carcinoma.    B.  Plan is to give the APT regimen followed by addition of hormonal therapy with letrozole for 7 years.      INTERVAL HISTORY:  She is seen virtually today for evaluation and toxicity check prior to week 2 of weekly paclitaxel plus trastuzumab.  -She reports that her glucose the day after her treatment was high at 242. We discuss this is related to dex which will stop at C3.  -With the last cycle, she reports that she felt fine on Saturday, but  developed lower abdominal pain and cramping.   night she felt that her legs we painful with spasms and cramps.  Tried heat the next day which helped.    -She reports she felt fatigued and with poor appetite  into Monday.    -Added Vitamin D per   Skye's suggestion.  -Tuesday felt better and Wed was able to walk for an hour and was able to eat more and felt better.   -Intentionality lost 65 lbs in the last two years.   -She is constipated.  Last BM was two days ago on Tuesday.    -No rashes or skin changes.  -Toes got a bit more painful than her baseline but no neuropathy in her hands.  -She feels that she continues to heal well from her lumpectomy on 11/25/24.  -Reports that at baseline her toes can be numb, no other neuropathy noted.  -Today, she denies fever, chills, daily persistent headaches, dizziness or double vision.  -She denies new cough, chest pain, or shortness of breath at rest.  She has no mouth sores today.  -She denies nausea, vomiting or any new or unusual issues with constipation or diarrhea.  -She denies any new rashes or skin changes.  Her port is a bit sore with access today as it was just placed yesterday.    She has an ECOG 0 performance status.     REVIEW OF SYSTEMS:   She has had no fevers, headaches, cough, chest pain, shortness of breath, hemoptysis, loss of appetite, nausea, vomiting, abdominal pain, constipation, diarrhea, bone pain, back pain, muscle or joint complaints, numbness or tingling in hands and feet, hearing loss or depression.  The remainder of a 14-point review of systems is negative.     PHYSICAL EXAMINATION:     Video physical exam  General: Patient appears well in no acute distress.   Skin: No visualized rash or lesions on visualized skin  Eyes: EOMI, no erythema, sclera icterus or discharge noted  Resp: Appears to be breathing comfortably without accessory muscle usage, speaking in full sentences, no cough  MSK: Appears to have normal range of motion based on visualized movements  Neurologic: No apparent tremors, facial movements symmetric  Psych: affect bright, alert and oriented    LABORATORY DATA:    Labs scheduled for tomorrow.     IMAGING:  Echo 11/13/2024:  Interpretation Summary  Global and regional left  ventricular function is normal with an EF of 60-65%.  Right ventricular function, chamber size, wall motion, and thickness are  normal.  No significant valvular abnormalities were noted.  No pericardial effusion is present.  Previous study not available for comparison.    ASSESSMENT AND PLAN:    Stage I T1b NX MX invasive ductal carcinoma of the left breast upper outer quadrant which was estrogen receptor positive (%) progesterone receptor positive (81-90%) and HER2 2+ by IHC and HER2 positive by ADRIANA. Margins negative for invasive carcinoma.    -Post lumpectomy on 11/25/24.  -Port healing well.  -Echo 11/13/24 with 60-65% LVEF.  -She is clinically stable to continue adjuvant HER2 directed therapy with trastuzumab and paclitaxel as long as her labs are adequate tomorrow.  -She will begin hormonal therapy with an aromatase inhibitor after completion of chemotherapy and that the aromatase inhibitor treatment would be recommended for 10 years given the lack of data on shorter adjuvant therapy for hormone receptor positive HER2 positive breast cancer.  -She will complete every 3-week trastuzumab for 1 year of therapy.   -Postlumpectomy radiation will be recommended.     Constipation:   -Last BM two days ago.   -Reviewed strategies for management.     Bone Health:  -She and Dr. Cheung discussed use of adjuvant zoledronic acid.    -She will work on dental clearance.   -Weight bearing exersise recommended.    Follow-up reviewed and she will call sooner with concerns.     36 minutes spent on the date of the encounter doing chart review, review of test results, interpretation of tests, patient visit, and documentation. The longitudinal plan of care for the diagnosis(es)/condition(s) as documented were addressed during this visit. Due to the added complexity in care, I will continue to support Zenaida in the subsequent management and with ongoing continuity of care.    Leilani Richards, WOOD, CNP  [facility]  [address] [address]  [phone]    Virtual Visit Details    Type of service:  Video Visit   Video Start Time:  4:00 PM  Video End Time:4:26 PM    Originating Location (pt. Location): Home  Distant Location (provider location):  On-site  Platform used for Video Visit: Pooja                 Again, thank you for allowing me to participate in the care of your patient.        Sincerely,        WOOD Reyes CNP    Electronically signed

## 2025-01-03 ENCOUNTER — LAB (OUTPATIENT)
Dept: LAB | Facility: CLINIC | Age: 75
End: 2025-01-03
Attending: INTERNAL MEDICINE
Payer: COMMERCIAL

## 2025-01-03 PROCEDURE — 250N000011 HC RX IP 250 OP 636: Performed by: INTERNAL MEDICINE

## 2025-01-03 RX ADMIN — HEPARIN 5 ML: 100 SYRINGE at 08:27

## 2025-01-05 NOTE — PROGRESS NOTES
MEDICAL ONCOLOGY NOTE     Javier Wood MD  Professor   [address] [address]     Re. Zenaida Valles   Female, 74 year old, 1950  MRN: [id number]        Dear Dr. Wood,     Thank you for referring Zenaida Valles who is a 74-year-old woman with a new diagnosis of a screening identified stage I invasive ductal cancer of the upper outer quadrant of the left breast ER positive, WI positive, HER2 amplified stage [id number].       HISTORY OF PRESENT ILLNESS:    Zenaida has been in generally good health except for a right renal artery stenosis requiring a stent.  She also has a left leg injury recently she was in her usual state of good health and had a screening mammogram which showed a suspicious lesion in the upper outer quadrant of the left breast at the 3 o'clock position 5 cm from the nipple there is an irregular hypoechoic mass measuring 1.0 x 0.7 x 0.9 cm in size 1 cm from the nipple there is also a 0.5 x 0.4 cm area of suspicion     She underwent an ultrasound guided biopsy which showed usual ductal hyperplasia and fibrocystic changes microcysts in April Kren metaplasia.  Columnar cell changes.  It invasive ductal carcinoma was also found grade 2 and DCIS grade 2 solid type.  Her breast cancer was estrogen receptor positive progesterone receptor positive at HER2 2+ by IHC.  She then underwent ADRIANA which showed a subpopulation which was 60 to 70% ADRIANA 5 negative and 30 to 40% I-S age 1 positive with a 4.9 HER2 signals per nucleus and the ratio of 2.2 indicating HER2 positivity.     She then came to see Dr. Wood at [facility] for surgical recommendations.  She had a contrast mammogram which showed a 0.9 cm enhancing lesion in the upper outer quadrant of the left breast.     She has had no weight loss or loss of energy.  She does not sleep during the day.  She can perform all of her household chores.  ECOG 0 PS.      DIAGNOSTIC  AND TREATMENT SUMMARY:  On October 7 she underwent a screening mammogram which demonstrated an asymmetry in her left breast.       On October 15 she underwent a diagnostic mammogram which confirmed a left breast asymmetry.  She had an ultrasound which demonstrated 2 masses in her left breast.  At the 3 o'clock position 1 cm from the nipple there was a mass and at the 3 o'clock position 5 cm from the nipple there was a second mass.  Her lymph nodes were normal by ultrasound.       On October 23 she underwent a contrast-enhanced mammography which only demonstrated 1 mass measuring 1 cm.  The right breast was normal.  She underwent ultrasound-guided biopsies of both masses.  The mass at the 3 o'clock position 1 cm from the nipple was benign.  The mass at the 3 o'clock position 5 cm from the nipple demonstrating a grade 2 invasive ductal cancer that was ER positive and HER2 equivocal.  There is also DCIS present.          A. LEFT breast, 3:00, 1 cm from nipple, ultrasound-guided core biopsy:  - Benign breast tissue with usual ductal hyperplasia (UDH) and fibrocystic changes including microcysts, apocrine metaplasia, and columnar cell change  - Rare calcifications associated with benign breast ducts and acini  - Negative for atypia or malignancy      B. LEFT breast, 3:00, 5 cm from nipple, ultrasound-guided core biopsy:   - INVASIVE BREAST CARCINOMA OF NO SPECIAL TYPE (INVASIVE DUCTAL CARCINOMA), Honeydew grade 2  - Ductal carcinoma in situ (DCIS), nuclear grade 2, solid type  - Invasive carcinoma is estrogen receptor positive, progesterone receptor positive, and HER2 equivocal (score 2+) by immunohistochemistry (see 'Breast Biomarker Reporting Template' below)  - HER2 FISH is is process; results will be reportedly separately by cytogenetis  - See comment     This FISH analysis is performed in follow up to the reported equivocal (2+) HER2 findings by immunohistochemistry ([id number]).        RESULTS:     Ratio of  HER2/MARION-17 signals  Zenaida Lazarus Valles:  See Interpretation below     Majority (~60-70%) of tumor in area of strongest IHC staining:  Group 5 (ADRIANA Negative)  Avg. number HER2 signals/nucleus:  2.9  Avg. number MARION-17 signals/nucleus:  1.7  HER2/MARION 17 ratio:  1.7     Subpopulation (~30-40%) of tumor in area of strongest IHC staining:  Group 1 (ADRIANA Positive)                             Avg. number HER2 signals/nucleus:  4.9  Avg. number MARION-17 signals/nucleus:  2.3  HER2/MARION 17 ratio:  2.2     **Interpretive guidelines per the American Society of Clinical Oncology/College of American Pathologists Clinical Practice Guideline Update (Margarita VALENZUELA et al, 2023, Arch Pathol Lab Med 147:993):     -- Group 1: HER2/MARION-17 ratio 2.0 or more -AND- avg. number HER2 signals/nucleus 4.0 or more (ADRIANA Positive)  -- Group 2: HER2/MARION-17 ratio 2.0 or more -AND- avg. number HER2 signals/nucleus <4.0 (Additional work required)  -- Group 3: HER2/MARION-17 ratio <2.0 -AND- avg. number HER2 signals/nucleus 6.0 or more (Additional work required)  -- Group 4: HER2/MARION-17 ratio <2.0 -AND- avg. number HER2 signals/nucleus 4.0 or more and <6.0 (Additional work required)  -- Group 5: HER2/MARION-17 ratio <2.0 -AND- avg. number HER2 signals/nucleus <4.0 (ADRIANA Negative)     INTERPRETATION:  Two admixed populations of cells were found in this sample:     Majority (~60-70%) of tumor in area of strongest IHC staining:  Per the American Society of Clinical Oncology/College of American Pathologists Clinical Practice Guideline Focused Update (Margarita VALENZUELA et al, 2018, Arch Pathol Lab Med  doi:10.5858/arpa.6062-2292-AS), the HER2/MARION 17 ratio of 1.7 and average number of HER2 signals/cell of 2.9 places this population of cells in Group 5 (ADRIANA Negative).      Subpopulation (~30-40%) of tumor in area of strongest IHC staining:  Admixed within this sample were cells with increased HER2 signals, which, when selectively scored, had an average of 4.9 HER2 signals/nucleus  and a HER2/MARION 17 ratio of 2.2; per the American Society of Clinical Oncology/College of American Pathologists Clinical Practice Guideline Focused Update (Margarita VALENZUELA et al, 2018, Arch Pathol Lab Med  doi:10.5858/arpa.7089-1206-ST), this population of cells falls into Group 1 (ADRIANA Positive).     PAST MEDICAL HISTORY: No history of breast surgery.  No history of breast cancer in the past.  No history of radiation therapy in the past or radiation exposure.  No history of tumor of any kind.  No history of heart problems, heart attack, breathing problems, blood clots, seizures, arthritis, peptic ulcer disease, osteoporosis or bone fractures.  She is not currently participating in a clinical trial.  She does have a history of asthma and uses an albuterol inhaler in cold weather.     Her past medical history is significant for diabetes mellitus and is on metformin, hypertension obesity, she has IPMN and a right renal artery stent.  The family history is negative for breast cancer.  She has a history of right renal artery stenosis and has a stent in place.     She has a recent history of a left hamstring injury.  She is seeing orthopedics for this problem.  She also has a history of back pain.  Her gait is affected but she can perform activities of daily living.  She has a history of aches in her shoulders.  She had a 65 pound weight loss over the last 2 years which was intentional related to improve diet and exercise. She says she has a history of fibromuscular dysplasia.     She worked in the [employer] in [place] [email or web address] and was exposed to dengue, malaria, hepatitis A, hepatitis B, encephalitis.  She has also had COVID and the flu.     FAMILY HISTORY:   No history of ovarian, uterine, colon cancer, or melanoma.  No history of glioma, gastric cancer or pancreatic cancer.  Her mother did have Zollinger-Wheeler syndrome but the patient's genetics are negative for this syndrome.      PAST MENSTRUAL HISTORY:  Age of first  menstrual period was 13.   She has been pregnant 1 times with 0 live births and 0 miscarriages and 1 .  Age at in place pregnancy age 28.   Uterus and ovaries are in place.  Last menstrual period was about age 52 and the menopause occurred naturally. No history of hormone replacement therapy.     She does have a history of thickened endometrial lining but by her report no abnormalities of the lining when she had a DIC     HABITS:  She is a never smoker but she has a history of exposure to secondhand smoke from her father as a child.  She consumes alcoholic beverages socially 1 drink every couple of months.     GERMLINE GENETICS: Genetic testing is available for 47 genes associated with cancers of the breast, ovary, uterus, prostate and gastrointestinal system: [employer] Common Hereditary Cancers panel (APC, SUMIT, AXIN2, BARD1, BMPR1A, BRCA1, BRCA2, BRIP1, CDH1, CDK4, CDKN2A, CHEK2, CTNNA1, DICER1, EPCAM, GREM1, HOXB13, KIT, MEN1, MLH1, MSH2, MSH3, MSH6, MUTYH, NBN, NF1, NTHL1, PALB2, PDGFRA, PMS2, POLD1, POLE, PTEN,RAD50, RAD51C, RAD51D, SDHA, SDHB, SDHC, SDHD, SMAD4, SMARCA4, STK11, TP53,TSC1, TSC2, VHL)      ALLERGIES: She has drug allergies to codeine, codeine derivatives, ibuprofen, lisinopril..  No allergy to seafood, iodine, or contrast dye.  She does take daily aspirin 81 mg because of her renal stent.     LUMPECTOMY:  Surgical Pathology Report                         Case: [id number]                                   Authorizing Provider:  Javier Wood MD         Collected:           2024 09:40 AM           Ordering Location:     [facility] OR  Received:            2024 09:54 AM                                  [place]                                                                   Pathologist:           Kristal Lanza MD                                                   Specimens:   A) - Breast, Left, Left breast lumpectomy                                                             B) - Lymph Node(s), [place], Left axillary sentinel lymph node #1                                  C) - Lymph Node(s), [place], Left axillary sentinel lymph node # 2                         Final Diagnosis   A. LEFT breast, RFID tag-localized lumpectomy:  -INVASIVE BREAST CARCINOMA OF NO SPECIAL TYPE (INVASIVE DUCTAL CARCINOMA), FAREED GRADE 2, size 11 mm  -Margins are uninvolved by invasive carcinoma  -Invasive carcinoma is 1 mm from the nearest (posterior) margin, 5 mm from the anterior margin, and > 5 mm from the superior, inferior, medial and lateral margins  -No lymphovascular invasion identified  -Other findings: fibrocystic change (including microcysts with apocrine metaplasia), sclerosing adenosis, and usual ductal hyperplasia  -Calcifications associated with benign acini  -Prior core biopsy site changes  -See tumor synoptic below     B. Lymph node, LEFT axillary, sentinel #1, excision:  -One benign lymph node (0/1)     C. Lymph node, LEFT axillary, sentinel #2, excision:  -One benign lymph node (0/1)             Synoptic Checklist   INVASIVE CARCINOMA OF THE BREAST: Resection   8th Edition - Protocol posted: 12/13/2023INVASIVE CARCINOMA OF THE BREAST: RESECTION - All Specimens           SPECIMEN    Procedure   Excision (less than total mastectomy)    Specimen Laterality   Left    TUMOR    Tumor Site   Clock position        3 o'clock    Histologic Type   Invasive carcinoma of no special type (ductal)    Histologic Grade ([place] Histologic Score)        Glandular (Acinar) / Tubular Differentiation   Score 2    Nuclear Pleomorphism   Score 3    Mitotic Rate   Score 2    Overall Grade   Grade 2 (scores of 6 or 7)    Tumor Size   Greatest dimension of largest invasive focus (Millimeters): 11 mm    Additional Dimension (Millimeters)   9 mm        9 mm    Tumor Focality   Single focus of invasive carcinoma    Ductal Carcinoma In Situ (DCIS)   Not identified    Lobular Carcinoma In  Situ (LCIS)   Not identified    Lymphatic and / or Vascular Invasion   Not identified    Dermal Lymphatic and / or Vascular Invasion   No skin present    Microcalcifications   Present in non-neoplastic tissue    Treatment Effect in the Breast   No known presurgical therapy    MARGINS    Margin Status for Invasive Carcinoma   All margins negative for invasive carcinoma    Distance from Invasive Carcinoma to Closest Margin   1 mm    Closest Margin(s) to Invasive Carcinoma   Posterior    Distance from Invasive Carcinoma to Anterior Margin   5 mm    Distance from Invasive Carcinoma to Superior Margin   Greater than: 5 mm    Distance from Invasive Carcinoma to Inferior Margin   Greater than: 5 mm    Distance from Invasive Carcinoma to Medial Margin   Greater than: 5 mm    Distance from Invasive Carcinoma to Lateral Margin   Greater than: 5 mm    REGIONAL LYMPH NODES    Regional Lymph Node Status   All regional lymph nodes negative for tumor    Total Number of Lymph Nodes Examined (sentinel and non-sentinel)   2    Number of [place] Nodes Examined   2    pTNM CLASSIFICATION (AJCC 8th Edition)    Reporting of pT, pN, and (when applicable) pM categories is based on information available to the pathologist at the time the report is issued. As per the AJCC (Chapter 1, 8th Ed.) it is the managing physician s responsibility to establish the final pathologic stage based upon all pertinent information, including but potentially not limited to this pathology report.    pT Category   pT1c    pN Category   pN0    N Suffix   (sn)    SPECIAL STUDIES             Estrogen Receptor (ER) Status   Positive (greater than 10% of cells demonstrate nuclear positivity)    Percentage of Cells with Nuclear Positivity   %             Progesterone Receptor (PgR) Status   Positive    Percentage of Cells with Nuclear Positivity   81-90%             HER2 (by immunohistochemistry)   Equivocal (Score 2+)    Percentage of Cells with Uniform  "Intense Complete Membrane Staining   0 %             HER2 (by in situ hybridization)   Positive (amplified)    Testing Performed on Case Number   HER2 FISH amplified in 30-40% of tumor cells. Performed on prior core biopsy [id number] specimen B    Comment(s)   Best block: A13   .      Clinical Information   ALEE   The patient is a 74 year old woman with LEFT breast carcinoma. Procedure: LEFT breast RFID tag localized lumpectomy, LEFT axillary sentinel lymph node biopsy.    Gross Description   ALEE ROBIN(1). Breast, Left, Left breast lumpectomy:  The specimen is received fresh with proper patient identification, labeled \"left breast lumpectomy\". It consists of 27.85 g, 6.7 cm from medial to lateral x 4.7 cm from superior to inferior x 2.4 cm from anterior to posterior left breast lumpectomy specimen. The specimen was received previously inked by the surgeon as follows:  Superior - red, anterior - green, inferior - blue, posterior - black, medial - yellow and lateral - orange.     The specimen is sectioned from medial (slice 1) to lateral (slice 15) into 15 slices.          [id number]   This FISH analysis is performed in follow up to the reported equivocal (2+) HER2 findings by immunohistochemistry ([id number]).        RESULTS:     Ratio of HER2/MARION-17 signals  Zenaida Valles:  See Interpretation below     Majority (~60-70%) of tumor in area of strongest IHC staining:  Group 5 (ADRIANA Negative)  Avg. number HER2 signals/nucleus:  2.9  Avg. number MARION-17 signals/nucleus:  1.7  HER2/MARION 17 ratio:  1.7     Subpopulation (~30-40%) of tumor in area of strongest IHC staining:  Group 1 (ADRIANA Positive)                             Avg. number HER2 signals/nucleus:  4.9  Avg. number MARION-17 signals/nucleus:  2.3  HER2/MARION 17 ratio:  2.2        TREATMENT HISTORY:  A.  Lumpectomy showed a stage I T1b NX MX invasive ductal carcinoma of the left breast upper outer quadrant which was estrogen receptor positive (%) " progesterone receptor positive (81-90%) and HER2 2+ by IHC and HER2 positive by ADRIANA.  Margins negative for invasive carcinoma.    B.  Plan is to give the APT regimen followed by addition of hormonal therapy with letrozole for 7 years.         INTERVAL HISTORY:  She was seen in clinic today with her  Trae. She had her lumpectomy with Dr. Wood on November 25. She started adjuvant APT regimen on 12/27/2024. Her main symptom since starting chemo has been fatigue and some minor frontal headaches. Takes occasional naps to help with this. Has long history of some hair loss but hasn't noticed any changes since starting chemo. Had some numbness and tingling in the first week of chemo. At present, only has numbness in left big toe. Appetite is slightly reduced. Has not had any nausea, vomiting, or diarrhea. Had a few episodes of epistaxis in the past week. She states that she feels her port is not quite secure. When she turns her neck she has some discomfort. States she still has some bruising on her chest from the port placement about 3 weeks ago. She had been dealing with pain in her left hamstring and this is now much better. She continues to have issues with fatigue and issues sleeping. She has had some exercise intolerance- got a bit short of breath climbing stairs yesterday. She is taking a vitamin D supplement and drinking milk to meet her calcium requirements.     No pain.  Some discomfort around port in the neck area and will have a port check.  Some fatigue. Some constipation.  No depression or anxiety.  Some neuropathy.  She was not icing but will try this.      She has an ECOG 0 performance status.     REVIEW OF SYSTEMS:   She has had no fevers, cough, chest pain, shortness of breath, mouth sores, hemoptysis, nausea, vomiting, abdominal pain, diarrhea, bone pain, back pain, muscle or joint complaints, hearing loss or depression.  The remainder of a 10-point review of systems is negative.        PHYSICAL  EXAMINATION:     VITALS:  /87 (BP Location: Right arm, Patient Position: Sitting, Cuff Size: Adult Regular)   Pulse 78   Temp 97.7  F (36.5  C) (Oral)   Resp 18   Wt 77.1 kg (170 lb)   LMP  (LMP Unknown)   SpO2 98%   BMI 28.29 kg/m         HEENT:  No alopecia, no lesions in the oropharynx.  Dentition is normal  LYMPH:  There is no palpable cervical, supraclavicular, or subclavicular lymphadenopathy.  BREASTS: deferred  LUNGS:  Clear to auscultation.  HEART:  Regular rate and rhythm.  S1, S2. No murmurs  ABDOMEN:  Soft, nontender, without hepatosplenomegaly.  EXTREMITIES:  Without edema.  PSYCH:  Mood and affect were normal.  NEUROLOGIC:  Mood and affect  normal. No focal deficits apparent.          LABORATORY DATA:   Lab Results   Component Value Date    WBC 4.7 01/09/2025    HGB 12.0 01/09/2025    HCT 35.0 01/09/2025    MCV 87 01/09/2025     01/09/2025   Last Comprehensive Metabolic Panel:  Sodium   Date Value Ref Range Status   01/09/2025 139 135 - 145 mmol/L Final   01/19/2021 139 133 - 144 mmol/L Final     Potassium   Date Value Ref Range Status   01/09/2025 3.9 3.4 - 5.3 mmol/L Final   02/10/2022 4.4 3.4 - 5.3 mmol/L Final   01/19/2021 4.1 3.4 - 5.3 mmol/L Final     Chloride   Date Value Ref Range Status   01/09/2025 102 98 - 107 mmol/L Final   02/10/2022 104 94 - 109 mmol/L Final   01/19/2021 105 94 - 109 mmol/L Final     Carbon Dioxide   Date Value Ref Range Status   01/19/2021 30 20 - 32 mmol/L Final     Carbon Dioxide (CO2)   Date Value Ref Range Status   01/09/2025 26 22 - 29 mmol/L Final   02/10/2022 29 20 - 32 mmol/L Final     Anion Gap   Date Value Ref Range Status   01/09/2025 11 7 - 15 mmol/L Final   02/10/2022 7 3 - 14 mmol/L Final   01/19/2021 4 3 - 14 mmol/L Final     Glucose   Date Value Ref Range Status   01/09/2025 195 (H) 70 - 99 mg/dL Final   02/10/2022 226 (H) 70 - 99 mg/dL Final   01/19/2021 218 (H) 70 - 99 mg/dL Final     GLUCOSE BY METER POCT   Date Value Ref Range  Status   11/25/2024 152 (H) 70 - 99 mg/dL Final     Urea Nitrogen   Date Value Ref Range Status   01/09/2025 13.3 8.0 - 23.0 mg/dL Final   02/10/2022 15 7 - 30 mg/dL Final   01/19/2021 15 7 - 30 mg/dL Final     Creatinine   Date Value Ref Range Status   01/09/2025 0.59 0.51 - 0.95 mg/dL Final   01/19/2021 0.64 0.52 - 1.04 mg/dL Final     GFR Estimate   Date Value Ref Range Status   01/09/2025 >90 >60 mL/min/1.73m2 Final     Comment:     eGFR calculated using 2021 CKD-EPI equation.   01/19/2021 >90 >60 mL/min/[1.73_m2] Final     Comment:     Non  GFR Calc  Starting 12/18/2018, serum creatinine based estimated GFR (eGFR) will be   calculated using the Chronic Kidney Disease Epidemiology Collaboration   (CKD-EPI) equation.       Calcium   Date Value Ref Range Status   01/09/2025 9.0 8.8 - 10.4 mg/dL Final     Comment:     Reference intervals for this test were updated on 7/16/2024 to reflect our healthy population more accurately. There may be differences in the flagging of prior results with similar values performed with this method. Those prior results can be interpreted in the context of the updated reference intervals.   01/19/2021 8.9 8.5 - 10.1 mg/dL Final     Bilirubin Total   Date Value Ref Range Status   01/09/2025 0.3 <=1.2 mg/dL Final   01/19/2021 0.4 0.2 - 1.3 mg/dL Final     Alkaline Phosphatase   Date Value Ref Range Status   01/09/2025 67 40 - 150 U/L Final   01/19/2021 98 40 - 150 U/L Final     ALT   Date Value Ref Range Status   01/09/2025 14 0 - 50 U/L Final   01/19/2021 32 0 - 50 U/L Final     AST   Date Value Ref Range Status   01/09/2025 13 0 - 45 U/L Final   01/19/2021 12 0 - 45 U/L Final                    ASSESSMENT AND PLAN:    Zenaida Suazomarilouestrella has a stage I T1b NX MX invasive ductal carcinoma of the left breast upper outer quadrant which was estrogen receptor positive (%) progesterone receptor positive (81-90%) and HER2 2+ by IHC and HER2 positive by ADRIANA. Margins  negative for invasive carcinoma.    Germline genetics recommended.  She's tolerating adjuvant HER2 directed therapy with trastuzumab and paclitaxel.   Some neuropathy.  She was not icing but will try this.  We would also add adjuvant zoledronic acid.    We discussed toxicities of paclitaxel which could include alopecia, neuropathy which could last for 6 months to a year after infusion.    Discussion of hormonal therapy.  I discussed that after completion of paclitaxel we would begin hormonal therapy with an aromatase inhibitor and that this aromatase inhibitor treatment would continue for a total of 10 years.  We do not have data on  7 years of adjuvant hormonal therapy in patients with HER2 positive breast cancer.  We discussed side effects of aromatase inhibitor therapy which include joint stiffness which occurs in about 30% of patients hot flashes and vaginal dryness.  I discussed that exercise can help mitigate some of the side effects of aromatase inhibitor therapy.  Discussion of adjuvant zoledronic acid.  I discussed that zoledronic acid would help reduce risk of distant recurrence and also reduce bone loss due to aromatase inhibitor therapy.  I discussed that postlumpectomy radiation will be recommended.  Will arrange for radiation oncology consult.  Discussion of adjuvant zoledronic acid.  Adjuvant zoledronic acid can be associated with flulike symptoms that can last for several days after treatment.  I also discussed that there can be dental complications.  She keeps her teeth and good shape with frequent dental visits.  She does have excellent dentition and the risk of osteonecrosis of the jaw is quite low.  Discussion of genetics.  Discussed that the previous genetic testing that was done was for a different purpose related to Zollinger-Wheeler syndrome.  I discussed that the breast cancer panel would be more expanded relative to the prior panel.  Discussion of exercise.  Discussion of the Lace and  Pathway.  We recommend 150 minutes of exercise per week with mixed cardio and strength training.  Stretch band, hand weights, yoga.  Diet.  I recommended a diet low in saturated fat, but not low in fat with more fruits and vegetables, and less in the way of red meat.  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  Follow up plan.  Right single lumen Powerport 12-26.  Started APT regimen with weekly paclitaxel and trastuzuamb beginning 12-27. CBC, CMP and visit with Dr. Cheung January 16 CBC, CMP and Leilani on January 23rd and then every 3 weeks with me.  Follow up every 3rd  cycle with me for total of 12 cycles of paclitaxel. Echo in February.       Thank you for allowing us to participate in this patient's care.  The patient was seen and evaluated by me.  I discussed the patient with the resident Dr. Te Rainey and agree with the findings and plan in the note.      Sincerely,      Chito Cheung MD  Professor  [facility]  [phone]     I spent 40 minutes with the patient more than 50% of which was in counseling and coordination of care. The remainder of a 10 point review of systems was negative.

## 2025-01-08 RX ORDER — DIPHENHYDRAMINE HYDROCHLORIDE 50 MG/ML
25 INJECTION INTRAMUSCULAR; INTRAVENOUS
Status: CANCELLED
Start: 2025-01-10

## 2025-01-08 RX ORDER — DIPHENHYDRAMINE HYDROCHLORIDE 50 MG/ML
50 INJECTION INTRAMUSCULAR; INTRAVENOUS
Status: CANCELLED
Start: 2025-01-10

## 2025-01-08 RX ORDER — ACETAMINOPHEN 325 MG/1
650 TABLET ORAL
Status: CANCELLED | OUTPATIENT
Start: 2025-01-10

## 2025-01-08 RX ORDER — ALBUTEROL SULFATE 90 UG/1
1-2 INHALANT RESPIRATORY (INHALATION)
Status: CANCELLED
Start: 2025-01-10

## 2025-01-08 RX ORDER — EPINEPHRINE 1 MG/ML
0.3 INJECTION, SOLUTION INTRAMUSCULAR; SUBCUTANEOUS EVERY 5 MIN PRN
Status: CANCELLED | OUTPATIENT
Start: 2025-01-10

## 2025-01-08 RX ORDER — METHYLPREDNISOLONE SODIUM SUCCINATE 40 MG/ML
40 INJECTION INTRAMUSCULAR; INTRAVENOUS
Status: CANCELLED
Start: 2025-01-10

## 2025-01-08 RX ORDER — ONDANSETRON 2 MG/ML
8 INJECTION INTRAMUSCULAR; INTRAVENOUS ONCE
Status: CANCELLED | OUTPATIENT
Start: 2025-01-10

## 2025-01-08 RX ORDER — HEPARIN SODIUM (PORCINE) LOCK FLUSH IV SOLN 100 UNIT/ML 100 UNIT/ML
5 SOLUTION INTRAVENOUS
Status: CANCELLED | OUTPATIENT
Start: 2025-01-10

## 2025-01-08 RX ORDER — LORAZEPAM 2 MG/ML
0.5 INJECTION INTRAMUSCULAR EVERY 4 HOURS PRN
Status: CANCELLED | OUTPATIENT
Start: 2025-01-10

## 2025-01-08 RX ORDER — DIPHENHYDRAMINE HCL 25 MG
50 CAPSULE ORAL
Status: CANCELLED
Start: 2025-01-10

## 2025-01-08 RX ORDER — HEPARIN SODIUM,PORCINE 10 UNIT/ML
5-20 VIAL (ML) INTRAVENOUS DAILY PRN
Status: CANCELLED | OUTPATIENT
Start: 2025-01-10

## 2025-01-08 RX ORDER — MEPERIDINE HYDROCHLORIDE 25 MG/ML
25 INJECTION INTRAMUSCULAR; INTRAVENOUS; SUBCUTANEOUS
Status: CANCELLED | OUTPATIENT
Start: 2025-01-10

## 2025-01-08 RX ORDER — ALBUTEROL SULFATE 0.83 MG/ML
2.5 SOLUTION RESPIRATORY (INHALATION)
Status: CANCELLED | OUTPATIENT
Start: 2025-01-10

## 2025-01-09 ENCOUNTER — APPOINTMENT (OUTPATIENT)
Dept: LAB | Facility: CLINIC | Age: 75
End: 2025-01-09
Attending: INTERNAL MEDICINE
Payer: COMMERCIAL

## 2025-01-09 ENCOUNTER — ONCOLOGY VISIT (OUTPATIENT)
Dept: ONCOLOGY | Facility: CLINIC | Age: 75
End: 2025-01-09
Attending: INTERNAL MEDICINE
Payer: COMMERCIAL

## 2025-01-09 ENCOUNTER — ANCILLARY PROCEDURE (OUTPATIENT)
Dept: INTERVENTIONAL RADIOLOGY/VASCULAR | Facility: CLINIC | Age: 75
End: 2025-01-09
Attending: PHYSICIAN ASSISTANT
Payer: COMMERCIAL

## 2025-01-09 VITALS
RESPIRATION RATE: 18 BRPM | DIASTOLIC BLOOD PRESSURE: 87 MMHG | TEMPERATURE: 97.7 F | BODY MASS INDEX: 28.29 KG/M2 | SYSTOLIC BLOOD PRESSURE: 116 MMHG | OXYGEN SATURATION: 98 % | HEART RATE: 78 BPM | WEIGHT: 170 LBS

## 2025-01-09 DIAGNOSIS — C50.912 INVASIVE DUCTAL CARCINOMA OF BREAST, FEMALE, LEFT (H): Primary | ICD-10-CM

## 2025-01-09 DIAGNOSIS — C50.919 BREAST CANCER (H): Primary | ICD-10-CM

## 2025-01-09 DIAGNOSIS — C50.919 BREAST CANCER (H): ICD-10-CM

## 2025-01-09 LAB
ALBUMIN SERPL BCG-MCNC: 4 G/DL (ref 3.5–5.2)
ALP SERPL-CCNC: 67 U/L (ref 40–150)
ALT SERPL W P-5'-P-CCNC: 14 U/L (ref 0–50)
ANION GAP SERPL CALCULATED.3IONS-SCNC: 11 MMOL/L (ref 7–15)
AST SERPL W P-5'-P-CCNC: 13 U/L (ref 0–45)
BASOPHILS # BLD AUTO: 0 10E3/UL (ref 0–0.2)
BASOPHILS NFR BLD AUTO: 1 %
BILIRUB SERPL-MCNC: 0.3 MG/DL
BUN SERPL-MCNC: 13.3 MG/DL (ref 8–23)
CALCIUM SERPL-MCNC: 9 MG/DL (ref 8.8–10.4)
CHLORIDE SERPL-SCNC: 102 MMOL/L (ref 98–107)
CREAT SERPL-MCNC: 0.59 MG/DL (ref 0.51–0.95)
EGFRCR SERPLBLD CKD-EPI 2021: >90 ML/MIN/1.73M2
EOSINOPHIL # BLD AUTO: 0.2 10E3/UL (ref 0–0.7)
EOSINOPHIL NFR BLD AUTO: 4 %
ERYTHROCYTE [DISTWIDTH] IN BLOOD BY AUTOMATED COUNT: 12.7 % (ref 10–15)
GLUCOSE SERPL-MCNC: 195 MG/DL (ref 70–99)
HCO3 SERPL-SCNC: 26 MMOL/L (ref 22–29)
HCT VFR BLD AUTO: 35 % (ref 35–47)
HGB BLD-MCNC: 12 G/DL (ref 11.7–15.7)
IMM GRANULOCYTES # BLD: 0 10E3/UL
IMM GRANULOCYTES NFR BLD: 0 %
LYMPHOCYTES # BLD AUTO: 1.9 10E3/UL (ref 0.8–5.3)
LYMPHOCYTES NFR BLD AUTO: 41 %
MCH RBC QN AUTO: 29.7 PG (ref 26.5–33)
MCHC RBC AUTO-ENTMCNC: 34.3 G/DL (ref 31.5–36.5)
MCV RBC AUTO: 87 FL (ref 78–100)
MONOCYTES # BLD AUTO: 0.2 10E3/UL (ref 0–1.3)
MONOCYTES NFR BLD AUTO: 5 %
NEUTROPHILS # BLD AUTO: 2.3 10E3/UL (ref 1.6–8.3)
NEUTROPHILS NFR BLD AUTO: 49 %
NRBC # BLD AUTO: 0 10E3/UL
NRBC BLD AUTO-RTO: 0 /100
PLATELET # BLD AUTO: 287 10E3/UL (ref 150–450)
POTASSIUM SERPL-SCNC: 3.9 MMOL/L (ref 3.4–5.3)
PROT SERPL-MCNC: 6.4 G/DL (ref 6.4–8.3)
RBC # BLD AUTO: 4.04 10E6/UL (ref 3.8–5.2)
SODIUM SERPL-SCNC: 139 MMOL/L (ref 135–145)
WBC # BLD AUTO: 4.7 10E3/UL (ref 4–11)

## 2025-01-09 PROCEDURE — 36591 DRAW BLOOD OFF VENOUS DEVICE: CPT

## 2025-01-09 PROCEDURE — 250N000011 HC RX IP 250 OP 636: Performed by: INTERNAL MEDICINE

## 2025-01-09 PROCEDURE — G0463 HOSPITAL OUTPT CLINIC VISIT: HCPCS | Mod: 25 | Performed by: INTERNAL MEDICINE

## 2025-01-09 PROCEDURE — 36598 INJ W/FLUOR EVAL CV DEVICE: CPT | Performed by: PHYSICIAN ASSISTANT

## 2025-01-09 PROCEDURE — 258N000003 HC RX IP 258 OP 636: Performed by: INTERNAL MEDICINE

## 2025-01-09 PROCEDURE — 80053 COMPREHEN METABOLIC PANEL: CPT

## 2025-01-09 PROCEDURE — 99214 OFFICE O/P EST MOD 30 MIN: CPT | Performed by: INTERNAL MEDICINE

## 2025-01-09 PROCEDURE — 85025 COMPLETE CBC W/AUTO DIFF WBC: CPT | Performed by: INTERNAL MEDICINE

## 2025-01-09 RX ORDER — ONDANSETRON 2 MG/ML
8 INJECTION INTRAMUSCULAR; INTRAVENOUS ONCE
Status: COMPLETED | OUTPATIENT
Start: 2025-01-09 | End: 2025-01-09

## 2025-01-09 RX ORDER — CHOLECALCIFEROL (VITAMIN D3) 50 MCG
TABLET ORAL
COMMUNITY
Start: 2024-12-27

## 2025-01-09 RX ORDER — HEPARIN SODIUM (PORCINE) LOCK FLUSH IV SOLN 100 UNIT/ML 100 UNIT/ML
5 SOLUTION INTRAVENOUS ONCE
Status: COMPLETED | OUTPATIENT
Start: 2025-01-09 | End: 2025-01-09

## 2025-01-09 RX ORDER — HEPARIN SODIUM (PORCINE) LOCK FLUSH IV SOLN 100 UNIT/ML 100 UNIT/ML
5 SOLUTION INTRAVENOUS EVERY 8 HOURS
Status: DISCONTINUED | OUTPATIENT
Start: 2025-01-09 | End: 2025-01-12 | Stop reason: HOSPADM

## 2025-01-09 RX ADMIN — HEPARIN 3 ML: 100 SYRINGE at 08:17

## 2025-01-09 RX ADMIN — HEPARIN SODIUM (PORCINE) LOCK FLUSH IV SOLN 100 UNIT/ML 5 ML: 100 SOLUTION at 13:11

## 2025-01-09 RX ADMIN — ONDANSETRON 8 MG: 2 INJECTION INTRAMUSCULAR; INTRAVENOUS at 10:17

## 2025-01-09 RX ADMIN — SODIUM CHLORIDE 160 MG: 9 INJECTION, SOLUTION INTRAVENOUS at 10:26

## 2025-01-09 RX ADMIN — PACLITAXEL 154 MG: 6 INJECTION, SOLUTION INTRAVENOUS at 10:59

## 2025-01-09 RX ADMIN — SODIUM CHLORIDE 250 ML: 9 INJECTION, SOLUTION INTRAVENOUS at 10:17

## 2025-01-09 ASSESSMENT — PAIN SCALES - GENERAL: PAINLEVEL_OUTOF10: NO PAIN (0)

## 2025-01-09 NOTE — NURSING NOTE
"Oncology Rooming Note    January 9, 2025 8:43 AM   Zenaida Valles is a 74 year old female who presents for:    Chief Complaint   Patient presents with    Port Draw     Labs drawn via port by RN in lab, vitals taken.     Oncology Clinic Visit     Invasive ductal carcinoma of breast     Initial Vitals: /87 (BP Location: Right arm, Patient Position: Sitting, Cuff Size: Adult Regular)   Pulse 78   Temp 97.7  F (36.5  C) (Oral)   Resp 18   Wt 77.1 kg (170 lb)   LMP  (LMP Unknown)   SpO2 98%   BMI 28.29 kg/m   Estimated body mass index is 28.29 kg/m  as calculated from the following:    Height as of 1/2/25: 1.651 m (5' 5\").    Weight as of this encounter: 77.1 kg (170 lb). Body surface area is 1.88 meters squared.  No Pain (0) Comment: Data Unavailable   No LMP recorded (lmp unknown). Patient is postmenopausal.  Allergies reviewed: Yes  Medications reviewed: Yes    Medications: Medication refills not needed today.  Pharmacy name entered into EPIC:    [facility] [place], [place] - [facility] [place], [place] - [address]  [facility] [place], [place] - [address]    Frailty Screening:   Is the patient here for a new oncology consult visit in cancer care? 2. No      Clinical concerns:  Genetics department had put a letter in and believed already been done; also is adding vitamin D      Peyton Cline              "

## 2025-01-09 NOTE — PROGRESS NOTES
Interventional Radiology Brief Post Procedure Note    Procedure: Port evaluation.    Proceduralist: Derek Ashford [facility]    Assistant: None    Time Out: Prior to the start of the procedure and with procedural staff participation, I verbally confirmed the patient s identity using two indicators, relevant allergies, that the procedure was appropriate and matched the consent or emergent situation, and that the correct equipment/implants were available. Immediately prior to starting the procedure I conducted the Time Out with the procedural staff and re-confirmed the patient s name, procedure, and site/side. (The Joint Commission universal protocol was followed.)  Yes    Medications   Medication Event Details Admin User Admin Time       Sedation: None.     Findings: Patient complains of discomfort at the right neck/supraclavicular area, which is exacerbated by neck rotation. She denies pain or dysfunction. Exam reveals the recently placed right internal jugular central venous chest port to be non-tender, with no erythema, edema, warmth, or fluctuance along the port body, catheter tract, or venotomy site.     Port was locked with Heparin and de-accessed per patient request.    Estimated Blood Loss: Minimal    Fluoroscopy Time:  None.    SPECIMENS: None    Complications: 1. None     Condition: Stable    Assessment: Existing port is well-functioning, and without signs/symptoms of inflammation or infection. Discomfort is likely due to port catheter either coursing through or in close proximity to sternocleidomastoid muscle. Patient reassured that the port may be used as indicated, with no detriment to local anatomy or integrity of port/catheter. IR recommends continuing to use this port, but if discomfort worsens, patient can consider port replacement. Patient is also aware that port replacement may not improve, or could worsen symptoms.    Plan: Follow up per primary team. May use port as indicated.     Approximately  15 minutes of direct face to face time occurred with the patient during this encounter.    Comments: See dictated procedure note for full details.    Derek Ashford PA-C

## 2025-01-09 NOTE — PROGRESS NOTES
Infusion Nursing Note:  Zenaida Valles presents today for C3D1 trastuzumab-qyyp (TRAZIMERA) - Taxol.    Patient seen by provider today: Yes: Dr Cheung   present during visit today: Not Applicable.    Note: Pt saw provider prior to infusion.  Zenaida agrees to treatment today.  +requests ice for hands/feet during Taxol infusion    Administrations This Visit       ondansetron (ZOFRAN) injection 8 mg       Admin Date  01/09/2025 Action  $Given Dose  8 mg Route  Intravenous Documented By  Silvia Putnam RN              PACLitaxel (TAXOL) 154 mg in sodium chloride 0.9% in non-PVC container 300.67 mL infusion       Admin Date  01/09/2025 Action  $New Bag Dose  154 mg Rate  300.7 mL/hr Route  Intravenous Documented By  Silvia Putnam RN              sodium chloride (PF) 0.9% PF flush 3-20 mL       Admin Date  01/09/2025 Action  $Given Dose  10 mL Route  Intracatheter Documented By  Silvia Putnam RN              sodium chloride 0.9% BOLUS 250 mL       Admin Date  01/09/2025 Action  $New Bag Dose  250 mL Route  Intravenous Documented By  Silvia Putnam RN              trastuzumab-qyyp (TRAZIMERA) 160 mg in sodium chloride 0.9 % 282.62 mL infusion       Admin Date  01/09/2025 Action  $New Bag Dose  160 mg Rate  565.2 mL/hr Route  Intravenous Documented By  Silvia Putnam RN                      Intravenous Access:  Implanted Port.    Treatment Conditions:  ECHO/MUGA completed 11/13/24  EF 60-65%.   Latest Reference Range & Units 01/09/25 08:30   Sodium 135 - 145 mmol/L 139   Potassium 3.4 - 5.3 mmol/L 3.9   Chloride 98 - 107 mmol/L 102   Carbon Dioxide (CO2) 22 - 29 mmol/L 26   Urea Nitrogen 8.0 - 23.0 mg/dL 13.3   Creatinine 0.51 - 0.95 mg/dL 0.59   GFR Estimate >60 mL/min/1.73m2 >90   Calcium 8.8 - 10.4 mg/dL 9.0   Anion Gap 7 - 15 mmol/L 11   Albumin 3.5 - 5.2 g/dL 4.0   Protein Total 6.4 - 8.3 g/dL 6.4   Alkaline Phosphatase 40 - 150 U/L 67   ALT 0 - 50 U/L 14   AST 0 - 45 U/L  13   Bilirubin Total <=1.2 mg/dL 0.3   Glucose 70 - 99 mg/dL 195 (H)   WBC 4.0 - 11.0 10e3/uL 4.7   Hemoglobin 11.7 - 15.7 g/dL 12.0   Hematocrit 35.0 - 47.0 % 35.0   Platelet Count 150 - 450 10e3/uL 287   RBC Count 3.80 - 5.20 10e6/uL 4.04   MCV 78 - 100 fL 87   MCH 26.5 - 33.0 pg 29.7   MCHC 31.5 - 36.5 g/dL 34.3   RDW 10.0 - 15.0 % 12.7   % Neutrophils % 49   % Lymphocytes % 41   % Monocytes % 5   % Eosinophils % 4   % Basophils % 1   Absolute Basophils 0.0 - 0.2 10e3/uL 0.0   Absolute Eosinophils 0.0 - 0.7 10e3/uL 0.2   Absolute Immature Granulocytes <=0.4 10e3/uL 0.0   Absolute Lymphocytes 0.8 - 5.3 10e3/uL 1.9   Absolute Monocytes 0.0 - 1.3 10e3/uL 0.2   % Immature Granulocytes % 0   Absolute Neutrophils 1.6 - 8.3 10e3/uL 2.3   Absolute NRBCs 10e3/uL 0.0   NRBCs per 100 WBC <1 /100 0     Post Infusion Assessment:  Patient tolerated infusion without incident.  Blood return noted pre and post infusion.  Site patent and intact, free from redness, edema or discomfort.  No evidence of extravasations.   Left intact for IR post infusion.      Discharge Plan:   Patient declined prescription refills.  Discharge instructions reviewed with: Patient and Family.  Patient and/or family verbalized understanding of discharge instructions and all questions answered.  AVS to patient via [employer].  Patient will return 1/16 for next appointment.   Patient discharged in stable condition accompanied by: .  Departure Mode: Ambulatory.    Silvia Putnam RN

## 2025-01-09 NOTE — NURSING NOTE
Chief Complaint   Patient presents with    Port Draw     Labs drawn via port by RN in lab, vitals taken.      Labs drawn via port by RN. Port accessed with 20g, 3/4in, power needle. Flushed with saline and heparin. Pt tolerated well. Vitals taken. Pt checked into next appt.     Samia Jin RN

## 2025-01-09 NOTE — LETTER
1/9/2025      Zenaida Valles  [address]  [facility] [address]      Dear Colleague,    Thank you for referring your patient, Zenaida Valles, to the [facility]. Please see a copy of my visit note below.    MEDICAL ONCOLOGY NOTE     Javier Wood MD  Professor   [facility]  [address] [place] [address]     Re. Zenaida Valles   Female, 74 year old, 1950  MRN: [id number]        Dear Dr. Wood,     Thank you for referring Zenaida Valles who is a 74-year-old woman with a new diagnosis of a screening identified stage I invasive ductal cancer of the upper outer quadrant of the left breast ER positive, LA positive, HER2 amplified stage [id number].       HISTORY OF PRESENT ILLNESS:    Zenaida has been in generally good health except for a right renal artery stenosis requiring a stent.  She also has a left leg injury recently she was in her usual state of good health and had a screening mammogram which showed a suspicious lesion in the upper outer quadrant of the left breast at the 3 o'clock position 5 cm from the nipple there is an irregular hypoechoic mass measuring 1.0 x 0.7 x 0.9 cm in size 1 cm from the nipple there is also a 0.5 x 0.4 cm area of suspicion     She underwent an ultrasound guided biopsy which showed usual ductal hyperplasia and fibrocystic changes microcysts in April Kren metaplasia.  Columnar cell changes.  It invasive ductal carcinoma was also found grade 2 and DCIS grade 2 solid type.  Her breast cancer was estrogen receptor positive progesterone receptor positive at HER2 2+ by IHC.  She then underwent ADRIANA which showed a subpopulation which was 60 to 70% ADRIANA 5 negative and 30 to 40% I-S age 1 positive with a 4.9 HER2 signals per nucleus and the ratio of 2.2 indicating HER2 positivity.     She then came to see Dr. Wood at [facility] for surgical recommendations.  She had a  contrast mammogram which showed a 0.9 cm enhancing lesion in the upper outer quadrant of the left breast.     She has had no weight loss or loss of energy.  She does not sleep during the day.  She can perform all of her household chores.  ECOG 0 PS.      DIAGNOSTIC AND TREATMENT SUMMARY:  On October 7 she underwent a screening mammogram which demonstrated an asymmetry in her left breast.       On October 15 she underwent a diagnostic mammogram which confirmed a left breast asymmetry.  She had an ultrasound which demonstrated 2 masses in her left breast.  At the 3 o'clock position 1 cm from the nipple there was a mass and at the 3 o'clock position 5 cm from the nipple there was a second mass.  Her lymph nodes were normal by ultrasound.       On October 23 she underwent a contrast-enhanced mammography which only demonstrated 1 mass measuring 1 cm.  The right breast was normal.  She underwent ultrasound-guided biopsies of both masses.  The mass at the 3 o'clock position 1 cm from the nipple was benign.  The mass at the 3 o'clock position 5 cm from the nipple demonstrating a grade 2 invasive ductal cancer that was ER positive and HER2 equivocal.  There is also DCIS present.          A. LEFT breast, 3:00, 1 cm from nipple, ultrasound-guided core biopsy:  - Benign breast tissue with usual ductal hyperplasia (UDH) and fibrocystic changes including microcysts, apocrine metaplasia, and columnar cell change  - Rare calcifications associated with benign breast ducts and acini  - Negative for atypia or malignancy      B. LEFT breast, 3:00, 5 cm from nipple, ultrasound-guided core biopsy:   - INVASIVE BREAST CARCINOMA OF NO SPECIAL TYPE (INVASIVE DUCTAL CARCINOMA), Mynor grade 2  - Ductal carcinoma in situ (DCIS), nuclear grade 2, solid type  - Invasive carcinoma is estrogen receptor positive, progesterone receptor positive, and HER2 equivocal (score 2+) by immunohistochemistry (see 'Breast Biomarker Reporting Template'  below)  - HER2 FISH is is process; results will be reportedly separately by cytogenetis  - See comment     This FISH analysis is performed in follow up to the reported equivocal (2+) HER2 findings by immunohistochemistry ([id number]).        RESULTS:     Ratio of HER2/MARION-17 signals  Zenaida Valles:  See Interpretation below     Majority (~60-70%) of tumor in area of strongest IHC staining:  Group 5 (ADRIANA Negative)  Avg. number HER2 signals/nucleus:  2.9  Avg. number MARION-17 signals/nucleus:  1.7  HER2/MARION 17 ratio:  1.7     Subpopulation (~30-40%) of tumor in area of strongest IHC staining:  Group 1 (ADRIANA Positive)                             Avg. number HER2 signals/nucleus:  4.9  Avg. number MARION-17 signals/nucleus:  2.3  HER2/MARION 17 ratio:  2.2     **Interpretive guidelines per the American Society of Clinical Oncology/College of American Pathologists Clinical Practice Guideline Update (Margarita VALENZUELA et al, 2023, Arch Pathol Lab Med 147:993):     -- Group 1: HER2/MARION-17 ratio 2.0 or more -AND- avg. number HER2 signals/nucleus 4.0 or more (ADRIANA Positive)  -- Group 2: HER2/MARION-17 ratio 2.0 or more -AND- avg. number HER2 signals/nucleus <4.0 (Additional work required)  -- Group 3: HER2/MARION-17 ratio <2.0 -AND- avg. number HER2 signals/nucleus 6.0 or more (Additional work required)  -- Group 4: HER2/MARION-17 ratio <2.0 -AND- avg. number HER2 signals/nucleus 4.0 or more and <6.0 (Additional work required)  -- Group 5: HER2/MARION-17 ratio <2.0 -AND- avg. number HER2 signals/nucleus <4.0 (ADRIANA Negative)     INTERPRETATION:  Two admixed populations of cells were found in this sample:     Majority (~60-70%) of tumor in area of strongest IHC staining:  Per the American Society of Clinical Oncology/College of American Pathologists Clinical Practice Guideline Focused Update (Margarita VALENZUELA et al, 2018, Arch Pathol Lab Med  doi:10.5858/arpa.8934-7706-ZA), the HER2/MARION 17 ratio of 1.7 and average number of HER2 signals/cell of 2.9 places  this population of cells in Group 5 (ADRIANA Negative).      Subpopulation (~30-40%) of tumor in area of strongest IHC staining:  Admixed within this sample were cells with increased HER2 signals, which, when selectively scored, had an average of 4.9 HER2 signals/nucleus and a HER2/MARION 17 ratio of 2.2; per the American Society of Clinical Oncology/College of American Pathologists Clinical Practice Guideline Focused Update (Margarita VALENZUELA et al, 2018, Arch Pathol Lab Med  doi:10.5858/arpa.8010-2937-SW), this population of cells falls into Group 1 (ADRIANA Positive).     PAST MEDICAL HISTORY: No history of breast surgery.  No history of breast cancer in the past.  No history of radiation therapy in the past or radiation exposure.  No history of tumor of any kind.  No history of heart problems, heart attack, breathing problems, blood clots, seizures, arthritis, peptic ulcer disease, osteoporosis or bone fractures.  She is not currently participating in a clinical trial.  She does have a history of asthma and uses an albuterol inhaler in cold weather.     Her past medical history is significant for diabetes mellitus and is on metformin, hypertension obesity, she has IPMN and a right renal artery stent.  The family history is negative for breast cancer.  She has a history of right renal artery stenosis and has a stent in place.     She has a recent history of a left hamstring injury.  She is seeing orthopedics for this problem.  She also has a history of back pain.  Her gait is affected but she can perform activities of daily living.  She has a history of aches in her shoulders.  She had a 65 pound weight loss over the last 2 years which was intentional related to improve diet and exercise. She says she has a history of fibromuscular dysplasia.     She worked in the [employer] in [place] [employer] and was exposed to dengue, malaria, hepatitis A, hepatitis B, encephalitis.  She has also had COVID and the flu.     FAMILY HISTORY:   No  history of ovarian, uterine, colon cancer, or melanoma.  No history of glioma, gastric cancer or pancreatic cancer.  Her mother did have Zollinger-Wheeler syndrome but the patient's genetics are negative for this syndrome.      PAST MENSTRUAL HISTORY:  Age of first menstrual period was 13.   She has been pregnant 1 times with 0 live births and 0 miscarriages and 1 .  Age at in place pregnancy age 28.   Uterus and ovaries are in place.  Last menstrual period was about age 52 and the menopause occurred naturally. No history of hormone replacement therapy.     She does have a history of thickened endometrial lining but by her report no abnormalities of the lining when she had a DIC     HABITS:  She is a never smoker but she has a history of exposure to secondhand smoke from her father as a child.  She consumes alcoholic beverages socially 1 drink every couple of months.     GERMLINE GENETICS: Genetic testing is available for 47 genes associated with cancers of the breast, ovary, uterus, prostate and gastrointestinal system: [facility] Common Hereditary Cancers panel (APC, SUMIT, AXIN2, BARD1, BMPR1A, BRCA1, BRCA2, BRIP1, CDH1, CDK4, CDKN2A, CHEK2, CTNNA1, DICER1, EPCAM, GREM1, HOXB13, KIT, MEN1, MLH1, MSH2, MSH3, MSH6, MUTYH, NBN, NF1, NTHL1, PALB2, PDGFRA, PMS2, POLD1, POLE, PTEN,RAD50, RAD51C, RAD51D, SDHA, SDHB, SDHC, SDHD, SMAD4, SMARCA4, STK11, TP53,TSC1, TSC2, VHL)      ALLERGIES: She has drug allergies to codeine, codeine derivatives, ibuprofen, lisinopril..  No allergy to seafood, iodine, or contrast dye.  She does take daily aspirin 81 mg because of her renal stent.     LUMPECTOMY:  Surgical Pathology Report                         Case: [id number]                                   Authorizing Provider:  Javier Wood MD         Collected:           2024 09:40 AM           Ordering Location:     [facility] OR  Received:            2024 09:54 AM                                   [place]                                                                   Pathologist:           Kristal Lanza MD                                                   Specimens:   A) - Breast, Left, Left breast lumpectomy                                                            B) - Lymph Node(s), [place], Left axillary sentinel lymph node #1                                  C) - Lymph Node(s), [place], Left axillary sentinel lymph node # 2                         Final Diagnosis   A. LEFT breast, RFID tag-localized lumpectomy:  -INVASIVE BREAST CARCINOMA OF NO SPECIAL TYPE (INVASIVE DUCTAL CARCINOMA), FAREED GRADE 2, size 11 mm  -Margins are uninvolved by invasive carcinoma  -Invasive carcinoma is 1 mm from the nearest (posterior) margin, 5 mm from the anterior margin, and > 5 mm from the superior, inferior, medial and lateral margins  -No lymphovascular invasion identified  -Other findings: fibrocystic change (including microcysts with apocrine metaplasia), sclerosing adenosis, and usual ductal hyperplasia  -Calcifications associated with benign acini  -Prior core biopsy site changes  -See tumor synoptic below     B. Lymph node, LEFT axillary, sentinel #1, excision:  -One benign lymph node (0/1)     C. Lymph node, LEFT axillary, sentinel #2, excision:  -One benign lymph node (0/1)             Synoptic Checklist   INVASIVE CARCINOMA OF THE BREAST: Resection   8th Edition - Protocol posted: 12/13/2023INVASIVE CARCINOMA OF THE BREAST: RESECTION - All Specimens           SPECIMEN    Procedure   Excision (less than total mastectomy)    Specimen Laterality   Left    TUMOR    Tumor Site   Clock position        3 o'clock    Histologic Type   Invasive carcinoma of no special type (ductal)    Histologic Grade (Fareed Histologic Score)        Glandular (Acinar) / Tubular Differentiation   Score 2    Nuclear Pleomorphism   Score 3    Mitotic Rate   Score 2    Overall Grade   Grade 2 (scores of 6 or 7)     Tumor Size   Greatest dimension of largest invasive focus (Millimeters): 11 mm    Additional Dimension (Millimeters)   9 mm        9 mm    Tumor Focality   Single focus of invasive carcinoma    Ductal Carcinoma In Situ (DCIS)   Not identified    Lobular Carcinoma In Situ (LCIS)   Not identified    Lymphatic and / or Vascular Invasion   Not identified    Dermal Lymphatic and / or Vascular Invasion   No skin present    Microcalcifications   Present in non-neoplastic tissue    Treatment Effect in the Breast   No known presurgical therapy    MARGINS    Margin Status for Invasive Carcinoma   All margins negative for invasive carcinoma    Distance from Invasive Carcinoma to Closest Margin   1 mm    Closest Margin(s) to Invasive Carcinoma   Posterior    Distance from Invasive Carcinoma to Anterior Margin   5 mm    Distance from Invasive Carcinoma to Superior Margin   Greater than: 5 mm    Distance from Invasive Carcinoma to Inferior Margin   Greater than: 5 mm    Distance from Invasive Carcinoma to Medial Margin   Greater than: 5 mm    Distance from Invasive Carcinoma to Lateral Margin   Greater than: 5 mm    REGIONAL LYMPH NODES    Regional Lymph Node Status   All regional lymph nodes negative for tumor    Total Number of Lymph Nodes Examined (sentinel and non-sentinel)   2    Number of [place] Nodes Examined   2    pTNM CLASSIFICATION (AJCC 8th Edition)    Reporting of pT, pN, and (when applicable) pM categories is based on information available to the pathologist at the time the report is issued. As per the AJCC (Chapter 1, 8th Ed.) it is the managing physician s responsibility to establish the final pathologic stage based upon all pertinent information, including but potentially not limited to this pathology report.    pT Category   pT1c    pN Category   pN0    N Suffix   (sn)    SPECIAL STUDIES             Estrogen Receptor (ER) Status   Positive (greater than 10% of cells demonstrate nuclear positivity)   "  Percentage of Cells with Nuclear Positivity   %             Progesterone Receptor (PgR) Status   Positive    Percentage of Cells with Nuclear Positivity   81-90%             HER2 (by immunohistochemistry)   Equivocal (Score 2+)    Percentage of Cells with Uniform Intense Complete Membrane Staining   0 %             HER2 (by in situ hybridization)   Positive (amplified)    Testing Performed on Case Number   HER2 FISH amplified in 30-40% of tumor cells. Performed on prior core biopsy [id number] specimen B    Comment(s)   Best block: A13   .      Clinical Information   UCARLOS   The patient is a 74 year old woman with LEFT breast carcinoma. Procedure: LEFT breast RFID tag localized lumpectomy, LEFT axillary sentinel lymph node biopsy.    Gross Description   ALEE ROBIN(1). Breast, Left, Left breast lumpectomy:  The specimen is received fresh with proper patient identification, labeled \"left breast lumpectomy\". It consists of 27.85 g, 6.7 cm from medial to lateral x 4.7 cm from superior to inferior x 2.4 cm from anterior to posterior left breast lumpectomy specimen. The specimen was received previously inked by the surgeon as follows:  Superior - red, anterior - green, inferior - blue, posterior - black, medial - yellow and lateral - orange.     The specimen is sectioned from medial (slice 1) to lateral (slice 15) into 15 slices.          [id number]   This FISH analysis is performed in follow up to the reported equivocal (2+) HER2 findings by immunohistochemistry ([id number]).        RESULTS:     Ratio of HER2/MARION-17 signals  Zenaida Valles:  See Interpretation below     Majority (~60-70%) of tumor in area of strongest IHC staining:  Group 5 (ADRIANA Negative)  Avg. number HER2 signals/nucleus:  2.9  Avg. number MARION-17 signals/nucleus:  1.7  HER2/MARION 17 ratio:  1.7     Subpopulation (~30-40%) of tumor in area of strongest IHC staining:  Group 1 (ADRIANA Positive)                             Avg. number HER2 " signals/nucleus:  4.9  Avg. number MARION-17 signals/nucleus:  2.3  HER2/MARION 17 ratio:  2.2        TREATMENT HISTORY:  A.  Lumpectomy showed a stage I T1b NX MX invasive ductal carcinoma of the left breast upper outer quadrant which was estrogen receptor positive (%) progesterone receptor positive (81-90%) and HER2 2+ by IHC and HER2 positive by ADRIANA.  Margins negative for invasive carcinoma.    B.  Plan is to give the APT regimen followed by addition of hormonal therapy with letrozole for 7 years.         INTERVAL HISTORY:  She was seen in clinic today with her  Trae. She had her lumpectomy with Dr. Wood on November 25. She started adjuvant APT regimen on 12/27/2024. Her main symptom since starting chemo has been fatigue and some minor frontal headaches. Takes occasional naps to help with this. Has long history of some hair loss but hasn't noticed any changes since starting chemo. Had some numbness and tingling in the first week of chemo. At present, only has numbness in left big toe. Appetite is slightly reduced. Has not had any nausea, vomiting, or diarrhea. Had a few episodes of epistaxis in the past week. She states that she feels her port is not quite secure. When she turns her neck she has some discomfort. States she still has some bruising on her chest from the port placement about 3 weeks ago. She had been dealing with pain in her left hamstring and this is now much better. She continues to have issues with fatigue and issues sleeping. She has had some exercise intolerance- got a bit short of breath climbing stairs yesterday. She is taking a vitamin D supplement and drinking milk to meet her calcium requirements.     No pain.  Some discomfort around port in the neck area and will have a port check.  Some fatigue. Some constipation.  No depression or anxiety.  Some neuropathy.  She was not icing but will try this.      She has an ECOG 0 performance status.     REVIEW OF SYSTEMS:   She has had no  fevers, cough, chest pain, shortness of breath, mouth sores, hemoptysis, nausea, vomiting, abdominal pain, diarrhea, bone pain, back pain, muscle or joint complaints, hearing loss or depression.  The remainder of a 10-point review of systems is negative.        PHYSICAL EXAMINATION:     VITALS:  /87 (BP Location: Right arm, Patient Position: Sitting, Cuff Size: Adult Regular)   Pulse 78   Temp 97.7  F (36.5  C) (Oral)   Resp 18   Wt 77.1 kg (170 lb)   LMP  (LMP Unknown)   SpO2 98%   BMI 28.29 kg/m         HEENT:  No alopecia, no lesions in the oropharynx.  Dentition is normal  LYMPH:  There is no palpable cervical, supraclavicular, or subclavicular lymphadenopathy.  BREASTS: deferred  LUNGS:  Clear to auscultation.  HEART:  Regular rate and rhythm.  S1, S2. No murmurs  ABDOMEN:  Soft, nontender, without hepatosplenomegaly.  EXTREMITIES:  Without edema.  PSYCH:  Mood and affect were normal.  NEUROLOGIC:  Mood and affect  normal. No focal deficits apparent.          LABORATORY DATA:   Lab Results   Component Value Date    WBC 4.7 01/09/2025    HGB 12.0 01/09/2025    HCT 35.0 01/09/2025    MCV 87 01/09/2025     01/09/2025   Last Comprehensive Metabolic Panel:  Sodium   Date Value Ref Range Status   01/09/2025 139 135 - 145 mmol/L Final   01/19/2021 139 133 - 144 mmol/L Final     Potassium   Date Value Ref Range Status   01/09/2025 3.9 3.4 - 5.3 mmol/L Final   02/10/2022 4.4 3.4 - 5.3 mmol/L Final   01/19/2021 4.1 3.4 - 5.3 mmol/L Final     Chloride   Date Value Ref Range Status   01/09/2025 102 98 - 107 mmol/L Final   02/10/2022 104 94 - 109 mmol/L Final   01/19/2021 105 94 - 109 mmol/L Final     Carbon Dioxide   Date Value Ref Range Status   01/19/2021 30 20 - 32 mmol/L Final     Carbon Dioxide (CO2)   Date Value Ref Range Status   01/09/2025 26 22 - 29 mmol/L Final   02/10/2022 29 20 - 32 mmol/L Final     Anion Gap   Date Value Ref Range Status   01/09/2025 11 7 - 15 mmol/L Final   02/10/2022 7 3  - 14 mmol/L Final   01/19/2021 4 3 - 14 mmol/L Final     Glucose   Date Value Ref Range Status   01/09/2025 195 (H) 70 - 99 mg/dL Final   02/10/2022 226 (H) 70 - 99 mg/dL Final   01/19/2021 218 (H) 70 - 99 mg/dL Final     GLUCOSE BY METER POCT   Date Value Ref Range Status   11/25/2024 152 (H) 70 - 99 mg/dL Final     Urea Nitrogen   Date Value Ref Range Status   01/09/2025 13.3 8.0 - 23.0 mg/dL Final   02/10/2022 15 7 - 30 mg/dL Final   01/19/2021 15 7 - 30 mg/dL Final     Creatinine   Date Value Ref Range Status   01/09/2025 0.59 0.51 - 0.95 mg/dL Final   01/19/2021 0.64 0.52 - 1.04 mg/dL Final     GFR Estimate   Date Value Ref Range Status   01/09/2025 >90 >60 mL/min/1.73m2 Final     Comment:     eGFR calculated using 2021 CKD-EPI equation.   01/19/2021 >90 >60 mL/min/[1.73_m2] Final     Comment:     Non  GFR Calc  Starting 12/18/2018, serum creatinine based estimated GFR (eGFR) will be   calculated using the Chronic Kidney Disease Epidemiology Collaboration   (CKD-EPI) equation.       Calcium   Date Value Ref Range Status   01/09/2025 9.0 8.8 - 10.4 mg/dL Final     Comment:     Reference intervals for this test were updated on 7/16/2024 to reflect our healthy population more accurately. There may be differences in the flagging of prior results with similar values performed with this method. Those prior results can be interpreted in the context of the updated reference intervals.   01/19/2021 8.9 8.5 - 10.1 mg/dL Final     Bilirubin Total   Date Value Ref Range Status   01/09/2025 0.3 <=1.2 mg/dL Final   01/19/2021 0.4 0.2 - 1.3 mg/dL Final     Alkaline Phosphatase   Date Value Ref Range Status   01/09/2025 67 40 - 150 U/L Final   01/19/2021 98 40 - 150 U/L Final     ALT   Date Value Ref Range Status   01/09/2025 14 0 - 50 U/L Final   01/19/2021 32 0 - 50 U/L Final     AST   Date Value Ref Range Status   01/09/2025 13 0 - 45 U/L Final   01/19/2021 12 0 - 45 U/L Final                    ASSESSMENT  AND PLAN:    Zenaida Valles has a stage I T1b NX MX invasive ductal carcinoma of the left breast upper outer quadrant which was estrogen receptor positive (%) progesterone receptor positive (81-90%) and HER2 2+ by IHC and HER2 positive by ADRIANA. Margins negative for invasive carcinoma.    Germline genetics recommended.  She's tolerating adjuvant HER2 directed therapy with trastuzumab and paclitaxel.   Some neuropathy.  She was not icing but will try this.  We would also add adjuvant zoledronic acid.    We discussed toxicities of paclitaxel which could include alopecia, neuropathy which could last for 6 months to a year after infusion.    Discussion of hormonal therapy.  I discussed that after completion of paclitaxel we would begin hormonal therapy with an aromatase inhibitor and that this aromatase inhibitor treatment would continue for a total of 10 years.  We do not have data on  7 years of adjuvant hormonal therapy in patients with HER2 positive breast cancer.  We discussed side effects of aromatase inhibitor therapy which include joint stiffness which occurs in about 30% of patients hot flashes and vaginal dryness.  I discussed that exercise can help mitigate some of the side effects of aromatase inhibitor therapy.  Discussion of adjuvant zoledronic acid.  I discussed that zoledronic acid would help reduce risk of distant recurrence and also reduce bone loss due to aromatase inhibitor therapy.  I discussed that postlumpectomy radiation will be recommended.  Will arrange for radiation oncology consult.  Discussion of adjuvant zoledronic acid.  Adjuvant zoledronic acid can be associated with flulike symptoms that can last for several days after treatment.  I also discussed that there can be dental complications.  She keeps her teeth and good shape with frequent dental visits.  She does have excellent dentition and the risk of osteonecrosis of the jaw is quite low.  Discussion of genetics.  Discussed  that the previous genetic testing that was done was for a different purpose related to Zollinger-Wheeler syndrome.  I discussed that the breast cancer panel would be more expanded relative to the prior panel.  Discussion of exercise.  Discussion of the [employer].  We recommend 150 minutes of exercise per week with mixed cardio and strength training.  Stretch band, hand weights, yoga.  Diet.  I recommended a diet low in saturated fat, but not low in fat with more fruits and vegetables, and less in the way of red meat.  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  Follow up plan.  Right single lumen [place] 12-26.  Started APT regimen with weekly paclitaxel and trastuzuamb beginning 12-27. CBC, CMP and visit with Dr. Cheung January 16 CBC, CMP and Leilani on January 23rd and then every 3 weeks with me.  Follow up every 3rd  cycle with me for total of 12 cycles of paclitaxel. Echo in February.       Thank you for allowing us to participate in this patient's care.  The patient was seen and evaluated by me.  I discussed the patient with the resident Dr. Te Rainey and agree with the findings and plan in the note.      Sincerely,      Chito Cheung MD  Professor  [facility]  [phone]     I spent 40 minutes with the patient more than 50% of which was in counseling and coordination of care. The remainder of a 10 point review of systems was negative.            Again, thank you for allowing me to participate in the care of your patient.        Sincerely,        Chito Cheung MD    Electronically signed

## 2025-01-15 NOTE — PROGRESS NOTES
MEDICAL ONCOLOGY NOTE  Jan 16, 2025     Javier Wood MD  Professor   [address] [address]     Re. Zenaida Valles   Female, 74 year old, 1950  MRN: [id number]        Dear Dr. Wood,     Thank you for referring Zenaida Valles who is a 74-year-old woman with a new diagnosis of a screening identified stage I invasive ductal cancer of the upper outer quadrant of the left breast ER positive, SD positive, HER2 amplified stage [id number].       HISTORY OF PRESENT ILLNESS:    Zenaida has been in generally good health except for a right renal artery stenosis requiring a stent.  She also has a left leg injury recently she was in her usual state of good health and had a screening mammogram which showed a suspicious lesion in the upper outer quadrant of the left breast at the 3 o'clock position 5 cm from the nipple there is an irregular hypoechoic mass measuring 1.0 x 0.7 x 0.9 cm in size 1 cm from the nipple there is also a 0.5 x 0.4 cm area of suspicion     She underwent an ultrasound guided biopsy which showed usual ductal hyperplasia and fibrocystic changes microcysts in April Kren metaplasia.  Columnar cell changes.  It invasive ductal carcinoma was also found grade 2 and DCIS grade 2 solid type.  Her breast cancer was estrogen receptor positive progesterone receptor positive at HER2 2+ by IHC.  She then underwent ADRIANA which showed a subpopulation which was 60 to 70% ADRIANA 5 negative and 30 to 40% I-S age 1 positive with a 4.9 HER2 signals per nucleus and the ratio of 2.2 indicating HER2 positivity.     She then came to see Dr. Wood at [facility] for surgical recommendations.  She had a contrast mammogram which showed a 0.9 cm enhancing lesion in the upper outer quadrant of the left breast. She underwent a left lumpectomy with Dr. Wood on 11/25/24.      She is currently receiving adjuvant chemotherapy with paclitaxel and trastuzumab.      INTERVAL HISTORY:  Zenaida returns to clinic today prior to week for paclitaxel trastuzumab.  She reports that she is doing okay.  She has significant fatigue with chemotherapy which makes it hard to predict her schedule.  She does have constipation which is a longstanding issue for her since starting metformin and she uses senna as needed with good effect.  Her appetite is low but she continues to eat and focus on getting in protein.  She has dry nares with bleeding in the morning especially after blowing her nose.  This is not long-lasting.  She has started to icing during her infusions but does note intermittent tingling/neuropathy in her toes of the left greater than right.  She also has been experiencing more acid reflux over the past week and has been using Tums as needed.  These help better temporarily.  No fevers or chills.  She did notice some tenderness on her lower inner lip but no sores.  No rashes.  No chest pain, shortness of breath, or cough with the exception of one episode when she was carrying and things from her cars and going up multiple stairs.    She has an ECOG 0 performance status.     REVIEW OF SYSTEMS:  ROS: 10 point ROS neg other than the symptoms noted above in the HPI.         PAST MEDICAL HISTORY:   - asthma   - diabetes mellitus and is on metformin, hypertension obesity, she has IPMN and a right renal artery stent.  The family history is negative for breast cancer.  She has a history of right renal artery stenosis and has a stent in place.    FAMILY HISTORY:   No history of ovarian, uterine, colon cancer, or melanoma.  No history of glioma, gastric cancer or pancreatic cancer.  Her mother did have Zollinger-Wheeler syndrome but the patient's genetics are negative for this syndrome.      PAST MENSTRUAL HISTORY:  Age of first menstrual period was 13.   She has been pregnant 1 times with 0 live births and 0 miscarriages and 1 .  Age at in place pregnancy age 28.   Uterus and  ovaries are in place.  Last menstrual period was about age 52 and the menopause occurred naturally. No history of hormone replacement therapy.     She does have a history of thickened endometrial lining but by her report no abnormalities of the lining when she had a DIC     HABITS:  She is a never smoker but she has a history of exposure to secondhand smoke from her father as a child.  She consumes alcoholic beverages socially 1 drink every couple of months.     ALLERGIES: She has drug allergies to codeine, codeine derivatives, ibuprofen, lisinopril..  No allergy to seafood, iodine, or contrast dye.  She does take daily aspirin 81 mg because of her renal stent.     PHYSICAL EXAMINATION:     VITALS:  BP (!) 142/77 (BP Location: Right arm, Patient Position: Sitting, Cuff Size: Adult Regular)   Pulse 77   Temp 97.6  F (36.4  C) (Oral)   Resp 16   Wt 78.5 kg (173 lb)   LMP  (LMP Unknown)   SpO2 97%   BMI 28.79 kg/m    HEENT:  No alopecia, no lesions in the oropharynx.  Dentition is normal  LYMPH:  There is no palpable cervical, supraclavicular, or subclavicular lymphadenopathy.  BREASTS: deferred  LUNGS:  Clear to auscultation.  HEART:  Regular rate and rhythm.  S1, S2. No murmurs  ABDOMEN:  Soft, nontender, without hepatosplenomegaly.  EXTREMITIES:  Without edema.  PSYCH:  Mood and affect were normal.  NEUROLOGIC:  Mood and affect  normal. No focal deficits apparent.          LABORATORY DATA: Most Recent 3 CBC's:  Recent Labs   Lab Test 01/16/25  0713 01/09/25  0830 01/03/25  0826   WBC 5.4 4.7 6.0   HGB 11.3* 12.0 12.1   MCV 87 87 87    287 272   ANEUTAUTO 2.6 2.3 3.3     Most Recent 3 BMP's:  Recent Labs   Lab Test 01/16/25  0713 01/09/25  0830 01/03/25  0826    139 138   POTASSIUM 3.7 3.9 3.6   CHLORIDE 104 102 101   CO2 26 26 26   BUN 15.7 13.3 17.3   CR 0.58 0.59 0.58   ANIONGAP 9 11 11   GAEL 9.2 9.0 9.1   * 195* 212*   PROTTOTAL 6.4 6.4 6.6   ALBUMIN 4.0 4.0 4.1    Most Recent 3  LFT's:  Recent Labs   Lab Test 01/16/25  0713 01/09/25  0830 01/03/25  0826   AST 17 13 17   ALT 18 14 18   ALKPHOS 69 67 69   BILITOTAL 0.2 0.3 <0.2    Most Recent 2 TSH and T4:  Recent Labs   Lab Test 02/10/22  0938 02/06/20  1032   TSH 3.83 2.54     I reviewed the above labs today.    ASSESSMENT AND PLAN:    Stage I T1b NX MX invasive ductal carcinoma of the left breast upper outer quadrant which was estrogen receptor positive (%) progesterone receptor positive (81-90%) and HER2 2+ by IHC and HER2 positive by FISH. Margins negative for invasive carcinoma.    Seen today prior to cycle 4 adjuvant trastuzumab and paclitaxel. Labs generally WNL with the exception of mild anemia, likely chemotherapy induced.   Following completion of paclitaxel we would begin hormonal therapy with an aromatase inhibitor and that this aromatase inhibitor treatment would continue for a total of 10 years.   She will also need postlumpectomy radiation.  Will also recommend adjuvant zometa following adjuvant chemotherapy.   Peripheral neuropathy, grade 1:  Intermittent in tingling in the toes L>R. Continue to ice during infusion. Some neuropathy.  She was not icing but will try this.  Discussion of genetics.  Discussed that the previous genetic testing that was done was for a different purpose related to Zollinger-Wheeler syndrome.  The breast cancer panel would be more expanded relative to the prior panel. Not specifically discussed today.   Discussion of exercise.  Discussion of the Lace and Pathway.  We recommend 150 minutes of exercise per week with mixed cardio and strength training.  Stretch band, hand weights, yoga.  Diet.  I recommended a diet low in saturated fat, but not low in fat with more fruits and vegetables, and less in the way of red meat.  Pancreas IPMN. Will need to be followed by MRCP.  Acid reflux. Trial pepcid OTC daily prn.   Dry nares/bloody nose. Start Ayr nasal saline gel or Aquaphor. Consider use of a  humidifier.   Follow up plan.  RTC next week with Leilani and week 5 chemotherapy. Zenaida is tolerating things reasonably well and could consider spacing from weekly visits but will defer to primary team.      38 minutes spent on the date of the encounter doing chart review, review of test results, interpretation of tests, patient visit, and documentation     Shayy Becerra PA-C

## 2025-01-16 ENCOUNTER — APPOINTMENT (OUTPATIENT)
Dept: LAB | Facility: CLINIC | Age: 75
End: 2025-01-16
Attending: INTERNAL MEDICINE
Payer: COMMERCIAL

## 2025-01-16 ENCOUNTER — ONCOLOGY VISIT (OUTPATIENT)
Dept: ONCOLOGY | Facility: CLINIC | Age: 75
End: 2025-01-16
Payer: COMMERCIAL

## 2025-01-16 ENCOUNTER — INFUSION THERAPY VISIT (OUTPATIENT)
Dept: ONCOLOGY | Facility: CLINIC | Age: 75
End: 2025-01-16
Attending: INTERNAL MEDICINE
Payer: COMMERCIAL

## 2025-01-16 VITALS
OXYGEN SATURATION: 97 % | TEMPERATURE: 97.6 F | SYSTOLIC BLOOD PRESSURE: 142 MMHG | RESPIRATION RATE: 16 BRPM | HEART RATE: 77 BPM | BODY MASS INDEX: 28.79 KG/M2 | DIASTOLIC BLOOD PRESSURE: 77 MMHG | WEIGHT: 173 LBS

## 2025-01-16 DIAGNOSIS — C50.912 INVASIVE DUCTAL CARCINOMA OF BREAST, FEMALE, LEFT (H): Primary | ICD-10-CM

## 2025-01-16 DIAGNOSIS — Z51.11 ENCOUNTER FOR ANTINEOPLASTIC CHEMOTHERAPY: ICD-10-CM

## 2025-01-16 LAB
ALBUMIN SERPL BCG-MCNC: 4 G/DL (ref 3.5–5.2)
ALP SERPL-CCNC: 69 U/L (ref 40–150)
ALT SERPL W P-5'-P-CCNC: 18 U/L (ref 0–50)
ANION GAP SERPL CALCULATED.3IONS-SCNC: 9 MMOL/L (ref 7–15)
AST SERPL W P-5'-P-CCNC: 17 U/L (ref 0–45)
BASOPHILS # BLD AUTO: 0.1 10E3/UL (ref 0–0.2)
BASOPHILS NFR BLD AUTO: 1 %
BILIRUB SERPL-MCNC: 0.2 MG/DL
BUN SERPL-MCNC: 15.7 MG/DL (ref 8–23)
CALCIUM SERPL-MCNC: 9.2 MG/DL (ref 8.8–10.4)
CHLORIDE SERPL-SCNC: 104 MMOL/L (ref 98–107)
CREAT SERPL-MCNC: 0.58 MG/DL (ref 0.51–0.95)
EGFRCR SERPLBLD CKD-EPI 2021: >90 ML/MIN/1.73M2
EOSINOPHIL # BLD AUTO: 0.2 10E3/UL (ref 0–0.7)
EOSINOPHIL NFR BLD AUTO: 5 %
ERYTHROCYTE [DISTWIDTH] IN BLOOD BY AUTOMATED COUNT: 13 % (ref 10–15)
GLUCOSE SERPL-MCNC: 164 MG/DL (ref 70–99)
HCO3 SERPL-SCNC: 26 MMOL/L (ref 22–29)
HCT VFR BLD AUTO: 33.3 % (ref 35–47)
HGB BLD-MCNC: 11.3 G/DL (ref 11.7–15.7)
IMM GRANULOCYTES # BLD: 0 10E3/UL
IMM GRANULOCYTES NFR BLD: 1 %
LYMPHOCYTES # BLD AUTO: 2.2 10E3/UL (ref 0.8–5.3)
LYMPHOCYTES NFR BLD AUTO: 41 %
MCH RBC QN AUTO: 29.7 PG (ref 26.5–33)
MCHC RBC AUTO-ENTMCNC: 33.9 G/DL (ref 31.5–36.5)
MCV RBC AUTO: 87 FL (ref 78–100)
MONOCYTES # BLD AUTO: 0.3 10E3/UL (ref 0–1.3)
MONOCYTES NFR BLD AUTO: 5 %
NEUTROPHILS # BLD AUTO: 2.6 10E3/UL (ref 1.6–8.3)
NEUTROPHILS NFR BLD AUTO: 48 %
NRBC # BLD AUTO: 0 10E3/UL
NRBC BLD AUTO-RTO: 0 /100
PLATELET # BLD AUTO: 329 10E3/UL (ref 150–450)
POTASSIUM SERPL-SCNC: 3.7 MMOL/L (ref 3.4–5.3)
PROT SERPL-MCNC: 6.4 G/DL (ref 6.4–8.3)
RBC # BLD AUTO: 3.81 10E6/UL (ref 3.8–5.2)
SODIUM SERPL-SCNC: 139 MMOL/L (ref 135–145)
WBC # BLD AUTO: 5.4 10E3/UL (ref 4–11)

## 2025-01-16 PROCEDURE — 80048 BASIC METABOLIC PNL TOTAL CA: CPT

## 2025-01-16 PROCEDURE — 258N000003 HC RX IP 258 OP 636

## 2025-01-16 PROCEDURE — 36591 DRAW BLOOD OFF VENOUS DEVICE: CPT

## 2025-01-16 PROCEDURE — G0463 HOSPITAL OUTPT CLINIC VISIT: HCPCS

## 2025-01-16 PROCEDURE — 250N000011 HC RX IP 250 OP 636

## 2025-01-16 PROCEDURE — 82040 ASSAY OF SERUM ALBUMIN: CPT

## 2025-01-16 PROCEDURE — 85025 COMPLETE CBC W/AUTO DIFF WBC: CPT

## 2025-01-16 RX ORDER — DIPHENHYDRAMINE HYDROCHLORIDE 50 MG/ML
25 INJECTION INTRAMUSCULAR; INTRAVENOUS
Status: CANCELLED
Start: 2025-01-17

## 2025-01-16 RX ORDER — ACETAMINOPHEN 325 MG/1
650 TABLET ORAL
Status: CANCELLED | OUTPATIENT
Start: 2025-01-17

## 2025-01-16 RX ORDER — HEPARIN SODIUM (PORCINE) LOCK FLUSH IV SOLN 100 UNIT/ML 100 UNIT/ML
5 SOLUTION INTRAVENOUS
Status: CANCELLED | OUTPATIENT
Start: 2025-01-17

## 2025-01-16 RX ORDER — ALBUTEROL SULFATE 0.83 MG/ML
2.5 SOLUTION RESPIRATORY (INHALATION)
Status: CANCELLED | OUTPATIENT
Start: 2025-01-17

## 2025-01-16 RX ORDER — MEPERIDINE HYDROCHLORIDE 25 MG/ML
25 INJECTION INTRAMUSCULAR; INTRAVENOUS; SUBCUTANEOUS
Status: CANCELLED | OUTPATIENT
Start: 2025-01-17

## 2025-01-16 RX ORDER — ALBUTEROL SULFATE 90 UG/1
1-2 INHALANT RESPIRATORY (INHALATION)
Status: CANCELLED
Start: 2025-01-17

## 2025-01-16 RX ORDER — DIPHENHYDRAMINE HYDROCHLORIDE 50 MG/ML
50 INJECTION INTRAMUSCULAR; INTRAVENOUS
Status: CANCELLED
Start: 2025-01-17

## 2025-01-16 RX ORDER — HEPARIN SODIUM (PORCINE) LOCK FLUSH IV SOLN 100 UNIT/ML 100 UNIT/ML
5 SOLUTION INTRAVENOUS ONCE
Status: COMPLETED | OUTPATIENT
Start: 2025-01-16 | End: 2025-01-16

## 2025-01-16 RX ORDER — DIPHENHYDRAMINE HCL 25 MG
50 CAPSULE ORAL
Status: CANCELLED
Start: 2025-01-17

## 2025-01-16 RX ORDER — METHYLPREDNISOLONE SODIUM SUCCINATE 40 MG/ML
40 INJECTION INTRAMUSCULAR; INTRAVENOUS
Status: CANCELLED
Start: 2025-01-17

## 2025-01-16 RX ORDER — HEPARIN SODIUM (PORCINE) LOCK FLUSH IV SOLN 100 UNIT/ML 100 UNIT/ML
5 SOLUTION INTRAVENOUS
Status: DISCONTINUED | OUTPATIENT
Start: 2025-01-16 | End: 2025-01-16 | Stop reason: HOSPADM

## 2025-01-16 RX ORDER — LORAZEPAM 2 MG/ML
0.5 INJECTION INTRAMUSCULAR EVERY 4 HOURS PRN
Status: CANCELLED | OUTPATIENT
Start: 2025-01-17

## 2025-01-16 RX ORDER — LANCETS 30 GAUGE
EACH MISCELLANEOUS
COMMUNITY
Start: 2025-01-13

## 2025-01-16 RX ORDER — ONDANSETRON 2 MG/ML
8 INJECTION INTRAMUSCULAR; INTRAVENOUS ONCE
Status: CANCELLED | OUTPATIENT
Start: 2025-01-17

## 2025-01-16 RX ORDER — EPINEPHRINE 1 MG/ML
0.3 INJECTION, SOLUTION INTRAMUSCULAR; SUBCUTANEOUS EVERY 5 MIN PRN
Status: CANCELLED | OUTPATIENT
Start: 2025-01-17

## 2025-01-16 RX ORDER — ONDANSETRON 2 MG/ML
8 INJECTION INTRAMUSCULAR; INTRAVENOUS ONCE
Status: COMPLETED | OUTPATIENT
Start: 2025-01-16 | End: 2025-01-16

## 2025-01-16 RX ORDER — HEPARIN SODIUM,PORCINE 10 UNIT/ML
5-20 VIAL (ML) INTRAVENOUS DAILY PRN
Status: CANCELLED | OUTPATIENT
Start: 2025-01-17

## 2025-01-16 RX ADMIN — SODIUM CHLORIDE 160 MG: 9 INJECTION, SOLUTION INTRAVENOUS at 08:28

## 2025-01-16 RX ADMIN — Medication 3 ML: at 07:18

## 2025-01-16 RX ADMIN — ONDANSETRON 8 MG: 2 INJECTION INTRAMUSCULAR; INTRAVENOUS at 08:19

## 2025-01-16 RX ADMIN — PACLITAXEL 154 MG: 6 INJECTION, SOLUTION INTRAVENOUS at 09:39

## 2025-01-16 RX ADMIN — HEPARIN 5 ML: 100 SYRINGE at 10:44

## 2025-01-16 ASSESSMENT — PAIN SCALES - GENERAL: PAINLEVEL_OUTOF10: NO PAIN (0)

## 2025-01-16 NOTE — PROGRESS NOTES
Infusion Nursing Note:  Zenaida Valles presents today for Cycle 4 Day 1 trastuzumab and Taxol.    Patient seen by provider today: Yes: Shayy Becerra, SHIRA   present during visit today: Not Applicable.    Note:   Patient arrives to infusion after provider appointment. Patient has no additional questions and confirms that she wants to proceed with treatment today.    Intravenous Access:  Implanted Port.    Treatment Conditions:   Latest Reference Range & Units 01/16/25 07:13   Sodium 135 - 145 mmol/L 139   Potassium 3.4 - 5.3 mmol/L 3.7   Chloride 98 - 107 mmol/L 104   Carbon Dioxide (CO2) 22 - 29 mmol/L 26   Urea Nitrogen 8.0 - 23.0 mg/dL 15.7   Creatinine 0.51 - 0.95 mg/dL 0.58   GFR Estimate >60 mL/min/1.73m2 >90   Calcium 8.8 - 10.4 mg/dL 9.2   Anion Gap 7 - 15 mmol/L 9   Albumin 3.5 - 5.2 g/dL 4.0   Protein Total 6.4 - 8.3 g/dL 6.4   Alkaline Phosphatase 40 - 150 U/L 69   ALT 0 - 50 U/L 18   AST 0 - 45 U/L 17   Bilirubin Total <=1.2 mg/dL 0.2   Glucose 70 - 99 mg/dL 164 (H)   WBC 4.0 - 11.0 10e3/uL 5.4   Hemoglobin 11.7 - 15.7 g/dL 11.3 (L)   Hematocrit 35.0 - 47.0 % 33.3 (L)   Platelet Count 150 - 450 10e3/uL 329   RBC Count 3.80 - 5.20 10e6/uL 3.81   MCV 78 - 100 fL 87   MCH 26.5 - 33.0 pg 29.7   MCHC 31.5 - 36.5 g/dL 33.9   RDW 10.0 - 15.0 % 13.0   % Neutrophils % 48   % Lymphocytes % 41   % Monocytes % 5   % Eosinophils % 5   % Basophils % 1   Absolute Basophils 0.0 - 0.2 10e3/uL 0.1   Absolute Eosinophils 0.0 - 0.7 10e3/uL 0.2   Absolute Immature Granulocytes <=0.4 10e3/uL 0.0   Absolute Lymphocytes 0.8 - 5.3 10e3/uL 2.2   Absolute Monocytes 0.0 - 1.3 10e3/uL 0.3   % Immature Granulocytes % 1   Absolute Neutrophils 1.6 - 8.3 10e3/uL 2.6   Absolute NRBCs 10e3/uL 0.0   NRBCs per 100 WBC <1 /100 0     Results reviewed, labs MET treatment parameters, ok to proceed with treatment.  ECHO/MUGA completed 12/13/24 EF 60-65%.      Post Infusion Assessment:  Patient tolerated infusion without  incident.  Blood return noted pre and post infusion.  Site patent and intact, free from redness, edema or discomfort.  No evidence of extravasations.  Access discontinued per protocol.       Discharge Plan:   Patient declined prescription refills.  Discharge instructions reviewed with: Patient.  Patient and/or family verbalized understanding of discharge instructions and all questions answered.  AVS to patient via [employer].  Patient will return 1/23/25 for next appointment.   Patient discharged in stable condition accompanied by: .  Departure Mode: Ambulatory.      Nata Shelby RN

## 2025-01-16 NOTE — NURSING NOTE
"Oncology Rooming Note    January 16, 2025 7:18 AM   Zenaida Valles is a 74 year old female who presents for:    Chief Complaint   Patient presents with    Oncology Clinic Visit     Invasive ductal carcinoma of breast, female, left    Port Draw     Labs drawn via port by RN. VS taken.     Initial Vitals: BP (!) 142/77 (BP Location: Right arm, Patient Position: Sitting, Cuff Size: Adult Regular)   Pulse 77   Temp 97.6  F (36.4  C) (Oral)   Resp 16   Wt 78.5 kg (173 lb)   LMP  (LMP Unknown)   SpO2 97%   BMI 28.79 kg/m   Estimated body mass index is 28.79 kg/m  as calculated from the following:    Height as of 1/2/25: 1.651 m (5' 5\").    Weight as of this encounter: 78.5 kg (173 lb). Body surface area is 1.9 meters squared.  No Pain (0) Comment: Data Unavailable   No LMP recorded (lmp unknown). Patient is postmenopausal.  Allergies reviewed: Yes  Medications reviewed: Yes    Medications: Medication refills not needed today.  Pharmacy name entered into [facility]:    [facility] [place], [place] - [address] [facility] [place], [place] - [address]  [facility] [place], [place] - [address] [facility]    Frailty Screening:   Is the patient here for a new oncology consult visit in cancer care? 2. No      Clinical concerns: none      Lucero Roger, EMT  1/16/2025              "

## 2025-01-16 NOTE — NURSING NOTE
"Chief Complaint   Patient presents with    Oncology Clinic Visit     Invasive ductal carcinoma of breast, female, left    Port Draw     Labs drawn via port by RN. VS taken.     Port accessed with 20 gauge, 3/4\" power needle by RN, labs collected, line flushed with saline and heparin.  Vitals taken. Pt checked in for appointment(s).     Sandie Herndon RN    "

## 2025-01-16 NOTE — Clinical Note
1/16/2025      Zenaida Valles  [address]  [facility] [address]      Dear Colleague,    Thank you for referring your patient, Zenaida Valles, to the [facility]. Please see a copy of my visit note below.    MEDICAL ONCOLOGY NOTE  Jan 16, 2025     Javier Wood MD  Professor   [address] [address]     Re. Zenaida Valles   Female, 74 year old, 1950  MRN: [id number]        Dear Dr. Wood,     Thank you for referring Zenaida Valles who is a 74-year-old woman with a new diagnosis of a screening identified stage I invasive ductal cancer of the upper outer quadrant of the left breast ER positive, OK positive, HER2 amplified stage [id number].       HISTORY OF PRESENT ILLNESS:    Zenaida has been in generally good health except for a right renal artery stenosis requiring a stent.  She also has a left leg injury recently she was in her usual state of good health and had a screening mammogram which showed a suspicious lesion in the upper outer quadrant of the left breast at the 3 o'clock position 5 cm from the nipple there is an irregular hypoechoic mass measuring 1.0 x 0.7 x 0.9 cm in size 1 cm from the nipple there is also a 0.5 x 0.4 cm area of suspicion     She underwent an ultrasound guided biopsy which showed usual ductal hyperplasia and fibrocystic changes microcysts in April Kren metaplasia.  Columnar cell changes.  It invasive ductal carcinoma was also found grade 2 and DCIS grade 2 solid type.  Her breast cancer was estrogen receptor positive progesterone receptor positive at HER2 2+ by IHC.  She then underwent ADRIANA which showed a subpopulation which was 60 to 70% ADRIANA 5 negative and 30 to 40% I-S age 1 positive with a 4.9 HER2 signals per nucleus and the ratio of 2.2 indicating HER2 positivity.     She then came to see Dr. Wood at [facility] for surgical  recommendations.  She had a contrast mammogram which showed a 0.9 cm enhancing lesion in the upper outer quadrant of the left breast. She underwent a left lumpectomy with Dr. Wood on 11/25/24.      She is currently receiving adjuvant chemotherapy with paclitaxel and trastuzumab.     INTERVAL HISTORY:    She was seen in clinic today with her  Trae. She had her lumpectomy with Dr. Wood on November 25. She started adjuvant APT regimen on 12/27/2024.   - constipation- senna  - dry nares  - fatigue  - low appetite.  - icing with infusions now. Intermittent tingling neuropathy in the left toes > right   - experiencing acid reflux and using more tums.           Her main symptom since starting chemo has been fatigue and some minor frontal headaches. Takes occasional naps to help with this. Has long history of some hair loss but hasn't noticed any changes since starting chemo. Had some numbness and tingling in the first week of chemo. At present, only has numbness in left big toe. Appetite is slightly reduced. Has not had any nausea, vomiting, or diarrhea. Had a few episodes of epistaxis in the past week. She states that she feels her port is not quite secure. When she turns her neck she has some discomfort. States she still has some bruising on her chest from the port placement about 3 weeks ago. She had been dealing with pain in her left hamstring and this is now much better. She continues to have issues with fatigue and issues sleeping. She has had some exercise intolerance- got a bit short of breath climbing stairs yesterday. She is taking a vitamin D supplement and drinking milk to meet her calcium requirements.     No pain.  Some discomfort around port in the neck area and will have a port check.  Some fatigue. Some constipation.  No depression or anxiety.  Some neuropathy.  She was not icing but will try this.      She has an ECOG 0 performance status.     REVIEW OF SYSTEMS:  ROS: 10 point ROS neg other than  the symptoms noted above in the HPI.         PAST MEDICAL HISTORY:   - asthma   - diabetes mellitus and is on metformin, hypertension obesity, she has IPMN and a right renal artery stent.  The family history is negative for breast cancer.  She has a history of right renal artery stenosis and has a stent in place.       FAMILY HISTORY:   No history of ovarian, uterine, colon cancer, or melanoma.  No history of glioma, gastric cancer or pancreatic cancer.  Her mother did have Zollinger-Wheeler syndrome but the patient's genetics are negative for this syndrome.      PAST MENSTRUAL HISTORY:  Age of first menstrual period was 13.   She has been pregnant 1 times with 0 live births and 0 miscarriages and 1 .  Age at in place pregnancy age 28.   Uterus and ovaries are in place.  Last menstrual period was about age 52 and the menopause occurred naturally. No history of hormone replacement therapy.     She does have a history of thickened endometrial lining but by her report no abnormalities of the lining when she had a DIC     HABITS:  She is a never smoker but she has a history of exposure to secondhand smoke from her father as a child.  She consumes alcoholic beverages socially 1 drink every couple of months.     ALLERGIES: She has drug allergies to codeine, codeine derivatives, ibuprofen, lisinopril..  No allergy to seafood, iodine, or contrast dye.  She does take daily aspirin 81 mg because of her renal stent.     PHYSICAL EXAMINATION:     VITALS:  BP (!) 142/77 (BP Location: Right arm, Patient Position: Sitting, Cuff Size: Adult Regular)   Pulse 77   Temp 97.6  F (36.4  C) (Oral)   Resp 16   Wt 78.5 kg (173 lb)   LMP  (LMP Unknown)   SpO2 97%   BMI 28.79 kg/m    HEENT:  No alopecia, no lesions in the oropharynx.  Dentition is normal  LYMPH:  There is no palpable cervical, supraclavicular, or subclavicular lymphadenopathy.  BREASTS: deferred  LUNGS:  Clear to auscultation.  HEART:  Regular rate and rhythm.   S1, S2. No murmurs  ABDOMEN:  Soft, nontender, without hepatosplenomegaly.  EXTREMITIES:  Without edema.  PSYCH:  Mood and affect were normal.  NEUROLOGIC:  Mood and affect  normal. No focal deficits apparent.          LABORATORY DATA: Most Recent 3 CBC's:  Recent Labs   Lab Test 01/09/25  0830 01/03/25  0826 12/27/24  0800   WBC 4.7 6.0 5.8   HGB 12.0 12.1 12.0   MCV 87 87 88    272 272   ANEUTAUTO 2.3 3.3 2.9     Most Recent 3 BMP's:  Recent Labs   Lab Test 01/09/25  0830 01/03/25  0826 12/27/24  0800    138 139   POTASSIUM 3.9 3.6 3.9   CHLORIDE 102 101 104   CO2 26 26 25   BUN 13.3 17.3 15.2   CR 0.59 0.58 0.61   ANIONGAP 11 11 10   GAEL 9.0 9.1 8.9   * 212* 204*   PROTTOTAL 6.4 6.6 6.3*   ALBUMIN 4.0 4.1 4.0    Most Recent 3 LFT's:  Recent Labs   Lab Test 01/09/25  0830 01/03/25  0826 12/27/24  0800   AST 13 17 15   ALT 14 18 11   ALKPHOS 67 69 71   BILITOTAL 0.3 <0.2 <0.2    Most Recent 2 TSH and T4:  Recent Labs   Lab Test 02/10/22  0938 02/06/20  1032   TSH 3.83 2.54     I reviewed the above labs today.    ASSESSMENT AND PLAN:    Stage I T1b NX MX invasive ductal carcinoma of the left breast upper outer quadrant which was estrogen receptor positive (%) progesterone receptor positive (81-90%) and HER2 2+ by IHC and HER2 positive by FISH. Margins negative for invasive carcinoma.    Seen today prior to cycle 4 adjuvant trastuzumab and paclitaxel.   Following completion of paclitaxel we would begin hormonal therapy with an aromatase inhibitor and that this aromatase inhibitor treatment would continue for a total of 10 years.   She will also need postlumpectomy radiation.  Will also recommend adjuvant zometa following adjuvant chemotherapy.   Peripheral neuropathy:  Some neuropathy.  She was not icing but will try this.    Discussion of genetics.  Discussed that the previous genetic testing that was done was for a different purpose related to Zollinger-Wheeler syndrome.  I discussed that  the breast cancer panel would be more expanded relative to the prior panel.  Discussion of exercise.  Discussion of the Lace and Pathway.  We recommend 150 minutes of exercise per week with mixed cardio and strength training.  Stretch band, hand weights, yoga.  Diet.  I recommended a diet low in saturated fat, but not low in fat with more fruits and vegetables, and less in the way of red meat.  Pancreas IPMN. Will need to be followed by MRCP.  Follow up plan.  Right single lumen Powerport 12-26.  Started APT regimen with weekly paclitaxel and trastuzuamb beginning 12-27. CBC, CMP and visit with Dr. Cheung January 16 CBC, CMP and Leilani on January 23rd and then every 3 weeks with me  Follow up every 3rd  cycle with me for total of 12 cycles of paclitaxel. Echo in February.       *** minutes spent on the date of the encounter doing chart review, review of test results, interpretation of tests, patient visit, and documentation     Shayy Becerra PA-C      MEDICAL ONCOLOGY NOTE  Jan 16, 2025     Javier Wood [place] [address]     Re. Zenaida Valles   Female, 74 year old, 1950  MRN: [id number]        Dear Dr. Wood,     Thank you for referring Zenaida Valles who is a 74-year-old woman with a new diagnosis of a screening identified stage I invasive ductal cancer of the upper outer quadrant of the left breast ER positive, IA positive, HER2 amplified stage [id number].       HISTORY OF PRESENT ILLNESS:    Zenaida has been in generally good health except for a right renal artery stenosis requiring a stent.  She also has a left leg injury recently she was in her usual state of good health and had a screening mammogram which showed a suspicious lesion in the upper outer quadrant of the left breast at the 3 o'clock position 5 cm from the nipple there is an irregular hypoechoic mass measuring 1.0 x 0.7 x 0.9 cm in size 1 cm from the nipple there is  also a 0.5 x 0.4 cm area of suspicion     She underwent an ultrasound guided biopsy which showed usual ductal hyperplasia and fibrocystic changes microcysts in April Kren metaplasia.  Columnar cell changes.  It invasive ductal carcinoma was also found grade 2 and DCIS grade 2 solid type.  Her breast cancer was estrogen receptor positive progesterone receptor positive at HER2 2+ by IHC.  She then underwent ADRIANA which showed a subpopulation which was 60 to 70% ADRIANA 5 negative and 30 to 40% I-S age 1 positive with a 4.9 HER2 signals per nucleus and the ratio of 2.2 indicating HER2 positivity.     She then came to see Dr. Wood at [facility] for surgical recommendations.  She had a contrast mammogram which showed a 0.9 cm enhancing lesion in the upper outer quadrant of the left breast. She underwent a left lumpectomy with Dr. Wood on 11/25/24.      She is currently receiving adjuvant chemotherapy with paclitaxel and trastuzumab.     INTERVAL HISTORY:  Zenaida returns to clinic today prior to week for paclitaxel trastuzumab.  She reports that she is doing okay.  She has significant fatigue with chemotherapy which makes it hard to predict her schedule.  She does have constipation which is a longstanding issue for her since starting metformin and she uses senna as needed with good effect.  Her appetite is low but she continues to eat and focus on getting in protein.  She has dry nares with bleeding in the morning especially after blowing her nose.  This is not long-lasting.  She has started to icing during her infusions but does note intermittent tingling/neuropathy in her toes of the left greater than right.  She also has been experiencing more acid reflux over the past week and has been using Tums as needed.  These help better temporarily.  No fevers or chills.  She did notice some tenderness on her lower inner lip but no sores.  No rashes.  No chest pain, shortness of breath, or cough with the  exception of one episode when she was carrying and things from her cars and going up multiple stairs.    She has an ECOG 0 performance status.     REVIEW OF SYSTEMS:  ROS: 10 point ROS neg other than the symptoms noted above in the HPI.         PAST MEDICAL HISTORY:   - asthma   - diabetes mellitus and is on metformin, hypertension obesity, she has IPMN and a right renal artery stent.  The family history is negative for breast cancer.  She has a history of right renal artery stenosis and has a stent in place.    FAMILY HISTORY:   No history of ovarian, uterine, colon cancer, or melanoma.  No history of glioma, gastric cancer or pancreatic cancer.  Her mother did have Zollinger-Wheeler syndrome but the patient's genetics are negative for this syndrome.      PAST MENSTRUAL HISTORY:  Age of first menstrual period was 13.   She has been pregnant 1 times with 0 live births and 0 miscarriages and 1 .  Age at in place pregnancy age 28.   Uterus and ovaries are in place.  Last menstrual period was about age 52 and the menopause occurred naturally. No history of hormone replacement therapy.     She does have a history of thickened endometrial lining but by her report no abnormalities of the lining when she had a DIC     HABITS:  She is a never smoker but she has a history of exposure to secondhand smoke from her father as a child.  She consumes alcoholic beverages socially 1 drink every couple of months.     ALLERGIES: She has drug allergies to codeine, codeine derivatives, ibuprofen, lisinopril..  No allergy to seafood, iodine, or contrast dye.  She does take daily aspirin 81 mg because of her renal stent.     PHYSICAL EXAMINATION:     VITALS:  BP (!) 142/77 (BP Location: Right arm, Patient Position: Sitting, Cuff Size: Adult Regular)   Pulse 77   Temp 97.6  F (36.4  C) (Oral)   Resp 16   Wt 78.5 kg (173 lb)   LMP  (LMP Unknown)   SpO2 97%   BMI 28.79 kg/m    HEENT:  No alopecia, no lesions in the oropharynx.   Dentition is normal  LYMPH:  There is no palpable cervical, supraclavicular, or subclavicular lymphadenopathy.  BREASTS: deferred  LUNGS:  Clear to auscultation.  HEART:  Regular rate and rhythm.  S1, S2. No murmurs  ABDOMEN:  Soft, nontender, without hepatosplenomegaly.  EXTREMITIES:  Without edema.  PSYCH:  Mood and affect were normal.  NEUROLOGIC:  Mood and affect  normal. No focal deficits apparent.          LABORATORY DATA: Most Recent 3 CBC's:  Recent Labs   Lab Test 01/16/25  0713 01/09/25  0830 01/03/25  0826   WBC 5.4 4.7 6.0   HGB 11.3* 12.0 12.1   MCV 87 87 87    287 272   ANEUTAUTO 2.6 2.3 3.3     Most Recent 3 BMP's:  Recent Labs   Lab Test 01/09/25  0830 01/03/25  0826 12/27/24  0800    138 139   POTASSIUM 3.9 3.6 3.9   CHLORIDE 102 101 104   CO2 26 26 25   BUN 13.3 17.3 15.2   CR 0.59 0.58 0.61   ANIONGAP 11 11 10   GAEL 9.0 9.1 8.9   * 212* 204*   PROTTOTAL 6.4 6.6 6.3*   ALBUMIN 4.0 4.1 4.0    Most Recent 3 LFT's:  Recent Labs   Lab Test 01/09/25  0830 01/03/25  0826 12/27/24  0800   AST 13 17 15   ALT 14 18 11   ALKPHOS 67 69 71   BILITOTAL 0.3 <0.2 <0.2    Most Recent 2 TSH and T4:  Recent Labs   Lab Test 02/10/22  0938 02/06/20  1032   TSH 3.83 2.54     I reviewed the above labs today.    ASSESSMENT AND PLAN:    Stage I T1b NX MX invasive ductal carcinoma of the left breast upper outer quadrant which was estrogen receptor positive (%) progesterone receptor positive (81-90%) and HER2 2+ by IHC and HER2 positive by FISH. Margins negative for invasive carcinoma.    Seen today prior to cycle 4 adjuvant trastuzumab and paclitaxel.   Following completion of paclitaxel we would begin hormonal therapy with an aromatase inhibitor and that this aromatase inhibitor treatment would continue for a total of 10 years.   She will also need postlumpectomy radiation.  Will also recommend adjuvant zometa following adjuvant chemotherapy.   Peripheral neuropathy, grade 1:  Intermittent in  tingling in the toes L>R. Continue to ice during infusion. Some neuropathy.  She was not icing but will try this.  Discussion of genetics.  Discussed that the previous genetic testing that was done was for a different purpose related to Zollinger-Wheeler syndrome.  The breast cancer panel would be more expanded relative to the prior panel. Not specifically discussed today.   Discussion of exercise.  Discussion of the Lace and Pathway.  We recommend 150 minutes of exercise per week with mixed cardio and strength training.  Stretch band, hand weights, yoga.  Diet.  I recommended a diet low in saturated fat, but not low in fat with more fruits and vegetables, and less in the way of red meat.  Pancreas IPMN. Will need to be followed by MRCP.  Follow up plan.  Right single lumen Powerport 12-26.  Started APT regimen with weekly paclitaxel and trastuzuamb beginning 12-27. CBC, CMP and visit with Dr. Cheung January 16 CBC, CMP and Leilani on January 23rd and then every 3 weeks with me  Follow up every 3rd  cycle with me for total of 12 cycles of paclitaxel. Echo in February.       *** minutes spent on the date of the encounter doing chart review, review of test results, interpretation of tests, patient visit, and documentation     Shayy Becerra PA-C        Again, thank you for allowing me to participate in the care of your patient.        Sincerely,        Shayy Becerra PA-C    Electronically signed

## 2025-01-23 ENCOUNTER — INFUSION THERAPY VISIT (OUTPATIENT)
Dept: ONCOLOGY | Facility: CLINIC | Age: 75
End: 2025-01-23
Attending: NURSE PRACTITIONER
Payer: COMMERCIAL

## 2025-01-23 ENCOUNTER — APPOINTMENT (OUTPATIENT)
Dept: LAB | Facility: CLINIC | Age: 75
End: 2025-01-23
Attending: INTERNAL MEDICINE
Payer: COMMERCIAL

## 2025-01-23 ENCOUNTER — ONCOLOGY VISIT (OUTPATIENT)
Dept: ONCOLOGY | Facility: CLINIC | Age: 75
End: 2025-01-23
Attending: INTERNAL MEDICINE
Payer: COMMERCIAL

## 2025-01-23 VITALS
WEIGHT: 174.6 LBS | HEART RATE: 75 BPM | SYSTOLIC BLOOD PRESSURE: 117 MMHG | OXYGEN SATURATION: 97 % | RESPIRATION RATE: 16 BRPM | TEMPERATURE: 97.8 F | DIASTOLIC BLOOD PRESSURE: 64 MMHG | BODY MASS INDEX: 29.05 KG/M2

## 2025-01-23 DIAGNOSIS — C50.912 INVASIVE DUCTAL CARCINOMA OF BREAST, FEMALE, LEFT (H): Primary | ICD-10-CM

## 2025-01-23 LAB
ALBUMIN SERPL BCG-MCNC: 4.1 G/DL (ref 3.5–5.2)
ALP SERPL-CCNC: 77 U/L (ref 40–150)
ALT SERPL W P-5'-P-CCNC: 17 U/L (ref 0–50)
ANION GAP SERPL CALCULATED.3IONS-SCNC: 10 MMOL/L (ref 7–15)
AST SERPL W P-5'-P-CCNC: 16 U/L (ref 0–45)
BASOPHILS # BLD AUTO: 0.1 10E3/UL (ref 0–0.2)
BASOPHILS NFR BLD AUTO: 1 %
BILIRUB SERPL-MCNC: 0.2 MG/DL
BUN SERPL-MCNC: 17.1 MG/DL (ref 8–23)
CALCIUM SERPL-MCNC: 9 MG/DL (ref 8.8–10.4)
CHLORIDE SERPL-SCNC: 104 MMOL/L (ref 98–107)
CREAT SERPL-MCNC: 0.58 MG/DL (ref 0.51–0.95)
EGFRCR SERPLBLD CKD-EPI 2021: >90 ML/MIN/1.73M2
EOSINOPHIL # BLD AUTO: 0.3 10E3/UL (ref 0–0.7)
EOSINOPHIL NFR BLD AUTO: 6 %
ERYTHROCYTE [DISTWIDTH] IN BLOOD BY AUTOMATED COUNT: 13.2 % (ref 10–15)
GLUCOSE SERPL-MCNC: 121 MG/DL (ref 70–99)
HCO3 SERPL-SCNC: 27 MMOL/L (ref 22–29)
HCT VFR BLD AUTO: 32.7 % (ref 35–47)
HGB BLD-MCNC: 11.3 G/DL (ref 11.7–15.7)
IMM GRANULOCYTES # BLD: 0 10E3/UL
IMM GRANULOCYTES NFR BLD: 0 %
LYMPHOCYTES # BLD AUTO: 2.2 10E3/UL (ref 0.8–5.3)
LYMPHOCYTES NFR BLD AUTO: 45 %
MCH RBC QN AUTO: 30.2 PG (ref 26.5–33)
MCHC RBC AUTO-ENTMCNC: 34.6 G/DL (ref 31.5–36.5)
MCV RBC AUTO: 87 FL (ref 78–100)
MONOCYTES # BLD AUTO: 0.3 10E3/UL (ref 0–1.3)
MONOCYTES NFR BLD AUTO: 7 %
NEUTROPHILS # BLD AUTO: 2 10E3/UL (ref 1.6–8.3)
NEUTROPHILS NFR BLD AUTO: 41 %
NRBC # BLD AUTO: 0 10E3/UL
NRBC BLD AUTO-RTO: 0 /100
PLATELET # BLD AUTO: 348 10E3/UL (ref 150–450)
POTASSIUM SERPL-SCNC: 3.9 MMOL/L (ref 3.4–5.3)
PROT SERPL-MCNC: 6.5 G/DL (ref 6.4–8.3)
RBC # BLD AUTO: 3.74 10E6/UL (ref 3.8–5.2)
SODIUM SERPL-SCNC: 141 MMOL/L (ref 135–145)
WBC # BLD AUTO: 4.9 10E3/UL (ref 4–11)

## 2025-01-23 PROCEDURE — 250N000011 HC RX IP 250 OP 636: Performed by: NURSE PRACTITIONER

## 2025-01-23 PROCEDURE — 36591 DRAW BLOOD OFF VENOUS DEVICE: CPT

## 2025-01-23 PROCEDURE — 84155 ASSAY OF PROTEIN SERUM: CPT

## 2025-01-23 PROCEDURE — G0463 HOSPITAL OUTPT CLINIC VISIT: HCPCS | Performed by: NURSE PRACTITIONER

## 2025-01-23 PROCEDURE — 258N000003 HC RX IP 258 OP 636: Performed by: NURSE PRACTITIONER

## 2025-01-23 PROCEDURE — 85025 COMPLETE CBC W/AUTO DIFF WBC: CPT | Performed by: INTERNAL MEDICINE

## 2025-01-23 RX ORDER — ALBUTEROL SULFATE 90 UG/1
1-2 INHALANT RESPIRATORY (INHALATION)
Status: CANCELLED
Start: 2025-01-24

## 2025-01-23 RX ORDER — EPINEPHRINE 1 MG/ML
0.3 INJECTION, SOLUTION INTRAMUSCULAR; SUBCUTANEOUS EVERY 5 MIN PRN
Status: CANCELLED | OUTPATIENT
Start: 2025-01-24

## 2025-01-23 RX ORDER — MEPERIDINE HYDROCHLORIDE 25 MG/ML
25 INJECTION INTRAMUSCULAR; INTRAVENOUS; SUBCUTANEOUS
Status: CANCELLED | OUTPATIENT
Start: 2025-01-24

## 2025-01-23 RX ORDER — SODIUM CHLORIDE/ALOE VERA
GEL (GRAM) NASAL 4 TIMES DAILY PRN
COMMUNITY

## 2025-01-23 RX ORDER — HEPARIN SODIUM (PORCINE) LOCK FLUSH IV SOLN 100 UNIT/ML 100 UNIT/ML
5 SOLUTION INTRAVENOUS ONCE
Status: COMPLETED | OUTPATIENT
Start: 2025-01-23 | End: 2025-01-23

## 2025-01-23 RX ORDER — ACETAMINOPHEN 325 MG/1
650 TABLET ORAL
Status: CANCELLED | OUTPATIENT
Start: 2025-01-24

## 2025-01-23 RX ORDER — ONDANSETRON 2 MG/ML
8 INJECTION INTRAMUSCULAR; INTRAVENOUS ONCE
Status: COMPLETED | OUTPATIENT
Start: 2025-01-23 | End: 2025-01-23

## 2025-01-23 RX ORDER — DIPHENHYDRAMINE HYDROCHLORIDE 50 MG/ML
50 INJECTION INTRAMUSCULAR; INTRAVENOUS
Status: CANCELLED
Start: 2025-01-24

## 2025-01-23 RX ORDER — DIPHENHYDRAMINE HCL 25 MG
50 CAPSULE ORAL
Status: CANCELLED
Start: 2025-01-24

## 2025-01-23 RX ORDER — HEPARIN SODIUM (PORCINE) LOCK FLUSH IV SOLN 100 UNIT/ML 100 UNIT/ML
5 SOLUTION INTRAVENOUS
Status: DISCONTINUED | OUTPATIENT
Start: 2025-01-23 | End: 2025-01-23 | Stop reason: HOSPADM

## 2025-01-23 RX ORDER — HEPARIN SODIUM (PORCINE) LOCK FLUSH IV SOLN 100 UNIT/ML 100 UNIT/ML
5 SOLUTION INTRAVENOUS
Status: CANCELLED | OUTPATIENT
Start: 2025-01-24

## 2025-01-23 RX ORDER — ONDANSETRON 2 MG/ML
8 INJECTION INTRAMUSCULAR; INTRAVENOUS ONCE
Status: CANCELLED | OUTPATIENT
Start: 2025-01-24

## 2025-01-23 RX ORDER — ALBUTEROL SULFATE 0.83 MG/ML
2.5 SOLUTION RESPIRATORY (INHALATION)
Status: CANCELLED | OUTPATIENT
Start: 2025-01-24

## 2025-01-23 RX ORDER — METHYLPREDNISOLONE SODIUM SUCCINATE 40 MG/ML
40 INJECTION INTRAMUSCULAR; INTRAVENOUS
Status: CANCELLED
Start: 2025-01-24

## 2025-01-23 RX ORDER — DIPHENHYDRAMINE HYDROCHLORIDE 50 MG/ML
25 INJECTION INTRAMUSCULAR; INTRAVENOUS
Status: CANCELLED
Start: 2025-01-24

## 2025-01-23 RX ORDER — HEPARIN SODIUM,PORCINE 10 UNIT/ML
5-20 VIAL (ML) INTRAVENOUS DAILY PRN
Status: CANCELLED | OUTPATIENT
Start: 2025-01-24

## 2025-01-23 RX ORDER — LORAZEPAM 2 MG/ML
0.5 INJECTION INTRAMUSCULAR EVERY 4 HOURS PRN
Status: CANCELLED | OUTPATIENT
Start: 2025-01-24

## 2025-01-23 RX ADMIN — ONDANSETRON 8 MG: 2 INJECTION INTRAMUSCULAR; INTRAVENOUS at 12:53

## 2025-01-23 RX ADMIN — PACLITAXEL 154 MG: 6 INJECTION, SOLUTION INTRAVENOUS at 13:47

## 2025-01-23 RX ADMIN — SODIUM CHLORIDE 160 MG: 9 INJECTION, SOLUTION INTRAVENOUS at 13:09

## 2025-01-23 RX ADMIN — HEPARIN 5 ML: 100 SYRINGE at 14:51

## 2025-01-23 RX ADMIN — HEPARIN 3 ML: 100 SYRINGE at 10:51

## 2025-01-23 ASSESSMENT — PAIN SCALES - GENERAL: PAINLEVEL_OUTOF10: NO PAIN (0)

## 2025-01-23 NOTE — PROGRESS NOTES
Infusion Nursing Note:  Zenaida Valles presents today for Cycle 5 Day 1 Trazimera, Taxol .    Patient seen by provider today: Yes: Leilani GARZA CNP   present during visit today: Not Applicable.    Note:  Patient has no concerns to report since seeing provider today. Wishes to proceed with planned treatment today. Provided ice to hands/feet during Taxol infusion.       Intravenous Access:  Implanted Port.    Treatment Conditions:  Lab Results   Component Value Date    HGB 11.3 (L) 01/23/2025    WBC 4.9 01/23/2025    ANEU 2.5 11/23/2018    ANEUTAUTO 2.0 01/23/2025     01/23/2025        Lab Results   Component Value Date     01/23/2025    POTASSIUM 3.9 01/23/2025    MAG 1.9 08/14/2007    CR 0.58 01/23/2025    GAEL 9.0 01/23/2025    BILITOTAL 0.2 01/23/2025    ALBUMIN 4.1 01/23/2025    ALT 17 01/23/2025    AST 16 01/23/2025       Results reviewed, labs MET treatment parameters, ok to proceed with treatment.  ECHO/MUGA completed 11/13/24  EF 60-65%.  Next one scheduled for 2/13/25      Post Infusion Assessment:  Patient tolerated infusion without incident.  Blood return noted pre and post infusion.  Access discontinued per protocol.       Discharge Plan:   Patient declined prescription refills.  AVS to patient via [employer].  Patient will return 1/30 for next appointment.   Patient discharged in stable condition accompanied by: self.  Departure Mode: Ambulatory.      Lizz Thao RN

## 2025-01-23 NOTE — NURSING NOTE
Chief Complaint   Patient presents with    Port Draw     Labs drawn via port by RN in lab. VS taken.      Labs drawn via port by RN. Port accessed with 20g flat needle. Flushed with saline and heparin. Pt tolerated well. Vitals taken. Pt checked into next appt.     Zenobia Fall RN

## 2025-01-23 NOTE — PROGRESS NOTES
MEDICAL ONCOLOGY NOTE     Re. Zenaida Valles   Female, 74 year old, 1950  MRN: [id number]     Diagnosis: Screening identified stage I invasive ductal cancer of the upper outer quadrant of the left breast ER positive, CA positive, HER2 amplified stage [id number].       HISTORY OF PRESENT ILLNESS:    Zenaida has been in generally good health except for a right renal artery stenosis requiring a stent.  She also has a left leg injury recently she was in her usual state of good health and had a screening mammogram which showed a suspicious lesion in the upper outer quadrant of the left breast at the 3 o'clock position 5 cm from the nipple there is an irregular hypoechoic mass measuring 1.0 x 0.7 x 0.9 cm in size 1 cm from the nipple there is also a 0.5 x 0.4 cm area of suspicion     She underwent an ultrasound guided biopsy which showed usual ductal hyperplasia and fibrocystic changes microcysts in April Kren metaplasia.  Columnar cell changes.  It invasive ductal carcinoma was also found grade 2 and DCIS grade 2 solid type.  Her breast cancer was estrogen receptor positive progesterone receptor positive at HER2 2+ by IHC.  She then underwent ADRIANA which showed a subpopulation which was 60 to 70% ADRIANA 5 negative and 30 to 40% I-S age 1 positive with a 4.9 HER2 signals per nucleus and the ratio of 2.2 indicating HER2 positivity.     She then came to see Dr. Wood at [facility] for surgical recommendations.  She had a contrast mammogram which showed a 0.9 cm enhancing lesion in the upper outer quadrant of the left breast.     She has had no weight loss or loss of energy.  She does not sleep during the day.  She can perform all of her household chores.  ECOG 0 PS.      DIAGNOSTIC AND TREATMENT SUMMARY:  On October 7 she underwent a screening mammogram which demonstrated an asymmetry in her left breast.       On October 15 she underwent a diagnostic mammogram which confirmed a left breast  asymmetry.  She had an ultrasound which demonstrated 2 masses in her left breast.  At the 3 o'clock position 1 cm from the nipple there was a mass and at the 3 o'clock position 5 cm from the nipple there was a second mass.  Her lymph nodes were normal by ultrasound.       On October 23 she underwent a contrast-enhanced mammography which only demonstrated 1 mass measuring 1 cm.  The right breast was normal.  She underwent ultrasound-guided biopsies of both masses.  The mass at the 3 o'clock position 1 cm from the nipple was benign.  The mass at the 3 o'clock position 5 cm from the nipple demonstrating a grade 2 invasive ductal cancer that was ER positive and HER2 equivocal.  There is also DCIS present.       A. LEFT breast, 3:00, 1 cm from nipple, ultrasound-guided core biopsy:  - Benign breast tissue with usual ductal hyperplasia (UDH) and fibrocystic changes including microcysts, apocrine metaplasia, and columnar cell change  - Rare calcifications associated with benign breast ducts and acini  - Negative for atypia or malignancy      B. LEFT breast, 3:00, 5 cm from nipple, ultrasound-guided core biopsy:   - INVASIVE BREAST CARCINOMA OF NO SPECIAL TYPE (INVASIVE DUCTAL CARCINOMA), [place] grade 2  - Ductal carcinoma in situ (DCIS), nuclear grade 2, solid type  - Invasive carcinoma is estrogen receptor positive, progesterone receptor positive, and HER2 equivocal (score 2+) by immunohistochemistry (see 'Breast Biomarker Reporting Template' below)  - HER2 FISH is is process; results will be reportedly separately by cytogenetis  - See comment     This FISH analysis is performed in follow up to the reported equivocal (2+) HER2 findings by immunohistochemistry ([id number]).     RESULTS:     Ratio of HER2/MARION-17 signals  Zenaida Valles:  See Interpretation below     Majority (~60-70%) of tumor in area of strongest IHC staining:  Group 5 (ADRIANA Negative)  Avg. number HER2 signals/nucleus:  2.9  Avg. number MARION-17  signals/nucleus:  1.7  HER2/MARION 17 ratio:  1.7     Subpopulation (~30-40%) of tumor in area of strongest IHC staining:  Group 1 (ADRIANA Positive)                             Avg. number HER2 signals/nucleus:  4.9  Avg. number MARION-17 signals/nucleus:  2.3  HER2/MARION 17 ratio:  2.2     **Interpretive guidelines per the American Society of Clinical Oncology/College of American Pathologists Clinical Practice Guideline Update (Margarita VALENZUELA et al, 2023, Arch Pathol Lab Med 147:993):     -- Group 1: HER2/MARION-17 ratio 2.0 or more -AND- avg. number HER2 signals/nucleus 4.0 or more (ADRIANA Positive)  -- Group 2: HER2/MARION-17 ratio 2.0 or more -AND- avg. number HER2 signals/nucleus <4.0 (Additional work required)  -- Group 3: HER2/MARION-17 ratio <2.0 -AND- avg. number HER2 signals/nucleus 6.0 or more (Additional work required)  -- Group 4: HER2/MARION-17 ratio <2.0 -AND- avg. number HER2 signals/nucleus 4.0 or more and <6.0 (Additional work required)  -- Group 5: HER2/MARION-17 ratio <2.0 -AND- avg. number HER2 signals/nucleus <4.0 (ADRIANA Negative)     INTERPRETATION:  Two admixed populations of cells were found in this sample:     Majority (~60-70%) of tumor in area of strongest IHC staining:  Per the American Society of Clinical Oncology/College of American Pathologists Clinical Practice Guideline Focused Update (Margarita VALENZUELA et al, 2018, Arch Pathol Lab Med  doi:10.5858/arpa.1113-7027-JY), the HER2/MARION 17 ratio of 1.7 and average number of HER2 signals/cell of 2.9 places this population of cells in Group 5 (ADRIANA Negative).      Subpopulation (~30-40%) of tumor in area of strongest IHC staining:  Admixed within this sample were cells with increased HER2 signals, which, when selectively scored, had an average of 4.9 HER2 signals/nucleus and a HER2/MARION 17 ratio of 2.2; per the American Society of Clinical Oncology/College of American Pathologists Clinical Practice Guideline Focused Update (Margarita VALENZUELA et al, 2018, Arch Pathol Lab Med   doi:10.5858/arpa.8845-5602-CI), this population of cells falls into Group 1 (ADRIANA Positive).     PAST MEDICAL HISTORY: No history of breast surgery.  No history of breast cancer in the past.  No history of radiation therapy in the past or radiation exposure.  No history of tumor of any kind.  No history of heart problems, heart attack, breathing problems, blood clots, seizures, arthritis, peptic ulcer disease, osteoporosis or bone fractures.  She is not currently participating in a clinical trial.  She does have a history of asthma and uses an albuterol inhaler in cold weather.     Her past medical history is significant for diabetes mellitus and is on metformin, hypertension obesity, she has IPMN and a right renal artery stent.  The family history is negative for breast cancer.  She has a history of right renal artery stenosis and has a stent in place.     She has a recent history of a left hamstring injury.  She is seeing orthopedics for this problem.  She also has a history of back pain.  Her gait is affected but she can perform activities of daily living.  She has a history of aches in her shoulders.  She had a 65 pound weight loss over the last 2 years which was intentional related to improve diet and exercise.  She says she has a history of fibromuscular dysplasia.     She worked in the [employer] in [place] and was exposed to dengue, malaria, hepatitis A, hepatitis B, encephalitis.  She has also had COVID and the flu.     FAMILY HISTORY:   No history of ovarian, uterine, colon cancer, or melanoma.  No history of glioma, gastric cancer or pancreatic cancer.  Her mother did have Zollinger-Wheeler syndrome but the patient's genetics are negative for this syndrome.      PAST MENSTRUAL HISTORY:  Age of first menstrual period was 13.   She has been pregnant 1 times with 0 live births and 0 miscarriages and 1 .  Age at in place pregnancy age 28.   Uterus and ovaries are in place.  Last menstrual period  was about age 52 and the menopause occurred naturally. No history of hormone replacement therapy.     She does have a history of thickened endometrial lining but by her report no abnormalities of the lining when she had a DIC     HABITS:  She is a never smoker but she has a history of exposure to secondhand smoke from her father as a child.  She consumes alcoholic beverages socially 1 drink every couple of months.     GERMLINE GENETICS: pending     ALLERGIES: She has drug allergies to codeine, codeine derivatives, ibuprofen, lisinopril..  No allergy to seafood, iodine, or contrast dye.  She does take daily aspirin 81 mg because of her renal stent.    TREATMENT HISTORY:  A.  Lumpectomy showed a stage I T1b NX MX invasive ductal carcinoma of the left breast upper outer quadrant which was estrogen receptor positive (%) progesterone receptor positive (81-90%) and HER2 2+ by IHC and HER2 positive by ADRIANA.  Margins negative for invasive carcinoma.    B.  Plan is to give the APT regimen followed by addition of hormonal therapy with letrozole for 7 years.      INTERVAL HISTORY:  She is seen virtually today for evaluation and toxicity check prior to week 5 of weekly paclitaxel plus trastuzumab.  -She is feeling really fatigued.   -Saline gel (AIR) has helped with her dry nose.  -Rinsing mouth with salt/soda has been helping ans she is not having as much mouth pain.  -Hair falling out.   -Appetite is not getting any better.  Feels like a stone in her stomach.  Not nausea per se, just a heaviness in her stomach.   -She slept the next day after chemo last week.   -Tingling in her left foot still there and now she has noted the right foot as well.  No falls or trouble walking. Can feel where her feet are on the ground.   -Left sided jaw pain that shoots into ear.  -Also has some shooting pain at her lumpectomy site.  This is something small but she wanted us to know.   -She has had a few mild headaches.  She has not  needed/wanted to take anything for them.   -Taking Senna to manage her constipation.  She usually goes about 4 times/week.   -She continues [place], walking, weights.   -Today, she denies fever, chills, daily persistent headaches, dizziness or double vision.  -She denies new cough, chest pain, or shortness of breath at rest.    -She denies nausea, vomiting or any new or unusual issues with constipation or diarrhea.  -She denies any new rashes or skin changes.      She has an ECOG 0 performance status.     REVIEW OF SYSTEMS:   She has had no fevers, headaches, cough, chest pain, shortness of breath, hemoptysis, loss of appetite, nausea, vomiting, abdominal pain, constipation, diarrhea, bone pain, back pain, muscle or joint complaints, numbness or tingling in hands and feet, hearing loss or depression.  The remainder of a 14-point review of systems is negative.     PHYSICAL EXAMINATION:     General: The patient is a pleasant female in no acute distress.  /64 (BP Location: Right arm, Patient Position: Sitting, Cuff Size: Adult Regular)   Pulse 75   Temp 97.8  F (36.6  C) (Oral)   Resp 16   Wt 79.2 kg (174 lb 9.6 oz)   LMP  (LMP Unknown)   SpO2 97%   BMI 29.05 kg/m    Wt Readings from Last 10 Encounters:   01/23/25 79.2 kg (174 lb 9.6 oz)   01/16/25 78.5 kg (173 lb)   01/09/25 77.1 kg (170 lb)   01/03/25 77.4 kg (170 lb 9.6 oz)   01/02/25 78.9 kg (174 lb)   12/27/24 78.2 kg (172 lb 4.8 oz)   12/27/24 79 kg (174 lb 1.6 oz)   12/26/24 78.2 kg (172 lb 8 oz)   12/19/24 78.6 kg (173 lb 3.2 oz)   12/09/24 78.4 kg (172 lb 12.8 oz)   HEENT: EOMI. Sclerae are anicteric.   Lymph: Neck is supple with no lymphadenopathy in the cervical or supraclavicular areas.   Heart: Regular rate and rhythm.   Lungs: Clear to auscultation bilaterally.   Abdomen: Bowel sounds present, soft, nontender.  Extremities: No lower extremity edema noted bilaterally.   Neuro: Cranial nerves II through XII are grossly intact.  Skin: No  rashes, petechiae, or bruising noted on exposed skin.    LABORATORY DATA:    Most Recent 3 CBC's:  Recent Labs   Lab Test 01/23/25  1059 01/16/25  0713 01/09/25  0830   WBC 4.9 5.4 4.7   HGB 11.3* 11.3* 12.0   MCV 87 87 87    329 287   ANEUTAUTO 2.0 2.6 2.3     Most Recent 3 BMP's:  Recent Labs   Lab Test 01/16/25  0713 01/09/25  0830 01/03/25  0826    139 138   POTASSIUM 3.7 3.9 3.6   CHLORIDE 104 102 101   CO2 26 26 26   BUN 15.7 13.3 17.3   CR 0.58 0.59 0.58   ANIONGAP 9 11 11   GAEL 9.2 9.0 9.1   * 195* 212*   PROTTOTAL 6.4 6.4 6.6   ALBUMIN 4.0 4.0 4.1    Most Recent 3 LFT's:  Recent Labs   Lab Test 01/16/25  0713 01/09/25  0830 01/03/25  0826   AST 17 13 17   ALT 18 14 18   ALKPHOS 69 67 69   BILITOTAL 0.2 0.3 <0.2    Most Recent 2 TSH and T4:  Recent Labs   Lab Test 02/10/22  0938 02/06/20  1032   TSH 3.83 2.54     I reviewed the above labs today.    IMAGING:  Echo 11/13/2024:  Interpretation Summary  Global and regional left ventricular function is normal with an EF of 60-65%.  Right ventricular function, chamber size, wall motion, and thickness are  normal.  No significant valvular abnormalities were noted.  No pericardial effusion is present.  Previous study not available for comparison.    She is scheduled for repeat echo next month.     ASSESSMENT AND PLAN:    Stage I T1b NX MX invasive ductal carcinoma of the left breast upper outer quadrant which was estrogen receptor positive (%) progesterone receptor positive (81-90%) and HER2 2+ by IHC and HER2 positive by ADRIANA. Margins negative for invasive carcinoma.    -Post lumpectomy on 11/25/24.  -Port healing well.  -Echo 11/13/24 with 60-65% LVEF.  She is scheduled for repeat in February.   -She is clinically stable to continue adjuvant HER2 directed therapy with trastuzumab and paclitaxel.  -She will begin hormonal therapy with an aromatase inhibitor after completion of chemotherapy and that the aromatase inhibitor treatment would be  recommended for 10 years given the lack of data on shorter adjuvant therapy for hormone receptor positive HER2 positive breast cancer.  -She will complete every 3-week trastuzumab for 1 year of therapy.   -Postlumpectomy radiation will be recommended.     Constipation:   -Continues to use Senna with benefit.   -No new issues.     Fatigue:  -Related to treatment.  She is mildly anemic at 11.3 today.     Low appetite/food aversions:  -Related to chemo; discussed adding protein powder to foods.   -She denies overt nausea.    -Weight is stable.     Bone Health:  -She and Dr. Cheung discussed use of adjuvant zoledronic acid.    -She will work on dental clearance.   -Weight bearing exersise recommended.    Follow-up reviewed and she will call sooner with concerns.     44 minutes spent on the date of the encounter doing chart review, review of test results, interpretation of tests, patient visit, and documentation. The longitudinal plan of care for the diagnosis(es)/condition(s) as documented were addressed during this visit. Due to the added complexity in care, I will continue to support Zenaida in the subsequent management and with ongoing continuity of care.    Leilani Richards, WOOD, CNP  [facility]  [address], [place] [id number] [phone]

## 2025-01-23 NOTE — LETTER
1/23/2025      Zenaida Valles  [address] [facility] [address]      Dear Colleague,    Thank you for referring your patient, Zenaida Valles, to the [facility]. Please see a copy of my visit note below.    MEDICAL ONCOLOGY NOTE     Re. Zenaida Valles   Female, 74 year old, 1950  MRN: [id number]     Diagnosis: Screening identified stage I invasive ductal cancer of the upper outer quadrant of the left breast ER positive, NM positive, HER2 amplified stage [id number].       HISTORY OF PRESENT ILLNESS:    Zenaida has been in generally good health except for a right renal artery stenosis requiring a stent.  She also has a left leg injury recently she was in her usual state of good health and had a screening mammogram which showed a suspicious lesion in the upper outer quadrant of the left breast at the 3 o'clock position 5 cm from the nipple there is an irregular hypoechoic mass measuring 1.0 x 0.7 x 0.9 cm in size 1 cm from the nipple there is also a 0.5 x 0.4 cm area of suspicion     She underwent an ultrasound guided biopsy which showed usual ductal hyperplasia and fibrocystic changes microcysts in April Kren metaplasia.  Columnar cell changes.  It invasive ductal carcinoma was also found grade 2 and DCIS grade 2 solid type.  Her breast cancer was estrogen receptor positive progesterone receptor positive at HER2 2+ by IHC.  She then underwent ADRIANA which showed a subpopulation which was 60 to 70% ADRIANA 5 negative and 30 to 40% I-S age 1 positive with a 4.9 HER2 signals per nucleus and the ratio of 2.2 indicating HER2 positivity.     She then came to see Dr. Wood at [facility] for surgical recommendations.  She had a contrast mammogram which showed a 0.9 cm enhancing lesion in the upper outer quadrant of the left breast.     She has had no weight loss or loss of energy.  She does not sleep during the day.  She can perform all  of her household chores.  ECOG 0 PS.      DIAGNOSTIC AND TREATMENT SUMMARY:  On October 7 she underwent a screening mammogram which demonstrated an asymmetry in her left breast.       On October 15 she underwent a diagnostic mammogram which confirmed a left breast asymmetry.  She had an ultrasound which demonstrated 2 masses in her left breast.  At the 3 o'clock position 1 cm from the nipple there was a mass and at the 3 o'clock position 5 cm from the nipple there was a second mass.  Her lymph nodes were normal by ultrasound.       On October 23 she underwent a contrast-enhanced mammography which only demonstrated 1 mass measuring 1 cm.  The right breast was normal.  She underwent ultrasound-guided biopsies of both masses.  The mass at the 3 o'clock position 1 cm from the nipple was benign.  The mass at the 3 o'clock position 5 cm from the nipple demonstrating a grade 2 invasive ductal cancer that was ER positive and HER2 equivocal.  There is also DCIS present.       A. LEFT breast, 3:00, 1 cm from nipple, ultrasound-guided core biopsy:  - Benign breast tissue with usual ductal hyperplasia (UDH) and fibrocystic changes including microcysts, apocrine metaplasia, and columnar cell change  - Rare calcifications associated with benign breast ducts and acini  - Negative for atypia or malignancy      B. LEFT breast, 3:00, 5 cm from nipple, ultrasound-guided core biopsy:   - INVASIVE BREAST CARCINOMA OF NO SPECIAL TYPE (INVASIVE DUCTAL CARCINOMA), [place] grade 2  - Ductal carcinoma in situ (DCIS), nuclear grade 2, solid type  - Invasive carcinoma is estrogen receptor positive, progesterone receptor positive, and HER2 equivocal (score 2+) by immunohistochemistry (see 'Breast Biomarker Reporting Template' below)  - HER2 FISH is is process; results will be reportedly separately by cytogenetis  - See comment     This FISH analysis is performed in follow up to the reported equivocal (2+) HER2 findings by  immunohistochemistry ([id number]).     RESULTS:     Ratio of HER2/MARION-17 signals  Zenaida Qiu Reena:  See Interpretation below     Majority (~60-70%) of tumor in area of strongest IHC staining:  Group 5 (ADRIANA Negative)  Avg. number HER2 signals/nucleus:  2.9  Avg. number MARION-17 signals/nucleus:  1.7  HER2/MARION 17 ratio:  1.7     Subpopulation (~30-40%) of tumor in area of strongest IHC staining:  Group 1 (ADRIANA Positive)                             Avg. number HER2 signals/nucleus:  4.9  Avg. number MARION-17 signals/nucleus:  2.3  HER2/MARION 17 ratio:  2.2     **Interpretive guidelines per the American Society of Clinical Oncology/College of American Pathologists Clinical Practice Guideline Update (Margarita VALENZUELA et al, 2023, Arch Pathol Lab Med 147:993):     -- Group 1: HER2/MARION-17 ratio 2.0 or more -AND- avg. number HER2 signals/nucleus 4.0 or more (ADRIANA Positive)  -- Group 2: HER2/MARION-17 ratio 2.0 or more -AND- avg. number HER2 signals/nucleus <4.0 (Additional work required)  -- Group 3: HER2/MARION-17 ratio <2.0 -AND- avg. number HER2 signals/nucleus 6.0 or more (Additional work required)  -- Group 4: HER2/MARION-17 ratio <2.0 -AND- avg. number HER2 signals/nucleus 4.0 or more and <6.0 (Additional work required)  -- Group 5: HER2/MARION-17 ratio <2.0 -AND- avg. number HER2 signals/nucleus <4.0 (ADRIANA Negative)     INTERPRETATION:  Two admixed populations of cells were found in this sample:     Majority (~60-70%) of tumor in area of strongest IHC staining:  Per the American Society of Clinical Oncology/College of American Pathologists Clinical Practice Guideline Focused Update (Margarita VALENZUELA et al, 2018, Arch Pathol Lab Med  doi:10.5858/arpa.6703-2613-WP), the HER2/MARION 17 ratio of 1.7 and average number of HER2 signals/cell of 2.9 places this population of cells in Group 5 (ADRIANA Negative).      Subpopulation (~30-40%) of tumor in area of strongest IHC staining:  Admixed within this sample were cells with increased HER2 signals, which, when  selectively scored, had an average of 4.9 HER2 signals/nucleus and a HER2/MARION 17 ratio of 2.2; per the American Society of Clinical Oncology/College of American Pathologists Clinical Practice Guideline Focused Update (Margarita VALENZUELA et al, 2018, Arch Pathol Lab Med  doi:10.5858/arpa.3855-1334-IA), this population of cells falls into Group 1 (ADRIANA Positive).     PAST MEDICAL HISTORY: No history of breast surgery.  No history of breast cancer in the past.  No history of radiation therapy in the past or radiation exposure.  No history of tumor of any kind.  No history of heart problems, heart attack, breathing problems, blood clots, seizures, arthritis, peptic ulcer disease, osteoporosis or bone fractures.  She is not currently participating in a clinical trial.  She does have a history of asthma and uses an albuterol inhaler in cold weather.     Her past medical history is significant for diabetes mellitus and is on metformin, hypertension obesity, she has IPMN and a right renal artery stent.  The family history is negative for breast cancer.  She has a history of right renal artery stenosis and has a stent in place.     She has a recent history of a left hamstring injury.  She is seeing orthopedics for this problem.  She also has a history of back pain.  Her gait is affected but she can perform activities of daily living.  She has a history of aches in her shoulders.  She had a 65 pound weight loss over the last 2 years which was intentional related to improve diet and exercise.  She says she has a history of fibromuscular dysplasia.     She worked in the [employer] in [place] and was exposed to dengue, malaria, hepatitis A, hepatitis B, encephalitis.  She has also had COVID and the flu.     FAMILY HISTORY:   No history of ovarian, uterine, colon cancer, or melanoma.  No history of glioma, gastric cancer or pancreatic cancer.  Her mother did have Zollinger-Wheeler syndrome but the patient's genetics are negative for  this syndrome.      PAST MENSTRUAL HISTORY:  Age of first menstrual period was 13.   She has been pregnant 1 times with 0 live births and 0 miscarriages and 1 .  Age at in place pregnancy age 28.   Uterus and ovaries are in place.  Last menstrual period was about age 52 and the menopause occurred naturally. No history of hormone replacement therapy.     She does have a history of thickened endometrial lining but by her report no abnormalities of the lining when she had a DIC     HABITS:  She is a never smoker but she has a history of exposure to secondhand smoke from her father as a child.  She consumes alcoholic beverages socially 1 drink every couple of months.     GERMLINE GENETICS: pending     ALLERGIES: She has drug allergies to codeine, codeine derivatives, ibuprofen, lisinopril..  No allergy to seafood, iodine, or contrast dye.  She does take daily aspirin 81 mg because of her renal stent.    TREATMENT HISTORY:  A.  Lumpectomy showed a stage I T1b NX MX invasive ductal carcinoma of the left breast upper outer quadrant which was estrogen receptor positive (%) progesterone receptor positive (81-90%) and HER2 2+ by IHC and HER2 positive by ADRIANA.  Margins negative for invasive carcinoma.    B.  Plan is to give the APT regimen followed by addition of hormonal therapy with letrozole for 7 years.      INTERVAL HISTORY:  She is seen virtually today for evaluation and toxicity check prior to week 5 of weekly paclitaxel plus trastuzumab.  -She is feeling really fatigued.   -Saline gel (AIR) has helped with her dry nose.  -Rinsing mouth with salt/soda has been helping ans she is not having as much mouth pain.  -Hair falling out.   -Appetite is not getting any better.  Feels like a stone in her stomach.  Not nausea per se, just a heaviness in her stomach.   -She slept the next day after chemo last week.   -Tingling in her left foot still there and now she has noted the right foot as well.  No falls or  trouble walking. Can feel where her feet are on the ground.   -Left sided jaw pain that shoots into ear.  -Also has some shooting pain at her lumpectomy site.  This is something small but she wanted us to know.   -She has had a few mild headaches.  She has not needed/wanted to take anything for them.   -Taking Senna to manage her constipation.  She usually goes about 4 times/week.   -She continues [place], walking, weights.   -Today, she denies fever, chills, daily persistent headaches, dizziness or double vision.  -She denies new cough, chest pain, or shortness of breath at rest.    -She denies nausea, vomiting or any new or unusual issues with constipation or diarrhea.  -She denies any new rashes or skin changes.      She has an ECOG 0 performance status.     REVIEW OF SYSTEMS:   She has had no fevers, headaches, cough, chest pain, shortness of breath, hemoptysis, loss of appetite, nausea, vomiting, abdominal pain, constipation, diarrhea, bone pain, back pain, muscle or joint complaints, numbness or tingling in hands and feet, hearing loss or depression.  The remainder of a 14-point review of systems is negative.     PHYSICAL EXAMINATION:     General: The patient is a pleasant female in no acute distress.  /64 (BP Location: Right arm, Patient Position: Sitting, Cuff Size: Adult Regular)   Pulse 75   Temp 97.8  F (36.6  C) (Oral)   Resp 16   Wt 79.2 kg (174 lb 9.6 oz)   LMP  (LMP Unknown)   SpO2 97%   BMI 29.05 kg/m    Wt Readings from Last 10 Encounters:   01/23/25 79.2 kg (174 lb 9.6 oz)   01/16/25 78.5 kg (173 lb)   01/09/25 77.1 kg (170 lb)   01/03/25 77.4 kg (170 lb 9.6 oz)   01/02/25 78.9 kg (174 lb)   12/27/24 78.2 kg (172 lb 4.8 oz)   12/27/24 79 kg (174 lb 1.6 oz)   12/26/24 78.2 kg (172 lb 8 oz)   12/19/24 78.6 kg (173 lb 3.2 oz)   12/09/24 78.4 kg (172 lb 12.8 oz)   HEENT: EOMI. Sclerae are anicteric.   Lymph: Neck is supple with no lymphadenopathy in the cervical or supraclavicular  areas.   Heart: Regular rate and rhythm.   Lungs: Clear to auscultation bilaterally.   Abdomen: Bowel sounds present, soft, nontender.  Extremities: No lower extremity edema noted bilaterally.   Neuro: Cranial nerves II through XII are grossly intact.  Skin: No rashes, petechiae, or bruising noted on exposed skin.    LABORATORY DATA:    Most Recent 3 CBC's:  Recent Labs   Lab Test 01/23/25  1059 01/16/25  0713 01/09/25  0830   WBC 4.9 5.4 4.7   HGB 11.3* 11.3* 12.0   MCV 87 87 87    329 287   ANEUTAUTO 2.0 2.6 2.3     Most Recent 3 BMP's:  Recent Labs   Lab Test 01/16/25  0713 01/09/25  0830 01/03/25  0826    139 138   POTASSIUM 3.7 3.9 3.6   CHLORIDE 104 102 101   CO2 26 26 26   BUN 15.7 13.3 17.3   CR 0.58 0.59 0.58   ANIONGAP 9 11 11   GAEL 9.2 9.0 9.1   * 195* 212*   PROTTOTAL 6.4 6.4 6.6   ALBUMIN 4.0 4.0 4.1    Most Recent 3 LFT's:  Recent Labs   Lab Test 01/16/25  0713 01/09/25  0830 01/03/25  0826   AST 17 13 17   ALT 18 14 18   ALKPHOS 69 67 69   BILITOTAL 0.2 0.3 <0.2    Most Recent 2 TSH and T4:  Recent Labs   Lab Test 02/10/22  0938 02/06/20  1032   TSH 3.83 2.54     I reviewed the above labs today.    IMAGING:  Echo 11/13/2024:  Interpretation Summary  Global and regional left ventricular function is normal with an EF of 60-65%.  Right ventricular function, chamber size, wall motion, and thickness are  normal.  No significant valvular abnormalities were noted.  No pericardial effusion is present.  Previous study not available for comparison.    She is scheduled for repeat echo next month.     ASSESSMENT AND PLAN:    Stage I T1b NX MX invasive ductal carcinoma of the left breast upper outer quadrant which was estrogen receptor positive (%) progesterone receptor positive (81-90%) and HER2 2+ by IHC and HER2 positive by ADRIANA. Margins negative for invasive carcinoma.    -Post lumpectomy on 11/25/24.  -Port healing well.  -Echo 11/13/24 with 60-65% LVEF.  She is scheduled for repeat  in February.   -She is clinically stable to continue adjuvant HER2 directed therapy with trastuzumab and paclitaxel.  -She will begin hormonal therapy with an aromatase inhibitor after completion of chemotherapy and that the aromatase inhibitor treatment would be recommended for 10 years given the lack of data on shorter adjuvant therapy for hormone receptor positive HER2 positive breast cancer.  -She will complete every 3-week trastuzumab for 1 year of therapy.   -Postlumpectomy radiation will be recommended.     Constipation:   -Continues to use Senna with benefit.   -No new issues.     Fatigue:  -Related to treatment.  She is mildly anemic at 11.3 today.     Low appetite/food aversions:  -Related to chemo; discussed adding protein powder to foods.   -She denies overt nausea.    -Weight is stable.     Bone Health:  -She and Dr. Cheung discussed use of adjuvant zoledronic acid.    -She will work on dental clearance.   -Weight bearing exersise recommended.    Follow-up reviewed and she will call sooner with concerns.     44 minutes spent on the date of the encounter doing chart review, review of test results, interpretation of tests, patient visit, and documentation. The longitudinal plan of care for the diagnosis(es)/condition(s) as documented were addressed during this visit. Due to the added complexity in care, I will continue to support Zenaida in the subsequent management and with ongoing continuity of care.    WOOD Rangel, CNP  [facility] [address] [id number] [phone]                   Again, thank you for allowing me to participate in the care of your patient.        Sincerely,        WOOD Reyes CNP    Electronically signed

## 2025-01-30 ENCOUNTER — APPOINTMENT (OUTPATIENT)
Dept: LAB | Facility: CLINIC | Age: 75
End: 2025-01-30
Attending: INTERNAL MEDICINE
Payer: COMMERCIAL

## 2025-01-30 ENCOUNTER — INFUSION THERAPY VISIT (OUTPATIENT)
Dept: ONCOLOGY | Facility: CLINIC | Age: 75
End: 2025-01-30
Attending: INTERNAL MEDICINE
Payer: COMMERCIAL

## 2025-01-30 DIAGNOSIS — C50.912 INVASIVE DUCTAL CARCINOMA OF BREAST, FEMALE, LEFT (H): Primary | ICD-10-CM

## 2025-01-30 LAB
BASOPHILS # BLD AUTO: 0.1 10E3/UL (ref 0–0.2)
BASOPHILS NFR BLD AUTO: 1 %
EOSINOPHIL # BLD AUTO: 0.4 10E3/UL (ref 0–0.7)
EOSINOPHIL NFR BLD AUTO: 7 %
ERYTHROCYTE [DISTWIDTH] IN BLOOD BY AUTOMATED COUNT: 13.4 % (ref 10–15)
HCT VFR BLD AUTO: 33.2 % (ref 35–47)
HGB BLD-MCNC: 11.3 G/DL (ref 11.7–15.7)
IMM GRANULOCYTES # BLD: 0 10E3/UL
IMM GRANULOCYTES NFR BLD: 1 %
LYMPHOCYTES # BLD AUTO: 1.8 10E3/UL (ref 0.8–5.3)
LYMPHOCYTES NFR BLD AUTO: 35 %
MCH RBC QN AUTO: 29.9 PG (ref 26.5–33)
MCHC RBC AUTO-ENTMCNC: 34 G/DL (ref 31.5–36.5)
MCV RBC AUTO: 88 FL (ref 78–100)
MONOCYTES # BLD AUTO: 0.3 10E3/UL (ref 0–1.3)
MONOCYTES NFR BLD AUTO: 7 %
NEUTROPHILS # BLD AUTO: 2.6 10E3/UL (ref 1.6–8.3)
NEUTROPHILS NFR BLD AUTO: 50 %
NRBC # BLD AUTO: 0 10E3/UL
NRBC BLD AUTO-RTO: 0 /100
PLATELET # BLD AUTO: 309 10E3/UL (ref 150–450)
RBC # BLD AUTO: 3.78 10E6/UL (ref 3.8–5.2)
WBC # BLD AUTO: 5.2 10E3/UL (ref 4–11)

## 2025-01-30 PROCEDURE — 258N000003 HC RX IP 258 OP 636: Performed by: NURSE PRACTITIONER

## 2025-01-30 PROCEDURE — 250N000011 HC RX IP 250 OP 636: Performed by: NURSE PRACTITIONER

## 2025-01-30 PROCEDURE — 85025 COMPLETE CBC W/AUTO DIFF WBC: CPT | Performed by: INTERNAL MEDICINE

## 2025-01-30 RX ORDER — ONDANSETRON 2 MG/ML
8 INJECTION INTRAMUSCULAR; INTRAVENOUS ONCE
Status: COMPLETED | OUTPATIENT
Start: 2025-01-30 | End: 2025-01-30

## 2025-01-30 RX ORDER — HEPARIN SODIUM (PORCINE) LOCK FLUSH IV SOLN 100 UNIT/ML 100 UNIT/ML
5 SOLUTION INTRAVENOUS
Status: DISCONTINUED | OUTPATIENT
Start: 2025-01-30 | End: 2025-01-30 | Stop reason: HOSPADM

## 2025-01-30 RX ADMIN — HEPARIN 5 ML: 100 SYRINGE at 11:49

## 2025-01-30 RX ADMIN — SODIUM CHLORIDE 160 MG: 9 INJECTION, SOLUTION INTRAVENOUS at 10:16

## 2025-01-30 RX ADMIN — PACLITAXEL 154 MG: 6 INJECTION, SOLUTION INTRAVENOUS at 10:50

## 2025-01-30 RX ADMIN — ONDANSETRON 8 MG: 2 INJECTION INTRAMUSCULAR; INTRAVENOUS at 09:49

## 2025-01-30 NOTE — PATIENT INSTRUCTIONS
Contact Numbers    [facility] Main Line (for Scheduling/Triage/After Hours Nurse Line): [phone]    Please call the [facility] nurse triage or the after hours nurse line if you experience a temperature greater than or equal to 100.4, shaking chills, have uncontrolled nausea, vomiting and/or diarrhea, dizziness, lightheadedness, shortness of breath, chest pain, bleeding, unexplained bruising, or if you have any other new/concerning symptoms, questions or concerns.     If you are having any concerning symptoms or wish to speak to a provider before your next infusion visit, please call your care coordinator or triage to notify them so we can adequately serve you.     If you need any refills on medications (narcotics or other medications), please call before your infusion appointment.      Lab Results:  Recent Results (from the past 12 hours)   COMPREHENSIVE METABOLIC PANEL    Collection Time: 01/30/25  8:38 AM   Result Value Ref Range    Sodium 139 135 - 145 mmol/L    Potassium 4.0 3.4 - 5.3 mmol/L    Carbon Dioxide (CO2) 27 22 - 29 mmol/L    Anion Gap 9 7 - 15 mmol/L    Urea Nitrogen 19.8 8.0 - 23.0 mg/dL    Creatinine 0.62 0.51 - 0.95 mg/dL    GFR Estimate >90 >60 mL/min/1.73m2    Calcium 8.9 8.8 - 10.4 mg/dL    Chloride 103 98 - 107 mmol/L    Glucose 162 (H) 70 - 99 mg/dL    Alkaline Phosphatase 81 40 - 150 U/L    AST 19 0 - 45 U/L    ALT 25 0 - 50 U/L    Protein Total 6.7 6.4 - 8.3 g/dL    Albumin 4.1 3.5 - 5.2 g/dL    Bilirubin Total 0.2 <=1.2 mg/dL   CBC with platelets and differential    Collection Time: 01/30/25  8:38 AM   Result Value Ref Range    WBC Count 5.2 4.0 - 11.0 10e3/uL    RBC Count 3.78 (L) 3.80 - 5.20 10e6/uL    Hemoglobin 11.3 (L) 11.7 - 15.7 g/dL    Hematocrit 33.2 (L) 35.0 - 47.0 %    MCV 88 78 - 100 fL    MCH 29.9 26.5 - 33.0 pg    MCHC 34.0 31.5 - 36.5 g/dL    RDW 13.4 10.0 - 15.0 %    Platelet Count 309 150 - 450 10e3/uL    % Neutrophils 50 %    % Lymphocytes 35 %    % Monocytes 7 %    %  Eosinophils 7 %    % Basophils 1 %    % Immature Granulocytes 1 %    NRBCs per 100 WBC 0 <1 /100    Absolute Neutrophils 2.6 1.6 - 8.3 10e3/uL    Absolute Lymphocytes 1.8 0.8 - 5.3 10e3/uL    Absolute Monocytes 0.3 0.0 - 1.3 10e3/uL    Absolute Eosinophils 0.4 0.0 - 0.7 10e3/uL    Absolute Basophils 0.1 0.0 - 0.2 10e3/uL    Absolute Immature Granulocytes 0.0 <=0.4 10e3/uL    Absolute NRBCs 0.0 10e3/uL

## 2025-01-30 NOTE — PROGRESS NOTES
Infusion Nursing Note:  Zenaida Valles presents today for C6D1 Trastuzumab-qyyp and Paclitaxel.    Patient seen by provider today: Yes: Leilani Richards CNP   present during visit today: Not Applicable.    Note: Patient presents to Infusion Clinic feeling well today. Denies fever, chills, pain, or any other symptoms of infection/illness. Patient wishes to proceed with treatment today.     Patient iced hands and feet during Taxol infusion.    Intravenous Access:  Implanted Port.    Treatment Conditions:  Lab Results   Component Value Date    HGB 11.3 (L) 01/30/2025    WBC 5.2 01/30/2025    ANEU 2.5 11/23/2018    ANEUTAUTO 2.6 01/30/2025     01/30/2025        Lab Results   Component Value Date     01/30/2025    POTASSIUM 4.0 01/30/2025    MAG 1.9 08/14/2007    CR 0.62 01/30/2025    GAEL 8.9 01/30/2025    BILITOTAL 0.2 01/30/2025    ALBUMIN 4.1 01/30/2025    ALT 25 01/30/2025    AST 19 01/30/2025     ECHO/MUGA completed 11/13/2024  EF 60-65%.  Results reviewed, labs MET treatment parameters, ok to proceed with treatment.        Post Infusion Assessment:  Patient tolerated infusion without incident.  Blood return noted pre and post infusion.  Site patent and intact, free from redness, edema or discomfort.  No evidence of extravasations.  Access discontinued per protocol.       Discharge Plan:   Patient declined prescription refills.  Discharge instructions reviewed with: Patient.  Patient and/or family verbalized understanding of discharge instructions and all questions answered.  Copy of AVS reviewed with patient and/or family.  Patient will return 2/6 for next appointment.  Patient discharged in stable condition accompanied by: self and .  Departure Mode: Ambulatory.      Tika Paiz RN

## 2025-02-02 NOTE — PROGRESS NOTES
MEDICAL ONCOLOGY NOTE     Javier Wood MD  Professor   [address] [address]     Re. Zenaida Valles   Female, 74 year old, 1950  MRN: [id number]        Dear Dr. Wood,     Thank you for referring Zenaida Valles who is a 74-year-old woman with a new diagnosis of a screening identified stage I invasive ductal cancer of the upper outer quadrant of the left breast ER positive, OR positive, HER2 amplified stage [id number].       HISTORY OF PRESENT ILLNESS:    Zenaida has been in generally good health except for a right renal artery stenosis requiring a stent.  She also has a left leg injury recently she was in her usual state of good health and had a screening mammogram which showed a suspicious lesion in the upper outer quadrant of the left breast at the 3 o'clock position 5 cm from the nipple there is an irregular hypoechoic mass measuring 1.0 x 0.7 x 0.9 cm in size 1 cm from the nipple there is also a 0.5 x 0.4 cm area of suspicion     She underwent an ultrasound guided biopsy which showed usual ductal hyperplasia and fibrocystic changes microcysts in April Kren metaplasia.  Columnar cell changes.  It invasive ductal carcinoma was also found grade 2 and DCIS grade 2 solid type.  Her breast cancer was estrogen receptor positive progesterone receptor positive at HER2 2+ by IHC.  She then underwent ADRIANA which showed a subpopulation which was 60 to 70% ADRIANA 5 negative and 30 to 40% I-S age 1 positive with a 4.9 HER2 signals per nucleus and the ratio of 2.2 indicating HER2 positivity.     She then came to see Dr. Wood at [facility] for surgical recommendations.  She had a contrast mammogram which showed a 0.9 cm enhancing lesion in the upper outer quadrant of the left breast.     She has had no weight loss or loss of energy.  She does not sleep during the day.  She can perform all of her household chores.  ECOG 0 PS.      DIAGNOSTIC  AND TREATMENT SUMMARY:  On October 7 she underwent a screening mammogram which demonstrated an asymmetry in her left breast.       On October 15 she underwent a diagnostic mammogram which confirmed a left breast asymmetry.  She had an ultrasound which demonstrated 2 masses in her left breast.  At the 3 o'clock position 1 cm from the nipple there was a mass and at the 3 o'clock position 5 cm from the nipple there was a second mass.  Her lymph nodes were normal by ultrasound.       On October 23 she underwent a contrast-enhanced mammography which only demonstrated 1 mass measuring 1 cm.  The right breast was normal.  She underwent ultrasound-guided biopsies of both masses.  The mass at the 3 o'clock position 1 cm from the nipple was benign.  The mass at the 3 o'clock position 5 cm from the nipple demonstrating a grade 2 invasive ductal cancer that was ER positive and HER2 equivocal.  There is also DCIS present.          A. LEFT breast, 3:00, 1 cm from nipple, ultrasound-guided core biopsy:  - Benign breast tissue with usual ductal hyperplasia (UDH) and fibrocystic changes including microcysts, apocrine metaplasia, and columnar cell change  - Rare calcifications associated with benign breast ducts and acini  - Negative for atypia or malignancy      B. LEFT breast, 3:00, 5 cm from nipple, ultrasound-guided core biopsy:   - INVASIVE BREAST CARCINOMA OF NO SPECIAL TYPE (INVASIVE DUCTAL CARCINOMA), [place] grade 2  - Ductal carcinoma in situ (DCIS), nuclear grade 2, solid type  - Invasive carcinoma is estrogen receptor positive, progesterone receptor positive, and HER2 equivocal (score 2+) by immunohistochemistry (see 'Breast Biomarker Reporting Template' below)  - HER2 FISH is is process; results will be reportedly separately by cytogenetis  - See comment     This FISH analysis is performed in follow up to the reported equivocal (2+) HER2 findings by immunohistochemistry ([id number]).        RESULTS:     Ratio of  HER2/MARION-17 signals  Zenaida Lazarus Vlales:  See Interpretation below     Majority (~60-70%) of tumor in area of strongest IHC staining:  Group 5 (ADRIANA Negative)  Avg. number HER2 signals/nucleus:  2.9  Avg. number MARION-17 signals/nucleus:  1.7  HER2/MARION 17 ratio:  1.7     Subpopulation (~30-40%) of tumor in area of strongest IHC staining:  Group 1 (ADRIANA Positive)                             Avg. number HER2 signals/nucleus:  4.9  Avg. number MARION-17 signals/nucleus:  2.3  HER2/MARION 17 ratio:  2.2     **Interpretive guidelines per the American Society of Clinical Oncology/College of American Pathologists Clinical Practice Guideline Update (Margarita VALENZUELA et al, 2023, Arch Pathol Lab Med 147:993):     -- Group 1: HER2/MARION-17 ratio 2.0 or more -AND- avg. number HER2 signals/nucleus 4.0 or more (ADRIANA Positive)  -- Group 2: HER2/MARION-17 ratio 2.0 or more -AND- avg. number HER2 signals/nucleus <4.0 (Additional work required)  -- Group 3: HER2/MARION-17 ratio <2.0 -AND- avg. number HER2 signals/nucleus 6.0 or more (Additional work required)  -- Group 4: HER2/MARION-17 ratio <2.0 -AND- avg. number HER2 signals/nucleus 4.0 or more and <6.0 (Additional work required)  -- Group 5: HER2/MARION-17 ratio <2.0 -AND- avg. number HER2 signals/nucleus <4.0 (ADRIANA Negative)     INTERPRETATION:  Two admixed populations of cells were found in this sample:     Majority (~60-70%) of tumor in area of strongest IHC staining:  Per the American Society of Clinical Oncology/College of American Pathologists Clinical Practice Guideline Focused Update (Margarita VALENZUELA et al, 2018, Arch Pathol Lab Med  doi:10.5858/arpa.[id number]-LK), the HER2/MARION 17 ratio of 1.7 and average number of HER2 signals/cell of 2.9 places this population of cells in Group 5 (ADRIANA Negative).      Subpopulation (~30-40%) of tumor in area of strongest IHC staining:  Admixed within this sample were cells with increased HER2 signals, which, when selectively scored, had an average of 4.9 HER2 signals/nucleus  and a HER2/MARION 17 ratio of 2.2; per the American Society of Clinical Oncology/College of American Pathologists Clinical Practice Guideline Focused Update (Margarita VALENZUELA et al, 2018, Arch Pathol Lab Med  doi:10.5858/arpa.[id number]-MX), this population of cells falls into Group 1 (ADRIANA Positive).     PAST MEDICAL HISTORY: No history of breast surgery.  No history of breast cancer in the past.  No history of radiation therapy in the past or radiation exposure.  No history of tumor of any kind.  No history of heart problems, heart attack, breathing problems, blood clots, seizures, arthritis, peptic ulcer disease, osteoporosis or bone fractures.  She is not currently participating in a clinical trial.  She does have a history of asthma and uses an albuterol inhaler in cold weather.     Her past medical history is significant for diabetes mellitus and is on metformin, hypertension obesity, she has IPMN and a right renal artery stent.  The family history is negative for breast cancer.  She has a history of right renal artery stenosis and has a stent in place.     She has a recent history of a left hamstring injury.  She is seeing orthopedics for this problem.  She also has a history of back pain.  Her gait is affected but she can perform activities of daily living.  She has a history of aches in her shoulders.  She had a 65 pound weight loss over the last 2 years which was intentional related to improve diet and exercise. She says she has a history of fibromuscular dysplasia.     She worked in the [employer] in [employer] and was exposed to dengue, malaria, hepatitis A, hepatitis B, encephalitis.  She has also had COVID and the flu.     FAMILY HISTORY:   No history of ovarian, uterine, colon cancer, or melanoma.  No history of glioma, gastric cancer or pancreatic cancer.  Her mother did have Zollinger-Wheeler syndrome but the patient's genetics are negative for this syndrome.      PAST MENSTRUAL HISTORY:  Age of first  menstrual period was 13.   She has been pregnant 1 times with 0 live births and 0 miscarriages and 1 .  Age at in place pregnancy age 28.   Uterus and ovaries are in place.  Last menstrual period was about age 52 and the menopause occurred naturally. No history of hormone replacement therapy.     She does have a history of thickened endometrial lining but by her report no abnormalities of the lining when she had a DIC     HABITS:  She is a never smoker but she has a history of exposure to secondhand smoke from her father as a child.  She consumes alcoholic beverages socially 1 drink every couple of months.     GERMLINE GENETICS: Genetic testing is available for 47 genes associated with cancers of the breast, ovary, uterus, prostate and gastrointestinal system: [employer] Common Hereditary Cancers panel (APC, SUMIT, AXIN2, BARD1, BMPR1A, BRCA1, BRCA2, BRIP1, CDH1, CDK4, CDKN2A, CHEK2, CTNNA1, DICER1, EPCAM, GREM1, HOXB13, KIT, MEN1, MLH1, MSH2, MSH3, MSH6, MUTYH, NBN, NF1, NTHL1, PALB2, PDGFRA, PMS2, POLD1, POLE, PTEN,RAD50, RAD51C, RAD51D, SDHA, SDHB, SDHC, SDHD, SMAD4, SMARCA4, STK11, TP53,TSC1, TSC2, VHL)      ALLERGIES: She has drug allergies to codeine, codeine derivatives, ibuprofen, lisinopril..  No allergy to seafood, iodine, or contrast dye.  She does take daily aspirin 81 mg because of her renal stent.     LUMPECTOMY:  Surgical Pathology Report                         Case: [id number]                                   Authorizing Provider:  Javier Wood MD         Collected:           2024 09:40 AM           Ordering Location:     [facility] OR  Received:            2024 09:54 AM                                  [place]                                                                   Pathologist:           Kritsal Lanza MD                                                   Specimens:   A) - Breast, Left, Left breast lumpectomy                                                             B) - Lymph Node(s), [place], Left axillary sentinel lymph node #1                                  C) - Lymph Node(s), [place], Left axillary sentinel lymph node # 2                         Final Diagnosis   A. LEFT breast, RFID tag-localized lumpectomy:  -INVASIVE BREAST CARCINOMA OF NO SPECIAL TYPE (INVASIVE DUCTAL CARCINOMA), FAREED GRADE 2, size 11 mm  -Margins are uninvolved by invasive carcinoma  -Invasive carcinoma is 1 mm from the nearest (posterior) margin, 5 mm from the anterior margin, and > 5 mm from the superior, inferior, medial and lateral margins  -No lymphovascular invasion identified  -Other findings: fibrocystic change (including microcysts with apocrine metaplasia), sclerosing adenosis, and usual ductal hyperplasia  -Calcifications associated with benign acini  -Prior core biopsy site changes  -See tumor synoptic below     B. Lymph node, LEFT axillary, sentinel #1, excision:  -One benign lymph node (0/1)     C. Lymph node, LEFT axillary, sentinel #2, excision:  -One benign lymph node (0/1)                Synoptic Checklist   INVASIVE CARCINOMA OF THE BREAST: Resection   8th Edition - Protocol posted: 12/13/2023INVASIVE CARCINOMA OF THE BREAST: RESECTION - All Specimens                  SPECIMEN     Procedure   Excision (less than total mastectomy)     Specimen Laterality   Left     TUMOR     Tumor Site   Clock position         3 o'clock     Histologic Type   Invasive carcinoma of no special type (ductal)     Histologic Grade (Fareed Histologic Score)         Glandular (Acinar) / Tubular Differentiation   Score 2     Nuclear Pleomorphism   Score 3     Mitotic Rate   Score 2     Overall Grade   Grade 2 (scores of 6 or 7)     Tumor Size   Greatest dimension of largest invasive focus (Millimeters): 11 mm     Additional Dimension (Millimeters)   9 mm         9 mm     Tumor Focality   Single focus of invasive carcinoma     Ductal Carcinoma In Situ (DCIS)   Not identified      Lobular Carcinoma In Situ (LCIS)   Not identified     Lymphatic and / or Vascular Invasion   Not identified     Dermal Lymphatic and / or Vascular Invasion   No skin present     Microcalcifications   Present in non-neoplastic tissue     Treatment Effect in the Breast   No known presurgical therapy     MARGINS     Margin Status for Invasive Carcinoma   All margins negative for invasive carcinoma     Distance from Invasive Carcinoma to Closest Margin   1 mm     Closest Margin(s) to Invasive Carcinoma   Posterior     Distance from Invasive Carcinoma to Anterior Margin   5 mm     Distance from Invasive Carcinoma to Superior Margin   Greater than: 5 mm     Distance from Invasive Carcinoma to Inferior Margin   Greater than: 5 mm     Distance from Invasive Carcinoma to Medial Margin   Greater than: 5 mm     Distance from Invasive Carcinoma to Lateral Margin   Greater than: 5 mm     REGIONAL LYMPH NODES     Regional Lymph Node Status   All regional lymph nodes negative for tumor     Total Number of Lymph Nodes Examined (sentinel and non-sentinel)   2     Number of [place] Nodes Examined   2     pTNM CLASSIFICATION (AJCC 8th Edition)     Reporting of pT, pN, and (when applicable) pM categories is based on information available to the pathologist at the time the report is issued. As per the AJCC (Chapter 1, 8th Ed.) it is the managing physician s responsibility to establish the final pathologic stage based upon all pertinent information, including but potentially not limited to this pathology report.     pT Category   pT1c     pN Category   pN0     N Suffix   (sn)     SPECIAL STUDIES               Estrogen Receptor (ER) Status   Positive (greater than 10% of cells demonstrate nuclear positivity)     Percentage of Cells with Nuclear Positivity   %               Progesterone Receptor (PgR) Status   Positive     Percentage of Cells with Nuclear Positivity   81-90%               HER2 (by immunohistochemistry)    "Equivocal (Score 2+)     Percentage of Cells with Uniform Intense Complete Membrane Staining   0 %               HER2 (by in situ hybridization)   Positive (amplified)     Testing Performed on Case Number   HER2 FISH amplified in 30-40% of tumor cells. Performed on prior core biopsy [id number] specimen B     Comment(s)   Best block: A13   .      Clinical Information   ALEE   The patient is a 74 year old woman with LEFT breast carcinoma. Procedure: LEFT breast RFID tag localized lumpectomy, LEFT axillary sentinel lymph node biopsy.    Gross Description   ALEE ROBIN(1). Breast, Left, Left breast lumpectomy:  The specimen is received fresh with proper patient identification, labeled \"left breast lumpectomy\". It consists of 27.85 g, 6.7 cm from medial to lateral x 4.7 cm from superior to inferior x 2.4 cm from anterior to posterior left breast lumpectomy specimen. The specimen was received previously inked by the surgeon as follows:  Superior - red, anterior - green, inferior - blue, posterior - black, medial - yellow and lateral - orange.     The specimen is sectioned from medial (slice 1) to lateral (slice 15) into 15 slices.          [id number]   This FISH analysis is performed in follow up to the reported equivocal (2+) HER2 findings by immunohistochemistry ([id number]).        RESULTS:     Ratio of HER2/MARION-17 signals  Zenaida Valles:  See Interpretation below     Majority (~60-70%) of tumor in area of strongest IHC staining:  Group 5 (ADRIANA Negative)  Avg. number HER2 signals/nucleus:  2.9  Avg. number MARION-17 signals/nucleus:  1.7  HER2/MARION 17 ratio:  1.7     Subpopulation (~30-40%) of tumor in area of strongest IHC staining:  Group 1 (ADRIANA Positive)                             Avg. number HER2 signals/nucleus:  4.9  Avg. number MARION-17 signals/nucleus:  2.3  HER2/MARION 17 ratio:  2.2        TREATMENT HISTORY:  A.  Lumpectomy showed a stage I T1b NX MX invasive ductal carcinoma of the left breast upper outer " quadrant which was estrogen receptor positive (%) progesterone receptor positive (81-90%) and HER2 2+ by IHC and HER2 positive by ADRIANA.  Margins negative for invasive carcinoma.    B.  Plan is to give the APT regimen followed by addition of hormonal therapy with letrozole for 7 years.         INTERVAL HISTORY:  She was seen in clinic today with her  Trae. She had her lumpectomy with Dr. Wood on November 25. She started adjuvant APT regimen on 12/27/2024.  She has some neuropathy pain in her toes.  She is deconditioned and has significant fatigue.  She has mild depression and anxiety which is held by her [employer] community which has been very supportive.  She is having difficulty with quilting which is major activity.    Her main symptom since starting chemo has been fatigue which is grade 1 and delayed by rest.  She takes occasional naps to help with this.  She has lost most of her hair.  Had some numbness and tingling in the first week of chemo. At present, only has numbness in left big toe. Appetite is poor.  She has had some mild nausea and diarrhea.  She has some constipation.  Had a few episodes of epistaxis which are getting better.  She has been exercising by walking.  Overall her exercise stamina is less.  She is taking a vitamin D supplement and drinking milk to meet her calcium requirements.  She does have a bovine collagen to her diet and increase protein.  She has been icing to prevent neuropathy.     She has an ECOG 0 performance status.     REVIEW OF SYSTEMS:   She has had no fevers, cough, chest pain, shortness of breath, mouth sores, hemoptysis, nausea, vomiting, abdominal pain, diarrhea, bone pain, back pain, muscle or joint complaints, hearing loss or depression.  The remainder of a 10-point review of systems is negative.        PHYSICAL EXAMINATION:     VITALS:   /73 (BP Location: Right arm, Patient Position: Sitting, Cuff Size: Adult Large)   Pulse 73   Temp 97.6  F (36.4  C)  (Oral)   Resp 18   Wt 79.3 kg (174 lb 14.4 oz)   LMP  (LMP Unknown)   SpO2 97%   BMI 29.10 kg/m    HEENT:  No alopecia, no lesions in the oropharynx.  Dentition is normal  LYMPH:  There is no palpable cervical, supraclavicular, or subclavicular lymphadenopathy.  BREASTS: deferred  LUNGS:  Clear to auscultation.  HEART:  Regular rate and rhythm.  S1, S2. No murmurs  ABDOMEN:  Soft, nontender, without hepatosplenomegaly.  EXTREMITIES:  Without edema.  PSYCH:  Mood and affect were normal.  NEUROLOGIC:  Mood and affect  normal. No focal deficits apparent.          LABORATORY DATA:   CMP was within normal limits except for glucose of 140.  CBC showed a WBC of 4.8, hemoglobin 10.8, platelets 304K.  Absolute neutrophil count 2300.     ASSESSMENT AND PLAN:    Zenaida Valles has a stage I T1b NX MX invasive ductal carcinoma of the left breast upper outer quadrant which was estrogen receptor positive (%) progesterone receptor positive (81-90%) and HER2 2+ by IHC and HER2 positive by ADRIANA. Margins negative for invasive carcinoma.    She's tolerating adjuvant HER2 directed therapy with trastuzumab and paclitaxel.   She does have some fatigue and decreased stamina.  Overall her exercise tolerance is decreased.  I did have discussion with her that there can be some minor irreversible reduction in exercise capacity based on the chemotherapy whereas the reduction in exercise capacity related to trastuzumab is likely to be reversible.  I discussed that we will see what the echocardiogram shows next week.  I also discussed that if there is some loss of ejection fraction we can potentially just her beta-blocker and try carvedilol.  She is on Avapro which is an ARB and could be helpful.  Some neuropathy.  She has now been icing during chemotherapy.    We would also add adjuvant zoledronic acid.    We discussed toxicities of paclitaxel which could include alopecia, neuropathy which could last for 6 months to a year after  infusion.    Discussion of hormonal therapy.  We have discussed 10 years of adjuvant hormonal therapy.  We discussed adjuvant zoledronic acid.  I discussed that postlumpectomy radiation will be recommended and this would be after completion of 12 weeks of paclitaxel and trastuzumab..  Will arrange for radiation oncology consult.  Discussion of adjuvant zoledronic acid.  Adjuvant zoledronic acid can be associated with flulike symptoms that can last for several days after treatment.  I also discussed that there can be dental complications.  She keeps her teeth and good shape with frequent dental visits.  She does have excellent dentition and the risk of osteonecrosis of the jaw is quite low.  History of Zollinger-Wheeler syndrome.    Diabetes.  She is on metformin.  Discussion of exercise.  Discussion of the Lace and Pathway.  We recommend 150 minutes of exercise per week with mixed cardio and strength training.  Stretch band, hand weights, yoga.  Diet.  I recommended a diet low in saturated fat, but not low in fat with more fruits and vegetables, and less in the way of red meat.  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  Follow up plan.  Right single lumen Powerport 12-26.  Started APT regimen with weekly paclitaxel and trastuzuamb beginning 12-27. CBC, CMP.  Follow-up with me alternating with an ALEC each cycle total of 12 cycles of paclitaxel and trastuzumab.  We will see her each cycle because of symptoms with treatment. .Echo in February.  Radiation oncology consultation.     Thank you for allowing us to participate in this patient's care.  The patient was seen and evaluated by me.  I discussed the patient with the resident Dr. Te Rainey and agree with the findings and plan in the note.      Sincerely,      Chito Cheung MD  Professor  [facility]  [phone]     I spent 40 minutes with the patient more than 50% of which was in counseling and coordination of care. The remainder of a  10 point review of systems was negative.

## 2025-02-05 RX ORDER — METHYLPREDNISOLONE SODIUM SUCCINATE 40 MG/ML
40 INJECTION INTRAMUSCULAR; INTRAVENOUS
Status: CANCELLED
Start: 2025-02-07

## 2025-02-05 RX ORDER — HEPARIN SODIUM (PORCINE) LOCK FLUSH IV SOLN 100 UNIT/ML 100 UNIT/ML
5 SOLUTION INTRAVENOUS
Status: CANCELLED | OUTPATIENT
Start: 2025-02-07

## 2025-02-05 RX ORDER — LORAZEPAM 2 MG/ML
0.5 INJECTION INTRAMUSCULAR EVERY 4 HOURS PRN
Status: CANCELLED | OUTPATIENT
Start: 2025-02-07

## 2025-02-05 RX ORDER — MEPERIDINE HYDROCHLORIDE 25 MG/ML
25 INJECTION INTRAMUSCULAR; INTRAVENOUS; SUBCUTANEOUS
Status: CANCELLED | OUTPATIENT
Start: 2025-02-07

## 2025-02-05 RX ORDER — DIPHENHYDRAMINE HCL 25 MG
50 CAPSULE ORAL
Status: CANCELLED
Start: 2025-02-07

## 2025-02-05 RX ORDER — ALBUTEROL SULFATE 90 UG/1
1-2 INHALANT RESPIRATORY (INHALATION)
Status: CANCELLED
Start: 2025-02-07

## 2025-02-05 RX ORDER — ALBUTEROL SULFATE 0.83 MG/ML
2.5 SOLUTION RESPIRATORY (INHALATION)
Status: CANCELLED | OUTPATIENT
Start: 2025-02-07

## 2025-02-05 RX ORDER — EPINEPHRINE 1 MG/ML
0.3 INJECTION, SOLUTION INTRAMUSCULAR; SUBCUTANEOUS EVERY 5 MIN PRN
Status: CANCELLED | OUTPATIENT
Start: 2025-02-07

## 2025-02-05 RX ORDER — HEPARIN SODIUM,PORCINE 10 UNIT/ML
5-20 VIAL (ML) INTRAVENOUS DAILY PRN
Status: CANCELLED | OUTPATIENT
Start: 2025-02-07

## 2025-02-05 RX ORDER — DIPHENHYDRAMINE HYDROCHLORIDE 50 MG/ML
25 INJECTION INTRAMUSCULAR; INTRAVENOUS
Status: CANCELLED
Start: 2025-02-07

## 2025-02-05 RX ORDER — DIPHENHYDRAMINE HYDROCHLORIDE 50 MG/ML
50 INJECTION INTRAMUSCULAR; INTRAVENOUS
Status: CANCELLED
Start: 2025-02-07

## 2025-02-05 RX ORDER — ACETAMINOPHEN 325 MG/1
650 TABLET ORAL
Status: CANCELLED | OUTPATIENT
Start: 2025-02-07

## 2025-02-05 RX ORDER — ONDANSETRON 2 MG/ML
8 INJECTION INTRAMUSCULAR; INTRAVENOUS ONCE
Status: CANCELLED | OUTPATIENT
Start: 2025-02-07

## 2025-02-06 ENCOUNTER — APPOINTMENT (OUTPATIENT)
Dept: LAB | Facility: CLINIC | Age: 75
End: 2025-02-06
Attending: INTERNAL MEDICINE
Payer: COMMERCIAL

## 2025-02-06 ENCOUNTER — INFUSION THERAPY VISIT (OUTPATIENT)
Dept: ONCOLOGY | Facility: CLINIC | Age: 75
End: 2025-02-06
Attending: INTERNAL MEDICINE
Payer: COMMERCIAL

## 2025-02-06 VITALS
SYSTOLIC BLOOD PRESSURE: 114 MMHG | BODY MASS INDEX: 29.1 KG/M2 | RESPIRATION RATE: 18 BRPM | TEMPERATURE: 97.6 F | OXYGEN SATURATION: 97 % | DIASTOLIC BLOOD PRESSURE: 73 MMHG | HEART RATE: 73 BPM | WEIGHT: 174.9 LBS

## 2025-02-06 DIAGNOSIS — C50.912 INVASIVE DUCTAL CARCINOMA OF BREAST, FEMALE, LEFT (H): Primary | ICD-10-CM

## 2025-02-06 LAB
ALBUMIN SERPL BCG-MCNC: 4 G/DL (ref 3.5–5.2)
ALP SERPL-CCNC: 75 U/L (ref 40–150)
ALT SERPL W P-5'-P-CCNC: 15 U/L (ref 0–50)
ANION GAP SERPL CALCULATED.3IONS-SCNC: 10 MMOL/L (ref 7–15)
AST SERPL W P-5'-P-CCNC: 16 U/L (ref 0–45)
BASOPHILS # BLD AUTO: 0 10E3/UL (ref 0–0.2)
BASOPHILS NFR BLD AUTO: 1 %
BILIRUB SERPL-MCNC: 0.2 MG/DL
BUN SERPL-MCNC: 19 MG/DL (ref 8–23)
CALCIUM SERPL-MCNC: 9 MG/DL (ref 8.8–10.4)
CHLORIDE SERPL-SCNC: 103 MMOL/L (ref 98–107)
CREAT SERPL-MCNC: 0.62 MG/DL (ref 0.51–0.95)
EGFRCR SERPLBLD CKD-EPI 2021: >90 ML/MIN/1.73M2
EOSINOPHIL # BLD AUTO: 0.3 10E3/UL (ref 0–0.7)
EOSINOPHIL NFR BLD AUTO: 6 %
ERYTHROCYTE [DISTWIDTH] IN BLOOD BY AUTOMATED COUNT: 13.5 % (ref 10–15)
GLUCOSE SERPL-MCNC: 140 MG/DL (ref 70–99)
HCO3 SERPL-SCNC: 26 MMOL/L (ref 22–29)
HCT VFR BLD AUTO: 32 % (ref 35–47)
HGB BLD-MCNC: 10.8 G/DL (ref 11.7–15.7)
IMM GRANULOCYTES # BLD: 0 10E3/UL
IMM GRANULOCYTES NFR BLD: 0 %
LYMPHOCYTES # BLD AUTO: 1.8 10E3/UL (ref 0.8–5.3)
LYMPHOCYTES NFR BLD AUTO: 38 %
MCH RBC QN AUTO: 29.7 PG (ref 26.5–33)
MCHC RBC AUTO-ENTMCNC: 33.8 G/DL (ref 31.5–36.5)
MCV RBC AUTO: 88 FL (ref 78–100)
MONOCYTES # BLD AUTO: 0.3 10E3/UL (ref 0–1.3)
MONOCYTES NFR BLD AUTO: 7 %
NEUTROPHILS # BLD AUTO: 2.3 10E3/UL (ref 1.6–8.3)
NEUTROPHILS NFR BLD AUTO: 48 %
NRBC # BLD AUTO: 0 10E3/UL
NRBC BLD AUTO-RTO: 0 /100
PLATELET # BLD AUTO: 304 10E3/UL (ref 150–450)
POTASSIUM SERPL-SCNC: 4 MMOL/L (ref 3.4–5.3)
PROT SERPL-MCNC: 6.4 G/DL (ref 6.4–8.3)
RBC # BLD AUTO: 3.64 10E6/UL (ref 3.8–5.2)
SODIUM SERPL-SCNC: 139 MMOL/L (ref 135–145)
WBC # BLD AUTO: 4.8 10E3/UL (ref 4–11)

## 2025-02-06 PROCEDURE — 36591 DRAW BLOOD OFF VENOUS DEVICE: CPT

## 2025-02-06 PROCEDURE — G0463 HOSPITAL OUTPT CLINIC VISIT: HCPCS | Performed by: INTERNAL MEDICINE

## 2025-02-06 PROCEDURE — 82435 ASSAY OF BLOOD CHLORIDE: CPT

## 2025-02-06 PROCEDURE — 84155 ASSAY OF PROTEIN SERUM: CPT

## 2025-02-06 PROCEDURE — 85004 AUTOMATED DIFF WBC COUNT: CPT | Performed by: INTERNAL MEDICINE

## 2025-02-06 PROCEDURE — 82247 BILIRUBIN TOTAL: CPT

## 2025-02-06 PROCEDURE — 85041 AUTOMATED RBC COUNT: CPT | Performed by: INTERNAL MEDICINE

## 2025-02-06 PROCEDURE — 250N000011 HC RX IP 250 OP 636: Performed by: INTERNAL MEDICINE

## 2025-02-06 PROCEDURE — 258N000003 HC RX IP 258 OP 636: Performed by: INTERNAL MEDICINE

## 2025-02-06 PROCEDURE — 84075 ASSAY ALKALINE PHOSPHATASE: CPT

## 2025-02-06 RX ORDER — HEPARIN SODIUM (PORCINE) LOCK FLUSH IV SOLN 100 UNIT/ML 100 UNIT/ML
5 SOLUTION INTRAVENOUS EVERY 8 HOURS
Status: DISCONTINUED | OUTPATIENT
Start: 2025-02-06 | End: 2025-02-06 | Stop reason: HOSPADM

## 2025-02-06 RX ORDER — HEPARIN SODIUM (PORCINE) LOCK FLUSH IV SOLN 100 UNIT/ML 100 UNIT/ML
5 SOLUTION INTRAVENOUS
Status: DISCONTINUED | OUTPATIENT
Start: 2025-02-06 | End: 2025-02-06 | Stop reason: HOSPADM

## 2025-02-06 RX ORDER — ONDANSETRON 2 MG/ML
8 INJECTION INTRAMUSCULAR; INTRAVENOUS ONCE
Status: COMPLETED | OUTPATIENT
Start: 2025-02-06 | End: 2025-02-06

## 2025-02-06 RX ADMIN — HEPARIN 3 ML: 100 SYRINGE at 07:43

## 2025-02-06 RX ADMIN — HEPARIN 5 ML: 100 SYRINGE at 11:10

## 2025-02-06 RX ADMIN — ONDANSETRON 8 MG: 2 INJECTION INTRAMUSCULAR; INTRAVENOUS at 08:40

## 2025-02-06 RX ADMIN — SODIUM CHLORIDE 160 MG: 9 INJECTION, SOLUTION INTRAVENOUS at 09:15

## 2025-02-06 RX ADMIN — PACLITAXEL 154 MG: 6 INJECTION, SOLUTION INTRAVENOUS at 10:05

## 2025-02-06 ASSESSMENT — PAIN SCALES - GENERAL: PAINLEVEL_OUTOF10: NO PAIN (0)

## 2025-02-06 NOTE — NURSING NOTE
Chief Complaint   Patient presents with    Port Draw     Labs drawn via port by RN in lab, vitals taken.     Oncology Clinic Visit     Breast CA     Labs drawn via port by RN. Port accessed with 20g, 3/4in, power needle. Flushed with saline and heparin. Pt tolerated well. Vitals taken. Pt checked into next appt.     Samia Jin RN

## 2025-02-06 NOTE — PATIENT INSTRUCTIONS
[facility] Triage and after hours / weekends / holidays:  [phone] option 5, option 2    Please call the triage or after hours line if you experience a temperature greater than or equal to 100.4, shaking chills, have uncontrolled nausea, vomiting and/or diarrhea, dizziness, shortness of breath, chest pain, bleeding, unexplained bruising, or if you have any other new/concerning symptoms, questions or concerns.      If you are having any concerning symptoms or wish to speak to a provider before your next infusion visit, please call triage to notify your care team so we can adequately serve you.     If you need a refill on a narcotic prescription or other medication, please call before your infusion appointment.

## 2025-02-06 NOTE — PROGRESS NOTES
Infusion Nursing Note:  Zenaida Qiu Reena presents today for C7D1 Trastzumab-qyyp/Taxol.    Patient seen by provider today: Yes: Dr. Cheung prior to infusion   present during visit today: Not Applicable.    Note: No questions or concerns following provider visit.    Patient ices hands and feet during taxol.    Intravenous Access:  Implanted Port.    Treatment Conditions:   Latest Reference Range & Units 02/06/25 07:38   Sodium 135 - 145 mmol/L 139   Potassium 3.4 - 5.3 mmol/L 4.0   Chloride 98 - 107 mmol/L 103   Carbon Dioxide (CO2) 22 - 29 mmol/L 26   Urea Nitrogen 8.0 - 23.0 mg/dL 19.0   Creatinine 0.51 - 0.95 mg/dL 0.62   GFR Estimate >60 mL/min/1.73m2 >90   Calcium 8.8 - 10.4 mg/dL 9.0   Anion Gap 7 - 15 mmol/L 10   Albumin 3.5 - 5.2 g/dL 4.0   Protein Total 6.4 - 8.3 g/dL 6.4   Alkaline Phosphatase 40 - 150 U/L 75   ALT 0 - 50 U/L 15   AST 0 - 45 U/L 16   Bilirubin Total <=1.2 mg/dL 0.2   Glucose 70 - 99 mg/dL 140 (H)   WBC 4.0 - 11.0 10e3/uL 4.8   Hemoglobin 11.7 - 15.7 g/dL 10.8 (L)   Hematocrit 35.0 - 47.0 % 32.0 (L)   Platelet Count 150 - 450 10e3/uL 304   RBC Count 3.80 - 5.20 10e6/uL 3.64 (L)   MCV 78 - 100 fL 88   MCH 26.5 - 33.0 pg 29.7   MCHC 31.5 - 36.5 g/dL 33.8   RDW 10.0 - 15.0 % 13.5   % Neutrophils % 48   % Lymphocytes % 38   % Monocytes % 7   % Eosinophils % 6   % Basophils % 1   Absolute Basophils 0.0 - 0.2 10e3/uL 0.0   Absolute Eosinophils 0.0 - 0.7 10e3/uL 0.3   Absolute Immature Granulocytes <=0.4 10e3/uL 0.0   Absolute Lymphocytes 0.8 - 5.3 10e3/uL 1.8   Absolute Monocytes 0.0 - 1.3 10e3/uL 0.3   % Immature Granulocytes % 0   Absolute Neutrophils 1.6 - 8.3 10e3/uL 2.3   Absolute NRBCs 10e3/uL 0.0   NRBCs per 100 WBC <1 /100 0     Results reviewed, labs MET treatment parameters, ok to proceed with treatment.  ECHO/MUGA completed 11/13/24  EF 60-65%.      Post Infusion Assessment:  Patient tolerated infusion without incident.  Blood return noted pre and post  infusion.  Site patent and intact, free from redness, edema or discomfort.  No evidence of extravasations.  Access discontinued per protocol.       Discharge Plan:   Discharge instructions reviewed with: Patient and Family.  Patient and/or family verbalized understanding of discharge instructions and all questions answered.  AVS to patient via [employer].  Patient will return 2/13 for next appointment.   Patient discharged in stable condition accompanied by: self and .  Departure Mode: Ambulatory.      Aurea Velez RN

## 2025-02-06 NOTE — LETTER
2/6/2025      Zenaida Valles  [address]  [facility] [address]      Dear Colleague,    Thank you for referring your patient, Zenaida Valles, to the [facility]. Please see a copy of my visit note below.    MEDICAL ONCOLOGY NOTE     Javier Wood MD  Professor   [facility]  [address] [place] [address]     Re. Zenaida Valles   Female, 74 year old, 1950  MRN: [id number]        Dear Dr. Wood,     Thank you for referring Zenaida Valles who is a 74-year-old woman with a new diagnosis of a screening identified stage I invasive ductal cancer of the upper outer quadrant of the left breast ER positive, WV positive, HER2 amplified stage [id number].       HISTORY OF PRESENT ILLNESS:    Zenaida has been in generally good health except for a right renal artery stenosis requiring a stent.  She also has a left leg injury recently she was in her usual state of good health and had a screening mammogram which showed a suspicious lesion in the upper outer quadrant of the left breast at the 3 o'clock position 5 cm from the nipple there is an irregular hypoechoic mass measuring 1.0 x 0.7 x 0.9 cm in size 1 cm from the nipple there is also a 0.5 x 0.4 cm area of suspicion     She underwent an ultrasound guided biopsy which showed usual ductal hyperplasia and fibrocystic changes microcysts in April Kren metaplasia.  Columnar cell changes.  It invasive ductal carcinoma was also found grade 2 and DCIS grade 2 solid type.  Her breast cancer was estrogen receptor positive progesterone receptor positive at HER2 2+ by IHC.  She then underwent ADRIANA which showed a subpopulation which was 60 to 70% ADRIANA 5 negative and 30 to 40% I-S age 1 positive with a 4.9 HER2 signals per nucleus and the ratio of 2.2 indicating HER2 positivity.     She then came to see Dr. Wood at [facility] for surgical recommendations.  She had a  contrast mammogram which showed a 0.9 cm enhancing lesion in the upper outer quadrant of the left breast.     She has had no weight loss or loss of energy.  She does not sleep during the day.  She can perform all of her household chores.  ECOG 0 PS.      DIAGNOSTIC AND TREATMENT SUMMARY:  On October 7 she underwent a screening mammogram which demonstrated an asymmetry in her left breast.       On October 15 she underwent a diagnostic mammogram which confirmed a left breast asymmetry.  She had an ultrasound which demonstrated 2 masses in her left breast.  At the 3 o'clock position 1 cm from the nipple there was a mass and at the 3 o'clock position 5 cm from the nipple there was a second mass.  Her lymph nodes were normal by ultrasound.       On October 23 she underwent a contrast-enhanced mammography which only demonstrated 1 mass measuring 1 cm.  The right breast was normal.  She underwent ultrasound-guided biopsies of both masses.  The mass at the 3 o'clock position 1 cm from the nipple was benign.  The mass at the 3 o'clock position 5 cm from the nipple demonstrating a grade 2 invasive ductal cancer that was ER positive and HER2 equivocal.  There is also DCIS present.          A. LEFT breast, 3:00, 1 cm from nipple, ultrasound-guided core biopsy:  - Benign breast tissue with usual ductal hyperplasia (UDH) and fibrocystic changes including microcysts, apocrine metaplasia, and columnar cell change  - Rare calcifications associated with benign breast ducts and acini  - Negative for atypia or malignancy      B. LEFT breast, 3:00, 5 cm from nipple, ultrasound-guided core biopsy:   - INVASIVE BREAST CARCINOMA OF NO SPECIAL TYPE (INVASIVE DUCTAL CARCINOMA), Mynor grade 2  - Ductal carcinoma in situ (DCIS), nuclear grade 2, solid type  - Invasive carcinoma is estrogen receptor positive, progesterone receptor positive, and HER2 equivocal (score 2+) by immunohistochemistry (see 'Breast Biomarker Reporting Template'  below)  - HER2 FISH is is process; results will be reportedly separately by cytogenetis  - See comment     This FISH analysis is performed in follow up to the reported equivocal (2+) HER2 findings by immunohistochemistry ([id number]).        RESULTS:     Ratio of HER2/MARION-17 signals  Zenaida Valles:  See Interpretation below     Majority (~60-70%) of tumor in area of strongest IHC staining:  Group 5 (ADRIANA Negative)  Avg. number HER2 signals/nucleus:  2.9  Avg. number MARION-17 signals/nucleus:  1.7  HER2/MARION 17 ratio:  1.7     Subpopulation (~30-40%) of tumor in area of strongest IHC staining:  Group 1 (ADRIANA Positive)                             Avg. number HER2 signals/nucleus:  4.9  Avg. number MARION-17 signals/nucleus:  2.3  HER2/MARION 17 ratio:  2.2     **Interpretive guidelines per the American Society of Clinical Oncology/College of American Pathologists Clinical Practice Guideline Update (Margarita VALENZUELA et al, 2023, Arch Pathol Lab Med 147:993):     -- Group 1: HER2/MARION-17 ratio 2.0 or more -AND- avg. number HER2 signals/nucleus 4.0 or more (ADRIANA Positive)  -- Group 2: HER2/MARION-17 ratio 2.0 or more -AND- avg. number HER2 signals/nucleus <4.0 (Additional work required)  -- Group 3: HER2/MARION-17 ratio <2.0 -AND- avg. number HER2 signals/nucleus 6.0 or more (Additional work required)  -- Group 4: HER2/MARION-17 ratio <2.0 -AND- avg. number HER2 signals/nucleus 4.0 or more and <6.0 (Additional work required)  -- Group 5: HER2/MARION-17 ratio <2.0 -AND- avg. number HER2 signals/nucleus <4.0 (ADRIANA Negative)     INTERPRETATION:  Two admixed populations of cells were found in this sample:     Majority (~60-70%) of tumor in area of strongest IHC staining:  Per the American Society of Clinical Oncology/College of American Pathologists Clinical Practice Guideline Focused Update (Margarita VALENZUELA et al, 2018, Arch Pathol Lab Med  doi:10.5858/arpa.1417-7946-CJ), the HER2/MARION 17 ratio of 1.7 and average number of HER2 signals/cell of 2.9 places  this population of cells in Group 5 (ADRIANA Negative).      Subpopulation (~30-40%) of tumor in area of strongest IHC staining:  Admixed within this sample were cells with increased HER2 signals, which, when selectively scored, had an average of 4.9 HER2 signals/nucleus and a HER2/MARION 17 ratio of 2.2; per the American Society of Clinical Oncology/College of American Pathologists Clinical Practice Guideline Focused Update (Margarita VALENZUELA et al, 2018, Arch Pathol Lab Med  doi:10.5858/arpa.5824-6852-GF), this population of cells falls into Group 1 (ADRIANA Positive).     PAST MEDICAL HISTORY: No history of breast surgery.  No history of breast cancer in the past.  No history of radiation therapy in the past or radiation exposure.  No history of tumor of any kind.  No history of heart problems, heart attack, breathing problems, blood clots, seizures, arthritis, peptic ulcer disease, osteoporosis or bone fractures.  She is not currently participating in a clinical trial.  She does have a history of asthma and uses an albuterol inhaler in cold weather.     Her past medical history is significant for diabetes mellitus and is on metformin, hypertension obesity, she has IPMN and a right renal artery stent.  The family history is negative for breast cancer.  She has a history of right renal artery stenosis and has a stent in place.     She has a recent history of a left hamstring injury.  She is seeing orthopedics for this problem.  She also has a history of back pain.  Her gait is affected but she can perform activities of daily living.  She has a history of aches in her shoulders.  She had a 65 pound weight loss over the last 2 years which was intentional related to improve diet and exercise. She says she has a history of fibromuscular dysplasia.     She worked in the [employer] in [place] and was exposed to dengue, malaria, hepatitis A, hepatitis B, encephalitis.  She has also had COVID and the flu.     FAMILY HISTORY:   No  history of ovarian, uterine, colon cancer, or melanoma.  No history of glioma, gastric cancer or pancreatic cancer.  Her mother did have Zollinger-Wheeler syndrome but the patient's genetics are negative for this syndrome.      PAST MENSTRUAL HISTORY:  Age of first menstrual period was 13.   She has been pregnant 1 times with 0 live births and 0 miscarriages and 1 .  Age at in place pregnancy age 28.   Uterus and ovaries are in place.  Last menstrual period was about age 52 and the menopause occurred naturally. No history of hormone replacement therapy.     She does have a history of thickened endometrial lining but by her report no abnormalities of the lining when she had a DIC     HABITS:  She is a never smoker but she has a history of exposure to secondhand smoke from her father as a child.  She consumes alcoholic beverages socially 1 drink every couple of months.     GERMLINE GENETICS: Genetic testing is available for 47 genes associated with cancers of the breast, ovary, uterus, prostate and gastrointestinal system: [facility] Common Hereditary Cancers panel (APC, SUMIT, AXIN2, BARD1, BMPR1A, BRCA1, BRCA2, BRIP1, CDH1, CDK4, CDKN2A, CHEK2, CTNNA1, DICER1, EPCAM, GREM1, HOXB13, KIT, MEN1, MLH1, MSH2, MSH3, MSH6, MUTYH, NBN, NF1, NTHL1, PALB2, PDGFRA, PMS2, POLD1, POLE, PTEN,RAD50, RAD51C, RAD51D, SDHA, SDHB, SDHC, SDHD, SMAD4, SMARCA4, STK11, TP53,TSC1, TSC2, VHL)      ALLERGIES: She has drug allergies to codeine, codeine derivatives, ibuprofen, lisinopril..  No allergy to seafood, iodine, or contrast dye.  She does take daily aspirin 81 mg because of her renal stent.     LUMPECTOMY:  Surgical Pathology Report                         Case: [id number]                                   Authorizing Provider:  Javier Wood MD         Collected:           2024 09:40 AM           Ordering Location:     [facility]  Received:            2024 09:54 AM                                   [place]                                                                   Pathologist:           Kristal Lanza MD                                                   Specimens:   A) - Breast, Left, Left breast lumpectomy                                                            B) - Lymph Node(s), [place], Left axillary sentinel lymph node #1                                  C) - Lymph Node(s), [place], Left axillary sentinel lymph node # 2                         Final Diagnosis   A. LEFT breast, RFID tag-localized lumpectomy:  -INVASIVE BREAST CARCINOMA OF NO SPECIAL TYPE (INVASIVE DUCTAL CARCINOMA), FAREED GRADE 2, size 11 mm  -Margins are uninvolved by invasive carcinoma  -Invasive carcinoma is 1 mm from the nearest (posterior) margin, 5 mm from the anterior margin, and > 5 mm from the superior, inferior, medial and lateral margins  -No lymphovascular invasion identified  -Other findings: fibrocystic change (including microcysts with apocrine metaplasia), sclerosing adenosis, and usual ductal hyperplasia  -Calcifications associated with benign acini  -Prior core biopsy site changes  -See tumor synoptic below     B. Lymph node, LEFT axillary, sentinel #1, excision:  -One benign lymph node (0/1)     C. Lymph node, LEFT axillary, sentinel #2, excision:  -One benign lymph node (0/1)                Synoptic Checklist   INVASIVE CARCINOMA OF THE BREAST: Resection   8th Edition - Protocol posted: 12/13/2023INVASIVE CARCINOMA OF THE BREAST: RESECTION - All Specimens                  SPECIMEN     Procedure   Excision (less than total mastectomy)     Specimen Laterality   Left     TUMOR     Tumor Site   Clock position         3 o'clock     Histologic Type   Invasive carcinoma of no special type (ductal)     Histologic Grade (Nikolai Histologic Score)         Glandular (Acinar) / Tubular Differentiation   Score 2     Nuclear Pleomorphism   Score 3     Mitotic Rate   Score 2     Overall Grade   Grade 2  (scores of 6 or 7)     Tumor Size   Greatest dimension of largest invasive focus (Millimeters): 11 mm     Additional Dimension (Millimeters)   9 mm         9 mm     Tumor Focality   Single focus of invasive carcinoma     Ductal Carcinoma In Situ (DCIS)   Not identified     Lobular Carcinoma In Situ (LCIS)   Not identified     Lymphatic and / or Vascular Invasion   Not identified     Dermal Lymphatic and / or Vascular Invasion   No skin present     Microcalcifications   Present in non-neoplastic tissue     Treatment Effect in the Breast   No known presurgical therapy     MARGINS     Margin Status for Invasive Carcinoma   All margins negative for invasive carcinoma     Distance from Invasive Carcinoma to Closest Margin   1 mm     Closest Margin(s) to Invasive Carcinoma   Posterior     Distance from Invasive Carcinoma to Anterior Margin   5 mm     Distance from Invasive Carcinoma to Superior Margin   Greater than: 5 mm     Distance from Invasive Carcinoma to Inferior Margin   Greater than: 5 mm     Distance from Invasive Carcinoma to Medial Margin   Greater than: 5 mm     Distance from Invasive Carcinoma to Lateral Margin   Greater than: 5 mm     REGIONAL LYMPH NODES     Regional Lymph Node Status   All regional lymph nodes negative for tumor     Total Number of Lymph Nodes Examined (sentinel and non-sentinel)   2     Number of [place] Nodes Examined   2     pTNM CLASSIFICATION (AJCC 8th Edition)     Reporting of pT, pN, and (when applicable) pM categories is based on information available to the pathologist at the time the report is issued. As per the AJCC (Chapter 1, 8th Ed.) it is the managing physician s responsibility to establish the final pathologic stage based upon all pertinent information, including but potentially not limited to this pathology report.     pT Category   pT1c     pN Category   pN0     N Suffix   (sn)     SPECIAL STUDIES               Estrogen Receptor (ER) Status   Positive (greater than 10%  "of cells demonstrate nuclear positivity)     Percentage of Cells with Nuclear Positivity   %               Progesterone Receptor (PgR) Status   Positive     Percentage of Cells with Nuclear Positivity   81-90%               HER2 (by immunohistochemistry)   Equivocal (Score 2+)     Percentage of Cells with Uniform Intense Complete Membrane Staining   0 %               HER2 (by in situ hybridization)   Positive (amplified)     Testing Performed on Case Number   HER2 FISH amplified in 30-40% of tumor cells. Performed on prior core biopsy [id number] specimen B     Comment(s)   Best block: A13   .      Clinical Information   ALEE   The patient is a 74 year old woman with LEFT breast carcinoma. Procedure: LEFT breast RFID tag localized lumpectomy, LEFT axillary sentinel lymph node biopsy.    Gross Description   ALEE ROBIN(1). Breast, Left, Left breast lumpectomy:  The specimen is received fresh with proper patient identification, labeled \"left breast lumpectomy\". It consists of 27.85 g, 6.7 cm from medial to lateral x 4.7 cm from superior to inferior x 2.4 cm from anterior to posterior left breast lumpectomy specimen. The specimen was received previously inked by the surgeon as follows:  Superior - red, anterior - green, inferior - blue, posterior - black, medial - yellow and lateral - orange.     The specimen is sectioned from medial (slice 1) to lateral (slice 15) into 15 slices.          [id number]   This FISH analysis is performed in follow up to the reported equivocal (2+) HER2 findings by immunohistochemistry ([id number]).        RESULTS:     Ratio of HER2/MARION-17 signals  Zenaida Valles:  See Interpretation below     Majority (~60-70%) of tumor in area of strongest IHC staining:  Group 5 (ADRIANA Negative)  Avg. number HER2 signals/nucleus:  2.9  Avg. number MARION-17 signals/nucleus:  1.7  HER2/MARION 17 ratio:  1.7     Subpopulation (~30-40%) of tumor in area of strongest IHC staining:  Group 1 (ADRIANA Positive)  "                            Avg. number HER2 signals/nucleus:  4.9  Avg. number MARION-17 signals/nucleus:  2.3  HER2/MARION 17 ratio:  2.2        TREATMENT HISTORY:  A.  Lumpectomy showed a stage I T1b NX MX invasive ductal carcinoma of the left breast upper outer quadrant which was estrogen receptor positive (%) progesterone receptor positive (81-90%) and HER2 2+ by IHC and HER2 positive by ADRIANA.  Margins negative for invasive carcinoma.    B.  Plan is to give the APT regimen followed by addition of hormonal therapy with letrozole for 7 years.         INTERVAL HISTORY:  She was seen in clinic today with her  Trae. She had her lumpectomy with Dr. Wood on November 25. She started adjuvant APT regimen on 12/27/2024.  She has some neuropathy pain in her toes.  She is deconditioned and has significant fatigue.  She has mild depression and anxiety which is held by her [employer] community which has been very supportive.  She is having difficulty with quilting which is major activity.    Her main symptom since starting chemo has been fatigue which is grade 1 and delayed by rest.  She takes occasional naps to help with this.  She has lost most of her hair.  Had some numbness and tingling in the first week of chemo. At present, only has numbness in left big toe. Appetite is poor.  She has had some mild nausea and diarrhea.  She has some constipation.  Had a few episodes of epistaxis which are getting better.  She has been exercising by walking.  Overall her exercise stamina is less.  She is taking a vitamin D supplement and drinking milk to meet her calcium requirements.  She does have a bovine collagen to her diet and increase protein.  She has been icing to prevent neuropathy.     She has an ECOG 0 performance status.     REVIEW OF SYSTEMS:   She has had no fevers, cough, chest pain, shortness of breath, mouth sores, hemoptysis, nausea, vomiting, abdominal pain, diarrhea, bone pain, back pain, muscle or joint  complaints, hearing loss or depression.  The remainder of a 10-point review of systems is negative.        PHYSICAL EXAMINATION:     VITALS:   /73 (BP Location: Right arm, Patient Position: Sitting, Cuff Size: Adult Large)   Pulse 73   Temp 97.6  F (36.4  C) (Oral)   Resp 18   Wt 79.3 kg (174 lb 14.4 oz)   LMP  (LMP Unknown)   SpO2 97%   BMI 29.10 kg/m    HEENT:  No alopecia, no lesions in the oropharynx.  Dentition is normal  LYMPH:  There is no palpable cervical, supraclavicular, or subclavicular lymphadenopathy.  BREASTS: deferred  LUNGS:  Clear to auscultation.  HEART:  Regular rate and rhythm.  S1, S2. No murmurs  ABDOMEN:  Soft, nontender, without hepatosplenomegaly.  EXTREMITIES:  Without edema.  PSYCH:  Mood and affect were normal.  NEUROLOGIC:  Mood and affect  normal. No focal deficits apparent.          LABORATORY DATA:   CMP was within normal limits except for glucose of 140.  CBC showed a WBC of 4.8, hemoglobin 10.8, platelets 304K.  Absolute neutrophil count 2300.     ASSESSMENT AND PLAN:    Zenaida Valles has a stage I T1b NX MX invasive ductal carcinoma of the left breast upper outer quadrant which was estrogen receptor positive (%) progesterone receptor positive (81-90%) and HER2 2+ by IHC and HER2 positive by ADRIANA. Margins negative for invasive carcinoma.    She's tolerating adjuvant HER2 directed therapy with trastuzumab and paclitaxel.   She does have some fatigue and decreased stamina.  Overall her exercise tolerance is decreased.  I did have discussion with her that there can be some minor irreversible reduction in exercise capacity based on the chemotherapy whereas the reduction in exercise capacity related to trastuzumab is likely to be reversible.  I discussed that we will see what the echocardiogram shows next week.  I also discussed that if there is some loss of ejection fraction we can potentially just her beta-blocker and try carvedilol.  She is on Avapro which is  an ARB and could be helpful.  Some neuropathy.  She has now been icing during chemotherapy.    We would also add adjuvant zoledronic acid.    We discussed toxicities of paclitaxel which could include alopecia, neuropathy which could last for 6 months to a year after infusion.    Discussion of hormonal therapy.  We have discussed 10 years of adjuvant hormonal therapy.  We discussed adjuvant zoledronic acid.  I discussed that postlumpectomy radiation will be recommended and this would be after completion of 12 weeks of paclitaxel and trastuzumab..  Will arrange for radiation oncology consult.  Discussion of adjuvant zoledronic acid.  Adjuvant zoledronic acid can be associated with flulike symptoms that can last for several days after treatment.  I also discussed that there can be dental complications.  She keeps her teeth and good shape with frequent dental visits.  She does have excellent dentition and the risk of osteonecrosis of the jaw is quite low.  History of Zollinger-Wheeler syndrome.    Diabetes.  She is on metformin.  Discussion of exercise.  Discussion of the Lace and Pathway.  We recommend 150 minutes of exercise per week with mixed cardio and strength training.  Stretch band, hand weights, yoga.  Diet.  I recommended a diet low in saturated fat, but not low in fat with more fruits and vegetables, and less in the way of red meat.  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  Follow up plan.  Right single lumen Powerport 12-26.  Started APT regimen with weekly paclitaxel and trastuzuamb beginning 12-27. CBC, CMP.  Follow-up with me alternating with an ALEC each cycle total of 12 cycles of paclitaxel and trastuzumab.  We will see her each cycle because of symptoms with treatment. .Echo in February.  Radiation oncology consultation.     Thank you for allowing us to participate in this patient's care.  The patient was seen and evaluated by me.  I discussed the patient with the resident Dr. Te Rainey  and agree with the findings and plan in the note.      Sincerely,      Chito Cheung MD  Professor  [facility]  [phone]     I spent 40 minutes with the patient more than 50% of which was in counseling and coordination of care. The remainder of a 10 point review of systems was negative.      Again, thank you for allowing me to participate in the care of your patient.        Sincerely,        Chito Cheung MD    Electronically signed

## 2025-02-06 NOTE — NURSING NOTE
"Oncology Rooming Note    February 6, 2025 7:53 AM   Zenaida Valles is a 75 year old female who presents for:    Chief Complaint   Patient presents with    Port Draw     Labs drawn via port by RN in lab, vitals taken.     Oncology Clinic Visit     Breast CA     Initial Vitals: /73 (BP Location: Right arm, Patient Position: Sitting, Cuff Size: Adult Large)   Pulse 73   Temp 97.6  F (36.4  C) (Oral)   Resp 18   Wt 79.3 kg (174 lb 14.4 oz)   LMP  (LMP Unknown)   SpO2 97%   BMI 29.10 kg/m   Estimated body mass index is 29.1 kg/m  as calculated from the following:    Height as of 1/2/25: 1.651 m (5' 5\").    Weight as of this encounter: 79.3 kg (174 lb 14.4 oz). Body surface area is 1.91 meters squared.  No Pain (0) Comment: Data Unavailable   No LMP recorded (lmp unknown). Patient is postmenopausal.  Allergies reviewed: Yes  Medications reviewed: Yes    Medications: Medication refills not needed today.  Pharmacy name entered into EPIC:    [facility] [place], [place] - [address] [facility] [place], [place] - [address]  [facility] [place], [place] - [address]    Frailty Screening:   Is the patient here for a new oncology consult visit in cancer care? 2. No      Clinical concerns:        Cristina Vaughn              "

## 2025-02-13 ENCOUNTER — ONCOLOGY VISIT (OUTPATIENT)
Dept: ONCOLOGY | Facility: CLINIC | Age: 75
End: 2025-02-13
Attending: STUDENT IN AN ORGANIZED HEALTH CARE EDUCATION/TRAINING PROGRAM
Payer: COMMERCIAL

## 2025-02-13 ENCOUNTER — APPOINTMENT (OUTPATIENT)
Dept: LAB | Facility: CLINIC | Age: 75
End: 2025-02-13
Attending: INTERNAL MEDICINE
Payer: COMMERCIAL

## 2025-02-13 ENCOUNTER — ANCILLARY PROCEDURE (OUTPATIENT)
Dept: CARDIOLOGY | Facility: CLINIC | Age: 75
End: 2025-02-13
Attending: INTERNAL MEDICINE
Payer: COMMERCIAL

## 2025-02-13 ENCOUNTER — INFUSION THERAPY VISIT (OUTPATIENT)
Dept: ONCOLOGY | Facility: CLINIC | Age: 75
End: 2025-02-13
Attending: INTERNAL MEDICINE
Payer: COMMERCIAL

## 2025-02-13 VITALS
SYSTOLIC BLOOD PRESSURE: 128 MMHG | BODY MASS INDEX: 28.86 KG/M2 | TEMPERATURE: 97.4 F | DIASTOLIC BLOOD PRESSURE: 84 MMHG | WEIGHT: 173.4 LBS | HEART RATE: 69 BPM | OXYGEN SATURATION: 95 % | RESPIRATION RATE: 18 BRPM

## 2025-02-13 DIAGNOSIS — C50.912 INVASIVE DUCTAL CARCINOMA OF BREAST, FEMALE, LEFT (H): Primary | ICD-10-CM

## 2025-02-13 DIAGNOSIS — Z51.11 ENCOUNTER FOR ANTINEOPLASTIC CHEMOTHERAPY: ICD-10-CM

## 2025-02-13 DIAGNOSIS — C50.912 INVASIVE DUCTAL CARCINOMA OF BREAST, FEMALE, LEFT (H): ICD-10-CM

## 2025-02-13 LAB
ALBUMIN SERPL BCG-MCNC: 4.2 G/DL (ref 3.5–5.2)
ALP SERPL-CCNC: 76 U/L (ref 40–150)
ALT SERPL W P-5'-P-CCNC: 20 U/L (ref 0–50)
ANION GAP SERPL CALCULATED.3IONS-SCNC: 10 MMOL/L (ref 7–15)
AST SERPL W P-5'-P-CCNC: 19 U/L (ref 0–45)
BASOPHILS # BLD AUTO: 0 10E3/UL (ref 0–0.2)
BASOPHILS NFR BLD AUTO: 1 %
BILIRUB DIRECT SERPL-MCNC: <0.2 MG/DL (ref 0–0.3)
BILIRUB SERPL-MCNC: 0.2 MG/DL
BUN SERPL-MCNC: 14.6 MG/DL (ref 8–23)
CALCIUM SERPL-MCNC: 9 MG/DL (ref 8.8–10.4)
CHLORIDE SERPL-SCNC: 103 MMOL/L (ref 98–107)
CREAT SERPL-MCNC: 0.55 MG/DL (ref 0.51–0.95)
EGFRCR SERPLBLD CKD-EPI 2021: >90 ML/MIN/1.73M2
EOSINOPHIL # BLD AUTO: 0.2 10E3/UL (ref 0–0.7)
EOSINOPHIL NFR BLD AUTO: 4 %
ERYTHROCYTE [DISTWIDTH] IN BLOOD BY AUTOMATED COUNT: 14 % (ref 10–15)
GLUCOSE SERPL-MCNC: 134 MG/DL (ref 70–99)
HCO3 SERPL-SCNC: 27 MMOL/L (ref 22–29)
HCT VFR BLD AUTO: 33 % (ref 35–47)
HGB BLD-MCNC: 11.1 G/DL (ref 11.7–15.7)
IMM GRANULOCYTES # BLD: 0 10E3/UL
IMM GRANULOCYTES NFR BLD: 0 %
LVEF ECHO: NORMAL
LYMPHOCYTES # BLD AUTO: 2.1 10E3/UL (ref 0.8–5.3)
LYMPHOCYTES NFR BLD AUTO: 41 %
MCH RBC QN AUTO: 29.8 PG (ref 26.5–33)
MCHC RBC AUTO-ENTMCNC: 33.6 G/DL (ref 31.5–36.5)
MCV RBC AUTO: 89 FL (ref 78–100)
MONOCYTES # BLD AUTO: 0.3 10E3/UL (ref 0–1.3)
MONOCYTES NFR BLD AUTO: 6 %
NEUTROPHILS # BLD AUTO: 2.5 10E3/UL (ref 1.6–8.3)
NEUTROPHILS NFR BLD AUTO: 48 %
NRBC # BLD AUTO: 0 10E3/UL
NRBC BLD AUTO-RTO: 0 /100
PLATELET # BLD AUTO: 307 10E3/UL (ref 150–450)
POTASSIUM SERPL-SCNC: 3.9 MMOL/L (ref 3.4–5.3)
PROT SERPL-MCNC: 6.6 G/DL (ref 6.4–8.3)
RBC # BLD AUTO: 3.73 10E6/UL (ref 3.8–5.2)
SODIUM SERPL-SCNC: 140 MMOL/L (ref 135–145)
WBC # BLD AUTO: 5.2 10E3/UL (ref 4–11)

## 2025-02-13 PROCEDURE — 82247 BILIRUBIN TOTAL: CPT | Performed by: STUDENT IN AN ORGANIZED HEALTH CARE EDUCATION/TRAINING PROGRAM

## 2025-02-13 PROCEDURE — 93306 TTE W/DOPPLER COMPLETE: CPT | Performed by: INTERNAL MEDICINE

## 2025-02-13 PROCEDURE — 85004 AUTOMATED DIFF WBC COUNT: CPT | Performed by: NURSE PRACTITIONER

## 2025-02-13 PROCEDURE — 258N000003 HC RX IP 258 OP 636: Performed by: STUDENT IN AN ORGANIZED HEALTH CARE EDUCATION/TRAINING PROGRAM

## 2025-02-13 PROCEDURE — 250N000011 HC RX IP 250 OP 636: Performed by: STUDENT IN AN ORGANIZED HEALTH CARE EDUCATION/TRAINING PROGRAM

## 2025-02-13 PROCEDURE — 93356 MYOCRD STRAIN IMG SPCKL TRCK: CPT | Performed by: INTERNAL MEDICINE

## 2025-02-13 PROCEDURE — 82248 BILIRUBIN DIRECT: CPT | Performed by: NURSE PRACTITIONER

## 2025-02-13 PROCEDURE — G0463 HOSPITAL OUTPT CLINIC VISIT: HCPCS | Performed by: STUDENT IN AN ORGANIZED HEALTH CARE EDUCATION/TRAINING PROGRAM

## 2025-02-13 PROCEDURE — 36591 DRAW BLOOD OFF VENOUS DEVICE: CPT | Performed by: NURSE PRACTITIONER

## 2025-02-13 RX ORDER — DIPHENHYDRAMINE HYDROCHLORIDE 50 MG/ML
50 INJECTION INTRAMUSCULAR; INTRAVENOUS
Status: CANCELLED
Start: 2025-02-14

## 2025-02-13 RX ORDER — DIPHENHYDRAMINE HYDROCHLORIDE 50 MG/ML
25 INJECTION INTRAMUSCULAR; INTRAVENOUS
Status: CANCELLED
Start: 2025-02-14

## 2025-02-13 RX ORDER — HEPARIN SODIUM (PORCINE) LOCK FLUSH IV SOLN 100 UNIT/ML 100 UNIT/ML
5 SOLUTION INTRAVENOUS DAILY PRN
Status: DISCONTINUED | OUTPATIENT
Start: 2025-02-13 | End: 2025-02-13 | Stop reason: HOSPADM

## 2025-02-13 RX ORDER — HEPARIN SODIUM,PORCINE 10 UNIT/ML
5-20 VIAL (ML) INTRAVENOUS DAILY PRN
Status: CANCELLED | OUTPATIENT
Start: 2025-02-14

## 2025-02-13 RX ORDER — ALBUTEROL SULFATE 0.83 MG/ML
2.5 SOLUTION RESPIRATORY (INHALATION)
Status: CANCELLED | OUTPATIENT
Start: 2025-02-14

## 2025-02-13 RX ORDER — ACETAMINOPHEN 325 MG/1
650 TABLET ORAL
Status: CANCELLED | OUTPATIENT
Start: 2025-02-14

## 2025-02-13 RX ORDER — ONDANSETRON 2 MG/ML
8 INJECTION INTRAMUSCULAR; INTRAVENOUS ONCE
Status: COMPLETED | OUTPATIENT
Start: 2025-02-13 | End: 2025-02-13

## 2025-02-13 RX ORDER — METHYLPREDNISOLONE SODIUM SUCCINATE 40 MG/ML
40 INJECTION INTRAMUSCULAR; INTRAVENOUS
Status: CANCELLED
Start: 2025-02-14

## 2025-02-13 RX ORDER — DIPHENHYDRAMINE HCL 25 MG
50 CAPSULE ORAL
Status: CANCELLED
Start: 2025-02-14

## 2025-02-13 RX ORDER — ALBUTEROL SULFATE 90 UG/1
1-2 INHALANT RESPIRATORY (INHALATION)
Status: CANCELLED
Start: 2025-02-14

## 2025-02-13 RX ORDER — EPINEPHRINE 1 MG/ML
0.3 INJECTION, SOLUTION INTRAMUSCULAR; SUBCUTANEOUS EVERY 5 MIN PRN
Status: CANCELLED | OUTPATIENT
Start: 2025-02-14

## 2025-02-13 RX ORDER — HEPARIN SODIUM (PORCINE) LOCK FLUSH IV SOLN 100 UNIT/ML 100 UNIT/ML
5 SOLUTION INTRAVENOUS
Status: CANCELLED | OUTPATIENT
Start: 2025-02-14

## 2025-02-13 RX ORDER — LORAZEPAM 2 MG/ML
0.5 INJECTION INTRAMUSCULAR EVERY 4 HOURS PRN
Status: CANCELLED | OUTPATIENT
Start: 2025-02-14

## 2025-02-13 RX ORDER — HEPARIN SODIUM (PORCINE) LOCK FLUSH IV SOLN 100 UNIT/ML 100 UNIT/ML
5 SOLUTION INTRAVENOUS
Status: DISCONTINUED | OUTPATIENT
Start: 2025-02-13 | End: 2025-02-13 | Stop reason: HOSPADM

## 2025-02-13 RX ORDER — ONDANSETRON 2 MG/ML
8 INJECTION INTRAMUSCULAR; INTRAVENOUS ONCE
Status: CANCELLED | OUTPATIENT
Start: 2025-02-14

## 2025-02-13 RX ORDER — MEPERIDINE HYDROCHLORIDE 25 MG/ML
25 INJECTION INTRAMUSCULAR; INTRAVENOUS; SUBCUTANEOUS
Status: CANCELLED | OUTPATIENT
Start: 2025-02-14

## 2025-02-13 RX ADMIN — HEPARIN 5 ML: 100 SYRINGE at 11:16

## 2025-02-13 RX ADMIN — HEPARIN 5 ML: 100 SYRINGE at 14:29

## 2025-02-13 RX ADMIN — SODIUM CHLORIDE 160 MG: 0.9 INJECTION, SOLUTION INTRAVENOUS at 12:51

## 2025-02-13 RX ADMIN — ONDANSETRON 8 MG: 2 INJECTION INTRAMUSCULAR; INTRAVENOUS at 12:35

## 2025-02-13 RX ADMIN — PACLITAXEL 154 MG: 6 INJECTION, SOLUTION INTRAVENOUS at 13:23

## 2025-02-13 ASSESSMENT — PAIN SCALES - GENERAL: PAINLEVEL_OUTOF10: NO PAIN (0)

## 2025-02-13 NOTE — PATIENT INSTRUCTIONS
[facility] Triage and after hours / weekends / holidays:  [phone] option 5, option 2    Please call the triage or after hours line if you experience a temperature greater than or equal to 100.4, shaking chills, have uncontrolled nausea, vomiting and/or diarrhea, dizziness, shortness of breath, chest pain, bleeding, unexplained bruising, or if you have any other new/concerning symptoms, questions or concerns.      If you are having any concerning symptoms or wish to speak to a provider before your next infusion visit, please call triage to notify your care team so we can adequately serve you.     If you need a refill on a narcotic prescription or other medication, please call before your infusion appointment.

## 2025-02-13 NOTE — NURSING NOTE
"Oncology Rooming Note    February 13, 2025 11:28 AM   Zenaida Valles is a 75 year old female who presents for:    Chief Complaint   Patient presents with    Oncology Clinic Visit     Breast Cancer    Port Draw     Labs drawn via port by RN in lab. VS taken.      Initial Vitals: /84   Pulse 69   Temp 97.4  F (36.3  C) (Oral)   Resp 18   Wt 78.7 kg (173 lb 6.4 oz)   LMP  (LMP Unknown)   SpO2 95%   BMI 28.86 kg/m   Estimated body mass index is 28.86 kg/m  as calculated from the following:    Height as of 1/2/25: 1.651 m (5' 5\").    Weight as of this encounter: 78.7 kg (173 lb 6.4 oz). Body surface area is 1.9 meters squared.  No Pain (0) Comment: Data Unavailable   No LMP recorded (lmp unknown). Patient is postmenopausal.  Allergies reviewed: Yes  Medications reviewed: Yes    Medications: Medication refills not needed today.  Pharmacy name entered into [employer]:    [facility] [place], [place] - [address] [facility] [place], [place] - [address] [place] [address] [facility], [place] - [address] [place] [address]    Frailty Screening:   Is the patient here for a new oncology consult visit in cancer care? 2. No    PHQ9:  Did this patient require a PHQ9?: No      Clinical concerns:   Patient reports having intense fatigue as of late.     The neuropathy in her feet is beginning to interfere with her sleep.     Patient made an appointment to see Radiology, was wondering if she can get an idea of her current treatment plan to be able to make her schedule for the upcoming week.      Rafael Butler LPN              "

## 2025-02-13 NOTE — LETTER
2/13/2025      Zenaida Valles  [address] [facility] [address]      Dear Colleague,    Thank you for referring your patient, Zenaida Valles, to the [facility]. Please see a copy of my visit note below.    MEDICAL ONCOLOGY NOTE     Re. Zenaida Valles   Female, 74 year old, 1950  MRN: [id number]     Diagnosis: Screening identified stage I invasive ductal cancer of the upper outer quadrant of the left breast ER positive, VA positive, HER2 amplified stage [id number].       HISTORY OF PRESENT ILLNESS:    Zenaida has been in generally good health except for a right renal artery stenosis requiring a stent.  She also has a left leg injury recently she was in her usual state of good health and had a screening mammogram which showed a suspicious lesion in the upper outer quadrant of the left breast at the 3 o'clock position 5 cm from the nipple there is an irregular hypoechoic mass measuring 1.0 x 0.7 x 0.9 cm in size 1 cm from the nipple there is also a 0.5 x 0.4 cm area of suspicion     She underwent an ultrasound guided biopsy which showed usual ductal hyperplasia and fibrocystic changes microcysts in April Kren metaplasia.  Columnar cell changes.  It invasive ductal carcinoma was also found grade 2 and DCIS grade 2 solid type.  Her breast cancer was estrogen receptor positive progesterone receptor positive at HER2 2+ by IHC.  She then underwent ADRIANA which showed a subpopulation which was 60 to 70% ADRIANA 5 negative and 30 to 40% I-S age 1 positive with a 4.9 HER2 signals per nucleus and the ratio of 2.2 indicating HER2 positivity.     She then came to see Dr. Wood at [facility] for surgical recommendations.  She had a contrast mammogram which showed a 0.9 cm enhancing lesion in the upper outer quadrant of the left breast.     DIAGNOSTIC AND TREATMENT SUMMARY:  On October 7 she underwent a screening mammogram which demonstrated  an asymmetry in her left breast.       On October 15 she underwent a diagnostic mammogram which confirmed a left breast asymmetry.  She had an ultrasound which demonstrated 2 masses in her left breast.  At the 3 o'clock position 1 cm from the nipple there was a mass and at the 3 o'clock position 5 cm from the nipple there was a second mass.  Her lymph nodes were normal by ultrasound.       On October 23 she underwent a contrast-enhanced mammography which only demonstrated 1 mass measuring 1 cm.  The right breast was normal.  She underwent ultrasound-guided biopsies of both masses.  The mass at the 3 o'clock position 1 cm from the nipple was benign.  The mass at the 3 o'clock position 5 cm from the nipple demonstrating a grade 2 invasive ductal cancer that was ER positive and HER2 equivocal.  There is also DCIS present.       A. LEFT breast, 3:00, 1 cm from nipple, ultrasound-guided core biopsy:  - Benign breast tissue with usual ductal hyperplasia (UDH) and fibrocystic changes including microcysts, apocrine metaplasia, and columnar cell change  - Rare calcifications associated with benign breast ducts and acini  - Negative for atypia or malignancy      B. LEFT breast, 3:00, 5 cm from nipple, ultrasound-guided core biopsy:   - INVASIVE BREAST CARCINOMA OF NO SPECIAL TYPE (INVASIVE DUCTAL CARCINOMA), [place] grade 2  - Ductal carcinoma in situ (DCIS), nuclear grade 2, solid type  - Invasive carcinoma is estrogen receptor positive, progesterone receptor positive, and HER2 equivocal (score 2+) by immunohistochemistry (see 'Breast Biomarker Reporting Template' below)  - HER2 FISH is is process; results will be reportedly separately by cytogenetis  - See comment     This FISH analysis is performed in follow up to the reported equivocal (2+) HER2 findings by immunohistochemistry ([id number]).     RESULTS:     Ratio of HER2/MARION-17 signals  Zenaida Valles:  See Interpretation below     Majority (~60-70%) of tumor in  area of strongest IHC staining:  Group 5 (ADRIANA Negative)  Avg. number HER2 signals/nucleus:  2.9  Avg. number MARION-17 signals/nucleus:  1.7  HER2/MARION 17 ratio:  1.7     Subpopulation (~30-40%) of tumor in area of strongest IHC staining:  Group 1 (ADRIANA Positive)                             Avg. number HER2 signals/nucleus:  4.9  Avg. number MARION-17 signals/nucleus:  2.3  HER2/MARION 17 ratio:  2.2     **Interpretive guidelines per the American Society of Clinical Oncology/College of American Pathologists Clinical Practice Guideline Update (Margarita VALENZUELA et al, 2023, Arch Pathol Lab Med 147:993):     -- Group 1: HER2/MARION-17 ratio 2.0 or more -AND- avg. number HER2 signals/nucleus 4.0 or more (ADRIANA Positive)  -- Group 2: HER2/MARION-17 ratio 2.0 or more -AND- avg. number HER2 signals/nucleus <4.0 (Additional work required)  -- Group 3: HER2/MARION-17 ratio <2.0 -AND- avg. number HER2 signals/nucleus 6.0 or more (Additional work required)  -- Group 4: HER2/MARION-17 ratio <2.0 -AND- avg. number HER2 signals/nucleus 4.0 or more and <6.0 (Additional work required)  -- Group 5: HER2/MARION-17 ratio <2.0 -AND- avg. number HER2 signals/nucleus <4.0 (ADRIANA Negative)     INTERPRETATION:  Two admixed populations of cells were found in this sample:     Majority (~60-70%) of tumor in area of strongest IHC staining:  Per the American Society of Clinical Oncology/College of American Pathologists Clinical Practice Guideline Focused Update (Margarita VALENZUELA et al, 2018, Arch Pathol Lab Med  doi:10.5858/arpa.9751-7429-IY), the HER2/MARION 17 ratio of 1.7 and average number of HER2 signals/cell of 2.9 places this population of cells in Group 5 (ADRIANA Negative).      Subpopulation (~30-40%) of tumor in area of strongest IHC staining:  Admixed within this sample were cells with increased HER2 signals, which, when selectively scored, had an average of 4.9 HER2 signals/nucleus and a HER2/MARION 17 ratio of 2.2; per the American Society of Clinical Oncology/College of American  Pathologists Clinical Practice Guideline Focused Update (Margarita VALENZUELA et al, 2018, Arch Pathol Lab Med  doi:10.5858/arpa.6063-4498-JS), this population of cells falls into Group 1 (ADRIANA Positive).     I reviewed history in chart:  PAST MEDICAL HISTORY: No history of breast surgery.  No history of breast cancer in the past.  No history of radiation therapy in the past or radiation exposure.  No history of tumor of any kind.  No history of heart problems, heart attack, breathing problems, blood clots, seizures, arthritis, peptic ulcer disease, osteoporosis or bone fractures.  She is not currently participating in a clinical trial.  She does have a history of asthma and uses an albuterol inhaler in cold weather.     Her past medical history is significant for diabetes mellitus and is on metformin, hypertension obesity, she has IPMN and a right renal artery stent.  The family history is negative for breast cancer.  She has a history of right renal artery stenosis and has a stent in place.     She has a recent history of a left hamstring injury.  She is seeing orthopedics for this problem.  She also has a history of back pain.  Her gait is affected but she can perform activities of daily living.  She has a history of aches in her shoulders.  She had a 65 pound weight loss over the last 2 years which was intentional related to improve diet and exercise.  She says she has a history of fibromuscular dysplasia.     She worked in the [employer] in [place] and was exposed to dengue, malaria, hepatitis A, hepatitis B, encephalitis.  She has also had COVID and the flu.     FAMILY HISTORY:   No history of ovarian, uterine, colon cancer, or melanoma.  No history of glioma, gastric cancer or pancreatic cancer.  Her mother did have Zollinger-Wheeler syndrome but the patient's genetics are negative for this syndrome.      PAST MENSTRUAL HISTORY:  Age of first menstrual period was 13.   She has been pregnant 1 times with 0 live births  and 0 miscarriages and 1 .  Age at in place pregnancy age 28.   Uterus and ovaries are in place.  Last menstrual period was about age 52 and the menopause occurred naturally. No history of hormone replacement therapy.     She does have a history of thickened endometrial lining but by her report no abnormalities of the lining when she had a DIC     HABITS:  She is a never smoker but she has a history of exposure to secondhand smoke from her father as a child.  She consumes alcoholic beverages socially 1 drink every couple of months.     GERMLINE GENETICS: pending     ALLERGIES: She has drug allergies to codeine, codeine derivatives, ibuprofen, lisinopril..  No allergy to seafood, iodine, or contrast dye.  She does take daily aspirin 81 mg because of her renal stent.    TREATMENT HISTORY:  A.  Lumpectomy showed a stage I T1b NX MX invasive ductal carcinoma of the left breast upper outer quadrant which was estrogen receptor positive (%) progesterone receptor positive (81-90%) and HER2 2+ by IHC and HER2 positive by ADRIANA.  Margins negative for invasive carcinoma.    B.  Plan is to give the APT regimen followed by addition of hormonal therapy with letrozole for 7 years.     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  They were in [facility] [facility] with the [employer] for two years.      INTERVAL HISTORY:  Zenaida is seen for evaluation and toxicity check prior to week 8 of weekly paclitaxel plus trastuzumab.  -She did have a lot of fatigue this Monday and in general deals with brain fog where she feels like she has to think through things longer. She had a busy weekend and was at the cabin.   -She is exceeding goal of 150 mins of exercise per week  -She does have symptoms of tingling/burning to feet at night. This comes and goes; not constant. Less noticeable during the day. No impact to ambulation or balance  -Having BM 4x per week with use of 2 tabs of senna  -Eating ok. Appetite is less than baseline but able to  eat  -Headaches are reduced in frequency to 1-2x per week  -No mouth sores  -No new lumps or bumps  -No cough or fever  -some blood with blowing nose in AM but no dripping nosebleeds  -Some swelling at ankles after infusions       PHYSICAL EXAMINATION:     /84   Pulse 69   Temp 97.4  F (36.3  C) (Oral)   Resp 18   Wt 78.7 kg (173 lb 6.4 oz)   LMP  (LMP Unknown)   SpO2 95%   BMI 28.86 kg/m    Wt Readings from Last 10 Encounters:   02/13/25 78.7 kg (173 lb 6.4 oz)   02/06/25 79.3 kg (174 lb 14.4 oz)   01/30/25 78.5 kg (173 lb)   01/23/25 79.2 kg (174 lb 9.6 oz)   01/16/25 78.5 kg (173 lb)   01/09/25 77.1 kg (170 lb)   01/03/25 77.4 kg (170 lb 9.6 oz)   01/02/25 78.9 kg (174 lb)   12/27/24 78.2 kg (172 lb 4.8 oz)   12/27/24 79 kg (174 lb 1.6 oz)   General: Female in no acute distress.  Eyes: EOMI. No scleral icterus or conjunctival injection.  ENT: Oral mucosa is moist without lesions or thrush.   Lymphatic: Neck is supple without cervical or supraclavicular lymphadenopathy. No palpable axillary masses.  Cardiovascular: RRR No murmurs. No peripheral edema.  Respiratory: CTA bilaterally. No wheezes or crackles.  Gastrointestinal: BS +. Abdomen soft, non-tender.  Neurologic: Cranial nerves II through XII are grossly intact.  Skin: No rashes, petechiae, or bruising noted on exposed skin.  Psych: Affect appropriate. pleasant    LABORATORY DATA:    Most Recent 3 CBC's:  Recent Labs   Lab Test 02/13/25  1122 02/06/25  0738 01/30/25  0838   WBC 5.2 4.8 5.2   HGB 11.1* 10.8* 11.3*   MCV 89 88 88    304 309   ANEUTAUTO 2.5 2.3 2.6     Most Recent 3 BMP's:  Recent Labs   Lab Test 02/13/25  1122 02/06/25  0738 01/30/25  0838    139 139   POTASSIUM 3.9 4.0 4.0   CHLORIDE 103 103 103   CO2 27 26 27   BUN 14.6 19.0 19.8   CR 0.55 0.62 0.62   ANIONGAP 10 10 9   GAEL 9.0 9.0 8.9   * 140* 162*   PROTTOTAL 6.6 6.4 6.7   ALBUMIN 4.2 4.0 4.1    Most Recent 3 LFT's:  Recent Labs   Lab Test 02/13/25  1122  25  0738 25  0838   AST 19 16 19   ALT 20 15 25   ALKPHOS 76 75 81   BILITOTAL 0.2 0.2 0.2     I reviewed the above labs today.    IMAGING:  Todays echo    Echocardiogram Complete  [id number]  [id number]  [id number]  [id number]^NAMAN^AC^JALEN     [facility]  Diagnostic and Treatment-3rd Floor  [address] [place] [address]     Name: KENDRA INTERIANO  MRN: [id number]  : 1950  Study Date: 2025 09:54 AM  Age: 75 yrs  Gender: Female  Patient Location: [facility]  Reason For Study: Encounter for antineoplastic chemotherapy  Ordering Physician: AC FLORENCE  Referring Physician: AC FLORENCE  Performed By: Maryjane Montgomery     BSA: 1.9 m2  Height: 65 in  Weight: 174 lb  BP: 122/85 mmHg  ______________________________________________________________________________  Procedure  Echocardiogram with two-dimensional, color and spectral Doppler.  ______________________________________________________________________________  Interpretation Summary  Left ventricular size, wall motion and function are normal. The ejection  fraction is 60-65%.     Global peak LV longitudinal strain is averaged at -19%. This is within  reported normal limits (normal <-18%).     Global right ventricular function is normal.  The right ventricle is normal size.     No pericardial effusion is present.     The inferior vena cava is normal.     Compared to prior imaging on  there are no hemodynamically  significant differences.     ______________________________________________________________________________  Left Ventricle  Left ventricular size, wall motion and function are normal. The ejection  fraction is 60-65%. Global peak LV longitudinal strain is averaged at -19%.  This is within reported normal limits (normal <-18%). Left ventricular  diastolic function is not assessable.     Right Ventricle  Global right ventricular function  is normal. The right ventricle is normal  size.     Atria  The atria cannot be assessed.     Mitral Valve  The mitral valve is normal. Trace mitral insufficiency is present.     Aortic Valve  Aortic valve is normal in structure and function.     Tricuspid Valve  The tricuspid valve is normal. Trace tricuspid insufficiency is present. The  peak velocity of the tricuspid regurgitant jet is not obtainable. Pulmonary  artery systolic pressure cannot be assessed.     Pulmonic Valve  The pulmonic valve is normal. Trace pulmonic insufficiency is present.     Vessels  The inferior vena cava is normal. IVC diameter <2.1 cm collapsing >50% with  sniff suggests a normal RA pressure of 3 mmHg.     Pericardium  No pericardial effusion is present.     Compared to Previous Study  Compared to prior imaging on 11/13/224 there are no hemodynamically  significant differences.  ______________________________________________________________________________  Doppler Measurements & Calculations  PA acc time: 0.12 sec     Measurements from [facility]  LV GLS Endo Peak A2C (AS): -19.4 %  LV GLS Endo Peak A3C (AS): -18.4 %  LV GLS Endo Peak A4C (AS): -19.2 %  LV GLS Endo Peak Avg (AS): -19.0 %     ______________________________________________________________________________  Report approved by: Deuce Martínez MD on 02/13/2025 10:43 AM          I reviewed the above imaging today.     ASSESSMENT AND PLAN:    Stage I T1b NX MX invasive ductal carcinoma of the left breast upper outer quadrant which was estrogen receptor positive (%) progesterone receptor positive (81-90%) and HER2 2+ by IHC and HER2 positive by ADRIANA. Margins negative for invasive carcinoma.    -Post lumpectomy on 11/25/24.  -Port in place   -Echo 11/13/24 with 60-65% LVEF.  She had a repeat echo today with stable EF  -She is noting ongoing neuropathy (grade 1) with no functional impairment. She is clinically appropriate to continue adjuvant HER2 directed therapy with  trastuzumab and paclitaxel C8 today.  -She will begin hormonal therapy with an aromatase inhibitor after completion of chemotherapy and that the aromatase inhibitor treatment would be recommended for 10 years given the lack of data on shorter adjuvant therapy for hormone receptor positive HER2 positive breast cancer.  -She will complete every 3-week trastuzumab for 1 year of therapy.   -Postlumpectomy radiation will be recommended.     Peripheral neuropathy  Grade 1 to feet  Continue to monitor closely with paclitaxel; no dose reduction needed today  Ok to trial topical capsaicin at bedtime; she will consider this    Constipation:   -Continues to use Senna with benefit.   -No new issues.     Fatigue:  -Related to treatment.  She is mildly anemic but this is stable. She is continuing to meet her exercise goal     Low appetite/food aversions:  -Related to chemo; previously discussed adding protein powder to foods last week, and she has started to do this.   -Weight is stable.     Bone Health:  -She and Dr. Cheung discussed use of adjuvant zoledronic acid.    -She will work on dental clearance. She sees her dentist 2/19. I have asked [facility] to bring her the letter to take to this visit before she leaves   -Weight bearing exersise recommended.    Follow Up/Plan:  -Proceed with cycle 8  trastuzumab and paclitaxel today  -RTC weekly with provider prior to treatment    45 minutes spent on the date of the encounter doing chart review, review of test results, interpretation of tests, patient visit, and documentation       Amber Scheierl, CNP  [facility] [address] [address]  [phone]                     Again, thank you for allowing me to participate in the care of your patient.        Sincerely,        Amber J. Scheierl, APRN CNP    Electronically signed

## 2025-02-13 NOTE — TELEPHONE ENCOUNTER
MEDICAL RECORDS REQUEST   Radiation Oncology  [address] [facility] [address]  Fax: [phone]          FUTURE VISIT INFORMATION                                                   Zenaida Faust Lazarus Valles, : 1950 scheduled for future visit at [facility] Radiation Oncology    RECORDS REQUESTED FOR VISIT                                                     BREAST     OFFICE NOTE from medical oncologist Epic 25: Dr. Chito Cheung   OFFICE NOTE from surgeon/plastic surgeon [facility] 24: Dr. Javier Wood   OPERATIVE/BREAST BIOPSY REPORTS [facility] 24: LUMPECTOMY   10/23/24: US Breast Bx    MEDICATION LIST [facility]    LABS     PATHOLOGY REPORTS Report in Epic 24: [id number]   10/23/24: [id number]    ANYTHING RELATED TO DIAGNOSIS Epic Most recent 25   IMAGING (NEED IMAGES & REPORT)     MAMMO/SURGICAL BREAST IMGS/SPECIMEN RADIOGRAPH PACS 24-4/23/15    ULTRASOUND PACS 10/23/24: US Breast Bx  25, 10/15/24, 5/7/15: US Breast

## 2025-02-13 NOTE — NURSING NOTE
Chief Complaint   Patient presents with    Oncology Clinic Visit     Breast Cancer    Port Draw     Labs drawn via port by RN in lab. VS taken.      Labs drawn via Port accessed using 20g flat needle. Line flushed and Heparin locked. Vital signs taken. Checked into next appointment.     Alethea Bob RN

## 2025-02-13 NOTE — PROGRESS NOTES
Infusion Nursing Note:  Zenaida Valles presents today for C8D1 Trastuzumab-qyyp/Taxol.    Patient seen by provider today: Yes: Amber Scheierl, CNP prior to infusion   present during visit today: Not Applicable.    Note: No concerns following provider visit.    Dental clearance form for zometa provided to patient today.    Intravenous Access:  Implanted Port.    Treatment Conditions:   Latest Reference Range & Units 02/13/25 11:22   Sodium 135 - 145 mmol/L 140   Potassium 3.4 - 5.3 mmol/L 3.9   Chloride 98 - 107 mmol/L 103   Carbon Dioxide (CO2) 22 - 29 mmol/L 27   Urea Nitrogen 8.0 - 23.0 mg/dL 14.6   Creatinine 0.51 - 0.95 mg/dL 0.55   GFR Estimate >60 mL/min/1.73m2 >90   Calcium 8.8 - 10.4 mg/dL 9.0   Anion Gap 7 - 15 mmol/L 10   Albumin 3.5 - 5.2 g/dL 4.2   Protein Total 6.4 - 8.3 g/dL 6.6   Alkaline Phosphatase 40 - 150 U/L 76   ALT 0 - 50 U/L 20   AST 0 - 45 U/L 19   Bilirubin Direct 0.00 - 0.30 mg/dL <0.20   Bilirubin Total <=1.2 mg/dL 0.2   Glucose 70 - 99 mg/dL 134 (H)   WBC 4.0 - 11.0 10e3/uL 5.2   Hemoglobin 11.7 - 15.7 g/dL 11.1 (L)   Hematocrit 35.0 - 47.0 % 33.0 (L)   Platelet Count 150 - 450 10e3/uL 307   RBC Count 3.80 - 5.20 10e6/uL 3.73 (L)   MCV 78 - 100 fL 89   MCH 26.5 - 33.0 pg 29.8   MCHC 31.5 - 36.5 g/dL 33.6   RDW 10.0 - 15.0 % 14.0   % Neutrophils % 48   % Lymphocytes % 41   % Monocytes % 6   % Eosinophils % 4   % Basophils % 1   Absolute Basophils 0.0 - 0.2 10e3/uL 0.0   Absolute Eosinophils 0.0 - 0.7 10e3/uL 0.2   Absolute Immature Granulocytes <=0.4 10e3/uL 0.0   Absolute Lymphocytes 0.8 - 5.3 10e3/uL 2.1   Absolute Monocytes 0.0 - 1.3 10e3/uL 0.3   % Immature Granulocytes % 0   Absolute Neutrophils 1.6 - 8.3 10e3/uL 2.5   Absolute NRBCs 10e3/uL 0.0   NRBCs per 100 WBC <1 /100 0     Results reviewed, labs MET treatment parameters, ok to proceed with treatment.  ECHO/MUGA completed 2/13/25  EF 60-65%.      Post Infusion Assessment:  Patient tolerated infusion without  incident.  Blood return noted pre and post infusion.  Site patent and intact, free from redness, edema or discomfort.  No evidence of extravasations.  Access discontinued per protocol.       Discharge Plan:   Discharge instructions reviewed with: Patient and Family.  Patient and/or family verbalized understanding of discharge instructions and all questions answered.  AVS to patient via [employer].  Patient will return 2/20 for next appointment.   Patient discharged in stable condition accompanied by: self and .  Departure Mode: Ambulatory.      Aurea Velez RN

## 2025-02-19 ENCOUNTER — MEDICAL CORRESPONDENCE (OUTPATIENT)
Dept: HEALTH INFORMATION MANAGEMENT | Facility: CLINIC | Age: 75
End: 2025-02-19
Payer: COMMERCIAL

## 2025-02-20 ENCOUNTER — APPOINTMENT (OUTPATIENT)
Dept: LAB | Facility: CLINIC | Age: 75
End: 2025-02-20
Attending: INTERNAL MEDICINE
Payer: COMMERCIAL

## 2025-02-20 ENCOUNTER — INFUSION THERAPY VISIT (OUTPATIENT)
Dept: ONCOLOGY | Facility: CLINIC | Age: 75
End: 2025-02-20
Attending: INTERNAL MEDICINE
Payer: COMMERCIAL

## 2025-02-20 ENCOUNTER — ONCOLOGY VISIT (OUTPATIENT)
Dept: ONCOLOGY | Facility: CLINIC | Age: 75
End: 2025-02-20
Attending: NURSE PRACTITIONER
Payer: COMMERCIAL

## 2025-02-20 VITALS
HEART RATE: 81 BPM | BODY MASS INDEX: 28.77 KG/M2 | SYSTOLIC BLOOD PRESSURE: 116 MMHG | WEIGHT: 172.9 LBS | RESPIRATION RATE: 16 BRPM | TEMPERATURE: 97.3 F | OXYGEN SATURATION: 98 % | DIASTOLIC BLOOD PRESSURE: 77 MMHG

## 2025-02-20 DIAGNOSIS — C50.912 INVASIVE DUCTAL CARCINOMA OF BREAST, FEMALE, LEFT (H): Primary | ICD-10-CM

## 2025-02-20 DIAGNOSIS — R22.32 AXILLARY MASS, LEFT: ICD-10-CM

## 2025-02-20 DIAGNOSIS — Z79.899 ENCOUNTER FOR LONG-TERM (CURRENT) USE OF HIGH-RISK MEDICATION: ICD-10-CM

## 2025-02-20 LAB
ALBUMIN SERPL BCG-MCNC: 4.2 G/DL (ref 3.5–5.2)
ALP SERPL-CCNC: 76 U/L (ref 40–150)
ALT SERPL W P-5'-P-CCNC: 19 U/L (ref 0–50)
ANION GAP SERPL CALCULATED.3IONS-SCNC: 12 MMOL/L (ref 7–15)
AST SERPL W P-5'-P-CCNC: 18 U/L (ref 0–45)
BASOPHILS # BLD AUTO: 0.1 10E3/UL (ref 0–0.2)
BASOPHILS NFR BLD AUTO: 1 %
BILIRUB SERPL-MCNC: 0.2 MG/DL
BUN SERPL-MCNC: 19.2 MG/DL (ref 8–23)
CALCIUM SERPL-MCNC: 9.2 MG/DL (ref 8.8–10.4)
CHLORIDE SERPL-SCNC: 101 MMOL/L (ref 98–107)
CREAT SERPL-MCNC: 0.61 MG/DL (ref 0.51–0.95)
EGFRCR SERPLBLD CKD-EPI 2021: >90 ML/MIN/1.73M2
EOSINOPHIL # BLD AUTO: 0.1 10E3/UL (ref 0–0.7)
EOSINOPHIL NFR BLD AUTO: 3 %
ERYTHROCYTE [DISTWIDTH] IN BLOOD BY AUTOMATED COUNT: 14 % (ref 10–15)
GLUCOSE SERPL-MCNC: 150 MG/DL (ref 70–99)
HCO3 SERPL-SCNC: 26 MMOL/L (ref 22–29)
HCT VFR BLD AUTO: 32.3 % (ref 35–47)
HGB BLD-MCNC: 11.1 G/DL (ref 11.7–15.7)
IMM GRANULOCYTES # BLD: 0 10E3/UL
IMM GRANULOCYTES NFR BLD: 0 %
LYMPHOCYTES # BLD AUTO: 1.8 10E3/UL (ref 0.8–5.3)
LYMPHOCYTES NFR BLD AUTO: 40 %
MCH RBC QN AUTO: 30.1 PG (ref 26.5–33)
MCHC RBC AUTO-ENTMCNC: 34.4 G/DL (ref 31.5–36.5)
MCV RBC AUTO: 88 FL (ref 78–100)
MONOCYTES # BLD AUTO: 0.4 10E3/UL (ref 0–1.3)
MONOCYTES NFR BLD AUTO: 9 %
NEUTROPHILS # BLD AUTO: 2.1 10E3/UL (ref 1.6–8.3)
NEUTROPHILS NFR BLD AUTO: 48 %
NRBC # BLD AUTO: 0 10E3/UL
NRBC BLD AUTO-RTO: 0 /100
PLATELET # BLD AUTO: 306 10E3/UL (ref 150–450)
POTASSIUM SERPL-SCNC: 3.8 MMOL/L (ref 3.4–5.3)
PROT SERPL-MCNC: 6.5 G/DL (ref 6.4–8.3)
RBC # BLD AUTO: 3.69 10E6/UL (ref 3.8–5.2)
SODIUM SERPL-SCNC: 139 MMOL/L (ref 135–145)
WBC # BLD AUTO: 4.5 10E3/UL (ref 4–11)

## 2025-02-20 PROCEDURE — 250N000011 HC RX IP 250 OP 636: Performed by: NURSE PRACTITIONER

## 2025-02-20 PROCEDURE — 85025 COMPLETE CBC W/AUTO DIFF WBC: CPT | Performed by: NURSE PRACTITIONER

## 2025-02-20 PROCEDURE — 82040 ASSAY OF SERUM ALBUMIN: CPT

## 2025-02-20 PROCEDURE — G0463 HOSPITAL OUTPT CLINIC VISIT: HCPCS | Performed by: NURSE PRACTITIONER

## 2025-02-20 PROCEDURE — 36591 DRAW BLOOD OFF VENOUS DEVICE: CPT

## 2025-02-20 PROCEDURE — 258N000003 HC RX IP 258 OP 636: Performed by: NURSE PRACTITIONER

## 2025-02-20 RX ORDER — EPINEPHRINE 1 MG/ML
0.3 INJECTION, SOLUTION INTRAMUSCULAR; SUBCUTANEOUS EVERY 5 MIN PRN
Status: CANCELLED | OUTPATIENT
Start: 2025-02-21

## 2025-02-20 RX ORDER — DIPHENHYDRAMINE HCL 25 MG
50 CAPSULE ORAL
Status: CANCELLED
Start: 2025-02-21

## 2025-02-20 RX ORDER — MEPERIDINE HYDROCHLORIDE 25 MG/ML
25 INJECTION INTRAMUSCULAR; INTRAVENOUS; SUBCUTANEOUS
Status: CANCELLED | OUTPATIENT
Start: 2025-02-21

## 2025-02-20 RX ORDER — ACETAMINOPHEN 325 MG/1
650 TABLET ORAL
Status: CANCELLED | OUTPATIENT
Start: 2025-02-21

## 2025-02-20 RX ORDER — HEPARIN SODIUM (PORCINE) LOCK FLUSH IV SOLN 100 UNIT/ML 100 UNIT/ML
5 SOLUTION INTRAVENOUS ONCE
Status: COMPLETED | OUTPATIENT
Start: 2025-02-20 | End: 2025-02-20

## 2025-02-20 RX ORDER — METHYLPREDNISOLONE SODIUM SUCCINATE 40 MG/ML
40 INJECTION INTRAMUSCULAR; INTRAVENOUS
Status: CANCELLED
Start: 2025-02-21

## 2025-02-20 RX ORDER — DIPHENHYDRAMINE HYDROCHLORIDE 50 MG/ML
50 INJECTION, SOLUTION INTRAMUSCULAR; INTRAVENOUS
Status: CANCELLED
Start: 2025-02-21

## 2025-02-20 RX ORDER — LORAZEPAM 2 MG/ML
0.5 INJECTION INTRAMUSCULAR EVERY 4 HOURS PRN
Status: CANCELLED | OUTPATIENT
Start: 2025-02-21

## 2025-02-20 RX ORDER — ONDANSETRON 2 MG/ML
8 INJECTION INTRAMUSCULAR; INTRAVENOUS ONCE
Status: CANCELLED | OUTPATIENT
Start: 2025-02-21

## 2025-02-20 RX ORDER — HEPARIN SODIUM (PORCINE) LOCK FLUSH IV SOLN 100 UNIT/ML 100 UNIT/ML
5 SOLUTION INTRAVENOUS
Status: DISCONTINUED | OUTPATIENT
Start: 2025-02-20 | End: 2025-02-20 | Stop reason: HOSPADM

## 2025-02-20 RX ORDER — HEPARIN SODIUM,PORCINE 10 UNIT/ML
5-20 VIAL (ML) INTRAVENOUS DAILY PRN
Status: CANCELLED | OUTPATIENT
Start: 2025-02-21

## 2025-02-20 RX ORDER — HEPARIN SODIUM (PORCINE) LOCK FLUSH IV SOLN 100 UNIT/ML 100 UNIT/ML
5 SOLUTION INTRAVENOUS
Status: CANCELLED | OUTPATIENT
Start: 2025-02-21

## 2025-02-20 RX ORDER — ONDANSETRON 2 MG/ML
8 INJECTION INTRAMUSCULAR; INTRAVENOUS ONCE
Status: COMPLETED | OUTPATIENT
Start: 2025-02-20 | End: 2025-02-20

## 2025-02-20 RX ORDER — DIPHENHYDRAMINE HYDROCHLORIDE 50 MG/ML
25 INJECTION, SOLUTION INTRAMUSCULAR; INTRAVENOUS
Status: CANCELLED
Start: 2025-02-21

## 2025-02-20 RX ORDER — ALBUTEROL SULFATE 90 UG/1
1-2 INHALANT RESPIRATORY (INHALATION)
Status: CANCELLED
Start: 2025-02-21

## 2025-02-20 RX ORDER — ALBUTEROL SULFATE 0.83 MG/ML
2.5 SOLUTION RESPIRATORY (INHALATION)
Status: CANCELLED | OUTPATIENT
Start: 2025-02-21

## 2025-02-20 RX ADMIN — SODIUM CHLORIDE 160 MG: 9 INJECTION, SOLUTION INTRAVENOUS at 10:03

## 2025-02-20 RX ADMIN — HEPARIN 5 ML: 100 SYRINGE at 08:22

## 2025-02-20 RX ADMIN — PACLITAXEL 154 MG: 6 INJECTION, SOLUTION INTRAVENOUS at 10:49

## 2025-02-20 RX ADMIN — ONDANSETRON 8 MG: 2 INJECTION INTRAMUSCULAR; INTRAVENOUS at 09:39

## 2025-02-20 RX ADMIN — Medication 5 ML: at 11:55

## 2025-02-20 ASSESSMENT — PAIN SCALES - GENERAL: PAINLEVEL_OUTOF10: NO PAIN (0)

## 2025-02-20 NOTE — PATIENT INSTRUCTIONS
[facility] Triage and after hours / weekends / holidays:  [phone]    Please call the triage or after hours line if you experience a temperature greater than or equal to 100.4, shaking chills, have uncontrolled nausea, vomiting and/or diarrhea, dizziness, shortness of breath, chest pain, bleeding, unexplained bruising, or if you have any other new/concerning symptoms, questions or concerns.      If you are having any concerning symptoms or wish to speak to a provider before your next infusion visit, please call triage to notify them so we can adequately serve you.     If you need a refill on a narcotic prescription or other medication, please call before your infusion appointment.

## 2025-02-20 NOTE — PROGRESS NOTES
MEDICAL ONCOLOGY NOTE     Re. Zenaida Valles   Female, 74 year old, 1950  MRN: [id number]     Diagnosis: Screening identified stage I invasive ductal cancer of the upper outer quadrant of the left breast ER positive, MS positive, HER2 amplified stage [id number].       HISTORY OF PRESENT ILLNESS:    Zenaida has been in generally good health except for a right renal artery stenosis requiring a stent.  She also has a left leg injury recently she was in her usual state of good health and had a screening mammogram which showed a suspicious lesion in the upper outer quadrant of the left breast at the 3 o'clock position 5 cm from the nipple there is an irregular hypoechoic mass measuring 1.0 x 0.7 x 0.9 cm in size 1 cm from the nipple there is also a 0.5 x 0.4 cm area of suspicion     She underwent an ultrasound guided biopsy which showed usual ductal hyperplasia and fibrocystic changes microcysts in April Kren metaplasia.  Columnar cell changes.  It invasive ductal carcinoma was also found grade 2 and DCIS grade 2 solid type.  Her breast cancer was estrogen receptor positive progesterone receptor positive at HER2 2+ by IHC.  She then underwent ADRIANA which showed a subpopulation which was 60 to 70% ADRIANA 5 negative and 30 to 40% I-S age 1 positive with a 4.9 HER2 signals per nucleus and the ratio of 2.2 indicating HER2 positivity.     She then came to see Dr. Wood at [facility] for surgical recommendations.  She had a contrast mammogram which showed a 0.9 cm enhancing lesion in the upper outer quadrant of the left breast.     DIAGNOSTIC AND TREATMENT SUMMARY:  On October 7 she underwent a screening mammogram which demonstrated an asymmetry in her left breast.       On October 15 she underwent a diagnostic mammogram which confirmed a left breast asymmetry.  She had an ultrasound which demonstrated 2 masses in her left breast.  At the 3 o'clock position 1 cm from the nipple there was a mass  and at the 3 o'clock position 5 cm from the nipple there was a second mass.  Her lymph nodes were normal by ultrasound.       On October 23 she underwent a contrast-enhanced mammography which only demonstrated 1 mass measuring 1 cm.  The right breast was normal.  She underwent ultrasound-guided biopsies of both masses.  The mass at the 3 o'clock position 1 cm from the nipple was benign.  The mass at the 3 o'clock position 5 cm from the nipple demonstrating a grade 2 invasive ductal cancer that was ER positive and HER2 equivocal.  There is also DCIS present.       A. LEFT breast, 3:00, 1 cm from nipple, ultrasound-guided core biopsy:  - Benign breast tissue with usual ductal hyperplasia (UDH) and fibrocystic changes including microcysts, apocrine metaplasia, and columnar cell change  - Rare calcifications associated with benign breast ducts and acini  - Negative for atypia or malignancy      B. LEFT breast, 3:00, 5 cm from nipple, ultrasound-guided core biopsy:   - INVASIVE BREAST CARCINOMA OF NO SPECIAL TYPE (INVASIVE DUCTAL CARCINOMA), [place] grade 2  - Ductal carcinoma in situ (DCIS), nuclear grade 2, solid type  - Invasive carcinoma is estrogen receptor positive, progesterone receptor positive, and HER2 equivocal (score 2+) by immunohistochemistry (see 'Breast Biomarker Reporting Template' below)  - HER2 FISH is is process; results will be reportedly separately by cytogenetis  - See comment     This FISH analysis is performed in follow up to the reported equivocal (2+) HER2 findings by immunohistochemistry ([id number]).     RESULTS:     Ratio of HER2/MARION-17 signals  Zenaida Valles:  See Interpretation below     Majority (~60-70%) of tumor in area of strongest IHC staining:  Group 5 (ADRIANA Negative)  Avg. number HER2 signals/nucleus:  2.9  Avg. number MARION-17 signals/nucleus:  1.7  HER2/MARION 17 ratio:  1.7     Subpopulation (~30-40%) of tumor in area of strongest IHC staining:  Group 1 (ADRIANA Positive)                              Avg. number HER2 signals/nucleus:  4.9  Avg. number MARION-17 signals/nucleus:  2.3  HER2/MARION 17 ratio:  2.2     **Interpretive guidelines per the American Society of Clinical Oncology/College of American Pathologists Clinical Practice Guideline Update (Margarita VALENZUELA et al, 2023, Arch Pathol Lab Med 147:993):     -- Group 1: HER2/MARION-17 ratio 2.0 or more -AND- avg. number HER2 signals/nucleus 4.0 or more (ADRIANA Positive)  -- Group 2: HER2/MARION-17 ratio 2.0 or more -AND- avg. number HER2 signals/nucleus <4.0 (Additional work required)  -- Group 3: HER2/MARION-17 ratio <2.0 -AND- avg. number HER2 signals/nucleus 6.0 or more (Additional work required)  -- Group 4: HER2/MARION-17 ratio <2.0 -AND- avg. number HER2 signals/nucleus 4.0 or more and <6.0 (Additional work required)  -- Group 5: HER2/MARION-17 ratio <2.0 -AND- avg. number HER2 signals/nucleus <4.0 (ADRIANA Negative)     INTERPRETATION:  Two admixed populations of cells were found in this sample:     Majority (~60-70%) of tumor in area of strongest IHC staining:  Per the American Society of Clinical Oncology/College of American Pathologists Clinical Practice Guideline Focused Update (Margarita AC et al, 2018, Arch Pathol Lab Med  doi:10.5858/arpa.8150-0248-AQ), the HER2/MARION 17 ratio of 1.7 and average number of HER2 signals/cell of 2.9 places this population of cells in Group 5 (ADRIANA Negative).      Subpopulation (~30-40%) of tumor in area of strongest IHC staining:  Admixed within this sample were cells with increased HER2 signals, which, when selectively scored, had an average of 4.9 HER2 signals/nucleus and a HER2/MARION 17 ratio of 2.2; per the American Society of Clinical Oncology/College of American Pathologists Clinical Practice Guideline Focused Update (Margarita AC et al, 2018, Arch Pathol Lab Med  doi:10.5858/arpa.4984-5695-UE), this population of cells falls into Group 1 (ADRIANA Positive).     I reviewed history in chart:  PAST MEDICAL HISTORY: No history of breast  surgery.  No history of breast cancer in the past.  No history of radiation therapy in the past or radiation exposure.  No history of tumor of any kind.  No history of heart problems, heart attack, breathing problems, blood clots, seizures, arthritis, peptic ulcer disease, osteoporosis or bone fractures.  She is not currently participating in a clinical trial.  She does have a history of asthma and uses an albuterol inhaler in cold weather.     Her past medical history is significant for diabetes mellitus and is on metformin, hypertension obesity, she has IPMN and a right renal artery stent.  The family history is negative for breast cancer.  She has a history of right renal artery stenosis and has a stent in place.     She has a recent history of a left hamstring injury.  She is seeing orthopedics for this problem.  She also has a history of back pain.  Her gait is affected but she can perform activities of daily living.  She has a history of aches in her shoulders.  She had a 65 pound weight loss over the last 2 years which was intentional related to improve diet and exercise.  She says she has a history of fibromuscular dysplasia.     She worked in the [employer] in [place] and was exposed to dengue, malaria, hepatitis A, hepatitis B, encephalitis.  She has also had COVID and the flu.     FAMILY HISTORY:   No history of ovarian, uterine, colon cancer, or melanoma.  No history of glioma, gastric cancer or pancreatic cancer.  Her mother did have Zollinger-Wheeler syndrome but the patient's genetics are negative for this syndrome.      PAST MENSTRUAL HISTORY:  Age of first menstrual period was 13.   She has been pregnant 1 times with 0 live births and 0 miscarriages and 1 .  Age at in place pregnancy age 28.   Uterus and ovaries are in place.  Last menstrual period was about age 52 and the menopause occurred naturally. No history of hormone replacement therapy.     She does have a history of thickened  endometrial lining but by her report no abnormalities of the lining when she had a DIC     HABITS:  She is a never smoker but she has a history of exposure to secondhand smoke from her father as a child.  She consumes alcoholic beverages socially 1 drink every couple of months.     GERMLINE GENETICS: pending     ALLERGIES: She has drug allergies to codeine, codeine derivatives, ibuprofen, lisinopril..  No allergy to seafood, iodine, or contrast dye.  She does take daily aspirin 81 mg because of her renal stent.    TREATMENT HISTORY:  A.  Lumpectomy showed a stage I T1b NX MX invasive ductal carcinoma of the left breast upper outer quadrant which was estrogen receptor positive (%) progesterone receptor positive (81-90%) and HER2 2+ by IHC and HER2 positive by ADRIANA.  Margins negative for invasive carcinoma.    B.  Plan is to give the APT regimen followed by addition of hormonal therapy with letrozole for 7 years.     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  They were in [facility] with the [employer] for two years.      INTERVAL HISTORY:  Zenaida is seen for evaluation and toxicity check prior to week 9 of weekly paclitaxel plus trastuzumab.  -She has needed to take more Senna for constipation, but feel this is helping and her constipation is improved.   -She did bring in dental clearance today for Zometa.  This is given to [facility].  -She does have symptoms of tingling/burning to feet at night. This comes and goes; not constant. Less noticeable during the day. No impact to ambulation or balance.  This is actually a bit better this week.  -She has been more active.  She has also been doing more sewing and quilting.   -No signs of systemic illness.   -Headaches are also better.   -No cough, fever, chills, or signs of systemic illness.   -She does feel that there is maybe a lump in her L axilla; near incision site, no pain.        PHYSICAL EXAMINATION:     /77 (BP Location: Right arm, Patient Position: Sitting, Cuff  Size: Adult Regular)   Pulse 81   Temp 97.3  F (36.3  C) (Oral)   Resp 16   Wt 78.4 kg (172 lb 14.4 oz)   LMP  (LMP Unknown)   SpO2 98%   BMI 28.77 kg/m    Wt Readings from Last 10 Encounters:   02/20/25 78.4 kg (172 lb 14.4 oz)   02/14/25 79.3 kg (174 lb 14.4 oz)   02/13/25 78.7 kg (173 lb 6.4 oz)   02/06/25 79.3 kg (174 lb 14.4 oz)   01/30/25 78.5 kg (173 lb)   01/23/25 79.2 kg (174 lb 9.6 oz)   01/16/25 78.5 kg (173 lb)   01/09/25 77.1 kg (170 lb)   01/03/25 77.4 kg (170 lb 9.6 oz)   01/02/25 78.9 kg (174 lb)   General: Female in no acute distress.  Eyes: EOMI. No scleral icterus or conjunctival injection.  ENT: Oral mucosa is moist without lesions or thrush.   Lymphatic: Neck is supple without cervical or supraclavicular lymphadenopathy. No palpable axillary masses on right.  Subcentimeter area of fullness L axilla just under the incision.  No overlying skin changes.  Cardiovascular: RRR No murmurs. No peripheral edema.  Respiratory: CTA bilaterally. No wheezes or crackles.  Gastrointestinal: BS +. Abdomen soft, non-tender.  Neurologic: Cranial nerves II through XII are grossly intact.  Skin: No rashes, petechiae, or bruising noted on exposed skin.  Psych: Affect appropriate. pleasant    LABORATORY DATA:    Most Recent 3 CBC's:  Recent Labs   Lab Test 02/20/25  0818 02/13/25  1122 02/06/25  0738   WBC 4.5 5.2 4.8   HGB 11.1* 11.1* 10.8*   MCV 88 89 88    307 304   ANEUTAUTO 2.1 2.5 2.3     Most Recent 3 BMP's:  Recent Labs   Lab Test 02/20/25  0818 02/13/25  1122 02/06/25  0738    140 139   POTASSIUM 3.8 3.9 4.0   CHLORIDE 101 103 103   CO2 26 27 26   BUN 19.2 14.6 19.0   CR 0.61 0.55 0.62   ANIONGAP 12 10 10   GAEL 9.2 9.0 9.0   * 134* 140*   PROTTOTAL 6.5 6.6 6.4   ALBUMIN 4.2 4.2 4.0    Most Recent 3 LFT's:  Recent Labs   Lab Test 02/20/25  0818 02/13/25  1122 02/06/25  0738   AST 18 19 16   ALT 19 20 15   ALKPHOS 76 76 75   BILITOTAL 0.2 0.2 0.2     I reviewed the above labs  today.    IMAGING:  Echo 2025    Echocardiogram Complete  [id number]  [id number]  [id number]  [id number]^NAMAN^AC^JALEN     [facility]  Diagnostic and Treatment-3rd Floor  [address] [place] [address]     Name: KENDRA INTERIANO  MRN: [id number]  : 1950  Study Date: 2025 09:54 AM  Age: 75 yrs  Gender: Female  Patient Location: [facility]  Reason For Study: Encounter for antineoplastic chemotherapy  Ordering Physician: AC FLORENCE  Referring Physician: AC FLORENCE  Performed By: Maryjane Montgomery     BSA: 1.9 m2  Height: 65 in  Weight: 174 lb  BP: 122/85 mmHg  ______________________________________________________________________________  Procedure  Echocardiogram with two-dimensional, color and spectral Doppler.  ______________________________________________________________________________  Interpretation Summary  Left ventricular size, wall motion and function are normal. The ejection  fraction is 60-65%.     Global peak LV longitudinal strain is averaged at -19%. This is within  reported normal limits (normal <-18%).     Global right ventricular function is normal.  The right ventricle is normal size.     No pericardial effusion is present.     The inferior vena cava is normal.     Compared to prior imaging on  there are no hemodynamically  significant differences.     ______________________________________________________________________________  Left Ventricle  Left ventricular size, wall motion and function are normal. The ejection  fraction is 60-65%. Global peak LV longitudinal strain is averaged at -19%.  This is within reported normal limits (normal <-18%). Left ventricular  diastolic function is not assessable.     Right Ventricle  Global right ventricular function is normal. The right ventricle is normal  size.     Atria  The atria cannot be assessed.     Mitral Valve  The mitral valve is  normal. Trace mitral insufficiency is present.     Aortic Valve  Aortic valve is normal in structure and function.     Tricuspid Valve  The tricuspid valve is normal. Trace tricuspid insufficiency is present. The  peak velocity of the tricuspid regurgitant jet is not obtainable. Pulmonary  artery systolic pressure cannot be assessed.     Pulmonic Valve  The pulmonic valve is normal. Trace pulmonic insufficiency is present.     Vessels  The inferior vena cava is normal. IVC diameter <2.1 cm collapsing >50% with  sniff suggests a normal RA pressure of 3 mmHg.     Pericardium  No pericardial effusion is present.     Compared to Previous Study  Compared to prior imaging on 11/13/224 there are no hemodynamically  significant differences.  ______________________________________________________________________________  Doppler Measurements & Calculations  PA acc time: 0.12 sec     Measurements from [facility]  LV GLS Endo Peak A2C (AS): -19.4 %  LV GLS Endo Peak A3C (AS): -18.4 %  LV GLS Endo Peak A4C (AS): -19.2 %  LV GLS Endo Peak Avg (AS): -19.0 %     ______________________________________________________________________________  Report approved by: Deuce Martínez MD on 02/13/2025 10:43 AM          I reviewed the above imaging today.     ASSESSMENT AND PLAN:    Stage I T1b NX MX invasive ductal carcinoma of the left breast upper outer quadrant which was estrogen receptor positive (%) progesterone receptor positive (81-90%) and HER2 2+ by IHC and HER2 positive by ADRIANA. Margins negative for invasive carcinoma.    -Post lumpectomy on 11/25/24.  -Port in place and she is having no issues with this.  -Echo 11/13/24 with 60-65% LVEF.  She had a repeat echo on 2/13/25 with stable EF.  -She is noting ongoing neuropathy (grade 1) with no functional impairment.   -She is clinically appropriate to continue adjuvant HER2 directed therapy with trastuzumab and paclitaxel C9 today.  -She will begin hormonal therapy with an aromatase  inhibitor after completion of chemotherapy and that the aromatase inhibitor treatment would be recommended for 10 years given the lack of data on shorter adjuvant therapy for hormone receptor positive HER2 positive breast cancer.  -She will complete every 3-week trastuzumab for 1 year of therapy.   -Postlumpectomy radiation will be recommended.  She has an appointment set up with Dr. Ortega at the end of March.     Peripheral neuropathy:  -Grade 1 to feet.  No changes today and in fact slightly less bothersome at night.   -Continue to monitor closely with paclitaxel; no dose reduction at this point.     Constipation:   -Continues to use Senna with benefit.  She has increased her dose.  -No new issues.     Fatigue:  -Related to treatment.  She is mildly anemic but this is stable. She is continuing to meet her exercise goal.    Low appetite/food aversions:  -Related to chemo; previously discussed adding protein powder to foods, and she has started to do this.   -Weight is stable.     Left axillary fullness:   -She noticed this after her visit last week.  She is not sure if it has been this way as she has not been doing regular exams.   -No overlying skin changes, no pain, and this is just under and above her incision site.  The removed axillary node was negative.   -Plan: US left axilla.   ADDENDUM:     Narrative & Impression   Examinations: US AXILLARY LEFT, 2/27/2025 9:49 AM     Comparisons: None     History: Left breast cancer treated with conservation in November 2024. Lump left axilla the site of sentinel node biopsy.     Ultrasound:  Targeted ultrasound evaluation was performed by the technologist and  radiologist.  Ultrasound left breast lump shows a small seroma in the left axilla  measuring approximately 3 cm. No mass or suspicious lymph nodes.                                                                      IMPRESSION: BI-RADS CATEGORY: 2 - Benign.     RECOMMENDED FOLLOW-UP: Clinical follow-up. The  patient does not desire  aspiration at this time. Should it become more symptomatic, she may  return at any time for aspiration as desired.     Annual screening mammography.       Bone Health:  -She and Dr. Cheung discussed use of adjuvant zoledronic acid.    -She brought in dental clearance letter today.   -Weight bearing exersise recommended.    Follow Up/Plan:  -Proceed with cycle 9 trastuzumab and paclitaxel today.  -RTC weekly with provider prior to treatment.  She will call sooner with any concerns.     37 minutes spent on the date of the encounter doing chart review, review of test results, interpretation of tests, patient visit, and documentation.    Leilani Richards, APRN, CNP  [facility]  [address] [place] [address] [phone]

## 2025-02-20 NOTE — NURSING NOTE
"Oncology Rooming Note    February 20, 2025 8:29 AM   Zenaida Valles is a 75 year old female who presents for:    Chief Complaint   Patient presents with    Port Draw     Labs drawn via port by RN. VS taken.     Initial Vitals: /77 (BP Location: Right arm, Patient Position: Sitting, Cuff Size: Adult Regular)   Pulse 81   Temp 97.3  F (36.3  C) (Oral)   Resp 16   Wt 78.4 kg (172 lb 14.4 oz)   LMP  (LMP Unknown)   SpO2 98%   BMI 28.77 kg/m   Estimated body mass index is 28.77 kg/m  as calculated from the following:    Height as of 2/14/25: 1.651 m (5' 5\").    Weight as of this encounter: 78.4 kg (172 lb 14.4 oz). Body surface area is 1.9 meters squared.  No Pain (0) Comment: Data Unavailable   No LMP recorded (lmp unknown). Patient is postmenopausal.  Allergies reviewed: Yes  Medications reviewed: Yes    Medications: Medication refills not needed today.  Pharmacy name entered into [facility] Program:    [facility] [place], [place] - [address] [facility] [place] [facility] [place], [place] - [address]  [facility] [place], [place] - [address] [facility]    Frailty Screening:   Is the patient here for a new oncology consult visit in cancer care? 2. No    PHQ9:  Did this patient require a PHQ9?: No      Clinical concerns:        Katherine Schmidt CMA              "

## 2025-02-20 NOTE — NURSING NOTE
"Chief Complaint   Patient presents with    Port Draw     Labs drawn via port by RN. VS taken.     Port accessed with 20 gauge, 3/4\" power needle by RN, labs collected, line flushed with saline and heparin.  Vitals taken. Pt checked in for appointment(s).     Sandie Herndon RN    "

## 2025-02-20 NOTE — PROGRESS NOTES
Infusion Nursing Note:  Zenaida Valles presents today for Cycle 9 Day 1 Trastuzumab and Paclitaxel.    Patient seen by provider today: Yes: Leilani Richards CNP   present during visit today: Not Applicable.    Note: Patient was seen and assessed by Leilani Richards CNP in clinic prior to infusion. Patient offers no additional complaints or concerns. Patient wishes to proceed with planned treatment today.    Intravenous Access:  Implanted Port.    Treatment Conditions:  Lab Results   Component Value Date    HGB 11.1 (L) 02/20/2025    WBC 4.5 02/20/2025    ANEU 2.5 11/23/2018    ANEUTAUTO 2.1 02/20/2025     02/20/2025        Lab Results   Component Value Date     02/20/2025    POTASSIUM 3.8 02/20/2025    MAG 1.9 08/14/2007    CR 0.61 02/20/2025    GAEL 9.2 02/20/2025    BILITOTAL 0.2 02/20/2025    ALBUMIN 4.2 02/20/2025    ALT 19 02/20/2025    AST 18 02/20/2025     ECHO completed 02/13/25: LVEF 60-65%.  Results reviewed, labs MET treatment parameters, ok to proceed with treatment.    Post Infusion Assessment:  Patient tolerated infusion without incident.  Blood return noted pre and post infusion.  Site patent and intact, free from redness, edema or discomfort.  No evidence of extravasations.  Access discontinued per protocol.     Discharge Plan:   Patient declined prescription refills.  Discharge instructions reviewed with: Patient and Family.  Patient and/or family verbalized understanding of discharge instructions and all questions answered.  AVS to patient via [employer].  Patient will return 02/27/25 for next appointment.   Patient discharged in stable condition accompanied by: self and .  Departure Mode: Ambulatory.      Mohini Sylvester RN

## 2025-02-20 NOTE — LETTER
2/20/2025      Zenaida Valles  [address] [facility] [address]      Dear Colleague,    Thank you for referring your patient, Zenaida Valles, to the [facility]. Please see a copy of my visit note below.    MEDICAL ONCOLOGY NOTE     Re. Zenaida Valles   Female, 74 year old, 1950  MRN: [id number]     Diagnosis: Screening identified stage I invasive ductal cancer of the upper outer quadrant of the left breast ER positive, [place] positive, HER2 amplified stage [id number].       HISTORY OF PRESENT ILLNESS:    Zenaida has been in generally good health except for a right renal artery stenosis requiring a stent.  She also has a left leg injury recently she was in her usual state of good health and had a screening mammogram which showed a suspicious lesion in the upper outer quadrant of the left breast at the 3 o'clock position 5 cm from the nipple there is an irregular hypoechoic mass measuring 1.0 x 0.7 x 0.9 cm in size 1 cm from the nipple there is also a 0.5 x 0.4 cm area of suspicion     She underwent an ultrasound guided biopsy which showed usual ductal hyperplasia and fibrocystic changes microcysts in April Kren metaplasia.  Columnar cell changes.  It invasive ductal carcinoma was also found grade 2 and DCIS grade 2 solid type.  Her breast cancer was estrogen receptor positive progesterone receptor positive at HER2 2+ by IHC.  She then underwent ADRIANA which showed a subpopulation which was 60 to 70% ADRIANA 5 negative and 30 to 40% I-S age 1 positive with a 4.9 HER2 signals per nucleus and the ratio of 2.2 indicating HER2 positivity.     She then came to see Dr. Wood at [facility] for surgical recommendations.  She had a contrast mammogram which showed a 0.9 cm enhancing lesion in the upper outer quadrant of the left breast.     DIAGNOSTIC AND TREATMENT SUMMARY:  On October 7 she underwent a screening mammogram which demonstrated  an asymmetry in her left breast.       On October 15 she underwent a diagnostic mammogram which confirmed a left breast asymmetry.  She had an ultrasound which demonstrated 2 masses in her left breast.  At the 3 o'clock position 1 cm from the nipple there was a mass and at the 3 o'clock position 5 cm from the nipple there was a second mass.  Her lymph nodes were normal by ultrasound.       On October 23 she underwent a contrast-enhanced mammography which only demonstrated 1 mass measuring 1 cm.  The right breast was normal.  She underwent ultrasound-guided biopsies of both masses.  The mass at the 3 o'clock position 1 cm from the nipple was benign.  The mass at the 3 o'clock position 5 cm from the nipple demonstrating a grade 2 invasive ductal cancer that was ER positive and HER2 equivocal.  There is also DCIS present.       A. LEFT breast, 3:00, 1 cm from nipple, ultrasound-guided core biopsy:  - Benign breast tissue with usual ductal hyperplasia (UDH) and fibrocystic changes including microcysts, apocrine metaplasia, and columnar cell change  - Rare calcifications associated with benign breast ducts and acini  - Negative for atypia or malignancy      B. LEFT breast, 3:00, 5 cm from nipple, ultrasound-guided core biopsy:   - INVASIVE BREAST CARCINOMA OF NO SPECIAL TYPE (INVASIVE DUCTAL CARCINOMA), [place] grade 2  - Ductal carcinoma in situ (DCIS), nuclear grade 2, solid type  - Invasive carcinoma is estrogen receptor positive, progesterone receptor positive, and HER2 equivocal (score 2+) by immunohistochemistry (see 'Breast Biomarker Reporting Template' below)  - HER2 FISH is is process; results will be reportedly separately by cytogenetis  - See comment     This FISH analysis is performed in follow up to the reported equivocal (2+) HER2 findings by immunohistochemistry ([id number]).     RESULTS:     Ratio of HER2/MARION-17 signals  Zenaida Valles:  See Interpretation below     Majority (~60-70%) of tumor in  History and Family History as mentioned in her chart and this remains unchanged from previous. Past Medical History:   Diagnosis Date    Asthma     Fibromyalgia     IBS (irritable bowel syndrome)     Mood disorder (HCC)     PTSD (post-traumatic stress disorder)      Past Surgical History:   Procedure Laterality Date    HYSTERECTOMY, TOTAL ABDOMINAL      LAPAROSCOPIC APPENDECTOMY       Social History     Social History    Marital status:      Spouse name: N/A    Number of children: N/A    Years of education: N/A     Occupational History    Not on file.      Social History Main Topics    Smoking status: Former Smoker     Packs/day: 0.50     Years: 10.00     Quit date: 10/31/2017    Smokeless tobacco: Never Used    Alcohol use Yes      Comment: Wine at holidays    Drug use: No      Comment: Former daily marijuana smoker, quit 11/2017    Sexual activity: No     Other Topics Concern    Not on file     Social History Narrative    No narrative on file     Family History   Problem Relation Age of Onset    Other Father      Car Accident at age 22    Seizures Sister     Heart Disease Sister      Fibromyalgia         PHYSICAL EXAM   Vitals:    02/06/18 0902   BP: 98/75   Pulse: 78   Weight: 196 lb 12.8 oz (89.3 kg)   Height: 5' 6\" (1.676 m)     Physical Exam  Constitutional:  Well developed, well nourished, no acute distress, non-toxic appearance   Musculoskeletal:                                            Right            Left                                   Tender Tender    Costochondral  ++ ++   Low cervical ++ ++   suboccipital ++ ++   Trapezius  ++ ++   Supraspinatus  ++ ++   Lateral epicondyl ++ ++   Gluteal ++ ++   Greater trochanter  ++ ++   knees ++ ++     RIGHT  Swell  Tender  ROM  LEFT  Swell  Tender  ROM    DIP2  0  0  FULL   0  0  FULL    DIP3  0  0  FULL   0  0  FULL    DIP4  0  0  FULL   0  0  FULL    DIP5  0  0  FULL   0  0  FULL    PIP1  0  0  FULL   0  0  FULL    PIP2  0  0 area of strongest IHC staining:  Group 5 (ADRIANA Negative)  Avg. number HER2 signals/nucleus:  2.9  Avg. number MARION-17 signals/nucleus:  1.7  HER2/MARION 17 ratio:  1.7     Subpopulation (~30-40%) of tumor in area of strongest IHC staining:  Group 1 (ADRIANA Positive)                             Avg. number HER2 signals/nucleus:  4.9  Avg. number MARION-17 signals/nucleus:  2.3  HER2/MARION 17 ratio:  2.2     **Interpretive guidelines per the American Society of Clinical Oncology/College of American Pathologists Clinical Practice Guideline Update (Margarita VALENZUELA et al, 2023, Arch Pathol Lab Med 147:993):     -- Group 1: HER2/MARION-17 ratio 2.0 or more -AND- avg. number HER2 signals/nucleus 4.0 or more (ADRIANA Positive)  -- Group 2: HER2/MARION-17 ratio 2.0 or more -AND- avg. number HER2 signals/nucleus <4.0 (Additional work required)  -- Group 3: HER2/MARION-17 ratio <2.0 -AND- avg. number HER2 signals/nucleus 6.0 or more (Additional work required)  -- Group 4: HER2/MARION-17 ratio <2.0 -AND- avg. number HER2 signals/nucleus 4.0 or more and <6.0 (Additional work required)  -- Group 5: HER2/MARION-17 ratio <2.0 -AND- avg. number HER2 signals/nucleus <4.0 (ADRIANA Negative)     INTERPRETATION:  Two admixed populations of cells were found in this sample:     Majority (~60-70%) of tumor in area of strongest IHC staining:  Per the American Society of Clinical Oncology/College of American Pathologists Clinical Practice Guideline Focused Update (Margariat VALENZUELA et al, 2018, Arch Pathol Lab Med  doi:10.5858/arpa.9509-8946-MT), the HER2/MARION 17 ratio of 1.7 and average number of HER2 signals/cell of 2.9 places this population of cells in Group 5 (ADRIANA Negative).      Subpopulation (~30-40%) of tumor in area of strongest IHC staining:  Admixed within this sample were cells with increased HER2 signals, which, when selectively scored, had an average of 4.9 HER2 signals/nucleus and a HER2/MARION 17 ratio of 2.2; per the American Society of Clinical Oncology/College of American  Pathologists Clinical Practice Guideline Focused Update (Margarita VALENZUELA et al, 2018, Arch Pathol Lab Med  doi:10.5858/arpa.8919-2157-WA), this population of cells falls into Group 1 (ADRIANA Positive).     I reviewed history in chart:  PAST MEDICAL HISTORY: No history of breast surgery.  No history of breast cancer in the past.  No history of radiation therapy in the past or radiation exposure.  No history of tumor of any kind.  No history of heart problems, heart attack, breathing problems, blood clots, seizures, arthritis, peptic ulcer disease, osteoporosis or bone fractures.  She is not currently participating in a clinical trial.  She does have a history of asthma and uses an albuterol inhaler in cold weather.     Her past medical history is significant for diabetes mellitus and is on metformin, hypertension obesity, she has IPMN and a right renal artery stent.  The family history is negative for breast cancer.  She has a history of right renal artery stenosis and has a stent in place.     She has a recent history of a left hamstring injury.  She is seeing orthopedics for this problem.  She also has a history of back pain.  Her gait is affected but she can perform activities of daily living.  She has a history of aches in her shoulders.  She had a 65 pound weight loss over the last 2 years which was intentional related to improve diet and exercise.  She says she has a history of fibromuscular dysplasia.     She worked in the [employer] in [place] and was exposed to dengue, malaria, hepatitis A, hepatitis B, encephalitis.  She has also had COVID and the flu.     FAMILY HISTORY:   No history of ovarian, uterine, colon cancer, or melanoma.  No history of glioma, gastric cancer or pancreatic cancer.  Her mother did have Zollinger-Wheeler syndrome but the patient's genetics are negative for this syndrome.      PAST MENSTRUAL HISTORY:  Age of first menstrual period was 13.   She has been pregnant 1 times with 0 live births  and 0 miscarriages and 1 .  Age at in place pregnancy age 28.   Uterus and ovaries are in place.  Last menstrual period was about age 52 and the menopause occurred naturally. No history of hormone replacement therapy.     She does have a history of thickened endometrial lining but by her report no abnormalities of the lining when she had a DIC     HABITS:  She is a never smoker but she has a history of exposure to secondhand smoke from her father as a child.  She consumes alcoholic beverages socially 1 drink every couple of months.     GERMLINE GENETICS: pending     ALLERGIES: She has drug allergies to codeine, codeine derivatives, ibuprofen, lisinopril..  No allergy to seafood, iodine, or contrast dye.  She does take daily aspirin 81 mg because of her renal stent.    TREATMENT HISTORY:  A.  Lumpectomy showed a stage I T1b NX MX invasive ductal carcinoma of the left breast upper outer quadrant which was estrogen receptor positive (%) progesterone receptor positive (81-90%) and HER2 2+ by IHC and HER2 positive by ADRIANA.  Margins negative for invasive carcinoma.    B.  Plan is to give the APT regimen followed by addition of hormonal therapy with letrozole for 7 years.     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  They were in [facility] with the [employer] for two years.      INTERVAL HISTORY:  Zenaida is seen for evaluation and toxicity check prior to week 9 of weekly paclitaxel plus trastuzumab.  -She has needed to take more Senna for constipation, but feel this is helping and her constipation is improved.   -She did bring in dental clearance today for Zometa.  This is given to [facility].  -She does have symptoms of tingling/burning to feet at night. This comes and goes; not constant. Less noticeable during the day. No impact to ambulation or balance.  This is actually a bit better this week.  -She has been more active.  She has also been doing more sewing and quilting.   -No signs of systemic illness.   -Headaches  are also better.   -No cough, fever, chills, or signs of systemic illness.   -She does feel that there is maybe a lump in her L axilla; near incision site, no pain.        PHYSICAL EXAMINATION:     /77 (BP Location: Right arm, Patient Position: Sitting, Cuff Size: Adult Regular)   Pulse 81   Temp 97.3  F (36.3  C) (Oral)   Resp 16   Wt 78.4 kg (172 lb 14.4 oz)   LMP  (LMP Unknown)   SpO2 98%   BMI 28.77 kg/m    Wt Readings from Last 10 Encounters:   02/20/25 78.4 kg (172 lb 14.4 oz)   02/14/25 79.3 kg (174 lb 14.4 oz)   02/13/25 78.7 kg (173 lb 6.4 oz)   02/06/25 79.3 kg (174 lb 14.4 oz)   01/30/25 78.5 kg (173 lb)   01/23/25 79.2 kg (174 lb 9.6 oz)   01/16/25 78.5 kg (173 lb)   01/09/25 77.1 kg (170 lb)   01/03/25 77.4 kg (170 lb 9.6 oz)   01/02/25 78.9 kg (174 lb)   General: Female in no acute distress.  Eyes: EOMI. No scleral icterus or conjunctival injection.  ENT: Oral mucosa is moist without lesions or thrush.   Lymphatic: Neck is supple without cervical or supraclavicular lymphadenopathy. No palpable axillary masses on right.  Subcentimeter area of fullness L axilla just under the incision.  No overlying skin changes.  Cardiovascular: RRR No murmurs. No peripheral edema.  Respiratory: CTA bilaterally. No wheezes or crackles.  Gastrointestinal: BS +. Abdomen soft, non-tender.  Neurologic: Cranial nerves II through XII are grossly intact.  Skin: No rashes, petechiae, or bruising noted on exposed skin.  Psych: Affect appropriate. pleasant    LABORATORY DATA:    Most Recent 3 CBC's:  Recent Labs   Lab Test 02/20/25  0818 02/13/25  1122 02/06/25  0738   WBC 4.5 5.2 4.8   HGB 11.1* 11.1* 10.8*   MCV 88 89 88    307 304   ANEUTAUTO 2.1 2.5 2.3     Most Recent 3 BMP's:  Recent Labs   Lab Test 02/20/25  0818 02/13/25  1122 02/06/25  0738    140 139   POTASSIUM 3.8 3.9 4.0   CHLORIDE 101 103 103   CO2 26 27 26   BUN 19.2 14.6 19.0   CR 0.61 0.55 0.62   ANIONGAP 12 10 10   GAEL 9.2 9.0 9.0   GLC  150* 134* 140*   PROTTOTAL 6.5 6.6 6.4   ALBUMIN 4.2 4.2 4.0    Most Recent 3 LFT's:  Recent Labs   Lab Test 25  0818 25  1122 25  0738   AST 18 19 16   ALT 19 20 15   ALKPHOS 76 76 75   BILITOTAL 0.2 0.2 0.2     I reviewed the above labs today.    IMAGING:  Echo 2025    Echocardiogram Complete  [id number]  [id number]  [id number]  [id number]^NAMAN^AC^JALEN     [facility]  Diagnostic and Treatment-3rd Floor  [address] [place] [address]     Name: KENDRA INTERIANO  MRN: [id number]  : 1950  Study Date: 2025 09:54 AM  Age: 75 yrs  Gender: Female  Patient [facility]: [facility]  Reason For Study: Encounter for antineoplastic chemotherapy  Ordering Physician: AC FLORENCE  Referring Physician: AC FLORENCE  Performed By: Maryjane Montgomery     BSA: 1.9 m2  Height: 65 in  Weight: 174 lb  BP: 122/85 mmHg  ______________________________________________________________________________  Procedure  Echocardiogram with two-dimensional, color and spectral Doppler.  ______________________________________________________________________________  Interpretation Summary  Left ventricular size, wall motion and function are normal. The ejection  fraction is 60-65%.     Global peak LV longitudinal strain is averaged at -19%. This is within  reported normal limits (normal <-18%).     Global right ventricular function is normal.  The right ventricle is normal size.     No pericardial effusion is present.     The inferior vena cava is normal.     Compared to prior imaging on  there are no hemodynamically  significant differences.     ______________________________________________________________________________  Left Ventricle  Left ventricular size, wall motion and function are normal. The ejection  fraction is 60-65%. Global peak LV longitudinal strain is averaged at -19%.  This is within reported normal limits  by: Moon Gilbert MD, 2/6/2018 9:53 AM    Documentation was done using voice recognition dragon software. Every effort was made to ensure accuracy; however, inadvertent unintentional computerized transcription errors may be present. (normal <-18%). Left ventricular  diastolic function is not assessable.     Right Ventricle  Global right ventricular function is normal. The right ventricle is normal  size.     Atria  The atria cannot be assessed.     Mitral Valve  The mitral valve is normal. Trace mitral insufficiency is present.     Aortic Valve  Aortic valve is normal in structure and function.     Tricuspid Valve  The tricuspid valve is normal. Trace tricuspid insufficiency is present. The  peak velocity of the tricuspid regurgitant jet is not obtainable. Pulmonary  artery systolic pressure cannot be assessed.     Pulmonic Valve  The pulmonic valve is normal. Trace pulmonic insufficiency is present.     Vessels  The inferior vena cava is normal. IVC diameter <2.1 cm collapsing >50% with  sniff suggests a normal RA pressure of 3 mmHg.     Pericardium  No pericardial effusion is present.     Compared to Previous Study  Compared to prior imaging on 11/13/224 there are no hemodynamically  significant differences.  ______________________________________________________________________________  Doppler Measurements & Calculations  PA acc time: 0.12 sec     Measurements from [facility]  LV GLS Endo Peak A2C (AS): -19.4 %  LV GLS Endo Peak A3C (AS): -18.4 %  LV GLS Endo Peak A4C (AS): -19.2 %  LV GLS Endo Peak Avg (AS): -19.0 %     ______________________________________________________________________________  Report approved by: Deuce Martínez MD on 02/13/2025 10:43 AM          I reviewed the above imaging today.     ASSESSMENT AND PLAN:    Stage I T1b NX MX invasive ductal carcinoma of the left breast upper outer quadrant which was estrogen receptor positive (%) progesterone receptor positive (81-90%) and HER2 2+ by IHC and HER2 positive by ADRIANA. Margins negative for invasive carcinoma.    -Post lumpectomy on 11/25/24.  -Port in place and she is having no issues with this.  -Echo 11/13/24 with 60-65% LVEF.  She had a repeat echo on 2/13/25 with  stable EF.  -She is noting ongoing neuropathy (grade 1) with no functional impairment.   -She is clinically appropriate to continue adjuvant HER2 directed therapy with trastuzumab and paclitaxel C9 today.  -She will begin hormonal therapy with an aromatase inhibitor after completion of chemotherapy and that the aromatase inhibitor treatment would be recommended for 10 years given the lack of data on shorter adjuvant therapy for hormone receptor positive HER2 positive breast cancer.  -She will complete every 3-week trastuzumab for 1 year of therapy.   -Postlumpectomy radiation will be recommended.  She has an appointment set up with Dr. Ortega at the end of March.     Peripheral neuropathy:  -Grade 1 to feet.  No changes today and in fact slightly less bothersome at night.   -Continue to monitor closely with paclitaxel; no dose reduction at this point.     Constipation:   -Continues to use Senna with benefit.  She has increased her dose.  -No new issues.     Fatigue:  -Related to treatment.  She is mildly anemic but this is stable. She is continuing to meet her exercise goal.    Low appetite/food aversions:  -Related to chemo; previously discussed adding protein powder to foods, and she has started to do this.   -Weight is stable.     Left axillary fullness:   -She noticed this after her visit last week.  She is not sure if it has been this way as she has not been doing regular exams.   -No overlying skin changes, no pain, and this is just under and above her incision site.  The removed axillary node was negative.   -Plan: US left axilla.   ADDENDUM:     Narrative & Impression   Examinations: US AXILLARY LEFT, 2/27/2025 9:49 AM     Comparisons: None     History: Left breast cancer treated with conservation in November 2024. Lump left axilla the site of sentinel node biopsy.     Ultrasound:  Targeted ultrasound evaluation was performed by the technologist and  radiologist.  Ultrasound left breast lump shows a small  seroma in the left axilla  measuring approximately 3 cm. No mass or suspicious lymph nodes.                                                                      IMPRESSION: BI-RADS CATEGORY: 2 - Benign.     RECOMMENDED FOLLOW-UP: Clinical follow-up. The patient does not desire  aspiration at this time. Should it become more symptomatic, she may  return at any time for aspiration as desired.     Annual screening mammography.       Bone Health:  -She and Dr. Cheung discussed use of adjuvant zoledronic acid.    -She brought in dental clearance letter today.   -Weight bearing exersise recommended.    Follow Up/Plan:  -Proceed with cycle 9 trastuzumab and paclitaxel today.  -RTC weekly with provider prior to treatment.  She will call sooner with any concerns.     37 minutes spent on the date of the encounter doing chart review, review of test results, interpretation of tests, patient visit, and documentation.    WOOD Rangel, CNP  [facility]  [address] [place] [id number]  [phone]                       Again, thank you for allowing me to participate in the care of your patient.        Sincerely,        WOOD Reyes CNP    Electronically signed

## 2025-02-23 NOTE — PROGRESS NOTES
MEDICAL ONCOLOGY NOTE     Javier Wood MD  Professor   [address] [address]     Re. Zenaida Valles   Female, 74 year old, 1950  MRN: [id number]        Dear Dr. Wood,     Thank you for referring Zenaida Valles who is a 75-year-old woman with a new diagnosis of a screening identified stage I invasive ductal cancer of the upper outer quadrant of the left breast ER positive, AR positive, HER2 amplified stage [id number].       HISTORY OF PRESENT ILLNESS:    Zenaida has been in generally good health except for a right renal artery stenosis requiring a stent.  She also has a left leg injury recently she was in her usual state of good health and had a screening mammogram which showed a suspicious lesion in the upper outer quadrant of the left breast at the 3 o'clock position 5 cm from the nipple there is an irregular hypoechoic mass measuring 1.0 x 0.7 x 0.9 cm in size 1 cm from the nipple there is also a 0.5 x 0.4 cm area of suspicion     She underwent an ultrasound guided biopsy which showed usual ductal hyperplasia and fibrocystic changes microcysts in April Kren metaplasia.  Columnar cell changes.  It invasive ductal carcinoma was also found grade 2 and DCIS grade 2 solid type.  Her breast cancer was estrogen receptor positive progesterone receptor positive at HER2 2+ by IHC.  She then underwent ADRIANA which showed a subpopulation which was 60 to 70% ADRIANA 5 negative and 30 to 40% I-S age 1 positive with a 4.9 HER2 signals per nucleus and the ratio of 2.2 indicating HER2 positivity.     She then came to see Dr. Wood at [facility] for surgical recommendations.  She had a contrast mammogram which showed a 0.9 cm enhancing lesion in the upper outer quadrant of the left breast.     She has had no weight loss or loss of energy.  She does not sleep during the day.  She can perform all of her household chores.  ECOG 0 PS.      DIAGNOSTIC  AND TREATMENT SUMMARY:  On October 7 she underwent a screening mammogram which demonstrated an asymmetry in her left breast.       On October 15 she underwent a diagnostic mammogram which confirmed a left breast asymmetry.  She had an ultrasound which demonstrated 2 masses in her left breast.  At the 3 o'clock position 1 cm from the nipple there was a mass and at the 3 o'clock position 5 cm from the nipple there was a second mass.  Her lymph nodes were normal by ultrasound.       On October 23 she underwent a contrast-enhanced mammography which only demonstrated 1 mass measuring 1 cm.  The right breast was normal.  She underwent ultrasound-guided biopsies of both masses.  The mass at the 3 o'clock position 1 cm from the nipple was benign.  The mass at the 3 o'clock position 5 cm from the nipple demonstrating a grade 2 invasive ductal cancer that was ER positive and HER2 equivocal.  There is also DCIS present.          A. LEFT breast, 3:00, 1 cm from nipple, ultrasound-guided core biopsy:  - Benign breast tissue with usual ductal hyperplasia (UDH) and fibrocystic changes including microcysts, apocrine metaplasia, and columnar cell change  - Rare calcifications associated with benign breast ducts and acini  - Negative for atypia or malignancy      B. LEFT breast, 3:00, 5 cm from nipple, ultrasound-guided core biopsy:   - INVASIVE BREAST CARCINOMA OF NO SPECIAL TYPE (INVASIVE DUCTAL CARCINOMA), [place] grade 2  - Ductal carcinoma in situ (DCIS), nuclear grade 2, solid type  - Invasive carcinoma is estrogen receptor positive, progesterone receptor positive, and HER2 equivocal (score 2+) by immunohistochemistry (see 'Breast Biomarker Reporting Template' below)  - HER2 FISH is is process; results will be reportedly separately by cytogenetis  - See comment     This FISH analysis is performed in follow up to the reported equivocal (2+) HER2 findings by immunohistochemistry ([id number]).        RESULTS:     Ratio of  HER2/MARION-17 signals  Zenaida Lazarus Valles:  See Interpretation below     Majority (~60-70%) of tumor in area of strongest IHC staining:  Group 5 (ADRIANA Negative)  Avg. number HER2 signals/nucleus:  2.9  Avg. number MARION-17 signals/nucleus:  1.7  HER2/MARION 17 ratio:  1.7     Subpopulation (~30-40%) of tumor in area of strongest IHC staining:  Group 1 (ADRIANA Positive)                             Avg. number HER2 signals/nucleus:  4.9  Avg. number MARION-17 signals/nucleus:  2.3  HER2/MARION 17 ratio:  2.2     **Interpretive guidelines per the American Society of Clinical Oncology/College of American Pathologists Clinical Practice Guideline Update (Margarita VALENZUELA et al, 2023, Arch Pathol Lab Med 147:993):     -- Group 1: HER2/MARION-17 ratio 2.0 or more -AND- avg. number HER2 signals/nucleus 4.0 or more (ADRIANA Positive)  -- Group 2: HER2/MARION-17 ratio 2.0 or more -AND- avg. number HER2 signals/nucleus <4.0 (Additional work required)  -- Group 3: HER2/MARION-17 ratio <2.0 -AND- avg. number HER2 signals/nucleus 6.0 or more (Additional work required)  -- Group 4: HER2/MARION-17 ratio <2.0 -AND- avg. number HER2 signals/nucleus 4.0 or more and <6.0 (Additional work required)  -- Group 5: HER2/MARION-17 ratio <2.0 -AND- avg. number HER2 signals/nucleus <4.0 (ADRIANA Negative)     INTERPRETATION:  Two admixed populations of cells were found in this sample:     Majority (~60-70%) of tumor in area of strongest IHC staining:  Per the American Society of Clinical Oncology/College of American Pathologists Clinical Practice Guideline Focused Update (Margarita VALENZUELA et al, 2018, Arch Pathol Lab Med  doi:10.5858/arpa.5011-7362-NW), the HER2/MARION 17 ratio of 1.7 and average number of HER2 signals/cell of 2.9 places this population of cells in Group 5 (ADRIANA Negative).      Subpopulation (~30-40%) of tumor in area of strongest IHC staining:  Admixed within this sample were cells with increased HER2 signals, which, when selectively scored, had an average of 4.9 HER2 signals/nucleus  and a HER2/MARION 17 ratio of 2.2; per the American Society of Clinical Oncology/College of American Pathologists Clinical Practice Guideline Focused Update (Margarita VALENZUELA et al, 2018, Arch Pathol Lab Med  doi:10.5858/arpa.2102-0882-YP), this population of cells falls into Group 1 (ADRIANA Positive).     PAST MEDICAL HISTORY: No history of breast surgery.  No history of breast cancer in the past.  No history of radiation therapy in the past or radiation exposure.  No history of tumor of any kind.  No history of heart problems, heart attack, breathing problems, blood clots, seizures, arthritis, peptic ulcer disease, osteoporosis or bone fractures.  She is not currently participating in a clinical trial.  She does have a history of asthma and uses an albuterol inhaler in cold weather.     Her past medical history is significant for diabetes mellitus and is on metformin, hypertension obesity, she has IPMN and a right renal artery stent.  The family history is negative for breast cancer.  She has a history of right renal artery stenosis and has a stent in place.     She has a recent history of a left hamstring injury.  She is seeing orthopedics for this problem.  She also has a history of back pain.  Her gait is affected but she can perform activities of daily living.  She has a history of aches in her shoulders.  She had a 65 pound weight loss over the last 2 years which was intentional related to improve diet and exercise. She says she has a history of fibromuscular dysplasia.     She worked in the [employer] in [place] and was exposed to dengue, malaria, hepatitis A, hepatitis B, encephalitis.  She has also had COVID and the flu.     FAMILY HISTORY:   No history of ovarian, uterine, colon cancer, or melanoma.  No history of glioma, gastric cancer or pancreatic cancer.  Her mother did have Zollinger-Wheeler syndrome but the patient's genetics are negative for this syndrome.      PAST MENSTRUAL HISTORY:  Age of first  menstrual period was 13.   She has been pregnant 1 times with 0 live births and 0 miscarriages and 1 .  Age at in place pregnancy age 28.   Uterus and ovaries are in place.  Last menstrual period was about age 52 and the menopause occurred naturally. No history of hormone replacement therapy.     She does have a history of thickened endometrial lining but by her report no abnormalities of the lining when she had a DIC     HABITS:  She is a never smoker but she has a history of exposure to secondhand smoke from her father as a child.  She consumes alcoholic beverages socially 1 drink every couple of months.     GERMLINE GENETICS: Genetic testing is available for 47 genes associated with cancers of the breast, ovary, uterus, prostate and gastrointestinal system: [facility] [employer] [employer] Common Hereditary Cancers panel (APC, SUMIT, AXIN2, BARD1, BMPR1A, BRCA1, BRCA2, BRIP1, CDH1, CDK4, CDKN2A, CHEK2, CTNNA1, DICER1, EPCAM, GREM1, HOXB13, KIT, MEN1, MLH1, MSH2, MSH3, MSH6, MUTYH, NBN, NF1, NTHL1, PALB2, PDGFRA, PMS2, POLD1, POLE, PTEN,RAD50, RAD51C, RAD51D, SDHA, SDHB, SDHC, SDHD, SMAD4, SMARCA4, STK11, TP53,TSC1, TSC2, VHL)      ALLERGIES: She has drug allergies to codeine, codeine derivatives, ibuprofen, lisinopril..  No allergy to seafood, iodine, or contrast dye.  She does take daily aspirin 81 mg because of her renal stent.     LUMPECTOMY:  Surgical Pathology Report                         Case: [id number]                                   Authorizing Provider:  Javier Wood MD         Collected:           2024 09:40 AM           Ordering Location:     [facility] OR  Received:            2024 09:54 AM                                  [place]                                                                   Pathologist:           Kristal Lanza MD                                                   Specimens:   A) - Breast, Left, Left breast lumpectomy                                                             B) - Lymph Node(s), [place], Left axillary sentinel lymph node #1                                  C) - Lymph Node(s), [place], Left axillary sentinel lymph node # 2                         Final Diagnosis   A. LEFT breast, RFID tag-localized lumpectomy:  -INVASIVE BREAST CARCINOMA OF NO SPECIAL TYPE (INVASIVE DUCTAL CARCINOMA), FAREED GRADE 2, size 11 mm  -Margins are uninvolved by invasive carcinoma  -Invasive carcinoma is 1 mm from the nearest (posterior) margin, 5 mm from the anterior margin, and > 5 mm from the superior, inferior, medial and lateral margins  -No lymphovascular invasion identified  -Other findings: fibrocystic change (including microcysts with apocrine metaplasia), sclerosing adenosis, and usual ductal hyperplasia  -Calcifications associated with benign acini  -Prior core biopsy site changes  -See tumor synoptic below     B. Lymph node, LEFT axillary, sentinel #1, excision:  -One benign lymph node (0/1)     C. Lymph node, LEFT axillary, sentinel #2, excision:  -One benign lymph node (0/1)                Synoptic Checklist   INVASIVE CARCINOMA OF THE BREAST: Resection   8th Edition - Protocol posted: 12/13/2023INVASIVE CARCINOMA OF THE BREAST: RESECTION - All Specimens                  SPECIMEN     Procedure   Excision (less than total mastectomy)     Specimen Laterality   Left     TUMOR     Tumor Site   Clock position         3 o'clock     Histologic Type   Invasive carcinoma of no special type (ductal)     Histologic Grade (Fareed Histologic Score)         Glandular (Acinar) / Tubular Differentiation   Score 2     Nuclear Pleomorphism   Score 3     Mitotic Rate   Score 2     Overall Grade   Grade 2 (scores of 6 or 7)     Tumor Size   Greatest dimension of largest invasive focus (Millimeters): 11 mm     Additional Dimension (Millimeters)   9 mm         9 mm     Tumor Focality   Single focus of invasive carcinoma     Ductal Carcinoma In Situ (DCIS)   Not identified      Lobular Carcinoma In Situ (LCIS)   Not identified     Lymphatic and / or Vascular Invasion   Not identified     Dermal Lymphatic and / or Vascular Invasion   No skin present     Microcalcifications   Present in non-neoplastic tissue     Treatment Effect in the Breast   No known presurgical therapy     MARGINS     Margin Status for Invasive Carcinoma   All margins negative for invasive carcinoma     Distance from Invasive Carcinoma to Closest Margin   1 mm     Closest Margin(s) to Invasive Carcinoma   Posterior     Distance from Invasive Carcinoma to Anterior Margin   5 mm     Distance from Invasive Carcinoma to Superior Margin   Greater than: 5 mm     Distance from Invasive Carcinoma to Inferior Margin   Greater than: 5 mm     Distance from Invasive Carcinoma to Medial Margin   Greater than: 5 mm     Distance from Invasive Carcinoma to Lateral Margin   Greater than: 5 mm     REGIONAL LYMPH NODES     Regional Lymph Node Status   All regional lymph nodes negative for tumor     Total Number of Lymph Nodes Examined (sentinel and non-sentinel)   2     Number of [place] Nodes Examined   2     pTNM CLASSIFICATION (AJCC 8th Edition)     Reporting of pT, pN, and (when applicable) pM categories is based on information available to the pathologist at the time the report is issued. As per the AJCC (Chapter 1, 8th Ed.) it is the managing physician s responsibility to establish the final pathologic stage based upon all pertinent information, including but potentially not limited to this pathology report.     pT Category   pT1c     pN Category   pN0     N Suffix   (sn)     SPECIAL STUDIES               Estrogen Receptor (ER) Status   Positive (greater than 10% of cells demonstrate nuclear positivity)     Percentage of Cells with Nuclear Positivity   %               Progesterone Receptor (PgR) Status   Positive     Percentage of Cells with Nuclear Positivity   81-90%               HER2 (by immunohistochemistry)    "Equivocal (Score 2+)     Percentage of Cells with Uniform Intense Complete Membrane Staining   0 %               HER2 (by in situ hybridization)   Positive (amplified)     Testing Performed on Case Number   HER2 FISH amplified in 30-40% of tumor cells. Performed on prior core biopsy [id number] specimen B     Comment(s)   Best block: A13   .      Clinical Information   ALEE   The patient is a 74 year old woman with LEFT breast carcinoma. Procedure: LEFT breast RFID tag localized lumpectomy, LEFT axillary sentinel lymph node biopsy.    Gross Description   ALEE ROBIN(1). Breast, Left, Left breast lumpectomy:  The specimen is received fresh with proper patient identification, labeled \"left breast lumpectomy\". It consists of 27.85 g, 6.7 cm from medial to lateral x 4.7 cm from superior to inferior x 2.4 cm from anterior to posterior left breast lumpectomy specimen. The specimen was received previously inked by the surgeon as follows:  Superior - red, anterior - green, inferior - blue, posterior - black, medial - yellow and lateral - orange.     The specimen is sectioned from medial (slice 1) to lateral (slice 15) into 15 slices.          [id number]   This FISH analysis is performed in follow up to the reported equivocal (2+) HER2 findings by immunohistochemistry ([id number]).        RESULTS:     Ratio of HER2/MARION-17 signals  Zenaida Valles:  See Interpretation below     Majority (~60-70%) of tumor in area of strongest IHC staining:  Group 5 (ADRIANA Negative)  Avg. number HER2 signals/nucleus:  2.9  Avg. number MARION-17 signals/nucleus:  1.7  HER2/MARION 17 ratio:  1.7     Subpopulation (~30-40%) of tumor in area of strongest IHC staining:  Group 1 (ADRIANA Positive)                             Avg. number HER2 signals/nucleus:  4.9  Avg. number MARION-17 signals/nucleus:  2.3  HER2/MARION 17 ratio:  2.2        TREATMENT HISTORY:  A.  Lumpectomy showed a stage I T1b NX MX invasive ductal carcinoma of the left breast upper outer " quadrant which was estrogen receptor positive (%) progesterone receptor positive (81-90%) and HER2 2+ by IHC and HER2 positive by ADRIANA.  Margins negative for invasive carcinoma.    B.  Plan is to give the APT regimen followed by addition of hormonal therapy with letrozole for 7 years. Right single lumen [place] 12-26.  Started APT regimen with weekly paclitaxel and trastuzuamb beginning 12-27.   C.          INTERVAL HISTORY:  She was seen in clinic today with her  Trae. She had her lumpectomy with Dr. Wood on November 25. She started adjuvant APT regimen on 12/27/2024.  She has some neuropathy pain in her toes.  She is deconditioned and has significant fatigue.  She has mild depression and anxiety which is held by her [employer] community which has been very supportive.  She is having difficulty with quilting which is major activity.  Fatigue is grade 2 relieved by rest.  She can perform her activities of daily living.    She has worsening neuropathy in her feet but not in her hands.  She reports that she has significant pain with neuropathy.  She has grade 2 fatigue with relief by rest.  She can perform at least some of her activities of daily living including quilting and household chores.  She has lost some weight from 174 to 173 pounds she is continuing to eat reasonably well.  She is able to exercise by walking.  She does not consume alcohol.  She is taking vitamin D and calcium.  She did have an episode where she was extremely fatigued in the grocery store.     She has been exercising by walking.  Overall her exercise stamina is less.  She is taking a vitamin D supplement and drinking milk to meet her calcium requirements.  She does have a bovine collagen to her diet and increase protein.  She has been icing to prevent neuropathy.     She has an ECOG 0 performance status.     REVIEW OF SYSTEMS:   She has had no fevers, cough, chest pain, shortness of breath, mouth sores, hemoptysis, nausea,  vomiting, abdominal pain, diarrhea, bone pain, back pain, muscle or joint complaints, hearing loss or depression.  The remainder of a 10-point review of systems is negative.        PHYSICAL EXAMINATION:     VITALS:   /79 (BP Location: Right arm, Patient Position: Sitting, Cuff Size: Adult Regular)   Pulse 74   Temp 97.7  F (36.5  C) (Oral)   Resp 16   Wt 77.6 kg (171 lb 1.6 oz)   LMP  (LMP Unknown)   SpO2 98%   BMI 28.47 kg/m      HEENT:  No alopecia, no lesions in the oropharynx.  Dentition is normal  LYMPH:  There is no palpable cervical, supraclavicular, or subclavicular lymphadenopathy.  BREASTS: Deferred  Her port is without tenderness or erythema.  LUNGS:  Clear to auscultation.  HEART:  Regular rate and rhythm.  S1, S2. No murmurs  ABDOMEN:  Soft, nontender, without hepatosplenomegaly.  EXTREMITIES:  Without edema.  PSYCH:  Mood and affect were normal.  NEUROLOGIC:  Mood and affect  normal. No focal deficits apparent.          LABORATORY DATA:   CMP was normal except for glucose of 142.  CBC showed WBC of 5.4, hemoglobin 1.4 platelets 338,000.  Absolute neutrophil count 3000.    Echo 60-56% on 2-23-25.     ASSESSMENT AND PLAN:    Zenaida Valles has a stage I T1b NX MX invasive ductal carcinoma of the left breast upper outer quadrant which was estrogen receptor positive (%) progesterone receptor positive (81-90%) and HER2 2+ by IHC and HER2 positive by ADRIANA. Margins negative for invasive carcinoma.    She's tolerating adjuvant HER2 directed therapy with trastuzumab and paclitaxel.  She is here for cycle 10 of 12.  She has significant neuropathy.  She does not want to try duloxetine or gabapentin this time.  We will do icing of feet beginning today.  Her fingers are okay.  There may be a seroma in the left axilla which was noted last week.  Will perform a focused ultrasound of the left breast and axilla today.  She does have some fatigue and decreased stamina.  Overall her exercise  tolerance is decreased.  I did have discussion with her that there can be some minor irreversible reduction in exercise capacity based on the chemotherapy whereas the reduction in exercise capacity related to trastuzumab is likely to be reversible.  I discussed that we will see what the echocardiogram shows next week.  I also discussed that if there is some loss of ejection fraction we can potentially just her beta-blocker and try carvedilol.  She is on Avapro which is an ARB and could be helpful.  Some neuropathy.  She has now been icing during chemotherapy.    We would also add adjuvant zoledronic acid.    We discussed toxicities of paclitaxel which could include alopecia, neuropathy which could last for 6 months to a year after infusion.    Discussion of hormonal therapy.  We have discussed 10 years of adjuvant hormonal therapy.  We discussed adjuvant zoledronic acid.  I discussed that postlumpectomy radiation will be recommended and this would be after completion of 12 weeks of paclitaxel and trastuzumab..  Will arrange for radiation oncology consult.  Discussion of adjuvant zoledronic acid.  Adjuvant zoledronic acid can be associated with flulike symptoms that can last for several days after treatment.  I also discussed that there can be dental complications.  She keeps her teeth and good shape with frequent dental visits.  She does have excellent dentition and the risk of osteonecrosis of the jaw is quite low.  History of Zollinger-Wheeler syndrome.    Diabetes.  She is on metformin.  Discussion of exercise.  Discussion of the Lace and Pathway.  We recommend 150 minutes of exercise per week with mixed cardio and strength training.  Stretch band, hand weights, yoga.  Diet.  I recommended a diet low in saturated fat, but not low in fat with more fruits and vegetables, and less in the way of red meat.  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  Follow up plan.  CBC, CMP.  Next Echo in May.  Follow-up with  me alternating with an ALEC each cycle total of 12 cycles of paclitaxel and trastuzumab.  Leilani on 3-6 and Shayy on 3-13. She is here for cycle 10 of 12 today.  We will see her each cycle because of symptoms with treatment. Radiation oncology consultation with Dr. Ortega 3-28.  Follow up with me 3-27 to start every 3 week trastuzumab.      Thank you for allowing us to participate in this patient's care.  The patient was seen and evaluated by me.  I discussed the patient with the resident Dr. Te Rainey and agree with the findings and plan in the note.      Sincerely,      Chito Cheung MD  Professor  [facility]  [phone]     I spent 40 minutes with the patient more than 50% of which was in counseling and coordination of care. The remainder of a 10 point review of systems was negative.

## 2025-02-27 ENCOUNTER — ONCOLOGY VISIT (OUTPATIENT)
Dept: ONCOLOGY | Facility: CLINIC | Age: 75
End: 2025-02-27
Attending: INTERNAL MEDICINE
Payer: COMMERCIAL

## 2025-02-27 ENCOUNTER — APPOINTMENT (OUTPATIENT)
Dept: LAB | Facility: CLINIC | Age: 75
End: 2025-02-27
Attending: INTERNAL MEDICINE
Payer: COMMERCIAL

## 2025-02-27 ENCOUNTER — ANCILLARY PROCEDURE (OUTPATIENT)
Dept: MAMMOGRAPHY | Facility: CLINIC | Age: 75
End: 2025-02-27
Attending: NURSE PRACTITIONER
Payer: COMMERCIAL

## 2025-02-27 VITALS
BODY MASS INDEX: 28.47 KG/M2 | WEIGHT: 171.1 LBS | DIASTOLIC BLOOD PRESSURE: 79 MMHG | OXYGEN SATURATION: 98 % | TEMPERATURE: 97.7 F | SYSTOLIC BLOOD PRESSURE: 127 MMHG | RESPIRATION RATE: 16 BRPM | HEART RATE: 74 BPM

## 2025-02-27 DIAGNOSIS — C50.912 INVASIVE DUCTAL CARCINOMA OF BREAST, FEMALE, LEFT (H): Primary | ICD-10-CM

## 2025-02-27 DIAGNOSIS — C50.912 INVASIVE DUCTAL CARCINOMA OF BREAST, FEMALE, LEFT (H): ICD-10-CM

## 2025-02-27 DIAGNOSIS — R22.32 AXILLARY MASS, LEFT: ICD-10-CM

## 2025-02-27 LAB
ALBUMIN SERPL BCG-MCNC: 4.1 G/DL (ref 3.5–5.2)
ALP SERPL-CCNC: 79 U/L (ref 40–150)
ALT SERPL W P-5'-P-CCNC: 27 U/L (ref 0–50)
ANION GAP SERPL CALCULATED.3IONS-SCNC: 10 MMOL/L (ref 7–15)
AST SERPL W P-5'-P-CCNC: 22 U/L (ref 0–45)
BASOPHILS # BLD AUTO: 0 10E3/UL (ref 0–0.2)
BASOPHILS NFR BLD AUTO: 1 %
BILIRUB SERPL-MCNC: 0.2 MG/DL
BUN SERPL-MCNC: 18.9 MG/DL (ref 8–23)
CALCIUM SERPL-MCNC: 9.2 MG/DL (ref 8.8–10.4)
CHLORIDE SERPL-SCNC: 102 MMOL/L (ref 98–107)
CREAT SERPL-MCNC: 0.64 MG/DL (ref 0.51–0.95)
EGFRCR SERPLBLD CKD-EPI 2021: >90 ML/MIN/1.73M2
EOSINOPHIL # BLD AUTO: 0.2 10E3/UL (ref 0–0.7)
EOSINOPHIL NFR BLD AUTO: 3 %
ERYTHROCYTE [DISTWIDTH] IN BLOOD BY AUTOMATED COUNT: 14.2 % (ref 10–15)
GLUCOSE SERPL-MCNC: 142 MG/DL (ref 70–99)
HCO3 SERPL-SCNC: 28 MMOL/L (ref 22–29)
HCT VFR BLD AUTO: 33.5 % (ref 35–47)
HGB BLD-MCNC: 11.4 G/DL (ref 11.7–15.7)
IMM GRANULOCYTES # BLD: 0 10E3/UL
IMM GRANULOCYTES NFR BLD: 1 %
LYMPHOCYTES # BLD AUTO: 1.9 10E3/UL (ref 0.8–5.3)
LYMPHOCYTES NFR BLD AUTO: 35 %
MCH RBC QN AUTO: 30.2 PG (ref 26.5–33)
MCHC RBC AUTO-ENTMCNC: 34 G/DL (ref 31.5–36.5)
MCV RBC AUTO: 89 FL (ref 78–100)
MONOCYTES # BLD AUTO: 0.3 10E3/UL (ref 0–1.3)
MONOCYTES NFR BLD AUTO: 6 %
NEUTROPHILS # BLD AUTO: 3 10E3/UL (ref 1.6–8.3)
NEUTROPHILS NFR BLD AUTO: 55 %
NRBC # BLD AUTO: 0 10E3/UL
NRBC BLD AUTO-RTO: 0 /100
PLATELET # BLD AUTO: 338 10E3/UL (ref 150–450)
POTASSIUM SERPL-SCNC: 4 MMOL/L (ref 3.4–5.3)
PROT SERPL-MCNC: 6.4 G/DL (ref 6.4–8.3)
RBC # BLD AUTO: 3.77 10E6/UL (ref 3.8–5.2)
SODIUM SERPL-SCNC: 140 MMOL/L (ref 135–145)
WBC # BLD AUTO: 5.4 10E3/UL (ref 4–11)

## 2025-02-27 PROCEDURE — 82310 ASSAY OF CALCIUM: CPT | Performed by: INTERNAL MEDICINE

## 2025-02-27 PROCEDURE — 36591 DRAW BLOOD OFF VENOUS DEVICE: CPT | Performed by: INTERNAL MEDICINE

## 2025-02-27 PROCEDURE — 85014 HEMATOCRIT: CPT | Performed by: INTERNAL MEDICINE

## 2025-02-27 PROCEDURE — 258N000003 HC RX IP 258 OP 636: Performed by: INTERNAL MEDICINE

## 2025-02-27 PROCEDURE — 82374 ASSAY BLOOD CARBON DIOXIDE: CPT | Performed by: INTERNAL MEDICINE

## 2025-02-27 PROCEDURE — 99215 OFFICE O/P EST HI 40 MIN: CPT | Mod: GC | Performed by: INTERNAL MEDICINE

## 2025-02-27 PROCEDURE — 250N000011 HC RX IP 250 OP 636: Performed by: INTERNAL MEDICINE

## 2025-02-27 PROCEDURE — 76882 US LMTD JT/FCL EVL NVASC XTR: CPT | Mod: LT | Performed by: RADIOLOGY

## 2025-02-27 PROCEDURE — G0463 HOSPITAL OUTPT CLINIC VISIT: HCPCS | Performed by: INTERNAL MEDICINE

## 2025-02-27 PROCEDURE — 85004 AUTOMATED DIFF WBC COUNT: CPT | Performed by: INTERNAL MEDICINE

## 2025-02-27 RX ORDER — HEPARIN SODIUM (PORCINE) LOCK FLUSH IV SOLN 100 UNIT/ML 100 UNIT/ML
5 SOLUTION INTRAVENOUS
Status: DISCONTINUED | OUTPATIENT
Start: 2025-02-27 | End: 2025-02-27 | Stop reason: HOSPADM

## 2025-02-27 RX ORDER — METHYLPREDNISOLONE SODIUM SUCCINATE 40 MG/ML
40 INJECTION INTRAMUSCULAR; INTRAVENOUS
Status: CANCELLED
Start: 2025-02-28

## 2025-02-27 RX ORDER — DIPHENHYDRAMINE HYDROCHLORIDE 50 MG/ML
50 INJECTION, SOLUTION INTRAMUSCULAR; INTRAVENOUS
Status: CANCELLED
Start: 2025-02-28

## 2025-02-27 RX ORDER — EPINEPHRINE 1 MG/ML
0.3 INJECTION, SOLUTION INTRAMUSCULAR; SUBCUTANEOUS EVERY 5 MIN PRN
Status: CANCELLED | OUTPATIENT
Start: 2025-02-28

## 2025-02-27 RX ORDER — HEPARIN SODIUM (PORCINE) LOCK FLUSH IV SOLN 100 UNIT/ML 100 UNIT/ML
5 SOLUTION INTRAVENOUS
Status: CANCELLED | OUTPATIENT
Start: 2025-02-28

## 2025-02-27 RX ORDER — ACETAMINOPHEN 325 MG/1
650 TABLET ORAL
Status: CANCELLED | OUTPATIENT
Start: 2025-02-28

## 2025-02-27 RX ORDER — LORAZEPAM 2 MG/ML
0.5 INJECTION INTRAMUSCULAR EVERY 4 HOURS PRN
Status: CANCELLED | OUTPATIENT
Start: 2025-02-28

## 2025-02-27 RX ORDER — DIPHENHYDRAMINE HYDROCHLORIDE 50 MG/ML
25 INJECTION, SOLUTION INTRAMUSCULAR; INTRAVENOUS
Status: CANCELLED
Start: 2025-02-28

## 2025-02-27 RX ORDER — ONDANSETRON 2 MG/ML
8 INJECTION INTRAMUSCULAR; INTRAVENOUS ONCE
Status: CANCELLED | OUTPATIENT
Start: 2025-02-28

## 2025-02-27 RX ORDER — DIPHENHYDRAMINE HCL 25 MG
50 CAPSULE ORAL
Status: CANCELLED
Start: 2025-02-28

## 2025-02-27 RX ORDER — HEPARIN SODIUM (PORCINE) LOCK FLUSH IV SOLN 100 UNIT/ML 100 UNIT/ML
5 SOLUTION INTRAVENOUS ONCE
Status: COMPLETED | OUTPATIENT
Start: 2025-02-27 | End: 2025-02-27

## 2025-02-27 RX ORDER — ALBUTEROL SULFATE 0.83 MG/ML
2.5 SOLUTION RESPIRATORY (INHALATION)
Status: CANCELLED | OUTPATIENT
Start: 2025-02-28

## 2025-02-27 RX ORDER — MEPERIDINE HYDROCHLORIDE 25 MG/ML
25 INJECTION INTRAMUSCULAR; INTRAVENOUS; SUBCUTANEOUS
Status: CANCELLED | OUTPATIENT
Start: 2025-02-28

## 2025-02-27 RX ORDER — ALBUTEROL SULFATE 90 UG/1
1-2 INHALANT RESPIRATORY (INHALATION)
Status: CANCELLED
Start: 2025-02-28

## 2025-02-27 RX ORDER — ONDANSETRON 2 MG/ML
8 INJECTION INTRAMUSCULAR; INTRAVENOUS ONCE
Status: COMPLETED | OUTPATIENT
Start: 2025-02-27 | End: 2025-02-27

## 2025-02-27 RX ORDER — HEPARIN SODIUM,PORCINE 10 UNIT/ML
5-20 VIAL (ML) INTRAVENOUS DAILY PRN
Status: CANCELLED | OUTPATIENT
Start: 2025-02-28

## 2025-02-27 RX ADMIN — HEPARIN 3 ML: 100 SYRINGE at 08:36

## 2025-02-27 RX ADMIN — ONDANSETRON 8 MG: 2 INJECTION INTRAMUSCULAR; INTRAVENOUS at 10:24

## 2025-02-27 RX ADMIN — SODIUM CHLORIDE 160 MG: 9 INJECTION, SOLUTION INTRAVENOUS at 11:01

## 2025-02-27 RX ADMIN — HEPARIN 5 ML: 100 SYRINGE at 12:40

## 2025-02-27 RX ADMIN — PACLITAXEL 154 MG: 6 INJECTION, SOLUTION INTRAVENOUS at 11:36

## 2025-02-27 ASSESSMENT — PAIN SCALES - GENERAL: PAINLEVEL_OUTOF10: NO PAIN (0)

## 2025-02-27 NOTE — PROGRESS NOTES
Infusion Nursing Note:  Zenaida Valles presents today for Cycle 10 Day 1 trastuzumab and Taxol.    Patient seen by provider today: Yes: Dr. Cheung   present during visit today: Not Applicable.    Note:   Patient has not additional concerns or questions after her visit with Dr. Cheung. Patient confirmed that she wants to proceed with treatment today.    Patient iced during Taxol infusion.    Intravenous Access:  Implanted Port.    Treatment Conditions:   Latest Reference Range & Units 02/27/25 08:44   Sodium 135 - 145 mmol/L 140   Potassium 3.4 - 5.3 mmol/L 4.0   Chloride 98 - 107 mmol/L 102   Carbon Dioxide (CO2) 22 - 29 mmol/L 28   Urea Nitrogen 8.0 - 23.0 mg/dL 18.9   Creatinine 0.51 - 0.95 mg/dL 0.64   GFR Estimate >60 mL/min/1.73m2 >90   Calcium 8.8 - 10.4 mg/dL 9.2   Anion Gap 7 - 15 mmol/L 10   Albumin 3.5 - 5.2 g/dL 4.1   Protein Total 6.4 - 8.3 g/dL 6.4   Alkaline Phosphatase 40 - 150 U/L 79   ALT 0 - 50 U/L 27   AST 0 - 45 U/L 22   Bilirubin Total <=1.2 mg/dL 0.2   Glucose 70 - 99 mg/dL 142 (H)   WBC 4.0 - 11.0 10e3/uL 5.4   Hemoglobin 11.7 - 15.7 g/dL 11.4 (L)   Hematocrit 35.0 - 47.0 % 33.5 (L)   Platelet Count 150 - 450 10e3/uL 338   RBC Count 3.80 - 5.20 10e6/uL 3.77 (L)   MCV 78 - 100 fL 89   MCH 26.5 - 33.0 pg 30.2   MCHC 31.5 - 36.5 g/dL 34.0   RDW 10.0 - 15.0 % 14.2   % Neutrophils % 55   % Lymphocytes % 35   % Monocytes % 6   % Eosinophils % 3   % Basophils % 1   Absolute Basophils 0.0 - 0.2 10e3/uL 0.0   Absolute Eosinophils 0.0 - 0.7 10e3/uL 0.2   Absolute Immature Granulocytes <=0.4 10e3/uL 0.0   Absolute Lymphocytes 0.8 - 5.3 10e3/uL 1.9   Absolute Monocytes 0.0 - 1.3 10e3/uL 0.3   % Immature Granulocytes % 1   Absolute Neutrophils 1.6 - 8.3 10e3/uL 3.0   Absolute NRBCs 10e3/uL 0.0   NRBCs per 100 WBC <1 /100 0     Results reviewed, labs MET treatment parameters, ok to proceed with treatment.  Echo 2/13/25 EF 60-65%    Post Infusion Assessment:  Patient tolerated infusion  without incident.  Blood return noted pre and post infusion.  Site patent and intact, free from redness, edema or discomfort.  No evidence of extravasations.  Access discontinued per protocol.       Discharge Plan:   Patient declined prescription refills.  Discharge instructions reviewed with: Patient.  Patient and/or family verbalized understanding of discharge instructions and all questions answered.  AVS to patient via [employer].  Patient will return 3/6/25 for next appointment.   Patient discharged in stable condition accompanied by: .  Departure Mode: Ambulatory.      Nata Shelby RN

## 2025-02-27 NOTE — LETTER
2/27/2025      Zenaida Valles  [address]  [facility] [address]      Dear Colleague,    Thank you for referring your patient, Zenaida Valles, to the [facility]. Please see a copy of my visit note below.    MEDICAL ONCOLOGY NOTE     Javier Wood MD  Professor   [facility]  [address] [place] [address]     Re. Zenaida Valles   Female, 74 year old, 1950  MRN: [id number]        Dear Dr. Wood,     Thank you for referring Zenaida Valles who is a 75-year-old woman with a new diagnosis of a screening identified stage I invasive ductal cancer of the upper outer quadrant of the left breast ER positive, WI positive, HER2 amplified stage [id number].       HISTORY OF PRESENT ILLNESS:    Zenaida has been in generally good health except for a right renal artery stenosis requiring a stent.  She also has a left leg injury recently she was in her usual state of good health and had a screening mammogram which showed a suspicious lesion in the upper outer quadrant of the left breast at the 3 o'clock position 5 cm from the nipple there is an irregular hypoechoic mass measuring 1.0 x 0.7 x 0.9 cm in size 1 cm from the nipple there is also a 0.5 x 0.4 cm area of suspicion     She underwent an ultrasound guided biopsy which showed usual ductal hyperplasia and fibrocystic changes microcysts in April Kren metaplasia.  Columnar cell changes.  It invasive ductal carcinoma was also found grade 2 and DCIS grade 2 solid type.  Her breast cancer was estrogen receptor positive progesterone receptor positive at HER2 2+ by IHC.  She then underwent ADRIANA which showed a subpopulation which was 60 to 70% ADRIANA 5 negative and 30 to 40% I-S age 1 positive with a 4.9 HER2 signals per nucleus and the ratio of 2.2 indicating HER2 positivity.     She then came to see Dr. Wood at [facility] for surgical recommendations.  She had a  contrast mammogram which showed a 0.9 cm enhancing lesion in the upper outer quadrant of the left breast.     She has had no weight loss or loss of energy.  She does not sleep during the day.  She can perform all of her household chores.  ECOG 0 PS.      DIAGNOSTIC AND TREATMENT SUMMARY:  On October 7 she underwent a screening mammogram which demonstrated an asymmetry in her left breast.       On October 15 she underwent a diagnostic mammogram which confirmed a left breast asymmetry.  She had an ultrasound which demonstrated 2 masses in her left breast.  At the 3 o'clock position 1 cm from the nipple there was a mass and at the 3 o'clock position 5 cm from the nipple there was a second mass.  Her lymph nodes were normal by ultrasound.       On October 23 she underwent a contrast-enhanced mammography which only demonstrated 1 mass measuring 1 cm.  The right breast was normal.  She underwent ultrasound-guided biopsies of both masses.  The mass at the 3 o'clock position 1 cm from the nipple was benign.  The mass at the 3 o'clock position 5 cm from the nipple demonstrating a grade 2 invasive ductal cancer that was ER positive and HER2 equivocal.  There is also DCIS present.          A. LEFT breast, 3:00, 1 cm from nipple, ultrasound-guided core biopsy:  - Benign breast tissue with usual ductal hyperplasia (UDH) and fibrocystic changes including microcysts, apocrine metaplasia, and columnar cell change  - Rare calcifications associated with benign breast ducts and acini  - Negative for atypia or malignancy      B. LEFT breast, 3:00, 5 cm from nipple, ultrasound-guided core biopsy:   - INVASIVE BREAST CARCINOMA OF NO SPECIAL TYPE (INVASIVE DUCTAL CARCINOMA), Mynor grade 2  - Ductal carcinoma in situ (DCIS), nuclear grade 2, solid type  - Invasive carcinoma is estrogen receptor positive, progesterone receptor positive, and HER2 equivocal (score 2+) by immunohistochemistry (see 'Breast Biomarker Reporting Template'  below)  - HER2 FISH is is process; results will be reportedly separately by cytogenetis  - See comment     This FISH analysis is performed in follow up to the reported equivocal (2+) HER2 findings by immunohistochemistry ([id number]).        RESULTS:     Ratio of HER2/MARION-17 signals  Zenaida Valles:  See Interpretation below     Majority (~60-70%) of tumor in area of strongest IHC staining:  Group 5 (ADRIANA Negative)  Avg. number HER2 signals/nucleus:  2.9  Avg. number MARION-17 signals/nucleus:  1.7  HER2/MARION 17 ratio:  1.7     Subpopulation (~30-40%) of tumor in area of strongest IHC staining:  Group 1 (ADRIANA Positive)                             Avg. number HER2 signals/nucleus:  4.9  Avg. number MARION-17 signals/nucleus:  2.3  HER2/MARION 17 ratio:  2.2     **Interpretive guidelines per the American Society of Clinical Oncology/College of American Pathologists Clinical Practice Guideline Update (Margarita VALENZUELA et al, 2023, Arch Pathol Lab Med 147:993):     -- Group 1: HER2/MARION-17 ratio 2.0 or more -AND- avg. number HER2 signals/nucleus 4.0 or more (ADRIANA Positive)  -- Group 2: HER2/MARION-17 ratio 2.0 or more -AND- avg. number HER2 signals/nucleus <4.0 (Additional work required)  -- Group 3: HER2/MARION-17 ratio <2.0 -AND- avg. number HER2 signals/nucleus 6.0 or more (Additional work required)  -- Group 4: HER2/MARION-17 ratio <2.0 -AND- avg. number HER2 signals/nucleus 4.0 or more and <6.0 (Additional work required)  -- Group 5: HER2/MARION-17 ratio <2.0 -AND- avg. number HER2 signals/nucleus <4.0 (ADRIANA Negative)     INTERPRETATION:  Two admixed populations of cells were found in this sample:     Majority (~60-70%) of tumor in area of strongest IHC staining:  Per the American Society of Clinical Oncology/College of American Pathologists Clinical Practice Guideline Focused Update (Margarita VALENZUELA et al, 2018, Arch Pathol Lab Med  doi:10.5858/arpa.5439-2377-JQ), the HER2/MARION 17 ratio of 1.7 and average number of HER2 signals/cell of 2.9 places  this population of cells in Group 5 (ADRIANA Negative).      Subpopulation (~30-40%) of tumor in area of strongest IHC staining:  Admixed within this sample were cells with increased HER2 signals, which, when selectively scored, had an average of 4.9 HER2 signals/nucleus and a HER2/MARION 17 ratio of 2.2; per the American Society of Clinical Oncology/College of American Pathologists Clinical Practice Guideline Focused Update (Margarita VALENZUELA et al, 2018, Arch Pathol Lab Med  doi:10.5858/arpa.[address]-WR), this population of cells falls into Group 1 (ADRIANA Positive).     PAST MEDICAL HISTORY: No history of breast surgery.  No history of breast cancer in the past.  No history of radiation therapy in the past or radiation exposure.  No history of tumor of any kind.  No history of heart problems, heart attack, breathing problems, blood clots, seizures, arthritis, peptic ulcer disease, osteoporosis or bone fractures.  She is not currently participating in a clinical trial.  She does have a history of asthma and uses an albuterol inhaler in cold weather.     Her past medical history is significant for diabetes mellitus and is on metformin, hypertension obesity, she has IPMN and a right renal artery stent.  The family history is negative for breast cancer.  She has a history of right renal artery stenosis and has a stent in place.     She has a recent history of a left hamstring injury.  She is seeing orthopedics for this problem.  She also has a history of back pain.  Her gait is affected but she can perform activities of daily living.  She has a history of aches in her shoulders.  She had a 65 pound weight loss over the last 2 years which was intentional related to improve diet and exercise. She says she has a history of fibromuscular dysplasia.     She worked in the [employer] in [place] and was exposed to dengue, malaria, hepatitis A, hepatitis B, encephalitis.  She has also had COVID and the flu.     FAMILY HISTORY:   No  history of ovarian, uterine, colon cancer, or melanoma.  No history of glioma, gastric cancer or pancreatic cancer.  Her mother did have Zollinger-Wheeler syndrome but the patient's genetics are negative for this syndrome.      PAST MENSTRUAL HISTORY:  Age of first menstrual period was 13.   She has been pregnant 1 times with 0 live births and 0 miscarriages and 1 .  Age at in place pregnancy age 28.   Uterus and ovaries are in place.  Last menstrual period was about age 52 and the menopause occurred naturally. No history of hormone replacement therapy.     She does have a history of thickened endometrial lining but by her report no abnormalities of the lining when she had a DIC     HABITS:  She is a never smoker but she has a history of exposure to secondhand smoke from her father as a child.  She consumes alcoholic beverages socially 1 drink every couple of months.     GERMLINE GENETICS: Genetic testing is available for 47 genes associated with cancers of the breast, ovary, uterus, prostate and gastrointestinal system: [facility] Common Hereditary Cancers panel (APC, SUMIT, AXIN2, BARD1, BMPR1A, BRCA1, BRCA2, BRIP1, CDH1, CDK4, CDKN2A, CHEK2, CTNNA1, DICER1, EPCAM, GREM1, HOXB13, KIT, MEN1, MLH1, MSH2, MSH3, MSH6, MUTYH, NBN, NF1, NTHL1, PALB2, PDGFRA, PMS2, POLD1, POLE, PTEN,RAD50, RAD51C, RAD51D, SDHA, SDHB, SDHC, SDHD, SMAD4, SMARCA4, STK11, TP53,TSC1, TSC2, VHL)      ALLERGIES: She has drug allergies to codeine, codeine derivatives, ibuprofen, lisinopril..  No allergy to seafood, iodine, or contrast dye.  She does take daily aspirin 81 mg because of her renal stent.     LUMPECTOMY:  Surgical Pathology Report                         Case: [id number]                                   Authorizing Provider:  Javier Wood MD         Collected:           2024 09:40 AM           Ordering Location:     [facility] OR  Received:            2024 09:54 AM                                   [place]                                                                   Pathologist:           Kristal Lanza MD                                                   Specimens:   A) - Breast, Left, Left breast lumpectomy                                                            B) - Lymph Node(s), [place], Left axillary sentinel lymph node #1                                  C) - Lymph Node(s), [place], Left axillary sentinel lymph node # 2                         Final Diagnosis   A. LEFT breast, RFID tag-localized lumpectomy:  -INVASIVE BREAST CARCINOMA OF NO SPECIAL TYPE (INVASIVE DUCTAL CARCINOMA), FAREED GRADE 2, size 11 mm  -Margins are uninvolved by invasive carcinoma  -Invasive carcinoma is 1 mm from the nearest (posterior) margin, 5 mm from the anterior margin, and > 5 mm from the superior, inferior, medial and lateral margins  -No lymphovascular invasion identified  -Other findings: fibrocystic change (including microcysts with apocrine metaplasia), sclerosing adenosis, and usual ductal hyperplasia  -Calcifications associated with benign acini  -Prior core biopsy site changes  -See tumor synoptic below     B. Lymph node, LEFT axillary, sentinel #1, excision:  -One benign lymph node (0/1)     C. Lymph node, LEFT axillary, sentinel #2, excision:  -One benign lymph node (0/1)                Synoptic Checklist   INVASIVE CARCINOMA OF THE BREAST: Resection   8th Edition - Protocol posted: 12/13/2023INVASIVE CARCINOMA OF THE BREAST: RESECTION - All Specimens                  SPECIMEN     Procedure   Excision (less than total mastectomy)     Specimen Laterality   Left     TUMOR     Tumor Site   Clock position         3 o'clock     Histologic Type   Invasive carcinoma of no special type (ductal)     Histologic Grade ([place] Histologic Score)         Glandular (Acinar) / Tubular Differentiation   Score 2     Nuclear Pleomorphism   Score 3     Mitotic Rate   Score 2     Overall Grade   Grade 2  (scores of 6 or 7)     Tumor Size   Greatest dimension of largest invasive focus (Millimeters): 11 mm     Additional Dimension (Millimeters)   9 mm         9 mm     Tumor Focality   Single focus of invasive carcinoma     Ductal Carcinoma In Situ (DCIS)   Not identified     Lobular Carcinoma In Situ (LCIS)   Not identified     Lymphatic and / or Vascular Invasion   Not identified     Dermal Lymphatic and / or Vascular Invasion   No skin present     Microcalcifications   Present in non-neoplastic tissue     Treatment Effect in the Breast   No known presurgical therapy     MARGINS     Margin Status for Invasive Carcinoma   All margins negative for invasive carcinoma     Distance from Invasive Carcinoma to Closest Margin   1 mm     Closest Margin(s) to Invasive Carcinoma   Posterior     Distance from Invasive Carcinoma to Anterior Margin   5 mm     Distance from Invasive Carcinoma to Superior Margin   Greater than: 5 mm     Distance from Invasive Carcinoma to Inferior Margin   Greater than: 5 mm     Distance from Invasive Carcinoma to Medial Margin   Greater than: 5 mm     Distance from Invasive Carcinoma to Lateral Margin   Greater than: 5 mm     REGIONAL LYMPH NODES     Regional Lymph Node Status   All regional lymph nodes negative for tumor     Total Number of Lymph Nodes Examined (sentinel and non-sentinel)   2     Number of [place] Nodes Examined   2     pTNM CLASSIFICATION (AJCC 8th Edition)     Reporting of pT, pN, and (when applicable) pM categories is based on information available to the pathologist at the time the report is issued. As per the AJCC (Chapter 1, 8th Ed.) it is the managing physician s responsibility to establish the final pathologic stage based upon all pertinent information, including but potentially not limited to this pathology report.     pT Category   pT1c     pN Category   pN0     N Suffix   (sn)     SPECIAL STUDIES               Estrogen Receptor (ER) Status   Positive (greater than 10%  "of cells demonstrate nuclear positivity)     Percentage of Cells with Nuclear Positivity   %               Progesterone Receptor (PgR) Status   Positive     Percentage of Cells with Nuclear Positivity   81-90%               HER2 (by immunohistochemistry)   Equivocal (Score 2+)     Percentage of Cells with Uniform Intense Complete Membrane Staining   0 %               HER2 (by in situ hybridization)   Positive (amplified)     Testing Performed on Case Number   HER2 FISH amplified in 30-40% of tumor cells. Performed on prior core biopsy [id number] specimen B     Comment(s)   Best block: A13   .      Clinical Information   ALEE   The patient is a 74 year old woman with LEFT breast carcinoma. Procedure: LEFT breast RFID tag localized lumpectomy, LEFT axillary sentinel lymph node biopsy.    Gross Description   ALEE ROBIN(1). Breast, Left, Left breast lumpectomy:  The specimen is received fresh with proper patient identification, labeled \"left breast lumpectomy\". It consists of 27.85 g, 6.7 cm from medial to lateral x 4.7 cm from superior to inferior x 2.4 cm from anterior to posterior left breast lumpectomy specimen. The specimen was received previously inked by the surgeon as follows:  Superior - red, anterior - green, inferior - blue, posterior - black, medial - yellow and lateral - orange.     The specimen is sectioned from medial (slice 1) to lateral (slice 15) into 15 slices.          [id number]   This FISH analysis is performed in follow up to the reported equivocal (2+) HER2 findings by immunohistochemistry ([id number]).        RESULTS:     Ratio of HER2/MARION-17 signals  Zenaida Valles:  See Interpretation below     Majority (~60-70%) of tumor in area of strongest IHC staining:  Group 5 (ADRIANA Negative)  Avg. number HER2 signals/nucleus:  2.9  Avg. number MARION-17 signals/nucleus:  1.7  HER2/MARION 17 ratio:  1.7     Subpopulation (~30-40%) of tumor in area of strongest IHC staining:  Group 1 (ADRIANA Positive)  "                            Avg. number HER2 signals/nucleus:  4.9  Avg. number MARION-17 signals/nucleus:  2.3  HER2/MARION 17 ratio:  2.2        TREATMENT HISTORY:  A.  Lumpectomy showed a stage I T1b NX MX invasive ductal carcinoma of the left breast upper outer quadrant which was estrogen receptor positive (%) progesterone receptor positive (81-90%) and HER2 2+ by IHC and HER2 positive by ADRIANA.  Margins negative for invasive carcinoma.    B.  Plan is to give the APT regimen followed by addition of hormonal therapy with letrozole for 7 years. Right single lumen [place] 12-26.  Started APT regimen with weekly paclitaxel and trastuzuamb beginning 12-27.   C.          INTERVAL HISTORY:  She was seen in clinic today with her  Trae. She had her lumpectomy with Dr. Wood on November 25. She started adjuvant APT regimen on 12/27/2024.  She has some neuropathy pain in her toes.  She is deconditioned and has significant fatigue.  She has mild depression and anxiety which is held by her [employer] community which has been very supportive.  She is having difficulty with quilting which is major activity.  Fatigue is grade 2 relieved by rest.  She can perform her activities of daily living.    She has worsening neuropathy in her feet but not in her hands.  She reports that she has significant pain with neuropathy.  She has grade 2 fatigue with relief by rest.  She can perform at least some of her activities of daily living including quilting and household chores.  She has lost some weight from 174 to 173 pounds she is continuing to eat reasonably well.  She is able to exercise by walking.  She does not consume alcohol.  She is taking vitamin D and calcium.  She did have an episode where she was extremely fatigued in the grocery store.     She has been exercising by walking.  Overall her exercise stamina is less.  She is taking a vitamin D supplement and drinking milk to meet her calcium requirements.  She does have a  bovine collagen to her diet and increase protein.  She has been icing to prevent neuropathy.     She has an ECOG 0 performance status.     REVIEW OF SYSTEMS:   She has had no fevers, cough, chest pain, shortness of breath, mouth sores, hemoptysis, nausea, vomiting, abdominal pain, diarrhea, bone pain, back pain, muscle or joint complaints, hearing loss or depression.  The remainder of a 10-point review of systems is negative.        PHYSICAL EXAMINATION:     VITALS:   /79 (BP Location: Right arm, Patient Position: Sitting, Cuff Size: Adult Regular)   Pulse 74   Temp 97.7  F (36.5  C) (Oral)   Resp 16   Wt 77.6 kg (171 lb 1.6 oz)   LMP  (LMP Unknown)   SpO2 98%   BMI 28.47 kg/m      HEENT:  No alopecia, no lesions in the oropharynx.  Dentition is normal  LYMPH:  There is no palpable cervical, supraclavicular, or subclavicular lymphadenopathy.  BREASTS: Deferred  Her port is without tenderness or erythema.  LUNGS:  Clear to auscultation.  HEART:  Regular rate and rhythm.  S1, S2. No murmurs  ABDOMEN:  Soft, nontender, without hepatosplenomegaly.  EXTREMITIES:  Without edema.  PSYCH:  Mood and affect were normal.  NEUROLOGIC:  Mood and affect  normal. No focal deficits apparent.          LABORATORY DATA:   CMP was normal except for glucose of 142.  CBC showed WBC of 5.4, hemoglobin 1.4 platelets 338,000.  Absolute neutrophil count 3000.    Echo 60-56% on 2-23-25.     ASSESSMENT AND PLAN:    Zenaida Valles has a stage I T1b NX MX invasive ductal carcinoma of the left breast upper outer quadrant which was estrogen receptor positive (%) progesterone receptor positive (81-90%) and HER2 2+ by IHC and HER2 positive by ADRIANA. Margins negative for invasive carcinoma.    She's tolerating adjuvant HER2 directed therapy with trastuzumab and paclitaxel.  She is here for cycle 10 of 12.  She has significant neuropathy.  She does not want to try duloxetine or gabapentin this time.  We will do icing of feet  beginning today.  Her fingers are okay.  There may be a seroma in the left axilla which was noted last week.  Will perform a focused ultrasound of the left breast and axilla today.  She does have some fatigue and decreased stamina.  Overall her exercise tolerance is decreased.  I did have discussion with her that there can be some minor irreversible reduction in exercise capacity based on the chemotherapy whereas the reduction in exercise capacity related to trastuzumab is likely to be reversible.  I discussed that we will see what the echocardiogram shows next week.  I also discussed that if there is some loss of ejection fraction we can potentially just her beta-blocker and try carvedilol.  She is on Avapro which is an ARB and could be helpful.  Some neuropathy.  She has now been icing during chemotherapy.    We would also add adjuvant zoledronic acid.    We discussed toxicities of paclitaxel which could include alopecia, neuropathy which could last for 6 months to a year after infusion.    Discussion of hormonal therapy.  We have discussed 10 years of adjuvant hormonal therapy.  We discussed adjuvant zoledronic acid.  I discussed that postlumpectomy radiation will be recommended and this would be after completion of 12 weeks of paclitaxel and trastuzumab..  Will arrange for radiation oncology consult.  Discussion of adjuvant zoledronic acid.  Adjuvant zoledronic acid can be associated with flulike symptoms that can last for several days after treatment.  I also discussed that there can be dental complications.  She keeps her teeth and good shape with frequent dental visits.  She does have excellent dentition and the risk of osteonecrosis of the jaw is quite low.  History of Zollinger-Wheeler syndrome.    Diabetes.  She is on metformin.  Discussion of exercise.  Discussion of the Lace and Pathway.  We recommend 150 minutes of exercise per week with mixed cardio and strength training.  Stretch band, hand weights,  yoga.  Diet.  I recommended a diet low in saturated fat, but not low in fat with more fruits and vegetables, and less in the way of red meat.  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  Follow up plan.  CBC, CMP.  Next Echo in May.  Follow-up with me alternating with an ALEC each cycle total of 12 cycles of paclitaxel and trastuzumab.  Leilani on 3-6 and Shayy on 3-13. She is here for cycle 10 of 12 today.  We will see her each cycle because of symptoms with treatment. Radiation oncology consultation with Dr. Ortega 3-28.  Follow up with me 3-27 to start every 3 week trastuzumab.      Thank you for allowing us to participate in this patient's care.  The patient was seen and evaluated by me.  I discussed the patient with the resident Dr. Te Rainey and agree with the findings and plan in the note.      Sincerely,      Chito Cheung MD  Professor  [facility]  [phone]     I spent 40 minutes with the patient more than 50% of which was in counseling and coordination of care. The remainder of a 10 point review of systems was negative.      Again, thank you for allowing me to participate in the care of your patient.        Sincerely,        Chito Cheung MD    Electronically signed

## 2025-02-27 NOTE — PATIENT INSTRUCTIONS
Contact Numbers  [facility]: [phone] (for symptom and scheduling needs)    Please call the [facility] Triage line if you experience a temperature greater than or equal to 100.4, shaking chills, have uncontrolled nausea, vomiting and/or diarrhea, dizziness, shortness of breath, chest pain, bleeding, unexplained bruising, or if you have any other new/concerning symptoms, questions or concerns.     If you are having any concerning symptoms or wish to speak to a provider before your next infusion visit, please call your care triage to notify them so we can adequately serve you.     If you need a refill on a narcotic prescription or other medication, please call triage before your infusion appointment.

## 2025-02-27 NOTE — NURSING NOTE
Chief Complaint   Patient presents with    Oncology Clinic Visit     Invasive ductal carcinoma of breast    Port Draw     Labs drawn via port by RN in lab. VS taken.      Labs drawn via port by RN. Port accessed with 20g flat needle. Flushed with saline and heparin. Pt tolerated well. Vitals taken. Pt checked into next appt.     Zenobia Fall RN

## 2025-02-27 NOTE — NURSING NOTE
"Oncology Rooming Note    February 27, 2025 8:53 AM   Zenaida Valles is a 75 year old female who presents for:    Chief Complaint   Patient presents with    Oncology Clinic Visit     Invasive ductal carcinoma of breast    Port Draw     Labs drawn via port by RN in lab. VS taken.      Initial Vitals: /79 (BP Location: Right arm, Patient Position: Sitting, Cuff Size: Adult Regular)   Pulse 74   Temp 97.7  F (36.5  C) (Oral)   Resp 16   Wt 77.6 kg (171 lb 1.6 oz)   LMP  (LMP Unknown)   SpO2 98%   BMI 28.47 kg/m   Estimated body mass index is 28.47 kg/m  as calculated from the following:    Height as of 2/14/25: 1.651 m (5' 5\").    Weight as of this encounter: 77.6 kg (171 lb 1.6 oz). Body surface area is 1.89 meters squared.  No Pain (0) Comment: Data Unavailable   No LMP recorded (lmp unknown). Patient is postmenopausal.  Allergies reviewed: Yes  Medications reviewed: Yes    Medications: Medication refills not needed today.  Pharmacy name entered into EPIC:    [facility] [place], [place] - [address] [facility] [place] [facility] [place], [place] - [address]  [facility] [place], [place] - [address] [facility]    Frailty Screening:   Is the patient here for a new oncology consult visit in cancer care? 2. No    PHQ9:  Did this patient require a PHQ9?: No      Clinical concerns: none.      Jorge Plunkett"

## 2025-03-06 ENCOUNTER — ONCOLOGY VISIT (OUTPATIENT)
Dept: ONCOLOGY | Facility: CLINIC | Age: 75
End: 2025-03-06
Attending: NURSE PRACTITIONER
Payer: COMMERCIAL

## 2025-03-06 ENCOUNTER — APPOINTMENT (OUTPATIENT)
Dept: LAB | Facility: CLINIC | Age: 75
End: 2025-03-06
Attending: INTERNAL MEDICINE
Payer: COMMERCIAL

## 2025-03-06 ENCOUNTER — INFUSION THERAPY VISIT (OUTPATIENT)
Dept: ONCOLOGY | Facility: CLINIC | Age: 75
End: 2025-03-06
Attending: INTERNAL MEDICINE
Payer: COMMERCIAL

## 2025-03-06 VITALS
BODY MASS INDEX: 28.99 KG/M2 | HEART RATE: 84 BPM | WEIGHT: 174.2 LBS | OXYGEN SATURATION: 97 % | TEMPERATURE: 98.4 F | DIASTOLIC BLOOD PRESSURE: 62 MMHG | RESPIRATION RATE: 16 BRPM | SYSTOLIC BLOOD PRESSURE: 116 MMHG

## 2025-03-06 DIAGNOSIS — C50.912 INVASIVE DUCTAL CARCINOMA OF BREAST, FEMALE, LEFT (H): Primary | ICD-10-CM

## 2025-03-06 DIAGNOSIS — C50.912 INVASIVE DUCTAL CARCINOMA OF BREAST, FEMALE, LEFT (H): ICD-10-CM

## 2025-03-06 LAB
ALBUMIN SERPL BCG-MCNC: 4.1 G/DL (ref 3.5–5.2)
ALP SERPL-CCNC: 80 U/L (ref 40–150)
ALT SERPL W P-5'-P-CCNC: 25 U/L (ref 0–50)
ANION GAP SERPL CALCULATED.3IONS-SCNC: 10 MMOL/L (ref 7–15)
AST SERPL W P-5'-P-CCNC: 21 U/L (ref 0–45)
BASOPHILS # BLD AUTO: 0.1 10E3/UL (ref 0–0.2)
BASOPHILS NFR BLD AUTO: 1 %
BILIRUB SERPL-MCNC: 0.2 MG/DL
BUN SERPL-MCNC: 16.2 MG/DL (ref 8–23)
CALCIUM SERPL-MCNC: 9.6 MG/DL (ref 8.8–10.4)
CHLORIDE SERPL-SCNC: 103 MMOL/L (ref 98–107)
CREAT SERPL-MCNC: 0.63 MG/DL (ref 0.51–0.95)
EGFRCR SERPLBLD CKD-EPI 2021: >90 ML/MIN/1.73M2
EOSINOPHIL # BLD AUTO: 0.1 10E3/UL (ref 0–0.7)
EOSINOPHIL NFR BLD AUTO: 2 %
ERYTHROCYTE [DISTWIDTH] IN BLOOD BY AUTOMATED COUNT: 14.4 % (ref 10–15)
GLUCOSE SERPL-MCNC: 153 MG/DL (ref 70–99)
HCO3 SERPL-SCNC: 27 MMOL/L (ref 22–29)
HCT VFR BLD AUTO: 32.9 % (ref 35–47)
HGB BLD-MCNC: 11.2 G/DL (ref 11.7–15.7)
IMM GRANULOCYTES # BLD: 0 10E3/UL
IMM GRANULOCYTES NFR BLD: 1 %
LYMPHOCYTES # BLD AUTO: 2 10E3/UL (ref 0.8–5.3)
LYMPHOCYTES NFR BLD AUTO: 40 %
MCH RBC QN AUTO: 30.1 PG (ref 26.5–33)
MCHC RBC AUTO-ENTMCNC: 34 G/DL (ref 31.5–36.5)
MCV RBC AUTO: 88 FL (ref 78–100)
MONOCYTES # BLD AUTO: 0.3 10E3/UL (ref 0–1.3)
MONOCYTES NFR BLD AUTO: 6 %
NEUTROPHILS # BLD AUTO: 2.4 10E3/UL (ref 1.6–8.3)
NEUTROPHILS NFR BLD AUTO: 50 %
NRBC # BLD AUTO: 0 10E3/UL
NRBC BLD AUTO-RTO: 0 /100
PLATELET # BLD AUTO: 310 10E3/UL (ref 150–450)
POTASSIUM SERPL-SCNC: 4.2 MMOL/L (ref 3.4–5.3)
PROT SERPL-MCNC: 6.5 G/DL (ref 6.4–8.3)
RBC # BLD AUTO: 3.72 10E6/UL (ref 3.8–5.2)
SODIUM SERPL-SCNC: 140 MMOL/L (ref 135–145)
WBC # BLD AUTO: 4.9 10E3/UL (ref 4–11)

## 2025-03-06 PROCEDURE — 258N000003 HC RX IP 258 OP 636: Performed by: NURSE PRACTITIONER

## 2025-03-06 PROCEDURE — G0463 HOSPITAL OUTPT CLINIC VISIT: HCPCS | Performed by: NURSE PRACTITIONER

## 2025-03-06 PROCEDURE — 250N000011 HC RX IP 250 OP 636: Performed by: NURSE PRACTITIONER

## 2025-03-06 PROCEDURE — 80048 BASIC METABOLIC PNL TOTAL CA: CPT | Performed by: NURSE PRACTITIONER

## 2025-03-06 PROCEDURE — 82374 ASSAY BLOOD CARBON DIOXIDE: CPT | Performed by: NURSE PRACTITIONER

## 2025-03-06 PROCEDURE — 36591 DRAW BLOOD OFF VENOUS DEVICE: CPT | Performed by: NURSE PRACTITIONER

## 2025-03-06 PROCEDURE — 85025 COMPLETE CBC W/AUTO DIFF WBC: CPT | Performed by: NURSE PRACTITIONER

## 2025-03-06 RX ORDER — HEPARIN SODIUM (PORCINE) LOCK FLUSH IV SOLN 100 UNIT/ML 100 UNIT/ML
5 SOLUTION INTRAVENOUS
Status: COMPLETED | OUTPATIENT
Start: 2025-03-06 | End: 2025-03-06

## 2025-03-06 RX ORDER — DIPHENHYDRAMINE HCL 25 MG
50 CAPSULE ORAL
Status: CANCELLED
Start: 2025-03-07

## 2025-03-06 RX ORDER — DIPHENHYDRAMINE HYDROCHLORIDE 50 MG/ML
25 INJECTION, SOLUTION INTRAMUSCULAR; INTRAVENOUS
Status: CANCELLED
Start: 2025-03-07

## 2025-03-06 RX ORDER — MEPERIDINE HYDROCHLORIDE 25 MG/ML
25 INJECTION INTRAMUSCULAR; INTRAVENOUS; SUBCUTANEOUS
Status: CANCELLED | OUTPATIENT
Start: 2025-03-07

## 2025-03-06 RX ORDER — ALBUTEROL SULFATE 0.83 MG/ML
2.5 SOLUTION RESPIRATORY (INHALATION)
Status: CANCELLED | OUTPATIENT
Start: 2025-03-07

## 2025-03-06 RX ORDER — EPINEPHRINE 1 MG/ML
0.3 INJECTION, SOLUTION INTRAMUSCULAR; SUBCUTANEOUS EVERY 5 MIN PRN
Status: CANCELLED | OUTPATIENT
Start: 2025-03-07

## 2025-03-06 RX ORDER — DIPHENHYDRAMINE HYDROCHLORIDE 50 MG/ML
50 INJECTION, SOLUTION INTRAMUSCULAR; INTRAVENOUS
Status: CANCELLED
Start: 2025-03-07

## 2025-03-06 RX ORDER — ALBUTEROL SULFATE 90 UG/1
1-2 INHALANT RESPIRATORY (INHALATION)
Status: CANCELLED
Start: 2025-03-07

## 2025-03-06 RX ORDER — METHYLPREDNISOLONE SODIUM SUCCINATE 40 MG/ML
40 INJECTION INTRAMUSCULAR; INTRAVENOUS
Status: CANCELLED
Start: 2025-03-07

## 2025-03-06 RX ORDER — ONDANSETRON 2 MG/ML
8 INJECTION INTRAMUSCULAR; INTRAVENOUS ONCE
Status: CANCELLED | OUTPATIENT
Start: 2025-03-07

## 2025-03-06 RX ORDER — LORAZEPAM 2 MG/ML
0.5 INJECTION INTRAMUSCULAR EVERY 4 HOURS PRN
Status: CANCELLED | OUTPATIENT
Start: 2025-03-07

## 2025-03-06 RX ORDER — ACETAMINOPHEN 325 MG/1
650 TABLET ORAL
Status: CANCELLED | OUTPATIENT
Start: 2025-03-07

## 2025-03-06 RX ORDER — HEPARIN SODIUM,PORCINE 10 UNIT/ML
5-20 VIAL (ML) INTRAVENOUS DAILY PRN
Status: CANCELLED | OUTPATIENT
Start: 2025-03-07

## 2025-03-06 RX ORDER — HEPARIN SODIUM (PORCINE) LOCK FLUSH IV SOLN 100 UNIT/ML 100 UNIT/ML
5 SOLUTION INTRAVENOUS
Status: DISCONTINUED | OUTPATIENT
Start: 2025-03-06 | End: 2025-03-06 | Stop reason: HOSPADM

## 2025-03-06 RX ORDER — ONDANSETRON 2 MG/ML
8 INJECTION INTRAMUSCULAR; INTRAVENOUS ONCE
Status: COMPLETED | OUTPATIENT
Start: 2025-03-06 | End: 2025-03-06

## 2025-03-06 RX ORDER — HEPARIN SODIUM (PORCINE) LOCK FLUSH IV SOLN 100 UNIT/ML 100 UNIT/ML
5 SOLUTION INTRAVENOUS
Status: CANCELLED | OUTPATIENT
Start: 2025-03-07

## 2025-03-06 RX ADMIN — SODIUM CHLORIDE 160 MG: 0.9 INJECTION, SOLUTION INTRAVENOUS at 09:54

## 2025-03-06 RX ADMIN — ONDANSETRON 8 MG: 2 INJECTION INTRAMUSCULAR; INTRAVENOUS at 09:32

## 2025-03-06 RX ADMIN — HEPARIN 5 ML: 100 SYRINGE at 08:12

## 2025-03-06 RX ADMIN — PACLITAXEL 154 MG: 6 INJECTION, SOLUTION INTRAVENOUS at 10:32

## 2025-03-06 RX ADMIN — HEPARIN 5 ML: 100 SYRINGE at 11:30

## 2025-03-06 ASSESSMENT — PAIN SCALES - GENERAL: PAINLEVEL_OUTOF10: NO PAIN (0)

## 2025-03-06 NOTE — PROGRESS NOTES
MEDICAL ONCOLOGY NOTE     Re. Zenaida Valles   Female, 74 year old, 1950  MRN: [id number]     Diagnosis: Screening identified stage I invasive ductal cancer of the upper outer quadrant of the left breast ER positive, MS positive, HER2 amplified stage [id number].       HISTORY OF PRESENT ILLNESS:    Zenaida has been in generally good health except for a right renal artery stenosis requiring a stent.  She also has a left leg injury recently she was in her usual state of good health and had a screening mammogram which showed a suspicious lesion in the upper outer quadrant of the left breast at the 3 o'clock position 5 cm from the nipple there is an irregular hypoechoic mass measuring 1.0 x 0.7 x 0.9 cm in size 1 cm from the nipple there is also a 0.5 x 0.4 cm area of suspicion     She underwent an ultrasound guided biopsy which showed usual ductal hyperplasia and fibrocystic changes microcysts in April Kren metaplasia.  Columnar cell changes.  It invasive ductal carcinoma was also found grade 2 and DCIS grade 2 solid type.  Her breast cancer was estrogen receptor positive progesterone receptor positive at HER2 2+ by IHC.  She then underwent ADRIANA which showed a subpopulation which was 60 to 70% ADRIANA 5 negative and 30 to 40% I-S age 1 positive with a 4.9 HER2 signals per nucleus and the ratio of 2.2 indicating HER2 positivity.     She then came to see Dr. Wood at [facility] for surgical recommendations.  She had a contrast mammogram which showed a 0.9 cm enhancing lesion in the upper outer quadrant of the left breast.     DIAGNOSTIC AND TREATMENT SUMMARY:  On October 7 she underwent a screening mammogram which demonstrated an asymmetry in her left breast.       On October 15 she underwent a diagnostic mammogram which confirmed a left breast asymmetry.  She had an ultrasound which demonstrated 2 masses in her left breast.  At the 3 o'clock position 1 cm from the nipple there was a mass  and at the 3 o'clock position 5 cm from the nipple there was a second mass.  Her lymph nodes were normal by ultrasound.       On October 23 she underwent a contrast-enhanced mammography which only demonstrated 1 mass measuring 1 cm.  The right breast was normal.  She underwent ultrasound-guided biopsies of both masses.  The mass at the 3 o'clock position 1 cm from the nipple was benign.  The mass at the 3 o'clock position 5 cm from the nipple demonstrating a grade 2 invasive ductal cancer that was ER positive and HER2 equivocal.  There is also DCIS present.       A. LEFT breast, 3:00, 1 cm from nipple, ultrasound-guided core biopsy:  - Benign breast tissue with usual ductal hyperplasia (UDH) and fibrocystic changes including microcysts, apocrine metaplasia, and columnar cell change  - Rare calcifications associated with benign breast ducts and acini  - Negative for atypia or malignancy      B. LEFT breast, 3:00, 5 cm from nipple, ultrasound-guided core biopsy:   - INVASIVE BREAST CARCINOMA OF NO SPECIAL TYPE (INVASIVE DUCTAL CARCINOMA), Thatcher grade 2  - Ductal carcinoma in situ (DCIS), nuclear grade 2, solid type  - Invasive carcinoma is estrogen receptor positive, progesterone receptor positive, and HER2 equivocal (score 2+) by immunohistochemistry (see 'Breast Biomarker Reporting Template' below)  - HER2 FISH is is process; results will be reportedly separately by cytogenetis  - See comment     This FISH analysis is performed in follow up to the reported equivocal (2+) HER2 findings by immunohistochemistry ([id number]).     RESULTS:     Ratio of HER2/MARION-17 signals  Zenaida Valles:  See Interpretation below     Majority (~60-70%) of tumor in area of strongest IHC staining:  Group 5 (ADRIANA Negative)  Avg. number HER2 signals/nucleus:  2.9  Avg. number MARION-17 signals/nucleus:  1.7  HER2/MARION 17 ratio:  1.7     Subpopulation (~30-40%) of tumor in area of strongest IHC staining:  Group 1 (ADRIANA Positive)                              Avg. number HER2 signals/nucleus:  4.9  Avg. number MARION-17 signals/nucleus:  2.3  HER2/MARION 17 ratio:  2.2     **Interpretive guidelines per the American Society of Clinical Oncology/College of American Pathologists Clinical Practice Guideline Update (Margarita VALENZUELA et al, 2023, Arch Pathol Lab Med 147:993):     -- Group 1: HER2/MARION-17 ratio 2.0 or more -AND- avg. number HER2 signals/nucleus 4.0 or more (ADRIANA Positive)  -- Group 2: HER2/MARION-17 ratio 2.0 or more -AND- avg. number HER2 signals/nucleus <4.0 (Additional work required)  -- Group 3: HER2/MARION-17 ratio <2.0 -AND- avg. number HER2 signals/nucleus 6.0 or more (Additional work required)  -- Group 4: HER2/MARION-17 ratio <2.0 -AND- avg. number HER2 signals/nucleus 4.0 or more and <6.0 (Additional work required)  -- Group 5: HER2/MARION-17 ratio <2.0 -AND- avg. number HER2 signals/nucleus <4.0 (ADRIANA Negative)     INTERPRETATION:  Two admixed populations of cells were found in this sample:     Majority (~60-70%) of tumor in area of strongest IHC staining:  Per the American Society of Clinical Oncology/College of American Pathologists Clinical Practice Guideline Focused Update (Margarita AC et al, 2018, Arch Pathol Lab Med  doi:10.5858/arpa.9794-8703-GJ), the HER2/MARION 17 ratio of 1.7 and average number of HER2 signals/cell of 2.9 places this population of cells in Group 5 (ADRIANA Negative).      Subpopulation (~30-40%) of tumor in area of strongest IHC staining:  Admixed within this sample were cells with increased HER2 signals, which, when selectively scored, had an average of 4.9 HER2 signals/nucleus and a HER2/MARION 17 ratio of 2.2; per the American Society of Clinical Oncology/College of American Pathologists Clinical Practice Guideline Focused Update (Margarita AC et al, 2018, Arch Pathol Lab Med  doi:10.5858/arpa.3830-1251-HT), this population of cells falls into Group 1 (ADRIANA Positive).     I reviewed history in chart:  PAST MEDICAL HISTORY: No history of breast  surgery.  No history of breast cancer in the past.  No history of radiation therapy in the past or radiation exposure.  No history of tumor of any kind.  No history of heart problems, heart attack, breathing problems, blood clots, seizures, arthritis, peptic ulcer disease, osteoporosis or bone fractures.  She is not currently participating in a clinical trial.  She does have a history of asthma and uses an albuterol inhaler in cold weather.     Her past medical history is significant for diabetes mellitus and is on metformin, hypertension obesity, she has IPMN and a right renal artery stent.  The family history is negative for breast cancer.  She has a history of right renal artery stenosis and has a stent in place.     She has a recent history of a left hamstring injury.  She is seeing orthopedics for this problem.  She also has a history of back pain.  Her gait is affected but she can perform activities of daily living.  She has a history of aches in her shoulders.  She had a 65 pound weight loss over the last 2 years which was intentional related to improve diet and exercise.  She says she has a history of fibromuscular dysplasia.     She worked in the [employer] in [place] and was exposed to dengue, malaria, hepatitis A, hepatitis B, encephalitis.  She has also had COVID and the flu.     FAMILY HISTORY:   No history of ovarian, uterine, colon cancer, or melanoma.  No history of glioma, gastric cancer or pancreatic cancer.  Her mother did have Zollinger-Wheeler syndrome but the patient's genetics are negative for this syndrome.      PAST MENSTRUAL HISTORY:  Age of first menstrual period was 13.   She has been pregnant 1 times with 0 live births and 0 miscarriages and 1 .  Age at in place pregnancy age 28.   Uterus and ovaries are in place.  Last menstrual period was about age 52 and the menopause occurred naturally. No history of hormone replacement therapy.     She does have a history of thickened  "endometrial lining but by her report no abnormalities of the lining when she had a DIC     HABITS:  She is a never smoker but she has a history of exposure to secondhand smoke from her father as a child.  She consumes alcoholic beverages socially 1 drink every couple of months.     GERMLINE GENETICS: pending     ALLERGIES: She has drug allergies to codeine, codeine derivatives, ibuprofen, lisinopril..  No allergy to seafood, iodine, or contrast dye.  She does take daily aspirin 81 mg because of her renal stent.    TREATMENT HISTORY:  A.  Lumpectomy showed a stage I T1b NX MX invasive ductal carcinoma of the left breast upper outer quadrant which was estrogen receptor positive (%) progesterone receptor positive (81-90%) and HER2 2+ by IHC and HER2 positive by ADRIANA.  Margins negative for invasive carcinoma.    B.  Plan is to give the APT regimen followed by addition of hormonal therapy with letrozole for 7 years.     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  They were in [facility] with the [employer] for two years.      INTERVAL HISTORY:  Zenaida is seen for evaluation and toxicity check prior to week 11 of weekly paclitaxel plus trastuzumab.  -She is really fatigued.  Feeling like she cannot do some of the things she used to, for example,   -Her weight is back up to where it was before.  -Her feet tingling is not as bothersome.   -No fevers, chills or signs of systemic illness.   -Right upper abd twinge x 1 - thinks maybe from ice cream; has not recurred,  -Occasional mild frontal headaches, not long-lasting and no OTC meds needed.   -L axillary \"lump\" was evaluated and found to be a seroma.   -No cough, chest pain or shortness of breath at rest.        PHYSICAL EXAMINATION:     /62 (BP Location: Right arm, Patient Position: Sitting, Cuff Size: Adult Large)   Pulse 84   Temp 98.4  F (36.9  C) (Oral)   Resp 16   Wt 79 kg (174 lb 3.2 oz)   LMP  (LMP Unknown)   SpO2 97%   BMI 28.99 kg/m    Wt Readings from " Last 10 Encounters:   03/06/25 79 kg (174 lb 3.2 oz)   02/27/25 77.6 kg (171 lb 1.6 oz)   02/20/25 78.4 kg (172 lb 14.4 oz)   02/14/25 79.3 kg (174 lb 14.4 oz)   02/13/25 78.7 kg (173 lb 6.4 oz)   02/06/25 79.3 kg (174 lb 14.4 oz)   01/30/25 78.5 kg (173 lb)   01/23/25 79.2 kg (174 lb 9.6 oz)   01/16/25 78.5 kg (173 lb)   01/09/25 77.1 kg (170 lb)   General: Female in no acute distress.  Eyes: EOMI. No scleral icterus or conjunctival injection.  ENT: Oral mucosa is moist without lesions or thrush.   Cardiovascular: RRR No murmurs. No peripheral edema.  Respiratory: CTA bilaterally. No wheezes or crackles.  Gastrointestinal: BS +. Abdomen soft, non-tender.  Neurologic: Cranial nerves II through XII are grossly intact.  Skin: No rashes, petechiae, or bruising noted on exposed skin.  Psych: Affect appropriate. pleasant    LABORATORY DATA:    Most Recent 3 CBC's:  Recent Labs   Lab Test 03/06/25  0819 02/27/25  0844 02/20/25  0818   WBC 4.9 5.4 4.5   HGB 11.2* 11.4* 11.1*   MCV 88 89 88    338 306   ANEUTAUTO 2.4 3.0 2.1     Most Recent 3 BMP's:  Recent Labs   Lab Test 03/06/25  0819 02/27/25  0844 02/20/25  0818    140 139   POTASSIUM 4.2 4.0 3.8   CHLORIDE 103 102 101   CO2 27 28 26   BUN 16.2 18.9 19.2   CR 0.63 0.64 0.61   ANIONGAP 10 10 12   GAEL 9.6 9.2 9.2   * 142* 150*   PROTTOTAL 6.5 6.4 6.5   ALBUMIN 4.1 4.1 4.2    Most Recent 3 LFT's:  Recent Labs   Lab Test 03/06/25  0819 02/27/25  0844 02/20/25  0818   AST 21 22 18   ALT 25 27 19   ALKPHOS 80 79 76   BILITOTAL 0.2 0.2 0.2     I reviewed the above labs today.    IMAGING:  Echo 2/13/2025    US Axillary Left  Narrative: Examinations: US AXILLARY LEFT, 2/27/2025 9:49 AM    Comparisons: None    History: Left breast cancer treated with conservation in November 2024. Lump left axilla the site of sentinel node biopsy.    Ultrasound:  Targeted ultrasound evaluation was performed by the technologist and  radiologist.  Ultrasound left breast lump  shows a small seroma in the left axilla  measuring approximately 3 cm. No mass or suspicious lymph nodes.  Impression: IMPRESSION: BI-RADS CATEGORY: 2 - Benign.    RECOMMENDED FOLLOW-UP: Clinical follow-up. The patient does not desire  aspiration at this time. Should it become more symptomatic, she may  return at any time for aspiration as desired.    Annual screening mammography.    The finding and recommendation were discussed with the patient at the  time of evaluation.    NAVEEN TAVAREZ MD         SYSTEM ID:  [id number]    I reviewed the above imaging today.     ASSESSMENT AND PLAN:    Stage I T1b NX MX invasive ductal carcinoma of the left breast upper outer quadrant which was estrogen receptor positive (%) progesterone receptor positive (81-90%) and HER2 2+ by IHC and HER2 positive by ADRIANA. Margins negative for invasive carcinoma.    -Post lumpectomy on 11/25/24.  -Port in place and she is having no issues with this.  -Echo 11/13/24 with 60-65% LVEF.  She had a repeat echo on 2/13/25 with stable EF.  -She is noting ongoing neuropathy (grade 1) with no functional impairment.   -She is clinically appropriate to continue adjuvant HER2 directed therapy with trastuzumab and paclitaxel C11 today.  -She will begin hormonal therapy with an aromatase inhibitor after completion of chemotherapy and that the aromatase inhibitor treatment would be recommended for 10 years given the lack of data on shorter adjuvant therapy for hormone receptor positive HER2 positive breast cancer.  -She will complete every 3-week trastuzumab for 1 year of therapy.   -Postlumpectomy radiation will be recommended.  She has an appointment set up with Dr. Ortega at the end of March.     Peripheral neuropathy:  -Grade 1 to feet.   -Continue to monitor closely with paclitaxel; no dose reduction at this point.     Constipation:   -Continues to use Senna with benefit.  She has increased her dose.  -No new issues.     Fatigue:  -Related to  treatment.    -She is mildly anemic but this is stable.   -She has not been able to exercise as much.  Discussion of trying to get a few short walks in per day might help.     Low appetite/food aversions:  -Related to chemo; previously discussed adding protein powder to foods, and she has started to do this.   -Weight is stable.     Left axillary fullness:   -No new concerns.    -US left axilla last week showed seroma.     Bone Health:  -She and Dr. Cheung discussed use of adjuvant zoledronic acid.    -She brought in dental clearance letter today.   -Weight bearing exersise recommended.    Follow Up/Plan:  -Proceed with cycle 9 trastuzumab and paclitaxel today.  -RTC weekly with provider prior to treatment.  She will call sooner with any concerns.     37 minutes spent on the date of the encounter doing chart review, review of test results, interpretation of tests, patient visit, and documentation.    WOOD Rangel, CNP  [facility]  [address] [place] [id number] [phone]

## 2025-03-06 NOTE — NURSING NOTE
"Chief Complaint   Patient presents with    Port Draw     Labs drawn from port by rn.  VS taken.     Port accessed with 20 gauge 3/4\" Power needle and labs drawn by rn.  Port flushed with NS and heparin.  Pt tolerated well.  VS taken.  Pt checked in for next appt.    Linda Gagnon RN      "

## 2025-03-06 NOTE — LETTER
3/6/2025      Zenaida Valles  [address] [facility] [address]      Dear Colleague,    Thank you for referring your patient, Zenaida Valles, to the [facility]. Please see a copy of my visit note below.    MEDICAL ONCOLOGY NOTE     Re. Zenaida Valles   Female, 74 year old, 1950  MRN: [id number]     Diagnosis: Screening identified stage I invasive ductal cancer of the upper outer quadrant of the left breast ER positive, ME positive, HER2 amplified stage [id number].       HISTORY OF PRESENT ILLNESS:    Zenaida has been in generally good health except for a right renal artery stenosis requiring a stent.  She also has a left leg injury recently she was in her usual state of good health and had a screening mammogram which showed a suspicious lesion in the upper outer quadrant of the left breast at the 3 o'clock position 5 cm from the nipple there is an irregular hypoechoic mass measuring 1.0 x 0.7 x 0.9 cm in size 1 cm from the nipple there is also a 0.5 x 0.4 cm area of suspicion     She underwent an ultrasound guided biopsy which showed usual ductal hyperplasia and fibrocystic changes microcysts in April Kren metaplasia.  Columnar cell changes.  It invasive ductal carcinoma was also found grade 2 and DCIS grade 2 solid type.  Her breast cancer was estrogen receptor positive progesterone receptor positive at HER2 2+ by IHC.  She then underwent ADRIANA which showed a subpopulation which was 60 to 70% ADRIANA 5 negative and 30 to 40% I-S age 1 positive with a 4.9 HER2 signals per nucleus and the ratio of 2.2 indicating HER2 positivity.     She then came to see Dr. Wood at [facility] for surgical recommendations.  She had a contrast mammogram which showed a 0.9 cm enhancing lesion in the upper outer quadrant of the left breast.     DIAGNOSTIC AND TREATMENT SUMMARY:  On October 7 she underwent a screening mammogram which demonstrated  an asymmetry in her left breast.       On October 15 she underwent a diagnostic mammogram which confirmed a left breast asymmetry.  She had an ultrasound which demonstrated 2 masses in her left breast.  At the 3 o'clock position 1 cm from the nipple there was a mass and at the 3 o'clock position 5 cm from the nipple there was a second mass.  Her lymph nodes were normal by ultrasound.       On October 23 she underwent a contrast-enhanced mammography which only demonstrated 1 mass measuring 1 cm.  The right breast was normal.  She underwent ultrasound-guided biopsies of both masses.  The mass at the 3 o'clock position 1 cm from the nipple was benign.  The mass at the 3 o'clock position 5 cm from the nipple demonstrating a grade 2 invasive ductal cancer that was ER positive and HER2 equivocal.  There is also DCIS present.       A. LEFT breast, 3:00, 1 cm from nipple, ultrasound-guided core biopsy:  - Benign breast tissue with usual ductal hyperplasia (UDH) and fibrocystic changes including microcysts, apocrine metaplasia, and columnar cell change  - Rare calcifications associated with benign breast ducts and acini  - Negative for atypia or malignancy      B. LEFT breast, 3:00, 5 cm from nipple, ultrasound-guided core biopsy:   - INVASIVE BREAST CARCINOMA OF NO SPECIAL TYPE (INVASIVE DUCTAL CARCINOMA), [place] grade 2  - Ductal carcinoma in situ (DCIS), nuclear grade 2, solid type  - Invasive carcinoma is estrogen receptor positive, progesterone receptor positive, and HER2 equivocal (score 2+) by immunohistochemistry (see 'Breast Biomarker Reporting Template' below)  - HER2 FISH is is process; results will be reportedly separately by cytogenetis  - See comment     This FISH analysis is performed in follow up to the reported equivocal (2+) HER2 findings by immunohistochemistry ([id number]).     RESULTS:     Ratio of HER2/MARION-17 signals  Zenaida Valles:  See Interpretation below     Majority (~60-70%) of tumor in  area of strongest IHC staining:  Group 5 (ADRIANA Negative)  Avg. number HER2 signals/nucleus:  2.9  Avg. number MARION-17 signals/nucleus:  1.7  HER2/MARION 17 ratio:  1.7     Subpopulation (~30-40%) of tumor in area of strongest IHC staining:  Group 1 (ADRIANA Positive)                             Avg. number HER2 signals/nucleus:  4.9  Avg. number MARION-17 signals/nucleus:  2.3  HER2/MARION 17 ratio:  2.2     **Interpretive guidelines per the American Society of Clinical Oncology/College of American Pathologists Clinical Practice Guideline Update (Margarita VALENZUELA et al, 2023, Arch Pathol Lab Med 147:993):     -- Group 1: HER2/MARION-17 ratio 2.0 or more -AND- avg. number HER2 signals/nucleus 4.0 or more (ADRIANA Positive)  -- Group 2: HER2/MARION-17 ratio 2.0 or more -AND- avg. number HER2 signals/nucleus <4.0 (Additional work required)  -- Group 3: HER2/MARION-17 ratio <2.0 -AND- avg. number HER2 signals/nucleus 6.0 or more (Additional work required)  -- Group 4: HER2/MARION-17 ratio <2.0 -AND- avg. number HER2 signals/nucleus 4.0 or more and <6.0 (Additional work required)  -- Group 5: HER2/MARION-17 ratio <2.0 -AND- avg. number HER2 signals/nucleus <4.0 (ADRIANA Negative)     INTERPRETATION:  Two admixed populations of cells were found in this sample:     Majority (~60-70%) of tumor in area of strongest IHC staining:  Per the American Society of Clinical Oncology/College of American Pathologists Clinical Practice Guideline Focused Update (Margarita VALENZUELA et al, 2018, Arch Pathol Lab Med  doi:10.5858/arpa.1168-3540-RL), the HER2/MARION 17 ratio of 1.7 and average number of HER2 signals/cell of 2.9 places this population of cells in Group 5 (ADRIANA Negative).      Subpopulation (~30-40%) of tumor in area of strongest IHC staining:  Admixed within this sample were cells with increased HER2 signals, which, when selectively scored, had an average of 4.9 HER2 signals/nucleus and a HER2/MARION 17 ratio of 2.2; per the American Society of Clinical Oncology/College of American  Pathologists Clinical Practice Guideline Focused Update (Margarita VALENZUELA et al, 2018, Arch Pathol Lab Med  doi:10.5858/arpa.8841-8614-TX), this population of cells falls into Group 1 (ADRIANA Positive).     I reviewed history in chart:  PAST MEDICAL HISTORY: No history of breast surgery.  No history of breast cancer in the past.  No history of radiation therapy in the past or radiation exposure.  No history of tumor of any kind.  No history of heart problems, heart attack, breathing problems, blood clots, seizures, arthritis, peptic ulcer disease, osteoporosis or bone fractures.  She is not currently participating in a clinical trial.  She does have a history of asthma and uses an albuterol inhaler in cold weather.     Her past medical history is significant for diabetes mellitus and is on metformin, hypertension obesity, she has IPMN and a right renal artery stent.  The family history is negative for breast cancer.  She has a history of right renal artery stenosis and has a stent in place.     She has a recent history of a left hamstring injury.  She is seeing orthopedics for this problem.  She also has a history of back pain.  Her gait is affected but she can perform activities of daily living.  She has a history of aches in her shoulders.  She had a 65 pound weight loss over the last 2 years which was intentional related to improve diet and exercise.  She says she has a history of fibromuscular dysplasia.     She worked in the [employer] in [employer] and was exposed to dengue, malaria, hepatitis A, hepatitis B, encephalitis.  She has also had COVID and the flu.     FAMILY HISTORY:   No history of ovarian, uterine, colon cancer, or melanoma.  No history of glioma, gastric cancer or pancreatic cancer.  Her mother did have Zollinger-Wheeler syndrome but the patient's genetics are negative for this syndrome.      PAST MENSTRUAL HISTORY:  Age of first menstrual period was 13.   She has been pregnant 1 times with 0 live births  "and 0 miscarriages and 1 .  Age at in place pregnancy age 28.   Uterus and ovaries are in place.  Last menstrual period was about age 52 and the menopause occurred naturally. No history of hormone replacement therapy.     She does have a history of thickened endometrial lining but by her report no abnormalities of the lining when she had a DIC     HABITS:  She is a never smoker but she has a history of exposure to secondhand smoke from her father as a child.  She consumes alcoholic beverages socially 1 drink every couple of months.     GERMLINE GENETICS: pending     ALLERGIES: She has drug allergies to codeine, codeine derivatives, ibuprofen, lisinopril..  No allergy to seafood, iodine, or contrast dye.  She does take daily aspirin 81 mg because of her renal stent.    TREATMENT HISTORY:  A.  Lumpectomy showed a stage I T1b NX MX invasive ductal carcinoma of the left breast upper outer quadrant which was estrogen receptor positive (%) progesterone receptor positive (81-90%) and HER2 2+ by IHC and HER2 positive by ADRIANA.  Margins negative for invasive carcinoma.    B.  Plan is to give the APT regimen followed by addition of hormonal therapy with letrozole for 7 years.     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  They were in [facility] [employer] with the [employer] for two years.      INTERVAL HISTORY:  Zenaida is seen for evaluation and toxicity check prior to week 11 of weekly paclitaxel plus trastuzumab.  -She is really fatigued.  Feeling like she cannot do some of the things she used to, for example,   -Her weight is back up to where it was before.  -Her feet tingling is not as bothersome.   -No fevers, chills or signs of systemic illness.   -Right upper abd twinge x 1 - thinks maybe from ice cream; has not recurred,  -Occasional mild frontal headaches, not long-lasting and no OTC meds needed.   -L axillary \"lump\" was evaluated and found to be a seroma.   -No cough, chest pain or shortness of breath at rest.      "   PHYSICAL EXAMINATION:     /62 (BP Location: Right arm, Patient Position: Sitting, Cuff Size: Adult Large)   Pulse 84   Temp 98.4  F (36.9  C) (Oral)   Resp 16   Wt 79 kg (174 lb 3.2 oz)   LMP  (LMP Unknown)   SpO2 97%   BMI 28.99 kg/m    Wt Readings from Last 10 Encounters:   03/06/25 79 kg (174 lb 3.2 oz)   02/27/25 77.6 kg (171 lb 1.6 oz)   02/20/25 78.4 kg (172 lb 14.4 oz)   02/14/25 79.3 kg (174 lb 14.4 oz)   02/13/25 78.7 kg (173 lb 6.4 oz)   02/06/25 79.3 kg (174 lb 14.4 oz)   01/30/25 78.5 kg (173 lb)   01/23/25 79.2 kg (174 lb 9.6 oz)   01/16/25 78.5 kg (173 lb)   01/09/25 77.1 kg (170 lb)   General: Female in no acute distress.  Eyes: EOMI. No scleral icterus or conjunctival injection.  ENT: Oral mucosa is moist without lesions or thrush.   Cardiovascular: RRR No murmurs. No peripheral edema.  Respiratory: CTA bilaterally. No wheezes or crackles.  Gastrointestinal: BS +. Abdomen soft, non-tender.  Neurologic: Cranial nerves II through XII are grossly intact.  Skin: No rashes, petechiae, or bruising noted on exposed skin.  Psych: Affect appropriate. pleasant    LABORATORY DATA:    Most Recent 3 CBC's:  Recent Labs   Lab Test 03/06/25  0819 02/27/25  0844 02/20/25  0818   WBC 4.9 5.4 4.5   HGB 11.2* 11.4* 11.1*   MCV 88 89 88    338 306   ANEUTAUTO 2.4 3.0 2.1     Most Recent 3 BMP's:  Recent Labs   Lab Test 03/06/25  0819 02/27/25  0844 02/20/25  0818    140 139   POTASSIUM 4.2 4.0 3.8   CHLORIDE 103 102 101   CO2 27 28 26   BUN 16.2 18.9 19.2   CR 0.63 0.64 0.61   ANIONGAP 10 10 12   GAEL 9.6 9.2 9.2   * 142* 150*   PROTTOTAL 6.5 6.4 6.5   ALBUMIN 4.1 4.1 4.2    Most Recent 3 LFT's:  Recent Labs   Lab Test 03/06/25  0819 02/27/25  0844 02/20/25  0818   AST 21 22 18   ALT 25 27 19   ALKPHOS 80 79 76   BILITOTAL 0.2 0.2 0.2     I reviewed the above labs today.    IMAGING:  Echo 2/13/2025    US Axillary Left  Narrative: Examinations: US AXILLARY LEFT, 2/27/2025 9:49  AM    Comparisons: None    History: Left breast cancer treated with conservation in November 2024. Lump left axilla the site of sentinel node biopsy.    Ultrasound:  Targeted ultrasound evaluation was performed by the technologist and  radiologist.  Ultrasound left breast lump shows a small seroma in the left axilla  measuring approximately 3 cm. No mass or suspicious lymph nodes.  Impression: IMPRESSION: BI-RADS CATEGORY: 2 - Benign.    RECOMMENDED FOLLOW-UP: Clinical follow-up. The patient does not desire  aspiration at this time. Should it become more symptomatic, she may  return at any time for aspiration as desired.    Annual screening mammography.    The finding and recommendation were discussed with the patient at the  time of evaluation.    NAVEEN TAVAREZ MD         SYSTEM ID:  [id number]    I reviewed the above imaging today.     ASSESSMENT AND PLAN:    Stage I T1b NX MX invasive ductal carcinoma of the left breast upper outer quadrant which was estrogen receptor positive (%) progesterone receptor positive (81-90%) and HER2 2+ by IHC and HER2 positive by ADRIANA. Margins negative for invasive carcinoma.    -Post lumpectomy on 11/25/24.  -Port in place and she is having no issues with this.  -Echo 11/13/24 with 60-65% LVEF.  She had a repeat echo on 2/13/25 with stable EF.  -She is noting ongoing neuropathy (grade 1) with no functional impairment.   -She is clinically appropriate to continue adjuvant HER2 directed therapy with trastuzumab and paclitaxel C11 today.  -She will begin hormonal therapy with an aromatase inhibitor after completion of chemotherapy and that the aromatase inhibitor treatment would be recommended for 10 years given the lack of data on shorter adjuvant therapy for hormone receptor positive HER2 positive breast cancer.  -She will complete every 3-week trastuzumab for 1 year of therapy.   -Postlumpectomy radiation will be recommended.  She has an appointment set up with Dr. Ortega at  the end of March.     Peripheral neuropathy:  -Grade 1 to feet.   -Continue to monitor closely with paclitaxel; no dose reduction at this point.     Constipation:   -Continues to use Senna with benefit.  She has increased her dose.  -No new issues.     Fatigue:  -Related to treatment.    -She is mildly anemic but this is stable.   -She has not been able to exercise as much.  Discussion of trying to get a few short walks in per day might help.     Low appetite/food aversions:  -Related to chemo; previously discussed adding protein powder to foods, and she has started to do this.   -Weight is stable.     Left axillary fullness:   -No new concerns.    -US left axilla last week showed seroma.     Bone Health:  -She and Dr. Cheung discussed use of adjuvant zoledronic acid.    -She brought in dental clearance letter today.   -Weight bearing exersise recommended.    Follow Up/Plan:  -Proceed with cycle 9 trastuzumab and paclitaxel today.  -RTC weekly with provider prior to treatment.  She will call sooner with any concerns.     37 minutes spent on the date of the encounter doing chart review, review of test results, interpretation of tests, patient visit, and documentation.    WOOD Rangel, CNP  [facility]  [address] [place] [address]  [phone]                                      Again, thank you for allowing me to participate in the care of your patient.        Sincerely,        WOOD Reyes CNP    Electronically signed JOINT PAIN/LIMITED RANGE OF MOTION

## 2025-03-06 NOTE — PROGRESS NOTES
Infusion Nursing Note:  Zenaida Valles presents today for Cycle 11 Day 1 Trastuzumab and Taxol.    Patient seen by provider today: Yes: Leilani Amaya CNP   present during visit today: Not Applicable.    Note: Zenaida comes into the clinic today doing well overall and has no concerns to offer following her provider visit. She has no pain and denies s/s of infection.    Ices during Taxol.    Intravenous Access:  Implanted Port.    Treatment Conditions:  Lab Results   Component Value Date    HGB 11.2 (L) 03/06/2025    WBC 4.9 03/06/2025    ANEU 2.5 11/23/2018    ANEUTAUTO 2.4 03/06/2025     03/06/2025        Lab Results   Component Value Date     03/06/2025    POTASSIUM 4.2 03/06/2025    MAG 1.9 08/14/2007    CR 0.63 03/06/2025    GAEL 9.6 03/06/2025    BILITOTAL 0.2 03/06/2025    ALBUMIN 4.1 03/06/2025    ALT 25 03/06/2025    AST 21 03/06/2025       Results reviewed, labs MET treatment parameters, ok to proceed with treatment.  ECHO/MUGA completed 2/13/25  EF 60-65%.      Post Infusion Assessment:  Patient tolerated infusion without incident.  Blood return noted pre and post infusion.  Site patent and intact, free from redness, edema or discomfort.  No evidence of extravasations.  Access discontinued per protocol.       Discharge Plan:   Patient declined prescription refills.  Discharge instructions reviewed with: Patient.  Patient and/or family verbalized understanding of discharge instructions and all questions answered.  AVS to patient via [facility].  Patient will return 3/13 for next appointment.   Patient discharged in stable condition accompanied by: self.  Departure Mode: Ambulatory.      Janell Cross RN

## 2025-03-06 NOTE — PATIENT INSTRUCTIONS
[facility] Triage and after hours / weekends / holidays:  [phone]    Please call the triage or after hours line if you experience a temperature greater than or equal to 100.4, shaking chills, have uncontrolled nausea, vomiting and/or diarrhea, dizziness, shortness of breath, chest pain, bleeding, unexplained bruising, or if you have any other new/concerning symptoms, questions or concerns.      If you are having any concerning symptoms or wish to speak to a provider before your next infusion visit, please call triage to notify them so we can adequately serve you.     If you need a refill on a narcotic prescription or other medication, please call before your infusion appointment.

## 2025-03-06 NOTE — NURSING NOTE
"Oncology Rooming Note    March 6, 2025 8:29 AM   Zenaida Valles is a 75 year old female who presents for:    Chief Complaint   Patient presents with    Port Draw     Labs drawn from port by rn.  VS taken.    Oncology Clinic Visit    Breast Cancer     Invasive ductal carcinoma of breast, female, left, unstaged     Initial Vitals: /62 (BP Location: Right arm, Patient Position: Sitting, Cuff Size: Adult Large)   Pulse 84   Temp 98.4  F (36.9  C) (Oral)   Resp 16   Wt 79 kg (174 lb 3.2 oz)   LMP  (LMP Unknown)   SpO2 97%   BMI 28.99 kg/m   Estimated body mass index is 28.99 kg/m  as calculated from the following:    Height as of 2/14/25: 1.651 m (5' 5\").    Weight as of this encounter: 79 kg (174 lb 3.2 oz). Body surface area is 1.9 meters squared.  No Pain (0) Comment: Data Unavailable   No LMP recorded (lmp unknown). Patient is postmenopausal.  Allergies reviewed: Yes  Medications reviewed: Yes    Medications: Medication refills not needed today.  Pharmacy name entered into EPIC:    [facility] [place], [place] - [address] [facility] [place], [place] - [address]  [facility] [place], [place] - [address]    Frailty Screening:   Is the patient here for a new oncology consult visit in cancer care? 2. No      Clinical concerns: Pt reports no new medical concerns today.      Silva Monge, EMT     "

## 2025-03-12 NOTE — PROGRESS NOTES
MEDICAL ONCOLOGY NOTE     Re. Zenaida Valles   Female, 74 year old, 1950  MRN: [id number]     Diagnosis: Screening identified stage I invasive ductal cancer of the upper outer quadrant of the left breast ER positive, [place] positive, HER2 amplified stage [id number].       HISTORY OF PRESENT ILLNESS:    Zenaida has been in generally good health except for a right renal artery stenosis requiring a stent.  She also has a left leg injury recently she was in her usual state of good health and had a screening mammogram which showed a suspicious lesion in the upper outer quadrant of the left breast at the 3 o'clock position 5 cm from the nipple there is an irregular hypoechoic mass measuring 1.0 x 0.7 x 0.9 cm in size 1 cm from the nipple there is also a 0.5 x 0.4 cm area of suspicion     She underwent an ultrasound guided biopsy which showed usual ductal hyperplasia and fibrocystic changes microcysts in April Kren metaplasia.  Columnar cell changes.  It invasive ductal carcinoma was also found grade 2 and DCIS grade 2 solid type.  Her breast cancer was estrogen receptor positive progesterone receptor positive at HER2 2+ by IHC.  She then underwent ADRIANA which showed a subpopulation which was 60 to 70% ADRIANA 5 negative and 30 to 40% I-S age 1 positive with a 4.9 HER2 signals per nucleus and the ratio of 2.2 indicating HER2 positivity.     She then came to see Dr. Wood at [facility] for surgical recommendations.  She had a contrast mammogram which showed a 0.9 cm enhancing lesion in the upper outer quadrant of the left breast.     DIAGNOSTIC AND TREATMENT SUMMARY:  On October 7 she underwent a screening mammogram which demonstrated an asymmetry in her left breast.       On October 15 she underwent a diagnostic mammogram which confirmed a left breast asymmetry.  She had an ultrasound which demonstrated 2 masses in her left breast.  At the 3 o'clock position 1 cm from the nipple there was a mass  and at the 3 o'clock position 5 cm from the nipple there was a second mass.  Her lymph nodes were normal by ultrasound.       On October 23 she underwent a contrast-enhanced mammography which only demonstrated 1 mass measuring 1 cm.  The right breast was normal.  She underwent ultrasound-guided biopsies of both masses.  The mass at the 3 o'clock position 1 cm from the nipple was benign.  The mass at the 3 o'clock position 5 cm from the nipple demonstrating a grade 2 invasive ductal cancer that was ER positive and HER2 equivocal.  There is also DCIS present.       A. LEFT breast, 3:00, 1 cm from nipple, ultrasound-guided core biopsy:  - Benign breast tissue with usual ductal hyperplasia (UDH) and fibrocystic changes including microcysts, apocrine metaplasia, and columnar cell change  - Rare calcifications associated with benign breast ducts and acini  - Negative for atypia or malignancy      B. LEFT breast, 3:00, 5 cm from nipple, ultrasound-guided core biopsy:   - INVASIVE BREAST CARCINOMA OF NO SPECIAL TYPE (INVASIVE DUCTAL CARCINOMA), Helen grade 2  - Ductal carcinoma in situ (DCIS), nuclear grade 2, solid type  - Invasive carcinoma is estrogen receptor positive, progesterone receptor positive, and HER2 equivocal (score 2+) by immunohistochemistry (see 'Breast Biomarker Reporting Template' below)  - HER2 FISH is is process; results will be reportedly separately by cytogenetis  - See comment     This FISH analysis is performed in follow up to the reported equivocal (2+) HER2 findings by immunohistochemistry ([id number]).     RESULTS:     Ratio of HER2/MARION-17 signals  Zenaida Valles:  See Interpretation below     Majority (~60-70%) of tumor in area of strongest IHC staining:  Group 5 (ADRIANA Negative)  Avg. number HER2 signals/nucleus:  2.9  Avg. number MARION-17 signals/nucleus:  1.7  HER2/MARION 17 ratio:  1.7     Subpopulation (~30-40%) of tumor in area of strongest IHC staining:  Group 1 (ADRIANA Positive)                              Avg. number HER2 signals/nucleus:  4.9  Avg. number MARION-17 signals/nucleus:  2.3  HER2/MARION 17 ratio:  2.2     **Interpretive guidelines per the American Society of Clinical Oncology/College of American Pathologists Clinical Practice Guideline Update (Margarita VALENZUELA et al, 2023, Arch Pathol Lab Med 147:993):     -- Group 1: HER2/MARION-17 ratio 2.0 or more -AND- avg. number HER2 signals/nucleus 4.0 or more (ADRIANA Positive)  -- Group 2: HER2/MARION-17 ratio 2.0 or more -AND- avg. number HER2 signals/nucleus <4.0 (Additional work required)  -- Group 3: HER2/MARION-17 ratio <2.0 -AND- avg. number HER2 signals/nucleus 6.0 or more (Additional work required)  -- Group 4: HER2/MARION-17 ratio <2.0 -AND- avg. number HER2 signals/nucleus 4.0 or more and <6.0 (Additional work required)  -- Group 5: HER2/MARION-17 ratio <2.0 -AND- avg. number HER2 signals/nucleus <4.0 (ADRIANA Negative)     INTERPRETATION:  Two admixed populations of cells were found in this sample:     Majority (~60-70%) of tumor in area of strongest IHC staining:  Per the American Society of Clinical Oncology/College of American Pathologists Clinical Practice Guideline Focused Update (Margarita AC et al, 2018, Arch Pathol Lab Med  doi:10.5858/arpa.0836-4376-KM), the HER2/MARION 17 ratio of 1.7 and average number of HER2 signals/cell of 2.9 places this population of cells in Group 5 (ADRIANA Negative).      Subpopulation (~30-40%) of tumor in area of strongest IHC staining:  Admixed within this sample were cells with increased HER2 signals, which, when selectively scored, had an average of 4.9 HER2 signals/nucleus and a HER2/MARION 17 ratio of 2.2; per the American Society of Clinical Oncology/College of American Pathologists Clinical Practice Guideline Focused Update (Margarita AC et al, 2018, Arch Pathol Lab Med  doi:10.5858/arpa.0871-2408-FG), this population of cells falls into Group 1 (ADRIANA Positive).     I reviewed history in chart:  PAST MEDICAL HISTORY: No history of breast  surgery.  No history of breast cancer in the past.  No history of radiation therapy in the past or radiation exposure.  No history of tumor of any kind.  No history of heart problems, heart attack, breathing problems, blood clots, seizures, arthritis, peptic ulcer disease, osteoporosis or bone fractures.  She is not currently participating in a clinical trial.  She does have a history of asthma and uses an albuterol inhaler in cold weather.     Her past medical history is significant for diabetes mellitus and is on metformin, hypertension obesity, she has IPMN and a right renal artery stent.  The family history is negative for breast cancer.  She has a history of right renal artery stenosis and has a stent in place.     She has a recent history of a left hamstring injury.  She is seeing orthopedics for this problem.  She also has a history of back pain.  Her gait is affected but she can perform activities of daily living.  She has a history of aches in her shoulders.  She had a 65 pound weight loss over the last 2 years which was intentional related to improve diet and exercise.  She says she has a history of fibromuscular dysplasia.     She worked in the [employer] in [place] and was exposed to dengue, malaria, hepatitis A, hepatitis B, encephalitis.  She has also had COVID and the flu.     FAMILY HISTORY:   No history of ovarian, uterine, colon cancer, or melanoma.  No history of glioma, gastric cancer or pancreatic cancer.  Her mother did have Zollinger-Wheeler syndrome but the patient's genetics are negative for this syndrome.      PAST MENSTRUAL HISTORY:  Age of first menstrual period was 13.   She has been pregnant 1 times with 0 live births and 0 miscarriages and 1 .  Age at in place pregnancy age 28.   Uterus and ovaries are in place.  Last menstrual period was about age 52 and the menopause occurred naturally. No history of hormone replacement therapy.     She does have a history of thickened  endometrial lining but by her report no abnormalities of the lining when she had a DIC     HABITS:  She is a never smoker but she has a history of exposure to secondhand smoke from her father as a child.  She consumes alcoholic beverages socially 1 drink every couple of months.     GERMLINE GENETICS: pending     ALLERGIES: She has drug allergies to codeine, codeine derivatives, ibuprofen, lisinopril..  No allergy to seafood, iodine, or contrast dye.  She does take daily aspirin 81 mg because of her renal stent.    TREATMENT HISTORY:  A.  Lumpectomy showed a stage I T1b NX MX invasive ductal carcinoma of the left breast upper outer quadrant which was estrogen receptor positive (%) progesterone receptor positive (81-90%) and HER2 2+ by IHC and HER2 positive by ADRIANA.  Margins negative for invasive carcinoma.    B.  Plan is to give the APT regimen followed by addition of hormonal therapy with letrozole for 7 years.     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  They were in [facility] with the [employer] for two years.      INTERVAL HISTORY:  Zenaida is seen for evaluation and toxicity check prior to week 12 of weekly paclitaxel plus trastuzumab. Her visit is joined by Trae.  - Biggest complaint is fatigue that has worsened overtime. She is resting as needed but typically is not someone who can take naps. She has no chest pain or cough. Mild dyspnea with going up the stairs but feels this is secondary to deconditioning. No other shortness of breath or new respiratory symptoms.   - Neuropathy is ongoing in feet but not interfering with ADLs or sleep.  - Mild frontal headaches weren't an issue with this past week.   - appetite is low, finding it hard to hit her protein goals but continues to work on it.  - no nausea or vomiting.  - no new bowel concerns, constipation controlled. No diarrhea.   - no rashes.   - no fevers or chills.  - no mucositis. Mild sore throat this AM but feels it is secondary to dryness. Otherwise feeling  well.   - Port can sometimes be irritating as she can feel the catheter on her clavical. No rash, warmth, erythema, or swelling.     ROS: 10 point ROS neg other than the symptoms noted above in the HPI.      PHYSICAL EXAMINATION:     /71 (BP Location: Right arm, Patient Position: Sitting, Cuff Size: Adult Large)   Pulse 77   Temp 98.2  F (36.8  C) (Oral)   Resp 16   Wt 78 kg (172 lb)   LMP  (LMP Unknown)   SpO2 98%   BMI 28.62 kg/m    Wt Readings from Last 10 Encounters:   03/13/25 78 kg (172 lb)   03/06/25 79 kg (174 lb 3.2 oz)   02/27/25 77.6 kg (171 lb 1.6 oz)   02/20/25 78.4 kg (172 lb 14.4 oz)   02/14/25 79.3 kg (174 lb 14.4 oz)   02/13/25 78.7 kg (173 lb 6.4 oz)   02/06/25 79.3 kg (174 lb 14.4 oz)   01/30/25 78.5 kg (173 lb)   01/23/25 79.2 kg (174 lb 9.6 oz)   01/16/25 78.5 kg (173 lb)   General: Female in no acute distress.  Eyes: EOMI. No scleral icterus or conjunctival injection.  ENT: Oral mucosa is moist without lesions or thrush.   Cardiovascular: RRR No murmurs. No peripheral edema.  Respiratory: CTA bilaterally. No wheezes or crackles.  Gastrointestinal: BS +. Abdomen soft, non-tender.  Neurologic: Cranial nerves II through XII are grossly intact.  Skin: No rashes, petechiae, or bruising noted on exposed skin. Port right chest wall without erythema, warmth, or swelling.   Psych: Affect appropriate. pleasant    LABORATORY DATA:    Most Recent 3 CBC's:  Recent Labs   Lab Test 03/13/25  0917 03/06/25  0819 02/27/25  0844   WBC 4.7 4.9 5.4   HGB 11.3* 11.2* 11.4*   MCV 90 88 89    310 338   ANEUTAUTO 2.5 2.4 3.0     Most Recent 3 BMP's:  Recent Labs   Lab Test 03/13/25  0917 03/06/25  0819 02/27/25  0844    140 140   POTASSIUM 3.8 4.2 4.0   CHLORIDE 103 103 102   CO2 28 27 28   BUN 18.3 16.2 18.9   CR 0.63 0.63 0.64   ANIONGAP 9 10 10   GAEL 9.5 9.6 9.2   * 153* 142*   PROTTOTAL 6.5 6.5 6.4   ALBUMIN 4.1 4.1 4.1    Most Recent 3 LFT's:  Recent Labs   Lab Test 03/13/25  0917  03/06/25  0819 02/27/25  0844   AST 22 21 22   ALT 34 25 27   ALKPHOS 77 80 79   BILITOTAL 0.2 0.2 0.2     I reviewed the above labs today.    IMAGING:  Echo 2/13/2025    US Axillary Left  Narrative: Examinations: US AXILLARY LEFT, 2/27/2025 9:49 AM    Comparisons: None    History: Left breast cancer treated with conservation in November 2024. Lump left axilla the site of sentinel node biopsy.    Ultrasound:  Targeted ultrasound evaluation was performed by the technologist and  radiologist.  Ultrasound left breast lump shows a small seroma in the left axilla  measuring approximately 3 cm. No mass or suspicious lymph nodes.  Impression: IMPRESSION: BI-RADS CATEGORY: 2 - Benign.    RECOMMENDED FOLLOW-UP: Clinical follow-up. The patient does not desire  aspiration at this time. Should it become more symptomatic, she may  return at any time for aspiration as desired.    Annual screening mammography.    The finding and recommendation were discussed with the patient at the  time of evaluation.    NAVEEN TAVAREZ MD         SYSTEM ID:  [id number]    I reviewed the above imaging today.     ASSESSMENT AND PLAN:    Stage I T1b NX MX invasive ductal carcinoma of the left breast upper outer quadrant which was estrogen receptor positive (%) progesterone receptor positive (81-90%) and HER2 2+ by IHC and HER2 positive by ADRIANA. Margins negative for invasive carcinoma.    -Post lumpectomy on 11/25/24.  -Port in place- occasional mild irritation first thing in the AM that improves throughout the day. Discussed signs/symptoms of a port associated clots.   -Echo 11/13/24 with 60-65% LVEF.  She had a repeat echo on 2/13/25 with stable EF. Next due May 2025.  -She is noting ongoing neuropathy (grade 1) with no functional impairment. 3/13/25 is her last dose of taxol.   -She is clinically appropriate to continue adjuvant HER2 directed therapy with trastuzumab and paclitaxel C12 today. Labs stable and WNL.   -She will begin hormonal  therapy with an aromatase inhibitor after completion of chemotherapy and that the aromatase inhibitor treatment would be recommended for 10 years given the lack of data on shorter adjuvant therapy for hormone receptor positive HER2 positive breast cancer.  -She will complete every 3-week trastuzumab for 1 year of therapy. This will begin around 3/20/25.   -Postlumpectomy radiation will be recommended.  She has an appointment set up with Dr. Ortega at the end of March.     Peripheral neuropathy:  -Grade 1 to feet.   -Continue to monitor closely with paclitaxel; no dose reduction at this point.     Constipation:   -Continues to use Senna with benefit.  She has increased her dose.  -No new issues.     Fatigue:  -Related to treatment.    -She is mildly anemic but this is stable.   -monitor.     Low appetite/food aversions:  -Related to chemo; previously discussed adding protein powder to foods, and she has started to do this.   -Continue to monitor weight, down 2 lbs from 3/6 but overall stable.     Left axillary fullness:   -No new concerns.    -US left axilla last week showed seroma.   - not discussed 3/13/25.     Bone Health:  -She and Dr. Cheung discussed use of adjuvant zoledronic acid.    - has achieved dental clearance.   - will start 3 months after radiation.   -Weight bearing exercise recommended.    Follow Up/Plan:  -Proceed with cycle 12 trastuzumab and paclitaxel today.  - transition to q3 week trastuzumab starting 3/20/25. Provider visit every 6-9 weeks.     30 minutes spent on the date of the encounter doing chart review, review of test results, interpretation of tests, patient visit, and documentation     Shayy Becerra PA-C

## 2025-03-13 ENCOUNTER — APPOINTMENT (OUTPATIENT)
Dept: LAB | Facility: CLINIC | Age: 75
End: 2025-03-13
Attending: INTERNAL MEDICINE
Payer: COMMERCIAL

## 2025-03-13 ENCOUNTER — INFUSION THERAPY VISIT (OUTPATIENT)
Dept: ONCOLOGY | Facility: CLINIC | Age: 75
End: 2025-03-13
Attending: NURSE PRACTITIONER
Payer: COMMERCIAL

## 2025-03-13 ENCOUNTER — ONCOLOGY VISIT (OUTPATIENT)
Dept: ONCOLOGY | Facility: CLINIC | Age: 75
End: 2025-03-13
Payer: COMMERCIAL

## 2025-03-13 VITALS
DIASTOLIC BLOOD PRESSURE: 71 MMHG | SYSTOLIC BLOOD PRESSURE: 106 MMHG | BODY MASS INDEX: 28.62 KG/M2 | WEIGHT: 172 LBS | OXYGEN SATURATION: 98 % | RESPIRATION RATE: 16 BRPM | HEART RATE: 77 BPM | TEMPERATURE: 98.2 F

## 2025-03-13 DIAGNOSIS — C50.912 INVASIVE DUCTAL CARCINOMA OF BREAST, FEMALE, LEFT (H): Primary | ICD-10-CM

## 2025-03-13 LAB
ALBUMIN SERPL BCG-MCNC: 4.1 G/DL (ref 3.5–5.2)
ALP SERPL-CCNC: 77 U/L (ref 40–150)
ALT SERPL W P-5'-P-CCNC: 34 U/L (ref 0–50)
ANION GAP SERPL CALCULATED.3IONS-SCNC: 9 MMOL/L (ref 7–15)
AST SERPL W P-5'-P-CCNC: 22 U/L (ref 0–45)
BASOPHILS # BLD AUTO: 0.1 10E3/UL (ref 0–0.2)
BASOPHILS NFR BLD AUTO: 1 %
BILIRUB SERPL-MCNC: 0.2 MG/DL
BUN SERPL-MCNC: 18.3 MG/DL (ref 8–23)
CALCIUM SERPL-MCNC: 9.5 MG/DL (ref 8.8–10.4)
CHLORIDE SERPL-SCNC: 103 MMOL/L (ref 98–107)
CREAT SERPL-MCNC: 0.63 MG/DL (ref 0.51–0.95)
EGFRCR SERPLBLD CKD-EPI 2021: >90 ML/MIN/1.73M2
EOSINOPHIL # BLD AUTO: 0.2 10E3/UL (ref 0–0.7)
EOSINOPHIL NFR BLD AUTO: 4 %
ERYTHROCYTE [DISTWIDTH] IN BLOOD BY AUTOMATED COUNT: 14.6 % (ref 10–15)
GLUCOSE SERPL-MCNC: 146 MG/DL (ref 70–99)
HCO3 SERPL-SCNC: 28 MMOL/L (ref 22–29)
HCT VFR BLD AUTO: 33.3 % (ref 35–47)
HGB BLD-MCNC: 11.3 G/DL (ref 11.7–15.7)
IMM GRANULOCYTES # BLD: 0 10E3/UL
IMM GRANULOCYTES NFR BLD: 1 %
LYMPHOCYTES # BLD AUTO: 1.6 10E3/UL (ref 0.8–5.3)
LYMPHOCYTES NFR BLD AUTO: 35 %
MCH RBC QN AUTO: 30.4 PG (ref 26.5–33)
MCHC RBC AUTO-ENTMCNC: 33.9 G/DL (ref 31.5–36.5)
MCV RBC AUTO: 90 FL (ref 78–100)
MONOCYTES # BLD AUTO: 0.3 10E3/UL (ref 0–1.3)
MONOCYTES NFR BLD AUTO: 7 %
NEUTROPHILS # BLD AUTO: 2.5 10E3/UL (ref 1.6–8.3)
NEUTROPHILS NFR BLD AUTO: 52 %
NRBC # BLD AUTO: 0 10E3/UL
NRBC BLD AUTO-RTO: 0 /100
PLATELET # BLD AUTO: 300 10E3/UL (ref 150–450)
POTASSIUM SERPL-SCNC: 3.8 MMOL/L (ref 3.4–5.3)
PROT SERPL-MCNC: 6.5 G/DL (ref 6.4–8.3)
RBC # BLD AUTO: 3.72 10E6/UL (ref 3.8–5.2)
SODIUM SERPL-SCNC: 140 MMOL/L (ref 135–145)
WBC # BLD AUTO: 4.7 10E3/UL (ref 4–11)

## 2025-03-13 PROCEDURE — G0463 HOSPITAL OUTPT CLINIC VISIT: HCPCS

## 2025-03-13 PROCEDURE — 36591 DRAW BLOOD OFF VENOUS DEVICE: CPT

## 2025-03-13 PROCEDURE — 250N000011 HC RX IP 250 OP 636

## 2025-03-13 PROCEDURE — 85018 HEMOGLOBIN: CPT

## 2025-03-13 PROCEDURE — 258N000003 HC RX IP 258 OP 636

## 2025-03-13 PROCEDURE — 85004 AUTOMATED DIFF WBC COUNT: CPT

## 2025-03-13 PROCEDURE — 80053 COMPREHEN METABOLIC PANEL: CPT

## 2025-03-13 RX ORDER — ACETAMINOPHEN 325 MG/1
650 TABLET ORAL
Status: CANCELLED | OUTPATIENT
Start: 2025-03-14

## 2025-03-13 RX ORDER — EPINEPHRINE 1 MG/ML
0.3 INJECTION, SOLUTION INTRAMUSCULAR; SUBCUTANEOUS EVERY 5 MIN PRN
Status: CANCELLED | OUTPATIENT
Start: 2025-03-14

## 2025-03-13 RX ORDER — HEPARIN SODIUM (PORCINE) LOCK FLUSH IV SOLN 100 UNIT/ML 100 UNIT/ML
5 SOLUTION INTRAVENOUS
Status: CANCELLED | OUTPATIENT
Start: 2025-03-14

## 2025-03-13 RX ORDER — ONDANSETRON 2 MG/ML
8 INJECTION INTRAMUSCULAR; INTRAVENOUS ONCE
Status: COMPLETED | OUTPATIENT
Start: 2025-03-13 | End: 2025-03-13

## 2025-03-13 RX ORDER — DIPHENHYDRAMINE HCL 25 MG
50 CAPSULE ORAL
Status: CANCELLED
Start: 2025-03-14

## 2025-03-13 RX ORDER — DIPHENHYDRAMINE HYDROCHLORIDE 50 MG/ML
50 INJECTION, SOLUTION INTRAMUSCULAR; INTRAVENOUS
Status: CANCELLED
Start: 2025-03-14

## 2025-03-13 RX ORDER — HEPARIN SODIUM (PORCINE) LOCK FLUSH IV SOLN 100 UNIT/ML 100 UNIT/ML
500 SOLUTION INTRAVENOUS ONCE
Status: COMPLETED | OUTPATIENT
Start: 2025-03-13 | End: 2025-03-13

## 2025-03-13 RX ORDER — ALBUTEROL SULFATE 0.83 MG/ML
2.5 SOLUTION RESPIRATORY (INHALATION)
Status: CANCELLED | OUTPATIENT
Start: 2025-03-14

## 2025-03-13 RX ORDER — ALBUTEROL SULFATE 90 UG/1
1-2 INHALANT RESPIRATORY (INHALATION)
Status: CANCELLED
Start: 2025-03-14

## 2025-03-13 RX ORDER — LORAZEPAM 2 MG/ML
0.5 INJECTION INTRAMUSCULAR EVERY 4 HOURS PRN
Status: CANCELLED | OUTPATIENT
Start: 2025-03-14

## 2025-03-13 RX ORDER — ONDANSETRON 2 MG/ML
8 INJECTION INTRAMUSCULAR; INTRAVENOUS ONCE
Status: CANCELLED | OUTPATIENT
Start: 2025-03-14

## 2025-03-13 RX ORDER — DIPHENHYDRAMINE HYDROCHLORIDE 50 MG/ML
25 INJECTION, SOLUTION INTRAMUSCULAR; INTRAVENOUS
Status: CANCELLED
Start: 2025-03-14

## 2025-03-13 RX ORDER — HEPARIN SODIUM (PORCINE) LOCK FLUSH IV SOLN 100 UNIT/ML 100 UNIT/ML
5 SOLUTION INTRAVENOUS
Status: DISCONTINUED | OUTPATIENT
Start: 2025-03-13 | End: 2025-03-13

## 2025-03-13 RX ORDER — MEPERIDINE HYDROCHLORIDE 25 MG/ML
25 INJECTION INTRAMUSCULAR; INTRAVENOUS; SUBCUTANEOUS
Status: CANCELLED | OUTPATIENT
Start: 2025-03-14

## 2025-03-13 RX ORDER — METHYLPREDNISOLONE SODIUM SUCCINATE 40 MG/ML
40 INJECTION INTRAMUSCULAR; INTRAVENOUS
Status: CANCELLED
Start: 2025-03-14

## 2025-03-13 RX ORDER — HEPARIN SODIUM,PORCINE 10 UNIT/ML
5-20 VIAL (ML) INTRAVENOUS DAILY PRN
Status: CANCELLED | OUTPATIENT
Start: 2025-03-14

## 2025-03-13 RX ADMIN — Medication 500 UNITS: at 09:20

## 2025-03-13 RX ADMIN — SODIUM CHLORIDE 160 MG: 0.9 INJECTION, SOLUTION INTRAVENOUS at 10:59

## 2025-03-13 RX ADMIN — HEPARIN 5 ML: 100 SYRINGE at 12:37

## 2025-03-13 RX ADMIN — ONDANSETRON 8 MG: 2 INJECTION INTRAMUSCULAR; INTRAVENOUS at 10:37

## 2025-03-13 RX ADMIN — PACLITAXEL 154 MG: 6 INJECTION, SOLUTION INTRAVENOUS at 11:33

## 2025-03-13 ASSESSMENT — PAIN SCALES - GENERAL: PAINLEVEL_OUTOF10: NO PAIN (0)

## 2025-03-13 NOTE — NURSING NOTE
Chief Complaint   Patient presents with    Port Draw     Labs drawn from port by RN     Port accessed with 20 gauge, 3/4 inch, flat needle by RN, labs collected, line flushed with saline and heparin.  Vitals taken. Pt checked in for appointment(s).     Radha Hatfield, RN

## 2025-03-13 NOTE — NURSING NOTE
"Oncology Rooming Note    March 13, 2025 9:30 AM   Zenaida Valles is a 75 year old female who presents for:    Chief Complaint   Patient presents with    Port Draw     Labs drawn from port by RN    Oncology Clinic Visit     Invasive ductal carcinoma of breast, female, left     Initial Vitals: /71 (BP Location: Right arm, Patient Position: Sitting, Cuff Size: Adult Large)   Pulse 77   Temp 98.2  F (36.8  C) (Oral)   Resp 16   Wt 78 kg (172 lb)   LMP  (LMP Unknown)   SpO2 98%   BMI 28.62 kg/m   Estimated body mass index is 28.62 kg/m  as calculated from the following:    Height as of 2/14/25: 1.651 m (5' 5\").    Weight as of this encounter: 78 kg (172 lb). Body surface area is 1.89 meters squared.  No Pain (0) Comment: Data Unavailable   No LMP recorded (lmp unknown). Patient is postmenopausal.  Allergies reviewed: Yes  Medications reviewed: Yes    Medications: Medication refills not needed today.  Pharmacy name entered into [facility]:    [facility] [place], [place] - [facility] [place], [place] - [address]  [facility] [place], [place] - [address] [place] [address]1    Frailty Screening:   Is the patient here for a new oncology consult visit in cancer care? 2. No    PHQ9:  Did this patient require a PHQ9?: No      Clinical concerns:        Cristina Vaughn              "

## 2025-03-13 NOTE — LETTER
3/13/2025      Zenaida Valles  [address] [facility] [address]      Dear Colleague,    Thank you for referring your patient, Zenaida Valles, to the [facility]. Please see a copy of my visit note below.    MEDICAL ONCOLOGY NOTE     Re. Zenaida Valles   Female, 74 year old, 1950  MRN: [id number]     Diagnosis: Screening identified stage I invasive ductal cancer of the upper outer quadrant of the left breast ER positive, MS positive, HER2 amplified stage [id number].       HISTORY OF PRESENT ILLNESS:    Zenaida has been in generally good health except for a right renal artery stenosis requiring a stent.  She also has a left leg injury recently she was in her usual state of good health and had a screening mammogram which showed a suspicious lesion in the upper outer quadrant of the left breast at the 3 o'clock position 5 cm from the nipple there is an irregular hypoechoic mass measuring 1.0 x 0.7 x 0.9 cm in size 1 cm from the nipple there is also a 0.5 x 0.4 cm area of suspicion     She underwent an ultrasound guided biopsy which showed usual ductal hyperplasia and fibrocystic changes microcysts in April Kren metaplasia.  Columnar cell changes.  It invasive ductal carcinoma was also found grade 2 and DCIS grade 2 solid type.  Her breast cancer was estrogen receptor positive progesterone receptor positive at HER2 2+ by IHC.  She then underwent ADRIANA which showed a subpopulation which was 60 to 70% ADRIANA 5 negative and 30 to 40% I-S age 1 positive with a 4.9 HER2 signals per nucleus and the ratio of 2.2 indicating HER2 positivity.     She then came to see Dr. Wood at [facility] for surgical recommendations.  She had a contrast mammogram which showed a 0.9 cm enhancing lesion in the upper outer quadrant of the left breast.     DIAGNOSTIC AND TREATMENT SUMMARY:  On October 7 she underwent a screening mammogram which demonstrated  an asymmetry in her left breast.       On October 15 she underwent a diagnostic mammogram which confirmed a left breast asymmetry.  She had an ultrasound which demonstrated 2 masses in her left breast.  At the 3 o'clock position 1 cm from the nipple there was a mass and at the 3 o'clock position 5 cm from the nipple there was a second mass.  Her lymph nodes were normal by ultrasound.       On October 23 she underwent a contrast-enhanced mammography which only demonstrated 1 mass measuring 1 cm.  The right breast was normal.  She underwent ultrasound-guided biopsies of both masses.  The mass at the 3 o'clock position 1 cm from the nipple was benign.  The mass at the 3 o'clock position 5 cm from the nipple demonstrating a grade 2 invasive ductal cancer that was ER positive and HER2 equivocal.  There is also DCIS present.       A. LEFT breast, 3:00, 1 cm from nipple, ultrasound-guided core biopsy:  - Benign breast tissue with usual ductal hyperplasia (UDH) and fibrocystic changes including microcysts, apocrine metaplasia, and columnar cell change  - Rare calcifications associated with benign breast ducts and acini  - Negative for atypia or malignancy      B. LEFT breast, 3:00, 5 cm from nipple, ultrasound-guided core biopsy:   - INVASIVE BREAST CARCINOMA OF NO SPECIAL TYPE (INVASIVE DUCTAL CARCINOMA), [place] grade 2  - Ductal carcinoma in situ (DCIS), nuclear grade 2, solid type  - Invasive carcinoma is estrogen receptor positive, progesterone receptor positive, and HER2 equivocal (score 2+) by immunohistochemistry (see 'Breast Biomarker Reporting Template' below)  - HER2 FISH is is process; results will be reportedly separately by cytogenetis  - See comment     This FISH analysis is performed in follow up to the reported equivocal (2+) HER2 findings by immunohistochemistry ([id number]).     RESULTS:     Ratio of HER2/MARION-17 signals  Zenaida Valles:  See Interpretation below     Majority (~60-70%) of tumor in  area of strongest IHC staining:  Group 5 (ADRIANA Negative)  Avg. number HER2 signals/nucleus:  2.9  Avg. number MARION-17 signals/nucleus:  1.7  HER2/MARION 17 ratio:  1.7     Subpopulation (~30-40%) of tumor in area of strongest IHC staining:  Group 1 (ADRIANA Positive)                             Avg. number HER2 signals/nucleus:  4.9  Avg. number MARION-17 signals/nucleus:  2.3  HER2/MARION 17 ratio:  2.2     **Interpretive guidelines per the American Society of Clinical Oncology/College of American Pathologists Clinical Practice Guideline Update (Margarita VALENZUELA et al, 2023, Arch Pathol Lab Med 147:993):     -- Group 1: HER2/MARION-17 ratio 2.0 or more -AND- avg. number HER2 signals/nucleus 4.0 or more (ADRIANA Positive)  -- Group 2: HER2/MARION-17 ratio 2.0 or more -AND- avg. number HER2 signals/nucleus <4.0 (Additional work required)  -- Group 3: HER2/MARION-17 ratio <2.0 -AND- avg. number HER2 signals/nucleus 6.0 or more (Additional work required)  -- Group 4: HER2/MARION-17 ratio <2.0 -AND- avg. number HER2 signals/nucleus 4.0 or more and <6.0 (Additional work required)  -- Group 5: HER2/MARION-17 ratio <2.0 -AND- avg. number HER2 signals/nucleus <4.0 (ADRIANA Negative)     INTERPRETATION:  Two admixed populations of cells were found in this sample:     Majority (~60-70%) of tumor in area of strongest IHC staining:  Per the American Society of Clinical Oncology/College of American Pathologists Clinical Practice Guideline Focused Update (Margarita VALENZUELA et al, 2018, Arch Pathol Lab Med  doi:10.5858/arpa.5625-9638-WO), the HER2/MARION 17 ratio of 1.7 and average number of HER2 signals/cell of 2.9 places this population of cells in Group 5 (ADRIANA Negative).      Subpopulation (~30-40%) of tumor in area of strongest IHC staining:  Admixed within this sample were cells with increased HER2 signals, which, when selectively scored, had an average of 4.9 HER2 signals/nucleus and a HER2/MARION 17 ratio of 2.2; per the American Society of Clinical Oncology/College of American  Pathologists Clinical Practice Guideline Focused Update (Margarita VALENZUELA et al, 2018, Arch Pathol Lab Med  doi:10.5858/arpa.8154-1197-WI), this population of cells falls into Group 1 (ADRIANA Positive).     I reviewed history in chart:  PAST MEDICAL HISTORY: No history of breast surgery.  No history of breast cancer in the past.  No history of radiation therapy in the past or radiation exposure.  No history of tumor of any kind.  No history of heart problems, heart attack, breathing problems, blood clots, seizures, arthritis, peptic ulcer disease, osteoporosis or bone fractures.  She is not currently participating in a clinical trial.  She does have a history of asthma and uses an albuterol inhaler in cold weather.     Her past medical history is significant for diabetes mellitus and is on metformin, hypertension obesity, she has IPMN and a right renal artery stent.  The family history is negative for breast cancer.  She has a history of right renal artery stenosis and has a stent in place.     She has a recent history of a left hamstring injury.  She is seeing orthopedics for this problem.  She also has a history of back pain.  Her gait is affected but she can perform activities of daily living.  She has a history of aches in her shoulders.  She had a 65 pound weight loss over the last 2 years which was intentional related to improve diet and exercise.  She says she has a history of fibromuscular dysplasia.     She worked in the [employer] in [place] and was exposed to dengue, malaria, hepatitis A, hepatitis B, encephalitis.  She has also had COVID and the flu.     FAMILY HISTORY:   No history of ovarian, uterine, colon cancer, or melanoma.  No history of glioma, gastric cancer or pancreatic cancer.  Her mother did have Zollinger-Wheeler syndrome but the patient's genetics are negative for this syndrome.      PAST MENSTRUAL HISTORY:  Age of first menstrual period was 13.   She has been pregnant 1 times with 0 live births  and 0 miscarriages and 1 .  Age at in place pregnancy age 28.   Uterus and ovaries are in place.  Last menstrual period was about age 52 and the menopause occurred naturally. No history of hormone replacement therapy.     She does have a history of thickened endometrial lining but by her report no abnormalities of the lining when she had a DIC     HABITS:  She is a never smoker but she has a history of exposure to secondhand smoke from her father as a child.  She consumes alcoholic beverages socially 1 drink every couple of months.     GERMLINE GENETICS: pending     ALLERGIES: She has drug allergies to codeine, codeine derivatives, ibuprofen, lisinopril..  No allergy to seafood, iodine, or contrast dye.  She does take daily aspirin 81 mg because of her renal stent.    TREATMENT HISTORY:  A.  Lumpectomy showed a stage I T1b NX MX invasive ductal carcinoma of the left breast upper outer quadrant which was estrogen receptor positive (%) progesterone receptor positive (81-90%) and HER2 2+ by IHC and HER2 positive by ADRIANA.  Margins negative for invasive carcinoma.    B.  Plan is to give the APT regimen followed by addition of hormonal therapy with letrozole for 7 years.     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  They were in [facility] with the [employer] for two years.      INTERVAL HISTORY:  Zenaida is seen for evaluation and toxicity check prior to week 12 of weekly paclitaxel plus trastuzumab. Her visit is joined by Trae.  - Biggest complaint is fatigue that has worsened overtime. She is resting as needed but typically is not someone who can take naps. She has no chest pain or cough. Mild dyspnea with going up the stairs but feels this is secondary to deconditioning. No other shortness of breath or new respiratory symptoms.   - Neuropathy is ongoing in feet but not interfering with ADLs or sleep.  - Mild frontal headaches weren't an issue with this past week.   - appetite is low, finding it hard to hit her  protein goals but continues to work on it.  - no nausea or vomiting.  - no new bowel concerns, constipation controlled. No diarrhea.   - no rashes.   - no fevers or chills.  - no mucositis. Mild sore throat this AM but feels it is secondary to dryness. Otherwise feeling well.   - Port can sometimes be irritating as she can feel the catheter on her clavical. No rash, warmth, erythema, or swelling.     ROS: 10 point ROS neg other than the symptoms noted above in the HPI.      PHYSICAL EXAMINATION:     /71 (BP Location: Right arm, Patient Position: Sitting, Cuff Size: Adult Large)   Pulse 77   Temp 98.2  F (36.8  C) (Oral)   Resp 16   Wt 78 kg (172 lb)   LMP  (LMP Unknown)   SpO2 98%   BMI 28.62 kg/m    Wt Readings from Last 10 Encounters:   03/13/25 78 kg (172 lb)   03/06/25 79 kg (174 lb 3.2 oz)   02/27/25 77.6 kg (171 lb 1.6 oz)   02/20/25 78.4 kg (172 lb 14.4 oz)   02/14/25 79.3 kg (174 lb 14.4 oz)   02/13/25 78.7 kg (173 lb 6.4 oz)   02/06/25 79.3 kg (174 lb 14.4 oz)   01/30/25 78.5 kg (173 lb)   01/23/25 79.2 kg (174 lb 9.6 oz)   01/16/25 78.5 kg (173 lb)   General: Female in no acute distress.  Eyes: EOMI. No scleral icterus or conjunctival injection.  ENT: Oral mucosa is moist without lesions or thrush.   Cardiovascular: RRR No murmurs. No peripheral edema.  Respiratory: CTA bilaterally. No wheezes or crackles.  Gastrointestinal: BS +. Abdomen soft, non-tender.  Neurologic: Cranial nerves II through XII are grossly intact.  Skin: No rashes, petechiae, or bruising noted on exposed skin. Port right chest wall without erythema, warmth, or swelling.   Psych: Affect appropriate. pleasant    LABORATORY DATA:    Most Recent 3 CBC's:  Recent Labs   Lab Test 03/13/25  0917 03/06/25  0819 02/27/25  0844   WBC 4.7 4.9 5.4   HGB 11.3* 11.2* 11.4*   MCV 90 88 89    310 338   ANEUTAUTO 2.5 2.4 3.0     Most Recent 3 BMP's:  Recent Labs   Lab Test 03/13/25  0917 03/06/25  0819 02/27/25  0844    140  140   POTASSIUM 3.8 4.2 4.0   CHLORIDE 103 103 102   CO2 28 27 28   BUN 18.3 16.2 18.9   CR 0.63 0.63 0.64   ANIONGAP 9 10 10   GAEL 9.5 9.6 9.2   * 153* 142*   PROTTOTAL 6.5 6.5 6.4   ALBUMIN 4.1 4.1 4.1    Most Recent 3 LFT's:  Recent Labs   Lab Test 03/13/25  0917 03/06/25  0819 02/27/25  0844   AST 22 21 22   ALT 34 25 27   ALKPHOS 77 80 79   BILITOTAL 0.2 0.2 0.2     I reviewed the above labs today.    IMAGING:  Echo 2/13/2025    US Axillary Left  Narrative: Examinations: US AXILLARY LEFT, 2/27/2025 9:49 AM    Comparisons: None    History: Left breast cancer treated with conservation in November 2024. Lump left axilla the site of sentinel node biopsy.    Ultrasound:  Targeted ultrasound evaluation was performed by the technologist and  radiologist.  Ultrasound left breast lump shows a small seroma in the left axilla  measuring approximately 3 cm. No mass or suspicious lymph nodes.  Impression: IMPRESSION: BI-RADS CATEGORY: 2 - Benign.    RECOMMENDED FOLLOW-UP: Clinical follow-up. The patient does not desire  aspiration at this time. Should it become more symptomatic, she may  return at any time for aspiration as desired.    Annual screening mammography.    The finding and recommendation were discussed with the patient at the  time of evaluation.    NAVEEN TAVAREZ MD         SYSTEM ID:  [id number]    I reviewed the above imaging today.     ASSESSMENT AND PLAN:    Stage I T1b NX MX invasive ductal carcinoma of the left breast upper outer quadrant which was estrogen receptor positive (%) progesterone receptor positive (81-90%) and HER2 2+ by IHC and HER2 positive by ADRIANA. Margins negative for invasive carcinoma.    -Post lumpectomy on 11/25/24.  -Port in place- occasional mild irritation first thing in the AM that improves throughout the day. Discussed signs/symptoms of a port associated clots.   -Echo 11/13/24 with 60-65% LVEF.  She had a repeat echo on 2/13/25 with stable EF. Next due May 2025.  -She  is noting ongoing neuropathy (grade 1) with no functional impairment. 3/13/25 is her last dose of taxol.   -She is clinically appropriate to continue adjuvant HER2 directed therapy with trastuzumab and paclitaxel C12 today. Labs stable and WNL.   -She will begin hormonal therapy with an aromatase inhibitor after completion of chemotherapy and that the aromatase inhibitor treatment would be recommended for 10 years given the lack of data on shorter adjuvant therapy for hormone receptor positive HER2 positive breast cancer.  -She will complete every 3-week trastuzumab for 1 year of therapy. This will begin around 3/20/25.   -Postlumpectomy radiation will be recommended.  She has an appointment set up with Dr. Ortega at the end of March.     Peripheral neuropathy:  -Grade 1 to feet.   -Continue to monitor closely with paclitaxel; no dose reduction at this point.     Constipation:   -Continues to use Senna with benefit.  She has increased her dose.  -No new issues.     Fatigue:  -Related to treatment.    -She is mildly anemic but this is stable.   -monitor.     Low appetite/food aversions:  -Related to chemo; previously discussed adding protein powder to foods, and she has started to do this.   -Continue to monitor weight, down 2 lbs from 3/6 but overall stable.     Left axillary fullness:   -No new concerns.    -US left axilla last week showed seroma.   - not discussed 3/13/25.     Bone Health:  -She and Dr. Cheung discussed use of adjuvant zoledronic acid.    - has achieved dental clearance.   - will start 3 months after radiation.   -Weight bearing exercise recommended.    Follow Up/Plan:  -Proceed with cycle 12 trastuzumab and paclitaxel today.  - transition to q3 week trastuzumab starting 3/20/25. Provider visit every 6-9 weeks.     30 minutes spent on the date of the encounter doing chart review, review of test results, interpretation of tests, patient visit, and documentation     Shayy Becerra PA-C        Again,  thank you for allowing me to participate in the care of your patient.        Sincerely,        Shayy Becerra PA-C    Electronically signed

## 2025-03-13 NOTE — PROGRESS NOTES
Infusion Nursing Note:  Zenaida Valles presents today for C12D1 Trazimera/Taxol.    Patient seen by provider today: Yes: Jovana NELSON   present during visit today: Not Applicable.    Note: Instruction given to patient as per provider. Verbalized understanding.       Intravenous Access:  Implanted Port.    Treatment Conditions:  Lab Results   Component Value Date    HGB 11.3 (L) 03/13/2025    WBC 4.7 03/13/2025    ANEU 2.5 11/23/2018    ANEUTAUTO 2.5 03/13/2025     03/13/2025        Lab Results   Component Value Date     03/13/2025    POTASSIUM 3.8 03/13/2025    MAG 1.9 08/14/2007    CR 0.63 03/13/2025    GAEL 9.5 03/13/2025    BILITOTAL 0.2 03/13/2025    ALBUMIN 4.1 03/13/2025    ALT 34 03/13/2025    AST 22 03/13/2025       Results reviewed, labs MET treatment parameters, ok to proceed with treatment.  ECHO/MUGA completed 2/13/25  EF 60-65%.    TORB: 3/13/25/Jovana NELSON/Makayla Kirkland RN/ Will schedule her next week for single agent Trazimera in preparation for every 3 weeks. Will do blood test next week since she just come off from chemotherapy.       Post Infusion Assessment:  Patient tolerated infusion without incident.  Blood return noted pre and post infusion.  Site patent and intact, free from redness, edema or discomfort.  No evidence of extravasations.  Access discontinued per protocol.       Discharge Plan:   Patient declined prescription refills.  Discharge instructions reviewed with: Patient.  Patient and/or family verbalized understanding of discharge instructions and all questions answered.  AVS to patient via [employer].  Patient will return next week for next appointment.   Patient discharged in stable condition accompanied by: self and .  Departure Mode: Ambulatory.      ALONSO ESTRADA, SHIRA

## 2025-03-17 ENCOUNTER — APPOINTMENT (OUTPATIENT)
Dept: GENERAL RADIOLOGY | Facility: CLINIC | Age: 75
End: 2025-03-17
Payer: COMMERCIAL

## 2025-03-17 ENCOUNTER — HOSPITAL ENCOUNTER (EMERGENCY)
Facility: CLINIC | Age: 75
Discharge: HOME | End: 2025-03-17
Payer: COMMERCIAL

## 2025-03-17 VITALS
SYSTOLIC BLOOD PRESSURE: 142 MMHG | OXYGEN SATURATION: 94 % | RESPIRATION RATE: 26 BRPM | WEIGHT: 171 LBS | HEART RATE: 80 BPM | BODY MASS INDEX: 28.46 KG/M2 | TEMPERATURE: 98.7 F | DIASTOLIC BLOOD PRESSURE: 70 MMHG

## 2025-03-17 DIAGNOSIS — J18.9 COMMUNITY ACQUIRED PNEUMONIA, UNSPECIFIED LATERALITY: ICD-10-CM

## 2025-03-17 LAB
ALBUMIN SERPL BCG-MCNC: 3.9 G/DL (ref 3.5–5.2)
ALBUMIN UR-MCNC: 10 MG/DL
ALP SERPL-CCNC: 88 U/L (ref 40–150)
ALT SERPL W P-5'-P-CCNC: 22 U/L (ref 0–50)
ANION GAP SERPL CALCULATED.3IONS-SCNC: 12 MMOL/L (ref 7–15)
APPEARANCE UR: CLEAR
AST SERPL W P-5'-P-CCNC: 16 U/L (ref 0–45)
BASOPHILS # BLD AUTO: 0 10E3/UL (ref 0–0.2)
BASOPHILS NFR BLD AUTO: 1 %
BILIRUB SERPL-MCNC: 0.4 MG/DL
BILIRUB UR QL STRIP: NEGATIVE
BUN SERPL-MCNC: 12.8 MG/DL (ref 8–23)
CALCIUM SERPL-MCNC: 8.6 MG/DL (ref 8.8–10.4)
CHLORIDE SERPL-SCNC: 97 MMOL/L (ref 98–107)
COLOR UR AUTO: YELLOW
CREAT SERPL-MCNC: 0.62 MG/DL (ref 0.51–0.95)
EGFRCR SERPLBLD CKD-EPI 2021: >90 ML/MIN/1.73M2
EOSINOPHIL # BLD AUTO: 0 10E3/UL (ref 0–0.7)
EOSINOPHIL NFR BLD AUTO: 1 %
ERYTHROCYTE [DISTWIDTH] IN BLOOD BY AUTOMATED COUNT: 14.2 % (ref 10–15)
FLUAV RNA SPEC QL NAA+PROBE: NEGATIVE
FLUBV RNA RESP QL NAA+PROBE: NEGATIVE
GLUCOSE SERPL-MCNC: 154 MG/DL (ref 70–99)
GLUCOSE UR STRIP-MCNC: NEGATIVE MG/DL
HCO3 SERPL-SCNC: 24 MMOL/L (ref 22–29)
HCT VFR BLD AUTO: 32.3 % (ref 35–47)
HGB BLD-MCNC: 10.9 G/DL (ref 11.7–15.7)
HGB UR QL STRIP: ABNORMAL
IMM GRANULOCYTES # BLD: 0 10E3/UL
IMM GRANULOCYTES NFR BLD: 1 %
KETONES UR STRIP-MCNC: NEGATIVE MG/DL
LEUKOCYTE ESTERASE UR QL STRIP: NEGATIVE
LYMPHOCYTES # BLD AUTO: 0.7 10E3/UL (ref 0.8–5.3)
LYMPHOCYTES NFR BLD AUTO: 16 %
MCH RBC QN AUTO: 29.9 PG (ref 26.5–33)
MCHC RBC AUTO-ENTMCNC: 33.7 G/DL (ref 31.5–36.5)
MCV RBC AUTO: 89 FL (ref 78–100)
MONOCYTES # BLD AUTO: 0.2 10E3/UL (ref 0–1.3)
MONOCYTES NFR BLD AUTO: 4 %
MUCOUS THREADS #/AREA URNS LPF: PRESENT /LPF
NEUTROPHILS # BLD AUTO: 3.4 10E3/UL (ref 1.6–8.3)
NEUTROPHILS NFR BLD AUTO: 79 %
NITRATE UR QL: NEGATIVE
NRBC # BLD AUTO: 0 10E3/UL
NRBC BLD AUTO-RTO: 0 /100
PH UR STRIP: 6 [PH] (ref 5–7)
PLATELET # BLD AUTO: 230 10E3/UL (ref 150–450)
POTASSIUM SERPL-SCNC: 3.6 MMOL/L (ref 3.4–5.3)
PROT SERPL-MCNC: 6.4 G/DL (ref 6.4–8.3)
RBC # BLD AUTO: 3.64 10E6/UL (ref 3.8–5.2)
RBC URINE: 9 /HPF
RSV RNA SPEC NAA+PROBE: NEGATIVE
SARS-COV-2 RNA RESP QL NAA+PROBE: NEGATIVE
SODIUM SERPL-SCNC: 133 MMOL/L (ref 135–145)
SP GR UR STRIP: 1.02 (ref 1–1.03)
SQUAMOUS EPITHELIAL: <1 /HPF
UROBILINOGEN UR STRIP-MCNC: NORMAL MG/DL
WBC # BLD AUTO: 4.3 10E3/UL (ref 4–11)
WBC URINE: 3 /HPF

## 2025-03-17 PROCEDURE — 85018 HEMOGLOBIN: CPT

## 2025-03-17 PROCEDURE — 99284 EMERGENCY DEPT VISIT MOD MDM: CPT | Mod: FS | Performed by: EMERGENCY MEDICINE

## 2025-03-17 PROCEDURE — 84460 ALANINE AMINO (ALT) (SGPT): CPT

## 2025-03-17 PROCEDURE — 93308 TTE F-UP OR LMTD: CPT | Performed by: EMERGENCY MEDICINE

## 2025-03-17 PROCEDURE — 250N000011 HC RX IP 250 OP 636

## 2025-03-17 PROCEDURE — 87637 SARSCOV2&INF A&B&RSV AMP PRB: CPT

## 2025-03-17 PROCEDURE — 85004 AUTOMATED DIFF WBC COUNT: CPT

## 2025-03-17 PROCEDURE — 82310 ASSAY OF CALCIUM: CPT

## 2025-03-17 PROCEDURE — 99285 EMERGENCY DEPT VISIT HI MDM: CPT | Mod: 25 | Performed by: EMERGENCY MEDICINE

## 2025-03-17 PROCEDURE — 71046 X-RAY EXAM CHEST 2 VIEWS: CPT

## 2025-03-17 PROCEDURE — 81003 URINALYSIS AUTO W/O SCOPE: CPT

## 2025-03-17 PROCEDURE — 87040 BLOOD CULTURE FOR BACTERIA: CPT

## 2025-03-17 PROCEDURE — 85041 AUTOMATED RBC COUNT: CPT

## 2025-03-17 PROCEDURE — 250N000013 HC RX MED GY IP 250 OP 250 PS 637

## 2025-03-17 PROCEDURE — 36415 COLL VENOUS BLD VENIPUNCTURE: CPT

## 2025-03-17 RX ORDER — ACETAMINOPHEN 325 MG/1
975 TABLET ORAL ONCE
Status: COMPLETED | OUTPATIENT
Start: 2025-03-17 | End: 2025-03-17

## 2025-03-17 RX ORDER — HEPARIN SODIUM (PORCINE) LOCK FLUSH IV SOLN 100 UNIT/ML 100 UNIT/ML
5-10 SOLUTION INTRAVENOUS
Status: DISCONTINUED | OUTPATIENT
Start: 2025-03-17 | End: 2025-03-17 | Stop reason: HOSPADM

## 2025-03-17 RX ORDER — AZITHROMYCIN 250 MG/1
250 TABLET, FILM COATED ORAL DAILY
Qty: 4 TABLET | Refills: 0 | Status: SHIPPED | OUTPATIENT
Start: 2025-03-17 | End: 2025-03-21

## 2025-03-17 RX ORDER — AZITHROMYCIN MONOHYDRATE 500 MG/5ML
500 INJECTION, POWDER, LYOPHILIZED, FOR SOLUTION INTRAVENOUS ONCE
Status: DISCONTINUED | OUTPATIENT
Start: 2025-03-17 | End: 2025-03-17

## 2025-03-17 RX ORDER — AZITHROMYCIN 250 MG/1
500 TABLET, FILM COATED ORAL ONCE
Status: COMPLETED | OUTPATIENT
Start: 2025-03-17 | End: 2025-03-17

## 2025-03-17 RX ORDER — CEFTRIAXONE 2 G/1
2 INJECTION, POWDER, FOR SOLUTION INTRAMUSCULAR; INTRAVENOUS ONCE
Status: DISCONTINUED | OUTPATIENT
Start: 2025-03-17 | End: 2025-03-17

## 2025-03-17 RX ORDER — HEPARIN SODIUM,PORCINE 10 UNIT/ML
5-10 VIAL (ML) INTRAVENOUS EVERY 24 HOURS
Status: DISCONTINUED | OUTPATIENT
Start: 2025-03-17 | End: 2025-03-17 | Stop reason: HOSPADM

## 2025-03-17 RX ORDER — HEPARIN SODIUM,PORCINE 10 UNIT/ML
5-10 VIAL (ML) INTRAVENOUS
Status: DISCONTINUED | OUTPATIENT
Start: 2025-03-17 | End: 2025-03-17 | Stop reason: HOSPADM

## 2025-03-17 RX ADMIN — ACETAMINOPHEN 975 MG: 325 TABLET, FILM COATED ORAL at 12:48

## 2025-03-17 RX ADMIN — AMOXICILLIN AND CLAVULANATE POTASSIUM 1 TABLET: 875; 125 TABLET, COATED ORAL at 14:54

## 2025-03-17 RX ADMIN — AZITHROMYCIN DIHYDRATE 500 MG: 250 TABLET ORAL at 14:54

## 2025-03-17 RX ADMIN — Medication 5 ML: at 14:58

## 2025-03-17 ASSESSMENT — COLUMBIA-SUICIDE SEVERITY RATING SCALE - C-SSRS
1. IN THE PAST MONTH, HAVE YOU WISHED YOU WERE DEAD OR WISHED YOU COULD GO TO SLEEP AND NOT WAKE UP?: NO
2. HAVE YOU ACTUALLY HAD ANY THOUGHTS OF KILLING YOURSELF IN THE PAST MONTH?: NO
6. HAVE YOU EVER DONE ANYTHING, STARTED TO DO ANYTHING, OR PREPARED TO DO ANYTHING TO END YOUR LIFE?: NO

## 2025-03-17 ASSESSMENT — ACTIVITIES OF DAILY LIVING (ADL)
ADLS_ACUITY_SCORE: 41

## 2025-03-17 NOTE — ED TRIAGE NOTES
Patient here today for flu like symptoms that started two days  ago with worsening shortness of breath, chills, and productive cough today. Receiving chemo for breast cancer, last chemo was Thursday 3/13.      Triage Assessment (Adult)       Row Name 03/17/25 1223          Triage Assessment    Airway WDL WDL        Respiratory WDL    Respiratory WDL X;rhythm/pattern     Rhythm/Pattern, Respiratory shortness of breath        Skin Circulation/Temperature WDL    Skin Circulation/Temperature WDL WDL        Cardiac WDL    Cardiac WDL WDL        Cognitive/Neuro/Behavioral WDL    Cognitive/Neuro/Behavioral WDL WDL

## 2025-03-17 NOTE — ED PROVIDER NOTES
History     Chief Complaint   Patient presents with    Flu Symptoms    Shortness of Breath       Zenaida Valles is a 75 year old female with significant pmhx of HTN, asthma, T2DM, invasive ductal carcinoma of left breast currently undergoing treatment who presents for evaluation of SOA. Patient presents with her  Trae. She states she developed a dry cough 2 nights ago, and last night she developed a wet-sounding cough with associated body aches, chills, runny nose, sore throat, and SOA. Her SOA is worse with exertion, but is still present even while at rest. She has a hx of asthma and uses Advair daily, but has not tried her rescue inhaler.  notes decreased appetite since yesterday. She recently completed her last round of chemo paclitaxel on 3/13/25 and is to continue receiving trastuzumab (herceptin) every 3 weeks for the rest of the year. She is not on blood thinners. She denies chest pain, fever at home, vomiting, abdominal pain, urinary or stool changes. She was up at her cabin with her  when symptoms started, no known sick exposures.     Allergies:  Allergies   Allergen Reactions    Codeine Diarrhea and Rash    Codeine Camsylate Diarrhea and Rash    Ibuprofen GI Disturbance and Nausea    Lisinopril Cough       Problem List:    Patient Active Problem List    Diagnosis Date Noted    Invasive ductal carcinoma of breast, female, left (H) 10/31/2024     Priority: Medium    Hamstring strain 10/22/2024     Priority: Medium    Monoallelic mutation of MUTYH gene 08/03/2023     Priority: Medium    Diabetes mellitus, type 2 (H) 01/25/2021     Priority: Medium    Asthma 10/18/2011     Priority: Medium     Problem list name updated by automated process. Provider to review      Atherosclerosis of renal artery 10/18/2011     Priority: Medium    Essential hypertension 10/03/2011     Priority: Medium     Problem list name updated by automated process. Provider to review          Past Medical  History:    Past Medical History:   Diagnosis Date    Asthma     Diabetes mellitus (H)     Fibromuscular dysplasia     H/O sebaceous cyst     Hyperlipidemia LDL goal < 130     Hypertension     IPMN (intraductal papillary mucinous neoplasm)     Obesity     Renal artery stenosis 2005    Statin medication not prescribed per physician orders        Past Surgical History:    Past Surgical History:   Procedure Laterality Date    BIOPSY NODE SENTINEL Left 11/25/2024    Procedure: LEFT SENTINEL LYMPH NODE BIOPSY;  Surgeon: Javier Wood MD;  Location: [facility] OR    COLONOSCOPY N/A 3/10/2017    Procedure: COMBINED COLONOSCOPY, SINGLE OR MULTIPLE BIOPSY/POLYPECTOMY BY BIOPSY;  Surgeon: Sergo Mcgraw MD;  Location: [facility]    COLONOSCOPY N/A 3/30/2022    Procedure: COLONOSCOPY, WITH POLYPECTOMY;  Surgeon: Leventhal, Thomas Michael, MD;  Location: [facility] OR    ENDOMETRIAL SAMPLING (BIOPSY) N/A 11/25/2022    Procedure: endometrial biospy using portable ultrasound guidance;  Surgeon: Jessica Nation MD;  Location: [facility]    ENDOSCOPIC ULTRASOUND UPPER GASTROINTESTINAL TRACT (GI) N/A 3/14/2023    Procedure: ENDOSCOPIC ULTRASOUND, ESOPHAGOSCOPY / UPPER GASTROINTESTINAL TRACT (GI);  Surgeon: Miguel A Vickers MD;  Location: [facility]    EXAM UNDER ANESTHESIA PELVIC N/A 11/25/2022    Procedure: EXAM UNDER ANESTHESIA, PELVIS,;  Surgeon: Jessica Nation MD;  Location: [facility] OR    IR CHEST PORT PLACEMENT > 5 YRS OF AGE  12/26/2024    IR PORT CHECK RIGHT  1/9/2025    IR RENAL/VISCERAL STENT/ATHERECT/PTA Right 2005    LUMPECTOMY, BREAST, LOCALIZED USING RADIOFREQUENCY IDENTIFICATION Left 11/25/2024    Procedure: LEFT BREAST LUMPECTOMY LOCALIZED USING RADIOFREQUENCY IDENTIFICATION;  Surgeon: Javier Wood MD;  Location: [facility] OR       Family History:    Family History   Problem Relation Age of Onset    Peptic Ulcer Disease Mother         Zollinger-Wheeler Syndrome    Diabetes Mother     Thyroid Disease Mother         s/p  thyroidecomy    Cancer Father         prostate cancer    C.A.D. Father         aortic aneurysm    Childhood Heart Disease Sister         tetrology of fallot    Melanoma No family hx of     Skin Cancer No family hx of        Social History:  Marital Status:  Single [1]  Social History     Tobacco Use    Smoking status: Never    Smokeless tobacco: Never   Vaping Use    Vaping status: Never Used   Substance Use Topics    Alcohol use: Yes     Comment: rare    Drug use: No        Medications:    amoxicillin-clavulanate (AUGMENTIN) 875-125 MG tablet  azithromycin (ZITHROMAX) 250 MG tablet  acetaminophen (TYLENOL) 500 MG tablet  albuterol (PROAIR HFA) 108 (90 Base) MCG/ACT inhaler  aspirin 81 MG tablet  atenolol (TENORMIN) 25 MG tablet  blood glucose (NO BRAND SPECIFIED) lancets standard  blood glucose (NO BRAND SPECIFIED) test strip  blood glucose monitoring (NO BRAND SPECIFIED) meter device kit  Collagen Hydrolysate, Bovine, POWD  diclofenac (VOLTAREN) 1 % GEL topical gel  fluticasone-salmeterol (ADVAIR) 100-50 MCG/ACT inhaler  hydrochlorothiazide (HYDRODIURIL) 25 MG tablet  irbesartan (AVAPRO) 300 MG tablet  Lancets (ONETOUCH DELICA PLUS [id number]) MISC  LORazepam (ATIVAN) 0.5 MG tablet  metFORMIN (GLUCOPHAGE) 500 MG tablet  saline nasal (AYR SALINE) GEL topical gel  STATIN NOT PRESCRIBED (INTENTIONAL)  vitamin D3 (CHOLECALCIFEROL) 50 mcg (2000 units) tablet          Physical Exam   BP: (!) 165/80  Pulse: 90  Temp: 99.6  F (37.6  C)  Resp: 26  Weight: 77.6 kg (171 lb)  SpO2: 99 %      Physical Exam  Vitals and nursing note reviewed.   Constitutional:       Appearance: She is ill-appearing. She is not toxic-appearing or diaphoretic.   HENT:      Head: Normocephalic and atraumatic.      Mouth/Throat:      Mouth: Mucous membranes are moist.      Pharynx: No pharyngeal swelling or oropharyngeal exudate.   Eyes:      Extraocular Movements: Extraocular movements intact.      Pupils: Pupils are equal, round, and reactive to  light.   Cardiovascular:      Rate and Rhythm: Normal rate and regular rhythm.      Heart sounds: Normal heart sounds.   Pulmonary:      Breath sounds: Rales (bilateral lower) present. No wheezing or rhonchi.   Abdominal:      Palpations: Abdomen is soft.   Musculoskeletal:         General: Normal range of motion.      Cervical back: Normal range of motion.   Neurological:      General: No focal deficit present.      Mental Status: She is alert and oriented to person, place, and time.   Psychiatric:         Mood and Affect: Mood normal.         Behavior: Behavior normal.         ED Course        Procedures    Results for orders placed during the hospital encounter of 03/17/25    POC US ECHO [facility] Procedure Note    Limited Bedside ED Cardiac Ultrasound:    PROCEDURE: PERFORMED BY: Riri Broussard PA-C, Jorge Mendosa MD  INDICATIONS/SYMPTOM:  Shortness of Breath  PROBE: Cardiac phased array probe  BODY LOCATION: Chest  FINDINGS:  The ultrasound was performed utilizing the subcostal, parasternal long axis, and parasternal short axis views.  Cardiac contractility:  Present  Gross estimation of cardiac kinesis: normal  Pericardial Effusion:  ?trace  RV:LV ratio: LV > RV  IVC:  Collapsibility:  IVC collapses < 50% with inspiration  INTERPRETATION:    Chamber size and motion were grossly normal with LV > RV, normal cardiac kinesis.  ?Trace effusion was found.  IVC visualized and findings indicate normovolemia.  IMAGE DOCUMENTATION: Images were archived to PACs.                  Results for orders placed or performed during the hospital encounter of 03/17/25 (from the past 24 hours)   POC US ECHO [facility] Procedure Note      Limited Bedside ED Cardiac Ultrasound:    PROCEDURE: PERFORMED BY: Riri Broussard PA-C, Jorge Mendosa MD   INDICATIONS/SYMPTOM:  Shortness of Breath  PROBE: Cardiac phased array probe  BODY LOCATION: Chest  FINDINGS:   The  ultrasound was performed utilizing the subcostal, parasternal long axis, and parasternal short axis views.  Cardiac contractility:  Present  Gross estimation of cardiac kinesis: normal  Pericardial Effusion:  ?trace  RV:LV ratio: LV > RV  IVC:                                                   Collapsibility:  IVC collapses < 50% with inspiration  INTERPRETATION:    Chamber size and motion were grossly normal with LV > RV, normal cardiac kinesis.  ?Trace effusion was found.  IVC visualized and findings indicate normovolemia.  IMAGE DOCUMENTATION: Images were archived to PACs.         Influenza A/B, RSV and SARS-CoV2 PCR (COVID-19) Nose    Specimen: Nose; Swab   Result Value Ref Range    Influenza A PCR Negative Negative    Influenza B PCR Negative Negative    RSV PCR Negative Negative    SARS CoV2 PCR Negative Negative    Narrative    Testing was performed using the Xpert Xpress CoV2/Flu/RSV Assay on the Cepheid GeneXpert Instrument. This test should be ordered for the detection of SARS-CoV2, influenza, and RSV viruses in individuals with signs and symptoms of respiratory tract infection. This test is for in vitro diagnostic use under the [place] FDA for laboratories certified under CLIA to perform high or moderate complexity testing. This test has been [place] FDA cleared. A negative result does not rule out the presence of PCR inhibitors in the specimen or target RNA in concentration below the limit of detection for the assay. If only one viral target is positive but coinfection with multiple targets is suspected, the sample should be re-tested with another FDA cleared, approved, or authorized test, if coninfection would change clinical management. This test was validated by the [facility]. These laboratories are certified under the Clinical Laboratory Improvement Amendments of 1988 (CLIA-88) as qualified to perfom high complexity laboratory testing.   CBC with platelets differential    Narrative    The  following orders were created for panel order CBC with platelets differential.  Procedure                               Abnormality         Status                     ---------                               -----------         ------                     CBC with platelets and ...[[id number]]  Abnormal            Final result                 Please view results for these tests on the individual orders.   Comprehensive metabolic panel   Result Value Ref Range    Sodium 133 (L) 135 - 145 mmol/L    Potassium 3.6 3.4 - 5.3 mmol/L    Carbon Dioxide (CO2) 24 22 - 29 mmol/L    Anion Gap 12 7 - 15 mmol/L    Urea Nitrogen 12.8 8.0 - 23.0 mg/dL    Creatinine 0.62 0.51 - 0.95 mg/dL    GFR Estimate >90 >60 mL/min/1.73m2    Calcium 8.6 (L) 8.8 - 10.4 mg/dL    Chloride 97 (L) 98 - 107 mmol/L    Glucose 154 (H) 70 - 99 mg/dL    Alkaline Phosphatase 88 40 - 150 U/L    AST 16 0 - 45 U/L    ALT 22 0 - 50 U/L    Protein Total 6.4 6.4 - 8.3 g/dL    Albumin 3.9 3.5 - 5.2 g/dL    Bilirubin Total 0.4 <=1.2 mg/dL   CBC with platelets and differential   Result Value Ref Range    WBC Count 4.3 4.0 - 11.0 10e3/uL    RBC Count 3.64 (L) 3.80 - 5.20 10e6/uL    Hemoglobin 10.9 (L) 11.7 - 15.7 g/dL    Hematocrit 32.3 (L) 35.0 - 47.0 %    MCV 89 78 - 100 fL    MCH 29.9 26.5 - 33.0 pg    MCHC 33.7 31.5 - 36.5 g/dL    RDW 14.2 10.0 - 15.0 %    Platelet Count 230 150 - 450 10e3/uL    % Neutrophils 79 %    % Lymphocytes 16 %    % Monocytes 4 %    % Eosinophils 1 %    % Basophils 1 %    % Immature Granulocytes 1 %    NRBCs per 100 WBC 0 <1 /100    Absolute Neutrophils 3.4 1.6 - 8.3 10e3/uL    Absolute Lymphocytes 0.7 (L) 0.8 - 5.3 10e3/uL    Absolute Monocytes 0.2 0.0 - 1.3 10e3/uL    Absolute Eosinophils 0.0 0.0 - 0.7 10e3/uL    Absolute Basophils 0.0 0.0 - 0.2 10e3/uL    Absolute Immature Granulocytes 0.0 <=0.4 10e3/uL    Absolute NRBCs 0.0 10e3/uL   XR Chest 2 Views    Narrative    EXAM: XR CHEST 2 VIEWS  LOCATION: [facility]  DATE: 3/17/2025    INDICATION: SOA, crackles in bilateral bases, current chemo treatment for breast cancer  COMPARISON: Chest radiograph 12/31/2014      Impression    IMPRESSION: Normal size of cardiomediastinal silhouette. Right chest port with tip overlying the cavoatrial junction. There are bilateral lower lobe opacities, could represent aspiration and/or developing pneumonia. No definite pleural effusion or   pneumothorax. Bones are unchanged.   UA with Microscopic reflex to Culture    Specimen: Urine, Clean Catch   Result Value Ref Range    Color Urine Yellow Colorless, Straw, Light Yellow, Yellow    Appearance Urine Clear Clear    Glucose Urine Negative Negative mg/dL    Bilirubin Urine Negative Negative    Ketones Urine Negative Negative mg/dL    Specific Gravity Urine 1.024 1.003 - 1.035    Blood Urine Small (A) Negative    pH Urine 6.0 5.0 - 7.0    Protein Albumin Urine 10 (A) Negative mg/dL    Urobilinogen Urine Normal Normal, 2.0 mg/dL    Nitrite Urine Negative Negative    Leukocyte Esterase Urine Negative Negative    Mucus Urine Present (A) None Seen /LPF    RBC Urine 9 (H) <=2 /HPF    WBC Urine 3 <=5 /HPF    Squamous Epithelials Urine <1 <=1 /HPF    Narrative    Urine Culture not indicated       Medications   sodium chloride (PF) 0.9% PF flush 10-20 mL (has no administration in time range)   sodium chloride (PF) 0.9% PF flush 10-20 mL (has no administration in time range)   sodium chloride (PF) 0.9% PF flush 10-20 mL (has no administration in time range)   heparin lock flush 10 unit/mL injection 5-10 mL (has no administration in time range)   heparin lock flush 10 unit/mL injection 5-10 mL (has no administration in time range)   heparin lock flush 100 unit/mL injection 5-10 mL (5 mLs Intracatheter $Given 3/17/25 1458)   acetaminophen (TYLENOL) tablet 975 mg (975 mg Oral $Given 3/17/25 1248)   azithromycin (ZITHROMAX) tablet 500 mg (500 mg Oral $Given 3/17/25 1454)   amoxicillin-clavulanate  (AUGMENTIN) 875-125 MG per tablet 1 tablet (1 tablet Oral $Given 3/17/25 7253)       Assessments & Plan (with Medical Decision Making)     I have reviewed the nursing notes.    I have reviewed the findings, diagnosis, plan and need for follow up with the patient.    Medical Decision Making  Zenaida Valles is a 75 year old female with significant pmhx of HTN, asthma, T2DM, invasive ductal carcinoma of left breast currently undergoing treatment who presents for evaluation of shortness of breath.  Differential diagnoses include COVID, flu, RSV, other viral respiratory illness, pneumonia, asthma exacerbation, PE, heart failure.  Vital signs with hypertension at 165/80.  Patient's temperature is 99.6.  She is not tachycardic.  She is satting 99% on room air with slight increased respiratory rate in the mid 20s.    Per chart review, patient received her last round of paclitaxel on 3/13/25. Most recent CBC on 3/13 with normal WBC of 4.7, normal neutrophilic count, hgb stable at 11.3. She is to continue receiving Herceptin every 3 weeks starting 3/20, and has an appt to discuss radiation of the site in the future.     On examination patient is alert, oriented, but ill-appearing.  She has a slightly increased respiratory rate and signs of mild shortness of breath, no respiratory distress.  She is maintaining oxygen saturations of 99%.  She is able to speak to me in full sentences.  Lung exam with crackles in the bilateral bases.  No wheezing, no rhonchi.  Heart sounds are normal.  No lower extremity edema or pain.  Oropharyngeal exam negative for erythema, swelling, exudate. CBC negative for leukocytosis/neutropenia. Hgb is table at 10.9 compared to prior. CMP with mild hyponatremia of 133 and hypocalcemia of 8.6. Normal renal function, normal LFTs. Blood cultures were drawn as a precaution given patient's infectious symptoms and current chemotherapy treatment. Covid, flu, rsv testing negative. UA for signs of  infection. Bedside limited echo with possible trace pericardial effusion, but overall preserved EF with normal appearance of LV>RV. No diffuse B lines on lung US. IVC appears euvolemic. CXR with findings concerning for bilateral lower lobe opacities concerning for developing pneumonia.     I spoke with Shayy Becerra PA-C who is on the patient's oncology care team regarding patient's disposition for management of pneumonia. Patient reassuringly maintaining O2 sat >95% and has not had worsening SOA in the department. Patient would like to discharge home. Shayy stated there was concern for possible interstitial pneumonitis from patient's treatment with Herceptin, but this is less likely given associated low grade fever and infectious symptoms, as well as appearance of infiltrates on CXR. She stated if the patient is comfortable discharging home on oral abx for CAP they will arrange a follow up call to the patient tomorrow to check on her. Patient received initial doses of Augmentin and Azithromycin in the ER. Discussed recommendations for treatment of pneumonia, with follow up with her care team tomorrow. She was given strict return precautions should she develop worsening SOA, chest pain, or if other concerning symptoms develop.  Patient voiced understanding the plan and had no further questions.      New Prescriptions    AMOXICILLIN-CLAVULANATE (AUGMENTIN) 875-125 MG TABLET    Take 1 tablet by mouth 2 times daily for 7 days.    AZITHROMYCIN (ZITHROMAX) 250 MG TABLET    Take 1 tablet (250 mg) by mouth daily for 4 days.       Final diagnoses:   Community acquired pneumonia, unspecified laterality       Riri Broussard PA-C  March 17, 2025  [facility] EMERGENCY DEPT     Riri Broussard PA-C  03/17/25 0909

## 2025-03-17 NOTE — DISCHARGE INSTRUCTIONS
You have pneumonia in the bases of both lungs. We will treat this with two antibiotics called Augmentin and Azithromycin. The Augmentin is twice a day for 7 days. The Azithromycin is once a day for 5 days. We gave you the first doses of both these medications in the ER. You can take your next dose of Augmentin late tonight or early tomorrow morning, and your next dose of Azithromycin should be tomorrow morning.     Your oncology care team should be contacting you tomorrow to follow up on your symptoms.    Return to the ER if you develop worsening shortness of breath, or if other concerning symptoms develop.

## 2025-03-20 DIAGNOSIS — C50.912 INVASIVE DUCTAL CARCINOMA OF BREAST, FEMALE, LEFT (H): Primary | ICD-10-CM

## 2025-03-20 LAB
BACTERIA BLD CULT: NORMAL
BACTERIA BLD CULT: NORMAL

## 2025-03-20 RX ORDER — METHYLPREDNISOLONE SODIUM SUCCINATE 40 MG/ML
40 INJECTION INTRAMUSCULAR; INTRAVENOUS
Start: 2025-03-20

## 2025-03-20 RX ORDER — HEPARIN SODIUM (PORCINE) LOCK FLUSH IV SOLN 100 UNIT/ML 100 UNIT/ML
5 SOLUTION INTRAVENOUS
OUTPATIENT
Start: 2025-03-20

## 2025-03-20 RX ORDER — DIPHENHYDRAMINE HYDROCHLORIDE 50 MG/ML
25 INJECTION, SOLUTION INTRAMUSCULAR; INTRAVENOUS
Start: 2025-03-20

## 2025-03-20 RX ORDER — DIPHENHYDRAMINE HYDROCHLORIDE 50 MG/ML
50 INJECTION, SOLUTION INTRAMUSCULAR; INTRAVENOUS
Start: 2025-03-20

## 2025-03-20 RX ORDER — LORAZEPAM 2 MG/ML
0.5 INJECTION INTRAMUSCULAR EVERY 4 HOURS PRN
OUTPATIENT
Start: 2025-03-20

## 2025-03-20 RX ORDER — ALBUTEROL SULFATE 0.83 MG/ML
2.5 SOLUTION RESPIRATORY (INHALATION)
OUTPATIENT
Start: 2025-03-20

## 2025-03-20 RX ORDER — HEPARIN SODIUM,PORCINE 10 UNIT/ML
5-20 VIAL (ML) INTRAVENOUS DAILY PRN
OUTPATIENT
Start: 2025-03-20

## 2025-03-20 RX ORDER — MEPERIDINE HYDROCHLORIDE 25 MG/ML
25 INJECTION INTRAMUSCULAR; INTRAVENOUS; SUBCUTANEOUS
OUTPATIENT
Start: 2025-03-20

## 2025-03-20 RX ORDER — EPINEPHRINE 1 MG/ML
0.3 INJECTION, SOLUTION INTRAMUSCULAR; SUBCUTANEOUS EVERY 5 MIN PRN
OUTPATIENT
Start: 2025-03-20

## 2025-03-20 RX ORDER — ALBUTEROL SULFATE 90 UG/1
1-2 INHALANT RESPIRATORY (INHALATION)
Start: 2025-03-20

## 2025-03-21 ENCOUNTER — APPOINTMENT (OUTPATIENT)
Dept: LAB | Facility: CLINIC | Age: 75
End: 2025-03-21
Payer: COMMERCIAL

## 2025-03-22 LAB
BACTERIA BLD CULT: NO GROWTH
BACTERIA BLD CULT: NO GROWTH

## 2025-03-25 NOTE — PROGRESS NOTES
Physician Attestation   I, Jadyn Ortega, saw this patient and agree with the findings and plan of care as documented in the note.   I was present for key portions of the history and physical exam.  Ductal carcinoma of the 3 o'clock position of the left breast, [place] 91 to 100%, [place] 81 to 90%, HER2 2+ and amplified by FISH, status post left lumpectomy and sentinel lymph node biopsy biopsy on 11/25/2024 with Dr. Wood, revealing residual pT1c (1.1 cm), pN0 (0 out of 2 sentinel nodes involved), cM0, grade 2, absent LVSI, resected to close posterior margins (1 mm) with all other margins greater than or equal to 5 mm.  She started adjuvant chemotherapy with paclitaxel and trastuzumab on 12/27/2024.  She transition to every 3 week trastuzumab on 3/21/2025.  She is seen regarding the role for adjuvant radiation.    In the setting of hormone receptor and HER2 positive disease resected to close margins, I would recommend adjuvant radiation with a whole breast approach, either using moderate hypofractionation or ultra hypofractionation, each with a boost.  The former would be delivered over the course of 20 fractions and the latter over the course of 6 fractions.  Limitations to ultra hypofractionation include immaturity of the data (5 years published), particularly amongst patients for whom boost is delivered, given that this comprised a minority of patients on the index FAST-Forward study. However, at our institution we do offer this approach even when boost is needed, specifically for patients age 65 and over who expressed understanding of these limitations.    We appreciate the opportunity to participate in Ms. Lazarus Valles's care. She was encouraged to contact me with questions or concerns should they arise.    I personally reviewed the available *** images. Laboratory data and pathology as noted above was reviewed. I reviewed previous medical records, which are summarized in the HPI. A total of *** minutes were spent  (including face to face time) during this visit.     Jadyn Ortega MD MPH PhD    Department of Radiation Oncology

## 2025-03-27 NOTE — PROGRESS NOTES
Radiation Oncology Consultation Note    Name: Zeniada Valles MRN: [id number]   : 1950   Date of Service: Mar 28, 2025 Referring: Dr. 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  Oncologic History:   10/2/2024, screening bilateral mammogram:  - Potential asymmetry in the left breast UOQ at middle to anterior depth    10/15/2025, diagnostic left breast mammogram:  - Persistent asymmetries in the lateral left breast  - Ultrasound: Irregular hypoechoic mass at 3:00, 5 cm FTN.  1 x 0.7 x 0.9 cm.  Additional indistinct subtle hypoechoic mass 3:00, 1 cm FTN measuring 0.5 x 0.4 cm.  - BI-RADS Category 4    10/23/2024, ultrasound-guided biopsy:  - Left breast: 3:00, 5 cm FTN: IDC.  Grade 2.  ER +/ID +/HER2+ (by FISH).  Grade 2 DCIS, solid type.    2024, medical oncology consult:  - Plan for adjuvant trastuzumab and paclitaxel  - Plan to refer to breast surgery for lumpectomy and SLN biopsy      2024: Patient undergoes left breast lumpectomy and left SLN biopsy  - Pathology: Left breast lumpectomy: Grade 2 IDC.  11 mm in greatest dimension.  Margins (-), 1 mm from nearest (posterior) margin.  No LVSI.  - Left SLN biopsy: 0/2 nodes (+) for malignancy  -pT1c pN0(sn)    2024 -3/13/2025: Paclitaxel/trastuzumab x 12 cycles    2025, medical oncology follow-up:  - Plan for radiation oncology consultation for adjuvant radiotherapy    3/21/2025 - 4/10/2025: Trastuzumab every 3 weeks    Chemotherapy History: As above  Radiation History: No  Pregnant: No  Autoimmune History: No  Inflammatory Bowel Disease History: No  Claustrophobia: No    Subjective:  On interview, the patient was  accompanied by her  Trae.  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  Patient explained that she is eager to finish completing all of her therapy for her breast cancer and that she values her time and quality of life vis-à-vis radiotherapy.  The patient has been recovering from her systemic chemotherapy and reports mild ongoing fatigue.  The patient denies any left upper extremity lymphedema, no left breast swelling or pain.      Review of Systems   A 10-point review of systems was performed. Pertinent findings are noted in the HPI.  Physical Exam   ECOG Status:1    Vitals: /66 (BP Location: Right arm, Patient Position: Sitting, Cuff Size: Adult Regular)   Pulse 71   Resp 18   Wt 77.5 kg (170 lb 12.8 oz)   LMP  (LMP Unknown)   SpO2 96%   BMI 28.42 kg/m    Gen: Alert, in NAD  Head: NC/AT  Eyes: PERRL, EOMI, sclera anicteric  Ears: No external auricular lesions  Nose/sinus: No rhinorrhea or epistaxis  Pulm: No wheezing, stridor or respiratory distress  CV: Extremities are warm and well-perfused, no cyanosis, no pedal edema  Musculoskeletal: Normal bulk and tone  Skin: Normal color and turgor. No rashes or lesions.  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    The standard of care for breast conservation therapy includes whole breast radiation.   Per the EBCTCG Metanalysis (PMID: 71350688), the combination of breast conserving surgery and radiotherapy reduces local recurrence rate by approximately 2/3. Whole breast radiotherapy has many options such as 50 Gy/25 Fx (NSABP/[place] Trial,  PMID: 40384627), 42.56 Gy/16 Fx ([place] [facility], PMID: 48198860), 40 Gy/15 Fx (START B (PMID: 53413138) or DBG HYPRO (PMID: 01317085)), 28.5 Gy/ 5 Fx every week (FAST, PMID: 56867963), and 26 Gy/5 Fx (FAST-FORWARD, PMID: 52961439). Adjuvant radiotherapy combined with breast conserving surgery has demonstrated a statistically significant improved ipsilateral breast tumor recurrence rates and local regional recurrence rates with compared to endocrine therapy or observation alone following surgery.  However, medical literature has so far not demonstrated that adjuvant radiotherapy confers a survival or metastasis free survival advantage.    In this particular patient, we offered either moderately hypofractionated radiotherapy or ultra hypofractionated radiotherapy, explaining the data behind and differences between each regimen.  Because the patient wishes to complete her remaining portions of her breast cancer treatment as expeditiously as possible and also values her free time, we recommend an ultra hypofractionated radiotherapeutic regimen consisting of 26 Gray/5 fractions followed by a single fraction boost to the lumpectomy bed due to the close margins.    The risks and benefits of radiotherapy were discussed in detail with the patient.  The patient expressed understanding.      All questions answered to verbalized satisfaction.  Informed consent was obtained. The patient will schedule a CT Simulation appointment with our department in the near future.    Pertinent radiological and pathologic data was personally reviewed and discussed with the patient.     The patient understood the plan, was in agreement, and expressed gratitude for their care.    We instructed the patient to call with any questions or concerns in the interim.      Patient was seen and discussed with my attending physician, Dr. Ortega.    Gaurang Owens MD, MS PGY4  Radiation Oncology  Department of Radiation Oncology  [facility],  [place]  Phone: [phone]

## 2025-03-28 ENCOUNTER — PRE VISIT (OUTPATIENT)
Dept: RADIATION ONCOLOGY | Facility: CLINIC | Age: 75
End: 2025-03-28
Payer: COMMERCIAL

## 2025-03-28 ENCOUNTER — OFFICE VISIT (OUTPATIENT)
Dept: RADIATION ONCOLOGY | Facility: CLINIC | Age: 75
End: 2025-03-28
Attending: INTERNAL MEDICINE
Payer: COMMERCIAL

## 2025-03-28 VITALS
SYSTOLIC BLOOD PRESSURE: 122 MMHG | RESPIRATION RATE: 18 BRPM | HEART RATE: 71 BPM | BODY MASS INDEX: 28.42 KG/M2 | WEIGHT: 170.8 LBS | DIASTOLIC BLOOD PRESSURE: 66 MMHG | OXYGEN SATURATION: 96 %

## 2025-03-28 DIAGNOSIS — C50.912 INVASIVE DUCTAL CARCINOMA OF BREAST, FEMALE, LEFT (H): ICD-10-CM

## 2025-03-28 ASSESSMENT — PAIN SCALES - GENERAL: PAINLEVEL_OUTOF10: NO PAIN (0)

## 2025-03-28 NOTE — PROGRESS NOTES
"Considerations for radiation treatment   Pregnancy status: Female with documented history of menopause    Implanted Cardiac Devices: No , port on the right, stent in r renal artery, tag in left breast  Any previous radiation therapy: No    Oncology Rooming Note    March 28, 2025 12:38 PM   Zenaida Valles is a 75 year old female who presents for:    Chief Complaint   Patient presents with    Oncology Clinic Visit     New consult     Initial Vitals: /66 (BP Location: Right arm, Patient Position: Sitting, Cuff Size: Adult Regular)   Pulse 71   Resp 18   Wt 77.5 kg (170 lb 12.8 oz)   LMP  (LMP Unknown)   SpO2 96%   BMI 28.42 kg/m   Estimated body mass index is 28.42 kg/m  as calculated from the following:    Height as of 2/14/25: 1.651 m (5' 5\").    Weight as of this encounter: 77.5 kg (170 lb 12.8 oz). Body surface area is 1.89 meters squared.  No Pain (0) Comment: Data Unavailable   No LMP recorded (lmp unknown). Patient is postmenopausal.  Allergies reviewed: Yes  Medications reviewed: Yes    Medications: Medication refills not needed today.  Pharmacy name entered into INRFOOD:    [facility] [place], [place] - [facility] [place], [place] - [address]  [facility] [place], [place] - [address] [place] [address]-    Frailty Screening:   Is the patient here for a new oncology consult visit in cancer care? 2. No, not a new cancer patient    PHQ9:  Did this patient require a PHQ9?: No      Clinical concerns: Some fatigue from chemo still. some numbness in toes.  cataracts removed in past. fibromuscular dysplasia. SOB with exertion related to recent bacteria pneumonia. Bad appetite, improving. Lost a little bit of weight per pt. deconditioned due to prior torm hamstring and being on the chemo . Emotional \"up and down\". Concerned about the length of time.  Dr Owens was notified.    Aurea Hammer RN  Radiation Oncology "     [address] [address]     Contact  Clinic : [phone]  Clinic Nurse Desk: [phone]  Radiation Therapists/[phone]  After Hours (On-Call): [phone]         yes

## 2025-03-28 NOTE — Clinical Note
3/28/2025      Zenaida Valles  [address] [facility] [address]      Dear Colleague,    Thank you for referring your patient, Zenaida Valles, to the [facility] RADIATION ONCOLOGY. Please see a copy of my visit note below.    Physician Attestation   I, Jadyn Ortega, saw this patient and agree with the findings and plan of care as documented in the note.   I was present for key portions of the history and physical exam.  Ductal carcinoma of the 3 o'clock position of the left breast, ER 91 to 100%, HI 81 to 90%, HER2 2+ and amplified by FISH, status post left lumpectomy and sentinel lymph node biopsy biopsy on 11/25/2024 with Dr. Wood, revealing residual pT1c (1.1 cm), pN0 (0 out of 2 sentinel nodes involved), cM0, grade 2, absent LVSI, resected to close posterior margins (1 mm) with all other margins greater than or equal to 5 mm.  She started adjuvant chemotherapy with paclitaxel and trastuzumab on 12/27/2024.  She transition to every 3 week trastuzumab on 3/21/2025.  She is seen regarding the role for adjuvant radiation.    In the setting of hormone receptor and HER2 positive disease resected to close margins, I would recommend adjuvant radiation with a whole breast approach, either using moderate hypofractionation or ultra hypofractionation, each with a boost.  The former would be delivered over the course of 20 fractions and the latter over the course of 6 fractions.  Limitations to ultra hypofractionation include immaturity of the data (5 years published), particularly amongst patients for whom boost is delivered, given that this comprised a minority of patients on the index FAST-Forward study. However, at our institution we do offer this approach even when boost is needed, specifically for patients age 65 and over who expressed understanding of these limitations.    We appreciate the opportunity to participate in Ms. Lazarus Valles's care. She was encouraged to  contact me with questions or concerns should they arise.    I personally reviewed the available *** images. Laboratory data and pathology as noted above was reviewed. I reviewed previous medical records, which are summarized in the HPI. A total of *** minutes were spent (including face to face time) during this visit.     Jadyn Ortega MD MPH PhD    Department of Radiation Oncology      Radiation Oncology Consultation Note    Name: Zenaida Valles MRN: [id number]   : 1950   Date of Service: Mar 28, 2025 Referring: Dr. 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  Oncologic History:   10/2/2024, screening bilateral mammogram:  - Potential asymmetry in the left breast UOQ at middle to anterior depth    10/15/2025, diagnostic left breast mammogram:  - Persistent asymmetries in the lateral left breast  - Ultrasound: Irregular hypoechoic mass at 3:00, 5 cm FTN.  1 x 0.7 x 0.9 cm.  Additional indistinct subtle hypoechoic mass 3:00, 1 cm FTN measuring 0.5 x 0.4 cm.  - BI-RADS Category 4    10/23/2024, ultrasound-guided biopsy:  - Left breast: 3:00, 5 cm FTN: IDC.  Grade 2.  ER +/NV +/HER2+ (by FISH).  Grade 2 DCIS, solid type.    2024, medical oncology consult:  - Plan for adjuvant trastuzumab and paclitaxel  - Plan to refer to breast surgery for lumpectomy and SLN biopsy      2024: Patient undergoes left breast lumpectomy and left SLN biopsy  - Pathology: Left breast lumpectomy: Grade 2 IDC.  11 mm in greatest dimension.  Margins (-), 1 mm from nearest (posterior) margin.  No LVSI.  - Left SLN biopsy: 0/2 nodes (+) for malignancy  -pT1c pN0(sn)    2024 -3/13/2025:  Paclitaxel/trastuzumab x 12 cycles    2/27/2025, medical oncology follow-up:  - Plan for radiation oncology consultation for adjuvant radiotherapy    3/21/2025 - 4/10/2025: Trastuzumab every 3 weeks    Chemotherapy History: As above  Radiation History: No  Pregnant: No  Autoimmune History: No  Inflammatory Bowel Disease History: No  Claustrophobia: No    Subjective:  On interview, the patient was accompanied by her  Trae.  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  Patient explained that she is eager to finish completing all of her therapy for her breast cancer and that she values her time and quality of life vis-à-vis radiotherapy.  The patient has been recovering from her systemic chemotherapy and reports mild ongoing fatigue.  The patient denies any left upper extremity lymphedema, no left breast swelling or pain.      Review of Systems   A 10-point review of systems was performed. Pertinent findings are noted in the HPI.  Physical Exam   ECOG Status:1    Vitals: /66 (BP Location: Right arm, Patient Position: Sitting, Cuff Size: Adult Regular)   Pulse 71   Resp 18   Wt 77.5 kg (170 lb 12.8 oz)   LMP  (LMP Unknown)   SpO2 96%   BMI 28.42 kg/m    Gen: Alert, in NAD  Head: NC/AT  Eyes: PERRL, EOMI, sclera anicteric  Ears: No external auricular lesions  Nose/sinus: No rhinorrhea or epistaxis  Pulm: No wheezing, stridor or respiratory distress  CV: Extremities are warm and well-perfused, no cyanosis, no pedal edema  Musculoskeletal: Normal bulk and tone  Skin: Normal color and turgor. No rashes or lesions.  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    The standard of care for breast conservation therapy includes whole breast radiation.   Per the EBCTCG Metanalysis (PMID: 93556395), the combination of breast conserving surgery and radiotherapy reduces local recurrence rate by approximately 2/3. Whole breast radiotherapy has many options such as 50 Gy/25 Fx (NSABP/[place] Trial, PMID: 92359223), 42.56 Gy/16 Fx ([facility], PMID: 94959184), 40 Gy/15 Fx (START B (PMID: 89832300) or [facility] HYPRO (PMID: 67553423)), 28.5 Gy/ 5 Fx every week (FAST, PMID: 23120368), and 26 Gy/5 Fx (FAST-FORWARD, PMID: 08833036). Adjuvant radiotherapy combined with breast conserving surgery has demonstrated a statistically significant improved ipsilateral breast tumor recurrence rates and local regional recurrence rates with compared to endocrine therapy or observation alone following surgery.  However, medical literature has so far not demonstrated that adjuvant radiotherapy confers a survival or metastasis free survival advantage.    In this particular patient, we offered either moderately hypofractionated radiotherapy or ultra hypofractionated radiotherapy, explaining the data behind and differences between each regimen.  Because the patient wishes to complete her remaining portions of her breast cancer treatment as expeditiously as possible and also values her free time, we recommend an ultra hypofractionated radiotherapeutic regimen consisting of 26 Gray/5 fractions followed by a single fraction boost to the lumpectomy bed due to the close margins.    The risks and benefits of radiotherapy were discussed in detail with the patient.  The patient expressed understanding.      All questions answered to verbalized satisfaction.  Informed consent was obtained. The patient will schedule a CT Simulation appointment with our department in the near future.    Pertinent radiological and pathologic data was personally  "reviewed and discussed with the patient.     The patient understood the plan, was in agreement, and expressed gratitude for their care.    We instructed the patient to call with any questions or concerns in the interim.      Patient was seen and discussed with my attending physician, Dr. Ortega.    Gaurang Owens MD, MS PGY4  Radiation Oncology  Department of Radiation Oncology  [facility]  Phone: [phone]        Considerations for radiation treatment   Pregnancy status: Female with documented history of menopause    Implanted Cardiac Devices: No , port on the right, stent in r renal artery, tag in left breast  Any previous radiation therapy: No    Oncology Rooming Note    March 28, 2025 12:38 PM   Zenaida Valles is a 75 year old female who presents for:    Chief Complaint   Patient presents with    Oncology Clinic Visit     New consult     Initial Vitals: /66 (BP Location: Right arm, Patient Position: Sitting, Cuff Size: Adult Regular)   Pulse 71   Resp 18   Wt 77.5 kg (170 lb 12.8 oz)   LMP  (LMP Unknown)   SpO2 96%   BMI 28.42 kg/m   Estimated body mass index is 28.42 kg/m  as calculated from the following:    Height as of 2/14/25: 1.651 m (5' 5\").    Weight as of this encounter: 77.5 kg (170 lb 12.8 oz). Body surface area is 1.89 meters squared.  No Pain (0) Comment: Data Unavailable   No LMP recorded (lmp unknown). Patient is postmenopausal.  Allergies reviewed: Yes  Medications reviewed: Yes    Medications: Medication refills not needed today.  Pharmacy name entered into Klatcher:    [facility] [place], [place] - [address] [facility] [place], [place] - [address] [place] [address] [facility] [place], [place] - [address] [place] [address]    Frailty Screening:   Is the patient here for a new oncology consult visit in cancer care? 2. No, not a new cancer patient    PHQ9:  Did this patient require a PHQ9?: " "No      Clinical concerns: Some fatigue from chemo still. some numbness in toes.  cataracts removed in past. fibromuscular dysplasia. SOB with exertion related to recent bacteria pneumonia. Bad appetite, improving. Lost a little bit of weight per pt. deconditioned due to prior torm hamstring and being on the chemo . Emotional \"up and down\". Concerned about the length of time.  Dr Owens was notified.    Aurea Hammer RN  Radiation Oncology     [address] [address]     Contact  Clinic : [phone]  Clinic Nurse Desk: [phone]  Radiation Therapists/[phone]  After Hours (On-Call): [phone]            Again, thank you for allowing me to participate in the care of your patient.        Sincerely,        Jadyn Ortega    Electronically signed"

## 2025-04-05 NOTE — PROGRESS NOTES
MEDICAL ONCOLOGY NOTE     Javier Wood MD  Professor   [address] [address]     Re. Zenaida Valles   Female, 74 year old, 1950  MRN: [id number]        Dear Dr. Wood,     Thank you for referring Zenaida Valles who is a 75-year-old woman with a new diagnosis of a screening identified stage I invasive ductal cancer of the upper outer quadrant of the left breast ER positive, NV positive, HER2 amplified stage [id number].       HISTORY OF PRESENT ILLNESS:    Zenaida has been in generally good health except for a right renal artery stenosis requiring a stent.  She also has a left leg injury recently she was in her usual state of good health and had a screening mammogram which showed a suspicious lesion in the upper outer quadrant of the left breast at the 3 o'clock position 5 cm from the nipple there is an irregular hypoechoic mass measuring 1.0 x 0.7 x 0.9 cm in size 1 cm from the nipple there is also a 0.5 x 0.4 cm area of suspicion     She underwent an ultrasound guided biopsy which showed usual ductal hyperplasia and fibrocystic changes microcysts in April Kren metaplasia.  Columnar cell changes.  It invasive ductal carcinoma was also found grade 2 and DCIS grade 2 solid type.  Her breast cancer was estrogen receptor positive progesterone receptor positive at HER2 2+ by IHC.  She then underwent ADRIANA which showed a subpopulation which was 60 to 70% ADRIANA 5 negative and 30 to 40% I-S age 1 positive with a 4.9 HER2 signals per nucleus and the ratio of 2.2 indicating HER2 positivity.     She then came to see Dr. Wood at [facility] for surgical recommendations.  She had a contrast mammogram which showed a 0.9 cm enhancing lesion in the upper outer quadrant of the left breast.     She has had no weight loss or loss of energy.  She does not sleep during the day.  She can perform all of her household chores.  ECOG 0 PS.      DIAGNOSTIC  AND TREATMENT SUMMARY:  On October 7 she underwent a screening mammogram which demonstrated an asymmetry in her left breast.       On October 15 she underwent a diagnostic mammogram which confirmed a left breast asymmetry.  She had an ultrasound which demonstrated 2 masses in her left breast.  At the 3 o'clock position 1 cm from the nipple there was a mass and at the 3 o'clock position 5 cm from the nipple there was a second mass.  Her lymph nodes were normal by ultrasound.       On October 23 she underwent a contrast-enhanced mammography which only demonstrated 1 mass measuring 1 cm.  The right breast was normal.  She underwent ultrasound-guided biopsies of both masses.  The mass at the 3 o'clock position 1 cm from the nipple was benign.  The mass at the 3 o'clock position 5 cm from the nipple demonstrating a grade 2 invasive ductal cancer that was ER positive and HER2 equivocal.  There is also DCIS present.          A. LEFT breast, 3:00, 1 cm from nipple, ultrasound-guided core biopsy:  - Benign breast tissue with usual ductal hyperplasia (UDH) and fibrocystic changes including microcysts, apocrine metaplasia, and columnar cell change  - Rare calcifications associated with benign breast ducts and acini  - Negative for atypia or malignancy      B. LEFT breast, 3:00, 5 cm from nipple, ultrasound-guided core biopsy:   - INVASIVE BREAST CARCINOMA OF NO SPECIAL TYPE (INVASIVE DUCTAL CARCINOMA), [place] grade 2  - Ductal carcinoma in situ (DCIS), nuclear grade 2, solid type  - Invasive carcinoma is estrogen receptor positive, progesterone receptor positive, and HER2 equivocal (score 2+) by immunohistochemistry (see 'Breast Biomarker Reporting Template' below)  - HER2 FISH is is process; results will be reportedly separately by cytogenetis  - See comment     This FISH analysis is performed in follow up to the reported equivocal (2+) HER2 findings by immunohistochemistry ([id number]).        RESULTS:     Ratio of  HER2/MARION-17 signals  Zenaida Lazarus Valles:  See Interpretation below     Majority (~60-70%) of tumor in area of strongest IHC staining:  Group 5 (ADRIANA Negative)  Avg. number HER2 signals/nucleus:  2.9  Avg. number MARION-17 signals/nucleus:  1.7  HER2/MARION 17 ratio:  1.7     Subpopulation (~30-40%) of tumor in area of strongest IHC staining:  Group 1 (ADRIANA Positive)                             Avg. number HER2 signals/nucleus:  4.9  Avg. number MARION-17 signals/nucleus:  2.3  HER2/MARION 17 ratio:  2.2     **Interpretive guidelines per the American Society of Clinical Oncology/College of American Pathologists Clinical Practice Guideline Update (Margarita VALENZUELA et al, 2023, Arch Pathol Lab Med 147:993):     -- Group 1: HER2/MARION-17 ratio 2.0 or more -AND- avg. number HER2 signals/nucleus 4.0 or more (ADRIANA Positive)  -- Group 2: HER2/MARION-17 ratio 2.0 or more -AND- avg. number HER2 signals/nucleus <4.0 (Additional work required)  -- Group 3: HER2/MARION-17 ratio <2.0 -AND- avg. number HER2 signals/nucleus 6.0 or more (Additional work required)  -- Group 4: HER2/MARION-17 ratio <2.0 -AND- avg. number HER2 signals/nucleus 4.0 or more and <6.0 (Additional work required)  -- Group 5: HER2/MARION-17 ratio <2.0 -AND- avg. number HER2 signals/nucleus <4.0 (ADRIANA Negative)     INTERPRETATION:  Two admixed populations of cells were found in this sample:     Majority (~60-70%) of tumor in area of strongest IHC staining:  Per the American Society of Clinical Oncology/College of American Pathologists Clinical Practice Guideline Focused Update (Margarita VALENZUELA et al, 2018, Arch Pathol Lab Med  doi:10.5858/arpa.9076-8212-RL), the HER2/MARION 17 ratio of 1.7 and average number of HER2 signals/cell of 2.9 places this population of cells in Group 5 (ADRIANA Negative).      Subpopulation (~30-40%) of tumor in area of strongest IHC staining:  Admixed within this sample were cells with increased HER2 signals, which, when selectively scored, had an average of 4.9 HER2 signals/nucleus  and a HER2/MARION 17 ratio of 2.2; per the American Society of Clinical Oncology/College of American Pathologists Clinical Practice Guideline Focused Update (Margarita VALENZUELA et al, 2018, Arch Pathol Lab Med  doi:10.5858/arpa.4079-5623-CT), this population of cells falls into Group 1 (ADRIANA Positive).     PAST MEDICAL HISTORY: No history of breast surgery.  No history of breast cancer in the past.  No history of radiation therapy in the past or radiation exposure.  No history of tumor of any kind.  No history of heart problems, heart attack, breathing problems, blood clots, seizures, arthritis, peptic ulcer disease, osteoporosis or bone fractures.  She is not currently participating in a clinical trial.  She does have a history of asthma and uses an albuterol inhaler in cold weather.     Her past medical history is significant for diabetes mellitus and is on metformin, hypertension obesity, she has IPMN and a right renal artery stent.  The family history is negative for breast cancer.  She has a history of right renal artery stenosis and has a stent in place.     She has a recent history of a left hamstring injury.  She is seeing orthopedics for this problem.  She also has a history of back pain.  Her gait is affected but she can perform activities of daily living.  She has a history of aches in her shoulders.  She had a 65 pound weight loss over the last 2 years which was intentional related to improve diet and exercise. She says she has a history of fibromuscular dysplasia.     She worked in the [employer] in [place] [employer] and was exposed to dengue, malaria, hepatitis A, hepatitis B, encephalitis.  She has also had COVID and the flu.     FAMILY HISTORY:   No history of ovarian, uterine, colon cancer, or melanoma.  No history of glioma, gastric cancer or pancreatic cancer.  Her mother did have Zollinger-Wheeler syndrome but the patient's genetics are negative for this syndrome.      PAST MENSTRUAL HISTORY:  Age of first  menstrual period was 13.   She has been pregnant 1 times with 0 live births and 0 miscarriages and 1 .  Age at in place pregnancy age 28.   Uterus and ovaries are in place.  Last menstrual period was about age 52 and the menopause occurred naturally. No history of hormone replacement therapy.     She does have a history of thickened endometrial lining but by her report no abnormalities of the lining when she had a DIC     HABITS:  She is a never smoker but she has a history of exposure to secondhand smoke from her father as a child.  She consumes alcoholic beverages socially 1 drink every couple of months.     GERMLINE GENETICS: Genetic testing is available for 47 genes associated with cancers of the breast, ovary, uterus, prostate and gastrointestinal system: [employer] Common Hereditary Cancers panel (APC, SUMIT, AXIN2, BARD1, BMPR1A, BRCA1, BRCA2, BRIP1, CDH1, CDK4, CDKN2A, CHEK2, CTNNA1, DICER1, EPCAM, GREM1, HOXB13, KIT, MEN1, MLH1, MSH2, MSH3, MSH6, MUTYH, NBN, NF1, NTHL1, PALB2, PDGFRA, PMS2, POLD1, POLE, PTEN,RAD50, RAD51C, RAD51D, SDHA, SDHB, SDHC, SDHD, SMAD4, SMARCA4, STK11, TP53,TSC1, TSC2, VHL)      ALLERGIES: She has drug allergies to codeine, codeine derivatives, ibuprofen, lisinopril..  No allergy to seafood, iodine, or contrast dye.  She does take daily aspirin 81 mg because of her renal stent.     LUMPECTOMY:  Surgical Pathology Report                         Case: [id number]                                   Authorizing Provider:  Javier Wood MD         Collected:           2024 09:40 AM           Ordering Location:     [facility] OR  Received:            2024 09:54 AM                                  [place]                                                                   Pathologist:           Kristal Lanza MD                                                   Specimens:   A) - Breast, Left, Left breast lumpectomy                                                             B) - Lymph Node(s), [place], Left axillary sentinel lymph node #1                                  C) - Lymph Node(s), [place], Left axillary sentinel lymph node # 2                         Final Diagnosis   A. LEFT breast, RFID tag-localized lumpectomy:  -INVASIVE BREAST CARCINOMA OF NO SPECIAL TYPE (INVASIVE DUCTAL CARCINOMA), FAREED GRADE 2, size 11 mm  -Margins are uninvolved by invasive carcinoma  -Invasive carcinoma is 1 mm from the nearest (posterior) margin, 5 mm from the anterior margin, and > 5 mm from the superior, inferior, medial and lateral margins  -No lymphovascular invasion identified  -Other findings: fibrocystic change (including microcysts with apocrine metaplasia), sclerosing adenosis, and usual ductal hyperplasia  -Calcifications associated with benign acini  -Prior core biopsy site changes  -See tumor synoptic below     B. Lymph node, LEFT axillary, sentinel #1, excision:  -One benign lymph node (0/1)     C. Lymph node, LEFT axillary, sentinel #2, excision:  -One benign lymph node (0/1)                Synoptic Checklist   INVASIVE CARCINOMA OF THE BREAST: Resection   8th Edition - Protocol posted: 12/13/2023INVASIVE CARCINOMA OF THE BREAST: RESECTION - All Specimens                  SPECIMEN     Procedure   Excision (less than total mastectomy)     Specimen Laterality   Left     TUMOR     Tumor Site   Clock position         3 o'clock     Histologic Type   Invasive carcinoma of no special type (ductal)     Histologic Grade (Fareed Histologic Score)         Glandular (Acinar) / Tubular Differentiation   Score 2     Nuclear Pleomorphism   Score 3     Mitotic Rate   Score 2     Overall Grade   Grade 2 (scores of 6 or 7)     Tumor Size   Greatest dimension of largest invasive focus (Millimeters): 11 mm     Additional Dimension (Millimeters)   9 mm         9 mm     Tumor Focality   Single focus of invasive carcinoma     Ductal Carcinoma In Situ (DCIS)   Not identified      Lobular Carcinoma In Situ (LCIS)   Not identified     Lymphatic and / or Vascular Invasion   Not identified     Dermal Lymphatic and / or Vascular Invasion   No skin present     Microcalcifications   Present in non-neoplastic tissue     Treatment Effect in the Breast   No known presurgical therapy     MARGINS     Margin Status for Invasive Carcinoma   All margins negative for invasive carcinoma     Distance from Invasive Carcinoma to Closest Margin   1 mm     Closest Margin(s) to Invasive Carcinoma   Posterior     Distance from Invasive Carcinoma to Anterior Margin   5 mm     Distance from Invasive Carcinoma to Superior Margin   Greater than: 5 mm     Distance from Invasive Carcinoma to Inferior Margin   Greater than: 5 mm     Distance from Invasive Carcinoma to Medial Margin   Greater than: 5 mm     Distance from Invasive Carcinoma to Lateral Margin   Greater than: 5 mm     REGIONAL LYMPH NODES     Regional Lymph Node Status   All regional lymph nodes negative for tumor     Total Number of Lymph Nodes Examined (sentinel and non-sentinel)   2     Number of [place] Nodes Examined   2     pTNM CLASSIFICATION (AJCC 8th Edition)     Reporting of pT, pN, and (when applicable) pM categories is based on information available to the pathologist at the time the report is issued. As per the AJCC (Chapter 1, 8th Ed.) it is the managing physician s responsibility to establish the final pathologic stage based upon all pertinent information, including but potentially not limited to this pathology report.     pT Category   pT1c     pN Category   pN0     N Suffix   (sn)     SPECIAL STUDIES               Estrogen Receptor (ER) Status   Positive (greater than 10% of cells demonstrate nuclear positivity)     Percentage of Cells with Nuclear Positivity   %               Progesterone Receptor (PgR) Status   Positive     Percentage of Cells with Nuclear Positivity   81-90%               HER2 (by immunohistochemistry)    "Equivocal (Score 2+)     Percentage of Cells with Uniform Intense Complete Membrane Staining   0 %               HER2 (by in situ hybridization)   Positive (amplified)     Testing Performed on Case Number   HER2 FISH amplified in 30-40% of tumor cells. Performed on prior core biopsy [id number] specimen B     Comment(s)   Best block: A13   .      Clinical Information   ALEE   The patient is a 74 year old woman with LEFT breast carcinoma. Procedure: LEFT breast RFID tag localized lumpectomy, LEFT axillary sentinel lymph node biopsy.    Gross Description   ALEE ROBIN(1). Breast, Left, Left breast lumpectomy:  The specimen is received fresh with proper patient identification, labeled \"left breast lumpectomy\". It consists of 27.85 g, 6.7 cm from medial to lateral x 4.7 cm from superior to inferior x 2.4 cm from anterior to posterior left breast lumpectomy specimen. The specimen was received previously inked by the surgeon as follows:  Superior - red, anterior - green, inferior - blue, posterior - black, medial - yellow and lateral - orange.     The specimen is sectioned from medial (slice 1) to lateral (slice 15) into 15 slices.          [id number]   This FISH analysis is performed in follow up to the reported equivocal (2+) HER2 findings by immunohistochemistry ([id number]).        RESULTS:     Ratio of HER2/MARION-17 signals  Zenaida Valles:  See Interpretation below     Majority (~60-70%) of tumor in area of strongest IHC staining:  Group 5 (ADRIANA Negative)  Avg. number HER2 signals/nucleus:  2.9  Avg. number MARION-17 signals/nucleus:  1.7  HER2/MARION 17 ratio:  1.7     Subpopulation (~30-40%) of tumor in area of strongest IHC staining:  Group 1 (ADRIANA Positive)                             Avg. number HER2 signals/nucleus:  4.9  Avg. number MARION-17 signals/nucleus:  2.3  HER2/MARION 17 ratio:  2.2        TREATMENT HISTORY:  A.  Lumpectomy showed a stage I T1b NX MX invasive ductal carcinoma of the left breast upper outer " quadrant which was estrogen receptor positive (%) progesterone receptor positive (81-90%) and HER2 2+ by IHC and HER2 positive by ADRIANA.  Margins negative for invasive carcinoma.    B.  Plan is to give the APT regimen followed by addition of hormonal therapy with letrozole for 7 years. Right single lumen [place] 12-26.  Started APT regimen with weekly paclitaxel and trastuzuamb beginning 12-27.   C.          INTERVAL HISTORY:  She was seen in clinic today with her  Trae. She had her lumpectomy with Dr. Wood on November 25. She started adjuvant APT regimen on 12/27/2024.  She has some neuropathy pain in her toes.  She is deconditioned and has significant fatigue.  She has mild depression and anxiety which is held by her [employer] community which has been very supportive.  She is having difficulty with quilting which is major activity.  Fatigue is grade 2 relieved by rest.  She can perform her activities of daily living.     She has worsening neuropathy in her feet but not in her hands.  She reports that she has significant pain with neuropathy.  She has grade 2 fatigue with relief by rest.  She can perform at least some of her activities of daily living including quilting and household chores.  She has lost some weight from 174 to 173 pounds she is continuing to eat reasonably well.  She is able to exercise by walking.  She does not consume alcohol.  She is taking vitamin D and calcium.  She did have an episode where she was extremely fatigued in the grocery store.     She has been exercising by walking.  Overall her exercise stamina is less.  She is taking a vitamin D supplement and drinking milk to meet her calcium requirements.  She does have a bovine collagen to her diet and increase protein.  She has been icing to prevent neuropathy.     She has an ECOG 0 performance status.     REVIEW OF SYSTEMS:   She has had no fevers, cough, chest pain, shortness of breath, mouth sores, hemoptysis, nausea,  vomiting, abdominal pain, diarrhea, bone pain, back pain, muscle or joint complaints, hearing loss or depression.  The remainder of a 10-point review of systems is negative.        PHYSICAL EXAMINATION:     VITALS:   /79 (BP Location: Right arm, Patient Position: Sitting, Cuff Size: Adult Regular)   Pulse 74   Temp 97.7  F (36.5  C) (Oral)   Resp 16   Wt 77.6 kg (171 lb 1.6 oz)   LMP  (LMP Unknown)   SpO2 98%   BMI 28.47 kg/m       HEENT:  No alopecia, no lesions in the oropharynx.  Dentition is normal  LYMPH:  There is no palpable cervical, supraclavicular, or subclavicular lymphadenopathy.  BREASTS: Deferred  Her port is without tenderness or erythema.  LUNGS:  Clear to auscultation.  HEART:  Regular rate and rhythm.  S1, S2. No murmurs  ABDOMEN:  Soft, nontender, without hepatosplenomegaly.  EXTREMITIES:  Without edema.  PSYCH:  Mood and affect were normal.  NEUROLOGIC:  Mood and affect  normal. No focal deficits apparent.          LABORATORY DATA:   CMP was normal except for glucose of 142.  CBC showed WBC of 5.4, hemoglobin 1.4 platelets 338,000.  Absolute neutrophil count 3000.     Echo 60-56% on 2-23-25.     ASSESSMENT AND PLAN:    Zenaida Valles has a stage I T1b NX MX invasive ductal carcinoma of the left breast upper outer quadrant which was estrogen receptor positive (%) progesterone receptor positive (81-90%) and HER2 2+ by IHC and HER2 positive by ADRIANA. Margins negative for invasive carcinoma.    She's tolerating adjuvant HER2 directed therapy with trastuzumab and paclitaxel.  She is here for cycle 10 of 12.  She has significant neuropathy.  She does not want to try duloxetine or gabapentin this time.  We will do icing of feet beginning today.  Her fingers are okay.  There may be a seroma in the left axilla which was noted last week.  Will perform a focused ultrasound of the left breast and axilla today.  She does have some fatigue and decreased stamina.  Overall her exercise  tolerance is decreased.  I did have discussion with her that there can be some minor irreversible reduction in exercise capacity based on the chemotherapy whereas the reduction in exercise capacity related to trastuzumab is likely to be reversible.  I discussed that we will see what the echocardiogram shows next week.  I also discussed that if there is some loss of ejection fraction we can potentially just her beta-blocker and try carvedilol.  She is on Avapro which is an ARB and could be helpful.  Some neuropathy.  She has now been icing during chemotherapy.    We would also add adjuvant zoledronic acid.    We discussed toxicities of paclitaxel which could include alopecia, neuropathy which could last for 6 months to a year after infusion.    Discussion of hormonal therapy.  We have discussed 10 years of adjuvant hormonal therapy.  We discussed adjuvant zoledronic acid.  I discussed that postlumpectomy radiation will be recommended and this would be after completion of 12 weeks of paclitaxel and trastuzumab..  Will arrange for radiation oncology consult.  Discussion of adjuvant zoledronic acid.  Adjuvant zoledronic acid can be associated with flulike symptoms that can last for several days after treatment.  I also discussed that there can be dental complications.  She keeps her teeth and good shape with frequent dental visits.  She does have excellent dentition and the risk of osteonecrosis of the jaw is quite low.  History of Zollinger-Wheeler syndrome.    Diabetes.  She is on metformin.  Discussion of exercise.  Discussion of the Lace and Pathway.  We recommend 150 minutes of exercise per week with mixed cardio and strength training.  Stretch band, hand weights, yoga.  Diet.  I recommended a diet low in saturated fat, but not low in fat with more fruits and vegetables, and less in the way of red meat.  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  Follow up plan.  CBC, CMP.  Next Echo in May.  Follow-up with  me alternating with an ALEC each cycle total of 12 cycles of paclitaxel and trastuzumab.  Leilani on 3-6 and Shayy on 3-13. She is here for cycle 10 of 12 today.  We will see her each cycle because of symptoms with treatment. Radiation oncology consultation with Dr. Ortega 3-28.  Follow up with me 3-27 to start every 3 week trastuzumab.      Thank you for allowing us to participate in this patient's care.  The patient was seen and evaluated by me.  I discussed the patient with the resident Dr. Te Rainey and agree with the findings and plan in the note.      Sincerely,      Chito Cheung MD  Professor  [facility]  [phone]     I spent 40 minutes with the patient more than 50% of which was in counseling and coordination of care. The remainder of a 10 point review of systems was negative.

## 2025-04-09 RX ORDER — HEPARIN SODIUM (PORCINE) LOCK FLUSH IV SOLN 100 UNIT/ML 100 UNIT/ML
5 SOLUTION INTRAVENOUS
Status: CANCELLED | OUTPATIENT
Start: 2025-04-11

## 2025-04-09 RX ORDER — DIPHENHYDRAMINE HYDROCHLORIDE 50 MG/ML
50 INJECTION, SOLUTION INTRAMUSCULAR; INTRAVENOUS
Status: CANCELLED
Start: 2025-04-11

## 2025-04-09 RX ORDER — ACETAMINOPHEN 325 MG/1
650 TABLET ORAL
Status: CANCELLED
Start: 2025-04-11

## 2025-04-09 RX ORDER — ALBUTEROL SULFATE 90 UG/1
1-2 INHALANT RESPIRATORY (INHALATION)
Status: CANCELLED
Start: 2025-04-11

## 2025-04-09 RX ORDER — DIPHENHYDRAMINE HCL 25 MG
50 CAPSULE ORAL
Status: CANCELLED
Start: 2025-04-11

## 2025-04-09 RX ORDER — EPINEPHRINE 1 MG/ML
0.3 INJECTION, SOLUTION INTRAMUSCULAR; SUBCUTANEOUS EVERY 5 MIN PRN
Status: CANCELLED | OUTPATIENT
Start: 2025-04-11

## 2025-04-09 RX ORDER — ALBUTEROL SULFATE 0.83 MG/ML
2.5 SOLUTION RESPIRATORY (INHALATION)
Status: CANCELLED | OUTPATIENT
Start: 2025-04-11

## 2025-04-09 RX ORDER — DIPHENHYDRAMINE HYDROCHLORIDE 50 MG/ML
25 INJECTION, SOLUTION INTRAMUSCULAR; INTRAVENOUS
Status: CANCELLED
Start: 2025-04-11

## 2025-04-09 RX ORDER — MEPERIDINE HYDROCHLORIDE 25 MG/ML
25 INJECTION INTRAMUSCULAR; INTRAVENOUS; SUBCUTANEOUS
Status: CANCELLED | OUTPATIENT
Start: 2025-04-11

## 2025-04-09 RX ORDER — HEPARIN SODIUM,PORCINE 10 UNIT/ML
5-20 VIAL (ML) INTRAVENOUS DAILY PRN
Status: CANCELLED | OUTPATIENT
Start: 2025-04-11

## 2025-04-09 RX ORDER — METHYLPREDNISOLONE SODIUM SUCCINATE 40 MG/ML
40 INJECTION INTRAMUSCULAR; INTRAVENOUS
Status: CANCELLED
Start: 2025-04-11

## 2025-04-09 RX ORDER — LORAZEPAM 2 MG/ML
0.5 INJECTION INTRAMUSCULAR EVERY 4 HOURS PRN
Status: CANCELLED | OUTPATIENT
Start: 2025-04-11

## 2025-04-10 ENCOUNTER — ONCOLOGY VISIT (OUTPATIENT)
Dept: ONCOLOGY | Facility: CLINIC | Age: 75
End: 2025-04-10
Attending: INTERNAL MEDICINE
Payer: COMMERCIAL

## 2025-04-10 ENCOUNTER — APPOINTMENT (OUTPATIENT)
Dept: LAB | Facility: CLINIC | Age: 75
End: 2025-04-10
Attending: INTERNAL MEDICINE
Payer: COMMERCIAL

## 2025-04-10 VITALS
RESPIRATION RATE: 16 BRPM | TEMPERATURE: 97.9 F | OXYGEN SATURATION: 98 % | DIASTOLIC BLOOD PRESSURE: 82 MMHG | HEART RATE: 73 BPM | WEIGHT: 169.1 LBS | SYSTOLIC BLOOD PRESSURE: 129 MMHG | BODY MASS INDEX: 28.14 KG/M2

## 2025-04-10 DIAGNOSIS — C50.912 INVASIVE DUCTAL CARCINOMA OF BREAST, FEMALE, LEFT (H): Primary | ICD-10-CM

## 2025-04-10 LAB
ALBUMIN SERPL BCG-MCNC: 4.2 G/DL (ref 3.5–5.2)
ALP SERPL-CCNC: 64 U/L (ref 40–150)
ALT SERPL W P-5'-P-CCNC: 22 U/L (ref 0–50)
ANION GAP SERPL CALCULATED.3IONS-SCNC: 9 MMOL/L (ref 7–15)
AST SERPL W P-5'-P-CCNC: 18 U/L (ref 0–45)
BASOPHILS # BLD AUTO: 0.1 10E3/UL (ref 0–0.2)
BASOPHILS NFR BLD AUTO: 1 %
BILIRUB SERPL-MCNC: 0.3 MG/DL
BUN SERPL-MCNC: 16.5 MG/DL (ref 8–23)
CALCIUM SERPL-MCNC: 9.6 MG/DL (ref 8.8–10.4)
CHLORIDE SERPL-SCNC: 102 MMOL/L (ref 98–107)
CREAT SERPL-MCNC: 0.58 MG/DL (ref 0.51–0.95)
EGFRCR SERPLBLD CKD-EPI 2021: >90 ML/MIN/1.73M2
EOSINOPHIL # BLD AUTO: 0.3 10E3/UL (ref 0–0.7)
EOSINOPHIL NFR BLD AUTO: 5 %
ERYTHROCYTE [DISTWIDTH] IN BLOOD BY AUTOMATED COUNT: 14.2 % (ref 10–15)
GLUCOSE SERPL-MCNC: 125 MG/DL (ref 70–99)
HCO3 SERPL-SCNC: 28 MMOL/L (ref 22–29)
HCT VFR BLD AUTO: 36.2 % (ref 35–47)
HGB BLD-MCNC: 12 G/DL (ref 11.7–15.7)
IMM GRANULOCYTES # BLD: 0 10E3/UL
IMM GRANULOCYTES NFR BLD: 0 %
LYMPHOCYTES # BLD AUTO: 2.4 10E3/UL (ref 0.8–5.3)
LYMPHOCYTES NFR BLD AUTO: 38 %
MCH RBC QN AUTO: 29.5 PG (ref 26.5–33)
MCHC RBC AUTO-ENTMCNC: 33.1 G/DL (ref 31.5–36.5)
MCV RBC AUTO: 89 FL (ref 78–100)
MONOCYTES # BLD AUTO: 0.5 10E3/UL (ref 0–1.3)
MONOCYTES NFR BLD AUTO: 8 %
NEUTROPHILS # BLD AUTO: 3 10E3/UL (ref 1.6–8.3)
NEUTROPHILS NFR BLD AUTO: 49 %
NRBC # BLD AUTO: 0 10E3/UL
NRBC BLD AUTO-RTO: 0 /100
PLATELET # BLD AUTO: 284 10E3/UL (ref 150–450)
POTASSIUM SERPL-SCNC: 4.1 MMOL/L (ref 3.4–5.3)
PROT SERPL-MCNC: 6.7 G/DL (ref 6.4–8.3)
RBC # BLD AUTO: 4.07 10E6/UL (ref 3.8–5.2)
SODIUM SERPL-SCNC: 139 MMOL/L (ref 135–145)
WBC # BLD AUTO: 6.2 10E3/UL (ref 4–11)

## 2025-04-10 PROCEDURE — 36591 DRAW BLOOD OFF VENOUS DEVICE: CPT

## 2025-04-10 PROCEDURE — 85018 HEMOGLOBIN: CPT

## 2025-04-10 PROCEDURE — 82040 ASSAY OF SERUM ALBUMIN: CPT

## 2025-04-10 PROCEDURE — 250N000011 HC RX IP 250 OP 636: Performed by: INTERNAL MEDICINE

## 2025-04-10 PROCEDURE — 85004 AUTOMATED DIFF WBC COUNT: CPT

## 2025-04-10 PROCEDURE — 258N000003 HC RX IP 258 OP 636: Performed by: INTERNAL MEDICINE

## 2025-04-10 PROCEDURE — G0463 HOSPITAL OUTPT CLINIC VISIT: HCPCS | Performed by: INTERNAL MEDICINE

## 2025-04-10 PROCEDURE — 99215 OFFICE O/P EST HI 40 MIN: CPT | Performed by: INTERNAL MEDICINE

## 2025-04-10 RX ORDER — EPINEPHRINE 1 MG/ML
0.3 INJECTION, SOLUTION INTRAMUSCULAR; SUBCUTANEOUS EVERY 5 MIN PRN
OUTPATIENT
Start: 2025-05-02

## 2025-04-10 RX ORDER — HEPARIN SODIUM,PORCINE 10 UNIT/ML
5-20 VIAL (ML) INTRAVENOUS DAILY PRN
OUTPATIENT
Start: 2025-05-02

## 2025-04-10 RX ORDER — HEPARIN SODIUM (PORCINE) LOCK FLUSH IV SOLN 100 UNIT/ML 100 UNIT/ML
5 SOLUTION INTRAVENOUS
Status: COMPLETED | OUTPATIENT
Start: 2025-04-10 | End: 2025-04-10

## 2025-04-10 RX ORDER — DIPHENHYDRAMINE HYDROCHLORIDE 50 MG/ML
25 INJECTION, SOLUTION INTRAMUSCULAR; INTRAVENOUS
Start: 2025-05-02

## 2025-04-10 RX ORDER — ALBUTEROL SULFATE 90 UG/1
1-2 INHALANT RESPIRATORY (INHALATION)
Start: 2025-05-02

## 2025-04-10 RX ORDER — DIPHENHYDRAMINE HCL 25 MG
50 CAPSULE ORAL
Start: 2025-05-02

## 2025-04-10 RX ORDER — LORAZEPAM 2 MG/ML
0.5 INJECTION INTRAMUSCULAR EVERY 4 HOURS PRN
OUTPATIENT
Start: 2025-05-02

## 2025-04-10 RX ORDER — METHYLPREDNISOLONE SODIUM SUCCINATE 40 MG/ML
40 INJECTION INTRAMUSCULAR; INTRAVENOUS
Start: 2025-05-02

## 2025-04-10 RX ORDER — DIPHENHYDRAMINE HYDROCHLORIDE 50 MG/ML
50 INJECTION, SOLUTION INTRAMUSCULAR; INTRAVENOUS
Start: 2025-05-02

## 2025-04-10 RX ORDER — ACETAMINOPHEN 325 MG/1
650 TABLET ORAL
Start: 2025-05-02

## 2025-04-10 RX ORDER — HEPARIN SODIUM (PORCINE) LOCK FLUSH IV SOLN 100 UNIT/ML 100 UNIT/ML
5 SOLUTION INTRAVENOUS
Status: DISCONTINUED | OUTPATIENT
Start: 2025-04-10 | End: 2025-04-10 | Stop reason: HOSPADM

## 2025-04-10 RX ORDER — HEPARIN SODIUM (PORCINE) LOCK FLUSH IV SOLN 100 UNIT/ML 100 UNIT/ML
5 SOLUTION INTRAVENOUS
OUTPATIENT
Start: 2025-05-02

## 2025-04-10 RX ORDER — MEPERIDINE HYDROCHLORIDE 25 MG/ML
25 INJECTION INTRAMUSCULAR; INTRAVENOUS; SUBCUTANEOUS
OUTPATIENT
Start: 2025-05-02

## 2025-04-10 RX ORDER — ALBUTEROL SULFATE 0.83 MG/ML
2.5 SOLUTION RESPIRATORY (INHALATION)
OUTPATIENT
Start: 2025-05-02

## 2025-04-10 RX ADMIN — HEPARIN 5 ML: 100 SYRINGE at 12:14

## 2025-04-10 RX ADMIN — SODIUM CHLORIDE 470 MG: 0.9 INJECTION, SOLUTION INTRAVENOUS at 11:43

## 2025-04-10 RX ADMIN — HEPARIN 5 ML: 100 SYRINGE at 10:26

## 2025-04-10 ASSESSMENT — PAIN SCALES - GENERAL: PAINLEVEL_OUTOF10: NO PAIN (0)

## 2025-04-10 NOTE — LETTER
4/10/2025      Zenaida Valles  [address]  [facility] [id number]      Dear Colleague,    Thank you for referring your patient, Zenaida Valles, to the [facility]. Please see a copy of my visit note below.    MEDICAL ONCOLOGY NOTE     Javier Wood MD  Professor   [facility]  [address] [place] [address]     Re. Zenaida Valles   Female, 74 year old, 1950  MRN: [id number]        Dear Dr. Wood,     Thank you for referring Zenaida Valles who is a 75-year-old woman with a new diagnosis of a screening identified stage I invasive ductal cancer of the upper outer quadrant of the left breast ER positive, ME positive, HER2 amplified stage [id number].       HISTORY OF PRESENT ILLNESS:    Zenaida has been in generally good health except for a right renal artery stenosis requiring a stent.  She also has a left leg injury recently she was in her usual state of good health and had a screening mammogram which showed a suspicious lesion in the upper outer quadrant of the left breast at the 3 o'clock position 5 cm from the nipple there is an irregular hypoechoic mass measuring 1.0 x 0.7 x 0.9 cm in size 1 cm from the nipple there is also a 0.5 x 0.4 cm area of suspicion     She underwent an ultrasound guided biopsy which showed usual ductal hyperplasia and fibrocystic changes microcysts in April Kren metaplasia.  Columnar cell changes.  It invasive ductal carcinoma was also found grade 2 and DCIS grade 2 solid type.  Her breast cancer was estrogen receptor positive progesterone receptor positive at HER2 2+ by IHC.  She then underwent ADRIANA which showed a subpopulation which was 60 to 70% ADRIANA 5 negative and 30 to 40% I-S age 1 positive with a 4.9 HER2 signals per nucleus and the ratio of 2.2 indicating HER2 positivity.     She then came to see Dr. Wood at [facility] for surgical recommendations.  She had a  contrast mammogram which showed a 0.9 cm enhancing lesion in the upper outer quadrant of the left breast.     She has had no weight loss or loss of energy.  She does not sleep during the day.  She can perform all of her household chores.  ECOG 0 PS.      DIAGNOSTIC AND TREATMENT SUMMARY:  On October 7 she underwent a screening mammogram which demonstrated an asymmetry in her left breast.       On October 15 she underwent a diagnostic mammogram which confirmed a left breast asymmetry.  She had an ultrasound which demonstrated 2 masses in her left breast.  At the 3 o'clock position 1 cm from the nipple there was a mass and at the 3 o'clock position 5 cm from the nipple there was a second mass.  Her lymph nodes were normal by ultrasound.       On October 23 she underwent a contrast-enhanced mammography which only demonstrated 1 mass measuring 1 cm.  The right breast was normal.  She underwent ultrasound-guided biopsies of both masses.  The mass at the 3 o'clock position 1 cm from the nipple was benign.  The mass at the 3 o'clock position 5 cm from the nipple demonstrating a grade 2 invasive ductal cancer that was ER positive and HER2 equivocal.  There is also DCIS present.          A. LEFT breast, 3:00, 1 cm from nipple, ultrasound-guided core biopsy:  - Benign breast tissue with usual ductal hyperplasia (UDH) and fibrocystic changes including microcysts, apocrine metaplasia, and columnar cell change  - Rare calcifications associated with benign breast ducts and acini  - Negative for atypia or malignancy      B. LEFT breast, 3:00, 5 cm from nipple, ultrasound-guided core biopsy:   - INVASIVE BREAST CARCINOMA OF NO SPECIAL TYPE (INVASIVE DUCTAL CARCINOMA), Mynor grade 2  - Ductal carcinoma in situ (DCIS), nuclear grade 2, solid type  - Invasive carcinoma is estrogen receptor positive, progesterone receptor positive, and HER2 equivocal (score 2+) by immunohistochemistry (see 'Breast Biomarker Reporting Template'  below)  - HER2 FISH is is process; results will be reportedly separately by cytogenetis  - See comment     This FISH analysis is performed in follow up to the reported equivocal (2+) HER2 findings by immunohistochemistry ([id number]).        RESULTS:     Ratio of HER2/MARION-17 signals  Zenaida Valles:  See Interpretation below     Majority (~60-70%) of tumor in area of strongest IHC staining:  Group 5 (ADRIANA Negative)  Avg. number HER2 signals/nucleus:  2.9  Avg. number MARION-17 signals/nucleus:  1.7  HER2/MARION 17 ratio:  1.7     Subpopulation (~30-40%) of tumor in area of strongest IHC staining:  Group 1 (ADRIANA Positive)                             Avg. number HER2 signals/nucleus:  4.9  Avg. number MARION-17 signals/nucleus:  2.3  HER2/MARION 17 ratio:  2.2     **Interpretive guidelines per the American Society of Clinical Oncology/College of American Pathologists Clinical Practice Guideline Update (Margarita VALENZUELA et al, 2023, Arch Pathol Lab Med 147:993):     -- Group 1: HER2/MARION-17 ratio 2.0 or more -AND- avg. number HER2 signals/nucleus 4.0 or more (ADRIANA Positive)  -- Group 2: HER2/MARION-17 ratio 2.0 or more -AND- avg. number HER2 signals/nucleus <4.0 (Additional work required)  -- Group 3: HER2/MARION-17 ratio <2.0 -AND- avg. number HER2 signals/nucleus 6.0 or more (Additional work required)  -- Group 4: HER2/MARION-17 ratio <2.0 -AND- avg. number HER2 signals/nucleus 4.0 or more and <6.0 (Additional work required)  -- Group 5: HER2/MARION-17 ratio <2.0 -AND- avg. number HER2 signals/nucleus <4.0 (ADRIANA Negative)     INTERPRETATION:  Two admixed populations of cells were found in this sample:     Majority (~60-70%) of tumor in area of strongest IHC staining:  Per the American Society of Clinical Oncology/College of American Pathologists Clinical Practice Guideline Focused Update (Margarita VALENZUELA et al, 2018, Arch Pathol Lab Med  doi:10.5858/arpa.[email or web address]-RW), the HER2/MARION 17 ratio of 1.7 and average number of HER2 signals/cell of 2.9 places  this population of cells in Group 5 (ADRIANA Negative).      Subpopulation (~30-40%) of tumor in area of strongest IHC staining:  Admixed within this sample were cells with increased HER2 signals, which, when selectively scored, had an average of 4.9 HER2 signals/nucleus and a HER2/MARION 17 ratio of 2.2; per the American Society of Clinical Oncology/College of American Pathologists Clinical Practice Guideline Focused Update (Margarita VALENZUELA et al, 2018, Arch Pathol Lab Med  doi:10.5858/arpa.8696-3840-QC), this population of cells falls into Group 1 (ADRIANA Positive).     PAST MEDICAL HISTORY: No history of breast surgery.  No history of breast cancer in the past.  No history of radiation therapy in the past or radiation exposure.  No history of tumor of any kind.  No history of heart problems, heart attack, breathing problems, blood clots, seizures, arthritis, peptic ulcer disease, osteoporosis or bone fractures.  She is not currently participating in a clinical trial.  She does have a history of asthma and uses an albuterol inhaler in cold weather.     Her past medical history is significant for diabetes mellitus and is on metformin, hypertension obesity, she has IPMN and a right renal artery stent.  The family history is negative for breast cancer.  She has a history of right renal artery stenosis and has a stent in place.     She has a recent history of a left hamstring injury.  She is seeing orthopedics for this problem.  She also has a history of back pain.  Her gait is affected but she can perform activities of daily living.  She has a history of aches in her shoulders.  She had a 65 pound weight loss over the last 2 years which was intentional related to improve diet and exercise. She says she has a history of fibromuscular dysplasia.     She worked in the [employer] in [place] [employer] and was exposed to dengue, malaria, hepatitis A, hepatitis B, encephalitis.  She has also had COVID and the flu.     FAMILY HISTORY:   No  history of ovarian, uterine, colon cancer, or melanoma.  No history of glioma, gastric cancer or pancreatic cancer.  Her mother did have Zollinger-Wheeler syndrome but the patient's genetics are negative for this syndrome.      PAST MENSTRUAL HISTORY:  Age of first menstrual period was 13.   She has been pregnant 1 times with 0 live births and 0 miscarriages and 1 .  Age at in place pregnancy age 28.   Uterus and ovaries are in place.  Last menstrual period was about age 52 and the menopause occurred naturally. No history of hormone replacement therapy.     She does have a history of thickened endometrial lining but by her report no abnormalities of the lining when she had a DIC     HABITS:  She is a never smoker but she has a history of exposure to secondhand smoke from her father as a child.  She consumes alcoholic beverages socially 1 drink every couple of months.     GERMLINE GENETICS: Genetic testing is available for 47 genes associated with cancers of the breast, ovary, uterus, prostate and gastrointestinal system: [facility] Common Hereditary Cancers panel (APC, SUMIT, AXIN2, BARD1, BMPR1A, BRCA1, BRCA2, BRIP1, CDH1, CDK4, CDKN2A, CHEK2, CTNNA1, DICER1, EPCAM, GREM1, HOXB13, KIT, MEN1, MLH1, MSH2, MSH3, MSH6, MUTYH, NBN, NF1, NTHL1, PALB2, PDGFRA, PMS2, POLD1, POLE, PTEN,RAD50, RAD51C, RAD51D, SDHA, SDHB, SDHC, SDHD, SMAD4, SMARCA4, STK11, TP53,TSC1, TSC2, VHL)      ALLERGIES: She has drug allergies to codeine, codeine derivatives, ibuprofen, lisinopril..  No allergy to seafood, iodine, or contrast dye.  She does take daily aspirin 81 mg because of her renal stent.     LUMPECTOMY:  Surgical Pathology Report                         Case: [id number]                                   Authorizing Provider:  Javier Wood MD         Collected:           2024 09:40 AM           Ordering Location:     [facility] OR  Received:            2024 09:54 AM                                   [place]                                                                   Pathologist:           Kristal Lanza MD                                                   Specimens:   A) - Breast, Left, Left breast lumpectomy                                                            B) - Lymph Node(s), [place], Left axillary sentinel lymph node #1                                  C) - Lymph Node(s), [place], Left axillary sentinel lymph node # 2                         Final Diagnosis   A. LEFT breast, RFID tag-localized lumpectomy:  -INVASIVE BREAST CARCINOMA OF NO SPECIAL TYPE (INVASIVE DUCTAL CARCINOMA), FAREED GRADE 2, size 11 mm  -Margins are uninvolved by invasive carcinoma  -Invasive carcinoma is 1 mm from the nearest (posterior) margin, 5 mm from the anterior margin, and > 5 mm from the superior, inferior, medial and lateral margins  -No lymphovascular invasion identified  -Other findings: fibrocystic change (including microcysts with apocrine metaplasia), sclerosing adenosis, and usual ductal hyperplasia  -Calcifications associated with benign acini  -Prior core biopsy site changes  -See tumor synoptic below     B. Lymph node, LEFT axillary, sentinel #1, excision:  -One benign lymph node (0/1)     C. Lymph node, LEFT axillary, sentinel #2, excision:  -One benign lymph node (0/1)                Synoptic Checklist   INVASIVE CARCINOMA OF THE BREAST: Resection   8th Edition - Protocol posted: 12/13/2023INVASIVE CARCINOMA OF THE BREAST: RESECTION - All Specimens                  SPECIMEN     Procedure   Excision (less than total mastectomy)     Specimen Laterality   Left     TUMOR     Tumor Site   Clock position         3 o'clock     Histologic Type   Invasive carcinoma of no special type (ductal)     Histologic Grade ([place] Histologic Score)         Glandular (Acinar) / Tubular Differentiation   Score 2     Nuclear Pleomorphism   Score 3     Mitotic Rate   Score 2     Overall Grade   Grade 2  (scores of 6 or 7)     Tumor Size   Greatest dimension of largest invasive focus (Millimeters): 11 mm     Additional Dimension (Millimeters)   9 mm         9 mm     Tumor Focality   Single focus of invasive carcinoma     Ductal Carcinoma In Situ (DCIS)   Not identified     Lobular Carcinoma In Situ (LCIS)   Not identified     Lymphatic and / or Vascular Invasion   Not identified     Dermal Lymphatic and / or Vascular Invasion   No skin present     Microcalcifications   Present in non-neoplastic tissue     Treatment Effect in the Breast   No known presurgical therapy     MARGINS     Margin Status for Invasive Carcinoma   All margins negative for invasive carcinoma     Distance from Invasive Carcinoma to Closest Margin   1 mm     Closest Margin(s) to Invasive Carcinoma   Posterior     Distance from Invasive Carcinoma to Anterior Margin   5 mm     Distance from Invasive Carcinoma to Superior Margin   Greater than: 5 mm     Distance from Invasive Carcinoma to Inferior Margin   Greater than: 5 mm     Distance from Invasive Carcinoma to Medial Margin   Greater than: 5 mm     Distance from Invasive Carcinoma to Lateral Margin   Greater than: 5 mm     REGIONAL LYMPH NODES     Regional Lymph Node Status   All regional lymph nodes negative for tumor     Total Number of Lymph Nodes Examined (sentinel and non-sentinel)   2     Number of Collins Nodes Examined   2     pTNM CLASSIFICATION (AJCC 8th Edition)     Reporting of pT, pN, and (when applicable) pM categories is based on information available to the pathologist at the time the report is issued. As per the AJCC (Chapter 1, 8th Ed.) it is the managing physician s responsibility to establish the final pathologic stage based upon all pertinent information, including but potentially not limited to this pathology report.     pT Category   pT1c     pN Category   pN0     N Suffix   (sn)     SPECIAL STUDIES               Estrogen Receptor (ER) Status   Positive (greater than 10%  "of cells demonstrate nuclear positivity)     Percentage of Cells with Nuclear Positivity   %               Progesterone Receptor (PgR) Status   Positive     Percentage of Cells with Nuclear Positivity   81-90%               HER2 (by immunohistochemistry)   Equivocal (Score 2+)     Percentage of Cells with Uniform Intense Complete Membrane Staining   0 %               HER2 (by in situ hybridization)   Positive (amplified)     Testing Performed on Case Number   HER2 FISH amplified in 30-40% of tumor cells. Performed on prior core biopsy [id number] specimen B     Comment(s)   Best block: A13   .      Clinical Information   ALEE   The patient is a 74 year old woman with LEFT breast carcinoma. Procedure: LEFT breast RFID tag localized lumpectomy, LEFT axillary sentinel lymph node biopsy.    Gross Description   ALEE ROBIN(1). Breast, Left, Left breast lumpectomy:  The specimen is received fresh with proper patient identification, labeled \"left breast lumpectomy\". It consists of 27.85 g, 6.7 cm from medial to lateral x 4.7 cm from superior to inferior x 2.4 cm from anterior to posterior left breast lumpectomy specimen. The specimen was received previously inked by the surgeon as follows:  Superior - red, anterior - green, inferior - blue, posterior - black, medial - yellow and lateral - orange.     The specimen is sectioned from medial (slice 1) to lateral (slice 15) into 15 slices.          [id number]   This FISH analysis is performed in follow up to the reported equivocal (2+) HER2 findings by immunohistochemistry ([id number]).        RESULTS:     Ratio of HER2/MARION-17 signals  Zenaida Valles:  See Interpretation below     Majority (~60-70%) of tumor in area of strongest IHC staining:  Group 5 (ADRIANA Negative)  Avg. number HER2 signals/nucleus:  2.9  Avg. number MARION-17 signals/nucleus:  1.7  HER2/MARION 17 ratio:  1.7     Subpopulation (~30-40%) of tumor in area of strongest IHC staining:  Group 1 (ADRIANA Positive)  "                            Avg. number HER2 signals/nucleus:  4.9  Avg. number MARION-17 signals/nucleus:  2.3  HER2/MARION 17 ratio:  2.2        TREATMENT HISTORY:  A.  Lumpectomy showed a stage I T1b NX MX invasive ductal carcinoma of the left breast upper outer quadrant which was estrogen receptor positive (%) progesterone receptor positive (81-90%) and HER2 2+ by IHC and HER2 positive by ADRIANA.  Margins negative for invasive carcinoma.    B.  Plan is to give the APT regimen followed by addition of hormonal therapy with letrozole for 7 years. Right single lumen Powerport 12-26.  Started APT regimen with weekly paclitaxel and trastuzuamb beginning 12-27.   C.          INTERVAL HISTORY:  She was seen in clinic today with her  Trae. She had her lumpectomy with Dr. Wood on November 25. She started adjuvant APT regimen on 12/27/2024.  She has some neuropathy pain in her toes.  She is deconditioned and has significant fatigue.  She has mild depression and anxiety which is held by her [employer] community which has been very supportive.  She is having difficulty with quilting which is major activity.  Fatigue is grade 2 relieved by rest.  She can perform her activities of daily living.     She has worsening neuropathy in her feet but not in her hands.  She reports that she has significant pain with neuropathy.  She has grade 2 fatigue with relief by rest.  She can perform at least some of her activities of daily living including quilting and household chores.  She has lost some weight from 174 to 173 pounds she is continuing to eat reasonably well.  She is able to exercise by walking.  She does not consume alcohol.  She is taking vitamin D and calcium.  She did have an episode where she was extremely fatigued in the grocery store.     She has been exercising by walking.  Overall her exercise stamina is less.  She is taking a vitamin D supplement and drinking milk to meet her calcium requirements.  She does have a  bovine collagen to her diet and increase protein.  She has been icing to prevent neuropathy.     She has an ECOG 0 performance status.     REVIEW OF SYSTEMS:   She has had no fevers, cough, chest pain, shortness of breath, mouth sores, hemoptysis, nausea, vomiting, abdominal pain, diarrhea, bone pain, back pain, muscle or joint complaints, hearing loss or depression.  The remainder of a 10-point review of systems is negative.        PHYSICAL EXAMINATION:     VITALS:   /79 (BP Location: Right arm, Patient Position: Sitting, Cuff Size: Adult Regular)   Pulse 74   Temp 97.7  F (36.5  C) (Oral)   Resp 16   Wt 77.6 kg (171 lb 1.6 oz)   LMP  (LMP Unknown)   SpO2 98%   BMI 28.47 kg/m       HEENT:  No alopecia, no lesions in the oropharynx.  Dentition is normal  LYMPH:  There is no palpable cervical, supraclavicular, or subclavicular lymphadenopathy.  BREASTS: Deferred  Her port is without tenderness or erythema.  LUNGS:  Clear to auscultation.  HEART:  Regular rate and rhythm.  S1, S2. No murmurs  ABDOMEN:  Soft, nontender, without hepatosplenomegaly.  EXTREMITIES:  Without edema.  PSYCH:  Mood and affect were normal.  NEUROLOGIC:  Mood and affect  normal. No focal deficits apparent.          LABORATORY DATA:   CMP was normal except for glucose of 142.  CBC showed WBC of 5.4, hemoglobin 1.4 platelets 338,000.  Absolute neutrophil count 3000.     Echo 60-56% on 2-23-25.     ASSESSMENT AND PLAN:    Zenaida Valles has a stage I T1b NX MX invasive ductal carcinoma of the left breast upper outer quadrant which was estrogen receptor positive (%) progesterone receptor positive (81-90%) and HER2 2+ by IHC and HER2 positive by ADRIANA. Margins negative for invasive carcinoma.    She's tolerating adjuvant HER2 directed therapy with trastuzumab and paclitaxel.  She is here for cycle 10 of 12.  She has significant neuropathy.  She does not want to try duloxetine or gabapentin this time.  We will do icing of feet  beginning today.  Her fingers are okay.  There may be a seroma in the left axilla which was noted last week.  Will perform a focused ultrasound of the left breast and axilla today.  She does have some fatigue and decreased stamina.  Overall her exercise tolerance is decreased.  I did have discussion with her that there can be some minor irreversible reduction in exercise capacity based on the chemotherapy whereas the reduction in exercise capacity related to trastuzumab is likely to be reversible.  I discussed that we will see what the echocardiogram shows next week.  I also discussed that if there is some loss of ejection fraction we can potentially just her beta-blocker and try carvedilol.  She is on Avapro which is an ARB and could be helpful.  Some neuropathy.  She has now been icing during chemotherapy.    We would also add adjuvant zoledronic acid.    We discussed toxicities of paclitaxel which could include alopecia, neuropathy which could last for 6 months to a year after infusion.    Discussion of hormonal therapy.  We have discussed 10 years of adjuvant hormonal therapy.  We discussed adjuvant zoledronic acid.  I discussed that postlumpectomy radiation will be recommended and this would be after completion of 12 weeks of paclitaxel and trastuzumab..  Will arrange for radiation oncology consult.  Discussion of adjuvant zoledronic acid.  Adjuvant zoledronic acid can be associated with flulike symptoms that can last for several days after treatment.  I also discussed that there can be dental complications.  She keeps her teeth and good shape with frequent dental visits.  She does have excellent dentition and the risk of osteonecrosis of the jaw is quite low.  History of Zollinger-Wheeler syndrome.    Diabetes.  She is on metformin.  Discussion of exercise.  Discussion of the Lace and Pathway.  We recommend 150 minutes of exercise per week with mixed cardio and strength training.  Stretch band, hand weights,  yoga.  Diet.  I recommended a diet low in saturated fat, but not low in fat with more fruits and vegetables, and less in the way of red meat.  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  Follow up plan.  CBC, CMP.  Next Echo in May.  Follow-up with me alternating with an ALEC each cycle total of 12 cycles of paclitaxel and trastuzumab.  Leilani on 3-6 and Shayy on 3-13. She is here for cycle 10 of 12 today.  We will see her each cycle because of symptoms with treatment. Radiation oncology consultation with Dr. Ortega 3-28.  Follow up with me 3-27 to start every 3 week trastuzumab.      Thank you for allowing us to participate in this patient's care.  The patient was seen and evaluated by me.  I discussed the patient with the resident Dr. Te Rainey and agree with the findings and plan in the note.      Sincerely,      Chito Cheung MD  Professor  [facility]  [phone]     I spent 40 minutes with the patient more than 50% of which was in counseling and coordination of care. The remainder of a 10 point review of systems was negative.      Again, thank you for allowing me to participate in the care of your patient.        Sincerely,        Chito Cheung MD    Electronically signed

## 2025-04-10 NOTE — PROGRESS NOTES
Infusion Nursing Note:  Zenaida Qiu Reena presents today for Cycle 2 Day 1 Trastuzumab-qyyp.    Patient seen by provider today: Yes: Dr. Cheung   present during visit today: Not Applicable.    Note: Zenaida comes into the clinic today doing well overall and has no concerns to offer following her provider visit. She has no pain and denies s/s of infection.      Intravenous Access:  Implanted Port.    Treatment Conditions:  Lab Results   Component Value Date    HGB 12.0 04/10/2025    WBC 6.2 04/10/2025    ANEU 2.5 11/23/2018    ANEUTAUTO 3.0 04/10/2025     04/10/2025        Lab Results   Component Value Date     04/10/2025    POTASSIUM 4.1 04/10/2025    MAG 1.9 08/14/2007    CR 0.58 04/10/2025    GAEL 9.6 04/10/2025    BILITOTAL 0.3 04/10/2025    ALBUMIN 4.2 04/10/2025    ALT 22 04/10/2025    AST 18 04/10/2025       Results reviewed, labs MET treatment parameters, ok to proceed with treatment.  ECHO/MUGA completed 2/13/25  EF 60-65%.      Post Infusion Assessment:  Patient tolerated infusion without incident.  Blood return noted pre and post infusion.  Site patent and intact, free from redness, edema or discomfort.  No evidence of extravasations.  Access discontinued per protocol.       Discharge Plan:   Patient declined prescription refills.  Discharge instructions reviewed with: Patient and spouse.  Patient and/or family verbalized understanding of discharge instructions and all questions answered.  AVS to patient via [employer].  Patient will return 5/1 for next appointment.   Patient discharged in stable condition accompanied by: self and .  Departure Mode: Ambulatory.      Janell Cross RN

## 2025-04-10 NOTE — NURSING NOTE
"Oncology Rooming Note    April 10, 2025 10:41 AM   Zenaida Valles is a 75 year old female who presents for:    Chief Complaint   Patient presents with    Port Draw     Labs drawn from port by rn.  VS taken.    Oncology Clinic Visit     Invasive ductal carcinoma of breast, female, left     Initial Vitals: /82 (BP Location: Right arm, Patient Position: Sitting, Cuff Size: Adult Regular)   Pulse 73   Temp 97.9  F (36.6  C) (Oral)   Resp 16   Wt 76.7 kg (169 lb 1.6 oz)   LMP  (LMP Unknown)   SpO2 98%   BMI 28.14 kg/m   Estimated body mass index is 28.14 kg/m  as calculated from the following:    Height as of 2/14/25: 1.651 m (5' 5\").    Weight as of this encounter: 76.7 kg (169 lb 1.6 oz). Body surface area is 1.88 meters squared.  No Pain (0) Comment: Data Unavailable   No LMP recorded (lmp unknown). Patient is postmenopausal.  Allergies reviewed: Yes  Medications reviewed: Yes    Medications: Medication refills not needed today.  Pharmacy name entered into EPIC:    [facility] [place], [place] - [address] [facility] [place] [facility] [place], [place] - [address]  [facility] [place], [place] - [address]    Frailty Screening:   Is the patient here for a new oncology consult visit in cancer care? 2. No    PHQ9:  Did this patient require a PHQ9?: No      Clinical concerns: none       Ronni Crain              "

## 2025-04-10 NOTE — PATIENT INSTRUCTIONS
[facility] Triage and after hours / weekends / holidays:  [phone]    Please call the triage or after hours line if you experience a temperature greater than or equal to 100.4, shaking chills, have uncontrolled nausea, vomiting and/or diarrhea, dizziness, shortness of breath, chest pain, bleeding, unexplained bruising, or if you have any other new/concerning symptoms, questions or concerns.      If you are having any concerning symptoms or wish to speak to a provider before your next infusion visit, please call triage to notify them so we can adequately serve you.     If you need a refill on a narcotic prescription or other medication, please call before your infusion appointment.

## 2025-04-12 RX ORDER — HEPARIN SODIUM,PORCINE 10 UNIT/ML
5-20 VIAL (ML) INTRAVENOUS DAILY PRN
OUTPATIENT
Start: 2025-04-12

## 2025-04-12 RX ORDER — LETROZOLE 2.5 MG/1
2.5 TABLET, FILM COATED ORAL DAILY
Qty: 90 TABLET | Refills: 3 | Status: SHIPPED | OUTPATIENT
Start: 2025-04-12

## 2025-04-12 RX ORDER — ZOLEDRONIC ACID 0.04 MG/ML
4 INJECTION, SOLUTION INTRAVENOUS ONCE
OUTPATIENT
Start: 2025-04-12 | End: 2025-04-12

## 2025-04-12 RX ORDER — HEPARIN SODIUM (PORCINE) LOCK FLUSH IV SOLN 100 UNIT/ML 100 UNIT/ML
5 SOLUTION INTRAVENOUS
OUTPATIENT
Start: 2025-04-12

## 2025-04-15 ENCOUNTER — OFFICE VISIT (OUTPATIENT)
Dept: RADIATION ONCOLOGY | Facility: CLINIC | Age: 75
End: 2025-04-15
Attending: RADIOLOGY
Payer: COMMERCIAL

## 2025-04-15 DIAGNOSIS — C50.912 INVASIVE DUCTAL CARCINOMA OF BREAST, FEMALE, LEFT (H): Primary | ICD-10-CM

## 2025-04-15 PROCEDURE — 77280 THER RAD SIMULAJ FIELD SMPL: CPT | Mod: 26 | Performed by: RADIOLOGY

## 2025-04-15 PROCEDURE — 77280 THER RAD SIMULAJ FIELD SMPL: CPT | Performed by: RADIOLOGY

## 2025-04-15 NOTE — PROGRESS NOTES
RADIATION ONCOLOGY WEEKLY ON TREATMENT VISIT   Encounter Date: 2025     Patient Name: Zenaida Valles  MRN: [id number]  : 1950     Disease and Stage: pT1 cN0 M0 left breast cancer  Treatment Site: Left breast  Current Dose/Planned Total Dose: 520/2600 cGy to the whole breast, 0/520 cGy to lumpectomy bed boost  Current Fraction/Planned Total fractions: 1/5 to the whole breast, 0/1 to lumpectomy bed boost  Concurrent Chemotherapy: No  Drug and Frequency: N/A      ED visits/Hospitalizations:  N/A    Missed Treatments:  N/A    Subjective: Ms. Lazarsu Valles presents to clinic today for her weekly on-treatment visit.  She expresses no acute concerns after 1 dose of treatment.    Nursing ROS:   Nutrition Alteration  Diet Type: Patient's Preference  Skin  Skin Reaction: 0 - No changes  Skin Intervention: Pt educated on the use of aqupahor 1-2x/day during the course of receiving treament  Skin Note: Pt reported a feeling of warmth and being flushed while in the RT machine, but this sensation has gradually improved since her treatment ended.  Pt denies seeing of blue lights or an increased sense of smell while receiving her treatment today.  CNS Alteration  CNS note: WNL     Cardiovascular  Respiratory effort: 1 - Normal - without distress  Gastrointestinal  Nausea: 0 - None     Psychosocial  Mood - Anxiety: 0 - Normal  Mood - Depression: 0 - Normal  Pain Assessment  0-10 Pain Scale: 0      Objective:   /68 (BP Location: Left arm)   Pulse 72   Resp 18   Wt 77.6 kg (171 lb)   LMP  (LMP Unknown)   SpO2 96%   BMI 28.46 kg/m     KPS 80  Pain Score No Pain (0)/10  General: Alert and in no acute distress.  HEENT: Normocephalic, atraumatic. No visible scleral icterus.  Neck: Apparent full range of motion.  CV: Appears well-perfused, with no visible cyanosis.  Lungs: Breathing easily on room air, with no difficulty completing full sentences  Extremities:  No visible edema of the upper  extremities.   Neuro: Alert and oriented; grossly nonfocal. Normal speech. Moving upper and lower extremities equally.  Gait within normal limits.  Skin: No visible jaundice. No suspicious lesions of the visualized integuments.  Psych: Mood and affect are appropriate to given situation. Answers questions appropriately.        Treatment-related toxicities (CTCAE v5.0):  Fatigue: Grade 1: Fatigue relieved by rest  Pain: Grade 0: No toxicity  Dermatitis: Grade 0: No toxicity    Assessment:    Ms. Lazarus Valles is a 75 year old female with a recently diagnosed ductal carcinoma of the 3 o'clock position of the left breast, ER 91 to 100%, MI 81 to 90%, HER2 2+ and amplified by FISH, status post left lumpectomy and sentinel lymph node biopsy biopsy on 11/25/2024 with Dr. Wood, revealing residual pT1c (1.1 cm), pN0 (0 out of 2 sentinel nodes involved), cM0, grade 2, absent LVSI, resected to close posterior margins (1 mm) with all other margins greater than or equal to 5 mm. She started adjuvant chemotherapy with paclitaxel and trastuzumab on 12/27/2024. She transitioned to every 3 week trastuzumab on 3/21/2025. She is receiving adjuvant ultra hypofractionated whole breast radiation with planned lumpectomy bed boost, which she is tolerating well to date.    Plan:   Left breast cancer:  Continue radiotherapy    Pain management:  None indicated    Dermatitis:  Skin care reviewed.  Recommended Aquaphor nightly starting today.  We reviewed that skin reaction will likely take 1 to 2 weeks after radiotherapy     Mosaiq chart and setup information reviewed  Port images reviewed         Jadyn Ortega MD, MPH, PhD     Department of Radiation Oncology  [facility]

## 2025-04-15 NOTE — LETTER
4/15/2025      Zenaida Valles  [address] [facility] [address]      Dear Colleague,    Thank you for referring your patient, Zenaida Valles, to the [facility] RADIATION ONCOLOGY. Please see a copy of my visit note below.    Radiation set up without incident.      Again, thank you for allowing me to participate in the care of your patient.        Sincerely,        Jadyn Ortega    Electronically signed

## 2025-04-16 ENCOUNTER — OFFICE VISIT (OUTPATIENT)
Dept: RADIATION ONCOLOGY | Facility: CLINIC | Age: 75
End: 2025-04-16
Attending: RADIOLOGY
Payer: COMMERCIAL

## 2025-04-16 VITALS
WEIGHT: 171 LBS | OXYGEN SATURATION: 96 % | BODY MASS INDEX: 28.46 KG/M2 | HEART RATE: 72 BPM | RESPIRATION RATE: 18 BRPM | SYSTOLIC BLOOD PRESSURE: 130 MMHG | DIASTOLIC BLOOD PRESSURE: 68 MMHG

## 2025-04-16 DIAGNOSIS — C50.912 INVASIVE DUCTAL CARCINOMA OF BREAST, FEMALE, LEFT (H): Primary | ICD-10-CM

## 2025-04-16 PROCEDURE — 1126F AMNT PAIN NOTED NONE PRSNT: CPT | Performed by: RADIOLOGY

## 2025-04-16 PROCEDURE — 3075F SYST BP GE 130 - 139MM HG: CPT | Performed by: RADIOLOGY

## 2025-04-16 PROCEDURE — 3078F DIAST BP <80 MM HG: CPT | Performed by: RADIOLOGY

## 2025-04-16 PROCEDURE — 77412 RADIATION TX DELIVERY LVL 3: CPT | Performed by: RADIOLOGY

## 2025-04-16 PROCEDURE — 77014 PR CT GUIDE FOR PLACEMENT RADIATION THERAPY FIELDS: CPT | Mod: 26 | Performed by: RADIOLOGY

## 2025-04-16 PROCEDURE — 77387 GUIDANCE FOR RADJ TX DLVR: CPT | Performed by: RADIOLOGY

## 2025-04-16 ASSESSMENT — PAIN SCALES - GENERAL: PAINLEVEL_OUTOF10: NO PAIN (0)

## 2025-04-17 ENCOUNTER — APPOINTMENT (OUTPATIENT)
Dept: RADIATION ONCOLOGY | Facility: CLINIC | Age: 75
End: 2025-04-17
Attending: RADIOLOGY
Payer: COMMERCIAL

## 2025-04-17 PROCEDURE — 77387 GUIDANCE FOR RADJ TX DLVR: CPT | Performed by: RADIOLOGY

## 2025-04-17 PROCEDURE — 77014 PR CT GUIDE FOR PLACEMENT RADIATION THERAPY FIELDS: CPT | Mod: 26 | Performed by: RADIOLOGY

## 2025-04-17 PROCEDURE — 77412 RADIATION TX DELIVERY LVL 3: CPT | Performed by: RADIOLOGY

## 2025-04-18 ENCOUNTER — APPOINTMENT (OUTPATIENT)
Dept: RADIATION ONCOLOGY | Facility: CLINIC | Age: 75
End: 2025-04-18
Attending: RADIOLOGY
Payer: COMMERCIAL

## 2025-04-18 PROCEDURE — 77387 GUIDANCE FOR RADJ TX DLVR: CPT | Performed by: RADIOLOGY

## 2025-04-18 PROCEDURE — 77014 PR CT GUIDE FOR PLACEMENT RADIATION THERAPY FIELDS: CPT | Mod: 26 | Performed by: RADIOLOGY

## 2025-04-18 PROCEDURE — 77412 RADIATION TX DELIVERY LVL 3: CPT | Performed by: RADIOLOGY

## 2025-04-19 NOTE — TELEPHONE ENCOUNTER
Attempted to contact patient regarding upcoming Endoscopic ultrasound (EUS) procedure on 3/14/23 for pre assessment questions. No answer.     Left message to return call to [phone] #[phone]    Discuss Covid policy and designated  policy.    Pre op exam? N/A    Arrival time: 0730. Procedure time: 0900    Facility location: [facility]; [address] [facility], 3rd Floor, [place] [address]    Sedation type: MAC    Anticoagulants: No    Electronic implanted devices? No    Diabetic? Yes - Patient to hold oral diabetic medications day of procedure    Indication for procedure: abnormal finding on imaging, IPMN    Prep instructions sent via [employer].      Sujata Corbett, SHIRA  Endoscopy Procedure Pre Assessment RN           I have personally seen and examined the patient. I have collaborated with and supervised the

## 2025-04-21 ENCOUNTER — ANCILLARY PROCEDURE (OUTPATIENT)
Dept: MAMMOGRAPHY | Facility: CLINIC | Age: 75
End: 2025-04-21
Attending: STUDENT IN AN ORGANIZED HEALTH CARE EDUCATION/TRAINING PROGRAM
Payer: COMMERCIAL

## 2025-04-21 ENCOUNTER — APPOINTMENT (OUTPATIENT)
Dept: RADIATION ONCOLOGY | Facility: CLINIC | Age: 75
End: 2025-04-21
Attending: RADIOLOGY
Payer: COMMERCIAL

## 2025-04-21 ENCOUNTER — OFFICE VISIT (OUTPATIENT)
Dept: RADIATION ONCOLOGY | Facility: CLINIC | Age: 75
End: 2025-04-21
Attending: INTERNAL MEDICINE
Payer: COMMERCIAL

## 2025-04-21 ENCOUNTER — PATIENT OUTREACH (OUTPATIENT)
Dept: ONCOLOGY | Facility: CLINIC | Age: 75
End: 2025-04-21

## 2025-04-21 VITALS
HEART RATE: 73 BPM | SYSTOLIC BLOOD PRESSURE: 123 MMHG | OXYGEN SATURATION: 97 % | TEMPERATURE: 98.1 F | DIASTOLIC BLOOD PRESSURE: 59 MMHG | RESPIRATION RATE: 16 BRPM

## 2025-04-21 DIAGNOSIS — Z85.3 HISTORY OF BREAST CANCER: ICD-10-CM

## 2025-04-21 DIAGNOSIS — C50.912 INVASIVE DUCTAL CARCINOMA OF BREAST, FEMALE, LEFT (H): ICD-10-CM

## 2025-04-21 DIAGNOSIS — C50.912 INVASIVE DUCTAL CARCINOMA OF BREAST, FEMALE, LEFT (H): Primary | ICD-10-CM

## 2025-04-21 DIAGNOSIS — N61.1 BREAST ABSCESS: Primary | ICD-10-CM

## 2025-04-21 PROCEDURE — 3078F DIAST BP <80 MM HG: CPT | Performed by: STUDENT IN AN ORGANIZED HEALTH CARE EDUCATION/TRAINING PROGRAM

## 2025-04-21 PROCEDURE — 77334 RADIATION TREATMENT AID(S): CPT | Performed by: RADIOLOGY

## 2025-04-21 PROCEDURE — 77387 GUIDANCE FOR RADJ TX DLVR: CPT | Performed by: RADIOLOGY

## 2025-04-21 PROCEDURE — 3074F SYST BP LT 130 MM HG: CPT | Performed by: STUDENT IN AN ORGANIZED HEALTH CARE EDUCATION/TRAINING PROGRAM

## 2025-04-21 PROCEDURE — 77321 SPECIAL TELETX PORT PLAN: CPT | Performed by: RADIOLOGY

## 2025-04-21 PROCEDURE — 76642 ULTRASOUND BREAST LIMITED: CPT | Mod: LT | Performed by: STUDENT IN AN ORGANIZED HEALTH CARE EDUCATION/TRAINING PROGRAM

## 2025-04-21 PROCEDURE — 1125F AMNT PAIN NOTED PAIN PRSNT: CPT | Performed by: STUDENT IN AN ORGANIZED HEALTH CARE EDUCATION/TRAINING PROGRAM

## 2025-04-21 PROCEDURE — 77412 RADIATION TX DELIVERY LVL 3: CPT | Performed by: RADIOLOGY

## 2025-04-21 PROCEDURE — 77321 SPECIAL TELETX PORT PLAN: CPT | Mod: 26 | Performed by: RADIOLOGY

## 2025-04-21 PROCEDURE — 77334 RADIATION TREATMENT AID(S): CPT | Mod: 26 | Performed by: RADIOLOGY

## 2025-04-21 RX ORDER — CEPHALEXIN 500 MG/1
500 CAPSULE ORAL 4 TIMES DAILY
Qty: 28 CAPSULE | Refills: 0 | Status: SHIPPED | OUTPATIENT
Start: 2025-04-21 | End: 2025-04-28

## 2025-04-21 ASSESSMENT — PAIN SCALES - GENERAL: PAINLEVEL_OUTOF10: MILD PAIN (2)

## 2025-04-21 NOTE — PROGRESS NOTES
Zenaida Qiu is a 75F hx IDC pT1 cN0 M0 s/p lumpectomy with close posterior margins started on adjuvant chemotherapy with paclitaxel and trastuzumab now on treatment for adjuvant ultra hypofractionated whole breast radiation with planned lumpectomy bed boost. She has been tolerating treatment well (completing fraction 4/6 today) but shared that she had developed a new palpable erythematous nodule near the medial superior treatment edge. She has a history of what appear to be sebaceous cysts that she has routinely expressed for the past ~20 years without issue. She noticed two such lesions around Wednesday 4/16/2025 and treated them as usual; however, one expressed internally. That site developed into the lesion over the weekend.    On exam, she has an approximately 2 x 2 x 3 cm mobile nodule with overlying erythema. She is afebrile and has no constitutional symptoms. For this new lesion most consistent with a superficial breast abscess, we recommended proceeding to the breast imaging center for evaluation and potential drainage.    I saw this patient and made this recommendation with my attending, Dr. Erum Pike, following discussion with Dr. Jadyn Ortega.    Ulices Dow MD PhD PGY2  Department of Radiation Oncology  [facility]  Phone: [phone]      Evaluation at the breast imaging center was most consistent with an infected/inflamed sebaceous cyst and showed no collections amenable to drainage. We adopted their recommendation to treat this undrained superficial skin abscess with a 7 day course of cephalexin 500 mg 4 times daily. We will plan to continue treatment as planned for the remaining two treatments. The patient was instructed to reach out with any concerns or questions.

## 2025-04-21 NOTE — PROGRESS NOTES
"[facility]: Surgical Oncology Cancer Care Short Note                                     Discussion with Patient:                                                       Received message from Radiation Oncology Team on [employer] Secure Chat. Aurea Hammer RN stated Zenaida was see in their clinic today for her radiation treatment and she has and she has a \"left breast lump\" which they would like drained prior to Zenaida's next radiation treatment on 4/22/2025. They shared the area is red, swollen with 2/10 pain.     Reviewed with Aurea that we do not have a provider in clinic today to assess Zenaida. Recommended starting with imaging. [facility] kindly agreed to see Zenaida to complete a left breast US.     US showed a sebaceous cyst that now appears to be inflamed/infected. It is not suitable to draining, but they recommended antibiotics.     Plan made for antibiotics for cyst. Prescription was kindly placed by Radiologist, Dr. Ulices Dow. Zenaida is aware she should report to Radiation Therapy tomorrow and follow up with her Dermatologist or PCP for further assessment and treatment of the cyst. Zenaida verbalized understanding of the plan.      Reviewed plan for visit with Dr. Cheung on 5/1/2025. Encouraged Zenaida  to call with any further questions or concerns. Direct contact number provided.      Emelyn Cheung RNCC  [facility]   Surgical Oncology          Approximately 15 minutes was spent in conversation with the patient/caregiver.    "

## 2025-04-21 NOTE — LETTER
"4/21/2025      Zenaida Valles  [address] [facility] [address]      Dear Colleague,    Thank you for referring your patient, Zenaida Valles, to the [facility] RADIATION ONCOLOGY. Please see a copy of my visit note below.    .Oncology Rooming Note    April 21, 2025 10:17 AM   Zenaida Valles is a 75 year old female who presents for:    Chief Complaint   Patient presents with     Oncology Clinic Visit     Lump in breast     Initial Vitals: /59 (BP Location: Right arm, Patient Position: Sitting, Cuff Size: Adult Regular)   Pulse 73   Temp 98.1  F (36.7  C) (Oral)   Resp 16   LMP  (LMP Unknown)   SpO2 97%  Estimated body mass index is 28.46 kg/m  as calculated from the following:    Height as of 2/14/25: 1.651 m (5' 5\").    Weight as of 4/16/25: 77.6 kg (171 lb). There is no height or weight on file to calculate BSA.  Mild Pain (2) Comment: Data Unavailable   No LMP recorded (lmp unknown). Patient is postmenopausal.  Allergies reviewed: No  Medications reviewed: No    Medications: Medication refills not needed today.  Pharmacy name entered into [employer]:    [facility] [place], [place] - [address] [facility] [place], [place] - [address]  [facility] [place], [place] - [address] [place] [address]    Frailty Screening:   Is the patient here for a new oncology consult visit in cancer care? 2. No    PHQ9:  Did this patient require a PHQ9?: No      Clinical concerns: Patient concerned about red lump in left breast. Says she usually self expresses two different foliciles (for the past 20 years) and it normally drains out but this time it popped inside. Reports some pain. Area is red and swollen.   Dr Dow was notified.    Aurea Hammer RN  Radiation Oncology     After Physician visit. Coordinated with nurse care coordinators for Med Onc and Nina who were able to get her in to see Breast " imaging. Informed patient to head over there now. Nurse care coordinators updated.       Aurea Hammer RN  Radiation Oncology     [address] [address]     Contact  Clinic : [phone]  Clinic Nurse Desk: [phone]  Radiation Therapists/[phone]  After Hours (On-Call): [phone]      Again, thank you for allowing me to participate in the care of your patient.        Sincerely,        Erum Pike MD PhD    Electronically signed

## 2025-04-21 NOTE — PROGRESS NOTES
".Oncology Rooming Note    April 21, 2025 10:17 AM   Zenaida Valles is a 75 year old female who presents for:    Chief Complaint   Patient presents with    Oncology Clinic Visit     Lump in breast     Initial Vitals: /59 (BP Location: Right arm, Patient Position: Sitting, Cuff Size: Adult Regular)   Pulse 73   Temp 98.1  F (36.7  C) (Oral)   Resp 16   LMP  (LMP Unknown)   SpO2 97%  Estimated body mass index is 28.46 kg/m  as calculated from the following:    Height as of 2/14/25: 1.651 m (5' 5\").    Weight as of 4/16/25: 77.6 kg (171 lb). There is no height or weight on file to calculate BSA.  Mild Pain (2) Comment: Data Unavailable   No LMP recorded (lmp unknown). Patient is postmenopausal.  Allergies reviewed: No  Medications reviewed: No    Medications: Medication refills not needed today.  Pharmacy name entered into [employer]    [facility] [place], [place] - [address] [facility] [place], [place] - [address]  [facility] [place], [place] - [address] [facility]-    Frailty Screening:   Is the patient here for a new oncology consult visit in cancer care? 2. No    PHQ9:  Did this patient require a PHQ9?: No      Clinical concerns: Patient concerned about red lump in left breast. Says she usually self expresses two different foliciles (for the past 20 years) and it normally drains out but this time it popped inside. Reports some pain. Area is red and swollen.   Dr Dow was notified.    Aurea Hammer RN  Radiation Oncology     After Physician visit. Coordinated with nurse care coordinators for Med Onc and Nina who were able to get her in to see Breast imaging. Informed patient to head over there now. Nurse care coordinators updated.       Aurea Hammer RN  Radiation Oncology     [address] [address]     Contact  Clinic : [phone]  Clinic Nurse Desk: " [phone]  Radiation Therapists/[phone]  After Hours (On-Call): [phone]

## 2025-04-22 ENCOUNTER — APPOINTMENT (OUTPATIENT)
Dept: RADIATION ONCOLOGY | Facility: CLINIC | Age: 75
End: 2025-04-22
Attending: RADIOLOGY
Payer: COMMERCIAL

## 2025-04-22 PROCEDURE — 77336 RADIATION PHYSICS CONSULT: CPT | Performed by: RADIOLOGY

## 2025-04-22 PROCEDURE — 77280 THER RAD SIMULAJ FIELD SMPL: CPT | Performed by: RADIOLOGY

## 2025-04-22 PROCEDURE — 77427 RADIATION TX MANAGEMENT X5: CPT | Performed by: RADIOLOGY

## 2025-04-22 PROCEDURE — 77412 RADIATION TX DELIVERY LVL 3: CPT | Performed by: RADIOLOGY

## 2025-04-22 PROCEDURE — 77280 THER RAD SIMULAJ FIELD SMPL: CPT | Mod: 26 | Performed by: RADIOLOGY

## 2025-04-23 ENCOUNTER — APPOINTMENT (OUTPATIENT)
Dept: RADIATION ONCOLOGY | Facility: CLINIC | Age: 75
End: 2025-04-23
Attending: RADIOLOGY
Payer: COMMERCIAL

## 2025-04-23 VITALS
BODY MASS INDEX: 28.36 KG/M2 | DIASTOLIC BLOOD PRESSURE: 68 MMHG | OXYGEN SATURATION: 96 % | SYSTOLIC BLOOD PRESSURE: 124 MMHG | RESPIRATION RATE: 18 BRPM | WEIGHT: 170.4 LBS | HEART RATE: 74 BPM

## 2025-04-23 DIAGNOSIS — C50.912 INVASIVE DUCTAL CARCINOMA OF BREAST, FEMALE, LEFT (H): Primary | ICD-10-CM

## 2025-04-23 PROCEDURE — 77412 RADIATION TX DELIVERY LVL 3: CPT | Performed by: RADIOLOGY

## 2025-04-23 PROCEDURE — 3078F DIAST BP <80 MM HG: CPT | Performed by: RADIOLOGY

## 2025-04-23 PROCEDURE — 1126F AMNT PAIN NOTED NONE PRSNT: CPT | Performed by: RADIOLOGY

## 2025-04-23 PROCEDURE — 3074F SYST BP LT 130 MM HG: CPT | Performed by: RADIOLOGY

## 2025-04-23 ASSESSMENT — PAIN SCALES - GENERAL: PAINLEVEL_OUTOF10: NO PAIN (0)

## 2025-04-23 NOTE — LETTER
2025      Zenaida Valles  [address] [facility] [address]      Dear Colleague,    Thank you for referring your patient, Zenaida Valles, to the [facility] RADIATION ONCOLOGY. Please see a copy of my visit note below.    RADIATION ONCOLOGY WEEKLY ON TREATMENT VISIT   Encounter Date: 2025     Patient Name: Zenaida Valles  MRN: [id number]  : 1950     Disease and Stage: pT1 cN0 M0 left breast cancer  Treatment Site: Left breast  Current Dose/Planned Total Dose: 2600/2600 cGy to the whole breast, 520/520 cGy to lumpectomy bed boost  Current Fraction/Planned Total fractions: 5/5 to the whole breast,  to lumpectomy bed boost  Concurrent Chemotherapy: Yes  Drug and Frequency: Herceptin      ED visits/Hospitalizations:  N/A    Missed Treatments:  N/A    Subjective: Ms. Lazarus Valles presents to clinic today for her weekly on-treatment visit.  She was evaluated on 2025 for increased redness and firmness of a presumed sebaceous cyst in the upper inner left breast field, which arose after she attempted to express material from the centralized pore of the cyst. Ultrasound was performed and showed an inflamed epidermal inclusion/sebaceous cyst. She was managed with Keflex, with improvement to redness and pain. Last dose is on .    Otherwise she has mild redness within the treatment field which is not bothering her. She has continued fatigue.      Objective:   /68 (BP Location: Right arm)   Pulse 74   Resp 18   Wt 77.3 kg (170 lb 6.4 oz)   LMP  (LMP Unknown)   SpO2 96%   BMI 28.36 kg/m     KPS 80  Pain Score No Pain (0)/10  General: Alert and in no acute distress.  HEENT: Normocephalic, atraumatic. No visible scleral icterus.  Neck: Apparent full range of motion.  CV: Appears well-perfused, with no visible cyanosis.  Lungs: Breathing easily on room air, with no difficulty completing full sentences  Extremities:  No visible edema  of the upper extremities.   Neuro: Alert and oriented; grossly nonfocal. Normal speech. Moving upper and lower extremities equally.  Gait within normal limits.  Skin: 3 cm reddened and firm mass in the upper inner breast with centralized pore consistent with known inflamed epidermal inclusion/sebaceous cyst. Otherwise grade 1 erythema nd no hyperpigmentation or desquamation in the field.  Psych: Mood and affect are appropriate to given situation. Answers questions appropriately.        OnTreatment-related toxicities (CTCAE v5.0):  Fatigue: Grade 1: Fatigue relieved by rest  Pain: Grade 0: No toxicity  Dermatitis: Grade 1: Faint erythema or dry desquamation      Assessment:    Ms. Lazarus Valles is a 75 year old female with a recently diagnosed ductal carcinoma of the 3 o'clock position of the left breast, ER 91 to 100%, CO 81 to 90%, HER2 2+ and amplified by FISH, status post left lumpectomy and sentinel lymph node biopsy biopsy on 11/25/2024 with Dr. Wood, revealing residual pT1c (1.1 cm), pN0 (0 out of 2 sentinel nodes involved), cM0, grade 2, absent LVSI, resected to close posterior margins (1 mm) with all other margins greater than or equal to 5 mm. She started adjuvant chemotherapy with paclitaxel and trastuzumab on 12/27/2024. She transitioned to every 3 week trastuzumab on 3/21/2025. She has completed adjuvant ultra hypofractionated whole breast radiation with lumpectomy bed boost, which she is tolerating well to date.     Plan:   Left breast cancer:  Fup in 2 weeks for skin check. I will order mammogram at that time  Continue to follow with Medical Oncology    Pain management:  None indicated    Dermatitis:  Skin care reviewed.  Continue Aquaphor.  We reviewed that skin reaction will likely take 1 to 2 weeks after radiotherapy. She will contact us if skin reaction is significantly worsened or worrisome in the coming 2 weeks prior to evaluation in our department.    Chlorogen chart and setup information  reviewed  Port images reviewed         Jadyn Ortega MD, MPH, PhD     Department of Radiation Oncology  [facility]         Again, thank you for allowing me to participate in the care of your patient.        Sincerely,        Jadyn Ortega    Electronically signed

## 2025-04-23 NOTE — PROGRESS NOTES
RADIATION ONCOLOGY WEEKLY ON TREATMENT VISIT   Encounter Date: 2025     Patient Name: Zenaida Valles  MRN: [id number]  : 1950     Disease and Stage: pT1 cN0 M0 left breast cancer  Treatment Site: Left breast  Current Dose/Planned Total Dose: 2600/2600 cGy to the whole breast, 520/520 cGy to lumpectomy bed boost  Current Fraction/Planned Total fractions: 5/5 to the whole breast, 1 to lumpectomy bed boost  Concurrent Chemotherapy: Yes  Drug and Frequency: Herceptin      ED visits/Hospitalizations:  N/A    Missed Treatments:  N/A    Subjective: Ms. Lazarus Valles presents to clinic today for her weekly on-treatment visit.  She was evaluated on 2025 for increased redness and firmness of a presumed sebaceous cyst in the upper inner left breast field, which arose after she attempted to express material from the centralized pore of the cyst. Ultrasound was performed and showed an inflamed epidermal inclusion/sebaceous cyst. She was managed with Keflex, with improvement to redness and pain. Last dose is on .    Otherwise she has mild redness within the treatment field which is not bothering her. She has continued fatigue.      Objective:   /68 (BP Location: Right arm)   Pulse 74   Resp 18   Wt 77.3 kg (170 lb 6.4 oz)   LMP  (LMP Unknown)   SpO2 96%   BMI 28.36 kg/m     KPS 80  Pain Score No Pain (0)/10  General: Alert and in no acute distress.  HEENT: Normocephalic, atraumatic. No visible scleral icterus.  Neck: Apparent full range of motion.  CV: Appears well-perfused, with no visible cyanosis.  Lungs: Breathing easily on room air, with no difficulty completing full sentences  Extremities:  No visible edema of the upper extremities.   Neuro: Alert and oriented; grossly nonfocal. Normal speech. Moving upper and lower extremities equally.  Gait within normal limits.  Skin: 3 cm reddened and firm mass in the upper inner breast with centralized pore consistent with known  inflamed epidermal inclusion/sebaceous cyst. Otherwise grade 1 erythema nd no hyperpigmentation or desquamation in the field.  Psych: Mood and affect are appropriate to given situation. Answers questions appropriately.        OnTreatment-related toxicities (CTCAE v5.0):  Fatigue: Grade 1: Fatigue relieved by rest  Pain: Grade 0: No toxicity  Dermatitis: Grade 1: Faint erythema or dry desquamation      Assessment:    Ms. Lazarus Valles is a 75 year old female with a recently diagnosed ductal carcinoma of the 3 o'clock position of the left breast, ER 91 to 100%, DC 81 to 90%, HER2 2+ and amplified by FISH, status post left lumpectomy and sentinel lymph node biopsy biopsy on 11/25/2024 with Dr. Wood, revealing residual pT1c (1.1 cm), pN0 (0 out of 2 sentinel nodes involved), cM0, grade 2, absent LVSI, resected to close posterior margins (1 mm) with all other margins greater than or equal to 5 mm. She started adjuvant chemotherapy with paclitaxel and trastuzumab on 12/27/2024. She transitioned to every 3 week trastuzumab on 3/21/2025. She has completed adjuvant ultra hypofractionated whole breast radiation with lumpectomy bed boost, which she is tolerating well to date.     Plan:   Left breast cancer:  Fup in 2 weeks for skin check. I will order mammogram at that time  Continue to follow with Medical Oncology    Pain management:  None indicated    Dermatitis:  Skin care reviewed.  Continue Aquaphor.  We reviewed that skin reaction will likely take 1 to 2 weeks after radiotherapy. She will contact us if skin reaction is significantly worsened or worrisome in the coming 2 weeks prior to evaluation in our department.    [employer] chart and setup information reviewed  Port images reviewed         Jadyn Ortega MD, MPH, PhD     Department of Radiation Oncology  [facility]

## 2025-04-30 ENCOUNTER — TELEPHONE (OUTPATIENT)
Dept: RADIATION ONCOLOGY | Facility: CLINIC | Age: 75
End: 2025-04-30
Payer: COMMERCIAL

## 2025-04-30 RX ORDER — HEPARIN SODIUM,PORCINE 10 UNIT/ML
5-20 VIAL (ML) INTRAVENOUS DAILY PRN
OUTPATIENT
Start: 2025-05-23

## 2025-04-30 RX ORDER — DIPHENHYDRAMINE HYDROCHLORIDE 50 MG/ML
50 INJECTION, SOLUTION INTRAMUSCULAR; INTRAVENOUS
Start: 2025-05-23

## 2025-04-30 RX ORDER — METHYLPREDNISOLONE SODIUM SUCCINATE 40 MG/ML
40 INJECTION INTRAMUSCULAR; INTRAVENOUS
Start: 2025-05-23

## 2025-04-30 RX ORDER — ACETAMINOPHEN 325 MG/1
650 TABLET ORAL
Start: 2025-05-23

## 2025-04-30 RX ORDER — DIPHENHYDRAMINE HCL 25 MG
50 CAPSULE ORAL
Start: 2025-05-23

## 2025-04-30 RX ORDER — LORAZEPAM 2 MG/ML
0.5 INJECTION INTRAMUSCULAR EVERY 4 HOURS PRN
OUTPATIENT
Start: 2025-05-23

## 2025-04-30 RX ORDER — EPINEPHRINE 1 MG/ML
0.3 INJECTION, SOLUTION INTRAMUSCULAR; SUBCUTANEOUS EVERY 5 MIN PRN
OUTPATIENT
Start: 2025-05-23

## 2025-04-30 RX ORDER — ALBUTEROL SULFATE 0.83 MG/ML
2.5 SOLUTION RESPIRATORY (INHALATION)
OUTPATIENT
Start: 2025-05-23

## 2025-04-30 RX ORDER — ALBUTEROL SULFATE 90 UG/1
1-2 INHALANT RESPIRATORY (INHALATION)
Start: 2025-05-23

## 2025-04-30 RX ORDER — DIPHENHYDRAMINE HYDROCHLORIDE 50 MG/ML
25 INJECTION, SOLUTION INTRAMUSCULAR; INTRAVENOUS
Start: 2025-05-23

## 2025-04-30 RX ORDER — HEPARIN SODIUM (PORCINE) LOCK FLUSH IV SOLN 100 UNIT/ML 100 UNIT/ML
5 SOLUTION INTRAVENOUS
OUTPATIENT
Start: 2025-05-23

## 2025-04-30 RX ORDER — MEPERIDINE HYDROCHLORIDE 25 MG/ML
25 INJECTION INTRAMUSCULAR; INTRAVENOUS; SUBCUTANEOUS
OUTPATIENT
Start: 2025-05-23

## 2025-04-30 NOTE — TELEPHONE ENCOUNTER
"At 1155 am this Pt called and informed this RN of concerns with \"a feeling of fluid in her L chest.\"  Pt reported needing to take a few breaths this am to help alleviate this feeling, as well as the same feeling came on again later in the morning, which is what led to making a phone call to radiation and confirming this wasn't a side effect from her L breast radiation completed 4/23/25.  This RN questioned more in regard to feelings of SOB, coughing, and sputum seen with coughing.  Pt denied these symptoms, as well as confirmed it does not feel like when she was diagnosed with pneumonia past mid March of this year.  This RN then asked about the appearance of Pt's documented cyst to which Pt reported the erythema and size has not changed from when the area was last observed 4/23/25 by this RN and Dr. Ortega, but the drainage is now brownish in color.  Pt denies any odor and is afebrile.  This RN encouraged Pt to reach out to her dermatology clinic to see the earliest she could be seen as this cyst has been bothersome in the past, as well as Pt reports other skin lesions they are monitoring.  It was also emphasized to Pt that medical oncology will see the cyst area tomorrow at the appointment with Dr. Cheung, as well as radiation sees Pt on 5/9/25 for a follow-up skin check with Dr. Ortega.  This RN also encouraged Pt to see urgent care for any signs of infection or increased concerns with breathing.  Pt agreed to all of this and states she will bring up her \"feeling of fluid in her L chest\" at her medical oncology appointment tomorrow 5/1/25 as well.      "

## 2025-05-01 ENCOUNTER — APPOINTMENT (OUTPATIENT)
Dept: LAB | Facility: CLINIC | Age: 75
End: 2025-05-01
Payer: COMMERCIAL

## 2025-05-01 ENCOUNTER — ONCOLOGY VISIT (OUTPATIENT)
Dept: ONCOLOGY | Facility: CLINIC | Age: 75
End: 2025-05-01
Attending: INTERNAL MEDICINE
Payer: COMMERCIAL

## 2025-05-01 VITALS
RESPIRATION RATE: 16 BRPM | OXYGEN SATURATION: 99 % | BODY MASS INDEX: 28.36 KG/M2 | SYSTOLIC BLOOD PRESSURE: 112 MMHG | TEMPERATURE: 97.5 F | DIASTOLIC BLOOD PRESSURE: 73 MMHG | WEIGHT: 170.42 LBS | HEART RATE: 68 BPM

## 2025-05-01 DIAGNOSIS — C50.912 INVASIVE DUCTAL CARCINOMA OF BREAST, FEMALE, LEFT (H): Primary | ICD-10-CM

## 2025-05-01 LAB
ALBUMIN SERPL BCG-MCNC: 4.1 G/DL (ref 3.5–5.2)
ALP SERPL-CCNC: 67 U/L (ref 40–150)
ALT SERPL W P-5'-P-CCNC: 20 U/L (ref 0–50)
ANION GAP SERPL CALCULATED.3IONS-SCNC: 8 MMOL/L (ref 7–15)
AST SERPL W P-5'-P-CCNC: 19 U/L (ref 0–45)
BASOPHILS # BLD AUTO: 0.1 10E3/UL (ref 0–0.2)
BASOPHILS NFR BLD AUTO: 1 %
BILIRUB SERPL-MCNC: 0.2 MG/DL
BUN SERPL-MCNC: 17.8 MG/DL (ref 8–23)
CALCIUM SERPL-MCNC: 9.5 MG/DL (ref 8.8–10.4)
CHLORIDE SERPL-SCNC: 102 MMOL/L (ref 98–107)
CREAT SERPL-MCNC: 0.61 MG/DL (ref 0.51–0.95)
EGFRCR SERPLBLD CKD-EPI 2021: >90 ML/MIN/1.73M2
EOSINOPHIL # BLD AUTO: 0.2 10E3/UL (ref 0–0.7)
EOSINOPHIL NFR BLD AUTO: 3 %
ERYTHROCYTE [DISTWIDTH] IN BLOOD BY AUTOMATED COUNT: 13 % (ref 10–15)
GLUCOSE SERPL-MCNC: 117 MG/DL (ref 70–99)
HCO3 SERPL-SCNC: 29 MMOL/L (ref 22–29)
HCT VFR BLD AUTO: 34.8 % (ref 35–47)
HGB BLD-MCNC: 11.5 G/DL (ref 11.7–15.7)
IMM GRANULOCYTES # BLD: 0 10E3/UL
IMM GRANULOCYTES NFR BLD: 0 %
LYMPHOCYTES # BLD AUTO: 1.7 10E3/UL (ref 0.8–5.3)
LYMPHOCYTES NFR BLD AUTO: 31 %
MCH RBC QN AUTO: 29.2 PG (ref 26.5–33)
MCHC RBC AUTO-ENTMCNC: 33 G/DL (ref 31.5–36.5)
MCV RBC AUTO: 88 FL (ref 78–100)
MONOCYTES # BLD AUTO: 0.5 10E3/UL (ref 0–1.3)
MONOCYTES NFR BLD AUTO: 9 %
NEUTROPHILS # BLD AUTO: 3 10E3/UL (ref 1.6–8.3)
NEUTROPHILS NFR BLD AUTO: 56 %
NRBC # BLD AUTO: 0 10E3/UL
NRBC BLD AUTO-RTO: 0 /100
PLATELET # BLD AUTO: 317 10E3/UL (ref 150–450)
POTASSIUM SERPL-SCNC: 4.1 MMOL/L (ref 3.4–5.3)
PROT SERPL-MCNC: 6.6 G/DL (ref 6.4–8.3)
RBC # BLD AUTO: 3.94 10E6/UL (ref 3.8–5.2)
SODIUM SERPL-SCNC: 139 MMOL/L (ref 135–145)
WBC # BLD AUTO: 5.4 10E3/UL (ref 4–11)

## 2025-05-01 PROCEDURE — G0463 HOSPITAL OUTPT CLINIC VISIT: HCPCS | Mod: 25 | Performed by: INTERNAL MEDICINE

## 2025-05-01 PROCEDURE — 82310 ASSAY OF CALCIUM: CPT

## 2025-05-01 PROCEDURE — 36591 DRAW BLOOD OFF VENOUS DEVICE: CPT

## 2025-05-01 PROCEDURE — 258N000003 HC RX IP 258 OP 636: Performed by: INTERNAL MEDICINE

## 2025-05-01 PROCEDURE — 250N000011 HC RX IP 250 OP 636: Performed by: INTERNAL MEDICINE

## 2025-05-01 PROCEDURE — 99215 OFFICE O/P EST HI 40 MIN: CPT | Performed by: INTERNAL MEDICINE

## 2025-05-01 PROCEDURE — 85004 AUTOMATED DIFF WBC COUNT: CPT

## 2025-05-01 RX ORDER — HEPARIN SODIUM (PORCINE) LOCK FLUSH IV SOLN 100 UNIT/ML 100 UNIT/ML
5 SOLUTION INTRAVENOUS
Status: DISCONTINUED | OUTPATIENT
Start: 2025-05-01 | End: 2025-05-04 | Stop reason: HOSPADM

## 2025-05-01 RX ORDER — ZOLEDRONIC ACID 0.04 MG/ML
4 INJECTION, SOLUTION INTRAVENOUS ONCE
Status: COMPLETED | OUTPATIENT
Start: 2025-05-01 | End: 2025-05-01

## 2025-05-01 RX ORDER — HEPARIN SODIUM (PORCINE) LOCK FLUSH IV SOLN 100 UNIT/ML 100 UNIT/ML
5 SOLUTION INTRAVENOUS
Status: DISCONTINUED | OUTPATIENT
Start: 2025-05-01 | End: 2025-05-01 | Stop reason: HOSPADM

## 2025-05-01 RX ADMIN — ZOLEDRONIC ACID 4 MG: 0.04 INJECTION, SOLUTION INTRAVENOUS at 13:54

## 2025-05-01 RX ADMIN — SODIUM CHLORIDE 250 ML: 0.9 INJECTION, SOLUTION INTRAVENOUS at 13:54

## 2025-05-01 RX ADMIN — SODIUM CHLORIDE 470 MG: 0.9 INJECTION, SOLUTION INTRAVENOUS at 13:22

## 2025-05-01 RX ADMIN — HEPARIN 3 ML: 100 SYRINGE at 11:17

## 2025-05-01 RX ADMIN — HEPARIN 5 ML: 100 SYRINGE at 14:13

## 2025-05-01 ASSESSMENT — PAIN SCALES - GENERAL: PAINLEVEL_OUTOF10: MILD PAIN (2)

## 2025-05-01 NOTE — NURSING NOTE
"Pt rating the Pain at her Left Chest Radiation site a \"2/10\" this morning.     Chief Complaint   Patient presents with    Oncology Clinic Visit     Invasive ductal carcinoma of breast, female, left    Port Draw     Labs drawn by RN in Lab from Right Chest Port-a-Cath. Line flushed with Saline and Heparin.      Sujata Jorge RN    "

## 2025-05-01 NOTE — Clinical Note
5/1/2025      Zenaida Valles  [address]  [facility] [id number]      Dear Colleague,    Thank you for referring your patient, Zenaida Valles, to the [facility]. Please see a copy of my visit note below.    MEDICAL ONCOLOGY NOTE     Javier Wood MD  Professor   [facility]  [address] [place] [address]     Re. Zenaida Valles   Female, 74 year old, 1950  MRN: [id number]        Dear Dr. Wood,     Thank you for referring Zenaida Valles who is a 75-year-old woman with a new diagnosis of a screening identified stage I invasive ductal cancer of the upper outer quadrant of the left breast ER positive, DC positive, HER2 amplified stage [id number].       HISTORY OF PRESENT ILLNESS:    Zenaida has been in generally good health except for a right renal artery stenosis requiring a stent.  She also has a left leg injury recently she was in her usual state of good health and had a screening mammogram which showed a suspicious lesion in the upper outer quadrant of the left breast at the 3 o'clock position 5 cm from the nipple there is an irregular hypoechoic mass measuring 1.0 x 0.7 x 0.9 cm in size 1 cm from the nipple there is also a 0.5 x 0.4 cm area of suspicion     She underwent an ultrasound guided biopsy which showed usual ductal hyperplasia and fibrocystic changes microcysts in April Kren metaplasia.  Columnar cell changes.  It invasive ductal carcinoma was also found grade 2 and DCIS grade 2 solid type.  Her breast cancer was estrogen receptor positive progesterone receptor positive at HER2 2+ by IHC.  She then underwent ADRIANA which showed a subpopulation which was 60 to 70% ADRIANA 5 negative and 30 to 40% I-S age 1 positive with a 4.9 HER2 signals per nucleus and the ratio of 2.2 indicating HER2 positivity.     She then came to see Dr. Wood at [facility] for surgical recommendations.  She had a  contrast mammogram which showed a 0.9 cm enhancing lesion in the upper outer quadrant of the left breast.     She has had no weight loss or loss of energy.  She does not sleep during the day.  She can perform all of her household chores.  ECOG 0 PS.      DIAGNOSTIC AND TREATMENT SUMMARY:  On October 7 she underwent a screening mammogram which demonstrated an asymmetry in her left breast.       On October 15 she underwent a diagnostic mammogram which confirmed a left breast asymmetry.  She had an ultrasound which demonstrated 2 masses in her left breast.  At the 3 o'clock position 1 cm from the nipple there was a mass and at the 3 o'clock position 5 cm from the nipple there was a second mass.  Her lymph nodes were normal by ultrasound.       On October 23 she underwent a contrast-enhanced mammography which only demonstrated 1 mass measuring 1 cm.  The right breast was normal.  She underwent ultrasound-guided biopsies of both masses.  The mass at the 3 o'clock position 1 cm from the nipple was benign.  The mass at the 3 o'clock position 5 cm from the nipple demonstrating a grade 2 invasive ductal cancer that was ER positive and HER2 equivocal.  There is also DCIS present.          A. LEFT breast, 3:00, 1 cm from nipple, ultrasound-guided core biopsy:  - Benign breast tissue with usual ductal hyperplasia (UDH) and fibrocystic changes including microcysts, apocrine metaplasia, and columnar cell change  - Rare calcifications associated with benign breast ducts and acini  - Negative for atypia or malignancy      B. LEFT breast, 3:00, 5 cm from nipple, ultrasound-guided core biopsy:   - INVASIVE BREAST CARCINOMA OF NO SPECIAL TYPE (INVASIVE DUCTAL CARCINOMA), [place] grade 2  - Ductal carcinoma in situ (DCIS), nuclear grade 2, solid type  - Invasive carcinoma is estrogen receptor positive, progesterone receptor positive, and HER2 equivocal (score 2+) by immunohistochemistry (see 'Breast Biomarker Reporting Template'  below)  - HER2 FISH is is process; results will be reportedly separately by cytogenetis  - See comment     This FISH analysis is performed in follow up to the reported equivocal (2+) HER2 findings by immunohistochemistry ([id number]).        RESULTS:     Ratio of HER2/MARION-17 signals  Zenaida Valles:  See Interpretation below     Majority (~60-70%) of tumor in area of strongest IHC staining:  Group 5 (ADRIANA Negative)  Avg. number HER2 signals/nucleus:  2.9  Avg. number MARION-17 signals/nucleus:  1.7  HER2/MARION 17 ratio:  1.7     Subpopulation (~30-40%) of tumor in area of strongest IHC staining:  Group 1 (ADRIANA Positive)                             Avg. number HER2 signals/nucleus:  4.9  Avg. number MARION-17 signals/nucleus:  2.3  HER2/MARION 17 ratio:  2.2     **Interpretive guidelines per the American Society of Clinical Oncology/College of American Pathologists Clinical Practice Guideline Update (Margarita VALENZUELA et al, 2023, Arch Pathol Lab Med 147:993):     -- Group 1: HER2/MARION-17 ratio 2.0 or more -AND- avg. number HER2 signals/nucleus 4.0 or more (ADRIANA Positive)  -- Group 2: HER2/MARION-17 ratio 2.0 or more -AND- avg. number HER2 signals/nucleus <4.0 (Additional work required)  -- Group 3: HER2/MARION-17 ratio <2.0 -AND- avg. number HER2 signals/nucleus 6.0 or more (Additional work required)  -- Group 4: HER2/MARION-17 ratio <2.0 -AND- avg. number HER2 signals/nucleus 4.0 or more and <6.0 (Additional work required)  -- Group 5: HER2/MARION-17 ratio <2.0 -AND- avg. number HER2 signals/nucleus <4.0 (ADRIANA Negative)     INTERPRETATION:  Two admixed populations of cells were found in this sample:     Majority (~60-70%) of tumor in area of strongest IHC staining:  Per the American Society of Clinical Oncology/College of American Pathologists Clinical Practice Guideline Focused Update (Margarita VALENZUELA et al, 2018, Arch Pathol Lab Med  doi:10.5858/arpa.9119-9342-WN), the HER2/MARION 17 ratio of 1.7 and average number of HER2 signals/cell of 2.9 places  this population of cells in Group 5 (ADRIANA Negative).      Subpopulation (~30-40%) of tumor in area of strongest IHC staining:  Admixed within this sample were cells with increased HER2 signals, which, when selectively scored, had an average of 4.9 HER2 signals/nucleus and a HER2/MARION 17 ratio of 2.2; per the American Society of Clinical Oncology/College of American Pathologists Clinical Practice Guideline Focused Update (Margarita VALENZUELA et al, 2018, Arch Pathol Lab Med  doi:10.5858/arpa.7714-8151-FU), this population of cells falls into Group 1 (ADRIANA Positive).     PAST MEDICAL HISTORY: No history of breast surgery.  No history of breast cancer in the past.  No history of radiation therapy in the past or radiation exposure.  No history of tumor of any kind.  No history of heart problems, heart attack, breathing problems, blood clots, seizures, arthritis, peptic ulcer disease, osteoporosis or bone fractures.  She is not currently participating in a clinical trial.  She does have a history of asthma and uses an albuterol inhaler in cold weather.     Her past medical history is significant for diabetes mellitus and is on metformin, hypertension obesity, she has IPMN and a right renal artery stent.  The family history is negative for breast cancer.  She has a history of right renal artery stenosis and has a stent in place.     She has a recent history of a left hamstring injury.  She is seeing orthopedics for this problem.  She also has a history of back pain.  Her gait is affected but she can perform activities of daily living.  She has a history of aches in her shoulders.  She had a 65 pound weight loss over the last 2 years which was intentional related to improve diet and exercise. She says she has a history of fibromuscular dysplasia.     She worked in the [employer] in [place] [employer] and was exposed to dengue, malaria, hepatitis A, hepatitis B, encephalitis.  She has also had COVID and the flu.     FAMILY HISTORY:   No  history of ovarian, uterine, colon cancer, or melanoma.  No history of glioma, gastric cancer or pancreatic cancer.  Her mother did have Zollinger-Wheeler syndrome but the patient's genetics are negative for this syndrome.      PAST MENSTRUAL HISTORY:  Age of first menstrual period was 13.   She has been pregnant 1 times with 0 live births and 0 miscarriages and 1 .  Age at in place pregnancy age 28.   Uterus and ovaries are in place.  Last menstrual period was about age 52 and the menopause occurred naturally. No history of hormone replacement therapy.     She does have a history of thickened endometrial lining but by her report no abnormalities of the lining when she had a DIC     HABITS:  She is a never smoker but she has a history of exposure to secondhand smoke from her father as a child.  She consumes alcoholic beverages socially 1 drink every couple of months.     GERMLINE GENETICS: Genetic testing is available for 47 genes associated with cancers of the breast, ovary, uterus, prostate and gastrointestinal system: [employer] Common Hereditary Cancers panel (APC, SUMIT, AXIN2, BARD1, BMPR1A, BRCA1, BRCA2, BRIP1, CDH1, CDK4, CDKN2A, CHEK2, CTNNA1, DICER1, EPCAM, GREM1, HOXB13, KIT, MEN1, MLH1, MSH2, MSH3, MSH6, MUTYH, NBN, NF1, NTHL1, PALB2, PDGFRA, PMS2, POLD1, POLE, PTEN,RAD50, RAD51C, RAD51D, SDHA, SDHB, SDHC, SDHD, SMAD4, SMARCA4, STK11, TP53,TSC1, TSC2, VHL)      ALLERGIES: She has drug allergies to codeine, codeine derivatives, ibuprofen, lisinopril..  No allergy to seafood, iodine, or contrast dye.  She does take daily aspirin 81 mg because of her renal stent.     LUMPECTOMY:  Surgical Pathology Report                         Case: [id number]                                   Authorizing Provider:  Javier Wood MD         Collected:           2024 09:40 AM           Ordering Location:     [facility] OR  Received:            2024 09:54 AM                                   [place]                                                                   Pathologist:           Kristal Lanza MD                                                   Specimens:   A) - Breast, Left, Left breast lumpectomy                                                            B) - Lymph Node(s), [place], Left axillary sentinel lymph node #1                                  C) - Lymph Node(s), [place], Left axillary sentinel lymph node # 2                         Final Diagnosis   A. LEFT breast, RFID tag-localized lumpectomy:  -INVASIVE BREAST CARCINOMA OF NO SPECIAL TYPE (INVASIVE DUCTAL CARCINOMA), FAREED GRADE 2, size 11 mm  -Margins are uninvolved by invasive carcinoma  -Invasive carcinoma is 1 mm from the nearest (posterior) margin, 5 mm from the anterior margin, and > 5 mm from the superior, inferior, medial and lateral margins  -No lymphovascular invasion identified  -Other findings: fibrocystic change (including microcysts with apocrine metaplasia), sclerosing adenosis, and usual ductal hyperplasia  -Calcifications associated with benign acini  -Prior core biopsy site changes  -See tumor synoptic below     B. Lymph node, LEFT axillary, sentinel #1, excision:  -One benign lymph node (0/1)     C. Lymph node, LEFT axillary, sentinel #2, excision:  -One benign lymph node (0/1)                Synoptic Checklist   INVASIVE CARCINOMA OF THE BREAST: Resection   8th Edition - Protocol posted: 12/13/2023INVASIVE CARCINOMA OF THE BREAST: RESECTION - All Specimens                  SPECIMEN     Procedure   Excision (less than total mastectomy)     Specimen Laterality   Left     TUMOR     Tumor Site   Clock position         3 o'clock     Histologic Type   Invasive carcinoma of no special type (ductal)     Histologic Grade ([place] Histologic Score)         Glandular (Acinar) / Tubular Differentiation   Score 2     Nuclear Pleomorphism   Score 3     Mitotic Rate   Score 2     Overall Grade   Grade 2  (scores of 6 or 7)     Tumor Size   Greatest dimension of largest invasive focus (Millimeters): 11 mm     Additional Dimension (Millimeters)   9 mm         9 mm     Tumor Focality   Single focus of invasive carcinoma     Ductal Carcinoma In Situ (DCIS)   Not identified     Lobular Carcinoma In Situ (LCIS)   Not identified     Lymphatic and / or Vascular Invasion   Not identified     Dermal Lymphatic and / or Vascular Invasion   No skin present     Microcalcifications   Present in non-neoplastic tissue     Treatment Effect in the Breast   No known presurgical therapy     MARGINS     Margin Status for Invasive Carcinoma   All margins negative for invasive carcinoma     Distance from Invasive Carcinoma to Closest Margin   1 mm     Closest Margin(s) to Invasive Carcinoma   Posterior     Distance from Invasive Carcinoma to Anterior Margin   5 mm     Distance from Invasive Carcinoma to Superior Margin   Greater than: 5 mm     Distance from Invasive Carcinoma to Inferior Margin   Greater than: 5 mm     Distance from Invasive Carcinoma to Medial Margin   Greater than: 5 mm     Distance from Invasive Carcinoma to Lateral Margin   Greater than: 5 mm     REGIONAL LYMPH NODES     Regional Lymph Node Status   All regional lymph nodes negative for tumor     Total Number of Lymph Nodes Examined (sentinel and non-sentinel)   2     Number of [place] Nodes Examined   2     pTNM CLASSIFICATION (AJCC 8th Edition)     Reporting of pT, pN, and (when applicable) pM categories is based on information available to the pathologist at the time the report is issued. As per the AJCC (Chapter 1, 8th Ed.) it is the managing physician s responsibility to establish the final pathologic stage based upon all pertinent information, including but potentially not limited to this pathology report.     pT Category   pT1c     pN Category   pN0     N Suffix   (sn)     SPECIAL STUDIES               Estrogen Receptor (ER) Status   Positive (greater than 10%  "of cells demonstrate nuclear positivity)     Percentage of Cells with Nuclear Positivity   %               Progesterone Receptor (PgR) Status   Positive     Percentage of Cells with Nuclear Positivity   81-90%               HER2 (by immunohistochemistry)   Equivocal (Score 2+)     Percentage of Cells with Uniform Intense Complete Membrane Staining   0 %               HER2 (by in situ hybridization)   Positive (amplified)     Testing Performed on Case Number   HER2 FISH amplified in 30-40% of tumor cells. Performed on prior core biopsy [id number] specimen B     Comment(s)   Best block: A13   .      Clinical Information   ALEE   The patient is a 74 year old woman with LEFT breast carcinoma. Procedure: LEFT breast RFID tag localized lumpectomy, LEFT axillary sentinel lymph node biopsy.    Gross Description   ALEE ROBIN(1). Breast, Left, Left breast lumpectomy:  The specimen is received fresh with proper patient identification, labeled \"left breast lumpectomy\". It consists of 27.85 g, 6.7 cm from medial to lateral x 4.7 cm from superior to inferior x 2.4 cm from anterior to posterior left breast lumpectomy specimen. The specimen was received previously inked by the surgeon as follows:  Superior - red, anterior - green, inferior - blue, posterior - black, medial - yellow and lateral - orange.     The specimen is sectioned from medial (slice 1) to lateral (slice 15) into 15 slices.          [id number]   This FISH analysis is performed in follow up to the reported equivocal (2+) HER2 findings by immunohistochemistry ([id number]).        RESULTS:     Ratio of HER2/MARION-17 signals  Zenaida Valles:  See Interpretation below     Majority (~60-70%) of tumor in area of strongest IHC staining:  Group 5 (ADRIANA Negative)  Avg. number HER2 signals/nucleus:  2.9  Avg. number MARION-17 signals/nucleus:  1.7  HER2/MARION 17 ratio:  1.7     Subpopulation (~30-40%) of tumor in area of strongest IHC staining:  Group 1 (ADRIANA Positive)  "                            Avg. number HER2 signals/nucleus:  4.9  Avg. number MARION-17 signals/nucleus:  2.3  HER2/MARION 17 ratio:  2.2        TREATMENT HISTORY:  A.  Lumpectomy November 25 showed a stage I T1b NX MX invasive ductal carcinoma of the left breast upper outer quadrant which was estrogen receptor positive (%) progesterone receptor positive (81-90%) and HER2 2+ by IHC and HER2 positive by ADRIANA.  Margins negative for invasive carcinoma.    B.  Plan is to give the APT regimen followed by addition of hormonal therapy with letrozole for 7 years. Right single lumen [place] 12-26.  Started APT regimen with weekly paclitaxel and trastuzuamb beginning 12-27.   C.  Trastuzumab on an every 3-week schedule was started March 21.        INTERVAL HISTORY:  She has some neuropathy pain in her toes which is getting better.  Fatigue is not resolved.  She denies depression or anxiety.  Her [employer] community has been very supportive.  She is now able to go back to quilting which is major activity.   She can perform her activities of daily living.     She does have some mild swelling in her ankles which has been getting better.  She continues on trastuzumab without difficulty.  Her diet is gradually been improving.  She is able to exercise.  She takes calcium and vitamin D.  She started the every 3-week schedule trastuzumab March 21 after completing 12 weeks of weekly trastuzumab and paclitaxel..     She has an ECOG 0 performance status.     REVIEW OF SYSTEMS:   She has had no fevers, cough, chest pain, shortness of breath, mouth sores, hemoptysis, nausea, vomiting, abdominal pain, diarrhea, bone pain, back pain, muscle or joint complaints, hearing loss or depression.  The remainder of a 10-point review of systems is negative.        PHYSICAL EXAMINATION:     VITALS:   /82 (BP Location: Right arm, Patient Position: Sitting, Cuff Size: Adult Regular)   Pulse 73   Temp 97.9  F (36.6  C) (Oral)   Resp 16   Wt 76.7  kg (169 lb 1.6 oz)   LMP  (LMP Unknown)   SpO2 98%   BMI 28.14 kg/m       HEENT:  No alopecia, no lesions in the oropharynx.  Dentition is normal  LYMPH:  There is no palpable cervical, supraclavicular, or subclavicular lymphadenopathy.  BREASTS: Deferred seroma in the left axilla is improving.  Her port is without tenderness or erythema.  LUNGS:  Clear to auscultation.  HEART:  Regular rate and rhythm.  S1, S2. No murmurs  ABDOMEN:  Soft, nontender, without hepatosplenomegaly.  EXTREMITIES:  Without edema.  PSYCH:  Mood and affect were normal.  NEUROLOGIC:  Mood and affect  normal. No focal deficits apparent.          LABORATORY DATA:   CMP and CMP were normal.  Glucose 125.     Echo 60-56% on 2-23-25.     ASSESSMENT AND PLAN:    Zenaida Valles has a stage I T1b NX MX invasive ductal carcinoma of the left breast upper outer quadrant which was estrogen receptor positive (%) progesterone receptor positive (81-90%) and HER2 2+ by IHC and HER2 positive by ADRIANA. Margins negative for invasive carcinoma.    APT regimen.  She tolerated adjuvant HER2 directed therapy with trastuzumab and paclitaxel for 12 weeks and is now on every 3-week trastuzumab to complete a total of 1 year of HER2 directed therapy.  There is a seroma in the left axilla.  Ultrasound of the left breast and axilla was consistent with seroma.  Her exercise tolerance is improving.  She is on Avapro which is an ARB and could be helpful.  Some neuropathy which is improving.  We would also add adjuvant zoledronic acid.  Will start Zometa in May 2 at [place].  We have discussed 10 years of adjuvant hormonal therapy.  We will begin hormonal therapy with letrozole on the next visit.  She completed radiation therapy April 23.  Breast imaging will be a mammogram in 6 months.  Next echo will be May 20 seconds.     History of Zollinger-Wheeler syndrome.    Diabetes.  She is on metformin.  Discussion of exercise.  Discussion of the Lace and Pathway.  We  recommend 150 minutes of exercise per week with mixed cardio and strength training.  Stretch band, hand weights, yoga.  Diet.  I recommended a diet low in saturated fat, but not low in fat with more fruits and vegetables, and less in the way of red meat.  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  Follow up plan.  CBC, CMP.  Next trastuzumab May 2.  Follow-up with me May 2 to begin letrozole and Zometa if she has dental clearance.  Next Echo in May 22.         Thank you for allowing us to participate in this patient's care.  The patient was seen and evaluated by me.  I discussed the patient with the fellow and agree with the findings and plan in the note.      Sincerely,      Chito Cheung MD  Professor  [facility]  [phone]     I spent 40 minutes with the patient more than 50% of which was in counseling and coordination of care.    MEDICAL ONCOLOGY NOTE     Javier Wood [place] [address]     Re. Zenaida Valles   Female, 74 year old, 1950  MRN: [id number]        Dear Dr. Wood,     Thank you for referring Zenaida Valles who is a 75-year-old woman with a new diagnosis of a screening identified stage I invasive ductal cancer of the upper outer quadrant of the left breast ER positive, AL positive, HER2 amplified stage [id number].       HISTORY OF PRESENT ILLNESS:    Zenaida has been in generally good health except for a right renal artery stenosis requiring a stent.  She also has a left leg injury recently she was in her usual state of good health and had a screening mammogram which showed a suspicious lesion in the upper outer quadrant of the left breast at the 3 o'clock position 5 cm from the nipple there is an irregular hypoechoic mass measuring 1.0 x 0.7 x 0.9 cm in size 1 cm from the nipple there is also a 0.5 x 0.4 cm area of suspicion     She underwent an ultrasound guided biopsy which showed usual  ductal hyperplasia and fibrocystic changes microcysts in April Kren metaplasia.  Columnar cell changes.  It invasive ductal carcinoma was also found grade 2 and DCIS grade 2 solid type.  Her breast cancer was estrogen receptor positive progesterone receptor positive at HER2 2+ by IHC.  She then underwent ADRIANA which showed a subpopulation which was 60 to 70% ADRIANA 5 negative and 30 to 40% I-S age 1 positive with a 4.9 HER2 signals per nucleus and the ratio of 2.2 indicating HER2 positivity.     She then came to see Dr. Wood at [facility] for surgical recommendations.  She had a contrast mammogram which showed a 0.9 cm enhancing lesion in the upper outer quadrant of the left breast.     She has had no weight loss or loss of energy.  She does not sleep during the day.  She can perform all of her household chores.  ECOG 0 PS.      DIAGNOSTIC AND TREATMENT SUMMARY:  On October 7 she underwent a screening mammogram which demonstrated an asymmetry in her left breast.       On October 15 she underwent a diagnostic mammogram which confirmed a left breast asymmetry.  She had an ultrasound which demonstrated 2 masses in her left breast.  At the 3 o'clock position 1 cm from the nipple there was a mass and at the 3 o'clock position 5 cm from the nipple there was a second mass.  Her lymph nodes were normal by ultrasound.       On October 23 she underwent a contrast-enhanced mammography which only demonstrated 1 mass measuring 1 cm.  The right breast was normal.  She underwent ultrasound-guided biopsies of both masses.  The mass at the 3 o'clock position 1 cm from the nipple was benign.  The mass at the 3 o'clock position 5 cm from the nipple demonstrating a grade 2 invasive ductal cancer that was ER positive and HER2 equivocal.  There is also DCIS present.          A. LEFT breast, 3:00, 1 cm from nipple, ultrasound-guided core biopsy:  - Benign breast tissue with usual ductal hyperplasia (UDH) and  fibrocystic changes including microcysts, apocrine metaplasia, and columnar cell change  - Rare calcifications associated with benign breast ducts and acini  - Negative for atypia or malignancy      B. LEFT breast, 3:00, 5 cm from nipple, ultrasound-guided core biopsy:   - INVASIVE BREAST CARCINOMA OF NO SPECIAL TYPE (INVASIVE DUCTAL CARCINOMA), [place] grade 2  - Ductal carcinoma in situ (DCIS), nuclear grade 2, solid type  - Invasive carcinoma is estrogen receptor positive, progesterone receptor positive, and HER2 equivocal (score 2+) by immunohistochemistry (see 'Breast Biomarker Reporting Template' below)  - HER2 FISH is is process; results will be reportedly separately by cytogenetis  - See comment     This FISH analysis is performed in follow up to the reported equivocal (2+) HER2 findings by immunohistochemistry ([id number]).        RESULTS:     Ratio of HER2/MARION-17 signals  Zenaida Valles:  See Interpretation below     Majority (~60-70%) of tumor in area of strongest IHC staining:  Group 5 (ADRIANA Negative)  Avg. number HER2 signals/nucleus:  2.9  Avg. number MARION-17 signals/nucleus:  1.7  HER2/MARION 17 ratio:  1.7     Subpopulation (~30-40%) of tumor in area of strongest IHC staining:  Group 1 (ADRIANA Positive)                             Avg. number HER2 signals/nucleus:  4.9  Avg. number MARION-17 signals/nucleus:  2.3  HER2/MARION 17 ratio:  2.2     **Interpretive guidelines per the American Society of Clinical Oncology/College of American Pathologists Clinical Practice Guideline Update (Margarita VALENZUELA et al, 2023, Arch Pathol Lab Med 147:993):     -- Group 1: HER2/MARION-17 ratio 2.0 or more -AND- avg. number HER2 signals/nucleus 4.0 or more (ADRIANA Positive)  -- Group 2: HER2/MARION-17 ratio 2.0 or more -AND- avg. number HER2 signals/nucleus <4.0 (Additional work required)  -- Group 3: HER2/MARION-17 ratio <2.0 -AND- avg. number HER2 signals/nucleus 6.0 or more (Additional work required)  -- Group 4: HER2/MARION-17 ratio <2.0  -AND- avg. number HER2 signals/nucleus 4.0 or more and <6.0 (Additional work required)  -- Group 5: HER2/MARION-17 ratio <2.0 -AND- avg. number HER2 signals/nucleus <4.0 (ADRIANA Negative)     INTERPRETATION:  Two admixed populations of cells were found in this sample:     Majority (~60-70%) of tumor in area of strongest IHC staining:  Per the American Society of Clinical Oncology/College of American Pathologists Clinical Practice Guideline Focused Update (Margarita VALENZUELA et al, 2018, Arch Pathol Lab Med  doi:10.5858/arpa.[id number]-VZ), the HER2/MARION 17 ratio of 1.7 and average number of HER2 signals/cell of 2.9 places this population of cells in Group 5 (ADRIANA Negative).      Subpopulation (~30-40%) of tumor in area of strongest IHC staining:  Admixed within this sample were cells with increased HER2 signals, which, when selectively scored, had an average of 4.9 HER2 signals/nucleus and a HER2/MARION 17 ratio of 2.2; per the American Society of Clinical Oncology/College of American Pathologists Clinical Practice Guideline Focused Update (Margarita VALENZUELA et al, 2018, Arch Pathol Lab Med  doi:10.5858/arpa.[id number]-WQ), this population of cells falls into Group 1 (ADRIANA Positive).     PAST MEDICAL HISTORY: No history of breast surgery.  No history of breast cancer in the past.  No history of radiation therapy in the past or radiation exposure.  No history of tumor of any kind.  No history of heart problems, heart attack, breathing problems, blood clots, seizures, arthritis, peptic ulcer disease, osteoporosis or bone fractures.  She is not currently participating in a clinical trial.  She does have a history of asthma and uses an albuterol inhaler in cold weather.     Her past medical history is significant for diabetes mellitus and is on metformin, hypertension obesity, she has IPMN and a right renal artery stent.  The family history is negative for breast cancer.  She has a history of right renal artery stenosis and has a stent in place.      She has a recent history of a left hamstring injury.  She is seeing orthopedics for this problem.  She also has a history of back pain.  Her gait is affected but she can perform activities of daily living.  She has a history of aches in her shoulders.  She had a 65 pound weight loss over the last 2 years which was intentional related to improve diet and exercise. She says she has a history of fibromuscular dysplasia.     She worked in the [employer] in [employer] and was exposed to dengue, malaria, hepatitis A, hepatitis B, encephalitis.  She has also had COVID and the flu.     FAMILY HISTORY:   No history of ovarian, uterine, colon cancer, or melanoma.  No history of glioma, gastric cancer or pancreatic cancer.  Her mother did have Zollinger-Wheeler syndrome but the patient's genetics are negative for this syndrome.      PAST MENSTRUAL HISTORY:  Age of first menstrual period was 13.   She has been pregnant 1 times with 0 live births and 0 miscarriages and 1 .  Age at in place pregnancy age 28.   Uterus and ovaries are in place.  Last menstrual period was about age 52 and the menopause occurred naturally. No history of hormone replacement therapy.     She does have a history of thickened endometrial lining but by her report no abnormalities of the lining when she had a DIC     HABITS:  She is a never smoker but she has a history of exposure to secondhand smoke from her father as a child.  She consumes alcoholic beverages socially 1 drink every couple of months.     GERMLINE GENETICS: Genetic testing is available for 47 genes associated with cancers of the breast, ovary, uterus, prostate and gastrointestinal system: [facility] Common Hereditary Cancers panel (APC, SUMIT, AXIN2, BARD1, BMPR1A, BRCA1, BRCA2, BRIP1, CDH1, CDK4, CDKN2A, CHEK2, CTNNA1, DICER1, EPCAM, GREM1, HOXB13, KIT, MEN1, MLH1, MSH2, MSH3, MSH6, MUTYH, NBN, NF1, NTHL1, PALB2, PDGFRA, PMS2, POLD1, POLE, PTEN,RAD50, RAD51C, RAD51D, SDHA, SDHB,  SDHC, SDHD, SMAD4, SMARCA4, STK11, TP53,TSC1, TSC2, VHL)      ALLERGIES: She has drug allergies to codeine, codeine derivatives, ibuprofen, lisinopril..  No allergy to seafood, iodine, or contrast dye.  She does take daily aspirin 81 mg because of her renal stent.     LUMPECTOMY:  Surgical Pathology Report                         Case: [id number]                                   Authorizing Provider:  Javier Wood MD         Collected:           11/25/2024 09:40 AM           Ordering Location:     [facility] OR  Received:            11/25/2024 09:54 AM                                  [place]                                                                   Pathologist:           Kristal Lanza MD                                                   Specimens:   A) - Breast, Left, Left breast lumpectomy                                                            B) - Lymph Node(s), [place], Left axillary sentinel lymph node #1                                  C) - Lymph Node(s), [place], Left axillary sentinel lymph node # 2                         Final Diagnosis   A. LEFT breast, RFID tag-localized lumpectomy:  -INVASIVE BREAST CARCINOMA OF NO SPECIAL TYPE (INVASIVE DUCTAL CARCINOMA), FAREED GRADE 2, size 11 mm  -Margins are uninvolved by invasive carcinoma  -Invasive carcinoma is 1 mm from the nearest (posterior) margin, 5 mm from the anterior margin, and > 5 mm from the superior, inferior, medial and lateral margins  -No lymphovascular invasion identified  -Other findings: fibrocystic change (including microcysts with apocrine metaplasia), sclerosing adenosis, and usual ductal hyperplasia  -Calcifications associated with benign acini  -Prior core biopsy site changes  -See tumor synoptic below     B. Lymph node, LEFT axillary, sentinel #1, excision:  -One benign lymph node (0/1)     C. Lymph node, LEFT axillary, sentinel #2, excision:  -One benign lymph node (0/1)                 Synoptic Checklist   INVASIVE CARCINOMA OF THE BREAST: Resection   8th Edition - Protocol posted: 12/13/2023INVASIVE CARCINOMA OF THE BREAST: RESECTION - All Specimens                  SPECIMEN     Procedure   Excision (less than total mastectomy)     Specimen Laterality   Left     TUMOR     Tumor Site   Clock position         3 o'clock     Histologic Type   Invasive carcinoma of no special type (ductal)     Histologic Grade (Weiner Histologic Score)         Glandular (Acinar) / Tubular Differentiation   Score 2     Nuclear Pleomorphism   Score 3     Mitotic Rate   Score 2     Overall Grade   Grade 2 (scores of 6 or 7)     Tumor Size   Greatest dimension of largest invasive focus (Millimeters): 11 mm     Additional Dimension (Millimeters)   9 mm         9 mm     Tumor Focality   Single focus of invasive carcinoma     Ductal Carcinoma In Situ (DCIS)   Not identified     Lobular Carcinoma In Situ (LCIS)   Not identified     Lymphatic and / or Vascular Invasion   Not identified     Dermal Lymphatic and / or Vascular Invasion   No skin present     Microcalcifications   Present in non-neoplastic tissue     Treatment Effect in the Breast   No known presurgical therapy     MARGINS     Margin Status for Invasive Carcinoma   All margins negative for invasive carcinoma     Distance from Invasive Carcinoma to Closest Margin   1 mm     Closest Margin(s) to Invasive Carcinoma   Posterior     Distance from Invasive Carcinoma to Anterior Margin   5 mm     Distance from Invasive Carcinoma to Superior Margin   Greater than: 5 mm     Distance from Invasive Carcinoma to Inferior Margin   Greater than: 5 mm     Distance from Invasive Carcinoma to Medial Margin   Greater than: 5 mm     Distance from Invasive Carcinoma to Lateral Margin   Greater than: 5 mm     REGIONAL LYMPH NODES     Regional Lymph Node Status   All regional lymph nodes negative for tumor     Total Number of Lymph Nodes Examined (sentinel and non-sentinel)   2    "  Number of [place] Nodes Examined   2     pTNM CLASSIFICATION (AJCC 8th Edition)     Reporting of pT, pN, and (when applicable) pM categories is based on information available to the pathologist at the time the report is issued. As per the AJCC (Chapter 1, 8th Ed.) it is the managing physician s responsibility to establish the final pathologic stage based upon all pertinent information, including but potentially not limited to this pathology report.     pT Category   pT1c     pN Category   pN0     N Suffix   (sn)     SPECIAL STUDIES               Estrogen Receptor (ER) Status   Positive (greater than 10% of cells demonstrate nuclear positivity)     Percentage of Cells with Nuclear Positivity   %               Progesterone Receptor (PgR) Status   Positive     Percentage of Cells with Nuclear Positivity   81-90%               HER2 (by immunohistochemistry)   Equivocal (Score 2+)     Percentage of Cells with Uniform Intense Complete Membrane Staining   0 %               HER2 (by in situ hybridization)   Positive (amplified)     Testing Performed on Case Number   HER2 FISH amplified in 30-40% of tumor cells. Performed on prior core biopsy [id number] specimen B     Comment(s)   Best block: A13   .      Clinical Information   UUMAYO   The patient is a 74 year old woman with LEFT breast carcinoma. Procedure: LEFT breast RFID tag localized lumpectomy, LEFT axillary sentinel lymph node biopsy.    Gross Description   ALEE   LOBITO(1). Breast, Left, Left breast lumpectomy:  The specimen is received fresh with proper patient identification, labeled \"left breast lumpectomy\". It consists of 27.85 g, 6.7 cm from medial to lateral x 4.7 cm from superior to inferior x 2.4 cm from anterior to posterior left breast lumpectomy specimen. The specimen was received previously inked by the surgeon as follows:  Superior - red, anterior - green, inferior - blue, posterior - black, medial - yellow and lateral - orange.     The specimen " is sectioned from medial (slice 1) to lateral (slice 15) into 15 slices.          [id number]   This FISH analysis is performed in follow up to the reported equivocal (2+) HER2 findings by immunohistochemistry ([id number]).        RESULTS:     Ratio of HER2/MARION-17 signals  Zenaida Qiu Reena:  See Interpretation below     Majority (~60-70%) of tumor in area of strongest IHC staining:  Group 5 (ADRIANA Negative)  Avg. number HER2 signals/nucleus:  2.9  Avg. number MARION-17 signals/nucleus:  1.7  HER2/MARION 17 ratio:  1.7     Subpopulation (~30-40%) of tumor in area of strongest IHC staining:  Group 1 (ADRIANA Positive)                             Avg. number HER2 signals/nucleus:  4.9  Avg. number MARION-17 signals/nucleus:  2.3  HER2/MARION 17 ratio:  2.2        TREATMENT HISTORY:  A.  Lumpectomy November 25 showed a stage I T1b NX MX invasive ductal carcinoma of the left breast upper outer quadrant which was estrogen receptor positive (%) progesterone receptor positive (81-90%) and HER2 2+ by IHC and HER2 positive by ADRIANA.  Margins negative for invasive carcinoma.    B.  Plan is to give the APT regimen followed by addition of hormonal therapy with letrozole for 7 years. Right single lumen Powerport 12-26.  Started APT regimen with weekly paclitaxel and trastuzuamb beginning 12-27.   C.  Trastuzumab on an every 3-week schedule was started March 21.        INTERVAL HISTORY:  She has some neuropathy pain in her toes which is getting better.  Fatigue is not resolved.  She denies depression or anxiety.  Her [employer] community has been very supportive.  She is now able to go back to quilting which is major activity.   She can perform her activities of daily living.     She does have some mild swelling in her ankles which has been getting better.  She continues on trastuzumab without difficulty.  Her diet is gradually been improving.  She is able to exercise.  She takes calcium and vitamin D.  She started the every 3-week schedule  trastuzumab March 21 after completing 12 weeks of weekly trastuzumab and paclitaxel..     She has an ECOG 0 performance status.     REVIEW OF SYSTEMS:   She has had no fevers, cough, chest pain, shortness of breath, mouth sores, hemoptysis, nausea, vomiting, abdominal pain, diarrhea, bone pain, back pain, muscle or joint complaints, hearing loss or depression.  The remainder of a 10-point review of systems is negative.        PHYSICAL EXAMINATION:     VITALS:   /82 (BP Location: Right arm, Patient Position: Sitting, Cuff Size: Adult Regular)   Pulse 73   Temp 97.9  F (36.6  C) (Oral)   Resp 16   Wt 76.7 kg (169 lb 1.6 oz)   LMP  (LMP Unknown)   SpO2 98%   BMI 28.14 kg/m       HEENT:  No alopecia, no lesions in the oropharynx.  Dentition is normal  LYMPH:  There is no palpable cervical, supraclavicular, or subclavicular lymphadenopathy.  BREASTS: Deferred seroma in the left axilla is improving.  Her port is without tenderness or erythema.  LUNGS:  Clear to auscultation.  HEART:  Regular rate and rhythm.  S1, S2. No murmurs  ABDOMEN:  Soft, nontender, without hepatosplenomegaly.  EXTREMITIES:  Without edema.  PSYCH:  Mood and affect were normal.  NEUROLOGIC:  Mood and affect  normal. No focal deficits apparent.          LABORATORY DATA:   CMP and CMP were normal.  Glucose 125.     Echo 60-56% on 2-23-25.     ASSESSMENT AND PLAN:    Zenaida Valles has a stage I T1b NX MX invasive ductal carcinoma of the left breast upper outer quadrant which was estrogen receptor positive (%) progesterone receptor positive (81-90%) and HER2 2+ by IHC and HER2 positive by ADRIANA. Margins negative for invasive carcinoma.    APT regimen.  She tolerated adjuvant HER2 directed therapy with trastuzumab and paclitaxel for 12 weeks and is now on every 3-week trastuzumab to complete a total of 1 year of HER2 directed therapy.  There is a seroma in the left axilla.  Ultrasound of the left breast and axilla was consistent  with seroma.  Her exercise tolerance is improving.  She is on Avapro which is an ARB and could be helpful.  Some neuropathy which is improving.  We would also add adjuvant zoledronic acid.  Will start Zometa in May 2 at [place].  We have discussed 10 years of adjuvant hormonal therapy.  We will begin hormonal therapy with letrozole on the next visit.  She completed radiation therapy April 23.  Breast imaging will be a mammogram in 6 months.  Next echo will be May 20 seconds.     History of Zollinger-Wheeler syndrome.    Diabetes.  She is on metformin.  Discussion of exercise.  Discussion of the Lace and Pathway.  We recommend 150 minutes of exercise per week with mixed cardio and strength training.  Stretch band, hand weights, yoga.  Diet.  I recommended a diet low in saturated fat, but not low in fat with more fruits and vegetables, and less in the way of red meat.  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  Follow up plan.  Trastuzumab and Zometa today.  Follow up in 3 weeks with CBC, CMP.  Next trastuzumab May 22, CBC, CMP. Next Echo in May 22.  Then Herceptin every 3 weeks June 12, July 3, July 24, August 14with follow up with Leilani every 6 weeks with CBC, CMP .  Follow up with me A        Thank you for allowing us to participate in this patient's care.  The patient was seen and evaluated by me.  I discussed the patient with the fellow and agree with the findings and plan in the note.      Sincerely,      Chito Cheung MD  Professor  [facility]  [phone]     I spent 40 minutes with the patient more than 50% of which was in counseling and coordination of care.      Again, thank you for allowing me to participate in the care of your patient.        Sincerely,        Chito Cheung MD    Electronically signed

## 2025-05-01 NOTE — PROGRESS NOTES
Infusion Nursing Note:  Zenaida Valles presents today for Cycle 3 Day 1 trastuzumab-qyyp, Cycle 1 Day 1 zometa.    Patient seen by provider today: Yes: Dr. Cheung   present during visit today: Not Applicable.    Note: Zenaida presents today feeling well. Denies pain or nausea/vomiting. Offers no concerns since visit with Dr. Cheung prior to infusion.    TORB Dr. Cheung/Jazmine Perry, RN [phone]  Dental clearance received, ok to start zometa today      Intravenous Access:  Implanted Port.    Treatment Conditions:     Latest Reference Range & Units 05/01/25 11:29   Sodium 135 - 145 mmol/L 139   Potassium 3.4 - 5.3 mmol/L 4.1   Chloride 98 - 107 mmol/L 102   Carbon Dioxide (CO2) 22 - 29 mmol/L 29   Urea Nitrogen 8.0 - 23.0 mg/dL 17.8   Creatinine 0.51 - 0.95 mg/dL 0.61   GFR Estimate >60 mL/min/1.73m2 >90   Calcium 8.8 - 10.4 mg/dL 9.5   Anion Gap 7 - 15 mmol/L 8   Albumin 3.5 - 5.2 g/dL 4.1   Protein Total 6.4 - 8.3 g/dL 6.6   Alkaline Phosphatase 40 - 150 U/L 67   ALT 0 - 50 U/L 20   AST 0 - 45 U/L 19   Bilirubin Total <=1.2 mg/dL 0.2   Glucose 70 - 99 mg/dL 117 (H)   WBC 4.0 - 11.0 10e3/uL 5.4   Hemoglobin 11.7 - 15.7 g/dL 11.5 (L)   Hematocrit 35.0 - 47.0 % 34.8 (L)   Platelet Count 150 - 450 10e3/uL 317   RBC Count 3.80 - 5.20 10e6/uL 3.94   MCV 78 - 100 fL 88   MCH 26.5 - 33.0 pg 29.2   MCHC 31.5 - 36.5 g/dL 33.0   RDW 10.0 - 15.0 % 13.0   % Neutrophils % 56   % Lymphocytes % 31   % Monocytes % 9   % Eosinophils % 3   % Basophils % 1   % Immature Granulocytes % 0   NRBC/W <1 /100 0   Absolute Neutrophil 1.6 - 8.3 10e3/uL 3.0   Absolute Lymphocytes 0.8 - 5.3 10e3/uL 1.7   Absolute Monocytes 0.0 - 1.3 10e3/uL 0.5   Absolute Eosinophils 0.0 - 0.7 10e3/uL 0.2   Absolute Basophils 0.0 - 0.2 10e3/uL 0.1   Absolute Immature Granulocytes <=0.4 10e3/uL 0.0   Absolute NRBCs 10e3/uL 0.0     Results reviewed, labs MET treatment parameters, ok to proceed with treatment.  ECHO/MUGA completed 02/13/25  EF  60-65%.      Post Infusion Assessment:  Patient tolerated infusion without incident.  Blood return noted pre and post infusion.  Site patent and intact, free from redness, edema or discomfort.  No evidence of extravasations.  Access discontinued per protocol.       Discharge Plan:   Patient declined prescription refills.  Discharge instructions reviewed with: Patient.  Patient and/or family verbalized understanding of discharge instructions and all questions answered.  AVS to patient via [employer].  Patient will return 05/22 for next infusion appointment.   Patient discharged in stable condition accompanied by: .  Departure Mode: Ambulatory.      Jazmine Perry RN

## 2025-05-01 NOTE — PATIENT INSTRUCTIONS
[facility] Triage and after hours / weekends / holidays:  [phone]    Please call the triage or after hours line if you experience a temperature greater than or equal to 100.4, shaking chills, have uncontrolled nausea, vomiting and/or diarrhea, dizziness, shortness of breath, chest pain, bleeding, unexplained bruising, or if you have any other new/concerning symptoms, questions, or concerns.      If you are having any concerning symptoms or wish to speak to a provider before your next infusion visit, please call your care coordinator or triage to notify them so we can adequately serve you.     If you need a refill on a narcotic prescription or other medication, please call before your infusion appointment.

## 2025-05-01 NOTE — NURSING NOTE
"Oncology Rooming Note    May 1, 2025 11:36 AM   Zenaida Valles is a 75 year old female who presents for:    Chief Complaint   Patient presents with    Oncology Clinic Visit     Invasive ductal carcinoma of breast, female, left    Port Draw     Labs drawn by RN in Lab from Right Chest Port-a-Cath. Line flushed with Saline and Heparin.      Initial Vitals: /73   Pulse 68   Temp 97.5  F (36.4  C) (Oral)   Resp 16   Wt 77.3 kg (170 lb 6.7 oz)   LMP  (LMP Unknown)   SpO2 99%   BMI 28.36 kg/m   Estimated body mass index is 28.36 kg/m  as calculated from the following:    Height as of 2/14/25: 1.651 m (5' 5\").    Weight as of this encounter: 77.3 kg (170 lb 6.7 oz). Body surface area is 1.88 meters squared.  Mild Pain (2) Comment: Data Unavailable   No LMP recorded (lmp unknown). Patient is postmenopausal.  Allergies reviewed: Yes  Medications reviewed: Yes    Medications: Medication refills not needed today.  Pharmacy name entered into EPIC:    [facility] [place], [place] - [address] [facility] [place], [place] - [address] [place] [address].  [facility] [place], [place] - [address] [place] [address] [address]VE [facility]  [facility] [place], [place] - [address] [facility] [address]    Frailty Screening:   Is the patient here for a new oncology consult visit in cancer care? 2. No    PHQ9:  Did this patient require a PHQ9?: No      Clinical concerns: none      Lucero Roger            "

## 2025-05-01 NOTE — PROGRESS NOTES
MEDICAL ONCOLOGY NOTE     Javier Wood MD  Professor   [address] [address]     Re. Zenaida Valles   Female, 74 year old, 1950  MRN: [id number]        Dear Dr. Wood,     Thank you for referring Zenaida Valles who is a 75-year-old woman with a new diagnosis of a screening identified stage I invasive ductal cancer of the upper outer quadrant of the left breast ER positive, AK positive, HER2 amplified stage [id number].       HISTORY OF PRESENT ILLNESS:    Zenaida has been in generally good health except for a right renal artery stenosis requiring a stent.  She also has a left leg injury recently she was in her usual state of good health and had a screening mammogram which showed a suspicious lesion in the upper outer quadrant of the left breast at the 3 o'clock position 5 cm from the nipple there is an irregular hypoechoic mass measuring 1.0 x 0.7 x 0.9 cm in size 1 cm from the nipple there is also a 0.5 x 0.4 cm area of suspicion     She underwent an ultrasound guided biopsy which showed usual ductal hyperplasia and fibrocystic changes microcysts in April Kren metaplasia.  Columnar cell changes.  It invasive ductal carcinoma was also found grade 2 and DCIS grade 2 solid type.  Her breast cancer was estrogen receptor positive progesterone receptor positive at HER2 2+ by IHC.  She then underwent ADRIANA which showed a subpopulation which was 60 to 70% ADRIANA 5 negative and 30 to 40% I-S age 1 positive with a 4.9 HER2 signals per nucleus and the ratio of 2.2 indicating HER2 positivity.     She then came to see Dr. Wood at [facility] for surgical recommendations.  She had a contrast mammogram which showed a 0.9 cm enhancing lesion in the upper outer quadrant of the left breast.     She has had no weight loss or loss of energy.  She does not sleep during the day.  She can perform all of her household chores.  ECOG 0 PS.      DIAGNOSTIC  AND TREATMENT SUMMARY:  On October 7 she underwent a screening mammogram which demonstrated an asymmetry in her left breast.       On October 15 she underwent a diagnostic mammogram which confirmed a left breast asymmetry.  She had an ultrasound which demonstrated 2 masses in her left breast.  At the 3 o'clock position 1 cm from the nipple there was a mass and at the 3 o'clock position 5 cm from the nipple there was a second mass.  Her lymph nodes were normal by ultrasound.       On October 23 she underwent a contrast-enhanced mammography which only demonstrated 1 mass measuring 1 cm.  The right breast was normal.  She underwent ultrasound-guided biopsies of both masses.  The mass at the 3 o'clock position 1 cm from the nipple was benign.  The mass at the 3 o'clock position 5 cm from the nipple demonstrating a grade 2 invasive ductal cancer that was ER positive and HER2 equivocal.  There is also DCIS present.          A. LEFT breast, 3:00, 1 cm from nipple, ultrasound-guided core biopsy:  - Benign breast tissue with usual ductal hyperplasia (UDH) and fibrocystic changes including microcysts, apocrine metaplasia, and columnar cell change  - Rare calcifications associated with benign breast ducts and acini  - Negative for atypia or malignancy      B. LEFT breast, 3:00, 5 cm from nipple, ultrasound-guided core biopsy:   - INVASIVE BREAST CARCINOMA OF NO SPECIAL TYPE (INVASIVE DUCTAL CARCINOMA), [place] grade 2  - Ductal carcinoma in situ (DCIS), nuclear grade 2, solid type  - Invasive carcinoma is estrogen receptor positive, progesterone receptor positive, and HER2 equivocal (score 2+) by immunohistochemistry (see 'Breast Biomarker Reporting Template' below)  - HER2 FISH is is process; results will be reportedly separately by cytogenetis  - See comment     This FISH analysis is performed in follow up to the reported equivocal (2+) HER2 findings by immunohistochemistry ([id number]).        RESULTS:     Ratio of  HER2/MARION-17 signals  Zenaida Lazarus Valles:  See Interpretation below     Majority (~60-70%) of tumor in area of strongest IHC staining:  Group 5 (ADRIANA Negative)  Avg. number HER2 signals/nucleus:  2.9  Avg. number MARION-17 signals/nucleus:  1.7  HER2/MARION 17 ratio:  1.7     Subpopulation (~30-40%) of tumor in area of strongest IHC staining:  Group 1 (ADRIANA Positive)                             Avg. number HER2 signals/nucleus:  4.9  Avg. number MARION-17 signals/nucleus:  2.3  HER2/MARION 17 ratio:  2.2     **Interpretive guidelines per the American Society of Clinical Oncology/College of American Pathologists Clinical Practice Guideline Update (Margarita VALENZUELA et al, 2023, Arch Pathol Lab Med 147:993):     -- Group 1: HER2/MARION-17 ratio 2.0 or more -AND- avg. number HER2 signals/nucleus 4.0 or more (ADRIANA Positive)  -- Group 2: HER2/MARION-17 ratio 2.0 or more -AND- avg. number HER2 signals/nucleus <4.0 (Additional work required)  -- Group 3: HER2/MARION-17 ratio <2.0 -AND- avg. number HER2 signals/nucleus 6.0 or more (Additional work required)  -- Group 4: HER2/MARION-17 ratio <2.0 -AND- avg. number HER2 signals/nucleus 4.0 or more and <6.0 (Additional work required)  -- Group 5: HER2/MARION-17 ratio <2.0 -AND- avg. number HER2 signals/nucleus <4.0 (ADRIANA Negative)     INTERPRETATION:  Two admixed populations of cells were found in this sample:     Majority (~60-70%) of tumor in area of strongest IHC staining:  Per the American Society of Clinical Oncology/College of American Pathologists Clinical Practice Guideline Focused Update (Margarita VALENZUELA et al, 2018, Arch Pathol Lab Med  doi:10.5858/arpa.2485-6130-CC), the HER2/MARION 17 ratio of 1.7 and average number of HER2 signals/cell of 2.9 places this population of cells in Group 5 (ADRIANA Negative).      Subpopulation (~30-40%) of tumor in area of strongest IHC staining:  Admixed within this sample were cells with increased HER2 signals, which, when selectively scored, had an average of 4.9 HER2 signals/nucleus  and a HER2/MARION 17 ratio of 2.2; per the American Society of Clinical Oncology/College of American Pathologists Clinical Practice Guideline Focused Update (Margarita VALENZUELA et al, 2018, Arch Pathol Lab Med  doi:10.5858/arpa.3080-8318-TP), this population of cells falls into Group 1 (ADRIANA Positive).     PAST MEDICAL HISTORY: No history of breast surgery.  No history of breast cancer in the past.  No history of radiation therapy in the past or radiation exposure.  No history of tumor of any kind.  No history of heart problems, heart attack, breathing problems, blood clots, seizures, arthritis, peptic ulcer disease, osteoporosis or bone fractures.  She is not currently participating in a clinical trial.  She does have a history of asthma and uses an albuterol inhaler in cold weather.     Her past medical history is significant for diabetes mellitus and is on metformin, hypertension obesity, she has IPMN and a right renal artery stent.  The family history is negative for breast cancer.  She has a history of right renal artery stenosis and has a stent in place.     She has a recent history of a left hamstring injury.  She is seeing orthopedics for this problem.  She also has a history of back pain.  Her gait is affected but she can perform activities of daily living.  She has a history of aches in her shoulders.  She had a 65 pound weight loss over the last 2 years which was intentional related to improve diet and exercise. She says she has a history of fibromuscular dysplasia.     She worked in the [employer] in [employer] and was exposed to dengue, malaria, hepatitis A, hepatitis B, encephalitis.  She has also had COVID and the flu.     FAMILY HISTORY:   No history of ovarian, uterine, colon cancer, or melanoma.  No history of glioma, gastric cancer or pancreatic cancer.  Her mother did have Zollinger-Wheeler syndrome but the patient's genetics are negative for this syndrome.      PAST MENSTRUAL HISTORY:  Age of first  menstrual period was 13.   She has been pregnant 1 times with 0 live births and 0 miscarriages and 1 .  Age at in place pregnancy age 28.   Uterus and ovaries are in place.  Last menstrual period was about age 52 and the menopause occurred naturally. No history of hormone replacement therapy.     She does have a history of thickened endometrial lining but by her report no abnormalities of the lining when she had a DIC     HABITS:  She is a never smoker but she has a history of exposure to secondhand smoke from her father as a child.  She consumes alcoholic beverages socially 1 drink every couple of months.     GERMLINE GENETICS: Genetic testing is available for 47 genes associated with cancers of the breast, ovary, uterus, prostate and gastrointestinal system: [facility]"[employer]" Common Hereditary Cancers panel (APC, SUMIT, AXIN2, BARD1, BMPR1A, BRCA1, BRCA2, BRIP1, CDH1, CDK4, CDKN2A, CHEK2, CTNNA1, DICER1, EPCAM, GREM1, HOXB13, KIT, MEN1, MLH1, MSH2, MSH3, MSH6, MUTYH, NBN, NF1, NTHL1, PALB2, PDGFRA, PMS2, POLD1, POLE, PTEN,RAD50, RAD51C, RAD51D, SDHA, SDHB, SDHC, SDHD, SMAD4, SMARCA4, STK11, TP53,TSC1, TSC2, VHL)      ALLERGIES: She has drug allergies to codeine, codeine derivatives, ibuprofen, lisinopril..  No allergy to seafood, iodine, or contrast dye.  She does take daily aspirin 81 mg because of her renal stent.     LUMPECTOMY:  Surgical Pathology Report                         Case: [id number]                                   Authorizing Provider:  Javier Wood MD         Collected:           2024 09:40 AM           Ordering Location:     [facility] OR  Received:            2024 09:54 AM                                  [place]                                                                   Pathologist:           Kristal Lanza MD                                                   Specimens:   A) - Breast, Left, Left breast lumpectomy                                                             B) - Lymph Node(s), [place], Left axillary sentinel lymph node #1                                  C) - Lymph Node(s), [place], Left axillary sentinel lymph node # 2                         Final Diagnosis   A. LEFT breast, RFID tag-localized lumpectomy:  -INVASIVE BREAST CARCINOMA OF NO SPECIAL TYPE (INVASIVE DUCTAL CARCINOMA), FAREED GRADE 2, size 11 mm  -Margins are uninvolved by invasive carcinoma  -Invasive carcinoma is 1 mm from the nearest (posterior) margin, 5 mm from the anterior margin, and > 5 mm from the superior, inferior, medial and lateral margins  -No lymphovascular invasion identified  -Other findings: fibrocystic change (including microcysts with apocrine metaplasia), sclerosing adenosis, and usual ductal hyperplasia  -Calcifications associated with benign acini  -Prior core biopsy site changes  -See tumor synoptic below     B. Lymph node, LEFT axillary, sentinel #1, excision:  -One benign lymph node (0/1)     C. Lymph node, LEFT axillary, sentinel #2, excision:  -One benign lymph node (0/1)                Synoptic Checklist   INVASIVE CARCINOMA OF THE BREAST: Resection   8th Edition - Protocol posted: 12/13/2023INVASIVE CARCINOMA OF THE BREAST: RESECTION - All Specimens                  SPECIMEN     Procedure   Excision (less than total mastectomy)     Specimen Laterality   Left     TUMOR     Tumor Site   Clock position         3 o'clock     Histologic Type   Invasive carcinoma of no special type (ductal)     Histologic Grade (Fareed Histologic Score)         Glandular (Acinar) / Tubular Differentiation   Score 2     Nuclear Pleomorphism   Score 3     Mitotic Rate   Score 2     Overall Grade   Grade 2 (scores of 6 or 7)     Tumor Size   Greatest dimension of largest invasive focus (Millimeters): 11 mm     Additional Dimension (Millimeters)   9 mm         9 mm     Tumor Focality   Single focus of invasive carcinoma     Ductal Carcinoma In Situ (DCIS)   Not identified      Lobular Carcinoma In Situ (LCIS)   Not identified     Lymphatic and / or Vascular Invasion   Not identified     Dermal Lymphatic and / or Vascular Invasion   No skin present     Microcalcifications   Present in non-neoplastic tissue     Treatment Effect in the Breast   No known presurgical therapy     MARGINS     Margin Status for Invasive Carcinoma   All margins negative for invasive carcinoma     Distance from Invasive Carcinoma to Closest Margin   1 mm     Closest Margin(s) to Invasive Carcinoma   Posterior     Distance from Invasive Carcinoma to Anterior Margin   5 mm     Distance from Invasive Carcinoma to Superior Margin   Greater than: 5 mm     Distance from Invasive Carcinoma to Inferior Margin   Greater than: 5 mm     Distance from Invasive Carcinoma to Medial Margin   Greater than: 5 mm     Distance from Invasive Carcinoma to Lateral Margin   Greater than: 5 mm     REGIONAL LYMPH NODES     Regional Lymph Node Status   All regional lymph nodes negative for tumor     Total Number of Lymph Nodes Examined (sentinel and non-sentinel)   2     Number of [place] Nodes Examined   2     pTNM CLASSIFICATION (AJCC 8th Edition)     Reporting of pT, pN, and (when applicable) pM categories is based on information available to the pathologist at the time the report is issued. As per the AJCC (Chapter 1, 8th Ed.) it is the managing physician s responsibility to establish the final pathologic stage based upon all pertinent information, including but potentially not limited to this pathology report.     pT Category   pT1c     pN Category   pN0     N Suffix   (sn)     SPECIAL STUDIES               Estrogen Receptor (ER) Status   Positive (greater than 10% of cells demonstrate nuclear positivity)     Percentage of Cells with Nuclear Positivity   %               Progesterone Receptor (PgR) Status   Positive     Percentage of Cells with Nuclear Positivity   81-90%               HER2 (by immunohistochemistry)    "Equivocal (Score 2+)     Percentage of Cells with Uniform Intense Complete Membrane Staining   0 %               HER2 (by in situ hybridization)   Positive (amplified)     Testing Performed on Case Number   HER2 FISH amplified in 30-40% of tumor cells. Performed on prior core biopsy [id number] specimen B     Comment(s)   Best block: A13   .      Clinical Information   ALEE   The patient is a 74 year old woman with LEFT breast carcinoma. Procedure: LEFT breast RFID tag localized lumpectomy, LEFT axillary sentinel lymph node biopsy.    Gross Description   ALEE ROBIN(1). Breast, Left, Left breast lumpectomy:  The specimen is received fresh with proper patient identification, labeled \"left breast lumpectomy\". It consists of 27.85 g, 6.7 cm from medial to lateral x 4.7 cm from superior to inferior x 2.4 cm from anterior to posterior left breast lumpectomy specimen. The specimen was received previously inked by the surgeon as follows:  Superior - red, anterior - green, inferior - blue, posterior - black, medial - yellow and lateral - orange.     The specimen is sectioned from medial (slice 1) to lateral (slice 15) into 15 slices.          [id number]   This FISH analysis is performed in follow up to the reported equivocal (2+) HER2 findings by immunohistochemistry ([id number]).        RESULTS:     Ratio of HER2/MARION-17 signals  Zenaida Valles:  See Interpretation below     Majority (~60-70%) of tumor in area of strongest IHC staining:  Group 5 (ADRIANA Negative)  Avg. number HER2 signals/nucleus:  2.9  Avg. number MARION-17 signals/nucleus:  1.7  HER2/MARION 17 ratio:  1.7     Subpopulation (~30-40%) of tumor in area of strongest IHC staining:  Group 1 (ADRIANA Positive)                             Avg. number HER2 signals/nucleus:  4.9  Avg. number MARION-17 signals/nucleus:  2.3  HER2/MARION 17 ratio:  2.2        TREATMENT HISTORY:  A.  Lumpectomy November 25 showed a stage I T1b NX MX invasive ductal carcinoma of the left breast " upper outer quadrant which was estrogen receptor positive (%) progesterone receptor positive (81-90%) and HER2 2+ by IHC and HER2 positive by ADRIANA.  Margins negative for invasive carcinoma.    B.  Plan is to give the APT regimen followed by addition of hormonal therapy with letrozole for 7 years. Right single lumen [place] 12-26.  Started APT regimen with weekly paclitaxel and trastuzuamb beginning 12-27.   C.  Trastuzumab on an every 3-week schedule was started March 21.        INTERVAL HISTORY:  She has some neuropathy pain in her toes which is getting better.  Fatigue is not resolved.  She denies depression or anxiety.  Her [facility] has been very supportive.  She is now able to go back to quilting which is major activity.   She can perform her activities of daily living.     She does have some mild swelling in her ankles which has been getting better.  She continues on trastuzumab without difficulty.  Her diet is gradually been improving.  She is able to exercise.  She takes calcium and vitamin D.  She started the every 3-week schedule trastuzumab March 21 after completing 12 weeks of weekly trastuzumab and paclitaxel..     She has an ECOG 0 performance status.     REVIEW OF SYSTEMS:   She has had no fevers, cough, chest pain, shortness of breath, mouth sores, hemoptysis, nausea, vomiting, abdominal pain, diarrhea, bone pain, back pain, muscle or joint complaints, hearing loss or depression.  The remainder of a 10-point review of systems is negative.        PHYSICAL EXAMINATION:     VITALS:   /82 (BP Location: Right arm, Patient Position: Sitting, Cuff Size: Adult Regular)   Pulse 73   Temp 97.9  F (36.6  C) (Oral)   Resp 16   Wt 76.7 kg (169 lb 1.6 oz)   LMP  (LMP Unknown)   SpO2 98%   BMI 28.14 kg/m       HEENT:  No alopecia, no lesions in the oropharynx.  Dentition is normal  LYMPH:  There is no palpable cervical, supraclavicular, or subclavicular lymphadenopathy.  BREASTS: Deferred  seroma in the left axilla is improving.  Her port is without tenderness or erythema.  LUNGS:  Clear to auscultation.  HEART:  Regular rate and rhythm.  S1, S2. No murmurs  ABDOMEN:  Soft, nontender, without hepatosplenomegaly.  EXTREMITIES:  Without edema.  PSYCH:  Mood and affect were normal.  NEUROLOGIC:  Mood and affect  normal. No focal deficits apparent.          LABORATORY DATA:   CMP and CMP were normal.  Glucose 125.     Echo 60-56% on 2-23-25.     ASSESSMENT AND PLAN:    Zenaida Valles has a stage I T1b NX MX invasive ductal carcinoma of the left breast upper outer quadrant which was estrogen receptor positive (%) progesterone receptor positive (81-90%) and HER2 2+ by IHC and HER2 positive by ADRIANA. Margins negative for invasive carcinoma.    APT regimen.  She tolerated adjuvant HER2 directed therapy with trastuzumab and paclitaxel for 12 weeks and is now on every 3-week trastuzumab to complete a total of 1 year of HER2 directed therapy.  There is a seroma in the left axilla.  Ultrasound of the left breast and axilla was consistent with seroma.  Her exercise tolerance is improving.  She is on Avapro which is an ARB and could be helpful.  Some neuropathy which is improving.  We would also add adjuvant zoledronic acid.  Will start Zometa in May 2 at [place].  We have discussed 10 years of adjuvant hormonal therapy.  We will begin hormonal therapy with letrozole on the next visit.  She completed radiation therapy April 23.  Breast imaging will be a mammogram in 6 months.  Next echo will be May 20 seconds.     History of Zollinger-Wheeler syndrome.    Diabetes.  She is on metformin.  Discussion of exercise.  Discussion of the Lace and Pathway.  We recommend 150 minutes of exercise per week with mixed cardio and strength training.  Stretch band, hand weights, yoga.  Diet.  I recommended a diet low in saturated fat, but not low in fat with more fruits and vegetables, and less in the way of red  meat.  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  Follow up plan.  Trastuzumab and Zometa today.  Follow up in 3 weeks with CBC, CMP.  Next trastuzumab May 22, CBC, CMP. Next Echo in May 22.  Then Herceptin every 3 weeks June 12, July 3, July 24, August 14 with follow up with Leilani every 6 weeks with CBC, CMP .  Follow up with me August 14 with CBC, CMP.         Thank you for allowing us to participate in this patient's care.  The patient was seen and evaluated by me.  I discussed the patient with the fellow and agree with the findings and plan in the note.      Sincerely,      Chito Cheung MD  Professor  [facility]  [phone]     I spent 40 minutes with the patient more than 50% of which was in counseling and coordination of care.

## 2025-05-08 ENCOUNTER — PATIENT OUTREACH (OUTPATIENT)
Dept: CARE COORDINATION | Facility: CLINIC | Age: 75
End: 2025-05-08
Payer: COMMERCIAL

## 2025-05-10 ENCOUNTER — APPOINTMENT (OUTPATIENT)
Dept: CT IMAGING | Facility: CLINIC | Age: 75
End: 2025-05-10
Attending: STUDENT IN AN ORGANIZED HEALTH CARE EDUCATION/TRAINING PROGRAM
Payer: COMMERCIAL

## 2025-05-10 ENCOUNTER — HOSPITAL ENCOUNTER (EMERGENCY)
Facility: CLINIC | Age: 75
Discharge: HOME | End: 2025-05-10
Attending: STUDENT IN AN ORGANIZED HEALTH CARE EDUCATION/TRAINING PROGRAM | Admitting: STUDENT IN AN ORGANIZED HEALTH CARE EDUCATION/TRAINING PROGRAM
Payer: COMMERCIAL

## 2025-05-10 VITALS
TEMPERATURE: 98 F | RESPIRATION RATE: 16 BRPM | OXYGEN SATURATION: 100 % | HEART RATE: 70 BPM | SYSTOLIC BLOOD PRESSURE: 128 MMHG | DIASTOLIC BLOOD PRESSURE: 81 MMHG

## 2025-05-10 DIAGNOSIS — R10.9 FLANK PAIN: ICD-10-CM

## 2025-05-10 LAB
ALBUMIN SERPL BCG-MCNC: 4.3 G/DL (ref 3.5–5.2)
ALBUMIN UR-MCNC: NEGATIVE MG/DL
ALP SERPL-CCNC: 77 U/L (ref 40–150)
ALT SERPL W P-5'-P-CCNC: 23 U/L (ref 0–50)
ANION GAP SERPL CALCULATED.3IONS-SCNC: 13 MMOL/L (ref 7–15)
APPEARANCE UR: CLEAR
AST SERPL W P-5'-P-CCNC: 21 U/L (ref 0–45)
ATRIAL RATE - MUSE: 68 BPM
BASOPHILS # BLD AUTO: 0 10E3/UL (ref 0–0.2)
BASOPHILS NFR BLD AUTO: 1 %
BILIRUB SERPL-MCNC: 0.3 MG/DL
BILIRUB UR QL STRIP: NEGATIVE
BUN SERPL-MCNC: 12.8 MG/DL (ref 8–23)
CALCIUM SERPL-MCNC: 9 MG/DL (ref 8.8–10.4)
CHLORIDE SERPL-SCNC: 102 MMOL/L (ref 98–107)
COLOR UR AUTO: NORMAL
CREAT SERPL-MCNC: 0.61 MG/DL (ref 0.51–0.95)
DIASTOLIC BLOOD PRESSURE - MUSE: NORMAL MMHG
EGFRCR SERPLBLD CKD-EPI 2021: >90 ML/MIN/1.73M2
EOSINOPHIL # BLD AUTO: 0.2 10E3/UL (ref 0–0.7)
EOSINOPHIL NFR BLD AUTO: 3 %
ERYTHROCYTE [DISTWIDTH] IN BLOOD BY AUTOMATED COUNT: 12.7 % (ref 10–15)
GLUCOSE SERPL-MCNC: 157 MG/DL (ref 70–99)
GLUCOSE UR STRIP-MCNC: NEGATIVE MG/DL
HCO3 SERPL-SCNC: 23 MMOL/L (ref 22–29)
HCT VFR BLD AUTO: 35.9 % (ref 35–47)
HGB BLD-MCNC: 12.1 G/DL (ref 11.7–15.7)
HGB UR QL STRIP: NEGATIVE
IMM GRANULOCYTES # BLD: 0 10E3/UL
IMM GRANULOCYTES NFR BLD: 0 %
INR PPP: 1.01 (ref 0.85–1.15)
INTERPRETATION ECG - MUSE: NORMAL
KETONES UR STRIP-MCNC: NEGATIVE MG/DL
LACTATE SERPL-SCNC: 1.8 MMOL/L (ref 0.7–2)
LEUKOCYTE ESTERASE UR QL STRIP: NEGATIVE
LIPASE SERPL-CCNC: 40 U/L (ref 13–60)
LYMPHOCYTES # BLD AUTO: 1.8 10E3/UL (ref 0.8–5.3)
LYMPHOCYTES NFR BLD AUTO: 34 %
MCH RBC QN AUTO: 28.9 PG (ref 26.5–33)
MCHC RBC AUTO-ENTMCNC: 33.7 G/DL (ref 31.5–36.5)
MCV RBC AUTO: 86 FL (ref 78–100)
MONOCYTES # BLD AUTO: 0.4 10E3/UL (ref 0–1.3)
MONOCYTES NFR BLD AUTO: 7 %
NEUTROPHILS # BLD AUTO: 2.9 10E3/UL (ref 1.6–8.3)
NEUTROPHILS NFR BLD AUTO: 54 %
NITRATE UR QL: NEGATIVE
NRBC # BLD AUTO: 0 10E3/UL
NRBC BLD AUTO-RTO: 0 /100
P AXIS - MUSE: 52 DEGREES
PH UR STRIP: 7 [PH] (ref 5–7)
PLATELET # BLD AUTO: 299 10E3/UL (ref 150–450)
POTASSIUM SERPL-SCNC: 3.9 MMOL/L (ref 3.4–5.3)
PR INTERVAL - MUSE: 160 MS
PROT SERPL-MCNC: 6.7 G/DL (ref 6.4–8.3)
PROTHROMBIN TIME: 13.6 SECONDS (ref 11.8–14.8)
QRS DURATION - MUSE: 88 MS
QT - MUSE: 410 MS
QTC - MUSE: 435 MS
R AXIS - MUSE: 40 DEGREES
RBC # BLD AUTO: 4.18 10E6/UL (ref 3.8–5.2)
RBC URINE: 0 /HPF
SODIUM SERPL-SCNC: 138 MMOL/L (ref 135–145)
SP GR UR STRIP: 1.03 (ref 1–1.03)
SQUAMOUS EPITHELIAL: <1 /HPF
SYSTOLIC BLOOD PRESSURE - MUSE: NORMAL MMHG
T AXIS - MUSE: 31 DEGREES
UROBILINOGEN UR STRIP-MCNC: NORMAL MG/DL
VENTRICULAR RATE- MUSE: 68 BPM
WBC # BLD AUTO: 5.3 10E3/UL (ref 4–11)
WBC URINE: 1 /HPF

## 2025-05-10 PROCEDURE — 74177 CT ABD & PELVIS W/CONTRAST: CPT | Mod: 26 | Performed by: RADIOLOGY

## 2025-05-10 PROCEDURE — 85025 COMPLETE CBC W/AUTO DIFF WBC: CPT | Performed by: STUDENT IN AN ORGANIZED HEALTH CARE EDUCATION/TRAINING PROGRAM

## 2025-05-10 PROCEDURE — 74177 CT ABD & PELVIS W/CONTRAST: CPT

## 2025-05-10 PROCEDURE — 87040 BLOOD CULTURE FOR BACTERIA: CPT | Performed by: STUDENT IN AN ORGANIZED HEALTH CARE EDUCATION/TRAINING PROGRAM

## 2025-05-10 PROCEDURE — 99285 EMERGENCY DEPT VISIT HI MDM: CPT | Performed by: STUDENT IN AN ORGANIZED HEALTH CARE EDUCATION/TRAINING PROGRAM

## 2025-05-10 PROCEDURE — 80053 COMPREHEN METABOLIC PANEL: CPT | Performed by: STUDENT IN AN ORGANIZED HEALTH CARE EDUCATION/TRAINING PROGRAM

## 2025-05-10 PROCEDURE — 36415 COLL VENOUS BLD VENIPUNCTURE: CPT | Performed by: STUDENT IN AN ORGANIZED HEALTH CARE EDUCATION/TRAINING PROGRAM

## 2025-05-10 PROCEDURE — 85610 PROTHROMBIN TIME: CPT | Performed by: STUDENT IN AN ORGANIZED HEALTH CARE EDUCATION/TRAINING PROGRAM

## 2025-05-10 PROCEDURE — 250N000011 HC RX IP 250 OP 636: Performed by: STUDENT IN AN ORGANIZED HEALTH CARE EDUCATION/TRAINING PROGRAM

## 2025-05-10 PROCEDURE — 81001 URINALYSIS AUTO W/SCOPE: CPT | Performed by: STUDENT IN AN ORGANIZED HEALTH CARE EDUCATION/TRAINING PROGRAM

## 2025-05-10 PROCEDURE — 83605 ASSAY OF LACTIC ACID: CPT | Performed by: STUDENT IN AN ORGANIZED HEALTH CARE EDUCATION/TRAINING PROGRAM

## 2025-05-10 PROCEDURE — 250N000011 HC RX IP 250 OP 636: Mod: JZ | Performed by: STUDENT IN AN ORGANIZED HEALTH CARE EDUCATION/TRAINING PROGRAM

## 2025-05-10 PROCEDURE — 83690 ASSAY OF LIPASE: CPT | Performed by: STUDENT IN AN ORGANIZED HEALTH CARE EDUCATION/TRAINING PROGRAM

## 2025-05-10 PROCEDURE — 99285 EMERGENCY DEPT VISIT HI MDM: CPT | Mod: 25 | Performed by: STUDENT IN AN ORGANIZED HEALTH CARE EDUCATION/TRAINING PROGRAM

## 2025-05-10 RX ORDER — ONDANSETRON 2 MG/ML
4 INJECTION INTRAMUSCULAR; INTRAVENOUS ONCE
Status: COMPLETED | OUTPATIENT
Start: 2025-05-10 | End: 2025-05-10

## 2025-05-10 RX ORDER — HEPARIN SODIUM (PORCINE) LOCK FLUSH IV SOLN 100 UNIT/ML 100 UNIT/ML
5-10 SOLUTION INTRAVENOUS
Status: DISCONTINUED | OUTPATIENT
Start: 2025-05-10 | End: 2025-05-10 | Stop reason: HOSPADM

## 2025-05-10 RX ORDER — HEPARIN SODIUM,PORCINE 10 UNIT/ML
VIAL (ML) INTRAVENOUS
Status: COMPLETED
Start: 2025-05-10 | End: 2025-05-10

## 2025-05-10 RX ORDER — HYDROMORPHONE HYDROCHLORIDE 1 MG/ML
0.5 INJECTION, SOLUTION INTRAMUSCULAR; INTRAVENOUS; SUBCUTANEOUS ONCE
Refills: 0 | Status: COMPLETED | OUTPATIENT
Start: 2025-05-10 | End: 2025-05-10

## 2025-05-10 RX ORDER — IOPAMIDOL 755 MG/ML
103 INJECTION, SOLUTION INTRAVASCULAR ONCE
Status: COMPLETED | OUTPATIENT
Start: 2025-05-10 | End: 2025-05-10

## 2025-05-10 RX ADMIN — IOPAMIDOL 103 ML: 755 INJECTION, SOLUTION INTRAVENOUS at 09:44

## 2025-05-10 RX ADMIN — HYDROMORPHONE HYDROCHLORIDE 0.5 MG: 1 INJECTION, SOLUTION INTRAMUSCULAR; INTRAVENOUS; SUBCUTANEOUS at 08:49

## 2025-05-10 RX ADMIN — SODIUM CHLORIDE, PRESERVATIVE FREE 5 ML: 5 INJECTION INTRAVENOUS at 11:45

## 2025-05-10 ASSESSMENT — ACTIVITIES OF DAILY LIVING (ADL)
ADLS_ACUITY_SCORE: 42

## 2025-05-10 NOTE — ED TRIAGE NOTES
Came in for 10/10 back pain. Started earlier this week, awoke this AM to 10/10 pain. Pt is tearful and reports a high pain tolerance.  CA hx, extensive medical hx     Triage Assessment (Adult)       Row Name 05/10/25 0816          Triage Assessment    Airway WDL WDL        Respiratory WDL    Respiratory WDL WDL        Skin Circulation/Temperature WDL    Skin Circulation/Temperature WDL WDL        Cardiac WDL    Cardiac WDL WDL        Peripheral/Neurovascular WDL    Peripheral Neurovascular WDL WDL        Cognitive/Neuro/Behavioral WDL    Cognitive/Neuro/Behavioral WDL WDL

## 2025-05-10 NOTE — ED PROVIDER NOTES
"ED Provider Note  [facility]      History     Chief Complaint   Patient presents with    Back Pain     HPI  Zenaida Valles is a 75 year old female with a history of breast cancer s/p left lumpectomy (11/25/2024) on chemotherapy (last infused 5/1/2025), DMII, HTN, HLD, and fibromuscular dysplasia who presents to the emergency department with back pain.  Patient reports she woke this morning with worsening back pain, 10/10 on pain scale. She states the pain is new and started 2 days ago.  It has been progressively worsening-yesterday the pain was intense, but she was still able to walk and move which she is unable to do so now.  Patient states the pain is located in the left middle side of her back and radiates down to her left hip.  She notes the pain feels like \"tissue pain, not bone pain\".  She denies any new numbness or tingling.    Per chart review, patient had office visit with radiation oncology yesterday.  During office visit she reported joint aches and pains throughout the body, primarily in her left shoulder which she experienced prior to chemotherapy infusion.  She also reported midthoracic back pain radiating to the left.        Past Medical History  Past Medical History:   Diagnosis Date    Asthma     Bacterial pneumonia     recent as of march 2025    Dengue     in past    Diabetes mellitus (H)     Encephalopathy     history of    Fibromuscular dysplasia     H/O sebaceous cyst     Hepatitis A     in the past    Hepatitis B infection     in the past    Hyperlipidemia LDL goal < 130     Hypertension     IPMN (intraductal papillary mucinous neoplasm)     Malaria     in past    Obesity     Renal artery stenosis 2005    Right, s/p stenting at [facility], FMD    Statin medication not prescribed per physician orders     pt declined     Past Surgical History:   Procedure Laterality Date    BIOPSY NODE SENTINEL Left 11/25/2024    Procedure: LEFT SENTINEL LYMPH NODE BIOPSY;  " Surgeon: Javier Wood MD;  Location: [facility] OR    COLONOSCOPY N/A 3/10/2017    Procedure: COMBINED COLONOSCOPY, SINGLE OR MULTIPLE BIOPSY/POLYPECTOMY BY BIOPSY;  Surgeon: Sergo cMgraw MD;  [facility]    COLONOSCOPY N/A 3/30/2022    Procedure: COLONOSCOPY, WITH POLYPECTOMY;  Surgeon: Leventhal, Thomas Michael, MD;  Location: [facility] OR    ENDOMETRIAL SAMPLING (BIOPSY) N/A 11/25/2022    Procedure: endometrial biospy using portable ultrasound guidance;  Surgeon: Jessica Nation MD;  [facility]    ENDOSCOPIC ULTRASOUND UPPER GASTROINTESTINAL TRACT (GI) N/A 3/14/2023    Procedure: ENDOSCOPIC ULTRASOUND, ESOPHAGOSCOPY / UPPER GASTROINTESTINAL TRACT (GI);  Surgeon: Miguel A Vickers MD;  [facility]    EXAM UNDER ANESTHESIA PELVIC N/A 11/25/2022    Procedure: EXAM UNDER ANESTHESIA, PELVIS,;  Surgeon: Jessica Nation MD;  [facility]    IR CHEST PORT PLACEMENT > 5 YRS OF AGE  12/26/2024    IR PORT CHECK RIGHT  1/9/2025    IR RENAL/VISCERAL STENT/ATHERECT/PTA Right 2005    LUMPECTOMY, BREAST, LOCALIZED USING RADIOFREQUENCY IDENTIFICATION Left 11/25/2024    Procedure: LEFT BREAST LUMPECTOMY LOCALIZED USING RADIOFREQUENCY IDENTIFICATION;  Surgeon: Javier Wood MD;  Location: [facility] OR     acetaminophen (TYLENOL) 500 MG tablet  albuterol (PROAIR HFA) 108 (90 Base) MCG/ACT inhaler  aspirin 81 MG tablet  atenolol (TENORMIN) 25 MG tablet  blood glucose (NO BRAND SPECIFIED) lancets standard  blood glucose (NO BRAND SPECIFIED) test strip  blood glucose monitoring (NO BRAND SPECIFIED) meter device kit  Collagen Hydrolysate, Bovine, POWD  diclofenac (VOLTAREN) 1 % GEL topical gel  fluticasone-salmeterol (ADVAIR) 100-50 MCG/ACT inhaler  hydrochlorothiazide (HYDRODIURIL) 25 MG tablet  irbesartan (AVAPRO) 300 MG tablet  Lancets (ONETOUCH DELICA PLUS [id number]) MISC  letrozole (FEMARA) 2.5 MG tablet  LORazepam (ATIVAN) 0.5 MG tablet  metFORMIN (GLUCOPHAGE) 500 MG tablet  saline nasal (AYR SALINE) GEL  topical gel  STATIN NOT PRESCRIBED (INTENTIONAL)  vitamin D3 (CHOLECALCIFEROL) 50 mcg (2000 units) tablet      Allergies   Allergen Reactions    Codeine Diarrhea and Rash    Codeine Camsylate Diarrhea and Rash    Ibuprofen GI Disturbance and Nausea    Lisinopril Cough     Family History  Family History   Problem Relation Age of Onset    Peptic Ulcer Disease Mother         Zollinger-Almaraz Syndrome    Diabetes Mother     Thyroid Disease Mother         s/p thyroidecomy    Cancer Mother         zollinger-almaraz - cncer of pancreas    Cancer Father         prostate cancer- smoker, got bone mets    C.A.D. Father         aortic aneurysm    Childhood Heart Disease Sister         tetrology of fallot    Melanoma No family hx of     Skin Cancer No family hx of      Social History   Social History     Tobacco Use    Smoking status: Never    Smokeless tobacco: Never   Vaping Use    Vaping status: Never Used   Substance Use Topics    Alcohol use: Yes     Comment: rare    Drug use: No      A medically appropriate review of systems was performed with pertinent positives and negatives noted in the HPI, and all other systems negative.    Physical Exam   BP: (!) 192/94  Pulse: 89  Temp: 98  F (36.7  C)  Resp: 16  SpO2: 100 %  Physical Exam  Constitutional:       General: She is not in acute distress.     Appearance: Normal appearance. She is not diaphoretic.   HENT:      Head: Atraumatic.      Mouth/Throat:      Mouth: Mucous membranes are moist.   Eyes:      General: No scleral icterus.     Conjunctiva/sclera: Conjunctivae normal.   Cardiovascular:      Rate and Rhythm: Normal rate.      Heart sounds: Normal heart sounds.   Pulmonary:      Effort: No respiratory distress.      Breath sounds: Normal breath sounds.   Abdominal:      General: Abdomen is flat. There is no distension.      Palpations: There is no mass.      Tenderness: There is no abdominal tenderness. There is no guarding or rebound.      Hernia: No hernia is present.    Musculoskeletal:      Cervical back: Neck supple.      Comments: Lumbar left paraspinal tenderness to palpation, no midline spine tenderness, no abdominal tenderness   Skin:     General: Skin is warm.      Findings: No rash.   Neurological:      Mental Status: She is alert.         ED Course, Procedures, & Data      Procedures                No results found for any visits on 05/10/25.  Medications - No data to display  Labs Ordered and Resulted from Time of ED Arrival to Time of ED Departure - No data to display  No orders to display          Critical care was not performed.     Medical Decision Making  The patient's presentation was of high complexity (an acute health issue posing potential threat to life or bodily function).    The patient's evaluation involved:  review of external note(s) from 3+ sources (see separate area of note for details)  review of 3+ test result(s) ordered prior to this encounter (see separate area of note for details)  strong consideration of a test (CT chest, CT PE study) that was ultimately deferred  ordering and/or review of 3+ test(s) in this encounter (see separate area of note for details)    The patient's management necessitated moderate risk (IV contrast administration).    Assessment & Plan    Patient's labs as reviewed and interpreted by me are reassuring  CT scan was read as reassuring, no acute intra-abdominal pathology  I went back to reassess the patient her pain is improved, and reports that now it feels more like gas pain  Given the fact that her pain was lower left flank, I considered a CT chest, and a CT PE study, the patient without chest pain, not short of breath, satting 100%, not tachycardic, so less likely PE at this point.  Did have a discussion with patient regarding possible CT PE versus CT chest study, and through shared decision making she deferred as she at this point is feeling better and would like to discharge home  Etiology of patient's pain and  discomfort is unclear, but at this point as patient has reassuring vital signs, reassuring physical exam, and reassuring imaging, I doubt at this point acute medical emergency, possible gas/stool pain, versus muscle spasm  Discussed possible observation overnight for reassessment versus discharge home and through shared decision making we will discharge home  Patient therefore discharged home with strict return precautions to come back to the emergency room for any new or worsening symptoms as well as instructions to follow-up with her primary doctor in the next 1 to 2 days.      I have reviewed the nursing notes. I have reviewed the findings, diagnosis, plan and need for follow up with the patient.    New Prescriptions    No medications on file       Final diagnoses:   None   ISujata, am serving as a trained medical scribe to document services personally performed by Aleksandr Decker MD, based on the provider's statements to me.     Aleksandr PONCE MD, was physically present and have reviewed and verified the accuracy of this note documented by Sujata Leger.     Aleksandr Decker MD  [facility] EMERGENCY DEPARTMENT  5/10/2025     Aleksandr Decker MD  05/10/25 1123

## 2025-05-10 NOTE — DISCHARGE INSTRUCTIONS
Please return to the emergency department if you develop any new or worsening symptoms  Please call your primary doctor for a follow-up visit in the next 1 to 2 days.

## 2025-05-11 ENCOUNTER — APPOINTMENT (OUTPATIENT)
Dept: MRI IMAGING | Facility: CLINIC | Age: 75
End: 2025-05-11
Attending: STUDENT IN AN ORGANIZED HEALTH CARE EDUCATION/TRAINING PROGRAM
Payer: COMMERCIAL

## 2025-05-11 ENCOUNTER — HOSPITAL ENCOUNTER (EMERGENCY)
Facility: CLINIC | Age: 75
Discharge: HOME | End: 2025-05-11
Attending: STUDENT IN AN ORGANIZED HEALTH CARE EDUCATION/TRAINING PROGRAM | Admitting: STUDENT IN AN ORGANIZED HEALTH CARE EDUCATION/TRAINING PROGRAM
Payer: COMMERCIAL

## 2025-05-11 VITALS
HEIGHT: 65 IN | SYSTOLIC BLOOD PRESSURE: 149 MMHG | OXYGEN SATURATION: 100 % | TEMPERATURE: 98.5 F | RESPIRATION RATE: 18 BRPM | BODY MASS INDEX: 27.82 KG/M2 | DIASTOLIC BLOOD PRESSURE: 69 MMHG | HEART RATE: 74 BPM

## 2025-05-11 DIAGNOSIS — M54.50 ACUTE LEFT-SIDED LOW BACK PAIN WITHOUT SCIATICA: ICD-10-CM

## 2025-05-11 DIAGNOSIS — D32.1 SPINAL MENINGIOMA (H): ICD-10-CM

## 2025-05-11 LAB
ALBUMIN SERPL BCG-MCNC: 4.1 G/DL (ref 3.5–5.2)
ALP SERPL-CCNC: 72 U/L (ref 40–150)
ALT SERPL W P-5'-P-CCNC: 21 U/L (ref 0–50)
ANION GAP SERPL CALCULATED.3IONS-SCNC: 11 MMOL/L (ref 7–15)
AST SERPL W P-5'-P-CCNC: 20 U/L (ref 0–45)
BASOPHILS # BLD AUTO: 0.1 10E3/UL (ref 0–0.2)
BASOPHILS NFR BLD AUTO: 1 %
BILIRUB SERPL-MCNC: 0.2 MG/DL
BUN SERPL-MCNC: 11.9 MG/DL (ref 8–23)
CALCIUM SERPL-MCNC: 9.8 MG/DL (ref 8.8–10.4)
CHLORIDE SERPL-SCNC: 102 MMOL/L (ref 98–107)
CREAT SERPL-MCNC: 0.61 MG/DL (ref 0.51–0.95)
EGFRCR SERPLBLD CKD-EPI 2021: >90 ML/MIN/1.73M2
EOSINOPHIL # BLD AUTO: 0.1 10E3/UL (ref 0–0.7)
EOSINOPHIL NFR BLD AUTO: 3 %
ERYTHROCYTE [DISTWIDTH] IN BLOOD BY AUTOMATED COUNT: 12.5 % (ref 10–15)
GLUCOSE SERPL-MCNC: 100 MG/DL (ref 70–99)
HCO3 SERPL-SCNC: 26 MMOL/L (ref 22–29)
HCT VFR BLD AUTO: 35.8 % (ref 35–47)
HGB BLD-MCNC: 11.9 G/DL (ref 11.7–15.7)
IMM GRANULOCYTES # BLD: 0 10E3/UL
IMM GRANULOCYTES NFR BLD: 0 %
INR PPP: 1.04 (ref 0.85–1.15)
LYMPHOCYTES # BLD AUTO: 1.5 10E3/UL (ref 0.8–5.3)
LYMPHOCYTES NFR BLD AUTO: 27 %
MCH RBC QN AUTO: 29.1 PG (ref 26.5–33)
MCHC RBC AUTO-ENTMCNC: 33.2 G/DL (ref 31.5–36.5)
MCV RBC AUTO: 88 FL (ref 78–100)
MONOCYTES # BLD AUTO: 0.4 10E3/UL (ref 0–1.3)
MONOCYTES NFR BLD AUTO: 7 %
NEUTROPHILS # BLD AUTO: 3.4 10E3/UL (ref 1.6–8.3)
NEUTROPHILS NFR BLD AUTO: 62 %
NRBC # BLD AUTO: 0 10E3/UL
NRBC BLD AUTO-RTO: 0 /100
PLATELET # BLD AUTO: 303 10E3/UL (ref 150–450)
POTASSIUM SERPL-SCNC: 3.9 MMOL/L (ref 3.4–5.3)
PROT SERPL-MCNC: 6.5 G/DL (ref 6.4–8.3)
PROTHROMBIN TIME: 14 SECONDS (ref 11.8–14.8)
RBC # BLD AUTO: 4.09 10E6/UL (ref 3.8–5.2)
SODIUM SERPL-SCNC: 139 MMOL/L (ref 135–145)
WBC # BLD AUTO: 5.5 10E3/UL (ref 4–11)

## 2025-05-11 PROCEDURE — 255N000002 HC RX 255 OP 636: Performed by: STUDENT IN AN ORGANIZED HEALTH CARE EDUCATION/TRAINING PROGRAM

## 2025-05-11 PROCEDURE — 99285 EMERGENCY DEPT VISIT HI MDM: CPT | Performed by: STUDENT IN AN ORGANIZED HEALTH CARE EDUCATION/TRAINING PROGRAM

## 2025-05-11 PROCEDURE — 72158 MRI LUMBAR SPINE W/O & W/DYE: CPT | Mod: 26 | Performed by: RADIOLOGY

## 2025-05-11 PROCEDURE — 36415 COLL VENOUS BLD VENIPUNCTURE: CPT | Performed by: STUDENT IN AN ORGANIZED HEALTH CARE EDUCATION/TRAINING PROGRAM

## 2025-05-11 PROCEDURE — 250N000013 HC RX MED GY IP 250 OP 250 PS 637: Performed by: EMERGENCY MEDICINE

## 2025-05-11 PROCEDURE — 85025 COMPLETE CBC W/AUTO DIFF WBC: CPT | Performed by: STUDENT IN AN ORGANIZED HEALTH CARE EDUCATION/TRAINING PROGRAM

## 2025-05-11 PROCEDURE — 250N000011 HC RX IP 250 OP 636: Performed by: EMERGENCY MEDICINE

## 2025-05-11 PROCEDURE — 72157 MRI CHEST SPINE W/O & W/DYE: CPT | Mod: 26 | Performed by: RADIOLOGY

## 2025-05-11 PROCEDURE — 250N000011 HC RX IP 250 OP 636: Performed by: STUDENT IN AN ORGANIZED HEALTH CARE EDUCATION/TRAINING PROGRAM

## 2025-05-11 PROCEDURE — A9585 GADOBUTROL INJECTION: HCPCS | Performed by: STUDENT IN AN ORGANIZED HEALTH CARE EDUCATION/TRAINING PROGRAM

## 2025-05-11 PROCEDURE — 250N000013 HC RX MED GY IP 250 OP 250 PS 637: Performed by: STUDENT IN AN ORGANIZED HEALTH CARE EDUCATION/TRAINING PROGRAM

## 2025-05-11 PROCEDURE — 82040 ASSAY OF SERUM ALBUMIN: CPT | Performed by: STUDENT IN AN ORGANIZED HEALTH CARE EDUCATION/TRAINING PROGRAM

## 2025-05-11 PROCEDURE — 72157 MRI CHEST SPINE W/O & W/DYE: CPT

## 2025-05-11 PROCEDURE — 72158 MRI LUMBAR SPINE W/O & W/DYE: CPT

## 2025-05-11 PROCEDURE — 250N000013 HC RX MED GY IP 250 OP 250 PS 637

## 2025-05-11 PROCEDURE — 85610 PROTHROMBIN TIME: CPT | Performed by: STUDENT IN AN ORGANIZED HEALTH CARE EDUCATION/TRAINING PROGRAM

## 2025-05-11 PROCEDURE — 99285 EMERGENCY DEPT VISIT HI MDM: CPT | Mod: 25 | Performed by: STUDENT IN AN ORGANIZED HEALTH CARE EDUCATION/TRAINING PROGRAM

## 2025-05-11 RX ORDER — HYDROCHLOROTHIAZIDE 25 MG/1
25 TABLET ORAL DAILY
Status: DISCONTINUED | OUTPATIENT
Start: 2025-05-12 | End: 2025-05-11 | Stop reason: HOSPADM

## 2025-05-11 RX ORDER — OXYCODONE HYDROCHLORIDE 5 MG/1
5 TABLET ORAL ONCE
Refills: 0 | Status: COMPLETED | OUTPATIENT
Start: 2025-05-11 | End: 2025-05-11

## 2025-05-11 RX ORDER — KETOROLAC TROMETHAMINE 15 MG/ML
15 INJECTION, SOLUTION INTRAMUSCULAR; INTRAVENOUS ONCE
Status: DISCONTINUED | OUTPATIENT
Start: 2025-05-11 | End: 2025-05-11

## 2025-05-11 RX ORDER — DEXTROSE MONOHYDRATE 25 G/50ML
25-50 INJECTION, SOLUTION INTRAVENOUS
Status: DISCONTINUED | OUTPATIENT
Start: 2025-05-11 | End: 2025-05-11 | Stop reason: HOSPADM

## 2025-05-11 RX ORDER — GADOBUTROL 604.72 MG/ML
0.1 INJECTION INTRAVENOUS ONCE
Status: COMPLETED | OUTPATIENT
Start: 2025-05-11 | End: 2025-05-11

## 2025-05-11 RX ORDER — METHOCARBAMOL 100 MG/ML
500 INJECTION, SOLUTION INTRAMUSCULAR; INTRAVENOUS ONCE
Status: COMPLETED | OUTPATIENT
Start: 2025-05-11 | End: 2025-05-11

## 2025-05-11 RX ORDER — METHOCARBAMOL 100 MG/ML
500 INJECTION, SOLUTION INTRAMUSCULAR; INTRAVENOUS EVERY 8 HOURS PRN
Status: DISCONTINUED | OUTPATIENT
Start: 2025-05-11 | End: 2025-05-11 | Stop reason: HOSPADM

## 2025-05-11 RX ORDER — ALBUTEROL SULFATE 90 UG/1
2 INHALANT RESPIRATORY (INHALATION) EVERY 6 HOURS PRN
Status: DISCONTINUED | OUTPATIENT
Start: 2025-05-11 | End: 2025-05-11

## 2025-05-11 RX ORDER — PROCHLORPERAZINE MALEATE 5 MG/1
5 TABLET ORAL EVERY 6 HOURS PRN
Status: DISCONTINUED | OUTPATIENT
Start: 2025-05-11 | End: 2025-05-11 | Stop reason: HOSPADM

## 2025-05-11 RX ORDER — HEPARIN SODIUM (PORCINE) LOCK FLUSH IV SOLN 100 UNIT/ML 100 UNIT/ML
5 SOLUTION INTRAVENOUS ONCE
Status: COMPLETED | OUTPATIENT
Start: 2025-05-11 | End: 2025-05-11

## 2025-05-11 RX ORDER — OXYCODONE HYDROCHLORIDE 5 MG/1
5 TABLET ORAL EVERY 4 HOURS PRN
Refills: 0 | Status: DISCONTINUED | OUTPATIENT
Start: 2025-05-11 | End: 2025-05-11 | Stop reason: HOSPADM

## 2025-05-11 RX ORDER — OXYCODONE HYDROCHLORIDE 10 MG/1
10 TABLET ORAL ONCE
Refills: 0 | Status: COMPLETED | OUTPATIENT
Start: 2025-05-11 | End: 2025-05-11

## 2025-05-11 RX ORDER — ACETAMINOPHEN 500 MG
500-1000 TABLET ORAL EVERY 6 HOURS PRN
Status: DISCONTINUED | OUTPATIENT
Start: 2025-05-11 | End: 2025-05-11

## 2025-05-11 RX ORDER — IRBESARTAN 300 MG/1
300 TABLET ORAL DAILY
Status: DISCONTINUED | OUTPATIENT
Start: 2025-05-12 | End: 2025-05-11 | Stop reason: HOSPADM

## 2025-05-11 RX ORDER — LORAZEPAM 0.5 MG/1
0.5 TABLET ORAL
Status: DISCONTINUED | OUTPATIENT
Start: 2025-05-11 | End: 2025-05-11 | Stop reason: HOSPADM

## 2025-05-11 RX ORDER — AMOXICILLIN 250 MG
1 CAPSULE ORAL 2 TIMES DAILY PRN
Status: DISCONTINUED | OUTPATIENT
Start: 2025-05-11 | End: 2025-05-11 | Stop reason: HOSPADM

## 2025-05-11 RX ORDER — ASPIRIN 81 MG/1
81 TABLET, CHEWABLE ORAL DAILY
Status: DISCONTINUED | OUTPATIENT
Start: 2025-05-12 | End: 2025-05-11 | Stop reason: HOSPADM

## 2025-05-11 RX ORDER — ONDANSETRON 4 MG/1
4 TABLET, ORALLY DISINTEGRATING ORAL EVERY 6 HOURS PRN
Status: DISCONTINUED | OUTPATIENT
Start: 2025-05-11 | End: 2025-05-11 | Stop reason: HOSPADM

## 2025-05-11 RX ORDER — ONDANSETRON 4 MG/1
4 TABLET, ORALLY DISINTEGRATING ORAL ONCE
Status: COMPLETED | OUTPATIENT
Start: 2025-05-11 | End: 2025-05-11

## 2025-05-11 RX ORDER — OXYCODONE HYDROCHLORIDE 5 MG/1
5 TABLET ORAL EVERY 6 HOURS PRN
Qty: 20 TABLET | Refills: 0 | Status: SHIPPED | OUTPATIENT
Start: 2025-05-11

## 2025-05-11 RX ORDER — ONDANSETRON 2 MG/ML
4 INJECTION INTRAMUSCULAR; INTRAVENOUS EVERY 6 HOURS PRN
Status: DISCONTINUED | OUTPATIENT
Start: 2025-05-11 | End: 2025-05-11 | Stop reason: HOSPADM

## 2025-05-11 RX ORDER — FLUTICASONE FUROATE AND VILANTEROL 100; 25 UG/1; UG/1
1 POWDER RESPIRATORY (INHALATION) DAILY
Status: DISCONTINUED | OUTPATIENT
Start: 2025-05-11 | End: 2025-05-11 | Stop reason: HOSPADM

## 2025-05-11 RX ORDER — AMOXICILLIN 250 MG
2 CAPSULE ORAL 2 TIMES DAILY PRN
Status: DISCONTINUED | OUTPATIENT
Start: 2025-05-11 | End: 2025-05-11 | Stop reason: HOSPADM

## 2025-05-11 RX ORDER — ATENOLOL 25 MG/1
25 TABLET ORAL DAILY
Status: DISCONTINUED | OUTPATIENT
Start: 2025-05-12 | End: 2025-05-11 | Stop reason: HOSPADM

## 2025-05-11 RX ORDER — LETROZOLE 2.5 MG/1
2.5 TABLET, FILM COATED ORAL DAILY
Status: DISCONTINUED | OUTPATIENT
Start: 2025-05-12 | End: 2025-05-11 | Stop reason: HOSPADM

## 2025-05-11 RX ORDER — POLYETHYLENE GLYCOL 3350 17 G/17G
17 POWDER, FOR SOLUTION ORAL 2 TIMES DAILY PRN
Status: DISCONTINUED | OUTPATIENT
Start: 2025-05-11 | End: 2025-05-11 | Stop reason: HOSPADM

## 2025-05-11 RX ORDER — ALBUTEROL SULFATE 0.83 MG/ML
2.5 SOLUTION RESPIRATORY (INHALATION) EVERY 6 HOURS PRN
Status: DISCONTINUED | OUTPATIENT
Start: 2025-05-11 | End: 2025-05-11 | Stop reason: HOSPADM

## 2025-05-11 RX ORDER — NICOTINE POLACRILEX 4 MG
15-30 LOZENGE BUCCAL
Status: DISCONTINUED | OUTPATIENT
Start: 2025-05-11 | End: 2025-05-11 | Stop reason: HOSPADM

## 2025-05-11 RX ORDER — LIDOCAINE 4 G/G
2 PATCH TOPICAL
Status: DISCONTINUED | OUTPATIENT
Start: 2025-05-11 | End: 2025-05-11 | Stop reason: HOSPADM

## 2025-05-11 RX ORDER — ACETAMINOPHEN 325 MG/1
975 TABLET ORAL 3 TIMES DAILY
Status: DISCONTINUED | OUTPATIENT
Start: 2025-05-11 | End: 2025-05-11 | Stop reason: HOSPADM

## 2025-05-11 RX ADMIN — OXYCODONE HYDROCHLORIDE 5 MG: 5 TABLET ORAL at 14:10

## 2025-05-11 RX ADMIN — GADOBUTROL 7.5 ML: 604.72 INJECTION INTRAVENOUS at 16:38

## 2025-05-11 RX ADMIN — OXYCODONE HYDROCHLORIDE 10 MG: 10 TABLET ORAL at 21:18

## 2025-05-11 RX ADMIN — HEPARIN 5 ML: 100 SYRINGE at 21:11

## 2025-05-11 RX ADMIN — LORAZEPAM 0.5 MG: 0.5 TABLET ORAL at 16:30

## 2025-05-11 RX ADMIN — ACETAMINOPHEN 975 MG: 325 TABLET ORAL at 18:04

## 2025-05-11 RX ADMIN — LIDOCAINE 4% 2 PATCH: 40 PATCH TOPICAL at 18:04

## 2025-05-11 ASSESSMENT — ACTIVITIES OF DAILY LIVING (ADL)
ADLS_ACUITY_SCORE: 41

## 2025-05-11 ASSESSMENT — COLUMBIA-SUICIDE SEVERITY RATING SCALE - C-SSRS
2. HAVE YOU ACTUALLY HAD ANY THOUGHTS OF KILLING YOURSELF IN THE PAST MONTH?: NO
6. HAVE YOU EVER DONE ANYTHING, STARTED TO DO ANYTHING, OR PREPARED TO DO ANYTHING TO END YOUR LIFE?: NO
1. IN THE PAST MONTH, HAVE YOU WISHED YOU WERE DEAD OR WISHED YOU COULD GO TO SLEEP AND NOT WAKE UP?: NO

## 2025-05-11 NOTE — CONSULTS
[facility]    Consult Note - Hospitalist Service, GOLD TEAM        Date of consult:  5/11/2025    Assessment & Plan    Zenaida Valles is a 75 year old female admitted on 5/11/2025. She has a past medical history of breast cancer s/p left lumpectomy (11/25/2024) on chemotherapy (last infused 5/1/2025), DMII, HTN 2/2 renal artery stenosis, HLD, asthma, and fibromuscular dysplasia. She presented to the ED with left flank pain. Initial plan was for admission to observation but patient did not want to be admitted given concern about insurance coverage. Consult note placed. Internal medicine will sign off at this time.   _________________________     # Left flank pain  Presented to the ED complaining of worsening back pain 10/10 that started three days ago but worsened acutely today. She was able to walk and move yesterday but today admits that she is unable to do so (limited by pain). Patient states the pain is located in the left middle side of her back and radiates down to her left hip. She also presented 5/10 with similar complaints. Lipase and UA negative. On arrival, CT scan negative for acute findings. EKG with NSR and no ischemia. ED provider discussed with oncology who recommended MRI thoracic and lumbar spine and back to evaluate better for bony metastasis given known breast cancer diagnosis. Since recent round of chemo last week, she had brief flu-like symptoms which she was told is normal with that chemotherapy which resolved last week and now has this pain so she wonders if there is a correlation. Feels generally well besides back pain.   - 5/10 blood cultures NGTD   - PRN robaxin ordered, PRN oxycodone, scheduled tylenol  - UA negative   - Hot packs ordered   - Recommend PT/OT outpatient   - MRI lumbar and thoracic spine w & w/o contrast ordered by ED   - HOLD off on observation admission at this time; ED team is primary   - ED provider to discuss with  pharm if there is correlation between recent chemo and back pain/muscle spasms     # Breast cancer s/p lumpectomy and radiation on chemotherapy, left breast   HER2 2+ and amplified by FISH. Status post left lumpectomy and sentinel lymph node biopsy biopsy on 11/25/2024 with Dr. Wood, revealing residual pT1c (1.1 cm).  She started adjuvant chemotherapy with paclitaxel and trastuzumab on 12/27/2024. She transitioned to every 3 week trastuzumab on 3/21/2025. She has completed adjuvant ultra hypofractionated whole breast radiation to 2600 cGy in 5 fractions with lumpectomy bed boost to an additional 520 cGy in 1 fraction, completed 4/23/2025. She follows with oncology (Dr. Cheung) and radiation oncology (Dr. Ortega) last seen 5/2025.   - Continue pta letrozole     # Diabetes mellitus type II    Last A1c 6.6 on 2/14/25. PTA regimen includes metformin.  - Continue pta metformin   - BG checks BID  - Hypoglycemia protocol     # HTN 2/2 renal artery stenosis   # Renal artery stenosis 2/2 fibromuscular dysplasia   # HLD   She is s/p renal stenting for resistent HTN related to BASILIO in 2005.   - Continue pta aspirin, atenolol, irbesartan, hydrochlorothiazide   - If bump in creatinine, could consider renal artery US to assess arterial patency     # Asthma   - Continue pta advair, albuterol     # Left breast cystic lesion   # Concern for small developing abscess   Has had cystic lesion to left breast or a few years. Notified ED team who is primary. There is redness and area of induration. Tender to palpation. Denies fevers or chills. WBC count with normal limits.   - Photo placed in chart, would recommend further evaluation; discuss with ED primary   - ED to consider PO antibiotics vs I&D; dermatology consult outpatient recommended given recurrence       Tae Mandujano PA-C  Hospitalist Service, [facility]  Securely message with [employer] (more info)  Text page via [employer]  Paging/Directory   See signed in provider for up to date coverage information    ______________________________________________________________________    Chief Complaint   Back pain     History is obtained from the patient    History of Present Illness   Presented to the ED complaining of worsening back pain 10/10 that started three days ago but worsened acutely today. She was able to walk and move yesterday but today admits that she is unable to do so (limited by pain). Patient states the pain is located in the left middle side of her back and radiates down to her left hip. She also presented 5/10 with similar complaints. ED provider discussed with oncology who recommended MRI thoracic and lumbar spine and back to evaluate better for bony metastasis given known breast cancer diagnosis. Since recent round of chemo last week, she had brief flu-like symptoms which she was told is normal with that chemotherapy which resolved last week and now has this pain so she wonders if there is a correlation. Feels generally well besides back pain. Denies nausea, vomiting, diarrhea, dysuria, chest pain, fevers, chills, cough, diarrhea, bleeding.       Past Medical History    Past Medical History:   Diagnosis Date    Asthma     Bacterial pneumonia     recent as of march 2025    Dengue     in past    Diabetes mellitus (H)     Encephalopathy     history of    Fibromuscular dysplasia     H/O sebaceous cyst     Hepatitis A     in the past    Hepatitis B infection     in the past    Hyperlipidemia LDL goal < 130     Hypertension     IPMN (intraductal papillary mucinous neoplasm)     Malaria     in past    Obesity     Renal artery stenosis 2005    Right, s/p stenting at [facility], FMD    Statin medication not prescribed per physician orders     pt declined       Past Surgical History   Past Surgical History:   Procedure Laterality Date    BIOPSY NODE SENTINEL Left 11/25/2024    Procedure: LEFT SENTINEL LYMPH NODE BIOPSY;  Surgeon: Nina  Javier DAY MD;  Location: [facility] OR    COLONOSCOPY N/A 3/10/2017    Procedure: COMBINED COLONOSCOPY, SINGLE OR MULTIPLE BIOPSY/POLYPECTOMY BY BIOPSY;  Surgeon: Sergo Mcgraw MD;  [facility]    COLONOSCOPY N/A 3/30/2022    Procedure: COLONOSCOPY, WITH POLYPECTOMY;  Surgeon: Leventhal, Thomas Michael, MD;  Location: [facility] OR    ENDOMETRIAL SAMPLING (BIOPSY) N/A 11/25/2022    Procedure: endometrial biospy using portable ultrasound guidance;  Surgeon: Jessica Nation MD;  [facility]    ENDOSCOPIC ULTRASOUND UPPER GASTROINTESTINAL TRACT (GI) N/A 3/14/2023    Procedure: ENDOSCOPIC ULTRASOUND, ESOPHAGOSCOPY / UPPER GASTROINTESTINAL TRACT (GI);  Surgeon: Miguel A Vickers MD;  [facility]: [facility]    EXAM UNDER ANESTHESIA PELVIC N/A 11/25/2022    Procedure: EXAM UNDER ANESTHESIA, PELVIS,;  Surgeon: Jessica Nation MD;  [facility]    IR CHEST PORT PLACEMENT > 5 YRS OF AGE  12/26/2024    IR PORT CHECK RIGHT  1/9/2025    IR RENAL/VISCERAL STENT/ATHERECT/PTA Right 2005    LUMPECTOMY, BREAST, LOCALIZED USING RADIOFREQUENCY IDENTIFICATION Left 11/25/2024    Procedure: LEFT BREAST LUMPECTOMY LOCALIZED USING RADIOFREQUENCY IDENTIFICATION;  Surgeon: Javier Wood MD;  Location: [facility] OR       Prior to Admission Medications   Prior to Admission Medications   Prescriptions Last Dose Informant Patient Reported? Taking?   Collagen Hydrolysate, Bovine, POWD   Yes No   LORazepam (ATIVAN) 0.5 MG tablet   No No   Sig: Take 1 tablet (0.5 mg) by mouth once as needed for anxiety (take approx 15 min before MRI, after instructed to do so by staff)   Lancets (ONETOUCH DELICA PLUS [id number]) MISC   Yes No   STATIN NOT PRESCRIBED (INTENTIONAL)   No No   Sig: Please choose reason not prescribed, below   Patient not taking: Reported on 12/27/2024   acetaminophen (TYLENOL) 500 MG tablet   Yes No   Sig: Take 500-1,000 mg by mouth every 6 hours as needed for mild pain   albuterol (PROAIR HFA) 108 (90 Base) MCG/ACT inhaler   No  No   Sig: Inhale 2 puffs into the lungs every 6 hours as needed for shortness of breath or wheezing.   aspirin 81 MG tablet   Yes No   Sig: Take 81 mg by mouth daily   atenolol (TENORMIN) 25 MG tablet   No No   Sig: Take 1 tablet (25 mg) by mouth daily.   blood glucose (NO BRAND SPECIFIED) lancets standard   No No   Sig: Use to test blood sugar 2 times daily or as directed.   blood glucose (NO BRAND SPECIFIED) test strip   No No   Sig: Use to test blood sugar 2 times daily or as directed.   blood glucose monitoring (NO BRAND SPECIFIED) meter device kit   No No   Sig: Use to test blood sugar 3 times daily or as directed.   diclofenac (VOLTAREN) 1 % GEL topical gel   No No   Sig: Apply 2 grams three - four times daily using enclosed dosing card.   Patient not taking: Reported on 5/9/2025   fluticasone-salmeterol (ADVAIR) 100-50 MCG/ACT inhaler   No No   Sig: INHALE ONE PUFF INTO THE LUNGS DAILY   hydrochlorothiazide (HYDRODIURIL) 25 MG tablet   No No   Sig: Take 1 tablet (25 mg) by mouth daily.   irbesartan (AVAPRO) 300 MG tablet   No No   Sig: Take 1 tablet (300 mg) by mouth daily.   letrozole (FEMARA) 2.5 MG tablet   No No   Sig: Take 1 tablet (2.5 mg) by mouth daily.   metFORMIN (GLUCOPHAGE) 500 MG tablet   No No   Sig: Take 2 tablets (1,000 mg) by mouth 2 times daily (with meals).   saline nasal (AYR SALINE) GEL topical gel   Yes No   Sig: Apply into each nare 4 times daily as needed for congestion. 1-3 per day as needed   vitamin D3 (CHOLECALCIFEROL) 50 mcg (2000 units) tablet   Yes No      Facility-Administered Medications: None           Physical Exam   Vital Signs: Temp: 97.6  F (36.4  C) Temp src: Oral BP: 117/68 Pulse: 70   Resp: 18 SpO2: 96 % O2 Device: None (Room air)    Weight: 0 lbs 0 oz    Constitutional: awake, alert, cooperative, well appearing   Respiratory: No increased work of breathing, good air exchange, clear to auscultation bilaterally, no crackles or wheezing  Cardiovascular: Normal apical  impulse, regular rate and rhythm, and no murmur noted  GI: normal bowel sounds, soft, non-distended, non-tender  Skin: no bruising or bleeding of visible skin, no jaundice   : No CVA tenderness.   Musculoskeletal: There is no redness, warmth, or swelling of the visible joints, no TOBY, no calve swelling - no tenderness to palpation of back. Cystic lesion of left breast with some erythema and area of induration. No warmth. Photo below.   Neurologic: Awake, alert, oriented to name, place and time.       Media Information      Document Information    Other: Photograph   Left breast cyst   05/11/2025 3:47 PM   Attached To:   Hospital Encounter on 5/11/25   Source Information    Tae Mandujano PA-C  [facility]   Document History        Medical Decision Making       90 MINUTES SPENT BY ME on the date of service doing chart review, history, exam, documentation & further activities per the note.      Data   Recent Labs   Lab 05/11/25  1406 05/10/25  0827   WBC 5.5 5.3   HGB 11.9 12.1   MCV 88 86    299   INR 1.04 1.01    138   POTASSIUM 3.9 3.9   CHLORIDE 102 102   CO2 26 23   BUN 11.9 12.8   CR 0.61 0.61   ANIONGAP 11 13   GAEL 9.8 9.0   * 157*   ALBUMIN 4.1 4.3   PROTTOTAL 6.5 6.7   BILITOTAL 0.2 0.3   ALKPHOS 72 77   ALT 21 23   AST 20 21   LIPASE  --  40

## 2025-05-11 NOTE — ED TRIAGE NOTES
Left lower back pain. Here yesterday for same and is getting worse     Triage Assessment (Adult)       Row Name 05/11/25 2451          Triage Assessment    Airway WDL WDL        Respiratory WDL    Respiratory WDL WDL        Skin Circulation/Temperature WDL    Skin Circulation/Temperature WDL WDL        Cardiac WDL    Cardiac WDL WDL        Peripheral/Neurovascular WDL    Peripheral Neurovascular WDL WDL        Cognitive/Neuro/Behavioral WDL    Cognitive/Neuro/Behavioral WDL WDL

## 2025-05-11 NOTE — ED PROVIDER NOTES
I assumed care of this patient from my colleague at 4:15 PM.  Patient signed over to me with plan for admission to medicine for ongoing back pain with pending MRI to rule out malignancy.  This patient is a 75-year-old female with a history of invasive ductal breast cancer who presented for evaluation of low back pain x 3 days.  She had CT imaging demonstrating no obvious abnormality.  Case discussed with hematology/oncology noting low sensitivity of CT for evaluation of metastatic involvement.  Plan was for admission.  I was updated by the hospital team that the patient was concerned about insurance coverage requesting ED management of pain while obtaining MRI with possible home-going management.  I did reassess the patient.  She seemed to have some improvement of pain with no severe symptoms or neurovascular compromise.  I think this is a reasonable approach.  Patient has decision-making capacity.  MRI reveals 2.5 cm well-circumscribed lesion in the mid thoracic back.  Additional enhancement noted of L5 suspected secondary to degeneration with inability to rule out metastatic component.  Case discussed with neurosurgery who were kind enough to evaluate the patient in the emergency department.  They did offer hospitalization to the patient.  The patient has declined and feels her pain is improved.  She prefer home-going management to follow-up with neurosurgery in clinic which they will arrange.  I discussed strict return precautions with the patient who verbalizes understanding.     Thomas Church MD  05/11/25 5726

## 2025-05-11 NOTE — ED PROVIDER NOTES
ED Provider Note  [facility]      History   No chief complaint on file.    HPI  Zenaida Valles is a 75 year old female PMH of HTN, DM2, invasive ductal carcinoma of left breast who presents to the ER for evaluation of back pain.    Pt states that her pain came back this morning around 4 am. The pain is in the same place as yesterday in her back. After discharge [place] and spent a lot of time sitting, had no issues. This morning she had pain reoccur in the same area and used hot packs. She denies new fever, chills, SOB, or other symptoms. She is worried that something is being missed due to her fibromuscular dysphagia.     Pt was seen yesterday at [facility] with negative CT scan. She was discharged home after feeling better. CT Chest and CT PE study considered. Etiology unclear, negative imaging labs, etc are reassuring that there is no acute medical emergency.          Physical Exam      Physical Exam  Constitutional:       General: She is not in acute distress.     Appearance: Normal appearance. She is not diaphoretic.   HENT:      Head: Atraumatic.      Mouth/Throat:      Mouth: Mucous membranes are moist.   Eyes:      General: No scleral icterus.     Conjunctiva/sclera: Conjunctivae normal.   Cardiovascular:      Rate and Rhythm: Normal rate.      Heart sounds: Normal heart sounds.   Pulmonary:      Effort: No respiratory distress.      Breath sounds: Normal breath sounds.   Abdominal:      General: Abdomen is flat.   Musculoskeletal:      Cervical back: Neck supple.      Comments: Midline spine tenderness, left thoracic/lumbar rib/muscular back tenderness to palpation   Skin:     General: Skin is warm.      Findings: No rash.   Neurological:      Mental Status: She is alert.           ED Course, Procedures, & Data      Procedures                No results found for any visits on 05/11/25.  Medications - No data to display  Labs Ordered and Resulted  from Time of ED Arrival to Time of ED Departure - No data to display  No orders to display          Critical care was not performed.     Medical Decision Making  The patient's presentation was of high complexity (an acute health issue posing potential threat to life or bodily function).    The patient's evaluation involved:  review of external note(s) from 3+ sources (see separate area of note for details)  review of 3+ test result(s) ordered prior to this encounter (see separate area of note for details)  ordering and/or review of 3+ test(s) in this encounter (see separate area of note for details)  discussion of management or test interpretation with another health professional (oncology, hospitalist)    The patient's management necessitated high risk (a decision regarding hospitalization).    Assessment & Plan    Patient  presented to the hospital for the same symptoms she had yesterday, severe left flank/back pain  Reports that she went home and felt good, through last night, but woke up this morning with the same pain, no additional symptoms  As patient has a similar exam that she had yesterday when I saw her, reassuring vital signs, discussed with oncology who recommended MRI thoracic and lumbar spine and back to evaluate better for bony metastasis  Plan will be to admit to medicine observation overnight for MRI, pain control, and reassessment  Discussed with internal medicine physician who accepted patient for admission.      I have reviewed the nursing notes. I have reviewed the findings, diagnosis, plan and need for follow up with the patient.    New Prescriptions    No medications on file       Final diagnoses:   None     ISasha, am serving as a trained medical scribe to document services personally performed by Aleksandr Decker MD, based on the provider's statements to me.     IAleksandr MD, was physically present and have reviewed and verified the accuracy of this note documented by  Sasha Worthington.    Aleksandr Decker MD  [facility] EMERGENCY DEPARTMENT  5/11/2025     Aleksandr Decker MD  05/11/25 [id number]

## 2025-05-11 NOTE — MEDICATION SCRIBE - ADMISSION MEDICATION HISTORY
Medication Scribe Admission Medication History    Admission medication history is complete. The information provided in this note is only as accurate as the sources available at the time of the update.    Information Source(s): Patient via in-person    Pertinent Information: Zenaida reports taking her medications as prescribed. She mentioned taking calcium 600 mg this morning but does not know if the pill also contains vitamin D. Patient denies taking any other medications. Dispense report and outside medication reconciliation list have been reviewed.     Changes made to PTA medication list:  Added: None  Deleted:   STATIN NOT PRESCRIBED (INTENTIONAL)   Changed: None    Allergies reviewed with patient and updates made in EHR: yes    Medication History Completed By: Hakan Gerard 5/11/2025 3:35 PM    PTA Med List   Medication Sig Last Dose/Taking    acetaminophen (TYLENOL) 500 MG tablet Take 500-1,000 mg by mouth every 6 hours as needed for mild pain Taking As Needed    albuterol (PROAIR HFA) 108 (90 Base) MCG/ACT inhaler Inhale 2 puffs into the lungs every 6 hours as needed for shortness of breath or wheezing. Taking As Needed    aspirin 81 MG tablet Take 81 mg by mouth daily 5/11/2025 Morning    atenolol (TENORMIN) 25 MG tablet Take 1 tablet (25 mg) by mouth daily. 5/11/2025 Morning    blood glucose (NO BRAND SPECIFIED) lancets standard Use to test blood sugar 2 times daily or as directed. 5/11/2025 Noon    blood glucose (NO BRAND SPECIFIED) test strip Use to test blood sugar 2 times daily or as directed. 5/11/2025 Noon    blood glucose monitoring (NO BRAND SPECIFIED) meter device kit Use to test blood sugar 3 times daily or as directed. 5/11/2025 Noon    calcium 600 MG tablet Take 1 tablet by mouth daily. 5/11/2025 Morning    Collagen Hydrolysate, Bovine, POWD  5/11/2025 Morning    diclofenac (VOLTAREN) 1 % GEL topical gel Apply 2 grams three - four times daily using enclosed dosing card. Taking     fluticasone-salmeterol (ADVAIR) 100-50 MCG/ACT inhaler INHALE ONE PUFF INTO THE LUNGS DAILY 5/11/2025 Morning    hydrochlorothiazide (HYDRODIURIL) 25 MG tablet Take 1 tablet (25 mg) by mouth daily. 5/11/2025 Morning    irbesartan (AVAPRO) 300 MG tablet Take 1 tablet (300 mg) by mouth daily. 5/11/2025 Morning    Lancets (ONETOUCH DELICA PLUS [id number]) MISC  5/11/2025 Noon    letrozole (FEMARA) 2.5 MG tablet Take 1 tablet (2.5 mg) by mouth daily. 5/11/2025 Morning    LORazepam (ATIVAN) 0.5 MG tablet Take 1 tablet (0.5 mg) by mouth once as needed for anxiety (take approx 15 min before MRI, after instructed to do so by staff) Taking As Needed    metFORMIN (GLUCOPHAGE) 500 MG tablet Take 2 tablets (1,000 mg) by mouth 2 times daily (with meals). 5/11/2025 Morning    saline nasal (AYR SALINE) GEL topical gel Apply into each nare 4 times daily as needed for congestion. 1-3 per day as needed Taking As Needed    vitamin D3 (CHOLECALCIFEROL) 50 mcg (2000 units) tablet  5/11/2025 Morning

## 2025-05-12 ENCOUNTER — PATIENT OUTREACH (OUTPATIENT)
Dept: CARE COORDINATION | Facility: CLINIC | Age: 75
End: 2025-05-12
Payer: COMMERCIAL

## 2025-05-12 ENCOUNTER — TELEPHONE (OUTPATIENT)
Dept: NEUROSURGERY | Facility: CLINIC | Age: 75
End: 2025-05-12
Payer: COMMERCIAL

## 2025-05-12 NOTE — DISCHARGE INSTRUCTIONS
I was sorry to hear that you are still having ongoing back pain.  Your MRI ultimately did show a mass within your mid back likely contributing to your symptoms.  The neurosurgery team did you see you in the emergency department with discussion of possibly coming to the hospital versus home-going management outpatient follow-up in clinic.  As per your preference we will work to get you home.  They will plan to reach out to you and see you in clinic to discuss next steps.  Should you have a change, progression or any worsening of symptoms such as increasing weakness, loss of sensation to your legs, abdomen or low back, uncontrolled pain, loss of bowel or bladder control or any other concerning symptoms please return emergently for reevaluation.

## 2025-05-12 NOTE — TELEPHONE ENCOUNTER
Called patient and scheduled appointment w/ Dr. Monte via task. -Shayy Wyman MD  P Neurosurgery Clinic Scheduling  Hello, please schedule this appointment with Dr. Monte, earliest available. She has a spinal meningioma and will need surgery arranged. Thank you!

## 2025-05-12 NOTE — TELEPHONE ENCOUNTER
SPINE PATIENTS - NEW PROTOCOL PREVISIT    RECORDS RECEIVED FROM: internal   REASON FOR VISIT: Spinal meningioma/    PROVIDER: Dr. Monte   DATE OF APPT: 5/16/25   NOTES (FOR ALL VISITS) STATUS DETAILS   OFFICE NOTE from referring provider Internal SEE INPATIENT NOTES   DISCHARGE REPORT from ER Internal [facility]:  5/11/25  5/10/25   MEDICATION LIST Internal    IMAGING  (FOR ALL VISITS)     MRI (HEAD, NECK, SPINE) Internal [facility]:  MRI Thoracic Spine 5/11/25  MRI Lumbar Spine 5/11/25

## 2025-05-12 NOTE — CONSULTS
[facility]       NEUROSURGERY CONSULTATION    This consultation was requested by Dr. Church from the ED service.    Reason for Consultation: Mass within spinal canal at T5    HPI:   Zenaida Valles is a 75 year old female breast cancer s/p left lumpectomy (11/25/2024) on chemotherapy (last infused 5/1/2025) and radiotherapy (completed 6 cycles), DMII, HTN 2/2 renal artery stenosis on daily 81mg aspirin, HLD, asthma, and fibromuscular dysplasia  who was seen for a newly identified mass within the spinal canal at T5, suspected to represent a meningioma based on radiology report. She presented to ED today with mid-back pain which has started on Friday. The pain predominately involves the left side of her back at the midthoracic spine and does not radiate anywhere else. She denies weakness or bowel/bladder changes and has baseline numbness/tingling in her feet (L worse than R) which has been present since she started chemotherapy. She did not experience any changes in her balance or hand dexterity.     PAST MEDICAL HISTORY:   Past Medical History:   Diagnosis Date    Asthma     Bacterial pneumonia     recent as of march 2025    Dengue     in past    Diabetes mellitus (H)     Encephalopathy     history of    Fibromuscular dysplasia     H/O sebaceous cyst     Hepatitis A     in the past    Hepatitis B infection     in the past    Hyperlipidemia LDL goal < 130     Hypertension     IPMN (intraductal papillary mucinous neoplasm)     Malaria     in past    Obesity     Renal artery stenosis 2005    Right, s/p stenting at [facility], [facility]    Statin medication not prescribed per physician orders     pt declined       PAST SURGICAL HISTORY:   Past Surgical History:   Procedure Laterality Date    BIOPSY NODE SENTINEL Left 11/25/2024    Procedure: LEFT SENTINEL LYMPH NODE BIOPSY;  Surgeon: Javier Wood MD;  Location: [facility] OR    COLONOSCOPY N/A 3/10/2017    Procedure: COMBINED  COLONOSCOPY, SINGLE OR MULTIPLE BIOPSY/POLYPECTOMY BY BIOPSY;  Surgeon: Sergo Mcgraw MD;  Location: [facility] GI    COLONOSCOPY N/A 3/30/2022    Procedure: COLONOSCOPY, WITH POLYPECTOMY;  Surgeon: Leventhal, Thomas Michael, MD;  Location: [facility] OR    ENDOMETRIAL SAMPLING (BIOPSY) N/A 11/25/2022    Procedure: endometrial biospy using portable ultrasound guidance;  Surgeon: Jessica Nation MD;  Location: [facility] OR    ENDOSCOPIC ULTRASOUND UPPER GASTROINTESTINAL TRACT (GI) N/A 3/14/2023    Procedure: ENDOSCOPIC ULTRASOUND, ESOPHAGOSCOPY / UPPER GASTROINTESTINAL TRACT (GI);  Surgeon: Miguel A Vickers MD;  Location: [facility] GI    EXAM UNDER ANESTHESIA PELVIC N/A 11/25/2022    Procedure: EXAM UNDER ANESTHESIA, PELVIS,;  Surgeon: Jessica Nation MD;  Location: [facility] OR    IR CHEST PORT PLACEMENT > 5 YRS OF AGE  12/26/2024    IR PORT CHECK RIGHT  1/9/2025    IR RENAL/VISCERAL STENT/ATHERECT/PTA Right 2005    LUMPECTOMY, BREAST, LOCALIZED USING RADIOFREQUENCY IDENTIFICATION Left 11/25/2024    Procedure: LEFT BREAST LUMPECTOMY LOCALIZED USING RADIOFREQUENCY IDENTIFICATION;  Surgeon: Javier Wood MD;  Location: [facility] OR       FAMILY HISTORY:   Family History   Problem Relation Age of Onset    Peptic Ulcer Disease Mother         Zollinger-Almaraz Syndrome    Diabetes Mother     Thyroid Disease Mother         s/p thyroidecomy    Cancer Mother         zollinger-almaraz - cncer of pancreas    Cancer Father         prostate cancer- smoker, got bone mets    C.A.D. Father         aortic aneurysm    Childhood Heart Disease Sister         tetrology of fallot    Melanoma No family hx of     Skin Cancer No family hx of        SOCIAL HISTORY:   Social History     Tobacco Use    Smoking status: Never    Smokeless tobacco: Never   Substance Use Topics    Alcohol use: Yes     Comment: rare       MEDICATIONS:    Current Outpatient Medications   Medication Instructions    acetaminophen (TYLENOL) 500-1,000 mg, EVERY 6 HOURS PRN     "albuterol (PROAIR HFA) 108 (90 Base) MCG/ACT inhaler 2 puffs, Inhalation, EVERY 6 HOURS PRN    aspirin (ASA) 81 mg, DAILY    atenolol (TENORMIN) 25 mg, Oral, DAILY    blood glucose (NO BRAND SPECIFIED) lancets standard Use to test blood sugar 2 times daily or as directed.    blood glucose (NO BRAND SPECIFIED) test strip Use to test blood sugar 2 times daily or as directed.    blood glucose monitoring (NO BRAND SPECIFIED) meter device kit Use to test blood sugar 3 times daily or as directed.    calcium 600 MG tablet 1 tablet, DAILY    Collagen Hydrolysate, Bovine, POWD     diclofenac (VOLTAREN) 1 % GEL topical gel Apply 2 grams three - four times daily using enclosed dosing card.    fluticasone-salmeterol (ADVAIR) 100-50 MCG/ACT inhaler INHALE ONE PUFF INTO THE LUNGS DAILY    hydrochlorothiazide (HYDRODIURIL) 25 mg, Oral, DAILY    irbesartan (AVAPRO) 300 mg, Oral, DAILY    Lancets (ONETOUCH DELICA PLUS [id number]) MISC     letrozole (FEMARA) 2.5 mg, Oral, DAILY    LORazepam (ATIVAN) 0.5 mg, Oral, ONCE PRN    metFORMIN (GLUCOPHAGE) 1,000 mg, Oral, 2 TIMES DAILY WITH MEALS    saline nasal (AYR SALINE) GEL topical gel 4 TIMES DAILY PRN    vitamin D3 (CHOLECALCIFEROL) 50 mcg (2000 units) tablet          Allergies:  Allergies   Allergen Reactions    Codeine Diarrhea and Rash    Codeine Camsylate Diarrhea and Rash    Ibuprofen GI Disturbance and Nausea    Lisinopril Cough     Physical exam:   Blood pressure 117/68, pulse 70, temperature 97.6  F (36.4  C), temperature source Oral, resp. rate 18, height 1.651 m (5' 5\"), SpO2 96%, not currently breastfeeding.  General: Not in acute distress   PULM: breathing comfortably on room air  ABD: soft, non-distended  NEUROLOGIC:  -- Awake and alert  -- Follows commands briskly  -- Speech fluent, spontaneous. No aphasia or dysarthria.  -- No gaze preference. No apparent hemineglect.  -- Extraocular movements intact  -- Face symmetrical  -- Hearing grossly intact bilat    Motor:  Normal " bulk / tone; no tremor, rigidity, or bradykinesia.  No muscle wasting or fasciculations     Delt Bi Tri Hand Flexion/  Extension Iliopsoas Quadriceps Hamstrings Tibialis Anterior Gastroc    C5 C6 C7 C8/T1 L2 L3 L4-S1 L4 S1   R 5 5 5 5 5 5 5 5 5   L 5 5 5 5 5 5 5 5 5     Sensory:  intact to LT x 4 extremities     Reflexes:     Bi Tri BR David Pat Ach Bab    C5-6 C7-8 C6 UMN L2-4 S1 UMN   R 2+ 2+ 2+ Norm 2+ 2+ Norm   L 2+ 2+ 2+ Norm 2+ 2+ Norm     LABS:  Recent Labs   Lab 05/11/25  1406 05/10/25  0827    138   POTASSIUM 3.9 3.9   CHLORIDE 102 102   CO2 26 23   ANIONGAP 11 13   * 157*   BUN 11.9 12.8   CR 0.61 0.61   GAEL 9.8 9.0       Recent Labs   Lab 05/11/25  1406   WBC 5.5   RBC 4.09   HGB 11.9   HCT 35.8   MCV 88   MCH 29.1   MCHC 33.2   RDW 12.5        IMAGING:  Recent Results (from the past 24 hours)   MR Lumbar Spine w/o & w Contrast    Impression    RESIDENT PRELIMINARY INTERPRETATION  IMPRESSION:  1.  Extensive lumbar spondylosis involving nearly the entirety of the  lumbar spine.  2.  Mildly enhancing lesion of the L5 vertebral body favored to  represent degeneration related inflammatory change, however metastatic  osseous lesion is a consideration in the setting of known breast  cancer history.   MR Thoracic Spine w/o & w Contrast    Narrative    MR THORACIC SPINE W/O & W CONTRAST 5/11/2025 5:52 PM    Provided History: back pain, left flank pain, please look at ribs    Additional information from the electronic medical record: Invasive  ductal breast cancer.  ICD-10:    Comparison: CT abdomen and pelvis 5/10/2025. Chest CT 11/24/2009.    Technique: Sagittal T1-weighted, sagittal T2-weighted, sagittal STIR,  sagittal diffusion-weighted (with ADC map), and axial T2-weighted  images of the thoracic spine were obtained without the administration  of intravenous contrast. After the administration of intravenous  contrast, axial and sagittal T1-weighted images of the thoracic spine  were  obtained.    Contrast: IV Gadavist    Findings:  The external marker is at T6. The thoracic vertebral column is in  normal alignment.    Well-defined enhancing mass centered in the left posterolateral spinal  canal at the level of T5 measuring 1.1 x 1.5 x 2.5 cm. There is subtle  density and calcification in this location on the prior chest CT from  2009.    Multilevel loss of intervertebral disc height. Scattered degenerative  endplate marrow signal changes. Incidental hemangiomas in the T1 and  T3 vertebral bodies. Multilevel thoracic degenerative changes  including disc bulges and facet hypertrophy. Multilevel mild  associated spinal canal narrowing. No high-grade neural foraminal  stenosis.    Partially evaluated right renal cyst.      Impression    Impression:   1. Mass in the left posterolateral spinal canal at T5 with severe  spinal canal stenosis and cord compression, consistent with a  meningioma. No definite associated myelopathy.  2. Multilevel mild to moderate thoracic spondylosis.    HEATHER CALIXTO MD         SYSTEM ID:  [id number]     ASSESSMENT/PLAN:  Zenaida Valles is a 75 year old female presenting with mid thoracic back pain, found to have a mass within the spinal canal at the level of T5. While the mass does displace the spinal cord, there is no associated cord signal change. She does not have any signs or symptoms of myelopathy and therefore no emergent intervention is indicated. She remains intact on neurological examination. No metastases were identified on CRT C/A/P. KPS 90. Discussed that it would be preferable for her to remain in the hospital for planning a possible surgical resection of this lesion; however, she would like to think more about this and favored leaving the hospital today and having a short-term outpatient follow up in clinic to discuss operative plans if this is not an emergency. We will book a short term follow-up in Neurosurgery clinic with Dr. Monte for further  "evaluation and discussion.      The patient was discussed with Dr. Ruiz, neurosurgery chief resident, and Dr. Menendez, neurosurgery staff, and they agree with the above.    Nohemi Gonzalez MD  Neurosurgery  Pager: [phone]     Please contact neurosurgery resident on call with questions.    Dial * * [phone], enter [phone] when prompted.     Clinically Significant Risk Factors Present on Admission                 # Drug Induced Platelet Defect: home medication list includes an antiplatelet medication   # Hypertension: Noted on problem list          # DMII: A1C = 6.6 % (Ref range: <=5.7 %) within past 6 months    # Overweight: Estimated body mass index is 27.82 kg/m  as calculated from the following:    Height as of this encounter: 1.651 m (5' 5\").    Weight as of 5/9/25: 75.8 kg (167 lb 3.2 oz).       # Asthma: noted on problem list   "

## 2025-05-15 LAB
BACTERIA SPEC CULT: NO GROWTH
BACTERIA SPEC CULT: NO GROWTH

## 2025-05-16 ENCOUNTER — PRE VISIT (OUTPATIENT)
Dept: NEUROSURGERY | Facility: CLINIC | Age: 75
End: 2025-05-16

## 2025-05-19 ENCOUNTER — HOSPITAL ENCOUNTER (OUTPATIENT)
Dept: CT IMAGING | Facility: CLINIC | Age: 75
Discharge: HOME | End: 2025-05-19
Attending: RADIOLOGY | Admitting: RADIOLOGY
Payer: COMMERCIAL

## 2025-05-19 DIAGNOSIS — R06.02 SHORTNESS OF BREATH: ICD-10-CM

## 2025-05-19 DIAGNOSIS — R07.89 CHEST WALL PAIN: ICD-10-CM

## 2025-05-19 DIAGNOSIS — Z85.3 HISTORY OF BREAST CANCER: ICD-10-CM

## 2025-05-19 DIAGNOSIS — M54.6 ACUTE LEFT-SIDED THORACIC BACK PAIN: ICD-10-CM

## 2025-05-19 DIAGNOSIS — Z87.01 HISTORY OF PNEUMONIA: ICD-10-CM

## 2025-05-19 PROCEDURE — 71260 CT THORAX DX C+: CPT | Mod: 26 | Performed by: STUDENT IN AN ORGANIZED HEALTH CARE EDUCATION/TRAINING PROGRAM

## 2025-05-19 PROCEDURE — 71260 CT THORAX DX C+: CPT

## 2025-05-19 PROCEDURE — 250N000011 HC RX IP 250 OP 636: Performed by: RADIOLOGY

## 2025-05-19 RX ORDER — IOPAMIDOL 755 MG/ML
84 INJECTION, SOLUTION INTRAVASCULAR ONCE
Status: COMPLETED | OUTPATIENT
Start: 2025-05-19 | End: 2025-05-19

## 2025-05-19 RX ADMIN — IOPAMIDOL 84 ML: 755 INJECTION, SOLUTION INTRAVENOUS at 14:37

## 2025-05-21 DIAGNOSIS — Z85.3 HISTORY OF BREAST CANCER: Primary | ICD-10-CM

## 2025-05-21 DIAGNOSIS — R07.89 CHEST WALL PAIN: ICD-10-CM

## 2025-05-21 DIAGNOSIS — R06.02 SHORTNESS OF BREATH: ICD-10-CM

## 2025-05-21 NOTE — PROGRESS NOTES
MEDICAL ONCOLOGY NOTE     Javier Wood MD  Professor   [address] [address]     Re. Zenaida Valles   Female, 74 year old, 1950  MRN: [id number]     May 22, 2025     Dear Dr. Wood,     Thank you for referring Zenaida Valles who is a 75-year-old woman with a new diagnosis of a screening identified stage I invasive ductal cancer of the upper outer quadrant of the left breast ER positive, MS positive, HER2 amplified stage [id number].       HISTORY OF PRESENT ILLNESS:    Zenaida has been in generally good health except for a right renal artery stenosis requiring a stent.  She also has a left leg injury recently she was in her usual state of good health and had a screening mammogram which showed a suspicious lesion in the upper outer quadrant of the left breast at the 3 o'clock position 5 cm from the nipple there is an irregular hypoechoic mass measuring 1.0 x 0.7 x 0.9 cm in size 1 cm from the nipple there is also a 0.5 x 0.4 cm area of suspicion     She underwent an ultrasound guided biopsy which showed usual ductal hyperplasia and fibrocystic changes microcysts in April Kren metaplasia.  Columnar cell changes.  It invasive ductal carcinoma was also found grade 2 and DCIS grade 2 solid type.  Her breast cancer was estrogen receptor positive progesterone receptor positive at HER2 2+ by IHC.  She then underwent ADRIANA which showed a subpopulation which was 60 to 70% ADRIANA 5 negative and 30 to 40% I-S age 1 positive with a 4.9 HER2 signals per nucleus and the ratio of 2.2 indicating HER2 positivity.     She then came to see Dr. Wood at [facility] for surgical recommendations.  She had a contrast mammogram which showed a 0.9 cm enhancing lesion in the upper outer quadrant of the left breast.     She has had no weight loss or loss of energy.  She does not sleep during the day.  She can perform all of her household chores.  ECOG 0 PS.       DIAGNOSTIC AND TREATMENT SUMMARY:  On October 7 she underwent a screening mammogram which demonstrated an asymmetry in her left breast.       On October 15 she underwent a diagnostic mammogram which confirmed a left breast asymmetry.  She had an ultrasound which demonstrated 2 masses in her left breast.  At the 3 o'clock position 1 cm from the nipple there was a mass and at the 3 o'clock position 5 cm from the nipple there was a second mass.  Her lymph nodes were normal by ultrasound.       On October 23 she underwent a contrast-enhanced mammography which only demonstrated 1 mass measuring 1 cm.  The right breast was normal.  She underwent ultrasound-guided biopsies of both masses.  The mass at the 3 o'clock position 1 cm from the nipple was benign.  The mass at the 3 o'clock position 5 cm from the nipple demonstrating a grade 2 invasive ductal cancer that was ER positive and HER2 equivocal.  There is also DCIS present.          A. LEFT breast, 3:00, 1 cm from nipple, ultrasound-guided core biopsy:  - Benign breast tissue with usual ductal hyperplasia (UDH) and fibrocystic changes including microcysts, apocrine metaplasia, and columnar cell change  - Rare calcifications associated with benign breast ducts and acini  - Negative for atypia or malignancy      B. LEFT breast, 3:00, 5 cm from nipple, ultrasound-guided core biopsy:   - INVASIVE BREAST CARCINOMA OF NO SPECIAL TYPE (INVASIVE DUCTAL CARCINOMA), Hessmer grade 2  - Ductal carcinoma in situ (DCIS), nuclear grade 2, solid type  - Invasive carcinoma is estrogen receptor positive, progesterone receptor positive, and HER2 equivocal (score 2+) by immunohistochemistry (see 'Breast Biomarker Reporting Template' below)  - HER2 FISH is is process; results will be reportedly separately by cytogenetis  - See comment     This FISH analysis is performed in follow up to the reported equivocal (2+) HER2 findings by immunohistochemistry ([id number]).        RESULTS:      Ratio of HER2/MARION-17 signals  Zenaida Lazarus Valles:  See Interpretation below     Majority (~60-70%) of tumor in area of strongest IHC staining:  Group 5 (ADRIANA Negative)  Avg. number HER2 signals/nucleus:  2.9  Avg. number MARION-17 signals/nucleus:  1.7  HER2/MARION 17 ratio:  1.7     Subpopulation (~30-40%) of tumor in area of strongest IHC staining:  Group 1 (ADRIANA Positive)                             Avg. number HER2 signals/nucleus:  4.9  Avg. number MARION-17 signals/nucleus:  2.3  HER2/MARION 17 ratio:  2.2     **Interpretive guidelines per the American Society of Clinical Oncology/College of American Pathologists Clinical Practice Guideline Update (Margarita VALENZUELA et al, 2023, Arch Pathol Lab Med 147:993):     -- Group 1: HER2/MARION-17 ratio 2.0 or more -AND- avg. number HER2 signals/nucleus 4.0 or more (ADRIANA Positive)  -- Group 2: HER2/MARION-17 ratio 2.0 or more -AND- avg. number HER2 signals/nucleus <4.0 (Additional work required)  -- Group 3: HER2/MARION-17 ratio <2.0 -AND- avg. number HER2 signals/nucleus 6.0 or more (Additional work required)  -- Group 4: HER2/MARION-17 ratio <2.0 -AND- avg. number HER2 signals/nucleus 4.0 or more and <6.0 (Additional work required)  -- Group 5: HER2/MARION-17 ratio <2.0 -AND- avg. number HER2 signals/nucleus <4.0 (ADRIANA Negative)     INTERPRETATION:  Two admixed populations of cells were found in this sample:     Majority (~60-70%) of tumor in area of strongest IHC staining:  Per the American Society of Clinical Oncology/College of American Pathologists Clinical Practice Guideline Focused Update (Margarita VALENZUELA et al, 2018, Arch Pathol Lab Med  doi:10.5858/arpa.[email or web address]-PN), the HER2/MARION 17 ratio of 1.7 and average number of HER2 signals/cell of 2.9 places this population of cells in Group 5 (ADRIANA Negative).      Subpopulation (~30-40%) of tumor in area of strongest IHC staining:  Admixed within this sample were cells with increased HER2 signals, which, when selectively scored, had an average of 4.9 HER2  signals/nucleus and a HER2/MARION 17 ratio of 2.2; per the American Society of Clinical Oncology/College of American Pathologists Clinical Practice Guideline Focused Update (Margarita VALENZUELA et al, 2018, Arch Pathol Lab Med  doi:10.5858/arpa.[email or web address]-ZN), this population of cells falls into Group 1 (ADRIANA Positive).     PAST MEDICAL HISTORY: No history of breast surgery.  No history of breast cancer in the past.  No history of radiation therapy in the past or radiation exposure.  No history of tumor of any kind.  No history of heart problems, heart attack, breathing problems, blood clots, seizures, arthritis, peptic ulcer disease, osteoporosis or bone fractures.  She is not currently participating in a clinical trial.  She does have a history of asthma and uses an albuterol inhaler in cold weather.     Her past medical history is significant for diabetes mellitus and is on metformin, hypertension obesity, she has IPMN and a right renal artery stent.  The family history is negative for breast cancer.  She has a history of right renal artery stenosis and has a stent in place.     She has a recent history of a left hamstring injury.  She is seeing orthopedics for this problem.  She also has a history of back pain.  Her gait is affected but she can perform activities of daily living.  She has a history of aches in her shoulders.  She had a 65 pound weight loss over the last 2 years which was intentional related to improve diet and exercise. She says she has a history of fibromuscular dysplasia.     She worked in the [employer] in [place] and was exposed to dengue, malaria, hepatitis A, hepatitis B, encephalitis.  She has also had COVID and the flu.     FAMILY HISTORY:   No history of ovarian, uterine, colon cancer, or melanoma.  No history of glioma, gastric cancer or pancreatic cancer.  Her mother did have Zollinger-Wheeler syndrome but the patient's genetics are negative for this syndrome.      PAST MENSTRUAL HISTORY:  Age  of first menstrual period was 13.   She has been pregnant 1 times with 0 live births and 0 miscarriages and 1 .  Age at in place pregnancy age 28.   Uterus and ovaries are in place.  Last menstrual period was about age 52 and the menopause occurred naturally. No history of hormone replacement therapy.     She does have a history of thickened endometrial lining but by her report no abnormalities of the lining when she had a DIC     HABITS:  She is a never smoker but she has a history of exposure to secondhand smoke from her father as a child.  She consumes alcoholic beverages socially 1 drink every couple of months.     GERMLINE GENETICS: Genetic testing is available for 47 genes associated with cancers of the breast, ovary, uterus, prostate and gastrointestinal system: "[employer] Common Hereditary Cancers panel (APC, SUMIT, AXIN2, BARD1, BMPR1A, BRCA1, BRCA2, BRIP1, CDH1, CDK4, CDKN2A, CHEK2, CTNNA1, DICER1, EPCAM, GREM1, HOXB13, KIT, MEN1, MLH1, MSH2, MSH3, MSH6, MUTYH, NBN, NF1, NTHL1, PALB2, PDGFRA, PMS2, POLD1, POLE, PTEN,RAD50, RAD51C, RAD51D, SDHA, SDHB, SDHC, SDHD, SMAD4, SMARCA4, STK11, TP53,TSC1, TSC2, VHL)      ALLERGIES: She has drug allergies to codeine, codeine derivatives, ibuprofen, lisinopril..  No allergy to seafood, iodine, or contrast dye.  She does take daily aspirin 81 mg because of her renal stent.     LUMPECTOMY:  Surgical Pathology Report                         Case: [id number]                                   Authorizing Provider:  Javier Wood MD         Collected:           2024 09:40 AM           Ordering Location:     [facility] OR  Received:            2024 09:54 AM                                  [place]                                                                   Pathologist:           Kristal Lanza MD                                                   Specimens:   A) - Breast, Left, Left breast lumpectomy                                                             B) - Lymph Node(s), [place], Left axillary sentinel lymph node #1                                  C) - Lymph Node(s), [place], Left axillary sentinel lymph node # 2                         Final Diagnosis   A. LEFT breast, RFID tag-localized lumpectomy:  -INVASIVE BREAST CARCINOMA OF NO SPECIAL TYPE (INVASIVE DUCTAL CARCINOMA), FAREED GRADE 2, size 11 mm  -Margins are uninvolved by invasive carcinoma  -Invasive carcinoma is 1 mm from the nearest (posterior) margin, 5 mm from the anterior margin, and > 5 mm from the superior, inferior, medial and lateral margins  -No lymphovascular invasion identified  -Other findings: fibrocystic change (including microcysts with apocrine metaplasia), sclerosing adenosis, and usual ductal hyperplasia  -Calcifications associated with benign acini  -Prior core biopsy site changes  -See tumor synoptic below     B. Lymph node, LEFT axillary, sentinel #1, excision:  -One benign lymph node (0/1)     C. Lymph node, LEFT axillary, sentinel #2, excision:  -One benign lymph node (0/1)                Synoptic Checklist   INVASIVE CARCINOMA OF THE BREAST: Resection   8th Edition - Protocol posted: 12/13/2023INVASIVE CARCINOMA OF THE BREAST: RESECTION - All Specimens                  SPECIMEN     Procedure   Excision (less than total mastectomy)     Specimen Laterality   Left     TUMOR     Tumor Site   Clock position         3 o'clock     Histologic Type   Invasive carcinoma of no special type (ductal)     Histologic Grade (Fareed Histologic Score)         Glandular (Acinar) / Tubular Differentiation   Score 2     Nuclear Pleomorphism   Score 3     Mitotic Rate   Score 2     Overall Grade   Grade 2 (scores of 6 or 7)     Tumor Size   Greatest dimension of largest invasive focus (Millimeters): 11 mm     Additional Dimension (Millimeters)   9 mm         9 mm     Tumor Focality   Single focus of invasive carcinoma     Ductal Carcinoma In Situ (DCIS)   Not  identified     Lobular Carcinoma In Situ (LCIS)   Not identified     Lymphatic and / or Vascular Invasion   Not identified     Dermal Lymphatic and / or Vascular Invasion   No skin present     Microcalcifications   Present in non-neoplastic tissue     Treatment Effect in the Breast   No known presurgical therapy     MARGINS     Margin Status for Invasive Carcinoma   All margins negative for invasive carcinoma     Distance from Invasive Carcinoma to Closest Margin   1 mm     Closest Margin(s) to Invasive Carcinoma   Posterior     Distance from Invasive Carcinoma to Anterior Margin   5 mm     Distance from Invasive Carcinoma to Superior Margin   Greater than: 5 mm     Distance from Invasive Carcinoma to Inferior Margin   Greater than: 5 mm     Distance from Invasive Carcinoma to Medial Margin   Greater than: 5 mm     Distance from Invasive Carcinoma to Lateral Margin   Greater than: 5 mm     REGIONAL LYMPH NODES     Regional Lymph Node Status   All regional lymph nodes negative for tumor     Total Number of Lymph Nodes Examined (sentinel and non-sentinel)   2     Number of [place] Nodes Examined   2     pTNM CLASSIFICATION (AJCC 8th Edition)     Reporting of pT, pN, and (when applicable) pM categories is based on information available to the pathologist at the time the report is issued. As per the AJCC (Chapter 1, 8th Ed.) it is the managing physician s responsibility to establish the final pathologic stage based upon all pertinent information, including but potentially not limited to this pathology report.     pT Category   pT1c     pN Category   pN0     N Suffix   (sn)     SPECIAL STUDIES               Estrogen Receptor (ER) Status   Positive (greater than 10% of cells demonstrate nuclear positivity)     Percentage of Cells with Nuclear Positivity   %               Progesterone Receptor (PgR) Status   Positive     Percentage of Cells with Nuclear Positivity   81-90%               HER2 (by  "immunohistochemistry)   Equivocal (Score 2+)     Percentage of Cells with Uniform Intense Complete Membrane Staining   0 %               HER2 (by in situ hybridization)   Positive (amplified)     Testing Performed on Case Number   HER2 FISH amplified in 30-40% of tumor cells. Performed on prior core biopsy [id number] specimen B     Comment(s)   Best block: A13   .      Clinical Information   ALEE   The patient is a 74 year old woman with LEFT breast carcinoma. Procedure: LEFT breast RFID tag localized lumpectomy, LEFT axillary sentinel lymph node biopsy.    Gross Description   ALEE ROBIN(1). Breast, Left, Left breast lumpectomy:  The specimen is received fresh with proper patient identification, labeled \"left breast lumpectomy\". It consists of 27.85 g, 6.7 cm from medial to lateral x 4.7 cm from superior to inferior x 2.4 cm from anterior to posterior left breast lumpectomy specimen. The specimen was received previously inked by the surgeon as follows:  Superior - red, anterior - green, inferior - blue, posterior - black, medial - yellow and lateral - orange.     The specimen is sectioned from medial (slice 1) to lateral (slice 15) into 15 slices.          [id number]   This FISH analysis is performed in follow up to the reported equivocal (2+) HER2 findings by immunohistochemistry ([id number]).        RESULTS:     Ratio of HER2/MARION-17 signals  Zenaida Valles:  See Interpretation below     Majority (~60-70%) of tumor in area of strongest IHC staining:  Group 5 (ADRIANA Negative)  Avg. number HER2 signals/nucleus:  2.9  Avg. number MARION-17 signals/nucleus:  1.7  HER2/MARION 17 ratio:  1.7     Subpopulation (~30-40%) of tumor in area of strongest IHC staining:  Group 1 (ADRIANA Positive)                             Avg. number HER2 signals/nucleus:  4.9  Avg. number MARION-17 signals/nucleus:  2.3  HER2/MARION 17 ratio:  2.2        TREATMENT HISTORY:  A.  Lumpectomy November 25 showed a stage I T1b NX MX invasive ductal " "carcinoma of the left breast upper outer quadrant which was estrogen receptor positive (%) progesterone receptor positive (81-90%) and HER2 2+ by IHC and HER2 positive by ADRIANA.  Margins negative for invasive carcinoma.    B.  Plan is to give the APT regimen followed by addition of hormonal therapy with letrozole for 7 years. Right single lumen Powerport 12-26.  Started APT regimen with weekly paclitaxel and trastuzuamb beginning 12-27.   C.  Trastuzumab on an every 3-week schedule was started March 21.       INTERVAL HISTORY:  Zenaida returns for follow up. She reports she is doing ok today. She has had an eventful month. She has been in the ER twice on 5/10 and 5/11 when she developed left sided low back pain that radiated down her hip. She had imaging that revealed a benign appearing mass in the T5. She reports her low back pain is significantly improved. There is twinges here and there but nothing like what brought in her in. She does continue to take tylenol prn. She feels her breathing is stable but the occasional rubbing sensation in her left chest with shortness of breath with deep breaths continues. She saw her CT chest which was non-revealing. Sebarceous cyst on the left chest wall still there, red and drainging green draingage but Zenaida feel's this isn't significant changed. She did do one round of antibiotics with 7 days of keflex and since then is using topical neosporin. She has no fevers or chills. No bowel or bladder changes. No balance or neurological changes, her neuropathy in her hands and feet is improved. Energy is stable. Her appetite is still somewhat low.     She tolerated zometa poorly with \"terrible joint pain\" that was in her shoulders, hips, and ankles the day after zometa for a couple of day then resolved and fatigue/poor appetite. In retrospect, Zenaida wonders if the zometa given on 5/1 may be the source of her low back pain that she was seen in the ER for on 5/10/25. No acute issues " with letrozole reported.     ROS: 10 point ROS neg other than the symptoms noted above in the HPI.    PHYSICAL EXAMINATION:     VITALS:  /73 (BP Location: Right arm, Patient Position: Sitting, Cuff Size: Adult Regular)   Pulse 67   Temp 97.6  F (36.4  C) (Oral)   Resp 16   Wt 75.2 kg (165 lb 11.2 oz)   LMP  (LMP Unknown)   SpO2 100%   BMI 26.74 kg/m      HEENT:  No alopecia, no lesions in the oropharynx.  Dentition is normal  BREASTS: Deferred  Her port is without tenderness or erythema.  She does have an ongoing left-sided ipsilateral sebaceous cyst which has some overlying erythema.  LUNGS:  Clear to auscultation.  HEART:  Regular rate and rhythm.  S1, S2. No murmurs  ABDOMEN:  Soft, nontender, without hepatosplenomegaly.  EXTREMITIES:  Without edema.  PSYCH:  Mood and affect were normal.  NEUROLOGIC:  Mood and affect  normal. No focal deficits apparent.          LABORATORY DATA:     Most Recent 3 CBC's:  Recent Labs   Lab Test 05/22/25  1137 05/11/25  1406 05/10/25  0827   WBC 5.4 5.5 5.3   HGB 11.5* 11.9 12.1   MCV 87 88 86    303 299     Most Recent 3 BMP's:  Recent Labs   Lab Test 05/22/25  1137 05/11/25  1406 05/10/25  0827    139 138   POTASSIUM 4.0 3.9 3.9   CHLORIDE 102 102 102   CO2 27 26 23   BUN 15.1 11.9 12.8   CR 0.56 0.61 0.61   ANIONGAP 11 11 13   GAEL 9.6 9.8 9.0   GLC 94 100* 157*   PROTTOTAL 6.5 6.5 6.7   ALBUMIN 4.2 4.1 4.3    Most Recent 3 LFT's:  Recent Labs   Lab Test 05/22/25  1137 05/11/25  1406 05/10/25  0827   AST 22 20 21   ALT 26 21 23   ALKPHOS 60 72 77   BILITOTAL <0.2 0.2 0.3    Most Recent 2 TSH and T4:  Recent Labs   Lab Test 02/10/22  0938 02/06/20  1032   TSH 3.83 2.54     I reviewed the above labs today.  Study Result    Narrative & Impression   EXAMINATION: CT CHEST W CONTRAST, 5/19/2025 2:58 PM     CLINICAL HISTORY: History of left breast cancer status post left sided  whole breast radiation.  History of pneumonia.  Ongoing issues with  feeling of  rubbing in the retrosternal area to the left with  breathing.  New onset left mid thoracic pain.  Shortness of  breath/cough; History of breast cancer; Chest wall pain; Shortness of  breath; History of pneumonia; Acute left-sided thoracic back pain     COMPARISON: CT chest 11/24/2009; CT abdomen 5/10/2025     TECHNIQUE: CT imaging obtained through the chest without contrast.  Coronal, sagittal and axial MIP reformatted images obtained and  reviewed.      FINDINGS:     Lines, tubes, devices: Right chest wall sherly catheter with  tip in  distal SVC.     Lungs: The central tracheobronchial tree is patent. No pleural  effusion or pneumothorax. Multifocal subsegmental atelectasis.  Indeterminate few scattered pulmonary nodule for example 2 mm nodule  in the peripheral right middle lobe (4/198), focal calcification in  the left upper lobe measuring 3 mm (4/67)     Mediastinum: The visualized thyroid is unremarkable.Heart size is  within normal limits. No pericardial effusion. No central pulmonary  artery thromboembolism on this nondedicated study.   Prominent 9 mm right hilar lymph node (3/132).     Prominent left axillary node measuring 9 mm in short axis (3/71) 7 mm  short axis right is a lymph node (3/115)     Bones and soft tissues: Postsurgical changes of left breast.  Degenerative changes of the shoulder, left more than right. Costal  cartilage calcification. Multilevel degenerative changes of the spine.  No acute or suspicious osseous abnormality. Marginated density in the  left axilla, measuring 3.4 x 2.6 cm (series 3/111). Additional 2.0 x  1.0 cm focal density in the left breast series 3 image 158 adjacent to  the surgical clips. Subcutaneous thickening seen along the paramedian  left breast increased enhancement measuring 3 x 1.3 cm (3/121)     Upper abdomen:  Limited evaluation of the upper abdomen. Similar  pancreatic cyst compared to 5/10/2025                                                                       IMPRESSION:      1. No pneumothorax or significant pleural effusion..  2. Few sub-6 mm pulmonary nodules, indeterminate. Few scattered  curvilinear densities in the lung bases, favor subsegmental  atelectasis. Recommend attention short-term follow-up.  3. Focal density in the left axilla, possible collection;  postoperative changes in the left breast with adjacent focal appearing  density; subcutaneous thickening along the central left breast ,  indeterminate. Recommend ultrasound evaluation.  4. Prominent right hilar and left axillary lymph nodes. Recommend  attention on follow-up  5. Similar pancreatic cyst compared to prior CT from 5/10/2025.  Consider attention on follow-up as suggested     EXAM: MR LUMBAR SPINE W/O & W CONTRAST  5/11/2025 5:38 PM      HISTORY: cancer, back pain, left flank pain please look at rubs as  well        COMPARISON: Same day MRI thoracic spine. CT abdomen 5/10/2025.     TECHNIQUE: Multiplanar, multisequence MR images of the lumbar spine  were obtained without intravenous contrast..     FINDINGS:  There are 5 lumbar type vertebrae with partial sacralization of L5. No  signal changes suggesting acute fracture or traumatic subluxation.  Scattered degenerative endplate marrow signal changes including edema.  Incidental L4 vertebral body hemangioma. Conus tip at L1.     Mild intervertebral disc space narrowing throughout all levels of the  lumbar spine. Mild grade 1 retrolisthesis of L1-2.     Osseous and periarticular edema with corresponding enhancement of the  right greater than left L4-5 facet joints. Additional multilevel mild  periarticular facet edema and enhancement.     Level by level:  T12-L1: Circumferential disc bulging. No high-grade spinal canal or  neuroforaminal stenosis.     L1-L2: Circumferential disc bulge and bilateral facet hypertrophy.  Asymmetric effacement of the left lateral recess and spinal canal  stenosis. Mild right and moderate left neural foraminal  stenosis.     L2-L3: Uniform posterior disc bulge with moderate spinal canal  stenosis. Mild right and moderate left neuroforaminal stenosis.  Bilateral facet arthropathy.     L3-L4: Uniform posterior disc bulge and bilateral facet hypertrophy.  Moderate spinal canal stenosis. Moderate bilateral neuroforaminal  stenosis.     L4-L5: Asymmetric right circumferential disc osteophyte complex. Right  greater than left facet hypertrophy. Mild spinal canal stenosis.   Severe right and moderate left neuroforaminal stenosis.      L5-S1: No spinal canal or neural foraminal stenosis.      Right renal cysts.                                                                      IMPRESSION:  1.  Normal variant transitional anatomy with partial sacralization of  L5.  2.  Extensive multilevel lumbar spondylosis, greatest at L3-4 and  L4-5.  3.  No lumbar metastasis.    Narrative & Impression   MR THORACIC SPINE W/O & W CONTRAST 5/11/2025 5:52 PM     Provided History: back pain, left flank pain, please look at ribs     Additional information from the electronic medical record: Invasive  ductal breast cancer.  ICD-10:     Comparison: CT abdomen and pelvis 5/10/2025. Chest CT 11/24/2009.     Technique: Sagittal T1-weighted, sagittal T2-weighted, sagittal STIR,  sagittal diffusion-weighted (with ADC map), and axial T2-weighted  images of the thoracic spine were obtained without the administration  of intravenous contrast. After the administration of intravenous  contrast, axial and sagittal T1-weighted images of the thoracic spine  were obtained.     Contrast: IV Gadavist     Findings:  The external marker is at T6. The thoracic vertebral column is in  normal alignment.     Well-defined enhancing mass centered in the left posterolateral spinal  canal at the level of T5 measuring 1.1 x 1.5 x 2.5 cm. There is subtle  density and calcification in this location on the prior chest CT from  2009.     Multilevel loss of intervertebral disc  height. Scattered degenerative  endplate marrow signal changes. Incidental hemangiomas in the T1 and  T3 vertebral bodies. Multilevel thoracic degenerative changes  including disc bulges and facet hypertrophy. Multilevel mild  associated spinal canal narrowing. No high-grade neural foraminal  stenosis.     Partially evaluated right renal cyst.                                                                      Impression:   1. Mass in the left posterolateral spinal canal at T5 with severe  spinal canal stenosis and cord compression, consistent with a  meningioma. No definite associated myelopathy.  2. Multilevel mild to moderate thoracic spondylosis.        May 22, 2025 Echo:  Interpretation Summary  Left ventricular size, wall motion and function are normal. The ejection  fraction is 60-65%.  Global peak LV longitudinal strain is averaged at -19.7%. This is within  reported normal limits (normal <-18%).  Right ventricular function, chamber size, wall motion, and thickness are  normal.  No significant valvular abnormalities present.  Trivial pericardial effusion is present.  _________________________________       ASSESSMENT AND PLAN:    Zenaida Qiu Kdestrella has a stage I T1b NX MX invasive ductal carcinoma of the left breast upper outer quadrant which was estrogen receptor positive (%) progesterone receptor positive (81-90%) and HER2 2+ by IHC and HER2 positive by ADRIANA. Margins negative for invasive carcinoma.    APT regimen.  She tolerated adjuvant HER2 directed therapy with trastuzumab and paclitaxel for 12 weeks and is now on every 3-week trastuzumab to complete a total of 1 year of HER2 directed therapy.  She began letrozole on 05/01/2025. She is tolerating this reasonably well. Plan is to continue this for 10 years total.   Reviewed CT chest ordered by Dr. Ortega after patient reported left should pain/rubbing sensation and shortness of breath ongoing since her pneumonia in 3/2025. Zenaida had this done on  5/19/25 with incidental few sub-6 mm pulmonary nodules and left axillary density. There is prominent right hilar and left axillary nodes. She has had an US of the left axilla previously that showed a seroma. I reviewed the scan in detail with Dr. Cheung and he feels no immediate follow up is needed. Will repeat a CT chest in three months to follow up on incidental findings, around 8/2025. She did have a CT chest in 2009 which did show a prominent right hilar node.   She also was seen in the ER on 5/10 and 5/11 for low back pain that began in the left middle of her back and radiated down her hip. Thoracic and lumbar spine were performed that showed a T5 intradural mass- Dr. Cheung and I looked at the images today and this appears to be a benign lesion. She saw neurosurgery outpatient on 5/16/25- given she is neurologically intact he did not feel urgent resection is needed until she is done with adjuvant trastuzumab.   She began adjuvant zometa on 5/1/25, continue every 6 months x 3 years (6 doses).   Completed radiation 4/23/25.   She will be due for her mammogram 6 months after the completion of radiation. Echo 5/22/25 stable. Repeat in 3 months.   Left chest wall cyst  Start warm compresses BID.   Call dermatology to get an appointment.   Low threshold for a second course of keflex x 7 days if worsens before seen by dermatology. Zenaida will message me if this is the case.   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    The longitudinal plan of care for the diagnosis(es)/condition(s) as documented were addressed during this visit. Due to the added complexity in care, I will continue to support Zenaida in the subsequent management and with ongoing continuity of care.    73 minutes spent on the date of the encounter doing chart review, review of test results, interpretation of tests, patient visit, and documentation     Shayy Becerra PA-C

## 2025-05-22 ENCOUNTER — APPOINTMENT (OUTPATIENT)
Dept: LAB | Facility: CLINIC | Age: 75
End: 2025-05-22
Payer: COMMERCIAL

## 2025-05-22 ENCOUNTER — INFUSION THERAPY VISIT (OUTPATIENT)
Dept: ONCOLOGY | Facility: CLINIC | Age: 75
End: 2025-05-22
Payer: COMMERCIAL

## 2025-05-22 ENCOUNTER — TELEPHONE (OUTPATIENT)
Dept: ONCOLOGY | Facility: CLINIC | Age: 75
End: 2025-05-22

## 2025-05-22 ENCOUNTER — ONCOLOGY VISIT (OUTPATIENT)
Dept: ONCOLOGY | Facility: CLINIC | Age: 75
End: 2025-05-22
Attending: NURSE PRACTITIONER
Payer: COMMERCIAL

## 2025-05-22 VITALS
OXYGEN SATURATION: 100 % | DIASTOLIC BLOOD PRESSURE: 73 MMHG | HEART RATE: 67 BPM | SYSTOLIC BLOOD PRESSURE: 116 MMHG | BODY MASS INDEX: 26.74 KG/M2 | TEMPERATURE: 97.6 F | WEIGHT: 165.7 LBS | RESPIRATION RATE: 16 BRPM

## 2025-05-22 DIAGNOSIS — C50.912 INVASIVE DUCTAL CARCINOMA OF BREAST, FEMALE, LEFT (H): Primary | ICD-10-CM

## 2025-05-22 LAB
ALBUMIN SERPL BCG-MCNC: 4.2 G/DL (ref 3.5–5.2)
ALP SERPL-CCNC: 60 U/L (ref 40–150)
ALT SERPL W P-5'-P-CCNC: 26 U/L (ref 0–50)
ANION GAP SERPL CALCULATED.3IONS-SCNC: 11 MMOL/L (ref 7–15)
AST SERPL W P-5'-P-CCNC: 22 U/L (ref 0–45)
BASOPHILS # BLD AUTO: 0 10E3/UL (ref 0–0.2)
BASOPHILS NFR BLD AUTO: 1 %
BILIRUB SERPL-MCNC: <0.2 MG/DL
BUN SERPL-MCNC: 15.1 MG/DL (ref 8–23)
CALCIUM SERPL-MCNC: 9.6 MG/DL (ref 8.8–10.4)
CHLORIDE SERPL-SCNC: 102 MMOL/L (ref 98–107)
CREAT SERPL-MCNC: 0.56 MG/DL (ref 0.51–0.95)
EGFRCR SERPLBLD CKD-EPI 2021: >90 ML/MIN/1.73M2
EOSINOPHIL # BLD AUTO: 0.1 10E3/UL (ref 0–0.7)
EOSINOPHIL NFR BLD AUTO: 3 %
ERYTHROCYTE [DISTWIDTH] IN BLOOD BY AUTOMATED COUNT: 12.8 % (ref 10–15)
GLUCOSE SERPL-MCNC: 94 MG/DL (ref 70–99)
HCO3 SERPL-SCNC: 27 MMOL/L (ref 22–29)
HCT VFR BLD AUTO: 34.6 % (ref 35–47)
HGB BLD-MCNC: 11.5 G/DL (ref 11.7–15.7)
IMM GRANULOCYTES # BLD: 0 10E3/UL
IMM GRANULOCYTES NFR BLD: 0 %
LYMPHOCYTES # BLD AUTO: 1.6 10E3/UL (ref 0.8–5.3)
LYMPHOCYTES NFR BLD AUTO: 30 %
MCH RBC QN AUTO: 28.9 PG (ref 26.5–33)
MCHC RBC AUTO-ENTMCNC: 33.2 G/DL (ref 31.5–36.5)
MCV RBC AUTO: 87 FL (ref 78–100)
MONOCYTES # BLD AUTO: 0.4 10E3/UL (ref 0–1.3)
MONOCYTES NFR BLD AUTO: 6 %
NEUTROPHILS # BLD AUTO: 3.3 10E3/UL (ref 1.6–8.3)
NEUTROPHILS NFR BLD AUTO: 60 %
NRBC # BLD AUTO: 0 10E3/UL
NRBC BLD AUTO-RTO: 0 /100
PLATELET # BLD AUTO: 240 10E3/UL (ref 150–450)
POTASSIUM SERPL-SCNC: 4 MMOL/L (ref 3.4–5.3)
PROT SERPL-MCNC: 6.5 G/DL (ref 6.4–8.3)
RBC # BLD AUTO: 3.98 10E6/UL (ref 3.8–5.2)
SODIUM SERPL-SCNC: 140 MMOL/L (ref 135–145)
WBC # BLD AUTO: 5.4 10E3/UL (ref 4–11)

## 2025-05-22 PROCEDURE — G0463 HOSPITAL OUTPT CLINIC VISIT: HCPCS

## 2025-05-22 PROCEDURE — 85025 COMPLETE CBC W/AUTO DIFF WBC: CPT

## 2025-05-22 PROCEDURE — 36591 DRAW BLOOD OFF VENOUS DEVICE: CPT

## 2025-05-22 PROCEDURE — 250N000011 HC RX IP 250 OP 636

## 2025-05-22 PROCEDURE — 80053 COMPREHEN METABOLIC PANEL: CPT

## 2025-05-22 PROCEDURE — 250N000011 HC RX IP 250 OP 636: Performed by: INTERNAL MEDICINE

## 2025-05-22 PROCEDURE — 258N000003 HC RX IP 258 OP 636: Performed by: INTERNAL MEDICINE

## 2025-05-22 RX ORDER — HEPARIN SODIUM (PORCINE) LOCK FLUSH IV SOLN 100 UNIT/ML 100 UNIT/ML
5 SOLUTION INTRAVENOUS ONCE
Status: COMPLETED | OUTPATIENT
Start: 2025-05-22 | End: 2025-05-22

## 2025-05-22 RX ORDER — HEPARIN SODIUM,PORCINE 10 UNIT/ML
5-20 VIAL (ML) INTRAVENOUS DAILY PRN
Status: DISCONTINUED | OUTPATIENT
Start: 2025-05-22 | End: 2025-05-22 | Stop reason: HOSPADM

## 2025-05-22 RX ORDER — HEPARIN SODIUM (PORCINE) LOCK FLUSH IV SOLN 100 UNIT/ML 100 UNIT/ML
5 SOLUTION INTRAVENOUS
Status: DISCONTINUED | OUTPATIENT
Start: 2025-05-22 | End: 2025-05-22 | Stop reason: HOSPADM

## 2025-05-22 RX ADMIN — Medication 5 ML: at 11:30

## 2025-05-22 RX ADMIN — Medication 5 ML: at 13:51

## 2025-05-22 RX ADMIN — SODIUM CHLORIDE 250 ML: 0.9 INJECTION, SOLUTION INTRAVENOUS at 13:07

## 2025-05-22 RX ADMIN — SODIUM CHLORIDE 470 MG: 0.9 INJECTION, SOLUTION INTRAVENOUS at 13:07

## 2025-05-22 ASSESSMENT — PAIN SCALES - GENERAL: PAINLEVEL_OUTOF10: NO PAIN (0)

## 2025-05-22 NOTE — LETTER
5/22/2025      Zenaida Valles  [address]  [facility] [address]      Dear Colleague,    Thank you for referring your patient, Zenaida Valles, to the [facility]. Please see a copy of my visit note below.    MEDICAL ONCOLOGY NOTE     Javier Wood MD  Professor   [facility]  [address] [place] [address]     Re. Zenaida Valles   Female, 74 year old, 1950  MRN: [id number]     May 22, 2025     Dear Dr. Wood,     Thank you for referring Zenaida Valles who is a 75-year-old woman with a new diagnosis of a screening identified stage I invasive ductal cancer of the upper outer quadrant of the left breast ER positive, DE positive, HER2 amplified stage [id number].       HISTORY OF PRESENT ILLNESS:    Zenaida has been in generally good health except for a right renal artery stenosis requiring a stent.  She also has a left leg injury recently she was in her usual state of good health and had a screening mammogram which showed a suspicious lesion in the upper outer quadrant of the left breast at the 3 o'clock position 5 cm from the nipple there is an irregular hypoechoic mass measuring 1.0 x 0.7 x 0.9 cm in size 1 cm from the nipple there is also a 0.5 x 0.4 cm area of suspicion     She underwent an ultrasound guided biopsy which showed usual ductal hyperplasia and fibrocystic changes microcysts in April Kren metaplasia.  Columnar cell changes.  It invasive ductal carcinoma was also found grade 2 and DCIS grade 2 solid type.  Her breast cancer was estrogen receptor positive progesterone receptor positive at HER2 2+ by IHC.  She then underwent ADRIANA which showed a subpopulation which was 60 to 70% ADRIANA 5 negative and 30 to 40% I-S age 1 positive with a 4.9 HER2 signals per nucleus and the ratio of 2.2 indicating HER2 positivity.     She then came to see Dr. Wood at [facility] for surgical  recommendations.  She had a contrast mammogram which showed a 0.9 cm enhancing lesion in the upper outer quadrant of the left breast.     She has had no weight loss or loss of energy.  She does not sleep during the day.  She can perform all of her household chores.  ECOG 0 PS.      DIAGNOSTIC AND TREATMENT SUMMARY:  On October 7 she underwent a screening mammogram which demonstrated an asymmetry in her left breast.       On October 15 she underwent a diagnostic mammogram which confirmed a left breast asymmetry.  She had an ultrasound which demonstrated 2 masses in her left breast.  At the 3 o'clock position 1 cm from the nipple there was a mass and at the 3 o'clock position 5 cm from the nipple there was a second mass.  Her lymph nodes were normal by ultrasound.       On October 23 she underwent a contrast-enhanced mammography which only demonstrated 1 mass measuring 1 cm.  The right breast was normal.  She underwent ultrasound-guided biopsies of both masses.  The mass at the 3 o'clock position 1 cm from the nipple was benign.  The mass at the 3 o'clock position 5 cm from the nipple demonstrating a grade 2 invasive ductal cancer that was ER positive and HER2 equivocal.  There is also DCIS present.          A. LEFT breast, 3:00, 1 cm from nipple, ultrasound-guided core biopsy:  - Benign breast tissue with usual ductal hyperplasia (UDH) and fibrocystic changes including microcysts, apocrine metaplasia, and columnar cell change  - Rare calcifications associated with benign breast ducts and acini  - Negative for atypia or malignancy      B. LEFT breast, 3:00, 5 cm from nipple, ultrasound-guided core biopsy:   - INVASIVE BREAST CARCINOMA OF NO SPECIAL TYPE (INVASIVE DUCTAL CARCINOMA), [place] grade 2  - Ductal carcinoma in situ (DCIS), nuclear grade 2, solid type  - Invasive carcinoma is estrogen receptor positive, progesterone receptor positive, and HER2 equivocal (score 2+) by immunohistochemistry (see 'Breast  Biomarker Reporting Template' below)  - HER2 FISH is is process; results will be reportedly separately by cytogenetis  - See comment     This FISH analysis is performed in follow up to the reported equivocal (2+) HER2 findings by immunohistochemistry ([id number]).        RESULTS:     Ratio of HER2/MARION-17 signals  Zenaida Valles:  See Interpretation below     Majority (~60-70%) of tumor in area of strongest IHC staining:  Group 5 (ADRIANA Negative)  Avg. number HER2 signals/nucleus:  2.9  Avg. number MARION-17 signals/nucleus:  1.7  HER2/MARION 17 ratio:  1.7     Subpopulation (~30-40%) of tumor in area of strongest IHC staining:  Group 1 (ADRIANA Positive)                             Avg. number HER2 signals/nucleus:  4.9  Avg. number MARION-17 signals/nucleus:  2.3  HER2/MARION 17 ratio:  2.2     **Interpretive guidelines per the American Society of Clinical Oncology/College of American Pathologists Clinical Practice Guideline Update (Margarita VALENZUELA et al, 2023, Arch Pathol Lab Med 147:993):     -- Group 1: HER2/MARION-17 ratio 2.0 or more -AND- avg. number HER2 signals/nucleus 4.0 or more (ADRIANA Positive)  -- Group 2: HER2/MARION-17 ratio 2.0 or more -AND- avg. number HER2 signals/nucleus <4.0 (Additional work required)  -- Group 3: HER2/MARION-17 ratio <2.0 -AND- avg. number HER2 signals/nucleus 6.0 or more (Additional work required)  -- Group 4: HER2/MARION-17 ratio <2.0 -AND- avg. number HER2 signals/nucleus 4.0 or more and <6.0 (Additional work required)  -- Group 5: HER2/MARION-17 ratio <2.0 -AND- avg. number HER2 signals/nucleus <4.0 (ADRIANA Negative)     INTERPRETATION:  Two admixed populations of cells were found in this sample:     Majority (~60-70%) of tumor in area of strongest IHC staining:  Per the American Society of Clinical Oncology/College of American Pathologists Clinical Practice Guideline Focused Update (Margarita VALENZUELA et al, 2018, Arch Pathol Lab Med  doi:10.5858/arpa.6936-1153-UG), the HER2/MARION 17 ratio of 1.7 and average number of HER2  signals/cell of 2.9 places this population of cells in Group 5 (ADRIANA Negative).      Subpopulation (~30-40%) of tumor in area of strongest IHC staining:  Admixed within this sample were cells with increased HER2 signals, which, when selectively scored, had an average of 4.9 HER2 signals/nucleus and a HER2/MARION 17 ratio of 2.2; per the American Society of Clinical Oncology/College of American Pathologists Clinical Practice Guideline Focused Update (Margarita VALENZUELA et al, 2018, Arch Pathol Lab Med  doi:10.5858/arpa.5948-7706-KG), this population of cells falls into Group 1 (ADRIANA Positive).     PAST MEDICAL HISTORY: No history of breast surgery.  No history of breast cancer in the past.  No history of radiation therapy in the past or radiation exposure.  No history of tumor of any kind.  No history of heart problems, heart attack, breathing problems, blood clots, seizures, arthritis, peptic ulcer disease, osteoporosis or bone fractures.  She is not currently participating in a clinical trial.  She does have a history of asthma and uses an albuterol inhaler in cold weather.     Her past medical history is significant for diabetes mellitus and is on metformin, hypertension obesity, she has IPMN and a right renal artery stent.  The family history is negative for breast cancer.  She has a history of right renal artery stenosis and has a stent in place.     She has a recent history of a left hamstring injury.  She is seeing orthopedics for this problem.  She also has a history of back pain.  Her gait is affected but she can perform activities of daily living.  She has a history of aches in her shoulders.  She had a 65 pound weight loss over the last 2 years which was intentional related to improve diet and exercise. She says she has a history of fibromuscular dysplasia.     She worked in the [employer] in [place] [employer] and was exposed to dengue, malaria, hepatitis A, hepatitis B, encephalitis.  She has also had COVID and the flu.      FAMILY HISTORY:   No history of ovarian, uterine, colon cancer, or melanoma.  No history of glioma, gastric cancer or pancreatic cancer.  Her mother did have Zollinger-Wheeler syndrome but the patient's genetics are negative for this syndrome.      PAST MENSTRUAL HISTORY:  Age of first menstrual period was 13.   She has been pregnant 1 times with 0 live births and 0 miscarriages and 1 .  Age at in place pregnancy age 28.   Uterus and ovaries are in place.  Last menstrual period was about age 52 and the menopause occurred naturally. No history of hormone replacement therapy.     She does have a history of thickened endometrial lining but by her report no abnormalities of the lining when she had a DIC     HABITS:  She is a never smoker but she has a history of exposure to secondhand smoke from her father as a child.  She consumes alcoholic beverages socially 1 drink every couple of months.     GERMLINE GENETICS: Genetic testing is available for 47 genes associated with cancers of the breast, ovary, uterus, prostate and gastrointestinal system: [facility] Common Hereditary Cancers panel (APC, SUMIT, AXIN2, BARD1, BMPR1A, BRCA1, BRCA2, BRIP1, CDH1, CDK4, CDKN2A, CHEK2, CTNNA1, DICER1, EPCAM, GREM1, HOXB13, KIT, MEN1, MLH1, MSH2, MSH3, MSH6, MUTYH, NBN, NF1, NTHL1, PALB2, PDGFRA, PMS2, POLD1, POLE, PTEN,RAD50, RAD51C, RAD51D, SDHA, SDHB, SDHC, SDHD, SMAD4, SMARCA4, STK11, TP53,TSC1, TSC2, VHL)      ALLERGIES: She has drug allergies to codeine, codeine derivatives, ibuprofen, lisinopril..  No allergy to seafood, iodine, or contrast dye.  She does take daily aspirin 81 mg because of her renal stent.     LUMPECTOMY:  Surgical Pathology Report                         Case: [id number]                                   Authorizing Provider:  Javier Wood MD         Collected:           2024 09:40 AM           Ordering Location:     [facility] OR  Received:            2024 09:54 AM                                   [place]                                                                   Pathologist:           Kristal Lanza MD                                                   Specimens:   A) - Breast, Left, Left breast lumpectomy                                                            B) - Lymph Node(s), Malcolm, Left axillary sentinel lymph node #1                                  C) - Lymph Node(s), Malcolm, Left axillary sentinel lymph node # 2                         Final Diagnosis   A. LEFT breast, RFID tag-localized lumpectomy:  -INVASIVE BREAST CARCINOMA OF NO SPECIAL TYPE (INVASIVE DUCTAL CARCINOMA), FAREED GRADE 2, size 11 mm  -Margins are uninvolved by invasive carcinoma  -Invasive carcinoma is 1 mm from the nearest (posterior) margin, 5 mm from the anterior margin, and > 5 mm from the superior, inferior, medial and lateral margins  -No lymphovascular invasion identified  -Other findings: fibrocystic change (including microcysts with apocrine metaplasia), sclerosing adenosis, and usual ductal hyperplasia  -Calcifications associated with benign acini  -Prior core biopsy site changes  -See tumor synoptic below     B. Lymph node, LEFT axillary, sentinel #1, excision:  -One benign lymph node (0/1)     C. Lymph node, LEFT axillary, sentinel #2, excision:  -One benign lymph node (0/1)                Synoptic Checklist   INVASIVE CARCINOMA OF THE BREAST: Resection   8th Edition - Protocol posted: 12/13/2023INVASIVE CARCINOMA OF THE BREAST: RESECTION - All Specimens                  SPECIMEN     Procedure   Excision (less than total mastectomy)     Specimen Laterality   Left     TUMOR     Tumor Site   Clock position         3 o'clock     Histologic Type   Invasive carcinoma of no special type (ductal)     Histologic Grade ([place] Histologic Score)         Glandular (Acinar) / Tubular Differentiation   Score 2     Nuclear Pleomorphism   Score 3     Mitotic Rate   Score 2      Overall Grade   Grade 2 (scores of 6 or 7)     Tumor Size   Greatest dimension of largest invasive focus (Millimeters): 11 mm     Additional Dimension (Millimeters)   9 mm         9 mm     Tumor Focality   Single focus of invasive carcinoma     Ductal Carcinoma In Situ (DCIS)   Not identified     Lobular Carcinoma In Situ (LCIS)   Not identified     Lymphatic and / or Vascular Invasion   Not identified     Dermal Lymphatic and / or Vascular Invasion   No skin present     Microcalcifications   Present in non-neoplastic tissue     Treatment Effect in the Breast   No known presurgical therapy     MARGINS     Margin Status for Invasive Carcinoma   All margins negative for invasive carcinoma     Distance from Invasive Carcinoma to Closest Margin   1 mm     Closest Margin(s) to Invasive Carcinoma   Posterior     Distance from Invasive Carcinoma to Anterior Margin   5 mm     Distance from Invasive Carcinoma to Superior Margin   Greater than: 5 mm     Distance from Invasive Carcinoma to Inferior Margin   Greater than: 5 mm     Distance from Invasive Carcinoma to Medial Margin   Greater than: 5 mm     Distance from Invasive Carcinoma to Lateral Margin   Greater than: 5 mm     REGIONAL LYMPH NODES     Regional Lymph Node Status   All regional lymph nodes negative for tumor     Total Number of Lymph Nodes Examined (sentinel and non-sentinel)   2     Number of [place] Nodes Examined   2     pTNM CLASSIFICATION (AJCC 8th Edition)     Reporting of pT, pN, and (when applicable) pM categories is based on information available to the pathologist at the time the report is issued. As per the AJCC (Chapter 1, 8th Ed.) it is the managing physician s responsibility to establish the final pathologic stage based upon all pertinent information, including but potentially not limited to this pathology report.     pT Category   pT1c     pN Category   pN0     N Suffix   (sn)     SPECIAL STUDIES               Estrogen Receptor (ER) Status    "Positive (greater than 10% of cells demonstrate nuclear positivity)     Percentage of Cells with Nuclear Positivity   %               Progesterone Receptor (PgR) Status   Positive     Percentage of Cells with Nuclear Positivity   81-90%               HER2 (by immunohistochemistry)   Equivocal (Score 2+)     Percentage of Cells with Uniform Intense Complete Membrane Staining   0 %               HER2 (by in situ hybridization)   Positive (amplified)     Testing Performed on Case Number   HER2 FISH amplified in 30-40% of tumor cells. Performed on prior core biopsy [id number] specimen B     Comment(s)   Best block: A13   .      Clinical Information   UUMAYO   The patient is a 74 year old woman with LEFT breast carcinoma. Procedure: LEFT breast RFID tag localized lumpectomy, LEFT axillary sentinel lymph node biopsy.    Gross Description   ALEE ROBIN(1). Breast, Left, Left breast lumpectomy:  The specimen is received fresh with proper patient identification, labeled \"left breast lumpectomy\". It consists of 27.85 g, 6.7 cm from medial to lateral x 4.7 cm from superior to inferior x 2.4 cm from anterior to posterior left breast lumpectomy specimen. The specimen was received previously inked by the surgeon as follows:  Superior - red, anterior - green, inferior - blue, posterior - black, medial - yellow and lateral - orange.     The specimen is sectioned from medial (slice 1) to lateral (slice 15) into 15 slices.          [id number]   This FISH analysis is performed in follow up to the reported equivocal (2+) HER2 findings by immunohistochemistry ([id number]).        RESULTS:     Ratio of HER2/MARION-17 signals  Zenaida Valles:  See Interpretation below     Majority (~60-70%) of tumor in area of strongest IHC staining:  Group 5 (ADRIANA Negative)  Avg. number HER2 signals/nucleus:  2.9  Avg. number MARION-17 signals/nucleus:  1.7  HER2/MARION 17 ratio:  1.7     Subpopulation (~30-40%) of tumor in area of strongest IHC " "staining:  Group 1 (ADRIANA Positive)                             Avg. number HER2 signals/nucleus:  4.9  Avg. number MARION-17 signals/nucleus:  2.3  HER2/MARION 17 ratio:  2.2        TREATMENT HISTORY:  A.  Lumpectomy November 25 showed a stage I T1b NX MX invasive ductal carcinoma of the left breast upper outer quadrant which was estrogen receptor positive (%) progesterone receptor positive (81-90%) and HER2 2+ by IHC and HER2 positive by ADRIANA.  Margins negative for invasive carcinoma.    B.  Plan is to give the APT regimen followed by addition of hormonal therapy with letrozole for 7 years. Right single lumen Powerport 12-26.  Started APT regimen with weekly paclitaxel and trastuzuamb beginning 12-27.   C.  Trastuzumab on an every 3-week schedule was started March 21.       INTERVAL HISTORY:  Zenaida returns for follow up. She reports she is doing ok today. She has had an eventful month. She has been in the ER twice on 5/10 and 5/11 when she developed left sided low back pain that radiated down her hip. She had imaging that revealed a benign appearing mass in the T5. She reports her low back pain is significantly improved. There is twinges here and there but nothing like what brought in her in. She does continue to take tylenol prn. She feels her breathing is stable but the occasional rubbing sensation in her left chest with shortness of breath with deep breaths continues. She saw her CT chest which was non-revealing. Sebarceous cyst on the left chest wall still there, red and drainging green draingage but Zenaida feel's this isn't significant changed. She did do one round of antibiotics with 7 days of keflex and since then is using topical neosporin. She has no fevers or chills. No bowel or bladder changes. No balance or neurological changes, her neuropathy in her hands and feet is improved. Energy is stable. Her appetite is still somewhat low.     She tolerated zometa poorly with \"terrible joint pain\" that was in her " shoulders, hips, and ankles the day after zometa for a couple of day then resolved and fatigue/poor appetite. In retrospect, Zenaida wonders if the zometa given on 5/1 may be the source of her low back pain that she was seen in the ER for on 5/10/25. No acute issues with letrozole reported.     ROS: 10 point ROS neg other than the symptoms noted above in the HPI.    PHYSICAL EXAMINATION:     VITALS:  /73 (BP Location: Right arm, Patient Position: Sitting, Cuff Size: Adult Regular)   Pulse 67   Temp 97.6  F (36.4  C) (Oral)   Resp 16   Wt 75.2 kg (165 lb 11.2 oz)   LMP  (LMP Unknown)   SpO2 100%   BMI 26.74 kg/m      HEENT:  No alopecia, no lesions in the oropharynx.  Dentition is normal  BREASTS: Deferred  Her port is without tenderness or erythema.  She does have an ongoing left-sided ipsilateral sebaceous cyst which has some overlying erythema.  LUNGS:  Clear to auscultation.  HEART:  Regular rate and rhythm.  S1, S2. No murmurs  ABDOMEN:  Soft, nontender, without hepatosplenomegaly.  EXTREMITIES:  Without edema.  PSYCH:  Mood and affect were normal.  NEUROLOGIC:  Mood and affect  normal. No focal deficits apparent.          LABORATORY DATA:     Most Recent 3 CBC's:  Recent Labs   Lab Test 05/22/25 1137 05/11/25  1406 05/10/25  0827   WBC 5.4 5.5 5.3   HGB 11.5* 11.9 12.1   MCV 87 88 86    303 299     Most Recent 3 BMP's:  Recent Labs   Lab Test 05/22/25 1137 05/11/25  1406 05/10/25  0827    139 138   POTASSIUM 4.0 3.9 3.9   CHLORIDE 102 102 102   CO2 27 26 23   BUN 15.1 11.9 12.8   CR 0.56 0.61 0.61   ANIONGAP 11 11 13   GAEL 9.6 9.8 9.0   GLC 94 100* 157*   PROTTOTAL 6.5 6.5 6.7   ALBUMIN 4.2 4.1 4.3    Most Recent 3 LFT's:  Recent Labs   Lab Test 05/22/25 1137 05/11/25  1406 05/10/25  0827   AST 22 20 21   ALT 26 21 23   ALKPHOS 60 72 77   BILITOTAL <0.2 0.2 0.3    Most Recent 2 TSH and T4:  Recent Labs   Lab Test 02/10/22  0938 02/06/20  1032   TSH 3.83 2.54     I reviewed the above  labs today.  Study Result    Narrative & Impression   EXAMINATION: CT CHEST W CONTRAST, 5/19/2025 2:58 PM     CLINICAL HISTORY: History of left breast cancer status post left sided  whole breast radiation.  History of pneumonia.  Ongoing issues with  feeling of rubbing in the retrosternal area to the left with  breathing.  New onset left mid thoracic pain.  Shortness of  breath/cough; History of breast cancer; Chest wall pain; Shortness of  breath; History of pneumonia; Acute left-sided thoracic back pain     COMPARISON: CT chest 11/24/2009; CT abdomen 5/10/2025     TECHNIQUE: CT imaging obtained through the chest without contrast.  Coronal, sagittal and axial MIP reformatted images obtained and  reviewed.      FINDINGS:     Lines, tubes, devices: Right chest wall sherly catheter with  tip in  distal SVC.     Lungs: The central tracheobronchial tree is patent. No pleural  effusion or pneumothorax. Multifocal subsegmental atelectasis.  Indeterminate few scattered pulmonary nodule for example 2 mm nodule  in the peripheral right middle lobe (4/198), focal calcification in  the left upper lobe measuring 3 mm (4/67)     Mediastinum: The visualized thyroid is unremarkable.Heart size is  within normal limits. No pericardial effusion. No central pulmonary  artery thromboembolism on this nondedicated study.   Prominent 9 mm right hilar lymph node (3/132).     Prominent left axillary node measuring 9 mm in short axis (3/71) 7 mm  short axis right is a lymph node (3/115)     Bones and soft tissues: Postsurgical changes of left breast.  Degenerative changes of the shoulder, left more than right. Costal  cartilage calcification. Multilevel degenerative changes of the spine.  No acute or suspicious osseous abnormality. Marginated density in the  left axilla, measuring 3.4 x 2.6 cm (series 3/111). Additional 2.0 x  1.0 cm focal density in the left breast series 3 image 158 adjacent to  the surgical clips. Subcutaneous thickening  seen along the paramedian  left breast increased enhancement measuring 3 x 1.3 cm (3/121)     Upper abdomen:  Limited evaluation of the upper abdomen. Similar  pancreatic cyst compared to 5/10/2025                                                                      IMPRESSION:      1. No pneumothorax or significant pleural effusion..  2. Few sub-6 mm pulmonary nodules, indeterminate. Few scattered  curvilinear densities in the lung bases, favor subsegmental  atelectasis. Recommend attention short-term follow-up.  3. Focal density in the left axilla, possible collection;  postoperative changes in the left breast with adjacent focal appearing  density; subcutaneous thickening along the central left breast ,  indeterminate. Recommend ultrasound evaluation.  4. Prominent right hilar and left axillary lymph nodes. Recommend  attention on follow-up  5. Similar pancreatic cyst compared to prior CT from 5/10/2025.  Consider attention on follow-up as suggested     EXAM: MR LUMBAR SPINE W/O & W CONTRAST  5/11/2025 5:38 PM      HISTORY: cancer, back pain, left flank pain please look at rubs as  well        COMPARISON: Same day MRI thoracic spine. CT abdomen 5/10/2025.     TECHNIQUE: Multiplanar, multisequence MR images of the lumbar spine  were obtained without intravenous contrast..     FINDINGS:  There are 5 lumbar type vertebrae with partial sacralization of L5. No  signal changes suggesting acute fracture or traumatic subluxation.  Scattered degenerative endplate marrow signal changes including edema.  Incidental L4 vertebral body hemangioma. Conus tip at L1.     Mild intervertebral disc space narrowing throughout all levels of the  lumbar spine. Mild grade 1 retrolisthesis of L1-2.     Osseous and periarticular edema with corresponding enhancement of the  right greater than left L4-5 facet joints. Additional multilevel mild  periarticular facet edema and enhancement.     Level by level:  T12-L1: Circumferential disc  bulging. No high-grade spinal canal or  neuroforaminal stenosis.     L1-L2: Circumferential disc bulge and bilateral facet hypertrophy.  Asymmetric effacement of the left lateral recess and spinal canal  stenosis. Mild right and moderate left neural foraminal stenosis.     L2-L3: Uniform posterior disc bulge with moderate spinal canal  stenosis. Mild right and moderate left neuroforaminal stenosis.  Bilateral facet arthropathy.     L3-L4: Uniform posterior disc bulge and bilateral facet hypertrophy.  Moderate spinal canal stenosis. Moderate bilateral neuroforaminal  stenosis.     L4-L5: Asymmetric right circumferential disc osteophyte complex. Right  greater than left facet hypertrophy. Mild spinal canal stenosis.   Severe right and moderate left neuroforaminal stenosis.      L5-S1: No spinal canal or neural foraminal stenosis.      Right renal cysts.                                                                      IMPRESSION:  1.  Normal variant transitional anatomy with partial sacralization of  L5.  2.  Extensive multilevel lumbar spondylosis, greatest at L3-4 and  L4-5.  3.  No lumbar metastasis.    Narrative & Impression   MR THORACIC SPINE W/O & W CONTRAST 5/11/2025 5:52 PM     Provided History: back pain, left flank pain, please look at ribs     Additional information from the electronic medical record: Invasive  ductal breast cancer.  ICD-10:     Comparison: CT abdomen and pelvis 5/10/2025. Chest CT 11/24/2009.     Technique: Sagittal T1-weighted, sagittal T2-weighted, sagittal STIR,  sagittal diffusion-weighted (with ADC map), and axial T2-weighted  images of the thoracic spine were obtained without the administration  of intravenous contrast. After the administration of intravenous  contrast, axial and sagittal T1-weighted images of the thoracic spine  were obtained.     Contrast: IV Gadavist     Findings:  The external marker is at T6. The thoracic vertebral column is in  normal alignment.      Well-defined enhancing mass centered in the left posterolateral spinal  canal at the level of T5 measuring 1.1 x 1.5 x 2.5 cm. There is subtle  density and calcification in this location on the prior chest CT from  2009.     Multilevel loss of intervertebral disc height. Scattered degenerative  endplate marrow signal changes. Incidental hemangiomas in the T1 and  T3 vertebral bodies. Multilevel thoracic degenerative changes  including disc bulges and facet hypertrophy. Multilevel mild  associated spinal canal narrowing. No high-grade neural foraminal  stenosis.     Partially evaluated right renal cyst.                                                                      Impression:   1. Mass in the left posterolateral spinal canal at T5 with severe  spinal canal stenosis and cord compression, consistent with a  meningioma. No definite associated myelopathy.  2. Multilevel mild to moderate thoracic spondylosis.        May 22, 2025 Echo:  Interpretation Summary  Left ventricular size, wall motion and function are normal. The ejection  fraction is 60-65%.  Global peak LV longitudinal strain is averaged at -19.7%. This is within  reported normal limits (normal <-18%).  Right ventricular function, chamber size, wall motion, and thickness are  normal.  No significant valvular abnormalities present.  Trivial pericardial effusion is present.  _________________________________       ASSESSMENT AND PLAN:    Zenaida Valles has a stage I T1b NX MX invasive ductal carcinoma of the left breast upper outer quadrant which was estrogen receptor positive (%) progesterone receptor positive (81-90%) and HER2 2+ by IHC and HER2 positive by ADRIANA. Margins negative for invasive carcinoma.    APT regimen.  She tolerated adjuvant HER2 directed therapy with trastuzumab and paclitaxel for 12 weeks and is now on every 3-week trastuzumab to complete a total of 1 year of HER2 directed therapy.  She began letrozole on 05/01/2025. She  is tolerating this reasonably well. Plan is to continue this for 10 years total.   Reviewed CT chest ordered by Dr. Ortega after patient reported left should pain/rubbing sensation and shortness of breath ongoing since her pneumonia in 3/2025. Zenaida had this done on 5/19/25 with incidental few sub-6 mm pulmonary nodules and left axillary density. There is prominent right hilar and left axillary nodes. She has had an US of the left axilla previously that showed a seroma. I reviewed the scan in detail with Dr. Cheung and he feels no immediate follow up is needed. Will repeat a CT chest in three months to follow up on incidental findings, around 8/2025. She did have a CT chest in 2009 which did show a prominent right hilar node.   She also was seen in the ER on 5/10 and 5/11 for low back pain that began in the left middle of her back and radiated down her hip. Thoracic and lumbar spine were performed that showed a T5 intradural mass- Dr. Cheung and I looked at the images today and this appears to be a benign lesion. She saw neurosurgery outpatient on 5/16/25- given she is neurologically intact he did not feel urgent resection is needed until she is done with adjuvant trastuzumab.   She began adjuvant zometa on 5/1/25, continue every 6 months x 3 years (6 doses).   Completed radiation 4/23/25.   She will be due for her mammogram 6 months after the completion of radiation. Echo 5/22/25 stable. Repeat in 3 months.   Left chest wall cyst  Start warm compresses BID.   Call dermatology to get an appointment.   Low threshold for a second course of keflex x 7 days if worsens before seen by dermatology. Zenaida will message me if this is the case.   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    The longitudinal plan of care for the diagnosis(es)/condition(s) as documented were addressed during this visit. Due to the added complexity in care, I will continue to support Zenaida in the subsequent management and with ongoing  continuity of care.    73 minutes spent on the date of the encounter doing chart review, review of test results, interpretation of tests, patient visit, and documentation     Shayy Becerra PA-C      Again, thank you for allowing me to participate in the care of your patient.        Sincerely,        Shayy Becerra PA-C    Electronically signed

## 2025-05-22 NOTE — NURSING NOTE
Chief Complaint   Patient presents with    Oncology Clinic Visit     RTN Breast CA     Port Draw     Labs drawn via port by RN in lab. VS taken.      Labs drawn via port by RN. Port accessed with 20g flat needle. Flushed with saline and heparin. Pt tolerated well. Vitals taken. Pt checked into next appt.     Zenobia Fall RN

## 2025-05-22 NOTE — TELEPHONE ENCOUNTER
I called Zenaida to go over the T spine MRI which was done because of pain that she was having in the left side at the flank and in the high chest area as well.  Some of the discomfort is a bit higher than the left shoulder blade.  She places a hot pack on it.  The left pain started a when getting radiation but she has had upper back pain for several years at least.  The more recent pain is at the level of the hip.  Pain is not brought on by coughing.      Will check with Dr. Monte who is the neurosurgery consultant.  Surgery would be feasible because we can hold the trastzuzumab for one cycle if needed.  If surgery is indicated now I think we could hold the trastuzumab.  If the plan was to repeat the MRI in 3 or 6 months and then consider surgery if there is progression that would also be okay.  Her breast cancer is of relatively low risk and should not prevent spine surgery if indicated.    Chito Cheung      Findings:  The external marker is at T6. The thoracic vertebral column is in  normal alignment.     Well-defined enhancing mass centered in the left posterolateral spinal  canal at the level of T5 measuring 1.1 x 1.5 x 2.5 cm. There is subtle  density and calcification in this location on the prior chest CT from  2009.     Multilevel loss of intervertebral disc height. Scattered degenerative  endplate marrow signal changes. Incidental hemangiomas in the T1 and  T3 vertebral bodies. Multilevel thoracic degenerative changes  including disc bulges and facet hypertrophy. Multilevel mild  associated spinal canal narrowing. No high-grade neural foraminal  stenosis.     Partially evaluated right renal cyst.                                                                      Impression:   1. Mass in the left posterolateral spinal canal at T5 with severe  spinal canal stenosis and cord compression, consistent with a  meningioma. No definite associated myelopathy.  2. Multilevel mild to moderate thoracic spondylosis.      HEATHER CALIXTO MD , MD

## 2025-05-22 NOTE — PATIENT INSTRUCTIONS
Shayy sent the following to scheduling:  -Add labs prior to 7/24 infusion.  -Schedule labs, Dr. Cheung, 8/15 herceptin.  -Labs, and herceptin around 9/5.     When to Call for Help  Coping with the changes brought about by cancer treatment is easier when you know  what to expect and when to call for help. Each person reacts in a different way to  chemotherapy; talk to your doctor about new side effects or any changes that worry you.  If you notice any of the following, please contact your doctor or the clinic:  ? A fever of 100.5 F (38 C) or greater  ? Shaking or chills  ? Shortness of breath  ? Repeated signs of bleeding, such as nose bleeds, easy bruising or black tarry stools  ? Mouth sores that stop you from eating or cause pain when you swallow  ? Nausea and vomiting that do not get better  ? Diarrhea that does not get better  ? Yellowing of the skin and/or eyes  ? Extreme weakness  ? Feeling confused or extremely sleepy  ? Constipation for more than 48 hours  ? Swelling of feet/legs or hands/arms  ? Any new symptoms that you did not have before your treatments.    If you need help managing symptoms and side effects at home:  Please call our NURSE TRIAGE TEAM at [phone] option 5, option 2  and ask for the triage nurse.    For informational purposes only. Not to replace the advice of your health care provider.    +++ For prescription refills, call your local pharmacy to initiate a refill  request or call our Nurse Triage Team at the [phone]. +++   Latest Reference Range & Units 05/22/25 11:37   Sodium 135 - 145 mmol/L 140   Potassium 3.4 - 5.3 mmol/L 4.0   Chloride 98 - 107 mmol/L 102   Carbon Dioxide (CO2) 22 - 29 mmol/L 27   Urea Nitrogen 8.0 - 23.0 mg/dL 15.1   Creatinine 0.51 - 0.95 mg/dL 0.56   GFR Estimate >60 mL/min/1.73m2 >90   Calcium 8.8 - 10.4 mg/dL 9.6   Anion Gap 7 - 15 mmol/L 11   Albumin 3.5 - 5.2 g/dL 4.2   Protein Total 6.4 - 8.3 g/dL 6.5   Alkaline Phosphatase 40 - 150 U/L 60   ALT 0  - 50 U/L 26   AST 0 - 45 U/L 22   Bilirubin Total <=1.2 mg/dL <0.2   Glucose 70 - 99 mg/dL 94   WBC 4.0 - 11.0 10e3/uL 5.4   Hemoglobin 11.7 - 15.7 g/dL 11.5 (L)   Hematocrit 35.0 - 47.0 % 34.6 (L)   Platelet Count 150 - 450 10e3/uL 240   RBC Count 3.80 - 5.20 10e6/uL 3.98   MCV 78 - 100 fL 87   MCH 26.5 - 33.0 pg 28.9   MCHC 31.5 - 36.5 g/dL 33.2   RDW 10.0 - 15.0 % 12.8   % Neutrophils % 60   % Lymphocytes % 30   % Monocytes % 6   % Eosinophils % 3   % Basophils % 1   % Immature Granulocytes % 0   NRBC/W <1 /100 0   Absolute Neutrophil 1.6 - 8.3 10e3/uL 3.3   Absolute Lymphocytes 0.8 - 5.3 10e3/uL 1.6   Absolute Monocytes 0.0 - 1.3 10e3/uL 0.4   Absolute Eosinophils 0.0 - 0.7 10e3/uL 0.1   Absolute Basophils 0.0 - 0.2 10e3/uL 0.0   Absolute Immature Granulocytes <=0.4 10e3/uL 0.0   Absolute NRBCs 10e3/uL 0.0

## 2025-05-22 NOTE — NURSING NOTE
"Oncology Rooming Note    May 22, 2025 11:44 AM   Zenaida Valles is a 75 year old female who presents for:    Chief Complaint   Patient presents with    Oncology Clinic Visit     RTN Breast CA     Port Draw     Labs drawn via port by RN in lab. VS taken.      Initial Vitals: /73 (BP Location: Right arm, Patient Position: Sitting, Cuff Size: Adult Regular)   Pulse 67   Temp 97.6  F (36.4  C) (Oral)   Resp 16   Wt 75.2 kg (165 lb 11.2 oz)   LMP  (LMP Unknown)   SpO2 100%   BMI 26.74 kg/m   Estimated body mass index is 26.74 kg/m  as calculated from the following:    Height as of 5/16/25: 1.676 m (5' 6\").    Weight as of this encounter: 75.2 kg (165 lb 11.2 oz). Body surface area is 1.87 meters squared.  No Pain (0) Comment: Data Unavailable   No LMP recorded (lmp unknown). Patient is postmenopausal.  Allergies reviewed: Yes  Medications reviewed: No    Medications: Medication refills not needed today.  Pharmacy name entered into EPIC:    [facility] [place], [place] - [address] [facility] [place], [place] - [address] [place] [address].  [facility] [place], [place] - [address] [place], [place] [id number]  [facility] [place], [place] - [address] [place] [address] [phone]    Frailty Screening:   Is the patient here for a new oncology consult visit in cancer care? 2. No    PHQ9:  Did this patient require a PHQ9?: No      Clinical concerns: Discuss test results. States reviewed medications via e check in.       Lefty Sweeney             "

## 2025-05-22 NOTE — PROGRESS NOTES
Infusion Nursing Note:  Zenaida Valles presents today for C4D1 trastuzumab-qyyp (TRAZIMERA).    Patient seen by provider today: Yes: OMAR Shipley   present during visit today: Not Applicable.    Note: Pt saw provider prior to infusion, ok for treatment.    Intravenous Access:  Implanted Port.    Treatment Conditions:  ECHO/MUGA completed 5/22/25  EF 60-65%.   Latest Reference Range & Units 05/22/25 11:37   Sodium 135 - 145 mmol/L 140   Potassium 3.4 - 5.3 mmol/L 4.0   Chloride 98 - 107 mmol/L 102   Carbon Dioxide (CO2) 22 - 29 mmol/L 27   Urea Nitrogen 8.0 - 23.0 mg/dL 15.1   Creatinine 0.51 - 0.95 mg/dL 0.56   GFR Estimate >60 mL/min/1.73m2 >90   Calcium 8.8 - 10.4 mg/dL 9.6   Anion Gap 7 - 15 mmol/L 11   Albumin 3.5 - 5.2 g/dL 4.2   Protein Total 6.4 - 8.3 g/dL 6.5   Alkaline Phosphatase 40 - 150 U/L 60   ALT 0 - 50 U/L 26   AST 0 - 45 U/L 22   Bilirubin Total <=1.2 mg/dL <0.2   Glucose 70 - 99 mg/dL 94   WBC 4.0 - 11.0 10e3/uL 5.4   Hemoglobin 11.7 - 15.7 g/dL 11.5 (L)   Hematocrit 35.0 - 47.0 % 34.6 (L)   Platelet Count 150 - 450 10e3/uL 240   RBC Count 3.80 - 5.20 10e6/uL 3.98   MCV 78 - 100 fL 87   MCH 26.5 - 33.0 pg 28.9   MCHC 31.5 - 36.5 g/dL 33.2   RDW 10.0 - 15.0 % 12.8   % Neutrophils % 60   % Lymphocytes % 30   % Monocytes % 6   % Eosinophils % 3   % Basophils % 1   % Immature Granulocytes % 0   NRBC/W <1 /100 0   Absolute Neutrophil 1.6 - 8.3 10e3/uL 3.3   Absolute Lymphocytes 0.8 - 5.3 10e3/uL 1.6   Absolute Monocytes 0.0 - 1.3 10e3/uL 0.4   Absolute Eosinophils 0.0 - 0.7 10e3/uL 0.1   Absolute Basophils 0.0 - 0.2 10e3/uL 0.0   Absolute Immature Granulocytes <=0.4 10e3/uL 0.0   Absolute NRBCs 10e3/uL 0.0         Post Infusion Assessment:  Patient tolerated infusion without incident.  Blood return noted pre and post infusion.  Site patent and intact, free from redness, edema or discomfort.  No evidence of extravasations.  Access discontinued per protocol.       Discharge  Plan:   Patient declined prescription refills.  Discharge instructions reviewed with: Patient and Family.  Patient and/or family verbalized understanding of discharge instructions and all questions answered.  Copy of AVS reviewed with patient and/or family.  Patient will return 6/12 for next appointment.  Patient discharged in stable condition accompanied by: .  Departure Mode: Ambulatory.    Silvia Putnam RN

## 2025-05-23 ENCOUNTER — OFFICE VISIT (OUTPATIENT)
Dept: DERMATOLOGY | Facility: CLINIC | Age: 75
End: 2025-05-23
Attending: INTERNAL MEDICINE
Payer: COMMERCIAL

## 2025-05-23 DIAGNOSIS — L72.0 EIC (EPIDERMAL INCLUSION CYST): Primary | ICD-10-CM

## 2025-05-23 DIAGNOSIS — C50.912 INVASIVE DUCTAL CARCINOMA OF BREAST, FEMALE, LEFT (H): ICD-10-CM

## 2025-05-23 PROCEDURE — 88305 TISSUE EXAM BY PATHOLOGIST: CPT | Mod: 26 | Performed by: PATHOLOGY

## 2025-05-23 PROCEDURE — 87077 CULTURE AEROBIC IDENTIFY: CPT | Performed by: STUDENT IN AN ORGANIZED HEALTH CARE EDUCATION/TRAINING PROGRAM

## 2025-05-23 PROCEDURE — 1126F AMNT PAIN NOTED NONE PRSNT: CPT | Performed by: STUDENT IN AN ORGANIZED HEALTH CARE EDUCATION/TRAINING PROGRAM

## 2025-05-23 PROCEDURE — 11900 INJECT SKIN LESIONS </W 7: CPT | Mod: 59 | Performed by: STUDENT IN AN ORGANIZED HEALTH CARE EDUCATION/TRAINING PROGRAM

## 2025-05-23 PROCEDURE — 88342 IMHCHEM/IMCYTCHM 1ST ANTB: CPT | Mod: 26 | Performed by: PATHOLOGY

## 2025-05-23 PROCEDURE — 88342 IMHCHEM/IMCYTCHM 1ST ANTB: CPT | Mod: TC | Performed by: STUDENT IN AN ORGANIZED HEALTH CARE EDUCATION/TRAINING PROGRAM

## 2025-05-23 PROCEDURE — 10060 I&D ABSCESS SIMPLE/SINGLE: CPT | Mod: 59 | Performed by: STUDENT IN AN ORGANIZED HEALTH CARE EDUCATION/TRAINING PROGRAM

## 2025-05-23 PROCEDURE — 99204 OFFICE O/P NEW MOD 45 MIN: CPT | Mod: 25 | Performed by: STUDENT IN AN ORGANIZED HEALTH CARE EDUCATION/TRAINING PROGRAM

## 2025-05-23 PROCEDURE — 88312 SPECIAL STAINS GROUP 1: CPT | Mod: 26 | Performed by: PATHOLOGY

## 2025-05-23 PROCEDURE — 99000 SPECIMEN HANDLING OFFICE-LAB: CPT | Performed by: PATHOLOGY

## 2025-05-23 RX ORDER — DOXYCYCLINE 100 MG/1
100 CAPSULE ORAL 2 TIMES DAILY
Qty: 10 CAPSULE | Refills: 0 | Status: SHIPPED | OUTPATIENT
Start: 2025-05-23 | End: 2025-05-28

## 2025-05-23 ASSESSMENT — PAIN SCALES - GENERAL: PAINLEVEL_OUTOF10: NO PAIN (0)

## 2025-05-23 NOTE — LETTER
5/23/2025       RE: Zenaida Valles  [address]  [facility] [address]     Dear Colleague,    Thank you for referring your patient, Zenaida Valles, to the [facility] DERMATOLOG[facility] [place] [facility]. Please see a copy of my visit note below.         Supportive Oncodermatology Consultation      Patient: Zenaida Valles  Date: May 23, 2025  Attending: Eugenia Koehler MD  Referring Provider: Chito Cheung MD    Dermatology Problem List    Invasive ductal carcinoma of the breast, left.    ASSESSMENT AND PLAN:     # Presumed infected epidermal inclusion cyst on the left breast   4 cm brightly erythematous slightly violaceous inflamed nodule, central punctum with visible keratinized material.  There is purulent drainage with application of mild pressure to the area.  Very tender to palpation.  Superior to this on the chest wall is a firm subcutaneous, well-demarcated approximately 7 mm nodule consistent with another epidermal inclusion cyst.  The patient reports that this was a longstanding epidermal inclusion cyst that about 6 weeks ago ruptured downwards; she felt the capsule rupture.  He has since undergone radiation therapy to the inferior portion of the same breast; the area of the cyst was not within the radiation field.  She has not had any fevers or chills, the pain has not been increasing.    Plan  - Punch biopsy of the tissue today to confirm diagnosis of ruptured epidermal inclusion cyst and rule out involvement of malignancy  -Incision and drainage  -Aerobic bacterial culture  -Intralesional Kenalog injections to the indurated surrounding tissue to try to decrease inflammation  -Doxycycline 100 mg twice daily x 5 days.  Advised patient to wait an hour between calcium and doxycycline.    - Punch biopsy procedure note, location(s): left superior breast. After discussion of benefits and risks including  but not limited to bleeding, infection, scar, incomplete removal, recurrence, and non-diagnostic biopsy, written consent and photographs were obtained. The area was cleaned with isopropyl alcohol. 0.5mL of 1% lidocaine with epinephrine was injected to obtain adequate anesthesia and a 4 mm punch biopsy was performed at site(s).  No suture placed, left open to drain. White petrolatum ointment and a bandage was applied to the wound. Explicit verbal and written wound care instructions were provided. The patient left the dermatology clinic in good condition.     - Incision and Drainage (I&D). After discussion of the risks including bleeding, infection, scar, non diagnosis and skin discoloration, verbal and/or written consent was obtained. The area was prepped with alcohol and 0.5 mL of lidocaine with epi (1:100,000) was injected into the lesion on the left breast.  An 11 blade was used to incise the lesion and the lesion was drained. A culture was performed. A dressing was placed.  The patient tolerated the procedure well and left the dermatology clinic in good condition.     - Intra-lesional triamcinolone procedure note. After verbal consent and review of risk of pain and skin thinning/atrophy, positioning and cleansing with isopropyl alcohol, 3 total mL of triamcinolone 10 mg/mL was injected into 1 lesion(s) on the LEFT BREAST. The patient tolerated the procedure well and left the dermatology clinic in good condition.    The longitudinal plan of care for the diagnosis(es)/condition(s) as documented were addressed during this visit. Due to the added complexity in care, I will continue to support Roberto in the subsequent management and with ongoing continuity of care.    Follow-Up:   Return to clinic depending on pathology result, or sooner as needed    History of Present Illness     Reason for Referral: Left anterior chest wall sebaceous cyst she recently had ipsilateral radiation     CC: Derm Problem (Sebaceous cysts on  breast evaluation; she states that these cysts have been present for years and has some drainage, redness and irritation occurring. )      Zenaida Valles is a 75 year old female referred by Dr. Cheung for presumed infected epidermal inclusion cyst on the left breast.  She has breast cancer of the left breast and in the last 6 weeks has been undergoing radiation to the inferior aspect of that breast.  She has a longstanding history of an epidermal inclusion cyst on the breast.  She had periodically been draining it with manual pressure, but about 6 weeks ago she felt the capsule rupture under the skin rather than extruding material out of the skin.  Since then it has been irritated, inflamed, and had purulent drainage, and been very painful.  Radiation therapy was conducted without complications despite the presence of the ruptured cyst/abscess.  He has not had any fevers or chills, no spreading redness or other evidence of cellulitis or sepsis.  Last week she was given a course of Keflex which may have helped somewhat.  She is not putting Neosporin on it.  Since having the radiation done she feels like she has had some more skin breakdown in the area although that may be secondary to the dressing.  Overall the course has been waxing and waning.    Review of systems is otherwise negative except as noted above.    History, Allergies, and Medications:         Past Medical History:   Diagnosis Date     Asthma      Bacterial pneumonia     recent as of march 2025     Dengue     in past     Diabetes mellitus (H)      Encephalopathy     history of     Fibromuscular dysplasia      H/O sebaceous cyst      Hepatitis A     in the past     Hepatitis B infection     in the past     Hyperlipidemia LDL goal < 130      Hypertension      IPMN (intraductal papillary mucinous neoplasm)      Malaria     in past     Obesity      Renal artery stenosis 2005    Right, s/p stenting at [facility], [facility]     Statin medication not  prescribed per physician orders     pt declined       Allergies   Allergen Reactions     Codeine Diarrhea and Rash     Codeine Camsylate Diarrhea and Rash     Ibuprofen GI Disturbance and Nausea     Lisinopril Cough       Current Outpatient Medications   Medication Sig Dispense Refill     doxycycline monohydrate (MONODOX) 100 MG capsule Take 1 capsule (100 mg) by mouth 2 times daily for 5 days. 10 capsule 0     acetaminophen (TYLENOL) 500 MG tablet Take 500-1,000 mg by mouth every 6 hours as needed for mild pain       albuterol (PROAIR HFA) 108 (90 Base) MCG/ACT inhaler Inhale 2 puffs into the lungs every 6 hours as needed for shortness of breath or wheezing. 18 g 3     aspirin 81 MG tablet Take 81 mg by mouth daily       atenolol (TENORMIN) 25 MG tablet Take 1 tablet (25 mg) by mouth daily. 90 tablet 4     blood glucose (NO BRAND SPECIFIED) lancets standard Use to test blood sugar 2 times daily or as directed. 200 each 3     blood glucose (NO BRAND SPECIFIED) test strip Use to test blood sugar 2 times daily or as directed. 200 strip 3     blood glucose monitoring (NO BRAND SPECIFIED) meter device kit Use to test blood sugar 3 times daily or as directed. 1 kit 0     calcium 600 MG tablet Take 1 tablet by mouth daily.       Collagen Hydrolysate, Bovine, POWD        diclofenac (VOLTAREN) 1 % GEL topical gel Apply 2 grams three - four times daily using enclosed dosing card. 100 g 0     fluticasone-salmeterol (ADVAIR) 100-50 MCG/ACT inhaler INHALE ONE PUFF INTO THE LUNGS DAILY 2 each 2     hydrochlorothiazide (HYDRODIURIL) 25 MG tablet Take 1 tablet (25 mg) by mouth daily. 90 tablet 4     irbesartan (AVAPRO) 300 MG tablet Take 1 tablet (300 mg) by mouth daily. 90 tablet 4     Lancets (ONETOUCH DELICA PLUS [id number]) MISC        letrozole (FEMARA) 2.5 MG tablet Take 1 tablet (2.5 mg) by mouth daily. 90 tablet 3     LORazepam (ATIVAN) 0.5 MG tablet Take 1 tablet (0.5 mg) by mouth once as needed for anxiety (take approx 15  min before MRI, after instructed to do so by staff) 1 tablet 0     metFORMIN (GLUCOPHAGE) 500 MG tablet Take 2 tablets (1,000 mg) by mouth 2 times daily (with meals). 360 tablet 4     saline nasal (AYR SALINE) GEL topical gel Apply into each nare 4 times daily as needed for congestion. 1-3 per day as needed       vitamin D3 (CHOLECALCIFEROL) 50 mcg (2000 units) tablet            Physical Exam:     Vitals: LMP  (LMP Unknown)   SKIN: Focused examination of left breast was performed.  See pertinent findings in A/P.     Laboratory Data:   Cell counts  Recent Labs   Lab Test 05/22/25 1137 05/11/25  1406 05/10/25  0827   HGB 11.5* 11.9 12.1   HCT 34.6* 35.8 35.9   WBC 5.4 5.5 5.3    303 299       Basic Metabolic Panel  Recent Labs   Lab Test 05/22/25 1137 05/11/25  1406 05/10/25  0827    139 138   POTASSIUM 4.0 3.9 3.9   CHLORIDE 102 102 102   CO2 27 26 23   BUN 15.1 11.9 12.8   CR 0.56 0.61 0.61   GLC 94 100* 157*        LFTs  Recent Labs   Lab Test 05/22/25 1137 05/11/25  1406 05/10/25  0827   BILITOTAL <0.2 0.2 0.3   ALKPHOS 60 72 77   AST 22 20 21   ALT 26 21 23   ALBUMIN 4.2 4.1 4.3       Eugenia Koehler MD  Adjunct  of Dermatology  Cell: [phone]    Again, thank you for allowing me to participate in the care of your patient.      Sincerely,    Eugenia Koehler MD

## 2025-05-23 NOTE — NURSING NOTE
Drug Administration Record    Prior to injection, verified patient identity using patient's name and date of birth.  Due to injection administration, patient instructed to remain in clinic for 15 minutes  afterwards, and to report any adverse reaction to me immediately.    Drug Name: triamcinolone acetonide(kenalog)  Dose: 0.9 mL of triamcinolone 10mg/mL, 9mg dose  Route administered: ID  NDC #: 8580-4694-80  Amount of waste(mL):4.1 mL  Reason for waste: Multi dose vial    LOT #: [id number]  SITE: Left breast  : [employer]  EXPIRATION DATE: 09/2027

## 2025-05-23 NOTE — NURSING NOTE
Lidocaine-epinephrine 1-1:206025 % injection   1.5 mL once for one use, starting 5/23/2025 ending 5/23/2025,  2mL disp, R-0, injection  Injected by Dr. Mihaela Mariscal

## 2025-05-23 NOTE — PROGRESS NOTES
[facility]ive Oncodermatology Consultation      Patient: Zenaida Valles  Date: May 23, 2025  Attending: Eugenia Koehler MD  Referring Provider: Chito Cheung MD    Dermatology Problem List    Invasive ductal carcinoma of the breast, left.    ASSESSMENT AND PLAN:     # Presumed infected epidermal inclusion cyst on the left breast   4 cm brightly erythematous slightly violaceous inflamed nodule, central punctum with visible keratinized material.  There is purulent drainage with application of mild pressure to the area.  Very tender to palpation.  Superior to this on the chest wall is a firm subcutaneous, well-demarcated approximately 7 mm nodule consistent with another epidermal inclusion cyst.  The patient reports that this was a longstanding epidermal inclusion cyst that about 6 weeks ago ruptured downwards; she felt the capsule rupture.  He has since undergone radiation therapy to the inferior portion of the same breast; the area of the cyst was not within the radiation field.  She has not had any fevers or chills, the pain has not been increasing.    Plan  - Punch biopsy of the tissue today to confirm diagnosis of ruptured epidermal inclusion cyst and rule out involvement of malignancy  -Incision and drainage  -Aerobic bacterial culture  -Intralesional Kenalog injections to the indurated surrounding tissue to try to decrease inflammation  -Doxycycline 100 mg twice daily x 5 days.  Advised patient to wait an hour between calcium and doxycycline.    - Punch biopsy procedure note, location(s): left superior breast. After discussion of benefits and risks including but not limited to bleeding, infection, scar, incomplete removal, recurrence, and non-diagnostic biopsy, written consent and photographs were obtained. The area was cleaned with isopropyl alcohol. 0.5mL of 1% lidocaine with epinephrine was injected to obtain adequate anesthesia and a 4 mm punch biopsy was performed at site(s).  No suture  placed, left open to drain. White petrolatum ointment and a bandage was applied to the wound. Explicit verbal and written wound care instructions were provided. The patient left the dermatology clinic in good condition.     - Incision and Drainage (I&D). After discussion of the risks including bleeding, infection, scar, non diagnosis and skin discoloration, verbal and/or written consent was obtained. The area was prepped with alcohol and 0.5 mL of lidocaine with epi (1:100,000) was injected into the lesion on the left breast.  An 11 blade was used to incise the lesion and the lesion was drained. A culture was performed. A dressing was placed.  The patient tolerated the procedure well and left the dermatology clinic in good condition.     - Intra-lesional triamcinolone procedure note. After verbal consent and review of risk of pain and skin thinning/atrophy, positioning and cleansing with isopropyl alcohol, 3 total mL of triamcinolone 10 mg/mL was injected into 1 lesion(s) on the LEFT BREAST. The patient tolerated the procedure well and left the dermatology clinic in good condition.    The longitudinal plan of care for the diagnosis(es)/condition(s) as documented were addressed during this visit. Due to the added complexity in care, I will continue to support Rboerto in the subsequent management and with ongoing continuity of care.    Follow-Up:   Return to clinic depending on pathology result, or sooner as needed    History of Present Illness     Reason for Referral: Left anterior chest wall sebaceous cyst she recently had ipsilateral radiation     CC: Derm Problem (Sebaceous cysts on breast evaluation; she states that these cysts have been present for years and has some drainage, redness and irritation occurring. )      Zenaida Qiu Reena is a 75 year old female referred by Dr. Cheung for presumed infected epidermal inclusion cyst on the left breast.  She has breast cancer of the left breast and in the last 6  weeks has been undergoing radiation to the inferior aspect of that breast.  She has a longstanding history of an epidermal inclusion cyst on the breast.  She had periodically been draining it with manual pressure, but about 6 weeks ago she felt the capsule rupture under the skin rather than extruding material out of the skin.  Since then it has been irritated, inflamed, and had purulent drainage, and been very painful.  Radiation therapy was conducted without complications despite the presence of the ruptured cyst/abscess.  He has not had any fevers or chills, no spreading redness or other evidence of cellulitis or sepsis.  Last week she was given a course of Keflex which may have helped somewhat.  She is not putting Neosporin on it.  Since having the radiation done she feels like she has had some more skin breakdown in the area although that may be secondary to the dressing.  Overall the course has been waxing and waning.    Review of systems is otherwise negative except as noted above.    History, Allergies, and Medications:         Past Medical History:   Diagnosis Date    Asthma     Bacterial pneumonia     recent as of march 2025    Dengue     in past    Diabetes mellitus (H)     Encephalopathy     history of    Fibromuscular dysplasia     H/O sebaceous cyst     Hepatitis A     in the past    Hepatitis B infection     in the past    Hyperlipidemia LDL goal < 130     Hypertension     IPMN (intraductal papillary mucinous neoplasm)     Malaria     in past    Obesity     Renal artery stenosis 2005    Right, s/p stenting at [facility]    Statin medication not prescribed per physician orders     pt declined       Allergies   Allergen Reactions    Codeine Diarrhea and Rash    Codeine Camsylate Diarrhea and Rash    Ibuprofen GI Disturbance and Nausea    Lisinopril Cough       Current Outpatient Medications   Medication Sig Dispense Refill    doxycycline monohydrate (MONODOX) 100 MG capsule Take 1 capsule (100 mg) by  mouth 2 times daily for 5 days. 10 capsule 0    acetaminophen (TYLENOL) 500 MG tablet Take 500-1,000 mg by mouth every 6 hours as needed for mild pain      albuterol (PROAIR HFA) 108 (90 Base) MCG/ACT inhaler Inhale 2 puffs into the lungs every 6 hours as needed for shortness of breath or wheezing. 18 g 3    aspirin 81 MG tablet Take 81 mg by mouth daily      atenolol (TENORMIN) 25 MG tablet Take 1 tablet (25 mg) by mouth daily. 90 tablet 4    blood glucose (NO BRAND SPECIFIED) lancets standard Use to test blood sugar 2 times daily or as directed. 200 each 3    blood glucose (NO BRAND SPECIFIED) test strip Use to test blood sugar 2 times daily or as directed. 200 strip 3    blood glucose monitoring (NO BRAND SPECIFIED) meter device kit Use to test blood sugar 3 times daily or as directed. 1 kit 0    calcium 600 MG tablet Take 1 tablet by mouth daily.      Collagen Hydrolysate, Bovine, POWD       diclofenac (VOLTAREN) 1 % GEL topical gel Apply 2 grams three - four times daily using enclosed dosing card. 100 g 0    fluticasone-salmeterol (ADVAIR) 100-50 MCG/ACT inhaler INHALE ONE PUFF INTO THE LUNGS DAILY 2 each 2    hydrochlorothiazide (HYDRODIURIL) 25 MG tablet Take 1 tablet (25 mg) by mouth daily. 90 tablet 4    irbesartan (AVAPRO) 300 MG tablet Take 1 tablet (300 mg) by mouth daily. 90 tablet 4    Lancets (ONETOUCH DELICA PLUS [id number]) MISC       letrozole (FEMARA) 2.5 MG tablet Take 1 tablet (2.5 mg) by mouth daily. 90 tablet 3    LORazepam (ATIVAN) 0.5 MG tablet Take 1 tablet (0.5 mg) by mouth once as needed for anxiety (take approx 15 min before MRI, after instructed to do so by staff) 1 tablet 0    metFORMIN (GLUCOPHAGE) 500 MG tablet Take 2 tablets (1,000 mg) by mouth 2 times daily (with meals). 360 tablet 4    saline nasal (AYR SALINE) GEL topical gel Apply into each nare 4 times daily as needed for congestion. 1-3 per day as needed      vitamin D3 (CHOLECALCIFEROL) 50 mcg (2000 units) tablet             Physical Exam:     Vitals: LMP  (LMP Unknown)   SKIN: Focused examination of left breast was performed.  See pertinent findings in A/P.     Laboratory Data:   Cell counts  Recent Labs   Lab Test 05/22/25 1137 05/11/25  1406 05/10/25  0827   HGB 11.5* 11.9 12.1   HCT 34.6* 35.8 35.9   WBC 5.4 5.5 5.3    303 299       Basic Metabolic Panel  Recent Labs   Lab Test 05/22/25 1137 05/11/25  1406 05/10/25  0827    139 138   POTASSIUM 4.0 3.9 3.9   CHLORIDE 102 102 102   CO2 27 26 23   BUN 15.1 11.9 12.8   CR 0.56 0.61 0.61   GLC 94 100* 157*        LFTs  Recent Labs   Lab Test 05/22/25 1137 05/11/25  1406 05/10/25  0827   BILITOTAL <0.2 0.2 0.3   ALKPHOS 60 72 77   AST 22 20 21   ALT 26 21 23   ALBUMIN 4.2 4.1 4.3       Eugenia Koehler MD  Adjunct  of Dermatology  Cell: [phone]

## 2025-05-23 NOTE — NURSING NOTE
Dermatology Rooming Note    Zenaida Valles's goals for this visit include:   Chief Complaint   Patient presents with    Derm Problem     Sebaceous cysts on breast evaluation; she states that these cysts have been present for years and has some drainage, redness and irritation occurring.      Zaho SHI CMA - Dermatology

## 2025-05-25 LAB
BACTERIA FLD CULT: ABNORMAL
BACTERIA FLD CULT: ABNORMAL
GRAM STAIN RESULT: ABNORMAL
GRAM STAIN RESULT: ABNORMAL

## 2025-05-28 ENCOUNTER — RESULTS FOLLOW-UP (OUTPATIENT)
Dept: DERMATOLOGY | Facility: CLINIC | Age: 75
End: 2025-05-28

## 2025-05-28 LAB
PATH REPORT.ADDENDUM SPEC: NORMAL
PATH REPORT.COMMENTS IMP SPEC: NORMAL
PATH REPORT.COMMENTS IMP SPEC: NORMAL
PATH REPORT.FINAL DX SPEC: NORMAL
PATH REPORT.GROSS SPEC: NORMAL
PATH REPORT.MICROSCOPIC SPEC OTHER STN: NORMAL
PATH REPORT.RELEVANT HX SPEC: NORMAL

## 2025-05-28 NOTE — RESULT ENCOUNTER NOTE
Final Diagnosis   A(1). Skin, Left superior medial breast, punch:  - Features consistent with ruptured cyst - (see description)       1+ Staphylococcus epidermidis Abnormal    Susceptibilities not routinely done, refer to antibiogram to view typical susceptibility profiles   1+ Staphylococcus simulans Abnormal    Susceptibilities not routinely done, refer to antibiogram to view typical susceptibility profiles        ________________      Dear Ms. Lazarus Valles,    Great news! The cyst is just a cyst. The cultures are growing bacteria normally found on the skin (which is typical for ruptured cysts). They should be covered by the antibiotics I gave you. We will place a referral for derm surgery to excise the remainder of the cyst in a couple of weeks. Please let me know how you are doing!    Thank you,  Eugenia Marcelo MD

## 2025-06-03 ENCOUNTER — TELEPHONE (OUTPATIENT)
Dept: DERMATOLOGY | Facility: CLINIC | Age: 75
End: 2025-06-03
Payer: COMMERCIAL

## 2025-06-03 NOTE — TELEPHONE ENCOUNTER
6/3 Patient confirmed scheduled appointment:  Date: 12/04/2025  Time: 7:00 AM  Visit type: Procedure  Provider: Gigi  [place] [facility] [place]imaging: na  Additional notes: cyst removal, scheduled per Zahra. Pt wants to have the procedure sooner due to open wounds. Routing to the clinic.

## 2025-06-10 RX ORDER — ACETAMINOPHEN 325 MG/1
650 TABLET ORAL
Status: CANCELLED
Start: 2025-06-13

## 2025-06-10 RX ORDER — METHYLPREDNISOLONE SODIUM SUCCINATE 40 MG/ML
40 INJECTION INTRAMUSCULAR; INTRAVENOUS
Status: CANCELLED
Start: 2025-06-13

## 2025-06-10 RX ORDER — EPINEPHRINE 1 MG/ML
0.3 INJECTION, SOLUTION INTRAMUSCULAR; SUBCUTANEOUS EVERY 5 MIN PRN
Status: CANCELLED | OUTPATIENT
Start: 2025-06-13

## 2025-06-10 RX ORDER — DIPHENHYDRAMINE HYDROCHLORIDE 50 MG/ML
50 INJECTION, SOLUTION INTRAMUSCULAR; INTRAVENOUS
Status: CANCELLED
Start: 2025-06-13

## 2025-06-10 RX ORDER — HEPARIN SODIUM,PORCINE 10 UNIT/ML
5-20 VIAL (ML) INTRAVENOUS DAILY PRN
Status: CANCELLED | OUTPATIENT
Start: 2025-06-13

## 2025-06-10 RX ORDER — LORAZEPAM 2 MG/ML
0.5 INJECTION INTRAMUSCULAR EVERY 4 HOURS PRN
Status: CANCELLED | OUTPATIENT
Start: 2025-06-13

## 2025-06-10 RX ORDER — ALBUTEROL SULFATE 0.83 MG/ML
2.5 SOLUTION RESPIRATORY (INHALATION)
Status: CANCELLED | OUTPATIENT
Start: 2025-06-13

## 2025-06-10 RX ORDER — DIPHENHYDRAMINE HYDROCHLORIDE 50 MG/ML
25 INJECTION, SOLUTION INTRAMUSCULAR; INTRAVENOUS
Status: CANCELLED
Start: 2025-06-13

## 2025-06-10 RX ORDER — ALBUTEROL SULFATE 90 UG/1
1-2 INHALANT RESPIRATORY (INHALATION)
Status: CANCELLED
Start: 2025-06-13

## 2025-06-10 RX ORDER — HEPARIN SODIUM (PORCINE) LOCK FLUSH IV SOLN 100 UNIT/ML 100 UNIT/ML
5 SOLUTION INTRAVENOUS
Status: CANCELLED | OUTPATIENT
Start: 2025-06-13

## 2025-06-10 RX ORDER — MEPERIDINE HYDROCHLORIDE 25 MG/ML
25 INJECTION INTRAMUSCULAR; INTRAVENOUS; SUBCUTANEOUS
Status: CANCELLED | OUTPATIENT
Start: 2025-06-13

## 2025-06-10 RX ORDER — DIPHENHYDRAMINE HCL 25 MG
50 CAPSULE ORAL
Status: CANCELLED
Start: 2025-06-13

## 2025-06-12 ENCOUNTER — INFUSION THERAPY VISIT (OUTPATIENT)
Dept: ONCOLOGY | Facility: CLINIC | Age: 75
End: 2025-06-12
Attending: INTERNAL MEDICINE
Payer: COMMERCIAL

## 2025-06-12 ENCOUNTER — APPOINTMENT (OUTPATIENT)
Dept: LAB | Facility: CLINIC | Age: 75
End: 2025-06-12
Attending: INTERNAL MEDICINE
Payer: COMMERCIAL

## 2025-06-12 VITALS
WEIGHT: 165 LBS | BODY MASS INDEX: 26.63 KG/M2 | TEMPERATURE: 97.6 F | HEART RATE: 70 BPM | DIASTOLIC BLOOD PRESSURE: 73 MMHG | RESPIRATION RATE: 16 BRPM | OXYGEN SATURATION: 97 % | SYSTOLIC BLOOD PRESSURE: 147 MMHG

## 2025-06-12 DIAGNOSIS — C50.912 INVASIVE DUCTAL CARCINOMA OF BREAST, FEMALE, LEFT (H): Primary | ICD-10-CM

## 2025-06-12 LAB
ALBUMIN SERPL BCG-MCNC: 4.3 G/DL (ref 3.5–5.2)
ALP SERPL-CCNC: 57 U/L (ref 40–150)
ALT SERPL W P-5'-P-CCNC: 21 U/L (ref 0–50)
ANION GAP SERPL CALCULATED.3IONS-SCNC: 11 MMOL/L (ref 7–15)
AST SERPL W P-5'-P-CCNC: 18 U/L (ref 0–45)
BASOPHILS # BLD AUTO: 0 10E3/UL (ref 0–0.2)
BASOPHILS NFR BLD AUTO: 1 %
BILIRUB SERPL-MCNC: 0.2 MG/DL
BUN SERPL-MCNC: 14.7 MG/DL (ref 8–23)
CALCIUM SERPL-MCNC: 9.7 MG/DL (ref 8.8–10.4)
CHLORIDE SERPL-SCNC: 101 MMOL/L (ref 98–107)
CREAT SERPL-MCNC: 0.56 MG/DL (ref 0.51–0.95)
EGFRCR SERPLBLD CKD-EPI 2021: >90 ML/MIN/1.73M2
EOSINOPHIL # BLD AUTO: 0.2 10E3/UL (ref 0–0.7)
EOSINOPHIL NFR BLD AUTO: 3 %
ERYTHROCYTE [DISTWIDTH] IN BLOOD BY AUTOMATED COUNT: 13.5 % (ref 10–15)
GLUCOSE SERPL-MCNC: 102 MG/DL (ref 70–99)
HCO3 SERPL-SCNC: 27 MMOL/L (ref 22–29)
HCT VFR BLD AUTO: 36.6 % (ref 35–47)
HGB BLD-MCNC: 12.2 G/DL (ref 11.7–15.7)
IMM GRANULOCYTES # BLD: 0 10E3/UL
IMM GRANULOCYTES NFR BLD: 0 %
LYMPHOCYTES # BLD AUTO: 1.7 10E3/UL (ref 0.8–5.3)
LYMPHOCYTES NFR BLD AUTO: 34 %
MCH RBC QN AUTO: 28.8 PG (ref 26.5–33)
MCHC RBC AUTO-ENTMCNC: 33.3 G/DL (ref 31.5–36.5)
MCV RBC AUTO: 86 FL (ref 78–100)
MONOCYTES # BLD AUTO: 0.4 10E3/UL (ref 0–1.3)
MONOCYTES NFR BLD AUTO: 8 %
NEUTROPHILS # BLD AUTO: 2.7 10E3/UL (ref 1.6–8.3)
NEUTROPHILS NFR BLD AUTO: 54 %
NRBC # BLD AUTO: 0 10E3/UL
NRBC BLD AUTO-RTO: 0 /100
PLATELET # BLD AUTO: 237 10E3/UL (ref 150–450)
POTASSIUM SERPL-SCNC: 3.7 MMOL/L (ref 3.4–5.3)
PROT SERPL-MCNC: 6.6 G/DL (ref 6.4–8.3)
RBC # BLD AUTO: 4.24 10E6/UL (ref 3.8–5.2)
SODIUM SERPL-SCNC: 139 MMOL/L (ref 135–145)
WBC # BLD AUTO: 4.9 10E3/UL (ref 4–11)

## 2025-06-12 PROCEDURE — 258N000003 HC RX IP 258 OP 636

## 2025-06-12 PROCEDURE — 250N000011 HC RX IP 250 OP 636

## 2025-06-12 PROCEDURE — 36591 DRAW BLOOD OFF VENOUS DEVICE: CPT

## 2025-06-12 PROCEDURE — 250N000011 HC RX IP 250 OP 636: Performed by: INTERNAL MEDICINE

## 2025-06-12 PROCEDURE — 85025 COMPLETE CBC W/AUTO DIFF WBC: CPT

## 2025-06-12 PROCEDURE — 84132 ASSAY OF SERUM POTASSIUM: CPT

## 2025-06-12 RX ORDER — HEPARIN SODIUM (PORCINE) LOCK FLUSH IV SOLN 100 UNIT/ML 100 UNIT/ML
5 SOLUTION INTRAVENOUS ONCE
Status: COMPLETED | OUTPATIENT
Start: 2025-06-12 | End: 2025-06-12

## 2025-06-12 RX ORDER — HEPARIN SODIUM (PORCINE) LOCK FLUSH IV SOLN 100 UNIT/ML 100 UNIT/ML
5 SOLUTION INTRAVENOUS
Status: DISCONTINUED | OUTPATIENT
Start: 2025-06-12 | End: 2025-06-12 | Stop reason: HOSPADM

## 2025-06-12 RX ADMIN — Medication 5 ML: at 12:55

## 2025-06-12 RX ADMIN — SODIUM CHLORIDE 470 MG: 0.9 INJECTION, SOLUTION INTRAVENOUS at 14:09

## 2025-06-12 RX ADMIN — Medication 5 ML: at 14:39

## 2025-06-12 ASSESSMENT — PAIN SCALES - GENERAL: PAINLEVEL_OUTOF10: NO PAIN (0)

## 2025-06-12 NOTE — PATIENT INSTRUCTIONS
[facility] Triage and after hours / weekends / holidays:  [phone]    Please call the [facility] Triage line if you experience a temperature greater than or equal to 100.4, shaking chills, have uncontrolled nausea, vomiting and/or diarrhea, dizziness, shortness of breath, chest pain, bleeding, unexplained bruising, or if you have any other new/concerning symptoms, questions or concerns.     If you wish to speak to a provider before your next infusion visit, please call your care coordinator to notify them so we can adequately serve you.    If you need a refill on a narcotic prescription or other medication, please call before your infusion appointment.      June 2025 Sunday Monday Tuesday Wednesday Thursday Friday Saturday   1     2     3     4     5     6     7       8     9     10     11     12    Lab Peripheral  12:30 PM   (15 min.)    [facility]   [facility]    Infusion 90   1:00 PM   (90 min.)    ONC INFUSION NURSE   [facility] 13     14       15     16     17     18     19     20     21       22     23     24     25     26     27     28       29     30                                            July 2025 Sunday Monday Tuesday Wednesday Thursday Friday Saturday             1     2     3    Lab Peripheral  11:00 AM   (15 min.)   [facility]   [facility]    Return Patient  11:15 AM   (45 min.)   Leilani Richards APRN CNP   [facility]    Infusion 90   2:00 PM   (90 min.)    ONC INFUSION NURSE   [facility] 4     5       6     7     8     9     10     11     12       13     14     15     16     17     18     19       20     21     22     23     24    Lab Peripheral   7:00 AM   (15 min.)    [facility]   [facility]    Infusion 90   7:30 AM   (90 min.)    ONC INFUSION NURSE   [facility] 25     26       27     28     29     30     31                                  Recent Results (from the past 24 hours)   COMPREHENSIVE METABOLIC PANEL    Collection Time: 06/12/25 12:55 PM   Result Value Ref Range    Sodium 139 135 - 145 mmol/L    Potassium 3.7 3.4 - 5.3 mmol/L    Carbon Dioxide (CO2) 27 22 - 29 mmol/L    Anion Gap 11 7 - 15 mmol/L    Urea Nitrogen 14.7 8.0 - 23.0 mg/dL    Creatinine 0.56 0.51 - 0.95 mg/dL    GFR Estimate >90 >60 mL/min/1.73m2    Calcium 9.7 8.8 - 10.4 mg/dL    Chloride 101 98 - 107 mmol/L    Glucose 102 (H) 70 - 99 mg/dL    Alkaline Phosphatase 57 40 - 150 U/L    AST 18 0 - 45 U/L    ALT 21 0 - 50 U/L    Protein Total 6.6 6.4 - 8.3 g/dL    Albumin 4.3 3.5 - 5.2 g/dL    Bilirubin Total 0.2 <=1.2 mg/dL   CBC with platelets and differential    Collection Time: 06/12/25 12:55 PM   Result Value Ref Range    WBC Count 4.9 4.0 - 11.0 10e3/uL    RBC Count 4.24 3.80 - 5.20 10e6/uL    Hemoglobin 12.2 11.7 - 15.7 g/dL    Hematocrit 36.6 35.0 - 47.0 %    MCV 86 78 - 100 fL    MCH 28.8 26.5 - 33.0 pg    MCHC 33.3 31.5 - 36.5 g/dL    RDW 13.5 10.0 - 15.0 %    Platelet Count 237 150 - 450 10e3/uL    % Neutrophils 54 %    % Lymphocytes 34 %    % Monocytes 8 %    % Eosinophils 3 %    % Basophils 1 %    % Immature Granulocytes 0 %    NRBCs per 100 WBC 0 <1 /100    Absolute Neutrophils 2.7 1.6 - 8.3 10e3/uL    Absolute Lymphocytes 1.7 0.8 - 5.3 10e3/uL    Absolute Monocytes 0.4 0.0 - 1.3 10e3/uL    Absolute Eosinophils 0.2 0.0 - 0.7 10e3/uL    Absolute Basophils 0.0 0.0 - 0.2 10e3/uL    Absolute Immature Granulocytes 0.0 <=0.4 10e3/uL    Absolute NRBCs 0.0 10e3/uL

## 2025-06-12 NOTE — NURSING NOTE
Chief Complaint   Patient presents with    Port Draw     Labs collected from port by RN. Vitals taken. Checked in for appointment(s).      Port accessed with 20 gauge 3/4 inch flat needle by RN, labs collected, line flushed with saline and heparin.  Vitals taken. Pt checked in for appointment(s).     Brianne Calix RN

## 2025-06-12 NOTE — PROGRESS NOTES
Infusion Nursing Note:  Zenaida Valles presents today for Cycle 5 Day 1 [place].    Patient seen by provider today: No   present during visit today: Not Applicable.    Note: Patient presents to the infusion center today feeling well. She denies any new symptoms or concerns. Ongoing fatigue and mild nausea. Both improving per pt report. No pain, fevers, chills, chest pain or shortness of breath.     Intravenous Access:  Implanted Port.    Treatment Conditions:   Latest Reference Range & Units 06/12/25 12:55   Sodium 135 - 145 mmol/L 139   Potassium 3.4 - 5.3 mmol/L 3.7   Chloride 98 - 107 mmol/L 101   Carbon Dioxide (CO2) 22 - 29 mmol/L 27   Urea Nitrogen 8.0 - 23.0 mg/dL 14.7   Creatinine 0.51 - 0.95 mg/dL 0.56   GFR Estimate >60 mL/min/1.73m2 >90   Calcium 8.8 - 10.4 mg/dL 9.7   Anion Gap 7 - 15 mmol/L 11   Albumin 3.5 - 5.2 g/dL 4.3   Protein Total 6.4 - 8.3 g/dL 6.6   Alkaline Phosphatase 40 - 150 U/L 57   ALT 0 - 50 U/L 21   AST 0 - 45 U/L 18   Bilirubin Total <=1.2 mg/dL 0.2   Glucose 70 - 99 mg/dL 102 (H)   WBC 4.0 - 11.0 10e3/uL 4.9   Hemoglobin 11.7 - 15.7 g/dL 12.2   Hematocrit 35.0 - 47.0 % 36.6   Platelet Count 150 - 450 10e3/uL 237   RBC Count 3.80 - 5.20 10e6/uL 4.24   MCV 78 - 100 fL 86   MCH 26.5 - 33.0 pg 28.8   MCHC 31.5 - 36.5 g/dL 33.3   RDW 10.0 - 15.0 % 13.5   % Neutrophils % 54   % Lymphocytes % 34   % Monocytes % 8   % Eosinophils % 3   % Basophils % 1   % Immature Granulocytes % 0   NRBC/W <1 /100 0   Absolute Neutrophil 1.6 - 8.3 10e3/uL 2.7   Absolute Lymphocytes 0.8 - 5.3 10e3/uL 1.7   Absolute Monocytes 0.0 - 1.3 10e3/uL 0.4   Absolute Eosinophils 0.0 - 0.7 10e3/uL 0.2   Absolute Basophils 0.0 - 0.2 10e3/uL 0.0   Absolute Immature Granulocytes <=0.4 10e3/uL 0.0   Absolute NRBCs 10e3/uL 0.0     ECHO/MUGA completed 5/22/25  EF 60-65%.    Results reviewed, labs MET treatment parameters, ok to proceed with treatment.    Post Infusion Assessment:  Patient tolerated  infusion without incident.  Blood return noted pre and post infusion.  No evidence of extravasations.  Access discontinued per protocol.     Discharge Plan:   Patient declined prescription refills.  Discharge instructions reviewed with: Patient and Family.  Patient and/or family verbalized understanding of discharge instructions and all questions answered.  AVS to patient via [facility].  Patient will return 7/3 for next appointment.   Patient discharged in stable condition accompanied by: .  Departure Mode: Ambulatory.      Nita Baeza RN

## 2025-06-25 ENCOUNTER — TELEPHONE (OUTPATIENT)
Dept: GASTROENTEROLOGY | Facility: CLINIC | Age: 75
End: 2025-06-25
Payer: COMMERCIAL

## 2025-06-25 DIAGNOSIS — K86.2 PANCREAS CYST: Primary | ICD-10-CM

## 2025-06-25 NOTE — TELEPHONE ENCOUNTER
Sent [facility] (1st Attempt) and Patient Contacted for the patient to call back and schedule the following:    Appointment type: New   Provider: OMAR Goldberg  Return date: November 2025   Specialty phone number: [phone]   Additional appointment(s) needed: MRI - DUE AT LEAST 1 WK PRIOR       ------       Karuna Taylor RN  [id number]  Hi team,    Please call pt to schedule MRI abdomen and clinic visit with danette Goldberg in November.  Please have MRI scheduled no more then one week prior to clinic.      Provider: Mame Mcgrath  Date/Time: November 2025  Mode: video or in person (pt preference)  Appt notes:  MRI to be done just prior to, Ind - pancreatic cyst surveillance.      Thank you  Yoselyn

## 2025-07-03 ENCOUNTER — ONCOLOGY VISIT (OUTPATIENT)
Dept: ONCOLOGY | Facility: CLINIC | Age: 75
End: 2025-07-03
Attending: NURSE PRACTITIONER
Payer: COMMERCIAL

## 2025-07-03 ENCOUNTER — APPOINTMENT (OUTPATIENT)
Dept: LAB | Facility: CLINIC | Age: 75
End: 2025-07-03
Attending: NURSE PRACTITIONER
Payer: COMMERCIAL

## 2025-07-03 ENCOUNTER — INFUSION THERAPY VISIT (OUTPATIENT)
Dept: ONCOLOGY | Facility: CLINIC | Age: 75
End: 2025-07-03
Attending: INTERNAL MEDICINE
Payer: COMMERCIAL

## 2025-07-03 VITALS
HEART RATE: 73 BPM | RESPIRATION RATE: 16 BRPM | DIASTOLIC BLOOD PRESSURE: 80 MMHG | OXYGEN SATURATION: 98 % | SYSTOLIC BLOOD PRESSURE: 127 MMHG | TEMPERATURE: 100 F

## 2025-07-03 DIAGNOSIS — C50.912 INVASIVE DUCTAL CARCINOMA OF BREAST, FEMALE, LEFT (H): Primary | ICD-10-CM

## 2025-07-03 DIAGNOSIS — R09.81 NASAL CONGESTION: Primary | ICD-10-CM

## 2025-07-03 DIAGNOSIS — R09.81 NASAL CONGESTION: ICD-10-CM

## 2025-07-03 DIAGNOSIS — Z79.899 ENCOUNTER FOR LONG-TERM (CURRENT) USE OF HIGH-RISK MEDICATION: ICD-10-CM

## 2025-07-03 LAB
ALBUMIN SERPL BCG-MCNC: 4.2 G/DL (ref 3.5–5.2)
ALP SERPL-CCNC: 61 U/L (ref 40–150)
ALT SERPL W P-5'-P-CCNC: 17 U/L (ref 0–50)
ANION GAP SERPL CALCULATED.3IONS-SCNC: 12 MMOL/L (ref 7–15)
AST SERPL W P-5'-P-CCNC: 18 U/L (ref 0–45)
BASOPHILS # BLD AUTO: 0 10E3/UL (ref 0–0.2)
BASOPHILS NFR BLD AUTO: 0 %
BILIRUB SERPL-MCNC: 0.3 MG/DL
BUN SERPL-MCNC: 13.9 MG/DL (ref 8–23)
C PNEUM DNA SPEC QL NAA+PROBE: NOT DETECTED
CALCIUM SERPL-MCNC: 8.9 MG/DL (ref 8.8–10.4)
CHLORIDE SERPL-SCNC: 101 MMOL/L (ref 98–107)
CREAT SERPL-MCNC: 0.58 MG/DL (ref 0.51–0.95)
EGFRCR SERPLBLD CKD-EPI 2021: >90 ML/MIN/1.73M2
EOSINOPHIL # BLD AUTO: 0.1 10E3/UL (ref 0–0.7)
EOSINOPHIL NFR BLD AUTO: 1 %
ERYTHROCYTE [DISTWIDTH] IN BLOOD BY AUTOMATED COUNT: 13.5 % (ref 10–15)
FLUAV H1 2009 PAND RNA SPEC QL NAA+PROBE: NOT DETECTED
FLUAV H1 RNA SPEC QL NAA+PROBE: NOT DETECTED
FLUAV H3 RNA SPEC QL NAA+PROBE: NOT DETECTED
FLUAV RNA SPEC QL NAA+PROBE: NOT DETECTED
FLUBV RNA SPEC QL NAA+PROBE: NOT DETECTED
GLUCOSE SERPL-MCNC: 129 MG/DL (ref 70–99)
HADV DNA SPEC QL NAA+PROBE: NOT DETECTED
HCO3 SERPL-SCNC: 25 MMOL/L (ref 22–29)
HCOV PNL SPEC NAA+PROBE: NOT DETECTED
HCT VFR BLD AUTO: 35.2 % (ref 35–47)
HGB BLD-MCNC: 11.9 G/DL (ref 11.7–15.7)
HMPV RNA SPEC QL NAA+PROBE: NOT DETECTED
HPIV1 RNA SPEC QL NAA+PROBE: NOT DETECTED
HPIV2 RNA SPEC QL NAA+PROBE: NOT DETECTED
HPIV3 RNA SPEC QL NAA+PROBE: NOT DETECTED
HPIV4 RNA SPEC QL NAA+PROBE: NOT DETECTED
IMM GRANULOCYTES # BLD: 0 10E3/UL
IMM GRANULOCYTES NFR BLD: 0 %
LYMPHOCYTES # BLD AUTO: 1 10E3/UL (ref 0.8–5.3)
LYMPHOCYTES NFR BLD AUTO: 10 %
M PNEUMO DNA SPEC QL NAA+PROBE: NOT DETECTED
MCH RBC QN AUTO: 28.7 PG (ref 26.5–33)
MCHC RBC AUTO-ENTMCNC: 33.8 G/DL (ref 31.5–36.5)
MCV RBC AUTO: 85 FL (ref 78–100)
MONOCYTES # BLD AUTO: 0.6 10E3/UL (ref 0–1.3)
MONOCYTES NFR BLD AUTO: 6 %
NEUTROPHILS # BLD AUTO: 7.6 10E3/UL (ref 1.6–8.3)
NEUTROPHILS NFR BLD AUTO: 82 %
NRBC # BLD AUTO: 0 10E3/UL
NRBC BLD AUTO-RTO: 0 /100
PLATELET # BLD AUTO: 242 10E3/UL (ref 150–450)
POTASSIUM SERPL-SCNC: 3.5 MMOL/L (ref 3.4–5.3)
PROT SERPL-MCNC: 6.4 G/DL (ref 6.4–8.3)
RBC # BLD AUTO: 4.15 10E6/UL (ref 3.8–5.2)
RSV RNA SPEC QL NAA+PROBE: NOT DETECTED
RSV RNA SPEC QL NAA+PROBE: NOT DETECTED
RV+EV RNA SPEC QL NAA+PROBE: NOT DETECTED
SARS-COV-2 RNA RESP QL NAA+PROBE: NEGATIVE
SODIUM SERPL-SCNC: 138 MMOL/L (ref 135–145)
WBC # BLD AUTO: 9.3 10E3/UL (ref 4–11)

## 2025-07-03 PROCEDURE — 85025 COMPLETE CBC W/AUTO DIFF WBC: CPT

## 2025-07-03 PROCEDURE — 258N000003 HC RX IP 258 OP 636: Performed by: NURSE PRACTITIONER

## 2025-07-03 PROCEDURE — 87635 SARS-COV-2 COVID-19 AMP PRB: CPT | Performed by: NURSE PRACTITIONER

## 2025-07-03 PROCEDURE — 82040 ASSAY OF SERUM ALBUMIN: CPT

## 2025-07-03 PROCEDURE — G0463 HOSPITAL OUTPT CLINIC VISIT: HCPCS | Performed by: NURSE PRACTITIONER

## 2025-07-03 PROCEDURE — 250N000011 HC RX IP 250 OP 636: Performed by: NURSE PRACTITIONER

## 2025-07-03 PROCEDURE — 36591 DRAW BLOOD OFF VENOUS DEVICE: CPT

## 2025-07-03 PROCEDURE — 87581 M.PNEUMON DNA AMP PROBE: CPT | Performed by: NURSE PRACTITIONER

## 2025-07-03 RX ORDER — DIPHENHYDRAMINE HYDROCHLORIDE 50 MG/ML
25 INJECTION, SOLUTION INTRAMUSCULAR; INTRAVENOUS
Status: CANCELLED
Start: 2025-07-04

## 2025-07-03 RX ORDER — ALBUTEROL SULFATE 0.83 MG/ML
2.5 SOLUTION RESPIRATORY (INHALATION)
Status: CANCELLED | OUTPATIENT
Start: 2025-07-04

## 2025-07-03 RX ORDER — MEPERIDINE HYDROCHLORIDE 25 MG/ML
25 INJECTION INTRAMUSCULAR; INTRAVENOUS; SUBCUTANEOUS
Status: CANCELLED | OUTPATIENT
Start: 2025-07-04

## 2025-07-03 RX ORDER — HEPARIN SODIUM (PORCINE) LOCK FLUSH IV SOLN 100 UNIT/ML 100 UNIT/ML
5 SOLUTION INTRAVENOUS
Status: CANCELLED | OUTPATIENT
Start: 2025-07-04

## 2025-07-03 RX ORDER — DIPHENHYDRAMINE HCL 25 MG
50 CAPSULE ORAL
Status: CANCELLED
Start: 2025-07-04

## 2025-07-03 RX ORDER — LORAZEPAM 2 MG/ML
0.5 INJECTION INTRAMUSCULAR EVERY 4 HOURS PRN
Status: CANCELLED | OUTPATIENT
Start: 2025-07-04

## 2025-07-03 RX ORDER — ALBUTEROL SULFATE 90 UG/1
1-2 INHALANT RESPIRATORY (INHALATION)
Status: CANCELLED
Start: 2025-07-04

## 2025-07-03 RX ORDER — HEPARIN SODIUM (PORCINE) LOCK FLUSH IV SOLN 100 UNIT/ML 100 UNIT/ML
5 SOLUTION INTRAVENOUS
Status: DISCONTINUED | OUTPATIENT
Start: 2025-07-03 | End: 2025-07-03 | Stop reason: HOSPADM

## 2025-07-03 RX ORDER — EPINEPHRINE 1 MG/ML
0.3 INJECTION, SOLUTION INTRAMUSCULAR; SUBCUTANEOUS EVERY 5 MIN PRN
Status: CANCELLED | OUTPATIENT
Start: 2025-07-04

## 2025-07-03 RX ORDER — HEPARIN SODIUM (PORCINE) LOCK FLUSH IV SOLN 100 UNIT/ML 100 UNIT/ML
5 SOLUTION INTRAVENOUS ONCE
Status: COMPLETED | OUTPATIENT
Start: 2025-07-03 | End: 2025-07-03

## 2025-07-03 RX ORDER — ACETAMINOPHEN 325 MG/1
650 TABLET ORAL
Status: CANCELLED
Start: 2025-07-04

## 2025-07-03 RX ORDER — DIPHENHYDRAMINE HYDROCHLORIDE 50 MG/ML
50 INJECTION, SOLUTION INTRAMUSCULAR; INTRAVENOUS
Status: CANCELLED
Start: 2025-07-04

## 2025-07-03 RX ORDER — HEPARIN SODIUM,PORCINE 10 UNIT/ML
5-20 VIAL (ML) INTRAVENOUS DAILY PRN
Status: CANCELLED | OUTPATIENT
Start: 2025-07-04

## 2025-07-03 RX ORDER — METHYLPREDNISOLONE SODIUM SUCCINATE 40 MG/ML
40 INJECTION INTRAMUSCULAR; INTRAVENOUS
Status: CANCELLED
Start: 2025-07-04

## 2025-07-03 RX ADMIN — SODIUM CHLORIDE 470 MG: 0.9 INJECTION, SOLUTION INTRAVENOUS at 13:49

## 2025-07-03 RX ADMIN — Medication 5 ML: at 14:23

## 2025-07-03 RX ADMIN — Medication 5 ML: at 12:13

## 2025-07-03 ASSESSMENT — PAIN SCALES - GENERAL: PAINLEVEL_OUTOF10: NO PAIN (0)

## 2025-07-03 NOTE — PATIENT INSTRUCTIONS
[facility] Triage and after hours / weekends / holidays:  [phone]    Please call the triage or after hours line if you experience a temperature greater than or equal to 100.4, shaking chills, have uncontrolled nausea, vomiting and/or diarrhea, dizziness, shortness of breath, chest pain, bleeding, unexplained bruising, or if you have any other new/concerning symptoms, questions or concerns.      If you are having any concerning symptoms or wish to speak to a provider before your next infusion visit, please call triage to notify them so we can adequately serve you.     If you need a refill on a narcotic prescription or other medication, please call before your infusion appointment.

## 2025-07-03 NOTE — PROGRESS NOTES
Received call from Zenaida.  She started having a sore throat last night.  This morning has a temp of 100.7, sore throat, nasal congestion.  Scheduled for Trastuzumab today.  Is hoping she will be ok to have her infusion today.     Labs at 11:00 today and appt with Leilani.  Leilani adding respiratory panel and covid pcr to labs today.     Isamar Esparza RN

## 2025-07-03 NOTE — PROGRESS NOTES
MEDICAL ONCOLOGY NOTE     Re. Zenaida Valles   Female, 74 year old, 1950  MRN: [id number]     Diagnosis: Screening identified stage I invasive ductal cancer of the upper outer quadrant of the left breast ER positive, FL positive, HER2 amplified stage [id number].       HISTORY OF PRESENT ILLNESS:    Zenaida has been in generally good health except for a right renal artery stenosis requiring a stent.  She also has a left leg injury recently she was in her usual state of good health and had a screening mammogram which showed a suspicious lesion in the upper outer quadrant of the left breast at the 3 o'clock position 5 cm from the nipple there is an irregular hypoechoic mass measuring 1.0 x 0.7 x 0.9 cm in size 1 cm from the nipple there is also a 0.5 x 0.4 cm area of suspicion     She underwent an ultrasound guided biopsy which showed usual ductal hyperplasia and fibrocystic changes microcysts in April Kren metaplasia.  Columnar cell changes.  It invasive ductal carcinoma was also found grade 2 and DCIS grade 2 solid type.  Her breast cancer was estrogen receptor positive progesterone receptor positive at HER2 2+ by IHC.  She then underwent ADRIANA which showed a subpopulation which was 60 to 70% ADRIANA 5 negative and 30 to 40% I-S age 1 positive with a 4.9 HER2 signals per nucleus and the ratio of 2.2 indicating HER2 positivity.     She then came to see Dr. Wood at [facility] for surgical recommendations.  She had a contrast mammogram which showed a 0.9 cm enhancing lesion in the upper outer quadrant of the left breast.     DIAGNOSTIC AND TREATMENT SUMMARY:  On October 7 she underwent a screening mammogram which demonstrated an asymmetry in her left breast.       On October 15 she underwent a diagnostic mammogram which confirmed a left breast asymmetry.  She had an ultrasound which demonstrated 2 masses in her left breast.  At the 3 o'clock position 1 cm from the nipple there was a mass  and at the 3 o'clock position 5 cm from the nipple there was a second mass.  Her lymph nodes were normal by ultrasound.       On October 23 she underwent a contrast-enhanced mammography which only demonstrated 1 mass measuring 1 cm.  The right breast was normal.  She underwent ultrasound-guided biopsies of both masses.  The mass at the 3 o'clock position 1 cm from the nipple was benign.  The mass at the 3 o'clock position 5 cm from the nipple demonstrating a grade 2 invasive ductal cancer that was ER positive and HER2 equivocal.  There is also DCIS present.       A. LEFT breast, 3:00, 1 cm from nipple, ultrasound-guided core biopsy:  - Benign breast tissue with usual ductal hyperplasia (UDH) and fibrocystic changes including microcysts, apocrine metaplasia, and columnar cell change  - Rare calcifications associated with benign breast ducts and acini  - Negative for atypia or malignancy      B. LEFT breast, 3:00, 5 cm from nipple, ultrasound-guided core biopsy:   - INVASIVE BREAST CARCINOMA OF NO SPECIAL TYPE (INVASIVE DUCTAL CARCINOMA), [place] grade 2  - Ductal carcinoma in situ (DCIS), nuclear grade 2, solid type  - Invasive carcinoma is estrogen receptor positive, progesterone receptor positive, and HER2 equivocal (score 2+) by immunohistochemistry (see 'Breast Biomarker Reporting Template' below)  - HER2 FISH is is process; results will be reportedly separately by cytogenetis  - See comment     This FISH analysis is performed in follow up to the reported equivocal (2+) HER2 findings by immunohistochemistry ([id number]).     RESULTS:     Ratio of HER2/MARION-17 signals  Zenaida Valles:  See Interpretation below     Majority (~60-70%) of tumor in area of strongest IHC staining:  Group 5 (ADRIANA Negative)  Avg. number HER2 signals/nucleus:  2.9  Avg. number MARION-17 signals/nucleus:  1.7  HER2/MARION 17 ratio:  1.7     Subpopulation (~30-40%) of tumor in area of strongest IHC staining:  Group 1 (ADRIANA Positive)                              Avg. number HER2 signals/nucleus:  4.9  Avg. number MARION-17 signals/nucleus:  2.3  HER2/MARION 17 ratio:  2.2     **Interpretive guidelines per the American Society of Clinical Oncology/College of American Pathologists Clinical Practice Guideline Update (Margarita VALENZUELA et al, 2023, Arch Pathol Lab Med 147:993):     -- Group 1: HER2/MARION-17 ratio 2.0 or more -AND- avg. number HER2 signals/nucleus 4.0 or more (ADRIANA Positive)  -- Group 2: HER2/MARION-17 ratio 2.0 or more -AND- avg. number HER2 signals/nucleus <4.0 (Additional work required)  -- Group 3: HER2/MARION-17 ratio <2.0 -AND- avg. number HER2 signals/nucleus 6.0 or more (Additional work required)  -- Group 4: HER2/MARION-17 ratio <2.0 -AND- avg. number HER2 signals/nucleus 4.0 or more and <6.0 (Additional work required)  -- Group 5: HER2/MARION-17 ratio <2.0 -AND- avg. number HER2 signals/nucleus <4.0 (ADRIANA Negative)     INTERPRETATION:  Two admixed populations of cells were found in this sample:     Majority (~60-70%) of tumor in area of strongest IHC staining:  Per the American Society of Clinical Oncology/College of American Pathologists Clinical Practice Guideline Focused Update (Mragarita AC et al, 2018, Arch Pathol Lab Med  doi:10.5858/arpa.3619-4067-DX), the HER2/MARION 17 ratio of 1.7 and average number of HER2 signals/cell of 2.9 places this population of cells in Group 5 (ADRIANA Negative).      Subpopulation (~30-40%) of tumor in area of strongest IHC staining:  Admixed within this sample were cells with increased HER2 signals, which, when selectively scored, had an average of 4.9 HER2 signals/nucleus and a HER2/MRAION 17 ratio of 2.2; per the American Society of Clinical Oncology/College of American Pathologists Clinical Practice Guideline Focused Update (Margarita AC et al, 2018, Arch Pathol Lab Med  doi:10.5858/arpa.9845-0782-FK), this population of cells falls into Group 1 (ADRIANA Positive).     I reviewed history in chart:  PAST MEDICAL HISTORY: No history of breast  surgery.  No history of breast cancer in the past.  No history of radiation therapy in the past or radiation exposure.  No history of tumor of any kind.  No history of heart problems, heart attack, breathing problems, blood clots, seizures, arthritis, peptic ulcer disease, osteoporosis or bone fractures.  She is not currently participating in a clinical trial.  She does have a history of asthma and uses an albuterol inhaler in cold weather.     Her past medical history is significant for diabetes mellitus and is on metformin, hypertension obesity, she has IPMN and a right renal artery stent.  The family history is negative for breast cancer.  She has a history of right renal artery stenosis and has a stent in place.     She has a recent history of a left hamstring injury.  She is seeing orthopedics for this problem.  She also has a history of back pain.  Her gait is affected but she can perform activities of daily living.  She has a history of aches in her shoulders.  She had a 65 pound weight loss over the last 2 years which was intentional related to improve diet and exercise.  She says she has a history of fibromuscular dysplasia.     She worked in the [employer] in [place] and was exposed to dengue, malaria, hepatitis A, hepatitis B, encephalitis.  She has also had COVID and the flu.     FAMILY HISTORY:   No history of ovarian, uterine, colon cancer, or melanoma.  No history of glioma, gastric cancer or pancreatic cancer.  Her mother did have Zollinger-Wheeler syndrome but the patient's genetics are negative for this syndrome.      PAST MENSTRUAL HISTORY:  Age of first menstrual period was 13.   She has been pregnant 1 times with 0 live births and 0 miscarriages and 1 .  Age at in place pregnancy age 28.   Uterus and ovaries are in place.  Last menstrual period was about age 52 and the menopause occurred naturally. No history of hormone replacement therapy.     She does have a history of thickened  endometrial lining but by her report no abnormalities of the lining when she had a DIC     HABITS:  She is a never smoker but she has a history of exposure to secondhand smoke from her father as a child.  She consumes alcoholic beverages socially 1 drink every couple of months.     GERMLINE GENETICS: pending     ALLERGIES: She has drug allergies to codeine, codeine derivatives, ibuprofen, lisinopril..  No allergy to seafood, iodine, or contrast dye.  She does take daily aspirin 81 mg because of her renal stent.    TREATMENT HISTORY:  A.  Lumpectomy showed a stage I T1b NX MX invasive ductal carcinoma of the left breast upper outer quadrant which was estrogen receptor positive (%) progesterone receptor positive (81-90%) and HER2 2+ by IHC and HER2 positive by ADRIANA.  Margins negative for invasive carcinoma.    B.  Plan is to give the APT regimen followed by addition of hormonal therapy with letrozole for 7 years.     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  They were in [facility] with the [employer] for two years.      INTERVAL HISTORY:  Zenaida is seen for evaluation and toxicity check prior receiving trastuzumab today.  -She developed a cold overnight last night: sore throat and post-nasal drip and did not sleep much.  She is feeling better now but super tired because of her night last night.   -Taking Vit D three times per week as this is making her nauseated.  Tries to take calcium every day.   -Letrozole might be causing some nausea.  Discussed taking at HS with food  -Saw derm and she had an I and D.  Not draining anymore and she has follow-up 8/4.   -No fevers that she is aware of.       -She is really fatigued.  Feeling like she cannot do some of the things she used to, for example,   -Her weight is back up to where it was before.  -Her feet tingling is not as bothersome.   -No fevers, chills or signs of systemic illness.   -Right upper abd twinge x 1 - thinks maybe from ice cream; has not  "recurred,  -Occasional mild frontal headaches, not long-lasting and no OTC meds needed.   -L axillary \"lump\" was evaluated and found to be a seroma.   -No cough, chest pain or shortness of breath at rest.        PHYSICAL EXAMINATION:     /80   Pulse 73   Temp 100  F (37.8  C) (Tympanic)   Resp 16   LMP  (LMP Unknown)   SpO2 98%   Wt Readings from Last 10 Encounters:   06/12/25 74.8 kg (165 lb)   05/22/25 75.2 kg (165 lb 11.2 oz)   05/16/25 75.8 kg (167 lb)   05/09/25 75.8 kg (167 lb 3.2 oz)   05/01/25 77.3 kg (170 lb 6.7 oz)   04/23/25 77.3 kg (170 lb 6.4 oz)   04/16/25 77.6 kg (171 lb)   04/10/25 76.7 kg (169 lb 1.6 oz)   03/28/25 77.5 kg (170 lb 12.8 oz)   03/21/25 76.5 kg (168 lb 11.2 oz)   General: Female in no acute distress.  Eyes: EOMI. No scleral icterus or conjunctival injection.  Cardiovascular: RRR No murmurs. No peripheral edema.  Respiratory: CTA bilaterally. No wheezes or crackles.  Gastrointestinal: BS +. Abdomen soft, non-tender.  Neurologic: Cranial nerves II through XII are grossly intact.  Skin: No rashes, petechiae, or bruising noted on exposed skin.  Bandaid covering the area where she had the I And D.  No redness or swelling noted in the skin around the area,   Psych: Affect appropriate. pleasant    LABORATORY DATA:    Most Recent 3 CBC's:  Recent Labs   Lab Test 07/03/25  1210 06/12/25  1255 05/22/25  1137   WBC 9.3 4.9 5.4   HGB 11.9 12.2 11.5*   MCV 85 86 87    237 240     Most Recent 3 BMP's:  Recent Labs   Lab Test 07/03/25  1210 06/12/25  1255 05/22/25  1137    139 140   POTASSIUM 3.5 3.7 4.0   CHLORIDE 101 101 102   CO2 25 27 27   BUN 13.9 14.7 15.1   CR 0.58 0.56 0.56   ANIONGAP 12 11 11   GAEL 8.9 9.7 9.6   * 102* 94   PROTTOTAL 6.4 6.6 6.5   ALBUMIN 4.2 4.3 4.2    Most Recent 3 LFT's:  Recent Labs   Lab Test 07/03/25  1210 06/12/25  1255 05/22/25  1137   AST 18 18 22   ALT 17 21 26   ALKPHOS 61 57 60   BILITOTAL 0.3 0.2 <0.2     I reviewed the above labs " today.    IMAGING:  Echo 5/22/2025  Interpretation Summary  Left ventricular size, wall motion and function are normal. The ejection  fraction is 60-65%.  Global peak LV longitudinal strain is averaged at -19.7%. This is within  reported normal limits (normal <-18%).  Right ventricular function, chamber size, wall motion, and thickness are  normal.  No significant valvular abnormalities present.  Trivial pericardial effusion is present.    I reviewed the above imaging today.  Repeat echo ordered for August.     ASSESSMENT AND PLAN:    Stage I T1b NX MX invasive ductal carcinoma of the left breast upper outer quadrant which was estrogen receptor positive (%) progesterone receptor positive (81-90%) and HER2 2+ by IHC and HER2 positive by ADRIANA. Margins negative for invasive carcinoma.    -Post lumpectomy on 11/25/24.  -Port in place and she is having no issues with this.  -She completed radiation.   -Echo 5/22 as above, and repeat ordered.   -She feels her neuropathy improving and she is able to sew/quilt.   -She is clinically appropriate to continue adjuvant HER2 directed therapy with trastuzumab today.   -She will complete every 3-week trastuzumab for 1 year of therapy.   -Continue letrozole, and will try to take at HS to see if this helps with nausea.     Peripheral neuropathy:  -Minimal remaining in feet.  -Continue to monito.    Fatigue:  -She was awake a lot overnight with cold symptoms.  Encouraged to rest and push fluids.     Left axillary fullness:   -No new concerns.    -US left axilla showed seroma.     Bone Health:  -She and Dr. Cheung discussed use of adjuvant zoledronic acid.    -She received her first dose on 5/1/2025.   -Weight bearing exersise recommended.    Cyst, chest wall:   -Seen and evaluated by derm and had an I and D; scheduled for excision in August.     Pancreatic cyst:   -Has follow-up imaging and appt with GI scheduled later this year,     T5 mass:  -Thoracic MRI showed mass at  T5.  -She was seen by neurosurgery and this is thought to be a hemangioma.   -She will continue to follow with neurosurgery.     Follow-up as scheduled and she will call sooner with concerns.       55 minutes spent on the date of the encounter doing chart review, review of test results, interpretation of tests, patient visit, and documentation. The longitudinal plan of care for the diagnosis(es)/condition(s) as documented were addressed during this visit. Due to the added complexity in care, I will continue to support Zenaida in the subsequent management and with ongoing continuity of care.      Leilani Richards, WOOD, CNP  [facility]  [address], [place] [address] [phone]

## 2025-07-03 NOTE — LETTER
7/3/2025      Zenaida Valles  [address] [facility] [address]      Dear Colleague,    Thank you for referring your patient, Zenaida Valles, to the [facility]. Please see a copy of my visit note below.     MEDICAL ONCOLOGY NOTE     Re. Zenaida Valles   Female, 74 year old, 1950  MRN: [id number]     Diagnosis: Screening identified stage I invasive ductal cancer of the upper outer quadrant of the left breast ER positive, MT positive, HER2 amplified stage [id number].       HISTORY OF PRESENT ILLNESS:    Zenaida has been in generally good health except for a right renal artery stenosis requiring a stent.  She also has a left leg injury recently she was in her usual state of good health and had a screening mammogram which showed a suspicious lesion in the upper outer quadrant of the left breast at the 3 o'clock position 5 cm from the nipple there is an irregular hypoechoic mass measuring 1.0 x 0.7 x 0.9 cm in size 1 cm from the nipple there is also a 0.5 x 0.4 cm area of suspicion     She underwent an ultrasound guided biopsy which showed usual ductal hyperplasia and fibrocystic changes microcysts in April Kren metaplasia.  Columnar cell changes.  It invasive ductal carcinoma was also found grade 2 and DCIS grade 2 solid type.  Her breast cancer was estrogen receptor positive progesterone receptor positive at HER2 2+ by IHC.  She then underwent ADRIANA which showed a subpopulation which was 60 to 70% ADRIANA 5 negative and 30 to 40% I-S age 1 positive with a 4.9 HER2 signals per nucleus and the ratio of 2.2 indicating HER2 positivity.     She then came to see Dr. Wood at [facility] for surgical recommendations.  She had a contrast mammogram which showed a 0.9 cm enhancing lesion in the upper outer quadrant of the left breast.     DIAGNOSTIC AND TREATMENT SUMMARY:  On October 7 she underwent a screening mammogram which demonstrated  an asymmetry in her left breast.       On October 15 she underwent a diagnostic mammogram which confirmed a left breast asymmetry.  She had an ultrasound which demonstrated 2 masses in her left breast.  At the 3 o'clock position 1 cm from the nipple there was a mass and at the 3 o'clock position 5 cm from the nipple there was a second mass.  Her lymph nodes were normal by ultrasound.       On October 23 she underwent a contrast-enhanced mammography which only demonstrated 1 mass measuring 1 cm.  The right breast was normal.  She underwent ultrasound-guided biopsies of both masses.  The mass at the 3 o'clock position 1 cm from the nipple was benign.  The mass at the 3 o'clock position 5 cm from the nipple demonstrating a grade 2 invasive ductal cancer that was ER positive and HER2 equivocal.  There is also DCIS present.       A. LEFT breast, 3:00, 1 cm from nipple, ultrasound-guided core biopsy:  - Benign breast tissue with usual ductal hyperplasia (UDH) and fibrocystic changes including microcysts, apocrine metaplasia, and columnar cell change  - Rare calcifications associated with benign breast ducts and acini  - Negative for atypia or malignancy      B. LEFT breast, 3:00, 5 cm from nipple, ultrasound-guided core biopsy:   - INVASIVE BREAST CARCINOMA OF NO SPECIAL TYPE (INVASIVE DUCTAL CARCINOMA), [place] grade 2  - Ductal carcinoma in situ (DCIS), nuclear grade 2, solid type  - Invasive carcinoma is estrogen receptor positive, progesterone receptor positive, and HER2 equivocal (score 2+) by immunohistochemistry (see 'Breast Biomarker Reporting Template' below)  - HER2 FISH is is process; results will be reportedly separately by cytogenetis  - See comment     This FISH analysis is performed in follow up to the reported equivocal (2+) HER2 findings by immunohistochemistry ([id number]).     RESULTS:     Ratio of HER2/MARION-17 signals  Zenaida Valles:  See Interpretation below     Majority (~60-70%) of tumor in  area of strongest IHC staining:  Group 5 (ADRIANA Negative)  Avg. number HER2 signals/nucleus:  2.9  Avg. number MARION-17 signals/nucleus:  1.7  HER2/MARION 17 ratio:  1.7     Subpopulation (~30-40%) of tumor in area of strongest IHC staining:  Group 1 (ADRIANA Positive)                             Avg. number HER2 signals/nucleus:  4.9  Avg. number MARION-17 signals/nucleus:  2.3  HER2/MARION 17 ratio:  2.2     **Interpretive guidelines per the American Society of Clinical Oncology/College of American Pathologists Clinical Practice Guideline Update (Margarita VALENZUELA et al, 2023, Arch Pathol Lab Med 147:993):     -- Group 1: HER2/MARION-17 ratio 2.0 or more -AND- avg. number HER2 signals/nucleus 4.0 or more (ADRIANA Positive)  -- Group 2: HER2/MARION-17 ratio 2.0 or more -AND- avg. number HER2 signals/nucleus <4.0 (Additional work required)  -- Group 3: HER2/MARION-17 ratio <2.0 -AND- avg. number HER2 signals/nucleus 6.0 or more (Additional work required)  -- Group 4: HER2/MARION-17 ratio <2.0 -AND- avg. number HER2 signals/nucleus 4.0 or more and <6.0 (Additional work required)  -- Group 5: HER2/MARION-17 ratio <2.0 -AND- avg. number HER2 signals/nucleus <4.0 (ADRIANA Negative)     INTERPRETATION:  Two admixed populations of cells were found in this sample:     Majority (~60-70%) of tumor in area of strongest IHC staining:  Per the American Society of Clinical Oncology/College of American Pathologists Clinical Practice Guideline Focused Update (Margarita VALENZUELA et al, 2018, Arch Pathol Lab Med  doi:10.5858/arpa.6786-4893-MS), the HER2/MARION 17 ratio of 1.7 and average number of HER2 signals/cell of 2.9 places this population of cells in Group 5 (ADRIANA Negative).      Subpopulation (~30-40%) of tumor in area of strongest IHC staining:  Admixed within this sample were cells with increased HER2 signals, which, when selectively scored, had an average of 4.9 HER2 signals/nucleus and a HER2/MARION 17 ratio of 2.2; per the American Society of Clinical Oncology/College of American  Pathologists Clinical Practice Guideline Focused Update (Margarita VALENZUELA et al, 2018, Arch Pathol Lab Med  doi:10.5858/arpa.2883-3947-TM), this population of cells falls into Group 1 (ADRIANA Positive).     I reviewed history in chart:  PAST MEDICAL HISTORY: No history of breast surgery.  No history of breast cancer in the past.  No history of radiation therapy in the past or radiation exposure.  No history of tumor of any kind.  No history of heart problems, heart attack, breathing problems, blood clots, seizures, arthritis, peptic ulcer disease, osteoporosis or bone fractures.  She is not currently participating in a clinical trial.  She does have a history of asthma and uses an albuterol inhaler in cold weather.     Her past medical history is significant for diabetes mellitus and is on metformin, hypertension obesity, she has IPMN and a right renal artery stent.  The family history is negative for breast cancer.  She has a history of right renal artery stenosis and has a stent in place.     She has a recent history of a left hamstring injury.  She is seeing orthopedics for this problem.  She also has a history of back pain.  Her gait is affected but she can perform activities of daily living.  She has a history of aches in her shoulders.  She had a 65 pound weight loss over the last 2 years which was intentional related to improve diet and exercise.  She says she has a history of fibromuscular dysplasia.     She worked in the [employer] in [place] [employer] and was exposed to dengue, malaria, hepatitis A, hepatitis B, encephalitis.  She has also had COVID and the flu.     FAMILY HISTORY:   No history of ovarian, uterine, colon cancer, or melanoma.  No history of glioma, gastric cancer or pancreatic cancer.  Her mother did have Zollinger-Wheeler syndrome but the patient's genetics are negative for this syndrome.      PAST MENSTRUAL HISTORY:  Age of first menstrual period was 13.   She has been pregnant 1 times with 0 live births  and 0 miscarriages and 1 .  Age at in place pregnancy age 28.   Uterus and ovaries are in place.  Last menstrual period was about age 52 and the menopause occurred naturally. No history of hormone replacement therapy.     She does have a history of thickened endometrial lining but by her report no abnormalities of the lining when she had a DIC     HABITS:  She is a never smoker but she has a history of exposure to secondhand smoke from her father as a child.  She consumes alcoholic beverages socially 1 drink every couple of months.     GERMLINE GENETICS: pending     ALLERGIES: She has drug allergies to codeine, codeine derivatives, ibuprofen, lisinopril..  No allergy to seafood, iodine, or contrast dye.  She does take daily aspirin 81 mg because of her renal stent.    TREATMENT HISTORY:  A.  Lumpectomy showed a stage I T1b NX MX invasive ductal carcinoma of the left breast upper outer quadrant which was estrogen receptor positive (%) progesterone receptor positive (81-90%) and HER2 2+ by IHC and HER2 positive by ADRIANA.  Margins negative for invasive carcinoma.    B.  Plan is to give the APT regimen followed by addition of hormonal therapy with letrozole for 7 years.     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  They were in [facility] with the [employer] for two years.      INTERVAL HISTORY:  Zenaida is seen for evaluation and toxicity check prior receiving trastuzumab today.  -She developed a cold overnight last night: sore throat and post-nasal drip and did not sleep much.  She is feeling better now but super tired because of her night last night.   -Taking Vit D three times per week as this is making her nauseated.  Tries to take calcium every day.   -Letrozole might be causing some nausea.  Discussed taking at HS with food  -Saw derm and she had an I and D.  Not draining anymore and she has follow-up .   -No fevers that she is aware of.       -She is really fatigued.  Feeling like she cannot do some of  "the things she used to, for example,   -Her weight is back up to where it was before.  -Her feet tingling is not as bothersome.   -No fevers, chills or signs of systemic illness.   -Right upper abd twinge x 1 - thinks maybe from ice cream; has not recurred,  -Occasional mild frontal headaches, not long-lasting and no OTC meds needed.   -L axillary \"lump\" was evaluated and found to be a seroma.   -No cough, chest pain or shortness of breath at rest.        PHYSICAL EXAMINATION:     /80   Pulse 73   Temp 100  F (37.8  C) (Tympanic)   Resp 16   LMP  (LMP Unknown)   SpO2 98%   Wt Readings from Last 10 Encounters:   06/12/25 74.8 kg (165 lb)   05/22/25 75.2 kg (165 lb 11.2 oz)   05/16/25 75.8 kg (167 lb)   05/09/25 75.8 kg (167 lb 3.2 oz)   05/01/25 77.3 kg (170 lb 6.7 oz)   04/23/25 77.3 kg (170 lb 6.4 oz)   04/16/25 77.6 kg (171 lb)   04/10/25 76.7 kg (169 lb 1.6 oz)   03/28/25 77.5 kg (170 lb 12.8 oz)   03/21/25 76.5 kg (168 lb 11.2 oz)   General: Female in no acute distress.  Eyes: EOMI. No scleral icterus or conjunctival injection.  Cardiovascular: RRR No murmurs. No peripheral edema.  Respiratory: CTA bilaterally. No wheezes or crackles.  Gastrointestinal: BS +. Abdomen soft, non-tender.  Neurologic: Cranial nerves II through XII are grossly intact.  Skin: No rashes, petechiae, or bruising noted on exposed skin.  Bandaid covering the area where she had the I And D.  No redness or swelling noted in the skin around the area,   Psych: Affect appropriate. pleasant    LABORATORY DATA:    Most Recent 3 CBC's:  Recent Labs   Lab Test 07/03/25  1210 06/12/25  1255 05/22/25  1137   WBC 9.3 4.9 5.4   HGB 11.9 12.2 11.5*   MCV 85 86 87    237 240     Most Recent 3 BMP's:  Recent Labs   Lab Test 07/03/25  1210 06/12/25  1255 05/22/25  1137    139 140   POTASSIUM 3.5 3.7 4.0   CHLORIDE 101 101 102   CO2 25 27 27   BUN 13.9 14.7 15.1   CR 0.58 0.56 0.56   ANIONGAP 12 11 11   GAEL 8.9 9.7 9.6   * " 102* 94   PROTTOTAL 6.4 6.6 6.5   ALBUMIN 4.2 4.3 4.2    Most Recent 3 LFT's:  Recent Labs   Lab Test 07/03/25  1210 06/12/25  1255 05/22/25  1137   AST 18 18 22   ALT 17 21 26   ALKPHOS 61 57 60   BILITOTAL 0.3 0.2 <0.2     I reviewed the above labs today.    IMAGING:  Echo 5/22/2025  Interpretation Summary  Left ventricular size, wall motion and function are normal. The ejection  fraction is 60-65%.  Global peak LV longitudinal strain is averaged at -19.7%. This is within  reported normal limits (normal <-18%).  Right ventricular function, chamber size, wall motion, and thickness are  normal.  No significant valvular abnormalities present.  Trivial pericardial effusion is present.    I reviewed the above imaging today.  Repeat echo ordered for August.     ASSESSMENT AND PLAN:    Stage I T1b NX MX invasive ductal carcinoma of the left breast upper outer quadrant which was estrogen receptor positive (%) progesterone receptor positive (81-90%) and HER2 2+ by IHC and HER2 positive by ADRIANA. Margins negative for invasive carcinoma.    -Post lumpectomy on 11/25/24.  -Port in place and she is having no issues with this.  -She completed radiation.   -Echo 5/22 as above, and repeat ordered.   -She feels her neuropathy improving and she is able to sew/quilt.   -She is clinically appropriate to continue adjuvant HER2 directed therapy with trastuzumab today.   -She will complete every 3-week trastuzumab for 1 year of therapy.   -Continue letrozole, and will try to take at HS to see if this helps with nausea.     Peripheral neuropathy:  -Minimal remaining in feet.  -Continue to monito.    Fatigue:  -She was awake a lot overnight with cold symptoms.  Encouraged to rest and push fluids.     Left axillary fullness:   -No new concerns.    -US left axilla showed seroma.     Bone Health:  -She and Dr. Cheung discussed use of adjuvant zoledronic acid.    -She received her first dose on 5/1/2025.   -Weight bearing exersise  recommended.    Cyst, chest wall:   -Seen and evaluated by derm and had an I and D; scheduled for excision in August.     Pancreatic cyst:   -Has follow-up imaging and appt with GI scheduled later this year,     T5 mass:  -Thoracic MRI showed mass at T5.  -She was seen by neurosurgery and this is thought to be a hemangioma.   -She will continue to follow with neurosurgery.     Follow-up as scheduled and she will call sooner with concerns.       55 minutes spent on the date of the encounter doing chart review, review of test results, interpretation of tests, patient visit, and documentation. The longitudinal plan of care for the diagnosis(es)/condition(s) as documented were addressed during this visit. Due to the added complexity in care, I will continue to support Zenaida in the subsequent management and with ongoing continuity of care.      WOOD Rangel, CNP  [facility]  [address] [place] [address] [phone]                                    Again, thank you for allowing me to participate in the care of your patient.        Sincerely,        WOOD Reyes CNP    Electronically signed

## 2025-07-03 NOTE — PROGRESS NOTES
Infusion Nursing Note:  Zenaida Valles presents today for Cycle 6 Day 1 trastuzumab  Patient seen by provider today: Yes: Leilani Amaya CNP   present during visit today: Not Applicable.    Note: Zenaida comes into the clinic today doing well overall and has no concerns to offer following her provider visit. She has no pain and denies s/s of infection.      Intravenous Access:  Implanted Port.    Treatment Conditions:  Lab Results   Component Value Date    HGB 11.9 07/03/2025    WBC 9.3 07/03/2025    ANEU 7.6 07/03/2025     07/03/2025        Lab Results   Component Value Date     07/03/2025    POTASSIUM 3.5 07/03/2025    MAG 1.9 08/14/2007    CR 0.58 07/03/2025    GAEL 8.9 07/03/2025    BILITOTAL 0.3 07/03/2025    ALBUMIN 4.2 07/03/2025    ALT 17 07/03/2025    AST 18 07/03/2025       Results reviewed, labs MET treatment parameters, ok to proceed with treatment.  ECHO/MUGA completed 5/22/25  EF 60-65%     Post Infusion Assessment:  Patient tolerated infusion without incident.  Blood return noted pre and post infusion.  Site patent and intact, free from redness, edema or discomfort.  No evidence of extravasations.  Access discontinued per protocol.       Discharge Plan:   Patient declined prescription refills.  Discharge instructions reviewed with: Patient.  Patient and/or family verbalized understanding of discharge instructions and all questions answered.  AVS to patient via [employer].  Patient will return 7/24 for next appointment.   Patient discharged in stable condition accompanied by: self and .  Departure Mode: Ambulatory.      Janell Cross RN

## 2025-07-05 ENCOUNTER — APPOINTMENT (OUTPATIENT)
Dept: GENERAL RADIOLOGY | Facility: CLINIC | Age: 75
End: 2025-07-05
Attending: STUDENT IN AN ORGANIZED HEALTH CARE EDUCATION/TRAINING PROGRAM
Payer: COMMERCIAL

## 2025-07-05 ENCOUNTER — HOSPITAL ENCOUNTER (OUTPATIENT)
Facility: CLINIC | Age: 75
Setting detail: OBSERVATION
Discharge: HOME | End: 2025-07-05
Attending: STUDENT IN AN ORGANIZED HEALTH CARE EDUCATION/TRAINING PROGRAM | Admitting: STUDENT IN AN ORGANIZED HEALTH CARE EDUCATION/TRAINING PROGRAM
Payer: COMMERCIAL

## 2025-07-05 VITALS
SYSTOLIC BLOOD PRESSURE: 119 MMHG | RESPIRATION RATE: 18 BRPM | TEMPERATURE: 99.2 F | HEART RATE: 74 BPM | OXYGEN SATURATION: 99 % | DIASTOLIC BLOOD PRESSURE: 62 MMHG

## 2025-07-05 DIAGNOSIS — J18.9 PNEUMONIA DUE TO INFECTIOUS ORGANISM, UNSPECIFIED LATERALITY, UNSPECIFIED PART OF LUNG: ICD-10-CM

## 2025-07-05 LAB
ALBUMIN SERPL BCG-MCNC: 3.8 G/DL (ref 3.5–5.2)
ALP SERPL-CCNC: 57 U/L (ref 40–150)
ALT SERPL W P-5'-P-CCNC: 16 U/L (ref 0–50)
ANION GAP SERPL CALCULATED.3IONS-SCNC: 12 MMOL/L (ref 7–15)
AST SERPL W P-5'-P-CCNC: 16 U/L (ref 0–45)
ATRIAL RATE - MUSE: 79 BPM
BASE EXCESS BLDV CALC-SCNC: 4.9 MMOL/L (ref -3–3)
BASOPHILS # BLD AUTO: 0 10E3/UL (ref 0–0.2)
BASOPHILS NFR BLD AUTO: 0 %
BILIRUB SERPL-MCNC: 0.4 MG/DL
BUN SERPL-MCNC: 9.9 MG/DL (ref 8–23)
C PNEUM DNA SPEC QL NAA+PROBE: NOT DETECTED
CALCIUM SERPL-MCNC: 8.8 MG/DL (ref 8.8–10.4)
CHLORIDE SERPL-SCNC: 100 MMOL/L (ref 98–107)
CREAT SERPL-MCNC: 0.54 MG/DL (ref 0.51–0.95)
DIASTOLIC BLOOD PRESSURE - MUSE: NORMAL MMHG
EGFRCR SERPLBLD CKD-EPI 2021: >90 ML/MIN/1.73M2
EOSINOPHIL # BLD AUTO: 0 10E3/UL (ref 0–0.7)
EOSINOPHIL NFR BLD AUTO: 1 %
ERYTHROCYTE [DISTWIDTH] IN BLOOD BY AUTOMATED COUNT: 13.4 % (ref 10–15)
FLUAV H1 2009 PAND RNA SPEC QL NAA+PROBE: NOT DETECTED
FLUAV H1 RNA SPEC QL NAA+PROBE: NOT DETECTED
FLUAV H3 RNA SPEC QL NAA+PROBE: NOT DETECTED
FLUAV RNA SPEC QL NAA+PROBE: NEGATIVE
FLUAV RNA SPEC QL NAA+PROBE: NOT DETECTED
FLUBV RNA RESP QL NAA+PROBE: NEGATIVE
FLUBV RNA SPEC QL NAA+PROBE: NOT DETECTED
GLUCOSE SERPL-MCNC: 144 MG/DL (ref 70–99)
HADV DNA SPEC QL NAA+PROBE: NOT DETECTED
HCO3 BLDV-SCNC: 29 MMOL/L (ref 21–28)
HCO3 SERPL-SCNC: 25 MMOL/L (ref 22–29)
HCOV PNL SPEC NAA+PROBE: NOT DETECTED
HCT VFR BLD AUTO: 33.4 % (ref 35–47)
HGB BLD-MCNC: 11.5 G/DL (ref 11.7–15.7)
HMPV RNA SPEC QL NAA+PROBE: NOT DETECTED
HPIV1 RNA SPEC QL NAA+PROBE: NOT DETECTED
HPIV2 RNA SPEC QL NAA+PROBE: NOT DETECTED
HPIV3 RNA SPEC QL NAA+PROBE: NOT DETECTED
HPIV4 RNA SPEC QL NAA+PROBE: NOT DETECTED
IMM GRANULOCYTES # BLD: 0 10E3/UL
IMM GRANULOCYTES NFR BLD: 0 %
INR PPP: 1.23 (ref 0.85–1.15)
INTERPRETATION ECG - MUSE: NORMAL
LACTATE SERPL-SCNC: 1.2 MMOL/L (ref 0.7–2)
LYMPHOCYTES # BLD AUTO: 0.7 10E3/UL (ref 0.8–5.3)
LYMPHOCYTES NFR BLD AUTO: 11 %
M PNEUMO DNA SPEC QL NAA+PROBE: NOT DETECTED
MCH RBC QN AUTO: 28.6 PG (ref 26.5–33)
MCHC RBC AUTO-ENTMCNC: 34.4 G/DL (ref 31.5–36.5)
MCV RBC AUTO: 83 FL (ref 78–100)
MONOCYTES # BLD AUTO: 0.6 10E3/UL (ref 0–1.3)
MONOCYTES NFR BLD AUTO: 10 %
NEUTROPHILS # BLD AUTO: 4.9 10E3/UL (ref 1.6–8.3)
NEUTROPHILS NFR BLD AUTO: 78 %
NRBC # BLD AUTO: 0 10E3/UL
NRBC BLD AUTO-RTO: 0 /100
O2/TOTAL GAS SETTING VFR VENT: 21 %
OXYHGB MFR BLDV: 66 % (ref 70–75)
P AXIS - MUSE: 79 DEGREES
PCO2 BLDV: 41 MM HG (ref 40–50)
PH BLDV: 7.46 [PH] (ref 7.32–7.43)
PLATELET # BLD AUTO: 179 10E3/UL (ref 150–450)
PO2 BLDV: 33 MM HG (ref 25–47)
POTASSIUM SERPL-SCNC: 3.7 MMOL/L (ref 3.4–5.3)
PR INTERVAL - MUSE: 148 MS
PROCALCITONIN SERPL IA-MCNC: 0.09 NG/ML
PROT SERPL-MCNC: 6.4 G/DL (ref 6.4–8.3)
PROTHROMBIN TIME: 15.8 SECONDS (ref 11.8–14.8)
QRS DURATION - MUSE: 78 MS
QT - MUSE: 376 MS
QTC - MUSE: 431 MS
R AXIS - MUSE: 75 DEGREES
RBC # BLD AUTO: 4.02 10E6/UL (ref 3.8–5.2)
RSV RNA SPEC NAA+PROBE: NEGATIVE
RSV RNA SPEC QL NAA+PROBE: NOT DETECTED
RSV RNA SPEC QL NAA+PROBE: NOT DETECTED
RV+EV RNA SPEC QL NAA+PROBE: NOT DETECTED
SAO2 % BLDV: 67.8 % (ref 70–75)
SARS-COV-2 RNA RESP QL NAA+PROBE: NEGATIVE
SODIUM SERPL-SCNC: 137 MMOL/L (ref 135–145)
SYSTOLIC BLOOD PRESSURE - MUSE: NORMAL MMHG
T AXIS - MUSE: 46 DEGREES
VENTRICULAR RATE- MUSE: 79 BPM
WBC # BLD AUTO: 6.3 10E3/UL (ref 4–11)

## 2025-07-05 PROCEDURE — 85610 PROTHROMBIN TIME: CPT | Performed by: STUDENT IN AN ORGANIZED HEALTH CARE EDUCATION/TRAINING PROGRAM

## 2025-07-05 PROCEDURE — 83605 ASSAY OF LACTIC ACID: CPT | Performed by: STUDENT IN AN ORGANIZED HEALTH CARE EDUCATION/TRAINING PROGRAM

## 2025-07-05 PROCEDURE — 93010 ELECTROCARDIOGRAM REPORT: CPT | Performed by: STUDENT IN AN ORGANIZED HEALTH CARE EDUCATION/TRAINING PROGRAM

## 2025-07-05 PROCEDURE — 82040 ASSAY OF SERUM ALBUMIN: CPT | Performed by: STUDENT IN AN ORGANIZED HEALTH CARE EDUCATION/TRAINING PROGRAM

## 2025-07-05 PROCEDURE — 93005 ELECTROCARDIOGRAM TRACING: CPT

## 2025-07-05 PROCEDURE — 96361 HYDRATE IV INFUSION ADD-ON: CPT

## 2025-07-05 PROCEDURE — 87040 BLOOD CULTURE FOR BACTERIA: CPT | Performed by: STUDENT IN AN ORGANIZED HEALTH CARE EDUCATION/TRAINING PROGRAM

## 2025-07-05 PROCEDURE — 250N000013 HC RX MED GY IP 250 OP 250 PS 637: Performed by: STUDENT IN AN ORGANIZED HEALTH CARE EDUCATION/TRAINING PROGRAM

## 2025-07-05 PROCEDURE — 99285 EMERGENCY DEPT VISIT HI MDM: CPT | Performed by: STUDENT IN AN ORGANIZED HEALTH CARE EDUCATION/TRAINING PROGRAM

## 2025-07-05 PROCEDURE — 250N000011 HC RX IP 250 OP 636: Performed by: STUDENT IN AN ORGANIZED HEALTH CARE EDUCATION/TRAINING PROGRAM

## 2025-07-05 PROCEDURE — 84145 PROCALCITONIN (PCT): CPT | Performed by: STUDENT IN AN ORGANIZED HEALTH CARE EDUCATION/TRAINING PROGRAM

## 2025-07-05 PROCEDURE — 258N000003 HC RX IP 258 OP 636: Performed by: STUDENT IN AN ORGANIZED HEALTH CARE EDUCATION/TRAINING PROGRAM

## 2025-07-05 PROCEDURE — 87486 CHLMYD PNEUM DNA AMP PROBE: CPT | Performed by: STUDENT IN AN ORGANIZED HEALTH CARE EDUCATION/TRAINING PROGRAM

## 2025-07-05 PROCEDURE — G0378 HOSPITAL OBSERVATION PER HR: HCPCS

## 2025-07-05 PROCEDURE — 36415 COLL VENOUS BLD VENIPUNCTURE: CPT | Performed by: STUDENT IN AN ORGANIZED HEALTH CARE EDUCATION/TRAINING PROGRAM

## 2025-07-05 PROCEDURE — 85025 COMPLETE CBC W/AUTO DIFF WBC: CPT | Performed by: STUDENT IN AN ORGANIZED HEALTH CARE EDUCATION/TRAINING PROGRAM

## 2025-07-05 PROCEDURE — 87637 SARSCOV2&INF A&B&RSV AMP PRB: CPT | Performed by: STUDENT IN AN ORGANIZED HEALTH CARE EDUCATION/TRAINING PROGRAM

## 2025-07-05 PROCEDURE — 82805 BLOOD GASES W/O2 SATURATION: CPT | Performed by: STUDENT IN AN ORGANIZED HEALTH CARE EDUCATION/TRAINING PROGRAM

## 2025-07-05 PROCEDURE — 99285 EMERGENCY DEPT VISIT HI MDM: CPT | Mod: 25

## 2025-07-05 PROCEDURE — 96365 THER/PROPH/DIAG IV INF INIT: CPT

## 2025-07-05 PROCEDURE — 99221 1ST HOSP IP/OBS SF/LOW 40: CPT | Performed by: PHYSICIAN ASSISTANT

## 2025-07-05 PROCEDURE — 71046 X-RAY EXAM CHEST 2 VIEWS: CPT

## 2025-07-05 PROCEDURE — 96367 TX/PROPH/DG ADDL SEQ IV INF: CPT

## 2025-07-05 RX ORDER — AZITHROMYCIN 250 MG/1
250 TABLET, FILM COATED ORAL DAILY
Qty: 4 TABLET | Refills: 0 | Status: SHIPPED | OUTPATIENT
Start: 2025-07-06 | End: 2025-07-10

## 2025-07-05 RX ORDER — HEPARIN SODIUM (PORCINE) LOCK FLUSH IV SOLN 100 UNIT/ML 100 UNIT/ML
5-10 SOLUTION INTRAVENOUS
Status: DISCONTINUED | OUTPATIENT
Start: 2025-07-05 | End: 2025-07-05 | Stop reason: HOSPADM

## 2025-07-05 RX ORDER — ACETAMINOPHEN 500 MG
1000 TABLET ORAL ONCE
Status: COMPLETED | OUTPATIENT
Start: 2025-07-05 | End: 2025-07-05

## 2025-07-05 RX ORDER — CEPHALEXIN 500 MG/1
500 CAPSULE ORAL 4 TIMES DAILY
Qty: 16 CAPSULE | Refills: 0 | Status: SHIPPED | OUTPATIENT
Start: 2025-07-06 | End: 2025-07-10

## 2025-07-05 RX ORDER — CEFTRIAXONE 1 G/1
1 INJECTION, POWDER, FOR SOLUTION INTRAMUSCULAR; INTRAVENOUS ONCE
Status: COMPLETED | OUTPATIENT
Start: 2025-07-05 | End: 2025-07-05

## 2025-07-05 RX ADMIN — Medication 5 ML: at 16:24

## 2025-07-05 RX ADMIN — AZITHROMYCIN MONOHYDRATE 500 MG: 500 INJECTION, POWDER, LYOPHILIZED, FOR SOLUTION INTRAVENOUS at 13:48

## 2025-07-05 RX ADMIN — ACETAMINOPHEN 1000 MG: 500 TABLET ORAL at 13:06

## 2025-07-05 RX ADMIN — SODIUM CHLORIDE 1000 ML: 0.9 INJECTION, SOLUTION INTRAVENOUS at 11:52

## 2025-07-05 RX ADMIN — CEFTRIAXONE SODIUM 1 G: 1 INJECTION, POWDER, FOR SOLUTION INTRAMUSCULAR; INTRAVENOUS at 13:21

## 2025-07-05 ASSESSMENT — ACTIVITIES OF DAILY LIVING (ADL)
ADLS_ACUITY_SCORE: 41

## 2025-07-05 NOTE — ED TRIAGE NOTES
Ambulatory to triage, sore throat since Wed, chemo Thr, lethargy, weakness, nausea since. Febrile, 100.2 (Tmax at home 100.7)     Triage Assessment (Adult)       Row Name 07/05/25 1030          Triage Assessment    Airway WDL WDL        Respiratory WDL    Respiratory WDL WDL        Skin Circulation/Temperature WDL    Skin Circulation/Temperature WDL WDL        Cardiac WDL    Cardiac WDL WDL        Peripheral/Neurovascular WDL    Peripheral Neurovascular WDL WDL        Cognitive/Neuro/Behavioral WDL    Cognitive/Neuro/Behavioral WDL WDL

## 2025-07-05 NOTE — ED PROVIDER NOTES
This is a 75-year-old female.  She had presented to the ED for sore throat and shortness of breath.  She had been found to have pneumonia and was started on biotics.  Plan had been for observation stay on the medicine service and she had been accepted.  Later the requested team saw the patient and she stated she no longer wished to stay.  They felt she was appropriate for outpatient follow-up.  The patient was reevaluated reporting feeling improved.  Her vitals were stable and she was able to ambulate and had a stable pulse ox.  She again stated she did not wish to stay.  This was discussed with the patient and she was provided a prescription for antibiotics.  Return precautions were discussed and all questions answered.     Jose Alfredo Oreilly MD  07/05/25 4522

## 2025-07-05 NOTE — CONSULTS
[facility]  Consult Note - Hospitalist Service, GOLD TEAM   Date of Admission:  7/5/2025  Consult Requested by: Emergency Department  Reason for Consult: Discussion of possible admission     Assessment & Plan   Zenaida Valles is a 75 year old woman with a history of HTN, HLD, DM II, asthma, and breast cancer s/p left lumpectomy (11/25/24) on chemotherapy (last infused 7/3/25) who presented to ED with flu-like symptoms. CXR showed concern for possible developing right sided pneumonia and observation stay was suggested by ED. Upon meeting patient she is not currently willing to come into the hospital.     #URI Symptoms.   #Non-Neutropenic Fever.   #Rule Out Possible Right Sided Community Acquired Pneumonia.   Developed sore throat on 7/2 and since chemo (trastuzumab) on 7/3 has felt progressively worse with congestion, fatigue, anorexia, and temp up to 100.7F. Reports similar symptoms with chemo in March for which she was treated outpatient for presumed pneumonia. Here CXR shows interstitial opacities on the right but with normal procal, lactate, WBC count. Rapid covid/flu and full RVP are negative. Is 96-97% on RA (including after ambulating to bathroom) with no e/o sepsis. Received IV fluids and ceftriaxone/azithromycin in ED.  - Upon going to ED to admit patient, she is not currently willing to come into the hospital. She is concerned with poor Medicare coverage and is feeling better after receiving IV fluids. Relayed to ED physician.   - Discussed with patient/ and ED physician that sending out on a 5 day course of empiric oral coverage for possible CAP would be reasonable given her immunocompromised status.   - Discussed with patient/ that PRN Tylenol is ok for symptomatic management, importance of pushing PO fluids, and what symptoms should prompt her return to ED. Will also send [employer] message to oncologist Dr. Cheung.   - It should also  be noted that trastuzumab can cause interstitial pneumonitis. Patient does not currently have bilateral opacities or hypoxia, and her congestion/fatigue/sore throat/anorexia symptoms are more predominant (possibly viral, and pre-dated chemo infusion) than cough/dyspnea symptoms. Symptom progression should be closely monitored.       Clinically Significant Risk Factors Present on Admission                # Coagulation Defect: INR = 1.23 (Ref range: 0.85 - 1.15) and/or PTT = N/A, will monitor for bleeding  # Drug Induced Platelet Defect: home medication list includes an antiplatelet medication   # Hypertension: Noted on problem list          # DMII: A1C = N/A within past 6 months        # Asthma: noted on problem list        Ariadna Alvarado PA-C  Hospitalist Service, GOLD TEAM   Securely message with [employer] (more info)  Text page via [facility] Paging/Directory   See signed in provider for up to date coverage information  ______________________________________________________________________    Chief Complaint   Flu symptoms     History is obtained from the patient and      History of Present Illness   Patient presented to ED for further evaluation of flu-like symptoms. She developed a sore throat on 7/2 and underwent Herceptin infusion on 7/3. Her only side effect from this is usually fatigue. Over the past couple days she has had progressive fatigue, weakness, loss of appetite, congestion, intermittently short of breath and had a temp to 100.7F. She feels similar to when she was treated for pneumonia with her March chemo (as outpatient). Since being in the ED she received IV fluids which have helped her feel better. She also received ceftriaxone and azithromycin for possible pneumonia. She reports a possible admission was not discussed prior to our visit. She is not willing to stay in the hospital. She is concerned about possible Medicare observation costs and is feeling improved enough to return home. She was  able to ambulate to the bathroom, has not required any oxygen or had desaturations, and is feeling hungry.     Past Medical History    Past Medical History:   Diagnosis Date    Asthma     Bacterial pneumonia     recent as of march 2025    Dengue     in past    Diabetes mellitus (H)     Encephalopathy     history of    Fibromuscular dysplasia     H/O sebaceous cyst     Hepatitis A     in the past    Hepatitis B infection     in the past    Hyperlipidemia LDL goal < 130     Hypertension     IPMN (intraductal papillary mucinous neoplasm)     Malaria     in past    Obesity     Renal artery stenosis 2005    Right, s/p stenting at [facility], FMD    Statin medication not prescribed per physician orders     pt declined       Past Surgical History   Past Surgical History:   Procedure Laterality Date    BIOPSY NODE SENTINEL Left 11/25/2024    Procedure: LEFT SENTINEL LYMPH NODE BIOPSY;  Surgeon: Javier Wood MD;  Location: [facility] OR    COLONOSCOPY N/A 3/10/2017    Procedure: COMBINED COLONOSCOPY, SINGLE OR MULTIPLE BIOPSY/POLYPECTOMY BY BIOPSY;  Surgeon: Sergo Mcgraw MD;  [facility]    COLONOSCOPY N/A 3/30/2022    Procedure: COLONOSCOPY, WITH POLYPECTOMY;  Surgeon: Leventhal, Thomas Michael, MD;  Location: [facility] OR    ENDOMETRIAL SAMPLING (BIOPSY) N/A 11/25/2022    Procedure: endometrial biospy using portable ultrasound guidance;  Surgeon: Jessica Nation MD;  [facility]    ENDOSCOPIC ULTRASOUND UPPER GASTROINTESTINAL TRACT (GI) N/A 3/14/2023    Procedure: ENDOSCOPIC ULTRASOUND, ESOPHAGOSCOPY / UPPER GASTROINTESTINAL TRACT (GI);  Surgeon: Miguel A Vickers MD;  [facility]    EXAM UNDER ANESTHESIA PELVIC N/A 11/25/2022    Procedure: EXAM UNDER ANESTHESIA, PELVIS,;  Surgeon: Jessica Nation MD;  [facility]    IR CHEST PORT PLACEMENT > 5 YRS OF AGE  12/26/2024    IR PORT CHECK RIGHT  1/9/2025    IR RENAL/VISCERAL STENT/ATHERECT/PTA Right 2005    LUMPECTOMY, BREAST, LOCALIZED USING  RADIOFREQUENCY IDENTIFICATION Left 11/25/2024    Procedure: LEFT BREAST LUMPECTOMY LOCALIZED USING RADIOFREQUENCY IDENTIFICATION;  Surgeon: Javier Wood MD;  Location: [facility] OR       Medications   Current Facility-Administered Medications   Medication Dose Route Frequency Provider Last Rate Last Admin    heparin lock flush 100 unit/mL injection 5-10 mL  5-10 mL Intracatheter Q28 Days Cole Marcos MD         Current Outpatient Medications   Medication Sig Dispense Refill    [START ON 7/6/2025] azithromycin (ZITHROMAX) 250 MG tablet Take 1 tablet (250 mg) by mouth daily for 4 days. 4 tablet 0    [START ON 7/6/2025] cephALEXin (KEFLEX) 500 MG capsule Take 1 capsule (500 mg) by mouth 4 times daily for 4 days. 16 capsule 0    acetaminophen (TYLENOL) 500 MG tablet Take 500-1,000 mg by mouth every 6 hours as needed for mild pain      albuterol (PROAIR HFA) 108 (90 Base) MCG/ACT inhaler Inhale 2 puffs into the lungs every 6 hours as needed for shortness of breath or wheezing. 18 g 3    aspirin 81 MG tablet Take 81 mg by mouth daily      atenolol (TENORMIN) 25 MG tablet Take 1 tablet (25 mg) by mouth daily. 90 tablet 4    blood glucose (NO BRAND SPECIFIED) lancets standard Use to test blood sugar 2 times daily or as directed. 200 each 3    blood glucose (NO BRAND SPECIFIED) test strip Use to test blood sugar 2 times daily or as directed. 200 strip 3    blood glucose monitoring (NO BRAND SPECIFIED) meter device kit Use to test blood sugar 3 times daily or as directed. 1 kit 0    calcium 600 MG tablet Take 1 tablet by mouth daily.      Collagen Hydrolysate, Bovine, POWD       diclofenac (VOLTAREN) 1 % GEL topical gel Apply 2 grams three - four times daily using enclosed dosing card. 100 g 0    fluticasone-salmeterol (ADVAIR) 100-50 MCG/ACT inhaler INHALE ONE PUFF INTO THE LUNGS DAILY 2 each 2    hydrochlorothiazide (HYDRODIURIL) 25 MG tablet Take 1 tablet (25 mg) by mouth daily. 90 tablet 4    irbesartan (AVAPRO) 300 MG  tablet Take 1 tablet (300 mg) by mouth daily. 90 tablet 4    Lancets (ONETOUCH DELICA PLUS [id number]) MISC       letrozole (FEMARA) 2.5 MG tablet Take 1 tablet (2.5 mg) by mouth daily. 90 tablet 3    LORazepam (ATIVAN) 0.5 MG tablet Take 1 tablet (0.5 mg) by mouth once as needed for anxiety (take approx 15 min before MRI, after instructed to do so by staff) 1 tablet 0    metFORMIN (GLUCOPHAGE) 500 MG tablet Take 2 tablets (1,000 mg) by mouth 2 times daily (with meals). 360 tablet 4    saline nasal (AYR SALINE) GEL topical gel Apply into each nare 4 times daily as needed for congestion. 1-3 per day as needed      vitamin D3 (CHOLECALCIFEROL) 50 mcg (2000 units) tablet Take 50 mcg by mouth daily.            Physical Exam   Vital Signs: Temp: 99.2  F (37.3  C) Temp src: Oral BP: 119/62 Pulse: 74   Resp: 18 SpO2: 99 % O2 Device: None (Room air)    Weight: 0 lbs 0 oz    GENERAL: Alert and oriented x 3. Sitting up at bedside, appears comfortable. Conversant.  present.   HEENT: Anicteric sclera. Mucous membranes moist.   CV: RRR. S1, S2. No murmurs appreciated.   RESPIRATORY: Effort normal on room air. Lungs CTAB with no wheezing, rales, rhonchi.   GI: Abdomen soft, non distended, non tender.   NEUROLOGICAL: No focal deficits. Moves all extremities.   EXTREMITIES: No peripheral edema. Warm and well perfused.   SKIN: No jaundice. No rashes.       Medical Decision Making             Data   Reviewed CMP, CBC, lactate, procal, CXR

## 2025-07-05 NOTE — ED PROVIDER NOTES
[place] EMERGENCY DEPARTMENT ([facility])    7/05/25       ED PROVIDER NOTE      History     Chief Complaint   Patient presents with    Flu Symptoms     Nausea, chemo Thursday, weak, SOB     HPI  Zenaida Valles is a 75 year old female with a history of breast cancer s/p left lumpectomy (11/25/24) on chemotherapy (last infused 7/3/25), DMT2, HTN, HLD, and fibromuscular dysplasia who presents ot the ED with flu symptoms. Patient reports she got a sore throat on Wednesday, had chemo on Thursday and has been feeling progressively worse. She now has shortness of breath, chest pain, congestion and a fever of 100.7. She has had a poor appetite, managing to eat a little ice cream yesterday.    Past Medical History  Past Medical History:   Diagnosis Date    Asthma     Bacterial pneumonia     recent as of march 2025    Dengue     in past    Diabetes mellitus (H)     Encephalopathy     history of    Fibromuscular dysplasia     H/O sebaceous cyst     Hepatitis A     in the past    Hepatitis B infection     in the past    Hyperlipidemia LDL goal < 130     Hypertension     IPMN (intraductal papillary mucinous neoplasm)     Malaria     in past    Obesity     Renal artery stenosis 2005    Right, s/p stenting at [facility], FMD    Statin medication not prescribed per physician orders     pt declined     Past Surgical History:   Procedure Laterality Date    BIOPSY NODE SENTINEL Left 11/25/2024    Procedure: LEFT SENTINEL LYMPH NODE BIOPSY;  Surgeon: Javier Wood MD;  [place] [facility]    COLONOSCOPY N/A 3/10/2017    Procedure: COMBINED COLONOSCOPY, SINGLE OR MULTIPLE BIOPSY/POLYPECTOMY BY BIOPSY;  Surgeon: Sergo Mcgraw MD;  [facility]    COLONOSCOPY N/A 3/30/2022    Procedure: COLONOSCOPY, WITH POLYPECTOMY;  Surgeon: Leventhal, Thomas Michael, MD;  [place] [facility] [place]    ENDOMETRIAL SAMPLING (BIOPSY) N/A 11/25/2022    Procedure: endometrial biospy using portable ultrasound guidance;  Surgeon: Rios  Jessica Tran MD;  [facility]    ENDOSCOPIC ULTRASOUND UPPER GASTROINTESTINAL TRACT (GI) N/A 3/14/2023    Procedure: ENDOSCOPIC ULTRASOUND, ESOPHAGOSCOPY / UPPER GASTROINTESTINAL TRACT (GI);  Surgeon: Miguel A Vickers MD;  [facility]    EXAM UNDER ANESTHESIA PELVIC N/A 11/25/2022    Procedure: EXAM UNDER ANESTHESIA, PELVIS,;  Surgeon: Jessica Nation MD;  [facility]    IR CHEST PORT PLACEMENT > 5 YRS OF AGE  12/26/2024    IR PORT CHECK RIGHT  1/9/2025    IR RENAL/VISCERAL STENT/ATHERECT/PTA Right 2005    LUMPECTOMY, BREAST, LOCALIZED USING RADIOFREQUENCY IDENTIFICATION Left 11/25/2024    Procedure: LEFT BREAST LUMPECTOMY LOCALIZED USING RADIOFREQUENCY IDENTIFICATION;  Surgeon: Javier Wood MD;  Location: [facility] OR     acetaminophen (TYLENOL) 500 MG tablet  albuterol (PROAIR HFA) 108 (90 Base) MCG/ACT inhaler  aspirin 81 MG tablet  atenolol (TENORMIN) 25 MG tablet  blood glucose (NO BRAND SPECIFIED) lancets standard  blood glucose (NO BRAND SPECIFIED) test strip  blood glucose monitoring (NO BRAND SPECIFIED) meter device kit  calcium 600 MG tablet  Collagen Hydrolysate, Bovine, POWD  diclofenac (VOLTAREN) 1 % GEL topical gel  fluticasone-salmeterol (ADVAIR) 100-50 MCG/ACT inhaler  hydrochlorothiazide (HYDRODIURIL) 25 MG tablet  irbesartan (AVAPRO) 300 MG tablet  Lancets (ONETOUCH DELICA PLUS [id number]) MISC  letrozole (FEMARA) 2.5 MG tablet  LORazepam (ATIVAN) 0.5 MG tablet  metFORMIN (GLUCOPHAGE) 500 MG tablet  saline nasal (AYR SALINE) GEL topical gel  vitamin D3 (CHOLECALCIFEROL) 50 mcg (2000 units) tablet      Allergies   Allergen Reactions    Codeine Diarrhea and Rash    Codeine Camsylate Diarrhea and Rash    Ibuprofen GI Disturbance and Nausea    Lisinopril Cough     Family History  Family History   Problem Relation Age of Onset    Peptic Ulcer Disease Mother         Zollinger-Wheeler Syndrome    Diabetes Mother     Thyroid Disease Mother         s/p thyroidecomy    Cancer Mother          zollinger-ellison - cncer of pancreas    Cancer Father         prostate cancer- smoker, got bone mets    C.A.D. Father         aortic aneurysm    Childhood Heart Disease Sister         tetrology of fallot    Melanoma No family hx of     Skin Cancer No family hx of      Social History   Social History     Tobacco Use    Smoking status: Never    Smokeless tobacco: Never   Vaping Use    Vaping status: Never Used   Substance Use Topics    Alcohol use: Yes     Comment: rare    Drug use: No      Past medical history, past surgical history, medications, allergies, family history, and social history were reviewed with the patient. No additional pertinent items.   A complete review of systems was performed with pertinent positives and negatives noted in the HPI, and all other systems negative.    Physical Exam   BP: 137/84  Pulse: 81  Temp: 100.2  F (37.9  C)  Resp: 18  SpO2: 97 %  Physical Exam  Vitals and nursing note reviewed.   Constitutional:       General: She is not in acute distress.     Appearance: Normal appearance. She is ill-appearing. She is not diaphoretic.   HENT:      Head: Atraumatic.      Mouth/Throat:      Mouth: Mucous membranes are moist.   Eyes:      General: No scleral icterus.     Conjunctiva/sclera: Conjunctivae normal.   Cardiovascular:      Rate and Rhythm: Normal rate.      Heart sounds: Normal heart sounds.   Pulmonary:      Effort: No respiratory distress.      Breath sounds: Normal breath sounds. No stridor. No wheezing, rhonchi or rales.   Abdominal:      General: Abdomen is flat.   Musculoskeletal:      Cervical back: Neck supple.   Skin:     General: Skin is warm.      Findings: No rash.   Neurological:      Mental Status: She is alert.           ED Course, Procedures, & Data      Procedures            EKG Interpretation:      Interpreted by Aleksandr Decker  Time reviewed: 10:57:52   Symptoms at time of EKG: flu symptoms   Rhythm: normal sinus rhythm  Rate: 79 BPM  Axis:  normal  Ectopy: none  Conduction: normal  ST Segments/ T Waves: No ST-T wave changes  Q Waves: none  Comparison to prior: Unchanged from 5/10/25    Clinical Impression: no acute ischemia              Medications - No data to display       Critical care was not performed.     Medical Decision Making  The patient's presentation was of high complexity (an acute health issue posing potential threat to life or bodily function).    The patient's evaluation involved:  review of external note(s) from 3+ sources (see separate area of note for details)  review of 3+ test result(s) ordered prior to this encounter (see separate area of note for details)  strong consideration of a test (CT chest abdomen pelvis) that was ultimately deferred  ordering and/or review of 3+ test(s) in this encounter (see separate area of note for details)  discussion of management or test interpretation with another health professional (hospitalist)    The patient's management necessitated high risk (a decision regarding hospitalization).    Assessment & Plan    Labs as reviewed and interpreted by me are significant for a reassuring lactate, mixed respiratory and metabolic alkalosis, otherwise reassuring  Chest x-ray shows possible right lung infection  Given patient's immunocompromise status, fevers, likely lobar pneumonia we will plan to admit to medicine for community-acquired pneumonia  Ordered ceftriaxone and azithromycin as well as fluid resuscitation  Discussed case with internal medicine physician who accepted patient for admission for community-acquired pneumonia in the context of immunocompromise patient.      I have reviewed the nursing notes. I have reviewed the findings, diagnosis, plan and need for follow up with the patient.    New Prescriptions    No medications on file       Final diagnoses:   None       ICristobal, am serving as a trained medical scribe to document services personally performed by Aleksandr Decker MD  based on the provider's statements to me on July 5, 2025.  This document has been checked and approved by the attending provider.    I, Aleksandr Decker MD, was physically present and have reviewed and verified the accuracy of this note documented by Cristobal Bravo, medical scribe.      Aleksandr Decker MD  [facility] EMERGENCY DEPARTMENT  7/5/2025     Aleksandr Decker MD  07/05/25 [id number]

## 2025-07-05 NOTE — DISCHARGE INSTRUCTIONS
You were seen in the emergency department and we found that you have pneumonia.  Hospitalization had been discussed with you but you preferred to be discharged.  A prescription for antibiotics has been sent to the pharmacy.  If you have new or worsening symptoms or other concerns please return to the emergency department.

## 2025-07-08 ENCOUNTER — PATIENT OUTREACH (OUTPATIENT)
Dept: ONCOLOGY | Facility: CLINIC | Age: 75
End: 2025-07-08
Payer: COMMERCIAL

## 2025-07-08 NOTE — PROGRESS NOTES
[facility]: Cancer Care                                                                                          Reached out to Zenaida to  check in after her visit to the ED. No answer. M requesting a return call.    Alethea Fernandez, RN, BSN  RN Care Coordinator  [facility]

## 2025-07-10 LAB
BACTERIA SPEC CULT: NO GROWTH
BACTERIA SPEC CULT: NO GROWTH

## 2025-07-22 RX ORDER — ALBUTEROL SULFATE 90 UG/1
1-2 INHALANT RESPIRATORY (INHALATION)
Status: CANCELLED
Start: 2025-07-25

## 2025-07-22 RX ORDER — MEPERIDINE HYDROCHLORIDE 25 MG/ML
25 INJECTION INTRAMUSCULAR; INTRAVENOUS; SUBCUTANEOUS
Status: CANCELLED | OUTPATIENT
Start: 2025-07-25

## 2025-07-22 RX ORDER — METHYLPREDNISOLONE SODIUM SUCCINATE 40 MG/ML
40 INJECTION INTRAMUSCULAR; INTRAVENOUS
Status: CANCELLED
Start: 2025-07-25

## 2025-07-22 RX ORDER — DIPHENHYDRAMINE HYDROCHLORIDE 50 MG/ML
50 INJECTION, SOLUTION INTRAMUSCULAR; INTRAVENOUS
Status: CANCELLED
Start: 2025-07-25

## 2025-07-22 RX ORDER — HEPARIN SODIUM (PORCINE) LOCK FLUSH IV SOLN 100 UNIT/ML 100 UNIT/ML
5 SOLUTION INTRAVENOUS
Status: CANCELLED | OUTPATIENT
Start: 2025-07-25

## 2025-07-22 RX ORDER — EPINEPHRINE 1 MG/ML
0.3 INJECTION, SOLUTION INTRAMUSCULAR; SUBCUTANEOUS EVERY 5 MIN PRN
Status: CANCELLED | OUTPATIENT
Start: 2025-07-25

## 2025-07-22 RX ORDER — HEPARIN SODIUM,PORCINE 10 UNIT/ML
5-20 VIAL (ML) INTRAVENOUS DAILY PRN
Status: CANCELLED | OUTPATIENT
Start: 2025-07-25

## 2025-07-22 RX ORDER — DIPHENHYDRAMINE HCL 25 MG
50 CAPSULE ORAL
Status: CANCELLED
Start: 2025-07-25

## 2025-07-22 RX ORDER — ACETAMINOPHEN 325 MG/1
650 TABLET ORAL
Status: CANCELLED
Start: 2025-07-25

## 2025-07-22 RX ORDER — ALBUTEROL SULFATE 0.83 MG/ML
2.5 SOLUTION RESPIRATORY (INHALATION)
Status: CANCELLED | OUTPATIENT
Start: 2025-07-25

## 2025-07-22 RX ORDER — DIPHENHYDRAMINE HYDROCHLORIDE 50 MG/ML
25 INJECTION, SOLUTION INTRAMUSCULAR; INTRAVENOUS
Status: CANCELLED
Start: 2025-07-25

## 2025-07-22 RX ORDER — LORAZEPAM 2 MG/ML
0.5 INJECTION INTRAMUSCULAR EVERY 4 HOURS PRN
Status: CANCELLED | OUTPATIENT
Start: 2025-07-25

## 2025-07-24 ENCOUNTER — APPOINTMENT (OUTPATIENT)
Dept: LAB | Facility: CLINIC | Age: 75
End: 2025-07-24
Attending: INTERNAL MEDICINE
Payer: COMMERCIAL

## 2025-07-24 ENCOUNTER — INFUSION THERAPY VISIT (OUTPATIENT)
Dept: ONCOLOGY | Facility: CLINIC | Age: 75
End: 2025-07-24
Attending: INTERNAL MEDICINE
Payer: COMMERCIAL

## 2025-07-24 VITALS
OXYGEN SATURATION: 98 % | HEART RATE: 67 BPM | SYSTOLIC BLOOD PRESSURE: 111 MMHG | TEMPERATURE: 98.1 F | WEIGHT: 161.7 LBS | DIASTOLIC BLOOD PRESSURE: 72 MMHG | BODY MASS INDEX: 26.1 KG/M2 | RESPIRATION RATE: 18 BRPM

## 2025-07-24 DIAGNOSIS — C50.912 INVASIVE DUCTAL CARCINOMA OF BREAST, FEMALE, LEFT (H): Primary | ICD-10-CM

## 2025-07-24 LAB
ALBUMIN SERPL BCG-MCNC: 4 G/DL (ref 3.5–5.2)
ALP SERPL-CCNC: 57 U/L (ref 40–150)
ALT SERPL W P-5'-P-CCNC: 15 U/L (ref 0–50)
ANION GAP SERPL CALCULATED.3IONS-SCNC: 13 MMOL/L (ref 7–15)
AST SERPL W P-5'-P-CCNC: 16 U/L (ref 0–45)
BASOPHILS # BLD AUTO: 0.1 10E3/UL (ref 0–0.2)
BASOPHILS NFR BLD AUTO: 1 %
BILIRUB SERPL-MCNC: 0.3 MG/DL
BUN SERPL-MCNC: 18.9 MG/DL (ref 8–23)
CALCIUM SERPL-MCNC: 9.2 MG/DL (ref 8.8–10.4)
CHLORIDE SERPL-SCNC: 102 MMOL/L (ref 98–107)
CREAT SERPL-MCNC: 0.66 MG/DL (ref 0.51–0.95)
EGFRCR SERPLBLD CKD-EPI 2021: >90 ML/MIN/1.73M2
EOSINOPHIL # BLD AUTO: 0.2 10E3/UL (ref 0–0.7)
EOSINOPHIL NFR BLD AUTO: 4 %
ERYTHROCYTE [DISTWIDTH] IN BLOOD BY AUTOMATED COUNT: 14.4 % (ref 10–15)
GLUCOSE SERPL-MCNC: 151 MG/DL (ref 70–99)
HCO3 SERPL-SCNC: 25 MMOL/L (ref 22–29)
HCT VFR BLD AUTO: 34.8 % (ref 35–47)
HGB BLD-MCNC: 11.6 G/DL (ref 11.7–15.7)
IMM GRANULOCYTES # BLD: 0 10E3/UL
IMM GRANULOCYTES NFR BLD: 0 %
LYMPHOCYTES # BLD AUTO: 1.7 10E3/UL (ref 0.8–5.3)
LYMPHOCYTES NFR BLD AUTO: 34 %
MCH RBC QN AUTO: 28.4 PG (ref 26.5–33)
MCHC RBC AUTO-ENTMCNC: 33.3 G/DL (ref 31.5–36.5)
MCV RBC AUTO: 85 FL (ref 78–100)
MONOCYTES # BLD AUTO: 0.4 10E3/UL (ref 0–1.3)
MONOCYTES NFR BLD AUTO: 7 %
NEUTROPHILS # BLD AUTO: 2.7 10E3/UL (ref 1.6–8.3)
NEUTROPHILS NFR BLD AUTO: 54 %
NRBC # BLD AUTO: 0 10E3/UL
NRBC BLD AUTO-RTO: 0 /100
PLATELET # BLD AUTO: 296 10E3/UL (ref 150–450)
POTASSIUM SERPL-SCNC: 3.8 MMOL/L (ref 3.4–5.3)
PROT SERPL-MCNC: 6.3 G/DL (ref 6.4–8.3)
RBC # BLD AUTO: 4.08 10E6/UL (ref 3.8–5.2)
SODIUM SERPL-SCNC: 140 MMOL/L (ref 135–145)
WBC # BLD AUTO: 4.9 10E3/UL (ref 4–11)

## 2025-07-24 PROCEDURE — 36591 DRAW BLOOD OFF VENOUS DEVICE: CPT

## 2025-07-24 PROCEDURE — 250N000011 HC RX IP 250 OP 636: Performed by: NURSE PRACTITIONER

## 2025-07-24 PROCEDURE — 82247 BILIRUBIN TOTAL: CPT

## 2025-07-24 PROCEDURE — 85025 COMPLETE CBC W/AUTO DIFF WBC: CPT

## 2025-07-24 PROCEDURE — 250N000011 HC RX IP 250 OP 636: Performed by: INTERNAL MEDICINE

## 2025-07-24 PROCEDURE — 258N000003 HC RX IP 258 OP 636: Performed by: NURSE PRACTITIONER

## 2025-07-24 RX ORDER — HEPARIN SODIUM (PORCINE) LOCK FLUSH IV SOLN 100 UNIT/ML 100 UNIT/ML
5 SOLUTION INTRAVENOUS
Status: DISCONTINUED | OUTPATIENT
Start: 2025-07-24 | End: 2025-07-24 | Stop reason: HOSPADM

## 2025-07-24 RX ORDER — HEPARIN SODIUM (PORCINE) LOCK FLUSH IV SOLN 100 UNIT/ML 100 UNIT/ML
5 SOLUTION INTRAVENOUS ONCE
Status: COMPLETED | OUTPATIENT
Start: 2025-07-24 | End: 2025-07-24

## 2025-07-24 RX ADMIN — Medication 5 ML: at 07:17

## 2025-07-24 RX ADMIN — SODIUM CHLORIDE 470 MG: 0.9 INJECTION, SOLUTION INTRAVENOUS at 08:22

## 2025-07-24 RX ADMIN — Medication 5 ML: at 08:55

## 2025-07-24 ASSESSMENT — PAIN SCALES - GENERAL: PAINLEVEL_OUTOF10: NO PAIN (0)

## 2025-07-24 NOTE — PATIENT INSTRUCTIONS
Contact Numbers    [facility] [place] [facility]: [phone]    Call with chills and/or temperature greater than or equal to 100.5, uncontrolled nausea/vomiting, diarrhea, constipation, dizziness, shortness of breath, chest pain, bleeding, unexplained bruising, or any new/concerning symptoms, questions/concerns.     If you are having any concerning symptoms or wish to speak to a provider before your next infusion visit, please call your care coordinator or triage to notify them so we can adequately serve you.       After Hours: [phone]    If after hours, weekends, or holidays, call main hospital  and ask for Oncology doctor on call.      July 2025 Sunday Monday Tuesday Wednesday Thursday Friday Saturday             1     2     3    Lab Peripheral  11:00 AM   (15 min.)   [facility]   [facility]    Return Patient  11:15 AM   (45 min.)   Leilani Richards APRN CNP   [facility]    Infusion 90   2:00 PM   (90 min.)   [facility] ON[facility] INFUSION NURSE   [facility] 4     5    Admission  10:33 AM   Cole Marcos MD   [facility] Emergency Department   (Discharge: 7/5/2025)    Xray General  10:55 AM   (20 min.)   [id number]   [facility] Imaging   6     7     8     9     10     11     12       13     14     15     16     17     18     19       20     21     22     23     24    Lab Peripheral   7:00 AM   (15 min.)   [facility]   [facility]    Infusion 90   7:30 AM   (90 min.)   [facility] INFUSION NURSE   [facility] 25     26       27     28     29     30     31                             August 2025 Sunday Monday Tuesday Wednesday Thursday Friday Saturday                            1     2       3     4     5     6     7     8     9       10     11     12     13     14    Lab Peripheral   6:30 AM   (15 min.)   [facility]   [facility]  [facility]    Return Patient   6:55 AM   (30 min.)   Chito Cheung MD   [facility]    Infusion 90   8:00 AM   (90 min.)    ONC INFUSION NURSE   [facility] 15     16       17     18     19     20     21     22     23       24     25     26     27     28    ECHO COMPLETE   9:45 AM   (60 min.)   [id number]   [facility] 29     30       31                                                     Lab Results:  Recent Results (from the past 12 hours)   COMPREHENSIVE METABOLIC PANEL    Collection Time: 07/24/25  7:12 AM   Result Value Ref Range    Sodium 140 135 - 145 mmol/L    Potassium 3.8 3.4 - 5.3 mmol/L    Carbon Dioxide (CO2) 25 22 - 29 mmol/L    Anion Gap 13 7 - 15 mmol/L    Urea Nitrogen 18.9 8.0 - 23.0 mg/dL    Creatinine 0.66 0.51 - 0.95 mg/dL    GFR Estimate >90 >60 mL/min/1.73m2    Calcium 9.2 8.8 - 10.4 mg/dL    Chloride 102 98 - 107 mmol/L    Glucose 151 (H) 70 - 99 mg/dL    Alkaline Phosphatase 57 40 - 150 U/L    AST 16 0 - 45 U/L    ALT 15 0 - 50 U/L    Protein Total 6.3 (L) 6.4 - 8.3 g/dL    Albumin 4.0 3.5 - 5.2 g/dL    Bilirubin Total 0.3 <=1.2 mg/dL   CBC with platelets and differential    Collection Time: 07/24/25  7:12 AM   Result Value Ref Range    WBC Count 4.9 4.0 - 11.0 10e3/uL    RBC Count 4.08 3.80 - 5.20 10e6/uL    Hemoglobin 11.6 (L) 11.7 - 15.7 g/dL    Hematocrit 34.8 (L) 35.0 - 47.0 %    MCV 85 78 - 100 fL    MCH 28.4 26.5 - 33.0 pg    MCHC 33.3 31.5 - 36.5 g/dL    RDW 14.4 10.0 - 15.0 %    Platelet Count 296 150 - 450 10e3/uL    % Neutrophils 54 %    % Lymphocytes 34 %    % Monocytes 7 %    % Eosinophils 4 %    % Basophils 1 %    % Immature Granulocytes 0 %    NRBCs per 100 WBC 0 <1 /100    Absolute Neutrophils 2.7 1.6 - 8.3 10e3/uL    Absolute Lymphocytes 1.7 0.8 - 5.3 10e3/uL    Absolute Monocytes 0.4 0.0 - 1.3 10e3/uL    Absolute Eosinophils 0.2 0.0 - 0.7 10e3/uL    Absolute  Basophils 0.1 0.0 - 0.2 10e3/uL    Absolute Immature Granulocytes 0.0 <=0.4 10e3/uL    Absolute NRBCs 0.0 10e3/uL

## 2025-07-24 NOTE — PROGRESS NOTES
Infusion Nursing Note:  Zenaida Valles presents today for Cycle 7, day 1 Trastuzumab-qyyp.    Patient seen by provider today: No    Note: Pt presents to clinic still recovering from recent pna.  Continues to have fatigue and poor appetite with weight loss.  Pt states she is drinking well, but really has to work to eat.  Thinking of food has the ability to make her nauseated.  She notes that she changed Ca and Vd to three times a week in evening, rather than daily in AM, and this has helped with some of the nausea.  Declines nutritionist referral at this time.  Open to having [facility] check in in coming weeks to see if appetite continues to improved while recovering from pna.  Pt also notes slightly elevated coagulation labs during ER visit; wonders if any follow up needed.  Message sent to Dr. Cheung and Isamar KRISHNAMURTHY, [facility], to follow up with pt. Pt did not request or require any intervention for pain today.    Intravenous Access:  Implanted Port.    Treatment Conditions:  Lab Results   Component Value Date    HGB 11.6 (L) 07/24/2025    WBC 4.9 07/24/2025    ANEU 2.7 07/24/2025     07/24/2025        Lab Results   Component Value Date     07/24/2025    POTASSIUM 3.8 07/24/2025    MAG 1.9 08/14/2007    CR 0.66 07/24/2025    GAEL 9.2 07/24/2025    BILITOTAL 0.3 07/24/2025    ALBUMIN 4.0 07/24/2025    ALT 15 07/24/2025    AST 16 07/24/2025     ECHO/MUGA completed 5/22/25 EF 60-65%.    Post Infusion Assessment:  Patient tolerated infusion without incident.  Blood return noted pre and post infusion.  Site patent and intact, free from redness, edema or discomfort.  No evidence of extravasations.  Access discontinued per protocol.    Discharge Plan:   Patient declined prescription refills.  Discharge instructions reviewed with: Patient.  Patient and/or family verbalized understanding of discharge instructions and all questions answered.  AVS to patient via [facility].  Patient will return 8/14/2025 for next  appointment.   Patient discharged in stable condition accompanied by: self.  Departure Mode: Ambulatory.    Patricia Webb RN

## 2025-08-12 RX ORDER — DIPHENHYDRAMINE HCL 25 MG
50 CAPSULE ORAL
Status: CANCELLED
Start: 2025-08-15

## 2025-08-12 RX ORDER — LORAZEPAM 2 MG/ML
0.5 INJECTION INTRAMUSCULAR EVERY 4 HOURS PRN
Status: CANCELLED | OUTPATIENT
Start: 2025-08-15

## 2025-08-12 RX ORDER — EPINEPHRINE 1 MG/ML
0.3 INJECTION, SOLUTION INTRAMUSCULAR; SUBCUTANEOUS EVERY 5 MIN PRN
Status: CANCELLED | OUTPATIENT
Start: 2025-08-15

## 2025-08-12 RX ORDER — DIPHENHYDRAMINE HYDROCHLORIDE 50 MG/ML
25 INJECTION, SOLUTION INTRAMUSCULAR; INTRAVENOUS
Status: CANCELLED
Start: 2025-08-15

## 2025-08-12 RX ORDER — HEPARIN SODIUM,PORCINE 10 UNIT/ML
5-20 VIAL (ML) INTRAVENOUS DAILY PRN
Status: CANCELLED | OUTPATIENT
Start: 2025-08-15

## 2025-08-12 RX ORDER — DIPHENHYDRAMINE HYDROCHLORIDE 50 MG/ML
50 INJECTION, SOLUTION INTRAMUSCULAR; INTRAVENOUS
Status: CANCELLED
Start: 2025-08-15

## 2025-08-12 RX ORDER — MEPERIDINE HYDROCHLORIDE 25 MG/ML
25 INJECTION INTRAMUSCULAR; INTRAVENOUS; SUBCUTANEOUS
Status: CANCELLED | OUTPATIENT
Start: 2025-08-15

## 2025-08-12 RX ORDER — HEPARIN SODIUM (PORCINE) LOCK FLUSH IV SOLN 100 UNIT/ML 100 UNIT/ML
5 SOLUTION INTRAVENOUS
Status: CANCELLED | OUTPATIENT
Start: 2025-08-15

## 2025-08-12 RX ORDER — ALBUTEROL SULFATE 0.83 MG/ML
2.5 SOLUTION RESPIRATORY (INHALATION)
Status: CANCELLED | OUTPATIENT
Start: 2025-08-15

## 2025-08-12 RX ORDER — METHYLPREDNISOLONE SODIUM SUCCINATE 40 MG/ML
40 INJECTION INTRAMUSCULAR; INTRAVENOUS
Status: CANCELLED
Start: 2025-08-15

## 2025-08-12 RX ORDER — ALBUTEROL SULFATE 90 UG/1
1-2 INHALANT RESPIRATORY (INHALATION)
Status: CANCELLED
Start: 2025-08-15

## 2025-08-12 RX ORDER — ACETAMINOPHEN 325 MG/1
650 TABLET ORAL
Status: CANCELLED
Start: 2025-08-15

## 2025-08-13 DIAGNOSIS — C50.912 INVASIVE DUCTAL CARCINOMA OF BREAST, FEMALE, LEFT (H): Primary | ICD-10-CM

## 2025-08-13 DIAGNOSIS — R79.1 ABNORMAL COAGULATION PROFILE: ICD-10-CM

## 2025-08-14 ENCOUNTER — APPOINTMENT (OUTPATIENT)
Dept: LAB | Facility: CLINIC | Age: 75
End: 2025-08-14
Attending: INTERNAL MEDICINE
Payer: COMMERCIAL

## 2025-08-14 ENCOUNTER — INFUSION THERAPY VISIT (OUTPATIENT)
Dept: ONCOLOGY | Facility: CLINIC | Age: 75
End: 2025-08-14
Attending: INTERNAL MEDICINE
Payer: COMMERCIAL

## 2025-08-14 VITALS
WEIGHT: 164.4 LBS | BODY MASS INDEX: 26.53 KG/M2 | DIASTOLIC BLOOD PRESSURE: 84 MMHG | OXYGEN SATURATION: 99 % | TEMPERATURE: 97.7 F | HEART RATE: 74 BPM | SYSTOLIC BLOOD PRESSURE: 133 MMHG | RESPIRATION RATE: 16 BRPM

## 2025-08-14 DIAGNOSIS — R79.1 ABNORMAL COAGULATION PROFILE: ICD-10-CM

## 2025-08-14 DIAGNOSIS — C50.912 INVASIVE DUCTAL CARCINOMA OF BREAST, FEMALE, LEFT (H): Primary | ICD-10-CM

## 2025-08-14 LAB
ALBUMIN SERPL BCG-MCNC: 4.1 G/DL (ref 3.5–5.2)
ALP SERPL-CCNC: 48 U/L (ref 40–150)
ALT SERPL W P-5'-P-CCNC: 16 U/L (ref 0–50)
ANION GAP SERPL CALCULATED.3IONS-SCNC: 14 MMOL/L (ref 7–15)
APTT PPP: 25 SECONDS (ref 22–38)
AST SERPL W P-5'-P-CCNC: 19 U/L (ref 0–45)
BASOPHILS # BLD AUTO: 0.05 10E3/UL (ref 0–0.2)
BASOPHILS NFR BLD AUTO: 1.1 %
BILIRUB SERPL-MCNC: 0.3 MG/DL
BUN SERPL-MCNC: 15.2 MG/DL (ref 8–23)
CALCIUM SERPL-MCNC: 9.2 MG/DL (ref 8.8–10.4)
CHLORIDE SERPL-SCNC: 97 MMOL/L (ref 98–107)
CREAT SERPL-MCNC: 0.63 MG/DL (ref 0.51–0.95)
EGFRCR SERPLBLD CKD-EPI 2021: >90 ML/MIN/1.73M2
EOSINOPHIL # BLD AUTO: 0.18 10E3/UL (ref 0–0.7)
EOSINOPHIL NFR BLD AUTO: 3.9 %
ERYTHROCYTE [DISTWIDTH] IN BLOOD BY AUTOMATED COUNT: 14.6 % (ref 10–15)
FIBRINOGEN PPP-MCNC: 343 MG/DL (ref 170–510)
GLUCOSE SERPL-MCNC: 114 MG/DL (ref 70–99)
HCO3 SERPL-SCNC: 26 MMOL/L (ref 22–29)
HCT VFR BLD AUTO: 33.8 % (ref 35–47)
HGB BLD-MCNC: 11.4 G/DL (ref 11.7–15.7)
IMM GRANULOCYTES # BLD: <0.03 10E3/UL
IMM GRANULOCYTES NFR BLD: 0.4 %
INR PPP: 0.97 (ref 0.85–1.15)
LYMPHOCYTES # BLD AUTO: 1.42 10E3/UL (ref 0.8–5.3)
LYMPHOCYTES NFR BLD AUTO: 30.5 %
MCH RBC QN AUTO: 28.5 PG (ref 26.5–33)
MCHC RBC AUTO-ENTMCNC: 33.7 G/DL (ref 31.5–36.5)
MCV RBC AUTO: 84.5 FL (ref 78–100)
MONOCYTES # BLD AUTO: 0.33 10E3/UL (ref 0–1.3)
MONOCYTES NFR BLD AUTO: 7.1 %
NEUTROPHILS # BLD AUTO: 2.66 10E3/UL (ref 1.6–8.3)
NEUTROPHILS NFR BLD AUTO: 57 %
NRBC # BLD AUTO: <0.03 10E3/UL
NRBC BLD AUTO-RTO: 0 /100
PLATELET # BLD AUTO: 241 10E3/UL (ref 150–450)
POTASSIUM SERPL-SCNC: 3.9 MMOL/L (ref 3.4–5.3)
PROT SERPL-MCNC: 6.4 G/DL (ref 6.4–8.3)
PROTHROMBIN TIME: 13.1 SECONDS (ref 11.8–14.8)
RBC # BLD AUTO: 4 10E6/UL (ref 3.8–5.2)
SODIUM SERPL-SCNC: 137 MMOL/L (ref 135–145)
WBC # BLD AUTO: 4.66 10E3/UL (ref 4–11)

## 2025-08-14 PROCEDURE — 85730 THROMBOPLASTIN TIME PARTIAL: CPT

## 2025-08-14 PROCEDURE — 85384 FIBRINOGEN ACTIVITY: CPT

## 2025-08-14 PROCEDURE — 250N000011 HC RX IP 250 OP 636: Performed by: NURSE PRACTITIONER

## 2025-08-14 PROCEDURE — 85610 PROTHROMBIN TIME: CPT

## 2025-08-14 PROCEDURE — 84155 ASSAY OF PROTEIN SERUM: CPT

## 2025-08-14 PROCEDURE — 36591 DRAW BLOOD OFF VENOUS DEVICE: CPT

## 2025-08-14 PROCEDURE — 250N000011 HC RX IP 250 OP 636: Performed by: INTERNAL MEDICINE

## 2025-08-14 PROCEDURE — 85014 HEMATOCRIT: CPT

## 2025-08-14 PROCEDURE — 258N000003 HC RX IP 258 OP 636: Performed by: NURSE PRACTITIONER

## 2025-08-14 RX ORDER — HEPARIN SODIUM (PORCINE) LOCK FLUSH IV SOLN 100 UNIT/ML 100 UNIT/ML
5 SOLUTION INTRAVENOUS ONCE
Status: COMPLETED | OUTPATIENT
Start: 2025-08-14 | End: 2025-08-14

## 2025-08-14 RX ORDER — HEPARIN SODIUM (PORCINE) LOCK FLUSH IV SOLN 100 UNIT/ML 100 UNIT/ML
5 SOLUTION INTRAVENOUS
Status: DISCONTINUED | OUTPATIENT
Start: 2025-08-14 | End: 2025-08-14 | Stop reason: HOSPADM

## 2025-08-14 RX ADMIN — Medication 5 ML: at 09:47

## 2025-08-14 RX ADMIN — SODIUM CHLORIDE 470 MG: 0.9 INJECTION, SOLUTION INTRAVENOUS at 09:06

## 2025-08-14 RX ADMIN — Medication 5 ML: at 08:06

## 2025-08-14 ASSESSMENT — PAIN SCALES - GENERAL: PAINLEVEL_OUTOF10: NO PAIN (0)

## 2025-08-17 ENCOUNTER — HEALTH MAINTENANCE LETTER (OUTPATIENT)
Age: 75
End: 2025-08-17

## 2025-08-28 ENCOUNTER — ANCILLARY PROCEDURE (OUTPATIENT)
Dept: CARDIOLOGY | Facility: CLINIC | Age: 75
End: 2025-08-28
Attending: NURSE PRACTITIONER
Payer: COMMERCIAL

## 2025-08-28 DIAGNOSIS — Z79.899 ENCOUNTER FOR LONG-TERM (CURRENT) USE OF HIGH-RISK MEDICATION: ICD-10-CM

## 2025-08-28 DIAGNOSIS — C50.912 INVASIVE DUCTAL CARCINOMA OF BREAST, FEMALE, LEFT (H): ICD-10-CM

## 2025-08-28 LAB — LVEF ECHO: NORMAL

## 2025-08-28 PROCEDURE — 93356 MYOCRD STRAIN IMG SPCKL TRCK: CPT | Performed by: STUDENT IN AN ORGANIZED HEALTH CARE EDUCATION/TRAINING PROGRAM

## 2025-08-28 PROCEDURE — 93306 TTE W/DOPPLER COMPLETE: CPT | Performed by: STUDENT IN AN ORGANIZED HEALTH CARE EDUCATION/TRAINING PROGRAM

## 2025-09-04 ENCOUNTER — INFUSION THERAPY VISIT (OUTPATIENT)
Dept: ONCOLOGY | Facility: CLINIC | Age: 75
End: 2025-09-04
Attending: INTERNAL MEDICINE
Payer: COMMERCIAL

## 2025-09-04 VITALS
TEMPERATURE: 97.5 F | SYSTOLIC BLOOD PRESSURE: 130 MMHG | HEART RATE: 71 BPM | RESPIRATION RATE: 16 BRPM | BODY MASS INDEX: 26.91 KG/M2 | DIASTOLIC BLOOD PRESSURE: 76 MMHG | WEIGHT: 166.7 LBS | OXYGEN SATURATION: 97 %

## 2025-09-04 DIAGNOSIS — C50.912 INVASIVE DUCTAL CARCINOMA OF BREAST, FEMALE, LEFT (H): Primary | ICD-10-CM

## 2025-09-04 DIAGNOSIS — G95.29: ICD-10-CM

## 2025-09-04 DIAGNOSIS — D18.09: ICD-10-CM

## 2025-09-04 LAB
ALBUMIN SERPL BCG-MCNC: 4.1 G/DL (ref 3.5–5.2)
ALP SERPL-CCNC: 63 U/L (ref 40–150)
ALT SERPL W P-5'-P-CCNC: 23 U/L (ref 0–50)
ANION GAP SERPL CALCULATED.3IONS-SCNC: 13 MMOL/L (ref 7–15)
AST SERPL W P-5'-P-CCNC: 20 U/L (ref 0–45)
BASOPHILS # BLD AUTO: 0.04 10E3/UL (ref 0–0.2)
BASOPHILS NFR BLD AUTO: 0.8 %
BILIRUB SERPL-MCNC: 0.2 MG/DL
BUN SERPL-MCNC: 19 MG/DL (ref 8–23)
CALCIUM SERPL-MCNC: 9.2 MG/DL (ref 8.8–10.4)
CHLORIDE SERPL-SCNC: 103 MMOL/L (ref 98–107)
CREAT SERPL-MCNC: 0.66 MG/DL (ref 0.51–0.95)
EGFRCR SERPLBLD CKD-EPI 2021: >90 ML/MIN/1.73M2
EOSINOPHIL # BLD AUTO: 0.25 10E3/UL (ref 0–0.7)
EOSINOPHIL NFR BLD AUTO: 5.2 %
ERYTHROCYTE [DISTWIDTH] IN BLOOD BY AUTOMATED COUNT: 14.7 % (ref 10–15)
GLUCOSE SERPL-MCNC: 136 MG/DL (ref 70–99)
HCO3 SERPL-SCNC: 25 MMOL/L (ref 22–29)
HCT VFR BLD AUTO: 35.4 % (ref 35–47)
HGB BLD-MCNC: 11.5 G/DL (ref 11.7–15.7)
IMM GRANULOCYTES # BLD: <0.03 10E3/UL
IMM GRANULOCYTES NFR BLD: 0.2 %
LYMPHOCYTES # BLD AUTO: 1.43 10E3/UL (ref 0.8–5.3)
LYMPHOCYTES NFR BLD AUTO: 29.9 %
MCH RBC QN AUTO: 28.5 PG (ref 26.5–33)
MCHC RBC AUTO-ENTMCNC: 32.5 G/DL (ref 31.5–36.5)
MCV RBC AUTO: 87.8 FL (ref 78–100)
MONOCYTES # BLD AUTO: 0.41 10E3/UL (ref 0–1.3)
MONOCYTES NFR BLD AUTO: 8.6 %
NEUTROPHILS # BLD AUTO: 2.65 10E3/UL (ref 1.6–8.3)
NEUTROPHILS NFR BLD AUTO: 55.3 %
NRBC # BLD AUTO: <0.03 10E3/UL
NRBC BLD AUTO-RTO: 0 /100
PLATELET # BLD AUTO: 266 10E3/UL (ref 150–450)
POTASSIUM SERPL-SCNC: 3.6 MMOL/L (ref 3.4–5.3)
PROT SERPL-MCNC: 6.4 G/DL (ref 6.4–8.3)
RBC # BLD AUTO: 4.03 10E6/UL (ref 3.8–5.2)
SODIUM SERPL-SCNC: 141 MMOL/L (ref 135–145)
WBC # BLD AUTO: 4.79 10E3/UL (ref 4–11)

## 2025-09-04 PROCEDURE — 85048 AUTOMATED LEUKOCYTE COUNT: CPT

## 2025-09-04 PROCEDURE — 36591 DRAW BLOOD OFF VENOUS DEVICE: CPT

## 2025-09-04 PROCEDURE — G0463 HOSPITAL OUTPT CLINIC VISIT: HCPCS | Performed by: INTERNAL MEDICINE

## 2025-09-04 PROCEDURE — 250N000011 HC RX IP 250 OP 636: Performed by: INTERNAL MEDICINE

## 2025-09-04 PROCEDURE — 258N000003 HC RX IP 258 OP 636: Performed by: INTERNAL MEDICINE

## 2025-09-04 PROCEDURE — 84075 ASSAY ALKALINE PHOSPHATASE: CPT

## 2025-09-04 RX ORDER — MEPERIDINE HYDROCHLORIDE 25 MG/ML
25 INJECTION INTRAMUSCULAR; INTRAVENOUS; SUBCUTANEOUS
Status: CANCELLED | OUTPATIENT
Start: 2025-09-04

## 2025-09-04 RX ORDER — HEPARIN SODIUM,PORCINE 10 UNIT/ML
5-20 VIAL (ML) INTRAVENOUS DAILY PRN
Status: CANCELLED | OUTPATIENT
Start: 2025-09-04

## 2025-09-04 RX ORDER — ALBUTEROL SULFATE 0.83 MG/ML
2.5 SOLUTION RESPIRATORY (INHALATION)
Status: CANCELLED | OUTPATIENT
Start: 2025-09-04

## 2025-09-04 RX ORDER — HEPARIN SODIUM,PORCINE 10 UNIT/ML
5-20 VIAL (ML) INTRAVENOUS DAILY PRN
OUTPATIENT
Start: 2025-11-06

## 2025-09-04 RX ORDER — HEPARIN SODIUM (PORCINE) LOCK FLUSH IV SOLN 100 UNIT/ML 100 UNIT/ML
5 SOLUTION INTRAVENOUS
Status: DISCONTINUED | OUTPATIENT
Start: 2025-09-04 | End: 2025-09-04 | Stop reason: HOSPADM

## 2025-09-04 RX ORDER — EPINEPHRINE 1 MG/ML
0.3 INJECTION, SOLUTION INTRAMUSCULAR; SUBCUTANEOUS EVERY 5 MIN PRN
Status: CANCELLED | OUTPATIENT
Start: 2025-09-04

## 2025-09-04 RX ORDER — HEPARIN SODIUM (PORCINE) LOCK FLUSH IV SOLN 100 UNIT/ML 100 UNIT/ML
500 SOLUTION INTRAVENOUS ONCE
Status: COMPLETED | OUTPATIENT
Start: 2025-09-04 | End: 2025-09-04

## 2025-09-04 RX ORDER — DIPHENHYDRAMINE HYDROCHLORIDE 50 MG/ML
50 INJECTION INTRAMUSCULAR; INTRAVENOUS
Status: CANCELLED
Start: 2025-09-04

## 2025-09-04 RX ORDER — DIPHENHYDRAMINE HYDROCHLORIDE 50 MG/ML
25 INJECTION INTRAMUSCULAR; INTRAVENOUS
Status: CANCELLED
Start: 2025-09-04

## 2025-09-04 RX ORDER — HEPARIN SODIUM (PORCINE) LOCK FLUSH IV SOLN 100 UNIT/ML 100 UNIT/ML
5 SOLUTION INTRAVENOUS
Status: CANCELLED | OUTPATIENT
Start: 2025-09-04

## 2025-09-04 RX ORDER — LORAZEPAM 2 MG/ML
0.5 INJECTION INTRAMUSCULAR EVERY 4 HOURS PRN
Status: CANCELLED | OUTPATIENT
Start: 2025-09-04

## 2025-09-04 RX ORDER — ZOLEDRONIC ACID 0.04 MG/ML
4 INJECTION, SOLUTION INTRAVENOUS ONCE
OUTPATIENT
Start: 2025-11-06 | End: 2025-10-28

## 2025-09-04 RX ORDER — HEPARIN SODIUM (PORCINE) LOCK FLUSH IV SOLN 100 UNIT/ML 100 UNIT/ML
5 SOLUTION INTRAVENOUS
OUTPATIENT
Start: 2025-11-06

## 2025-09-04 RX ORDER — METHYLPREDNISOLONE SODIUM SUCCINATE 40 MG/ML
40 INJECTION INTRAMUSCULAR; INTRAVENOUS
Status: CANCELLED
Start: 2025-09-04

## 2025-09-04 RX ORDER — DIPHENHYDRAMINE HCL 25 MG
50 CAPSULE ORAL
Status: CANCELLED
Start: 2025-09-04

## 2025-09-04 RX ORDER — ALBUTEROL SULFATE 90 UG/1
1-2 INHALANT RESPIRATORY (INHALATION)
Status: CANCELLED
Start: 2025-09-04

## 2025-09-04 RX ORDER — ACETAMINOPHEN 325 MG/1
650 TABLET ORAL
Status: CANCELLED
Start: 2025-09-04

## 2025-09-04 RX ADMIN — Medication 5 ML: at 08:57

## 2025-09-04 RX ADMIN — SODIUM CHLORIDE 470 MG: 0.9 INJECTION, SOLUTION INTRAVENOUS at 08:23

## 2025-09-04 RX ADMIN — Medication 500 UNITS: at 06:55

## 2025-09-04 ASSESSMENT — PAIN SCALES - GENERAL: PAINLEVEL_OUTOF10: NO PAIN (0)

## (undated) DEVICE — SU DERMABOND ADVANCED .7ML DNX12

## (undated) DEVICE — GOWN IMPERVIOUS 2XL BLUE

## (undated) DEVICE — PREP DYNA-HEX 4% CHG SCRUB 4OZ BOTTLE MDS098710

## (undated) DEVICE — SUCTION MANIFOLD NEPTUNE 2 SYS 1 PORT 702-025-000

## (undated) DEVICE — SOL WATER IRRIG 500ML BOTTLE 2F7113

## (undated) DEVICE — STRAP KNEE/BODY 31143004

## (undated) DEVICE — GOWN XLG DISP 9545

## (undated) DEVICE — DRAPE MAYO STAND 23X54 8337

## (undated) DEVICE — ESU GROUND PAD ADULT W/CORD E7507

## (undated) DEVICE — DRSG TELFA 3X8" 1238

## (undated) DEVICE — LINEN TOWEL PACK X5 5464

## (undated) DEVICE — TUBING SUCTION 12"X1/4" N612

## (undated) DEVICE — CLIP HORIZON MED BLUE 002200

## (undated) DEVICE — MARKER MARGIN MARKER STD 6 COLOR SGL USE MMS6

## (undated) DEVICE — SPONGE RAY-TEC 4X8" 7318

## (undated) DEVICE — SOL NACL 0.9% IRRIG 1000ML BOTTLE 2F7124

## (undated) DEVICE — CLIP HORIZON SM RED WIDE SLOT 001201

## (undated) DEVICE — KIT ENDO TURNOVER/PROCEDURE CARRY-ON 101822

## (undated) DEVICE — LIGHT HANDLE X1 31140133

## (undated) DEVICE — GLOVE BIOGEL PI MICRO INDICATOR UNDERGLOVE SZ 7.0 48970

## (undated) DEVICE — SPECIMEN CONTAINER 3OZ W/FORMALIN 59901

## (undated) DEVICE — ESU ELEC BLADE 2.75" COATED/INSULATED E1455

## (undated) DEVICE — PACK MINOR CUSTOM ASC

## (undated) DEVICE — JELLY LUBRICATING SURGILUBE 2OZ TUBE 281020502

## (undated) DEVICE — BNDG COHESIVE 2"X5YDS UNSTERILE ASSORTED COLORS LF 8962

## (undated) DEVICE — KIT ENDO FIRST STEP DISINFECTANT 200ML W/POUCH EP-4

## (undated) DEVICE — PAD CHUX UNDERPAD 30X36" P3036C

## (undated) DEVICE — SOL WATER IRRIG 1000ML BOTTLE 2F7114

## (undated) DEVICE — NDL 27GA 1.25" 305136

## (undated) DEVICE — SYR 10ML FINGER CONTROL W/O NDL 309695

## (undated) DEVICE — SUCTION CURETTE 3MM ENDOMETRIAL MX140

## (undated) DEVICE — DRAPE IOBAN INCISE 23X17" 6650EZ

## (undated) DEVICE — PREP CHLORAPREP 26ML TINTED HI-LITE ORANGE 930815

## (undated) DEVICE — NDL BLUNT 18GA 1" W/O FILTER 305181

## (undated) DEVICE — GLOVE BIOGEL PI MICRO SZ 6.5 48565

## (undated) DEVICE — PROBE COVER INTRAOPERATIVE 5"X96" PC1308

## (undated) DEVICE — SET BREAST BIOPSY LOCALIZER 20 PROBE 8MM PENCIL 09-0006

## (undated) DEVICE — DRAPE SHEET REV FOLD 3/4 9349

## (undated) DEVICE — NDL 25GA 2"  8881200441

## (undated) DEVICE — SU PDS II 4-0 FS-2 27" Z422H

## (undated) DEVICE — SU SILK 3-0 SH 30" K832H

## (undated) DEVICE — DRAPE LAP TRANSVERSE 29421

## (undated) DEVICE — NDL 25GA 1.5" 305127

## (undated) DEVICE — SPECIMEN CONTAINER W/10% BUFFERED FORMALIN 120ML 591201

## (undated) DEVICE — SU VICRYL+ 3-0 27IN SH UND VCP416H

## (undated) DEVICE — PREP PAD ALCOHOL 6818

## (undated) DEVICE — BASIN SET SINGLE STERILE 13752-624

## (undated) DEVICE — KIT PROCEDURE SPY-PHI SGL HH9001

## (undated) DEVICE — Device

## (undated) DEVICE — DECANTER TRANSFER DEVICE 2008S

## (undated) DEVICE — SOL NACL 0.9% IRRIG 500ML BOTTLE 2F7123

## (undated) DEVICE — GLOVE BIOGEL PI MICRO SZ 8.0 48580

## (undated) DEVICE — ENDO FORCEP SPIKED SERRATED SHAFT JUMBO 239CM G56998

## (undated) DEVICE — LINEN GOWN X4 5410

## (undated) DEVICE — DILATOR PROBE UTERINE OS CANAL FINDER 260-610

## (undated) RX ORDER — HEPARIN SODIUM (PORCINE) LOCK FLUSH IV SOLN 100 UNIT/ML 100 UNIT/ML
SOLUTION INTRAVENOUS
Status: DISPENSED
Start: 2025-01-09

## (undated) RX ORDER — FENTANYL CITRATE 50 UG/ML
INJECTION, SOLUTION INTRAMUSCULAR; INTRAVENOUS
Status: DISPENSED
Start: 2024-11-25

## (undated) RX ORDER — ALBUTEROL SULFATE 0.83 MG/ML
SOLUTION RESPIRATORY (INHALATION)
Status: DISPENSED
Start: 2024-11-25

## (undated) RX ORDER — CEFAZOLIN SODIUM 2 G/50ML
SOLUTION INTRAVENOUS
Status: DISPENSED
Start: 2024-11-25

## (undated) RX ORDER — ACETAMINOPHEN 325 MG/1
TABLET ORAL
Status: DISPENSED
Start: 2022-11-25

## (undated) RX ORDER — LIDOCAINE HYDROCHLORIDE 10 MG/ML
INJECTION, SOLUTION EPIDURAL; INFILTRATION; INTRACAUDAL; PERINEURAL
Status: DISPENSED
Start: 2022-11-25

## (undated) RX ORDER — FENTANYL CITRATE 50 UG/ML
INJECTION, SOLUTION INTRAMUSCULAR; INTRAVENOUS
Status: DISPENSED
Start: 2017-03-10

## (undated) RX ORDER — HYDROMORPHONE HYDROCHLORIDE 1 MG/ML
INJECTION, SOLUTION INTRAMUSCULAR; INTRAVENOUS; SUBCUTANEOUS
Status: DISPENSED
Start: 2024-11-25

## (undated) RX ORDER — PROPOFOL 10 MG/ML
INJECTION, EMULSION INTRAVENOUS
Status: DISPENSED
Start: 2024-11-25

## (undated) RX ORDER — CEFAZOLIN SODIUM 2 G/100ML
INJECTION, SOLUTION INTRAVENOUS
Status: DISPENSED
Start: 2024-12-26

## (undated) RX ORDER — GABAPENTIN 100 MG/1
CAPSULE ORAL
Status: DISPENSED
Start: 2022-11-25

## (undated) RX ORDER — LIDOCAINE HYDROCHLORIDE 10 MG/ML
INJECTION, SOLUTION INFILTRATION; PERINEURAL
Status: DISPENSED
Start: 2024-12-26

## (undated) RX ORDER — ALBUTEROL SULFATE 0.83 MG/ML
SOLUTION RESPIRATORY (INHALATION)
Status: DISPENSED
Start: 2022-11-25

## (undated) RX ORDER — HEPARIN SODIUM (PORCINE) LOCK FLUSH IV SOLN 100 UNIT/ML 100 UNIT/ML
SOLUTION INTRAVENOUS
Status: DISPENSED
Start: 2024-12-26

## (undated) RX ORDER — ACETAMINOPHEN 325 MG/1
TABLET ORAL
Status: DISPENSED
Start: 2024-11-25